# Patient Record
Sex: MALE | Race: WHITE | NOT HISPANIC OR LATINO
[De-identification: names, ages, dates, MRNs, and addresses within clinical notes are randomized per-mention and may not be internally consistent; named-entity substitution may affect disease eponyms.]

---

## 2017-02-21 ENCOUNTER — PRIOR ORIGINAL RECORDS (OUTPATIENT)
Dept: OTHER | Age: 72
End: 2017-02-21

## 2017-02-23 ENCOUNTER — TELEPHONE (OUTPATIENT)
Dept: FAMILY MEDICINE CLINIC | Facility: CLINIC | Age: 72
End: 2017-02-23

## 2017-02-23 NOTE — TELEPHONE ENCOUNTER
Pt advised. Appointment scheduled.   Future Appointments  Date Time Provider [facility] Radhika   2/24/2017 2:45 PM Marci Terrazas DO EMGOSW [facility] [place]   6/23/2017 10:20 AM Saida Lynn MD ONPV RHEUM [facility]SE MARIE NPV

## 2017-02-23 NOTE — TELEPHONE ENCOUNTER
Dr. Lucrecia Andrews office (hematology) called and stated pt has elevated fasting sugars. They wanted to make sure Dr. Marika Joseph is aware. Pt had recent labs done 2/21/17 at Dr. Lucrecia Andrews office. Please advise.

## 2017-02-24 ENCOUNTER — PRIOR ORIGINAL RECORDS (OUTPATIENT)
Dept: OTHER | Age: 72
End: 2017-02-24

## 2017-02-24 ENCOUNTER — OFFICE VISIT (OUTPATIENT)
Dept: FAMILY MEDICINE CLINIC | Facility: CLINIC | Age: 72
End: 2017-02-24

## 2017-02-24 VITALS
DIASTOLIC BLOOD PRESSURE: 72 MMHG | BODY MASS INDEX: 36 KG/M2 | HEART RATE: 76 BPM | SYSTOLIC BLOOD PRESSURE: 120 MMHG | TEMPERATURE: 98 F | WEIGHT: 242.38 LBS | RESPIRATION RATE: 14 BRPM

## 2017-02-24 DIAGNOSIS — E11.65 TYPE 2 DIABETES MELLITUS WITH HYPERGLYCEMIA, WITHOUT LONG-TERM CURRENT USE OF INSULIN (HCC): Primary | ICD-10-CM

## 2017-02-24 PROCEDURE — 99214 OFFICE O/P EST MOD 30 MIN: CPT | Performed by: FAMILY MEDICINE

## 2017-02-24 PROCEDURE — 83036 HEMOGLOBIN GLYCOSYLATED A1C: CPT | Performed by: FAMILY MEDICINE

## 2017-02-24 NOTE — PROGRESS NOTES
CC: abnormal testing    HPI:     Location blood sugar  Quality elevated  Severity moderate to severe (recent near 400)  Duration this is a chronic issue    ROS: positive polydipsia and polyuria, some dryness to skin, no cp or sob    Past Medical History

## 2017-02-25 LAB
EST. AVERAGE GLUCOSE BLD GHB EST-MCNC: 390 MG/DL (ref 68–126)
HBA1C MFR BLD HPLC: 15.2 % (ref ?–5.7)

## 2017-02-28 ENCOUNTER — NURSE ONLY (OUTPATIENT)
Dept: ENDOCRINOLOGY CLINIC | Facility: CLINIC | Age: 72
End: 2017-02-28

## 2017-02-28 VITALS — SYSTOLIC BLOOD PRESSURE: 128 MMHG | WEIGHT: 238 LBS | BODY MASS INDEX: 35 KG/M2 | DIASTOLIC BLOOD PRESSURE: 58 MMHG

## 2017-02-28 DIAGNOSIS — E11.65 TYPE 2 DIABETES MELLITUS WITH HYPERGLYCEMIA, UNSPECIFIED LONG TERM INSULIN USE STATUS: Primary | ICD-10-CM

## 2017-02-28 PROCEDURE — G0108 DIAB MANAGE TRN  PER INDIV: HCPCS | Performed by: DIETITIAN, REGISTERED

## 2017-02-28 RX ORDER — METFORMIN HYDROCHLORIDE 500 MG/1
TABLET, EXTENDED RELEASE ORAL
Qty: 120 TABLET | Refills: 6 | Status: SHIPPED | OUTPATIENT
Start: 2017-02-28 | End: 2017-07-06

## 2017-02-28 NOTE — PROGRESS NOTES
Chance Hernandez  : 1945 attended Step 1 Diabetic Education:  Pt. Accompanied by wife to appt.   Date: 2017   Start time: 1pm End time: 2:30pm    /58 mmHg  Wt 238 lb      HGBA1C (%)   Date Value   2017 15.2*   ----------     Depr Instructed/reinforced blood glucose monitoring, testing schedules and target goals:   Fasting / Pre-meal  2 Hour Post-prandial 140-180 -160  Demonstrated ability to perform blood glucose testing on: Zain Contour Next EZ  Glucose today was 523

## 2017-03-07 ENCOUNTER — NURSE ONLY (OUTPATIENT)
Dept: ENDOCRINOLOGY CLINIC | Facility: CLINIC | Age: 72
End: 2017-03-07

## 2017-03-07 ENCOUNTER — MED REC SCAN ONLY (OUTPATIENT)
Dept: FAMILY MEDICINE CLINIC | Facility: CLINIC | Age: 72
End: 2017-03-07

## 2017-03-07 VITALS — WEIGHT: 244 LBS | BODY MASS INDEX: 36 KG/M2 | DIASTOLIC BLOOD PRESSURE: 58 MMHG | SYSTOLIC BLOOD PRESSURE: 110 MMHG

## 2017-03-07 DIAGNOSIS — IMO0001 UNCONTROLLED TYPE 2 DIABETES MELLITUS WITHOUT COMPLICATION, WITHOUT LONG-TERM CURRENT USE OF INSULIN: Primary | ICD-10-CM

## 2017-03-07 PROCEDURE — G0108 DIAB MANAGE TRN  PER INDIV: HCPCS | Performed by: DIETITIAN, REGISTERED

## 2017-03-07 NOTE — PROGRESS NOTES
Rodney Cuello  [facility][id number] was seen for Diabetic Education Initial/Follow up:    Date: 3/7/2017  Referring MD:Dr. Lamin Lutz  Start time: 11am End time: 12pm    Assessment:     Assessment: /58 mmHg  Wt 244 lb     Changes since last visit:  - M May cause G.I. Side effectsif not taken with food. If dose missed, wait until next meal. Hold meds if tests ordered requiring contrast media. Injectables:   Type/Dose/Schedule:  Toujeo 24 units qhs( following titration schedule)  Action/Side Effects/Intera center with any questions or concerns. Patient verbalized understanding and has no further questions at this time.     Kavon Persaud, RN,CDE

## 2017-03-21 ENCOUNTER — NURSE ONLY (OUTPATIENT)
Dept: ENDOCRINOLOGY CLINIC | Facility: CLINIC | Age: 72
End: 2017-03-21

## 2017-03-21 VITALS — SYSTOLIC BLOOD PRESSURE: 118 MMHG | BODY MASS INDEX: 36 KG/M2 | WEIGHT: 242.19 LBS | DIASTOLIC BLOOD PRESSURE: 68 MMHG

## 2017-03-21 DIAGNOSIS — IMO0001 UNCONTROLLED TYPE 2 DIABETES MELLITUS WITHOUT COMPLICATION, WITHOUT LONG-TERM CURRENT USE OF INSULIN: Primary | ICD-10-CM

## 2017-03-21 PROCEDURE — G0108 DIAB MANAGE TRN  PER INDIV: HCPCS | Performed by: DIETITIAN, REGISTERED

## 2017-03-23 NOTE — PROGRESS NOTES
Toro Matthews  [id number] was seen for Diabetic Education Initial/Follow up:    Date: 3/22/2017  Referring MD: Dr. Gabriel Todd  Start time: 11am End time: 11:30am    Assessment:     Assessment: /68 mmHg  Wt 242 lb 3.2 oz    Changes since last >2 BG >300 mg/dL in 1 week    Hypoglycemia  - Causes/symptoms/prevention/treatment-Rule of 15  - Low Blood Glucose: <70 mg/dL  - Contact MD if >2BG <70 in 1 week    REDUCING RISKS:  - relationship between glucose control and risk for complications in eye,

## 2017-04-04 ENCOUNTER — PRIOR ORIGINAL RECORDS (OUTPATIENT)
Dept: OTHER | Age: 72
End: 2017-04-04

## 2017-04-04 ENCOUNTER — TELEPHONE (OUTPATIENT)
Dept: ENDOCRINOLOGY CLINIC | Facility: CLINIC | Age: 72
End: 2017-04-04

## 2017-04-04 NOTE — TELEPHONE ENCOUNTER
Pt. called to report BG readings:    Date Breakfast   Lunch    Dinner   Bedtime Comments    Pre Insulin Units Post Pre Insulin Units Post Pre  Insulin Units Post     3/23 131   93   111       3/24 109   115   147       3/25 125   103   87       3/26 124

## 2017-04-06 ENCOUNTER — TELEPHONE (OUTPATIENT)
Dept: FAMILY MEDICINE CLINIC | Facility: CLINIC | Age: 72
End: 2017-04-06

## 2017-04-06 NOTE — TELEPHONE ENCOUNTER
Pt completed dilated eye exam 4/4/17  [facility]  Seen by Shea hCeng, CHANI  Pt chart updated.

## 2017-04-10 LAB
ALBUMIN: 3.8 G/DL
ALKALINE PHOSPHATATE(ALK PHOS): 85 IU/L
BILIRUBIN TOTAL: 0.36 MG/DL
BUN: 15 MG/DL
CALCIUM: 9.1 MG/DL
CHLORIDE: 96 MEQ/L
CREATININE, SERUM: 0.99 MG/DL
ESR (SED RATE): 16 MM/HR
GLUCOSE: 390 MG/DL
HEMATOCRIT: 44.7 %
HEMOGLOBIN A1C: 15.2 %
HEMOGLOBIN: 14.8 G/DL
PLATELETS: 174 K/UL
POTASSIUM, SERUM: 4.6 MEQ/L
PROTEIN, TOTAL: 7.3 G/DL
RED BLOOD COUNT: 5.37 X 10-6/U
SGOT (AST): 44 IU/L
SGPT (ALT): 66 IU/L
SODIUM: 132 MEQ/L
WHITE BLOOD COUNT: 5.93 X 10-3/U

## 2017-04-11 ENCOUNTER — TELEPHONE (OUTPATIENT)
Dept: FAMILY MEDICINE CLINIC | Facility: CLINIC | Age: 72
End: 2017-04-11

## 2017-04-21 ENCOUNTER — PRIOR ORIGINAL RECORDS (OUTPATIENT)
Dept: OTHER | Age: 72
End: 2017-04-21

## 2017-04-24 ENCOUNTER — PRIOR ORIGINAL RECORDS (OUTPATIENT)
Dept: OTHER | Age: 72
End: 2017-04-24

## 2017-04-25 ENCOUNTER — OFFICE VISIT (OUTPATIENT)
Dept: FAMILY MEDICINE CLINIC | Facility: CLINIC | Age: 72
End: 2017-04-25

## 2017-04-25 VITALS
HEART RATE: 73 BPM | BODY MASS INDEX: 36.98 KG/M2 | RESPIRATION RATE: 16 BRPM | HEIGHT: 68 IN | DIASTOLIC BLOOD PRESSURE: 62 MMHG | SYSTOLIC BLOOD PRESSURE: 118 MMHG | WEIGHT: 244 LBS | TEMPERATURE: 99 F

## 2017-04-25 DIAGNOSIS — Z13.1 SCREENING FOR DIABETES MELLITUS (DM): ICD-10-CM

## 2017-04-25 DIAGNOSIS — Z23 NEED FOR VACCINATION: ICD-10-CM

## 2017-04-25 DIAGNOSIS — Z00.00 ENCOUNTER FOR ANNUAL HEALTH EXAMINATION: Primary | ICD-10-CM

## 2017-04-25 DIAGNOSIS — Z13.31 DEPRESSION SCREENING: ICD-10-CM

## 2017-04-25 PROCEDURE — G0444 DEPRESSION SCREEN ANNUAL: HCPCS | Performed by: FAMILY MEDICINE

## 2017-04-25 PROCEDURE — 82570 ASSAY OF URINE CREATININE: CPT | Performed by: FAMILY MEDICINE

## 2017-04-25 PROCEDURE — 82043 UR ALBUMIN QUANTITATIVE: CPT | Performed by: FAMILY MEDICINE

## 2017-04-25 PROCEDURE — 83036 HEMOGLOBIN GLYCOSYLATED A1C: CPT | Performed by: FAMILY MEDICINE

## 2017-04-25 PROCEDURE — 90732 PPSV23 VACC 2 YRS+ SUBQ/IM: CPT | Performed by: FAMILY MEDICINE

## 2017-04-25 PROCEDURE — G0009 ADMIN PNEUMOCOCCAL VACCINE: HCPCS | Performed by: FAMILY MEDICINE

## 2017-04-25 PROCEDURE — G0439 PPPS, SUBSEQ VISIT: HCPCS | Performed by: FAMILY MEDICINE

## 2017-04-25 RX ORDER — LOVASTATIN 40 MG/1
TABLET ORAL
Refills: 2 | COMMUNITY
Start: 2017-04-19

## 2017-04-25 NOTE — PROGRESS NOTES
HPI:   Josias Tucker is a 73year old male who presents for a Medicare Subsequent Annual Wellness visit (Pt already had Initial Annual Wellness). Doing ok. Blood sugars better.    His last annual assessment has been over 1 year: Annual Physical d Vitro Solution 1 drop by In Vitro route as needed.  Test 1st strip out of each new vial;discard solution 90 days after opening  Dx Code: E11.65 IDDM   Insulin Pen Needle (BD PEN NEEDLE LILO U/F) 32G X 4 MM Does not apply Misc Use once daily with Cassie Leiva alcohol or use illicit drugs.      REVIEW OF SYSTEMS:   GENERAL: feels well otherwise  SKIN: denies any unusual skin lesions  EYES: denies blurred vision or double vision  HEENT: denies nasal congestion, sinus pain or ST  LUNGS: denies shortness of breath w AND OTHER RELEVANT CHRONIC CONDITIONS:   Noe Gentile is a 73year old male who presents for a Medicare Assessment.      PLAN SUMMARY:   Diagnoses and all orders for this visit:    Encounter for annual health examination  -     pneumococcal 23-Tari Va Bathing or Showering: Able without help    Toileting: Able without help    Dressing: Able without help    Eating: Able without help    Driving: Able without help    Preparing your meals: Able without help    Managing money/bills: Able without help    Ta Procedure External Lab or Procedure   Diabetes Screening      HbgA1C   Annually HGBA1C (%)   Date Value   02/24/2017 15.2*       No flowsheet data found.     Fasting Blood Sugar (FSB)Annually   GLUCOSE (mg/dL)   Date Value   02/21/2017 390*   06/17/2016 196 06/17/2016 5.6*   04/16/2015 4.3   05/06/2014 4.7    No flowsheet data found.     Creatinine  Annually CREATININE, SERUM (mg/dL)   Date Value   06/17/2016 0.96     CREATININE (mg/dL)   Date Value   02/21/2017 0.99   04/16/2015 0.73   05/06/2014 0.75    No

## 2017-04-25 NOTE — PATIENT INSTRUCTIONS
Negro Reyes's SCREENING SCHEDULE   Tests on this list are recommended by your physician but may not be covered, or covered at this frequency, by your insurer. Please check with your insurance carrier before scheduling to verify coverage.     Sreekanth Arambula 73-68 years old and have smoked more than 100 cigarettes in their lifetime   • Anyone with a family history    Colorectal Cancer Screening Covered up to Age 76     Colonoscopy Screen   Covered every 10 years- more often if abnormal Colonoscopy,10 Years due Homosexual men   Illicit injectable drug abusers     Tetanus Toxoid- Only covered with a cut with metal- TD and TDaP Not covered by Medicare Part B) No orders found for this or any previous visit.  This may be covered with your prescription benefits, but

## 2017-04-28 LAB
ALBUMIN: 4.1 G/DL
ALKALINE PHOSPHATATE(ALK PHOS): 59 IU/L
BILIRUBIN TOTAL: 0.4 MG/DL
BNP: 75 PMOL/L
BUN: 20 MG/DL
CALCIUM: 9.3 MG/DL
CHLORIDE: 104 MEQ/L
CHOLESTEROL, TOTAL: 200 MG/DL
CREATININE, SERUM: 0.86 MG/DL
ESR (SED RATE): 31 MM/HR
GLOBULIN: 3.2 G/DL
GLUCOSE: 101 MG/DL
HDL CHOLESTEROL: 45 MG/DL
HEMATOCRIT: 46.4 %
HEMOGLOBIN: 15 G/DL
LDL CHOLESTEROL: 130 MG/DL
PLATELETS: 180 K/UL
POTASSIUM, SERUM: 4.8 MEQ/L
PROTEIN, TOTAL: 7.3 G/DL
RED BLOOD COUNT: 5.46 X 10-6/U
SGOT (AST): 25 IU/L
SGPT (ALT): 22 IU/L
SODIUM: 139 MEQ/L
TRIGLYCERIDES: 126 MG/DL
WHITE BLOOD COUNT: 5.7 X 10-3/U

## 2017-05-02 ENCOUNTER — NURSE ONLY (OUTPATIENT)
Dept: ENDOCRINOLOGY CLINIC | Facility: CLINIC | Age: 72
End: 2017-05-02

## 2017-05-02 VITALS — BODY MASS INDEX: 38 KG/M2 | WEIGHT: 247.19 LBS

## 2017-05-02 DIAGNOSIS — IMO0001 DIABETES MELLITUS DUE TO UNDERLYING CONDITION, UNCONTROLLED, WITHOUT COMPLICATION, WITH LONG-TERM CURRENT USE OF INSULIN: Primary | ICD-10-CM

## 2017-05-02 PROCEDURE — G0108 DIAB MANAGE TRN  PER INDIV: HCPCS | Performed by: DIETITIAN, REGISTERED

## 2017-05-04 NOTE — PROGRESS NOTES
Fang Balderrama  [id number] was seen for Diabetic Education Initial/Follow up:    Date: 5/3/2017  Referring MD:Dr. Ryan Olsen Start time: 11am End time: 11:30am    Assessment:     Assessment: Wt 247 lb 3.2 oz       HGBA1C (%)   Date Value   04/25/2017 hypoglycemia episodes 61 & 69 mg/dL    Hyperglycemia  - Causes/symptoms/prevention/treatment  - High Blood Glucose: >300 mg/dL  - Contact MD if >2 BG >300 mg/dL in 1 week    Hypoglycemia  - Causes/symptoms/prevention/treatment-Rule of 15  - Low Blood Gluco

## 2017-05-23 ENCOUNTER — PRIOR ORIGINAL RECORDS (OUTPATIENT)
Dept: OTHER | Age: 72
End: 2017-05-23

## 2017-05-30 ENCOUNTER — PRIOR ORIGINAL RECORDS (OUTPATIENT)
Dept: OTHER | Age: 72
End: 2017-05-30

## 2017-06-27 ENCOUNTER — NURSE ONLY (OUTPATIENT)
Dept: FAMILY MEDICINE CLINIC | Facility: CLINIC | Age: 72
End: 2017-06-27

## 2017-06-27 DIAGNOSIS — R73.09 ELEVATED HEMOGLOBIN A1C: Primary | ICD-10-CM

## 2017-06-27 PROCEDURE — 36415 COLL VENOUS BLD VENIPUNCTURE: CPT | Performed by: FAMILY MEDICINE

## 2017-06-27 PROCEDURE — 83036 HEMOGLOBIN GLYCOSYLATED A1C: CPT | Performed by: FAMILY MEDICINE

## 2017-07-06 ENCOUNTER — TELEPHONE (OUTPATIENT)
Dept: FAMILY MEDICINE CLINIC | Facility: CLINIC | Age: 72
End: 2017-07-06

## 2017-07-06 RX ORDER — METFORMIN HYDROCHLORIDE 500 MG/1
1000 TABLET, EXTENDED RELEASE ORAL 2 TIMES DAILY WITH MEALS
Qty: 360 TABLET | Refills: 1 | Status: SHIPPED | OUTPATIENT
Start: 2017-07-06 | End: 2018-01-12

## 2017-07-06 NOTE — TELEPHONE ENCOUNTER
[facility] needs updated information on patient's metformin. Medication pended, please edit sig. How many tabs daily?

## 2017-07-14 ENCOUNTER — MYAURORA ACCOUNT LINK (OUTPATIENT)
Dept: OTHER | Age: 72
End: 2017-07-14

## 2017-07-14 ENCOUNTER — PRIOR ORIGINAL RECORDS (OUTPATIENT)
Dept: OTHER | Age: 72
End: 2017-07-14

## 2017-08-01 ENCOUNTER — NURSE ONLY (OUTPATIENT)
Dept: ENDOCRINOLOGY CLINIC | Facility: CLINIC | Age: 72
End: 2017-08-01

## 2017-08-01 VITALS — WEIGHT: 253 LBS | BODY MASS INDEX: 37 KG/M2 | DIASTOLIC BLOOD PRESSURE: 64 MMHG | SYSTOLIC BLOOD PRESSURE: 114 MMHG

## 2017-08-01 DIAGNOSIS — E08.9 DIABETES MELLITUS DUE TO UNDERLYING CONDITION WITHOUT COMPLICATION, WITH LONG-TERM CURRENT USE OF INSULIN (HCC): Primary | ICD-10-CM

## 2017-08-01 DIAGNOSIS — Z79.4 DIABETES MELLITUS DUE TO UNDERLYING CONDITION WITHOUT COMPLICATION, WITH LONG-TERM CURRENT USE OF INSULIN (HCC): Primary | ICD-10-CM

## 2017-08-01 PROCEDURE — G0108 DIAB MANAGE TRN  PER INDIV: HCPCS | Performed by: DIETITIAN, REGISTERED

## 2017-08-01 NOTE — PROGRESS NOTES
Toro Matthews  : 1945 was seen for Diabetic Education Follow up:    Date: 2017  Referring MD: Dr. Gabriel Todd      Assessment:     Assessment: /64   Wt 253 lb   BMI 37.36 kg/m²        HgbA1C (%)   Date Value   2017 6.8 (H)   -- injection at the same time every day. Site selection/Rotation/Storage/Travel: no problems w/injection sites.  Provided w/Spink brochure for traveling w/insulin       PROBLEM SOLVING:  Review SMBG/Food log;observe patterns: elevated prelunch readings when no 0.10 - 0.60 10*3/uL   Eosinophils Absolute 0.18 0.00 - 0.30 10*3/uL   Basophils Absolute 0.06 0.00 - 0.10 10*3/uL   nRBC Absolute 0.000 0.000 - 0.012 10*3/uL   Neutrophils % 68.5 %   Lymphocytes % 21.4 %   Monocytes % 7.2 %   Eosinophils % 2.2 %   Basophil

## 2017-08-04 ENCOUNTER — OFFICE VISIT (OUTPATIENT)
Dept: FAMILY MEDICINE CLINIC | Facility: CLINIC | Age: 72
End: 2017-08-04

## 2017-08-04 VITALS
SYSTOLIC BLOOD PRESSURE: 132 MMHG | HEIGHT: 69 IN | TEMPERATURE: 98 F | BODY MASS INDEX: 37.27 KG/M2 | HEART RATE: 80 BPM | RESPIRATION RATE: 14 BRPM | WEIGHT: 251.63 LBS | OXYGEN SATURATION: 97 % | DIASTOLIC BLOOD PRESSURE: 82 MMHG

## 2017-08-04 DIAGNOSIS — Z79.4 TYPE 2 DIABETES MELLITUS WITH HYPERGLYCEMIA, WITH LONG-TERM CURRENT USE OF INSULIN (HCC): Primary | ICD-10-CM

## 2017-08-04 DIAGNOSIS — E11.65 TYPE 2 DIABETES MELLITUS WITH HYPERGLYCEMIA, WITH LONG-TERM CURRENT USE OF INSULIN (HCC): Primary | ICD-10-CM

## 2017-08-04 PROCEDURE — 99213 OFFICE O/P EST LOW 20 MIN: CPT | Performed by: FAMILY MEDICINE

## 2017-08-04 NOTE — PROGRESS NOTES
CC: dm follow up    HPI:     The A1c is markedly improved. Compliant with meds. Some exercise. Diet is relatively good.     ROS: weight fairly stable    EXAM: :  /82 (BP Location: Left arm, Patient Position: Sitting, Cuff Size: adult)   Pulse 80   Tem

## 2017-08-15 ENCOUNTER — MED REC SCAN ONLY (OUTPATIENT)
Dept: FAMILY MEDICINE CLINIC | Facility: CLINIC | Age: 72
End: 2017-08-15

## 2017-09-08 ENCOUNTER — MED REC SCAN ONLY (OUTPATIENT)
Dept: FAMILY MEDICINE CLINIC | Facility: CLINIC | Age: 72
End: 2017-09-08

## 2017-11-07 ENCOUNTER — NURSE ONLY (OUTPATIENT)
Dept: ENDOCRINOLOGY CLINIC | Facility: CLINIC | Age: 72
End: 2017-11-07

## 2017-11-07 ENCOUNTER — TELEPHONE (OUTPATIENT)
Dept: FAMILY MEDICINE CLINIC | Facility: CLINIC | Age: 72
End: 2017-11-07

## 2017-11-07 VITALS — BODY MASS INDEX: 38 KG/M2 | WEIGHT: 255 LBS | SYSTOLIC BLOOD PRESSURE: 122 MMHG | DIASTOLIC BLOOD PRESSURE: 70 MMHG

## 2017-11-07 DIAGNOSIS — E11.65 TYPE 2 DIABETES MELLITUS WITH HYPERGLYCEMIA, WITH LONG-TERM CURRENT USE OF INSULIN (HCC): ICD-10-CM

## 2017-11-07 DIAGNOSIS — Z12.5 PROSTATE CANCER SCREENING: ICD-10-CM

## 2017-11-07 DIAGNOSIS — Z00.00 GENERAL MEDICAL EXAM: Primary | ICD-10-CM

## 2017-11-07 DIAGNOSIS — Z79.4 TYPE 2 DIABETES MELLITUS WITH HYPERGLYCEMIA, WITH LONG-TERM CURRENT USE OF INSULIN (HCC): ICD-10-CM

## 2017-11-07 PROCEDURE — G0108 DIAB MANAGE TRN  PER INDIV: HCPCS | Performed by: DIETITIAN, REGISTERED

## 2017-11-07 NOTE — TELEPHONE ENCOUNTER
Bronson Bullock: I LM for patient to get nurse appointment scheduled. Fasting full set, A1C, urine micro, and PSA screen placed by Dr. Lore Barbosa.

## 2017-11-07 NOTE — TELEPHONE ENCOUNTER
Patient was in to see Carlos Cronin today. Sherice Medina states she thought patient was due for A1C check. No order for a1c in patient's chart. Would you like patient to be scheduled for any labs?   Last a1c : 6/27/17  Last urine micro: 4/25/17  Last diabetes vis

## 2017-11-07 NOTE — PROGRESS NOTES
Moises Ormond  : 1945 was seen for Diabetic Education Initial/Follow up:    Date: 2017  Referring MD:Dr. Jose Guadalupe Olsen  Start time: 11am End time: 11:30am    Assessment:     Assessment: /70   Wt 255 lb   BMI 37.66 kg/m²        HgbA1C timing of meal, use of snacks/fiber, barriers: he noticed his prelunch readings have been climbing over past month  - discussed foods eaten for breakfast; ranging from Protein shakes to bagel w/cream cheese to cereal ( granola or cheerios) w/blueberries 21 15 - 41 U/L   Albumin 4.0 3.5 - 4.8 g/dL   Alkaline Phosphatase 68 45 - 117 U/L   Bilirubin, Total 0.28 0.10 - 2.00 mg/dL   Total Protein 7.9 6.1 - 8.3 g/dL   Bilirubin, Direct <0.05 (L) 0.10 - 0.50 mg/dL   -SED RATE, WESTERGREN (AUTOMATED)   Result Tari at this time.     Dara Crespo RN, CDE

## 2017-11-07 NOTE — TELEPHONE ENCOUNTER
Future Appointments  Date Time Provider Lloyd Radhika   11/13/2017 8:30 AM [facility] [place] NURSE [facility] [place]   11/17/2017 11:00 AM YK CARD TM/STRESS/ECHO RM 1 YK CARD [place]   11/21/2017 10:20 AM Jennifer Bearden MD ONPV [facility]HE[facility]IE NPV   2/6/2018 11

## 2017-11-13 ENCOUNTER — PRIOR ORIGINAL RECORDS (OUTPATIENT)
Dept: OTHER | Age: 72
End: 2017-11-13

## 2017-11-13 ENCOUNTER — NURSE ONLY (OUTPATIENT)
Dept: FAMILY MEDICINE CLINIC | Facility: CLINIC | Age: 72
End: 2017-11-13

## 2017-11-13 DIAGNOSIS — Z00.00 GENERAL MEDICAL EXAM: Primary | ICD-10-CM

## 2017-11-13 DIAGNOSIS — Z12.5 PROSTATE CANCER SCREENING: ICD-10-CM

## 2017-11-13 PROCEDURE — 83036 HEMOGLOBIN GLYCOSYLATED A1C: CPT | Performed by: FAMILY MEDICINE

## 2017-11-13 PROCEDURE — 36415 COLL VENOUS BLD VENIPUNCTURE: CPT | Performed by: FAMILY MEDICINE

## 2017-11-13 PROCEDURE — 84443 ASSAY THYROID STIM HORMONE: CPT | Performed by: FAMILY MEDICINE

## 2017-11-13 PROCEDURE — 82043 UR ALBUMIN QUANTITATIVE: CPT | Performed by: FAMILY MEDICINE

## 2017-11-13 PROCEDURE — 80053 COMPREHEN METABOLIC PANEL: CPT | Performed by: FAMILY MEDICINE

## 2017-11-13 PROCEDURE — 85025 COMPLETE CBC W/AUTO DIFF WBC: CPT | Performed by: FAMILY MEDICINE

## 2017-11-13 PROCEDURE — 80061 LIPID PANEL: CPT | Performed by: FAMILY MEDICINE

## 2017-11-13 PROCEDURE — 82570 ASSAY OF URINE CREATININE: CPT | Performed by: FAMILY MEDICINE

## 2017-11-17 ENCOUNTER — MYAURORA ACCOUNT LINK (OUTPATIENT)
Dept: OTHER | Age: 72
End: 2017-11-17

## 2017-11-17 ENCOUNTER — HOSPITAL ENCOUNTER (OUTPATIENT)
Dept: CV DIAGNOSTICS | Age: 72
Discharge: HOME | End: 2017-11-17
Attending: INTERNAL MEDICINE
Payer: MEDICARE

## 2017-11-17 DIAGNOSIS — I25.10 CORONARY ARTERIOSCLEROSIS: ICD-10-CM

## 2017-11-17 DIAGNOSIS — I50.20 SYSTOLIC HEART FAILURE, UNSPECIFIED HEART FAILURE CHRONICITY: ICD-10-CM

## 2017-12-05 ENCOUNTER — PRIOR ORIGINAL RECORDS (OUTPATIENT)
Dept: OTHER | Age: 72
End: 2017-12-05

## 2017-12-27 LAB
ALBUMIN: 3.7 G/DL
ALKALINE PHOSPHATATE(ALK PHOS): 56 IU/L
BILIRUBIN TOTAL: 0.4 MG/DL
BUN: 22 MG/DL
CALCIUM: 9.3 MG/DL
CHLORIDE: 105 MEQ/L
CHOLESTEROL, TOTAL: 195 MG/DL
CREATININE, SERUM: 1.12 MG/DL
GLUCOSE: 105 MG/DL
HDL CHOLESTEROL: 49 MG/DL
HEMATOCRIT: 44.9 %
HEMOGLOBIN A1C: 6.5 %
HEMOGLOBIN: 14.6 G/DL
LDL CHOLESTEROL: 116 MG/DL
PLATELETS: 204 K/UL
POTASSIUM, SERUM: 5 MEQ/L
PROTEIN, TOTAL: 7.9 G/DL
RED BLOOD COUNT: 5.22 X 10-6/U
SGOT (AST): 21 IU/L
SGPT (ALT): 29 IU/L
SODIUM: 138 MEQ/L
THYROID STIMULATING HORMONE: 4.31 MLU/L
TRIGLYCERIDES: 151 MG/DL
WHITE BLOOD COUNT: 6.4 X 10-3/U

## 2018-01-12 RX ORDER — METFORMIN HYDROCHLORIDE 500 MG/1
TABLET, EXTENDED RELEASE ORAL
Qty: 360 TABLET | Refills: 0 | Status: SHIPPED | OUTPATIENT
Start: 2018-01-12 | End: 2018-04-13

## 2018-01-12 NOTE — TELEPHONE ENCOUNTER
LOV: 8/4/17 for diabetes  A1C: 11/13/17    MetFORMIN HCl  MG Oral Tablet 24 Hr 360 tablet 1 7/6/2017    Sig :  Take 2 tablets (1,000 mg total) by mouth 2 (two) times daily with meals.      Route:   Oral       Future Appointments  Date Time Provider Miky Ernandez

## 2018-01-17 ENCOUNTER — PATIENT OUTREACH (OUTPATIENT)
Dept: FAMILY MEDICINE CLINIC | Facility: CLINIC | Age: 73
End: 2018-01-17

## 2018-01-23 ENCOUNTER — PRIOR ORIGINAL RECORDS (OUTPATIENT)
Dept: OTHER | Age: 73
End: 2018-01-23

## 2018-01-24 ENCOUNTER — HOSPITAL ENCOUNTER (OUTPATIENT)
Dept: CV DIAGNOSTICS | Age: 73
Discharge: HOME | End: 2018-01-24
Attending: INTERNAL MEDICINE
Payer: MEDICARE

## 2018-01-24 DIAGNOSIS — I25.10 CORONARY ATHEROSCLEROSIS OF NATIVE CORONARY ARTERY: ICD-10-CM

## 2018-01-24 DIAGNOSIS — Z95.1 POSTSURGICAL AORTOCORONARY BYPASS STATUS: ICD-10-CM

## 2018-01-24 PROCEDURE — 78452 HT MUSCLE IMAGE SPECT MULT: CPT | Performed by: INTERNAL MEDICINE

## 2018-01-24 PROCEDURE — 93017 CV STRESS TEST TRACING ONLY: CPT | Performed by: INTERNAL MEDICINE

## 2018-01-24 PROCEDURE — 93018 CV STRESS TEST I&R ONLY: CPT | Performed by: INTERNAL MEDICINE

## 2018-02-02 ENCOUNTER — PRIOR ORIGINAL RECORDS (OUTPATIENT)
Dept: OTHER | Age: 73
End: 2018-02-02

## 2018-02-06 ENCOUNTER — NURSE ONLY (OUTPATIENT)
Dept: ENDOCRINOLOGY CLINIC | Facility: CLINIC | Age: 73
End: 2018-02-06

## 2018-02-06 VITALS — DIASTOLIC BLOOD PRESSURE: 52 MMHG | BODY MASS INDEX: 39 KG/M2 | WEIGHT: 264 LBS | SYSTOLIC BLOOD PRESSURE: 102 MMHG

## 2018-02-06 DIAGNOSIS — Z79.4 TYPE 2 DIABETES MELLITUS WITH HYPERGLYCEMIA, WITH LONG-TERM CURRENT USE OF INSULIN (HCC): ICD-10-CM

## 2018-02-06 DIAGNOSIS — E11.65 TYPE 2 DIABETES MELLITUS WITH HYPERGLYCEMIA, WITH LONG-TERM CURRENT USE OF INSULIN (HCC): ICD-10-CM

## 2018-02-06 PROCEDURE — G0108 DIAB MANAGE TRN  PER INDIV: HCPCS | Performed by: DIETITIAN, REGISTERED

## 2018-02-08 NOTE — PROGRESS NOTES
Steve Jessica  : 1945 was seen for Diabetic Education Follow up:    Date: 2018  Referring MD:Dr. Yesi Couch Start time:11am   End time: 11:30am    Assessment:     Assessment: /52   Wt 264 lb   BMI 38.99 kg/m²        HgbA1C (%)   Curtis Effects/Interactions/Precautions/Missed Doses: Decreases glucose production by the liver. May cause G.I. Side effectsif not taken with food. If dose missed, wait until next meal. Hold meds if tests ordered requiring contrast media.     Injectables:   Inject Chol 146 (H) <130 mg/dL   -ASSAY, THYROID STIM HORMONE   Result Value Ref Range   TSH 4.310 0.350 - 5.500 mIU/mL   -HEMOGLOBIN A1C   Result Value Ref Range   HgbA1C 6.5 (H) <5.7 %   Estimated Average Glucose 140 (H) 68 - 126 mg/dL   -MICROALB/CREAT RATIO,

## 2018-03-06 ENCOUNTER — PRIOR ORIGINAL RECORDS (OUTPATIENT)
Dept: OTHER | Age: 73
End: 2018-03-06

## 2018-03-09 ENCOUNTER — MED REC SCAN ONLY (OUTPATIENT)
Dept: FAMILY MEDICINE CLINIC | Facility: CLINIC | Age: 73
End: 2018-03-09

## 2018-04-11 ENCOUNTER — MYAURORA ACCOUNT LINK (OUTPATIENT)
Dept: OTHER | Age: 73
End: 2018-04-11

## 2018-04-13 RX ORDER — METFORMIN HYDROCHLORIDE 500 MG/1
TABLET, EXTENDED RELEASE ORAL
Qty: 360 TABLET | Refills: 0 | Status: SHIPPED | OUTPATIENT
Start: 2018-04-13 | End: 2018-07-20

## 2018-04-13 RX ORDER — INSULIN GLARGINE 300 U/ML
INJECTION, SOLUTION SUBCUTANEOUS
Qty: 15 PEN | Refills: 1 | Status: SHIPPED | OUTPATIENT
Start: 2018-04-13 | End: 2018-05-01

## 2018-04-27 NOTE — TELEPHONE ENCOUNTER
LOV: 8/4/17 for diabetes  A1C/Urine micro: 11/13/17      ROSIBEL MICROLET LANCETS Does not apply Misc 1 Box 11 2/28/2017    Sig :  Test 3 times daily DxCode: E11.65 IDDM     Route:   (none)       Future Appointments  Date Time Provider Lloyd Garrido   5/

## 2018-04-28 RX ORDER — LANCETS
EACH MISCELLANEOUS
Qty: 100 EACH | Refills: 2 | Status: SHIPPED | OUTPATIENT
Start: 2018-04-28 | End: 2018-10-12

## 2018-05-01 ENCOUNTER — OFFICE VISIT (OUTPATIENT)
Dept: FAMILY MEDICINE CLINIC | Facility: CLINIC | Age: 73
End: 2018-05-01

## 2018-05-01 VITALS
HEART RATE: 66 BPM | SYSTOLIC BLOOD PRESSURE: 110 MMHG | HEIGHT: 68 IN | OXYGEN SATURATION: 94 % | WEIGHT: 261 LBS | DIASTOLIC BLOOD PRESSURE: 60 MMHG | BODY MASS INDEX: 39.56 KG/M2 | TEMPERATURE: 99 F

## 2018-05-01 DIAGNOSIS — Z12.11 COLON CANCER SCREENING: ICD-10-CM

## 2018-05-01 DIAGNOSIS — I50.22 CHRONIC SYSTOLIC CONGESTIVE HEART FAILURE (HCC): ICD-10-CM

## 2018-05-01 DIAGNOSIS — Z79.4 TYPE 2 DIABETES MELLITUS WITH HYPERGLYCEMIA, WITH LONG-TERM CURRENT USE OF INSULIN (HCC): ICD-10-CM

## 2018-05-01 DIAGNOSIS — E78.5 DYSLIPIDEMIA: ICD-10-CM

## 2018-05-01 DIAGNOSIS — I25.10 CORONARY ARTERY DISEASE DUE TO CALCIFIED CORONARY LESION: ICD-10-CM

## 2018-05-01 DIAGNOSIS — G47.33 OSA (OBSTRUCTIVE SLEEP APNEA): ICD-10-CM

## 2018-05-01 DIAGNOSIS — M05.79 RHEUMATOID ARTHRITIS INVOLVING MULTIPLE SITES WITH POSITIVE RHEUMATOID FACTOR (HCC): ICD-10-CM

## 2018-05-01 DIAGNOSIS — I25.84 CORONARY ARTERY DISEASE DUE TO CALCIFIED CORONARY LESION: ICD-10-CM

## 2018-05-01 DIAGNOSIS — E11.65 TYPE 2 DIABETES MELLITUS WITH HYPERGLYCEMIA, WITH LONG-TERM CURRENT USE OF INSULIN (HCC): ICD-10-CM

## 2018-05-01 DIAGNOSIS — I42.8 NONISCHEMIC CARDIOMYOPATHY (HCC): ICD-10-CM

## 2018-05-01 DIAGNOSIS — Z00.00 ENCOUNTER FOR ANNUAL HEALTH EXAMINATION: Primary | ICD-10-CM

## 2018-05-01 PROCEDURE — G0439 PPPS, SUBSEQ VISIT: HCPCS | Performed by: FAMILY MEDICINE

## 2018-05-01 NOTE — PROGRESS NOTES
HPI:   Steve Lopez is a 81year old male who presents for a Medicare Subsequent Annual Wellness visit (Pt already had Initial Annual Wellness). Generally feeling ok. Blood sugars are \"being maintained\".    His last annual assessment has been (coronary artery disease)     Dyslipidemia     Rheumatoid arthritis involving multiple sites with positive rheumatoid factor (HCC)     Type 2 diabetes mellitus with hyperglycemia, with long-term current use of insulin (HCC)    Wt Readings from Last 3 Encou tablets by mouth 2x Daily(Beta Blocker). furosemide (LASIX) 20 MG Oral Tab Take 1 tablet by mouth daily. Clopidogrel Bisulfate (PLAVIX) 75 MG Oral Tab Take 75 mg by mouth daily. lisinopril (PRINIVIL,ZESTRIL) 5 MG Oral Tab Take 5 mg by mouth daily. Patient Position: Sitting, Cuff Size: adult)   Pulse 66   Temp 98.6 °F (37 °C) (Temporal)   Ht 68\"   Wt 261 lb   SpO2 94%   BMI 39.68 kg/m²   General appearance: alert, appears stated age and cooperative  Lungs: clear to auscultation bilaterally  Heart: S therapy    4. Chronic systolic congestive heart failure (HCC)  Clinically euvolemic. Continue diuretics, lower sodium, follow up Dr. Verlon Call. 5. Dyslipidemia  Stable. Continue statin therapy, periodic labs.      6. Nonischemic cardiomyopathy ([facility] [address]  Clin Health Maintenance if applicable    Flex Sigmoidoscopy Screen every 10 years No results found for this or any previous visit. No flowsheet data found. Fecal Occult Blood Annually No results found for: FOB No flowsheet data found.     Glaucoma Screening (mg/dL)   Date Value   03/23/2018 1.03    No flowsheet data found. Drug Serum Conc  Annually No results found for: DIGOXIN, DIG, VALP No flowsheet data found.        Diabetes      HgbA1C  Annually HgbA1C (%)   Date Value   11/13/2017 6.5 (H)       No emily

## 2018-05-01 NOTE — PATIENT INSTRUCTIONS
Tesha Reyes's SCREENING SCHEDULE   Tests on this list are recommended by your physician but may not be covered, or covered at this frequency, by your insurer. Please check with your insurance carrier before scheduling to verify coverage.     [employer] Limited to patients who meet one of the following criteria:   • Men who are 73-68 years old and have smoked more than 100 cigarettes in their lifetime   • Anyone with a family history    Colorectal Cancer Screening Covered up to Age 76     Colonoscopy Scre Clients of institutions for the mentally retarded   Persons who live in the same house as a HepB virus carrier   Homosexual men   Illicit injectable drug abusers     Tetanus Toxoid- Only covered with a cut with metal- TD and TDaP Not covered by [facility]

## 2018-05-11 RX ORDER — PEN NEEDLE, DIABETIC 32GX 5/32"
NEEDLE, DISPOSABLE MISCELLANEOUS
Qty: 100 EACH | Refills: 3 | Status: SHIPPED | OUTPATIENT
Start: 2018-05-11 | End: 2019-06-30

## 2018-05-22 ENCOUNTER — APPOINTMENT (OUTPATIENT)
Dept: LAB | Facility: HOSPITAL | Age: 73
End: 2018-05-22
Attending: FAMILY MEDICINE
Payer: MEDICARE

## 2018-05-22 ENCOUNTER — TELEPHONE (OUTPATIENT)
Dept: FAMILY MEDICINE CLINIC | Facility: CLINIC | Age: 73
End: 2018-05-22

## 2018-05-22 DIAGNOSIS — Z12.11 COLON CANCER SCREENING: ICD-10-CM

## 2018-05-22 PROCEDURE — 82270 OCCULT BLOOD FECES: CPT

## 2018-05-22 PROCEDURE — 82272 OCCULT BLD FECES 1-3 TESTS: CPT

## 2018-05-22 NOTE — TELEPHONE ENCOUNTER
Patient had questions regarding Hemoccult Fit cards. Instructed patient he must use all three cards  Patient verbalized understanding.

## 2018-06-01 ENCOUNTER — PRIOR ORIGINAL RECORDS (OUTPATIENT)
Dept: OTHER | Age: 73
End: 2018-06-01

## 2018-06-19 ENCOUNTER — MYAURORA ACCOUNT LINK (OUTPATIENT)
Dept: OTHER | Age: 73
End: 2018-06-19

## 2018-06-19 ENCOUNTER — PRIOR ORIGINAL RECORDS (OUTPATIENT)
Dept: OTHER | Age: 73
End: 2018-06-19

## 2018-07-10 ENCOUNTER — NURSE ONLY (OUTPATIENT)
Dept: ENDOCRINOLOGY CLINIC | Facility: CLINIC | Age: 73
End: 2018-07-10

## 2018-07-10 DIAGNOSIS — Z79.4 TYPE 2 DIABETES MELLITUS WITH HYPERGLYCEMIA, WITH LONG-TERM CURRENT USE OF INSULIN (HCC): ICD-10-CM

## 2018-07-10 DIAGNOSIS — E11.65 TYPE 2 DIABETES MELLITUS WITH HYPERGLYCEMIA, WITH LONG-TERM CURRENT USE OF INSULIN (HCC): ICD-10-CM

## 2018-07-10 PROCEDURE — G0108 DIAB MANAGE TRN  PER INDIV: HCPCS | Performed by: DIETITIAN, REGISTERED

## 2018-07-11 VITALS — WEIGHT: 261.38 LBS | DIASTOLIC BLOOD PRESSURE: 60 MMHG | SYSTOLIC BLOOD PRESSURE: 118 MMHG | BODY MASS INDEX: 40 KG/M2

## 2018-07-12 LAB
ALBUMIN: 4.2 G/DL
ALKALINE PHOSPHATATE(ALK PHOS): 53 IU/L
BILIRUBIN TOTAL: 0.3 MG/DL
BUN: 20 MG/DL
CALCIUM: 9.6 MG/DL
CHLORIDE: 103 MEQ/L
CHOLESTEROL, TOTAL: 192 MG/DL
CREATININE, SERUM: 0.94 MG/DL
GLOBULIN: 2.8 G/DL
GLUCOSE: 113 MG/DL
HDL CHOLESTEROL: 46 MG/DL
HEMOGLOBIN A1C: 6.4 %
LDL CHOLESTEROL: 120 MG/DL
POTASSIUM, SERUM: 5.2 MEQ/L
PROTEIN, TOTAL: 7 G/DL
SGOT (AST): 19 IU/L
SGPT (ALT): 20 IU/L
SODIUM: 138 MEQ/L
TRIGLYCERIDES: 145 MG/DL

## 2018-07-12 NOTE — PROGRESS NOTES
Kinzapat Hashimoto  : 1945 was seen for Diabetic Education Follow up:    Date: 7/10/2018  Referring MD: Dr. Omelia Dancer Start time: 11:00am End time: 11:30am    Assessment:     Assessment: /60 (BP Location: Right arm, Patient Position: Sittin complications    HEALTHY EATING:  - effect of food on BG, timing of meal, use of snacks/fiber, barriers    BEING ACTIVE:  - types of activity, frequency , duration: having problems w/Rt.  Knee pain     MONITORING:  - Type of meter:Zain Contour Next EZ  - T if no restrictions. 5. Increase Toujeo by 2-4 units at hs, continue Metformin 1000 mg 1 tab bid w/meals   5. Encouraged Alfredito Patel to call diabetes center with any questions or concerns.    6. Follow-up in 6 months        Patient verbalized under

## 2018-07-24 ENCOUNTER — PRIOR ORIGINAL RECORDS (OUTPATIENT)
Dept: OTHER | Age: 73
End: 2018-07-24

## 2018-07-24 RX ORDER — METFORMIN HYDROCHLORIDE 500 MG/1
TABLET, EXTENDED RELEASE ORAL
Qty: 360 TABLET | Refills: 0 | Status: SHIPPED | OUTPATIENT
Start: 2018-07-24 | End: 2018-10-22

## 2018-07-24 NOTE — TELEPHONE ENCOUNTER
Patient advised and verbalized understanding. Appointment scheduled.   Future Appointments  Date Time Provider Lloyd Garrido   8/7/2018 8:30 AM [facility] NURSE [facility] [place]   9/25/2018 11:00 AM Hanny Harvey MD ONPV RHE[facility] NPV   1/15/2019 1

## 2018-08-07 ENCOUNTER — NURSE ONLY (OUTPATIENT)
Dept: FAMILY MEDICINE CLINIC | Facility: CLINIC | Age: 73
End: 2018-08-07
Payer: MEDICARE

## 2018-08-07 ENCOUNTER — PRIOR ORIGINAL RECORDS (OUTPATIENT)
Dept: OTHER | Age: 73
End: 2018-08-07

## 2018-08-07 DIAGNOSIS — Z00.00 GENERAL MEDICAL EXAM: Primary | ICD-10-CM

## 2018-08-07 DIAGNOSIS — M17.12 PRIMARY OSTEOARTHRITIS OF LEFT KNEE: ICD-10-CM

## 2018-08-07 DIAGNOSIS — Z79.899 LONG-TERM USE OF HIGH-RISK MEDICATION: ICD-10-CM

## 2018-08-07 DIAGNOSIS — M05.79 RHEUMATOID ARTHRITIS INVOLVING MULTIPLE SITES WITH POSITIVE RHEUMATOID FACTOR (HCC): ICD-10-CM

## 2018-08-07 LAB
ALBUMIN SERPL-MCNC: 3.8 G/DL (ref 3.5–4.8)
ALBUMIN/GLOB SERPL: 0.9 {RATIO} (ref 1–2)
ALP LIVER SERPL-CCNC: 56 U/L (ref 45–117)
ALT SERPL-CCNC: 32 U/L (ref 17–63)
ANION GAP SERPL CALC-SCNC: 9 MMOL/L (ref 0–18)
AST SERPL-CCNC: 22 U/L (ref 15–41)
BASOPHILS # BLD AUTO: 0.05 X10(3) UL (ref 0–0.1)
BASOPHILS NFR BLD AUTO: 0.8 %
BILIRUB SERPL-MCNC: 0.3 MG/DL (ref 0.1–2)
BUN BLD-MCNC: 22 MG/DL (ref 8–20)
BUN/CREAT SERPL: 18.8 (ref 10–20)
CALCIUM BLD-MCNC: 9.3 MG/DL (ref 8.3–10.3)
CHLORIDE SERPL-SCNC: 106 MMOL/L (ref 101–111)
CHOLEST SMN-MCNC: 187 MG/DL (ref ?–200)
CO2 SERPL-SCNC: 26 MMOL/L (ref 22–32)
CREAT BLD-MCNC: 1.17 MG/DL (ref 0.7–1.3)
CREAT UR-SCNC: 256 MG/DL
CRP SERPL-MCNC: <0.29 MG/DL (ref ?–1)
EOSINOPHIL # BLD AUTO: 0.16 X10(3) UL (ref 0–0.3)
EOSINOPHIL NFR BLD AUTO: 2.6 %
ERYTHROCYTE [DISTWIDTH] IN BLOOD BY AUTOMATED COUNT: 15.2 % (ref 11.5–16)
EST. AVERAGE GLUCOSE BLD GHB EST-MCNC: 146 MG/DL (ref 68–126)
GLOBULIN PLAS-MCNC: 4.1 G/DL (ref 2.5–3.7)
GLUCOSE BLD-MCNC: 106 MG/DL (ref 70–99)
HBA1C MFR BLD HPLC: 6.7 % (ref ?–5.7)
HCT VFR BLD AUTO: 44.9 % (ref 37–53)
HDLC SERPL-MCNC: 44 MG/DL (ref 40–59)
HGB BLD-MCNC: 14.6 G/DL (ref 13–17)
IMMATURE GRANULOCYTE COUNT: 0.01 X10(3) UL (ref 0–1)
IMMATURE GRANULOCYTE RATIO %: 0.2 %
LDLC SERPL CALC-MCNC: 108 MG/DL (ref ?–100)
LYMPHOCYTES # BLD AUTO: 1.63 X10(3) UL (ref 0.9–4)
LYMPHOCYTES NFR BLD AUTO: 26.2 %
M PROTEIN MFR SERPL ELPH: 7.9 G/DL (ref 6.1–8.3)
MCH RBC QN AUTO: 27.9 PG (ref 27–33.2)
MCHC RBC AUTO-ENTMCNC: 32.5 G/DL (ref 31–37)
MCV RBC AUTO: 85.7 FL (ref 80–99)
MICROALBUMIN UR-MCNC: 1.54 MG/DL
MICROALBUMIN/CREAT 24H UR-RTO: 6 UG/MG (ref ?–30)
MONOCYTES # BLD AUTO: 0.56 X10(3) UL (ref 0.1–1)
MONOCYTES NFR BLD AUTO: 9 %
NEUTROPHIL ABS PRELIM: 3.82 X10 (3) UL (ref 1.3–6.7)
NEUTROPHILS # BLD AUTO: 3.82 X10(3) UL (ref 1.3–6.7)
NEUTROPHILS NFR BLD AUTO: 61.2 %
NONHDLC SERPL-MCNC: 143 MG/DL (ref ?–130)
OSMOLALITY SERPL CALC.SUM OF ELEC: 296 MOSM/KG (ref 275–295)
PLATELET # BLD AUTO: 173 10(3)UL (ref 150–450)
POTASSIUM SERPL-SCNC: 5.1 MMOL/L (ref 3.6–5.1)
RBC # BLD AUTO: 5.24 X10(6)UL (ref 3.8–5.8)
RED CELL DISTRIBUTION WIDTH-SD: 46.3 FL (ref 35.1–46.3)
SED RATE-ML: 14 MM/HR (ref 0–12)
SODIUM SERPL-SCNC: 141 MMOL/L (ref 136–144)
TRIGL SERPL-MCNC: 176 MG/DL (ref 30–149)
TSI SER-ACNC: 3.53 MIU/ML (ref 0.35–5.5)
VLDLC SERPL CALC-MCNC: 35 MG/DL (ref 0–30)
WBC # BLD AUTO: 6.2 X10(3) UL (ref 4–13)

## 2018-08-07 PROCEDURE — 36415 COLL VENOUS BLD VENIPUNCTURE: CPT | Performed by: FAMILY MEDICINE

## 2018-08-07 PROCEDURE — 80053 COMPREHEN METABOLIC PANEL: CPT | Performed by: FAMILY MEDICINE

## 2018-08-07 PROCEDURE — 84443 ASSAY THYROID STIM HORMONE: CPT | Performed by: FAMILY MEDICINE

## 2018-08-07 PROCEDURE — 85652 RBC SED RATE AUTOMATED: CPT | Performed by: INTERNAL MEDICINE

## 2018-08-07 PROCEDURE — 86140 C-REACTIVE PROTEIN: CPT | Performed by: INTERNAL MEDICINE

## 2018-08-07 PROCEDURE — 82043 UR ALBUMIN QUANTITATIVE: CPT | Performed by: FAMILY MEDICINE

## 2018-08-07 PROCEDURE — 85025 COMPLETE CBC W/AUTO DIFF WBC: CPT | Performed by: FAMILY MEDICINE

## 2018-08-07 PROCEDURE — 80061 LIPID PANEL: CPT | Performed by: FAMILY MEDICINE

## 2018-08-07 PROCEDURE — 82570 ASSAY OF URINE CREATININE: CPT | Performed by: FAMILY MEDICINE

## 2018-08-07 PROCEDURE — 83036 HEMOGLOBIN GLYCOSYLATED A1C: CPT | Performed by: FAMILY MEDICINE

## 2018-08-15 ENCOUNTER — OFFICE VISIT (OUTPATIENT)
Dept: FAMILY MEDICINE CLINIC | Facility: CLINIC | Age: 73
End: 2018-08-15
Payer: MEDICARE

## 2018-08-15 VITALS
HEART RATE: 78 BPM | BODY MASS INDEX: 39.71 KG/M2 | RESPIRATION RATE: 18 BRPM | WEIGHT: 262 LBS | DIASTOLIC BLOOD PRESSURE: 84 MMHG | SYSTOLIC BLOOD PRESSURE: 118 MMHG | HEIGHT: 68.1 IN | TEMPERATURE: 98 F

## 2018-08-15 DIAGNOSIS — E78.2 MIXED HYPERLIPIDEMIA: ICD-10-CM

## 2018-08-15 DIAGNOSIS — I25.84 CORONARY ARTERY DISEASE DUE TO CALCIFIED CORONARY LESION: ICD-10-CM

## 2018-08-15 DIAGNOSIS — E11.9 TYPE 2 DIABETES MELLITUS WITHOUT COMPLICATION, WITH LONG-TERM CURRENT USE OF INSULIN (HCC): Primary | ICD-10-CM

## 2018-08-15 DIAGNOSIS — I25.10 CORONARY ARTERY DISEASE DUE TO CALCIFIED CORONARY LESION: ICD-10-CM

## 2018-08-15 DIAGNOSIS — Z79.4 TYPE 2 DIABETES MELLITUS WITHOUT COMPLICATION, WITH LONG-TERM CURRENT USE OF INSULIN (HCC): Primary | ICD-10-CM

## 2018-08-15 PROCEDURE — 99214 OFFICE O/P EST MOD 30 MIN: CPT | Performed by: FAMILY MEDICINE

## 2018-08-15 NOTE — PROGRESS NOTES
CC: meds check    HPI:     DM: chronic, stable. Could increase the Toujeo to 34 units qhs to drive the N3V below 6.5 (currently 6.7).      Lipds: chronic, stable, but not at goal. He has had trouble with statins in past. Recent cardiology consult did not st ([facility] [address]  rx mgmt: increase toujeo to 34 units qhs, with tlc for obtaining a1c < 6.5.     2. Mixed hyperlipidemia  Stable, not at goal. Will contact Dr. India Etienne to determine if any further therapy drive down the ldl to goal of < 70.       3. Coronary artery dis

## 2018-08-20 ENCOUNTER — MED REC SCAN ONLY (OUTPATIENT)
Dept: FAMILY MEDICINE CLINIC | Facility: CLINIC | Age: 73
End: 2018-08-20

## 2018-10-12 RX ORDER — LANCETS
EACH MISCELLANEOUS
Qty: 100 EACH | Refills: 2 | Status: SHIPPED | OUTPATIENT
Start: 2018-10-12 | End: 2019-04-11

## 2018-10-12 NOTE — TELEPHONE ENCOUNTER
LOV:  8/15/18 for type 2 diabetes  A1C: 8/7/18 6.7    MICROLET LANCETS Does not apply Misc 100 each 2 4/28/2018    Sig :  TEST THREE TIMES DAILY     Route:   (none)       Future Appointments   Date Time Provider Lloyd Garrido   1/15/2019 11:00 AM Emile

## 2018-10-16 ENCOUNTER — MED REC SCAN ONLY (OUTPATIENT)
Dept: FAMILY MEDICINE CLINIC | Facility: CLINIC | Age: 73
End: 2018-10-16

## 2018-10-22 RX ORDER — METFORMIN HYDROCHLORIDE 500 MG/1
TABLET, EXTENDED RELEASE ORAL
Qty: 360 TABLET | Refills: 0 | Status: SHIPPED | OUTPATIENT
Start: 2018-10-22 | End: 2019-01-31

## 2018-10-24 ENCOUNTER — MYAURORA ACCOUNT LINK (OUTPATIENT)
Dept: OTHER | Age: 73
End: 2018-10-24

## 2018-11-19 ENCOUNTER — TELEPHONE (OUTPATIENT)
Dept: ENDOCRINOLOGY CLINIC | Facility: CLINIC | Age: 73
End: 2018-11-19

## 2018-11-19 DIAGNOSIS — E11.9 DIABETES MELLITUS WITHOUT COMPLICATION (HCC): Primary | ICD-10-CM

## 2018-11-20 ENCOUNTER — OFFICE VISIT (OUTPATIENT)
Dept: FAMILY MEDICINE CLINIC | Facility: CLINIC | Age: 73
End: 2018-11-20
Payer: MEDICARE

## 2018-11-20 ENCOUNTER — PRIOR ORIGINAL RECORDS (OUTPATIENT)
Dept: OTHER | Age: 73
End: 2018-11-20

## 2018-11-20 VITALS
SYSTOLIC BLOOD PRESSURE: 114 MMHG | OXYGEN SATURATION: 96 % | HEIGHT: 68 IN | BODY MASS INDEX: 40.47 KG/M2 | TEMPERATURE: 98 F | HEART RATE: 63 BPM | RESPIRATION RATE: 16 BRPM | WEIGHT: 267 LBS | DIASTOLIC BLOOD PRESSURE: 60 MMHG

## 2018-11-20 DIAGNOSIS — E78.2 MIXED HYPERLIPIDEMIA: ICD-10-CM

## 2018-11-20 DIAGNOSIS — E11.9 TYPE 2 DIABETES MELLITUS WITHOUT COMPLICATION, WITH LONG-TERM CURRENT USE OF INSULIN (HCC): Primary | ICD-10-CM

## 2018-11-20 DIAGNOSIS — Z79.4 TYPE 2 DIABETES MELLITUS WITHOUT COMPLICATION, WITH LONG-TERM CURRENT USE OF INSULIN (HCC): Primary | ICD-10-CM

## 2018-11-20 PROCEDURE — 36415 COLL VENOUS BLD VENIPUNCTURE: CPT | Performed by: FAMILY MEDICINE

## 2018-11-20 PROCEDURE — 99213 OFFICE O/P EST LOW 20 MIN: CPT | Performed by: FAMILY MEDICINE

## 2018-11-20 PROCEDURE — 83036 HEMOGLOBIN GLYCOSYLATED A1C: CPT | Performed by: FAMILY MEDICINE

## 2018-11-20 PROCEDURE — 83721 ASSAY OF BLOOD LIPOPROTEIN: CPT | Performed by: FAMILY MEDICINE

## 2018-11-20 NOTE — PROGRESS NOTES
CC: meds check    HPI:   Pt doing ok. Exercising. Using meds correctly. Due for labs for dm and lipids.      ROS: no abd pain    EXAM: /60   Pulse 63   Temp 97.8 °F (36.6 °C) (Temporal)   Resp 16   Ht 68\"   Wt 267 lb   SpO2 96%   BMI 40.60 kg/m²

## 2018-11-30 ENCOUNTER — MYAURORA ACCOUNT LINK (OUTPATIENT)
Dept: OTHER | Age: 73
End: 2018-11-30

## 2018-11-30 ENCOUNTER — HOSPITAL ENCOUNTER (OUTPATIENT)
Dept: CV DIAGNOSTICS | Facility: HOSPITAL | Age: 73
Discharge: HOME | End: 2018-11-30
Attending: INTERNAL MEDICINE

## 2018-11-30 DIAGNOSIS — I27.20 PULMONARY HYPERTENSION (HCC): ICD-10-CM

## 2018-11-30 DIAGNOSIS — I50.20 SYSTOLIC HEART FAILURE, UNSPECIFIED HF CHRONICITY (HCC): ICD-10-CM

## 2018-11-30 LAB — LV EF: NORMAL %

## 2018-12-03 ENCOUNTER — PRIOR ORIGINAL RECORDS (OUTPATIENT)
Dept: OTHER | Age: 73
End: 2018-12-03

## 2018-12-04 ENCOUNTER — PRIOR ORIGINAL RECORDS (OUTPATIENT)
Dept: OTHER | Age: 73
End: 2018-12-04

## 2018-12-04 ENCOUNTER — MYAURORA ACCOUNT LINK (OUTPATIENT)
Dept: OTHER | Age: 73
End: 2018-12-04

## 2018-12-18 LAB
ALBUMIN: 3.8 G/DL
ALKALINE PHOSPHATATE(ALK PHOS): 56 IU/L
BILIRUBIN TOTAL: 0.3 MG/DL
BUN: 22 MG/DL
CALCIUM: 9.3 MG/DL
CHLORIDE: 106 MEQ/L
CHOLESTEROL, TOTAL: 187 MG/DL
CREATININE, SERUM: 1.17 MG/DL
ESR (SED RATE): 14 MM/HR
GLOBULIN: 4.1 G/DL
GLUCOSE: 106 MG/DL
HDL CHOLESTEROL: 44 MG/DL
HEMATOCRIT: 44.9 %
HEMOGLOBIN A1C: 6.6 %
HEMOGLOBIN A1C: 6.7 %
HEMOGLOBIN: 14.6 G/DL
LDL CHOLESTEROL: 108 MG/DL
PLATELETS: 173 K/UL
POTASSIUM, SERUM: 5.1 MEQ/L
PROTEIN, TOTAL: 7.9 G/DL
RED BLOOD COUNT: 5.24 X 10-6/U
SGOT (AST): 22 IU/L
SGPT (ALT): 32 IU/L
SODIUM: 141 MEQ/L
THYROID STIMULATING HORMONE: 3.53 MLU/L
TRIGLYCERIDES: 176 MG/DL
WHITE BLOOD COUNT: 6.2 X 10-3/U

## 2019-01-07 ENCOUNTER — MED REC SCAN ONLY (OUTPATIENT)
Dept: FAMILY MEDICINE CLINIC | Facility: CLINIC | Age: 74
End: 2019-01-07

## 2019-01-15 ENCOUNTER — NURSE ONLY (OUTPATIENT)
Dept: ENDOCRINOLOGY CLINIC | Facility: CLINIC | Age: 74
End: 2019-01-15
Payer: MEDICARE

## 2019-01-15 DIAGNOSIS — E11.9 TYPE 2 DIABETES MELLITUS WITHOUT COMPLICATION, WITH LONG-TERM CURRENT USE OF INSULIN (HCC): Primary | ICD-10-CM

## 2019-01-15 DIAGNOSIS — Z79.4 TYPE 2 DIABETES MELLITUS WITHOUT COMPLICATION, WITH LONG-TERM CURRENT USE OF INSULIN (HCC): Primary | ICD-10-CM

## 2019-01-15 PROCEDURE — G0108 DIAB MANAGE TRN  PER INDIV: HCPCS | Performed by: DIETITIAN, REGISTERED

## 2019-01-22 ENCOUNTER — MYAURORA ACCOUNT LINK (OUTPATIENT)
Dept: OTHER | Age: 74
End: 2019-01-22

## 2019-01-22 ENCOUNTER — PRIOR ORIGINAL RECORDS (OUTPATIENT)
Dept: OTHER | Age: 74
End: 2019-01-22

## 2019-01-27 VITALS — WEIGHT: 269.19 LBS | SYSTOLIC BLOOD PRESSURE: 126 MMHG | DIASTOLIC BLOOD PRESSURE: 64 MMHG | BODY MASS INDEX: 41 KG/M2

## 2019-01-28 NOTE — PROGRESS NOTES
Carlos Eduardo Solis  : 1945 was seen for Diabetic Education Initial/Follow up:    Date: 2019 Referring MD: Dr. Belkis Levine  Start time: 11:00am End time: 11:30am    Assessment:     Assessment: /64 (BP Location: Right arm, Patient Position meal, use of snacks/fiber, barriers  -     BEING ACTIVE:  - types of activity, frequency , duration  -     MONITORING:  - Type of meter:Zain Contour Next EZ  - Testing Schedule: mainly fasting    TAKING MEDICATION:  Oral Agents:   Type/Dose/Schedule: Metf Eric to call diabetes center with any questions or concerns. Patient verbalized understanding and has no further questions at this time.     Leopold Knights, RN, CDE

## 2019-02-01 RX ORDER — METFORMIN HYDROCHLORIDE 500 MG/1
TABLET, EXTENDED RELEASE ORAL
Qty: 360 TABLET | Refills: 0 | Status: SHIPPED | OUTPATIENT
Start: 2019-02-01 | End: 2019-05-10

## 2019-02-28 VITALS
HEIGHT: 69 IN | DIASTOLIC BLOOD PRESSURE: 52 MMHG | SYSTOLIC BLOOD PRESSURE: 136 MMHG | HEART RATE: 72 BPM | WEIGHT: 258 LBS | BODY MASS INDEX: 38.21 KG/M2

## 2019-02-28 VITALS
DIASTOLIC BLOOD PRESSURE: 70 MMHG | RESPIRATION RATE: 16 BRPM | WEIGHT: 253 LBS | SYSTOLIC BLOOD PRESSURE: 130 MMHG | HEART RATE: 66 BPM

## 2019-02-28 VITALS
DIASTOLIC BLOOD PRESSURE: 68 MMHG | HEART RATE: 45 BPM | WEIGHT: 268 LBS | HEIGHT: 69 IN | SYSTOLIC BLOOD PRESSURE: 126 MMHG | BODY MASS INDEX: 39.69 KG/M2

## 2019-02-28 VITALS — SYSTOLIC BLOOD PRESSURE: 124 MMHG | DIASTOLIC BLOOD PRESSURE: 64 MMHG | HEART RATE: 70 BPM | WEIGHT: 260 LBS

## 2019-02-28 VITALS — WEIGHT: 269 LBS | SYSTOLIC BLOOD PRESSURE: 128 MMHG | DIASTOLIC BLOOD PRESSURE: 62 MMHG | HEART RATE: 70 BPM

## 2019-02-28 VITALS
HEART RATE: 60 BPM | DIASTOLIC BLOOD PRESSURE: 58 MMHG | SYSTOLIC BLOOD PRESSURE: 118 MMHG | HEIGHT: 69 IN | WEIGHT: 270 LBS | BODY MASS INDEX: 39.99 KG/M2

## 2019-02-28 VITALS — HEART RATE: 60 BPM | SYSTOLIC BLOOD PRESSURE: 122 MMHG | DIASTOLIC BLOOD PRESSURE: 68 MMHG | WEIGHT: 261 LBS

## 2019-02-28 VITALS
HEIGHT: 69 IN | WEIGHT: 268 LBS | BODY MASS INDEX: 39.69 KG/M2 | SYSTOLIC BLOOD PRESSURE: 112 MMHG | DIASTOLIC BLOOD PRESSURE: 62 MMHG | HEART RATE: 61 BPM

## 2019-03-01 VITALS
DIASTOLIC BLOOD PRESSURE: 58 MMHG | BODY MASS INDEX: 36.29 KG/M2 | SYSTOLIC BLOOD PRESSURE: 110 MMHG | HEART RATE: 72 BPM | WEIGHT: 245 LBS | HEIGHT: 69 IN

## 2019-03-01 VITALS
BODY MASS INDEX: 36.14 KG/M2 | DIASTOLIC BLOOD PRESSURE: 64 MMHG | WEIGHT: 244 LBS | SYSTOLIC BLOOD PRESSURE: 130 MMHG | HEART RATE: 65 BPM | HEIGHT: 69 IN

## 2019-03-14 ENCOUNTER — TELEPHONE (OUTPATIENT)
Dept: CARDIOLOGY | Age: 74
End: 2019-03-14

## 2019-03-14 ENCOUNTER — OFFICE VISIT (OUTPATIENT)
Dept: FAMILY MEDICINE CLINIC | Facility: CLINIC | Age: 74
End: 2019-03-14
Payer: MEDICARE

## 2019-03-14 ENCOUNTER — HOSPITAL ENCOUNTER (OUTPATIENT)
Dept: GENERAL RADIOLOGY | Age: 74
Discharge: HOME | End: 2019-03-14
Attending: FAMILY MEDICINE
Payer: MEDICARE

## 2019-03-14 VITALS
SYSTOLIC BLOOD PRESSURE: 126 MMHG | HEIGHT: 68 IN | WEIGHT: 265 LBS | HEART RATE: 79 BPM | OXYGEN SATURATION: 96 % | DIASTOLIC BLOOD PRESSURE: 84 MMHG | RESPIRATION RATE: 16 BRPM | TEMPERATURE: 99 F | BODY MASS INDEX: 40.16 KG/M2

## 2019-03-14 DIAGNOSIS — R05.9 COUGH: Primary | ICD-10-CM

## 2019-03-14 DIAGNOSIS — J98.01 BRONCHOSPASM: ICD-10-CM

## 2019-03-14 DIAGNOSIS — R05.9 COUGH: ICD-10-CM

## 2019-03-14 PROCEDURE — 71046 X-RAY EXAM CHEST 2 VIEWS: CPT | Performed by: FAMILY MEDICINE

## 2019-03-14 PROCEDURE — 94640 AIRWAY INHALATION TREATMENT: CPT | Performed by: FAMILY MEDICINE

## 2019-03-14 PROCEDURE — 99214 OFFICE O/P EST MOD 30 MIN: CPT | Performed by: FAMILY MEDICINE

## 2019-03-14 RX ORDER — ALBUTEROL SULFATE 2.5 MG/3ML
2.5 SOLUTION RESPIRATORY (INHALATION) EVERY 4 HOURS PRN
Qty: 150 ML | Refills: 1 | Status: SHIPPED | OUTPATIENT
Start: 2019-03-14

## 2019-03-14 RX ORDER — EZETIMIBE 10 MG/1
TABLET ORAL
Refills: 6 | COMMUNITY
Start: 2019-02-24 | End: 2021-10-14 | Stop reason: ALTCHOICE

## 2019-03-14 RX ORDER — EZETIMIBE 10 MG/1
1 TABLET ORAL DAILY
COMMUNITY
Start: 2018-12-04 | End: 2019-05-28 | Stop reason: SDUPTHER

## 2019-03-14 RX ORDER — FOLIC ACID 1 MG/1
TABLET ORAL
COMMUNITY
Start: 2013-12-12 | End: 2019-08-20 | Stop reason: SDUPTHER

## 2019-03-14 RX ORDER — LISINOPRIL 5 MG/1
TABLET ORAL
COMMUNITY
Start: 2019-01-18 | End: 2019-06-22 | Stop reason: SDUPTHER

## 2019-03-14 RX ORDER — CLOPIDOGREL BISULFATE 75 MG/1
1 TABLET ORAL DAILY
COMMUNITY
Start: 2017-12-19 | End: 2019-06-30 | Stop reason: SDUPTHER

## 2019-03-14 RX ORDER — ALBUTEROL SULFATE 2.5 MG/3ML
2.5 SOLUTION RESPIRATORY (INHALATION) ONCE
Status: COMPLETED | OUTPATIENT
Start: 2019-03-14 | End: 2019-03-14

## 2019-03-14 RX ORDER — METFORMIN HYDROCHLORIDE 500 MG/1
500 TABLET, EXTENDED RELEASE ORAL
COMMUNITY
Start: 2017-02-28

## 2019-03-14 RX ORDER — METOPROLOL SUCCINATE 100 MG/1
TABLET, EXTENDED RELEASE ORAL
COMMUNITY
Start: 2018-06-04 | End: 2019-03-24 | Stop reason: SDUPTHER

## 2019-03-14 RX ORDER — PREDNISONE 20 MG/1
40 TABLET ORAL DAILY
Qty: 10 TABLET | Refills: 0 | Status: SHIPPED | OUTPATIENT
Start: 2019-03-14 | End: 2019-03-19

## 2019-03-14 RX ORDER — FUROSEMIDE 20 MG/1
1 TABLET ORAL DAILY
COMMUNITY
Start: 2018-08-02 | End: 2019-05-28 | Stop reason: SDUPTHER

## 2019-03-14 RX ORDER — LOVASTATIN 40 MG/1
1 TABLET ORAL 2 TIMES DAILY
COMMUNITY
Start: 2018-11-06 | End: 2019-08-08 | Stop reason: SDUPTHER

## 2019-03-14 RX ADMIN — ALBUTEROL SULFATE 2.5 MG: 2.5 SOLUTION RESPIRATORY (INHALATION) at 13:32:00

## 2019-03-14 NOTE — PROGRESS NOTES
CC: cough    HPI:     Location chest  Quality deep, \"gurgling\"  Severity moderate  Duration 3 days  Timing frequent  Context recently ran out of water in cpap machine overnight    ROS: pos sputum, no fever or chills, no vomiting or diarrhea, no new edema

## 2019-03-25 ENCOUNTER — OFFICE VISIT (OUTPATIENT)
Dept: FAMILY MEDICINE CLINIC | Facility: CLINIC | Age: 74
End: 2019-03-25
Payer: MEDICARE

## 2019-03-25 VITALS
DIASTOLIC BLOOD PRESSURE: 66 MMHG | RESPIRATION RATE: 20 BRPM | WEIGHT: 264 LBS | HEART RATE: 63 BPM | HEIGHT: 68 IN | BODY MASS INDEX: 40.01 KG/M2 | OXYGEN SATURATION: 93 % | TEMPERATURE: 98 F | SYSTOLIC BLOOD PRESSURE: 101 MMHG

## 2019-03-25 DIAGNOSIS — R05.9 COUGH: Primary | ICD-10-CM

## 2019-03-25 DIAGNOSIS — J98.01 BRONCHOSPASM: ICD-10-CM

## 2019-03-25 PROCEDURE — 99213 OFFICE O/P EST LOW 20 MIN: CPT | Performed by: FAMILY MEDICINE

## 2019-03-25 RX ORDER — METOPROLOL SUCCINATE 100 MG/1
TABLET, EXTENDED RELEASE ORAL
Qty: 180 TABLET | Refills: 1 | Status: SHIPPED | OUTPATIENT
Start: 2019-03-25 | End: 2019-09-20 | Stop reason: SDUPTHER

## 2019-03-25 NOTE — PROGRESS NOTES
CC: follow up cough    HPI:       Overall much better. Has been taking the nebulizer treatments.      ROS: no sputum or hemoptysis    EXAM: /66 (BP Location: Left arm, Patient Position: Sitting, Cuff Size: adult)   Pulse 63   Temp 98.1 °F (36.7 °C) (T

## 2019-04-01 ENCOUNTER — TELEPHONE (OUTPATIENT)
Dept: ENDOCRINOLOGY CLINIC | Facility: CLINIC | Age: 74
End: 2019-04-01

## 2019-04-01 DIAGNOSIS — E11.9 DIABETES MELLITUS WITHOUT COMPLICATION (HCC): Primary | ICD-10-CM

## 2019-04-01 NOTE — TELEPHONE ENCOUNTER
PtMarianne ROMEO to request samples of Toujeo pens. Placed in the refrigerator with his name &  on them for pt. To pick . OUSMANE they are ready for him.

## 2019-04-02 ENCOUNTER — OFFICE VISIT (OUTPATIENT)
Dept: CARDIOLOGY | Age: 74
End: 2019-04-02

## 2019-04-02 VITALS
BODY MASS INDEX: 38.8 KG/M2 | DIASTOLIC BLOOD PRESSURE: 53 MMHG | SYSTOLIC BLOOD PRESSURE: 129 MMHG | HEIGHT: 69 IN | HEART RATE: 70 BPM | WEIGHT: 262 LBS

## 2019-04-02 DIAGNOSIS — I27.21 PULMONARY HYPERTENSION SECONDARY TO INCREASED PVR (CMD): Primary | ICD-10-CM

## 2019-04-02 DIAGNOSIS — I65.29 STENOSIS OF CAROTID ARTERY, UNSPECIFIED LATERALITY: ICD-10-CM

## 2019-04-02 DIAGNOSIS — E78.00 PURE HYPERCHOLESTEROLEMIA: ICD-10-CM

## 2019-04-02 DIAGNOSIS — Z95.1 HX OF CABG: ICD-10-CM

## 2019-04-02 DIAGNOSIS — I25.5 CARDIOMYOPATHY, ISCHEMIC: ICD-10-CM

## 2019-04-02 DIAGNOSIS — R06.00 DYSPNEA, UNSPECIFIED TYPE: ICD-10-CM

## 2019-04-02 DIAGNOSIS — R10.13 DYSPEPSIA: ICD-10-CM

## 2019-04-02 PROCEDURE — 99214 OFFICE O/P EST MOD 30 MIN: CPT | Performed by: INTERNAL MEDICINE

## 2019-04-02 SDOH — HEALTH STABILITY: PHYSICAL HEALTH: ON AVERAGE, HOW MANY DAYS PER WEEK DO YOU ENGAGE IN MODERATE TO STRENUOUS EXERCISE (LIKE A BRISK WALK)?: 0 DAYS

## 2019-04-02 SDOH — HEALTH STABILITY: PHYSICAL HEALTH: ON AVERAGE, HOW MANY MINUTES DO YOU ENGAGE IN EXERCISE AT THIS LEVEL?: 0 MIN

## 2019-04-11 NOTE — TELEPHONE ENCOUNTER
LOV: 3/25/19 for cough    MICROLET LANCETS Does not apply Misc 100 each 2 10/12/2018    Sig:   TEST THREE TIMES DAILY     Route:   (none)       Future Appointments   Date Time Provider Lloyd Garrido   5/3/2019 10:20 AM Judith Ferrell, DO EM[facility] [place]

## 2019-04-16 RX ORDER — LANCETS
EACH MISCELLANEOUS
Qty: 200 EACH | Refills: 2 | Status: SHIPPED | OUTPATIENT
Start: 2019-04-16 | End: 2021-10-16

## 2019-05-03 ENCOUNTER — OFFICE VISIT (OUTPATIENT)
Dept: FAMILY MEDICINE CLINIC | Facility: CLINIC | Age: 74
End: 2019-05-03
Payer: MEDICARE

## 2019-05-03 VITALS
WEIGHT: 264 LBS | RESPIRATION RATE: 16 BRPM | SYSTOLIC BLOOD PRESSURE: 120 MMHG | OXYGEN SATURATION: 96 % | HEART RATE: 72 BPM | HEIGHT: 68 IN | BODY MASS INDEX: 40.01 KG/M2 | DIASTOLIC BLOOD PRESSURE: 74 MMHG | TEMPERATURE: 98 F

## 2019-05-03 DIAGNOSIS — Z00.00 ENCOUNTER FOR ANNUAL HEALTH EXAMINATION: Primary | ICD-10-CM

## 2019-05-03 DIAGNOSIS — Z12.5 PROSTATE CANCER SCREENING: ICD-10-CM

## 2019-05-03 PROCEDURE — 36415 COLL VENOUS BLD VENIPUNCTURE: CPT | Performed by: FAMILY MEDICINE

## 2019-05-03 PROCEDURE — G0439 PPPS, SUBSEQ VISIT: HCPCS | Performed by: FAMILY MEDICINE

## 2019-05-03 RX ORDER — CLOPIDOGREL BISULFATE 75 MG/1
1 TABLET ORAL
COMMUNITY
Start: 2017-12-19 | End: 2019-05-03

## 2019-05-03 NOTE — PATIENT INSTRUCTIONS
Carline Reyes's SCREENING SCHEDULE   Tests on this list are recommended by your physician but may not be covered, or covered at this frequency, by your insurer. Please check with your insurance carrier before scheduling to verify coverage.     [employer] Limited to patients who meet one of the following criteria:   • Men who are 73-68 years old and have smoked more than 100 cigarettes in their lifetime   • Anyone with a family history    Colorectal Cancer Screening Covered up to Age 76     Colonoscopy Scre visit. Medium/high risk factors:   End-stage renal disease   Hemophiliacs who received Factor VIII or IX concentrates   Clients of institutions for the mentally retarded   Persons who live in the same house as a HepB virus carrier   Homosexual men   Illici

## 2019-05-03 NOTE — PROGRESS NOTES
HPI:   Amarilis Logan is a 72year old male who presents for a Medicare Subsequent Annual Wellness visit (Pt already had Initial Annual Wellness). Still unemployed.    His last annual assessment has been over 1 year: Annual Physical due on 05/01/2 Practice)  Yogi Mackey DO as PCP - MSS[facility]  Benito Burns MD as Consulting Physician (NEUROLOGY)    Patient Active Problem List:     Nonischemic cardiomyopathy ([facility] [address]     FLAKO (obstructive sleep apnea)     [facility]     CAD (coronary a Pen-injector Inject 32 units at bedtime   BD PEN NEEDLE LILO U/F 32G X 4 MM Does not apply Misc USE ONCE DAILY WITH TOULinktone SOLOSTAR PEN.    ROSIBEL CONTOUR NEXT TEST In Vitro Strip TEST THREE TIMES DAILY AS DIRECTED   aspirin 81 MG Oral Tab Take 81 mg by mout Resp 16   Ht 68\"   Wt 264 lb   SpO2 96%   BMI 40.14 kg/m²   Estimated body mass index is 40.14 kg/m² as calculated from the following:    Height as of this encounter: 68\". Weight as of this encounter: 264 lb.     Medicare Hearing Assessment  (Required activity?: Light  How would you describe your current health state?: Fair  How do you maintain positive mental well-being?: Social Interaction;Games    This section provided for quick review of chart, separate sheet to patient  [address] I[address] López 04/25/2017 Please get once after your 67th birthday    Hepatitis B for Moderate/High Risk No vaccine history found Medium/high risk factors:   End-stage renal disease   Hemophiliacs who received Factor VIII or IX concentrates   Clients of institutions for

## 2019-05-08 ENCOUNTER — APPOINTMENT (OUTPATIENT)
Dept: CARDIOLOGY | Age: 74
End: 2019-05-08
Attending: INTERNAL MEDICINE

## 2019-05-13 RX ORDER — METFORMIN HYDROCHLORIDE 500 MG/1
TABLET, EXTENDED RELEASE ORAL
Qty: 360 TABLET | Refills: 0 | Status: SHIPPED | OUTPATIENT
Start: 2019-05-13 | End: 2019-08-22

## 2019-05-28 RX ORDER — FUROSEMIDE 20 MG/1
TABLET ORAL
Qty: 90 TABLET | Refills: 1 | Status: SHIPPED | OUTPATIENT
Start: 2019-05-28 | End: 2019-11-17 | Stop reason: SDUPTHER

## 2019-05-29 RX ORDER — EZETIMIBE 10 MG/1
TABLET ORAL
Qty: 90 TABLET | Refills: 3 | Status: SHIPPED | OUTPATIENT
Start: 2019-05-29 | End: 2020-06-24

## 2019-06-06 ENCOUNTER — ANCILLARY ORDERS (OUTPATIENT)
Dept: CARDIOLOGY | Age: 74
End: 2019-06-06

## 2019-06-06 ENCOUNTER — ANCILLARY PROCEDURE (OUTPATIENT)
Dept: CARDIOLOGY | Age: 74
End: 2019-06-06
Attending: INTERNAL MEDICINE

## 2019-06-06 DIAGNOSIS — Z95.810 ICD (IMPLANTABLE CARDIOVERTER-DEFIBRILLATOR) IN PLACE: ICD-10-CM

## 2019-06-07 PROCEDURE — 93295 DEV INTERROG REMOTE 1/2/MLT: CPT | Performed by: INTERNAL MEDICINE

## 2019-06-11 ENCOUNTER — TELEPHONE (OUTPATIENT)
Dept: ENDOCRINOLOGY CLINIC | Facility: CLINIC | Age: 74
End: 2019-06-11

## 2019-06-11 DIAGNOSIS — E11.65 UNCONTROLLED TYPE 2 DIABETES MELLITUS WITH HYPERGLYCEMIA (HCC): Primary | ICD-10-CM

## 2019-06-11 NOTE — TELEPHONE ENCOUNTER
Pt. Called requesting samples of Toujeo. Informed he can come pick them up tomorrow. He v/u & was agreeable w/plan.

## 2019-06-25 RX ORDER — LISINOPRIL 5 MG/1
TABLET ORAL
Qty: 90 TABLET | Refills: 1 | Status: SHIPPED | OUTPATIENT
Start: 2019-06-25 | End: 2020-03-03 | Stop reason: SDUPTHER

## 2019-07-01 RX ORDER — PEN NEEDLE, DIABETIC 32GX 5/32"
NEEDLE, DISPOSABLE MISCELLANEOUS
Qty: 100 EACH | Refills: 3 | Status: SHIPPED | OUTPATIENT
Start: 2019-07-01 | End: 2020-07-27

## 2019-07-02 RX ORDER — CLOPIDOGREL BISULFATE 75 MG/1
TABLET ORAL
Qty: 90 TABLET | Refills: 0 | Status: SHIPPED | OUTPATIENT
Start: 2019-07-02 | End: 2019-09-28 | Stop reason: SDUPTHER

## 2019-07-23 ENCOUNTER — ANCILLARY PROCEDURE (OUTPATIENT)
Dept: CARDIOLOGY | Age: 74
End: 2019-07-23
Attending: INTERNAL MEDICINE

## 2019-07-23 ENCOUNTER — TELEPHONE (OUTPATIENT)
Dept: CARDIOLOGY | Age: 74
End: 2019-07-23

## 2019-07-23 VITALS
BODY MASS INDEX: 38.84 KG/M2 | DIASTOLIC BLOOD PRESSURE: 76 MMHG | SYSTOLIC BLOOD PRESSURE: 134 MMHG | HEART RATE: 80 BPM | WEIGHT: 263 LBS

## 2019-07-23 DIAGNOSIS — Z45.02 IMPLANTABLE DEFIBRILLATOR REPROGRAMMING/CHECK: ICD-10-CM

## 2019-07-23 PROCEDURE — 93283 PRGRMG EVAL IMPLANTABLE DFB: CPT | Performed by: INTERNAL MEDICINE

## 2019-07-23 RX ORDER — FOLIC ACID 1 MG/1
1 TABLET ORAL DAILY
COMMUNITY

## 2019-07-30 ENCOUNTER — NURSE ONLY (OUTPATIENT)
Dept: ENDOCRINOLOGY CLINIC | Facility: CLINIC | Age: 74
End: 2019-07-30
Payer: MEDICARE

## 2019-07-30 DIAGNOSIS — Z79.4 TYPE 2 DIABETES MELLITUS WITH HYPERGLYCEMIA, WITH LONG-TERM CURRENT USE OF INSULIN (HCC): ICD-10-CM

## 2019-07-30 DIAGNOSIS — E11.65 TYPE 2 DIABETES MELLITUS WITH HYPERGLYCEMIA, WITH LONG-TERM CURRENT USE OF INSULIN (HCC): ICD-10-CM

## 2019-07-30 PROCEDURE — G0108 DIAB MANAGE TRN  PER INDIV: HCPCS | Performed by: DIETITIAN, REGISTERED

## 2019-08-03 VITALS — BODY MASS INDEX: 41 KG/M2 | WEIGHT: 266.38 LBS

## 2019-08-04 NOTE — PROGRESS NOTES
Romi Gregg  : 1945 was seen for Diabetic Education Follow up:    Date: 2019  Referring Provider:Dr. Kay Alonso  Start time: 11:30am End time: 12:00pm    Assessment:     Assessment: Wt 266 lb 6.4 oz   BMI 40.51 kg/m²      HgbA1C (%) activity, frequency , duration: never started exercise routine  MONITORING:  - Type of meter:Zain Contour Next EZ  - Testing Schedule: fasting only    TAKING MEDICATION:  Oral Agents:   Type/Dose/Schedule: Metformin 1000 mg 1 tab bid w/meals  Action/Side

## 2019-08-08 RX ORDER — LOVASTATIN 40 MG/1
TABLET ORAL
Qty: 180 TABLET | Refills: 0 | Status: SHIPPED | OUTPATIENT
Start: 2019-08-08 | End: 2019-10-31 | Stop reason: SDUPTHER

## 2019-08-20 ENCOUNTER — OFFICE VISIT (OUTPATIENT)
Dept: CARDIOLOGY | Age: 74
End: 2019-08-20

## 2019-08-20 ENCOUNTER — CLINICAL ABSTRACT (OUTPATIENT)
Dept: CARDIOLOGY | Age: 74
End: 2019-08-20

## 2019-08-20 VITALS
HEIGHT: 69 IN | BODY MASS INDEX: 39.65 KG/M2 | SYSTOLIC BLOOD PRESSURE: 108 MMHG | DIASTOLIC BLOOD PRESSURE: 60 MMHG | WEIGHT: 267.7 LBS | HEART RATE: 70 BPM

## 2019-08-20 DIAGNOSIS — I27.21 PULMONARY HYPERTENSION SECONDARY TO INCREASED PVR (CMD): Primary | ICD-10-CM

## 2019-08-20 DIAGNOSIS — Z95.5 S/P CORONARY ARTERY STENT PLACEMENT: ICD-10-CM

## 2019-08-20 DIAGNOSIS — I65.23 BILATERAL CAROTID ARTERY STENOSIS: ICD-10-CM

## 2019-08-20 DIAGNOSIS — E66.9 DIABETES MELLITUS TYPE 2 IN OBESE (CMD): ICD-10-CM

## 2019-08-20 DIAGNOSIS — Z95.1 HX OF CABG: ICD-10-CM

## 2019-08-20 DIAGNOSIS — E78.00 PURE HYPERCHOLESTEROLEMIA: ICD-10-CM

## 2019-08-20 DIAGNOSIS — I25.5 CARDIOMYOPATHY, ISCHEMIC: ICD-10-CM

## 2019-08-20 DIAGNOSIS — G47.33 SLEEP APNEA, OBSTRUCTIVE: ICD-10-CM

## 2019-08-20 DIAGNOSIS — I50.22 CHRONIC SYSTOLIC HEART FAILURE (CMD): ICD-10-CM

## 2019-08-20 DIAGNOSIS — Z95.810 ICD (IMPLANTABLE CARDIOVERTER-DEFIBRILLATOR) IN PLACE: ICD-10-CM

## 2019-08-20 DIAGNOSIS — E11.69 DIABETES MELLITUS TYPE 2 IN OBESE (CMD): ICD-10-CM

## 2019-08-20 DIAGNOSIS — I25.10 CORONARY ARTERY DISEASE INVOLVING NATIVE CORONARY ARTERY OF NATIVE HEART WITHOUT ANGINA PECTORIS: ICD-10-CM

## 2019-08-20 PROCEDURE — 99215 OFFICE O/P EST HI 40 MIN: CPT | Performed by: INTERNAL MEDICINE

## 2019-08-23 RX ORDER — METFORMIN HYDROCHLORIDE 500 MG/1
TABLET, EXTENDED RELEASE ORAL
Qty: 360 TABLET | Refills: 0 | Status: SHIPPED | OUTPATIENT
Start: 2019-08-23 | End: 2019-11-20

## 2019-08-23 NOTE — TELEPHONE ENCOUNTER
Per verbal Dr. Ulysses alberts to fill 90 day  Rx sent    LOV: 5/3/19 for annual health exam  A1C: 11/20/18    METFORMIN HCL  MG Oral Tablet 24 Hr 360 tablet 0 5/13/2019    Sig:   TAKE 2 TABLETS(1000 MG) BY MOUTH TWICE DAILY WITH MEALS     Route:   (non

## 2019-08-24 LAB
ALBUMIN SERPL-MCNC: 4 G/DL (ref 3.6–5.1)
ALBUMIN/GLOB SERPL: 1.6 (CALC) (ref 1–2.5)
ALP SERPL-CCNC: 48 U/L (ref 40–115)
ALT SERPL-CCNC: 16 U/L (ref 9–46)
AST SERPL-CCNC: 21 U/L (ref 10–35)
BILIRUB SERPL-MCNC: 0.4 MG/DL (ref 0.2–1.2)
BUN SERPL-MCNC: 21 MG/DL (ref 7–25)
BUN/CREAT SERPL: ABNORMAL (CALC) (ref 6–22)
CALCIUM SERPL-MCNC: 9.3 MG/DL (ref 8.6–10.3)
CHLORIDE SERPL-SCNC: 106 MMOL/L (ref 98–110)
CHOLEST SERPL-MCNC: 151 MG/DL
CHOLEST/HDLC SERPL: 3.5 (CALC)
CO2 SERPL-SCNC: 27 MMOL/L (ref 20–32)
CREAT SERPL-MCNC: 0.96 MG/DL (ref 0.7–1.18)
GFRSERPLBLD MDRD-ARVRAT: 79 ML/MIN/1.73M2
GLOBULIN SER CALC-MCNC: 2.5 G/DL (CALC) (ref 1.9–3.7)
GLUCOSE SERPL-MCNC: 105 MG/DL (ref 65–99)
HBA1C MFR BLD: 6.8 % OF TOTAL HGB
HDLC SERPL-MCNC: 43 MG/DL
LDLC SERPL CALC-MCNC: 82 MG/DL (CALC)
NONHDLC SERPL-MCNC: 108 MG/DL (CALC)
POTASSIUM SERPL-SCNC: 5.2 MMOL/L (ref 3.5–5.3)
PROT SERPL-MCNC: 6.5 G/DL (ref 6.1–8.1)
SODIUM SERPL-SCNC: 142 MMOL/L (ref 135–146)
TRIGL SERPL-MCNC: 156 MG/DL

## 2019-09-13 ENCOUNTER — TELEPHONE (OUTPATIENT)
Dept: CARDIOLOGY | Age: 74
End: 2019-09-13

## 2019-09-17 ENCOUNTER — MED REC SCAN ONLY (OUTPATIENT)
Dept: FAMILY MEDICINE CLINIC | Facility: CLINIC | Age: 74
End: 2019-09-17

## 2019-09-20 RX ORDER — METOPROLOL SUCCINATE 100 MG/1
TABLET, EXTENDED RELEASE ORAL
Qty: 180 TABLET | Refills: 3 | Status: SHIPPED | OUTPATIENT
Start: 2019-09-20 | End: 2020-09-22 | Stop reason: SDUPTHER

## 2019-09-24 ENCOUNTER — OFFICE VISIT (OUTPATIENT)
Dept: FAMILY MEDICINE CLINIC | Facility: CLINIC | Age: 74
End: 2019-09-24
Payer: MEDICARE

## 2019-09-24 VITALS
TEMPERATURE: 97 F | BODY MASS INDEX: 41 KG/M2 | WEIGHT: 267 LBS | HEART RATE: 64 BPM | RESPIRATION RATE: 20 BRPM | OXYGEN SATURATION: 98 % | DIASTOLIC BLOOD PRESSURE: 58 MMHG | SYSTOLIC BLOOD PRESSURE: 100 MMHG

## 2019-09-24 DIAGNOSIS — I10 ESSENTIAL HYPERTENSION: ICD-10-CM

## 2019-09-24 DIAGNOSIS — Z79.4 TYPE 2 DIABETES MELLITUS WITHOUT COMPLICATION, WITH LONG-TERM CURRENT USE OF INSULIN (HCC): Primary | ICD-10-CM

## 2019-09-24 DIAGNOSIS — E78.2 MIXED HYPERLIPIDEMIA: ICD-10-CM

## 2019-09-24 DIAGNOSIS — E11.9 TYPE 2 DIABETES MELLITUS WITHOUT COMPLICATION, WITH LONG-TERM CURRENT USE OF INSULIN (HCC): Primary | ICD-10-CM

## 2019-09-24 LAB
ALT SERPL-CCNC: 35 U/L (ref 16–61)
AST SERPL-CCNC: 30 U/L (ref 15–37)
CK SERPL-CCNC: 121 U/L (ref 39–308)
EST. AVERAGE GLUCOSE BLD GHB EST-MCNC: 163 MG/DL (ref 68–126)
HBA1C MFR BLD HPLC: 7.3 % (ref ?–5.7)
LDLC SERPL DIRECT ASSAY-MCNC: 108 MG/DL (ref ?–100)

## 2019-09-24 PROCEDURE — 82550 ASSAY OF CK (CPK): CPT | Performed by: FAMILY MEDICINE

## 2019-09-24 PROCEDURE — 99214 OFFICE O/P EST MOD 30 MIN: CPT | Performed by: FAMILY MEDICINE

## 2019-09-24 PROCEDURE — 84460 ALANINE AMINO (ALT) (SGPT): CPT | Performed by: FAMILY MEDICINE

## 2019-09-24 PROCEDURE — 84450 TRANSFERASE (AST) (SGOT): CPT | Performed by: FAMILY MEDICINE

## 2019-09-24 PROCEDURE — 83721 ASSAY OF BLOOD LIPOPROTEIN: CPT | Performed by: FAMILY MEDICINE

## 2019-09-24 PROCEDURE — 83036 HEMOGLOBIN GLYCOSYLATED A1C: CPT | Performed by: FAMILY MEDICINE

## 2019-09-24 NOTE — PROGRESS NOTES
CC: meds and labs check    HPI:     1. Type 2 diabetes mellitus without complication, with long-term current use of insulin (HCC)  Chronic stable. Compliant with medication. Due for A1c. He has his eye exam scheduled    2.  Mixed hyperlipidemia  Chronic (LASIX) 20 MG Oral Tab Take 1 tablet by mouth daily. Disp: 30 tablet Rfl: 11   Clopidogrel Bisulfate (PLAVIX) 75 MG Oral Tab Take 75 mg by mouth daily. Disp:  Rfl:    lisinopril (PRINIVIL,ZESTRIL) 5 MG Oral Tab Take 5 mg by mouth daily.  Disp:  Rfl:    METF normal.  Bilateral monofilament/sensation of both feet is normal.  Pulsation pedal pulse exam of both lower legs/feet is normal as well.       A/P:   Type 2 diabetes mellitus without complication, with long-term current use of insulin (hcc)  (primary encoun

## 2019-09-24 NOTE — PATIENT INSTRUCTIONS
Dietary recommendations: restrict intake of carbohydrates from sources like bread, pasta, rice, cereal, potatoes or other starchy vegetables. Increase dramatically your intake of leafy greens like kale and cruciferous vegetables like broccoli.  Fruit choice

## 2019-10-01 ENCOUNTER — NURSE ONLY (OUTPATIENT)
Dept: ENDOCRINOLOGY CLINIC | Facility: CLINIC | Age: 74
End: 2019-10-01
Payer: MEDICARE

## 2019-10-01 VITALS — DIASTOLIC BLOOD PRESSURE: 62 MMHG | SYSTOLIC BLOOD PRESSURE: 124 MMHG

## 2019-10-01 DIAGNOSIS — E11.9 DIABETES MELLITUS WITHOUT COMPLICATION (HCC): Primary | ICD-10-CM

## 2019-10-01 PROCEDURE — G0108 DIAB MANAGE TRN  PER INDIV: HCPCS | Performed by: DIETITIAN, REGISTERED

## 2019-10-01 RX ORDER — BLOOD-GLUCOSE CONTROL, LOW
1 EACH MISCELLANEOUS AS NEEDED
Qty: 1 EACH | Refills: 3 | OUTPATIENT
Start: 2019-10-01

## 2019-10-01 RX ORDER — BLOOD SUGAR DIAGNOSTIC
STRIP MISCELLANEOUS
Qty: 300 EACH | Refills: 3 | Status: SHIPPED | OUTPATIENT
Start: 2019-10-01 | End: 2020-05-26

## 2019-10-01 RX ORDER — CLOPIDOGREL BISULFATE 75 MG/1
TABLET ORAL
Qty: 90 TABLET | Refills: 3 | Status: SHIPPED | OUTPATIENT
Start: 2019-10-01 | End: 2020-09-28 | Stop reason: SDUPTHER

## 2019-10-15 ENCOUNTER — OFFICE VISIT (OUTPATIENT)
Dept: CARDIOLOGY | Age: 74
End: 2019-10-15

## 2019-10-15 VITALS
BODY MASS INDEX: 39.25 KG/M2 | HEART RATE: 75 BPM | WEIGHT: 265 LBS | HEIGHT: 69 IN | SYSTOLIC BLOOD PRESSURE: 121 MMHG | DIASTOLIC BLOOD PRESSURE: 60 MMHG

## 2019-10-15 DIAGNOSIS — Z95.5 S/P CORONARY ARTERY STENT PLACEMENT: ICD-10-CM

## 2019-10-15 DIAGNOSIS — Z95.1 HX OF CABG: ICD-10-CM

## 2019-10-15 DIAGNOSIS — Z95.810 ICD (IMPLANTABLE CARDIOVERTER-DEFIBRILLATOR) IN PLACE: ICD-10-CM

## 2019-10-15 DIAGNOSIS — I50.22 CHRONIC SYSTOLIC HEART FAILURE (CMD): ICD-10-CM

## 2019-10-15 DIAGNOSIS — E78.00 PURE HYPERCHOLESTEROLEMIA: ICD-10-CM

## 2019-10-15 DIAGNOSIS — E11.69 DIABETES MELLITUS TYPE 2 IN OBESE (CMD): ICD-10-CM

## 2019-10-15 DIAGNOSIS — E66.9 DIABETES MELLITUS TYPE 2 IN OBESE (CMD): ICD-10-CM

## 2019-10-15 DIAGNOSIS — I65.23 BILATERAL CAROTID ARTERY STENOSIS: ICD-10-CM

## 2019-10-15 DIAGNOSIS — I25.10 CORONARY ARTERY DISEASE INVOLVING NATIVE CORONARY ARTERY OF NATIVE HEART WITHOUT ANGINA PECTORIS: ICD-10-CM

## 2019-10-15 DIAGNOSIS — I27.21 PULMONARY HYPERTENSION SECONDARY TO INCREASED PVR (CMD): ICD-10-CM

## 2019-10-15 DIAGNOSIS — R06.02 SHORTNESS OF BREATH: ICD-10-CM

## 2019-10-15 DIAGNOSIS — G47.33 SLEEP APNEA, OBSTRUCTIVE: Primary | ICD-10-CM

## 2019-10-15 DIAGNOSIS — I25.5 CARDIOMYOPATHY, ISCHEMIC: ICD-10-CM

## 2019-10-15 PROCEDURE — 99214 OFFICE O/P EST MOD 30 MIN: CPT | Performed by: INTERNAL MEDICINE

## 2019-10-15 ASSESSMENT — PATIENT HEALTH QUESTIONNAIRE - PHQ9
SUM OF ALL RESPONSES TO PHQ9 QUESTIONS 1 AND 2: 1
1. LITTLE INTEREST OR PLEASURE IN DOING THINGS: NOT AT ALL
2. FEELING DOWN, DEPRESSED OR HOPELESS: SEVERAL DAYS
SUM OF ALL RESPONSES TO PHQ9 QUESTIONS 1 AND 2: 1

## 2019-10-17 ENCOUNTER — E-ADVICE (OUTPATIENT)
Dept: CARDIOLOGY | Age: 74
End: 2019-10-17

## 2019-10-21 ENCOUNTER — PATIENT OUTREACH (OUTPATIENT)
Dept: CASE MANAGEMENT | Age: 74
End: 2019-10-21

## 2019-10-22 ENCOUNTER — PATIENT OUTREACH (OUTPATIENT)
Dept: CASE MANAGEMENT | Age: 74
End: 2019-10-22

## 2019-10-22 DIAGNOSIS — M05.79 RHEUMATOID ARTHRITIS INVOLVING MULTIPLE SITES WITH POSITIVE RHEUMATOID FACTOR (HCC): ICD-10-CM

## 2019-10-22 DIAGNOSIS — E11.65 TYPE 2 DIABETES MELLITUS WITH HYPERGLYCEMIA, WITH LONG-TERM CURRENT USE OF INSULIN (HCC): ICD-10-CM

## 2019-10-22 DIAGNOSIS — Z79.4 TYPE 2 DIABETES MELLITUS WITH HYPERGLYCEMIA, WITH LONG-TERM CURRENT USE OF INSULIN (HCC): ICD-10-CM

## 2019-10-22 NOTE — PROGRESS NOTES
I want to know a little about you. • What do you do for fun? Per patient enjoys watching sports and movies with wife. • Any hobbies? Yes  • What Hobbies? Per patient he enjoys cooking and loves to eat. • What do you do for work?  Per patient is currently patient. Care Team:  • Review specialists and add to care team Updated and reviewed with patient. Medication review:  • Update medications with meds from other providers as well Updated and reviewed with patient.   • Is there any reason you are unable t errands. • Do you want to work on incorporating more movement or exercise into your daily routine?  Yes patient states he would be willing to try some at home exercises aware I will be mailing him some information out.  o (skip to goals again if so)    Dis exercises to patient as we discussed. Care gaps? :  Patient aware he is due for FIT Colorectal screening/or Colonoscopy will discuss with Dr. Michelle Burrell at his next visit.     Spoke to Shilpa at length about CCM, current care plan and performed CCM assessme medications and denies experiencing any side effects.     Your appointments     Date & Time Appointment Department [facility])    Nov 12, 2019 11:00 AM CST DIAB Follow Up with Terence Herbert RN, CDE [facility] ([facility] DIABETES Abimbola Chapin adult  family member and parents of young children with diabetes. Please make arrangements for someone to care for your children under  the age of 6. ARRIVAL TIME  Patients should arrive 15 minutes prior to their appointment time.     INSURANCE QUESTION

## 2019-10-31 PROCEDURE — 99490 CHRNC CARE MGMT STAFF 1ST 20: CPT

## 2019-10-31 RX ORDER — LOVASTATIN 40 MG/1
TABLET ORAL
Qty: 180 TABLET | Refills: 1 | Status: SHIPPED | OUTPATIENT
Start: 2019-10-31 | End: 2020-04-27

## 2019-11-19 ENCOUNTER — NURSE ONLY (OUTPATIENT)
Dept: ENDOCRINOLOGY CLINIC | Facility: CLINIC | Age: 74
End: 2019-11-19
Payer: MEDICARE

## 2019-11-19 ENCOUNTER — PATIENT OUTREACH (OUTPATIENT)
Dept: CASE MANAGEMENT | Age: 74
End: 2019-11-19

## 2019-11-19 VITALS — BODY MASS INDEX: 41 KG/M2 | DIASTOLIC BLOOD PRESSURE: 66 MMHG | SYSTOLIC BLOOD PRESSURE: 116 MMHG | WEIGHT: 271.81 LBS

## 2019-11-19 DIAGNOSIS — E11.65 TYPE 2 DIABETES MELLITUS WITH HYPERGLYCEMIA, WITH LONG-TERM CURRENT USE OF INSULIN (HCC): Primary | ICD-10-CM

## 2019-11-19 DIAGNOSIS — Z79.4 TYPE 2 DIABETES MELLITUS WITH HYPERGLYCEMIA, WITH LONG-TERM CURRENT USE OF INSULIN (HCC): Primary | ICD-10-CM

## 2019-11-19 PROCEDURE — G0108 DIAB MANAGE TRN  PER INDIV: HCPCS | Performed by: DIETITIAN, REGISTERED

## 2019-11-19 RX ORDER — FUROSEMIDE 20 MG/1
TABLET ORAL
Qty: 90 TABLET | Refills: 1 | Status: SHIPPED | OUTPATIENT
Start: 2019-11-19 | End: 2020-05-18

## 2019-11-21 RX ORDER — METFORMIN HYDROCHLORIDE 500 MG/1
TABLET, EXTENDED RELEASE ORAL
Qty: 360 TABLET | Refills: 0 | Status: SHIPPED | OUTPATIENT
Start: 2019-11-21 | End: 2020-02-29

## 2019-11-24 NOTE — PROGRESS NOTES
Alfredito Patel  : 1945 was seen for Diabetic Education Follow up:    Date: 2019  Referring Provider:Dr. Reji Jeffers Start time: 11:00am End time: 11:30am    Assessment:     Assessment: /66 (BP Location: Right arm, Patient Position: complications    HEALTHY EATING:  - effect of food on BG, timing of meal, use of snacks/fiber, barriers; needs to work on portions  - B: eats cereal 2x/wk w/nuts & raisins( Honey Nut Cheerios, Honey Bunches of Oats) or oatmeal w/strawberries or Protein sha week    REDUCING RISKS:  - relationship between glucose control and risk for complications in eye, heart, kidneys,nerves,teeth,foot care, skin,  Foot Care Guidelines  - due for urine for microalbumin/creatinine ratio & dilated eye exam    HEALTHY COPING:

## 2019-11-27 ENCOUNTER — PATIENT OUTREACH (OUTPATIENT)
Dept: CASE MANAGEMENT | Age: 74
End: 2019-11-27

## 2019-11-27 NOTE — PROGRESS NOTES
Called patient and left a message for patient to call back when they can. Reviewed patient chart.  Left contact my contact number [phone]        Time Spent This Encounter Total: 3  min medical record review, telephone communication, care plan updates w

## 2019-12-06 ENCOUNTER — HOSPITAL ENCOUNTER (OUTPATIENT)
Dept: CV DIAGNOSTICS | Facility: HOSPITAL | Age: 74
Discharge: HOME | End: 2019-12-06
Attending: INTERNAL MEDICINE
Payer: MEDICARE

## 2019-12-06 DIAGNOSIS — I50.22 CHRONIC SYSTOLIC HEART FAILURE (HCC): ICD-10-CM

## 2019-12-06 DIAGNOSIS — Z95.810 ICD (IMPLANTABLE CARDIOVERTER-DEFIBRILLATOR) IN PLACE: ICD-10-CM

## 2019-12-06 DIAGNOSIS — E11.69 DIABETES MELLITUS TYPE 2 IN OBESE (HCC): ICD-10-CM

## 2019-12-06 DIAGNOSIS — Z95.5 S/P CORONARY ARTERY STENT PLACEMENT: ICD-10-CM

## 2019-12-06 DIAGNOSIS — Z95.1 HX OF CABG: ICD-10-CM

## 2019-12-06 DIAGNOSIS — E66.9 DIABETES MELLITUS TYPE 2 IN OBESE (HCC): ICD-10-CM

## 2019-12-06 DIAGNOSIS — I27.21 PULMONARY HYPERTENSION SECONDARY TO INCREASED PVR (HCC): ICD-10-CM

## 2019-12-06 PROCEDURE — 93306 TTE W/DOPPLER COMPLETE: CPT | Performed by: INTERNAL MEDICINE

## 2019-12-10 ENCOUNTER — PATIENT OUTREACH (OUTPATIENT)
Dept: CASE MANAGEMENT | Age: 74
End: 2019-12-10

## 2019-12-10 NOTE — PROGRESS NOTES
12/10/2019  Called patient and left a message for patient to call back when they can. Reviewed patient chart.  Left contact my contact number [phone]        Time Spent This Encounter Total: 5  min medical record review, telephone communication, care pl

## 2019-12-11 ENCOUNTER — E-ADVICE (OUTPATIENT)
Dept: CARDIOLOGY | Age: 74
End: 2019-12-11

## 2019-12-14 ENCOUNTER — ANCILLARY PROCEDURE (OUTPATIENT)
Dept: CARDIOLOGY | Age: 74
End: 2019-12-14
Attending: INTERNAL MEDICINE

## 2019-12-14 DIAGNOSIS — Z95.810 ICD (IMPLANTABLE CARDIOVERTER-DEFIBRILLATOR) IN PLACE: ICD-10-CM

## 2019-12-16 PROCEDURE — 93295 DEV INTERROG REMOTE 1/2/MLT: CPT | Performed by: INTERNAL MEDICINE

## 2019-12-18 ENCOUNTER — PATIENT OUTREACH (OUTPATIENT)
Dept: CASE MANAGEMENT | Age: 74
End: 2019-12-18

## 2019-12-26 ENCOUNTER — TELEPHONE (OUTPATIENT)
Dept: FAMILY MEDICINE CLINIC | Facility: CLINIC | Age: 74
End: 2019-12-26

## 2019-12-26 DIAGNOSIS — Z79.899 LONG-TERM USE OF HIGH-RISK MEDICATION: Primary | ICD-10-CM

## 2019-12-26 DIAGNOSIS — E11.9 TYPE 2 DIABETES MELLITUS WITHOUT COMPLICATION, WITH LONG-TERM CURRENT USE OF INSULIN (HCC): ICD-10-CM

## 2019-12-26 DIAGNOSIS — E78.2 MIXED HYPERLIPIDEMIA: ICD-10-CM

## 2019-12-26 DIAGNOSIS — Z79.4 TYPE 2 DIABETES MELLITUS WITHOUT COMPLICATION, WITH LONG-TERM CURRENT USE OF INSULIN (HCC): ICD-10-CM

## 2019-12-26 NOTE — TELEPHONE ENCOUNTER
Orders placed per written order from Dr. Geno Loya. Orders faxed to [facility] in [place] at [phone]. Spoke to pt. Advised him lab orders were faxed to [facility] in [place].

## 2019-12-26 NOTE — TELEPHONE ENCOUNTER
Pt wants to have labs done @ [facility] in [place],  Wants to know if can fax order to [employer]?

## 2019-12-30 ENCOUNTER — TELEPHONE (OUTPATIENT)
Dept: FAMILY MEDICINE CLINIC | Facility: CLINIC | Age: 74
End: 2019-12-30

## 2019-12-30 NOTE — TELEPHONE ENCOUNTER
----- Message from Erin Lopez MD sent at 12/30/2019 10:00 AM CST -----  Inform patient HBa1c is 7.1 improved from 7.3. His LDL cholesterol is 114 mild high.  LFT are normal. Continue low fat and low carb diet and follow up with Dr. Flori Beavers for further

## 2020-01-13 ENCOUNTER — PATIENT OUTREACH (OUTPATIENT)
Dept: CASE MANAGEMENT | Age: 75
End: 2020-01-13

## 2020-01-13 NOTE — PROGRESS NOTES
Called patient and left a message for patient to call back when they can. Reviewed patient chart.  Left contact my contact number [phone]        Time Spent This Encounter Total: 5  min medical record review  Monthly Minute Total including today: 5 justin

## 2020-01-24 ENCOUNTER — PATIENT OUTREACH (OUTPATIENT)
Dept: CASE MANAGEMENT | Age: 75
End: 2020-01-24

## 2020-01-24 NOTE — PROGRESS NOTES
Called patient and left a message for patient to call back when they can. Reviewed patient chart.  Left contact my contact number [phone]        Time Spent This Encounter Total: 5  min medical record review  Monthly Minute Total including today: 5 justin

## 2020-02-05 ENCOUNTER — TELEPHONE (OUTPATIENT)
Dept: FAMILY MEDICINE CLINIC | Facility: CLINIC | Age: 75
End: 2020-02-05

## 2020-02-07 NOTE — TELEPHONE ENCOUNTER
I dont know of anyone I recommend in the area. Dr. Korin Toure is who I refer to, goes to [address] [place] I believe.

## 2020-02-17 ENCOUNTER — NURSE ONLY (OUTPATIENT)
Dept: ENDOCRINOLOGY CLINIC | Facility: CLINIC | Age: 75
End: 2020-02-17
Payer: MEDICARE

## 2020-02-17 DIAGNOSIS — Z79.4 TYPE 2 DIABETES MELLITUS WITH HYPERGLYCEMIA, WITH LONG-TERM CURRENT USE OF INSULIN (HCC): Primary | ICD-10-CM

## 2020-02-17 DIAGNOSIS — E11.65 TYPE 2 DIABETES MELLITUS WITH HYPERGLYCEMIA, WITH LONG-TERM CURRENT USE OF INSULIN (HCC): Primary | ICD-10-CM

## 2020-02-17 PROCEDURE — G0108 DIAB MANAGE TRN  PER INDIV: HCPCS | Performed by: DIETITIAN, REGISTERED

## 2020-02-18 ENCOUNTER — OFFICE VISIT (OUTPATIENT)
Dept: CARDIOLOGY | Age: 75
End: 2020-02-18

## 2020-02-18 ENCOUNTER — TELEPHONE (OUTPATIENT)
Dept: FAMILY MEDICINE CLINIC | Facility: CLINIC | Age: 75
End: 2020-02-18

## 2020-02-18 ENCOUNTER — PATIENT OUTREACH (OUTPATIENT)
Dept: CASE MANAGEMENT | Age: 75
End: 2020-02-18

## 2020-02-18 VITALS
WEIGHT: 272.9 LBS | DIASTOLIC BLOOD PRESSURE: 54 MMHG | BODY MASS INDEX: 40.42 KG/M2 | HEIGHT: 69 IN | HEART RATE: 60 BPM | SYSTOLIC BLOOD PRESSURE: 116 MMHG

## 2020-02-18 DIAGNOSIS — I50.22 CHRONIC SYSTOLIC HEART FAILURE (CMD): ICD-10-CM

## 2020-02-18 DIAGNOSIS — I65.23 BILATERAL CAROTID ARTERY STENOSIS: ICD-10-CM

## 2020-02-18 DIAGNOSIS — G47.33 SLEEP APNEA, OBSTRUCTIVE: Primary | ICD-10-CM

## 2020-02-18 DIAGNOSIS — Z95.1 HX OF CABG: ICD-10-CM

## 2020-02-18 DIAGNOSIS — Z95.5 S/P CORONARY ARTERY STENT PLACEMENT: ICD-10-CM

## 2020-02-18 DIAGNOSIS — I27.21 PULMONARY HYPERTENSION SECONDARY TO INCREASED PVR (CMD): ICD-10-CM

## 2020-02-18 DIAGNOSIS — E11.65 TYPE 2 DIABETES MELLITUS WITH HYPERGLYCEMIA, WITH LONG-TERM CURRENT USE OF INSULIN (HCC): ICD-10-CM

## 2020-02-18 DIAGNOSIS — E78.5 DYSLIPIDEMIA: ICD-10-CM

## 2020-02-18 DIAGNOSIS — Z79.4 TYPE 2 DIABETES MELLITUS WITH HYPERGLYCEMIA, WITH LONG-TERM CURRENT USE OF INSULIN (HCC): ICD-10-CM

## 2020-02-18 DIAGNOSIS — I50.22 CHRONIC SYSTOLIC CONGESTIVE HEART FAILURE (HCC): ICD-10-CM

## 2020-02-18 DIAGNOSIS — G47.33 OSA (OBSTRUCTIVE SLEEP APNEA): ICD-10-CM

## 2020-02-18 DIAGNOSIS — I25.10 CORONARY ARTERY DISEASE INVOLVING NATIVE CORONARY ARTERY OF NATIVE HEART WITHOUT ANGINA PECTORIS: ICD-10-CM

## 2020-02-18 DIAGNOSIS — I25.5 CARDIOMYOPATHY, ISCHEMIC: ICD-10-CM

## 2020-02-18 DIAGNOSIS — Z95.810 ICD (IMPLANTABLE CARDIOVERTER-DEFIBRILLATOR) IN PLACE: ICD-10-CM

## 2020-02-18 DIAGNOSIS — E78.00 PURE HYPERCHOLESTEROLEMIA: ICD-10-CM

## 2020-02-18 PROCEDURE — 99215 OFFICE O/P EST HI 40 MIN: CPT | Performed by: INTERNAL MEDICINE

## 2020-02-18 RX ORDER — MELOXICAM 15 MG/1
15 TABLET ORAL DAILY PRN
COMMUNITY

## 2020-02-18 ASSESSMENT — PATIENT HEALTH QUESTIONNAIRE - PHQ9
SUM OF ALL RESPONSES TO PHQ9 QUESTIONS 1 AND 2: 0
1. LITTLE INTEREST OR PLEASURE IN DOING THINGS: NOT AT ALL
2. FEELING DOWN, DEPRESSED OR HOPELESS: NOT AT ALL
SUM OF ALL RESPONSES TO PHQ9 QUESTIONS 1 AND 2: 0

## 2020-02-18 NOTE — TELEPHONE ENCOUNTER
Last eye exam: 10/18/19  Doctor name:Rodney My Rubi , OD  Location:[facility]. No retinopathy noted, per note. Updated on flowsheets.

## 2020-02-18 NOTE — PROGRESS NOTES
2/18/2020  Spoke to Shilpa for CCM. Updates to patient care team/ comments: Per patient no changes  Patient reported changes in medications: Per patient no changes  Med Adherence  Comment: Taking as prescribed.     Health Maintenance: Reviewed with bishnu 11:00 AM Jennifer Bearden MD ONPV RHEUM ROSE MARIE NPV             Patient agrees to goal action plan.  Yes per patient  Self-Management Abilities (patient reported)             1= least confident in achieving goal, 5= very confident               - confidence: 3

## 2020-02-20 ENCOUNTER — TELEPHONE (OUTPATIENT)
Dept: FAMILY MEDICINE CLINIC | Facility: CLINIC | Age: 75
End: 2020-02-20

## 2020-02-20 NOTE — TELEPHONE ENCOUNTER
No fax in Dr Parvez Phillip red folder. LM at [facility] that fax not received and provided office fax #.

## 2020-02-21 VITALS — DIASTOLIC BLOOD PRESSURE: 64 MMHG | BODY MASS INDEX: 42 KG/M2 | SYSTOLIC BLOOD PRESSURE: 124 MMHG | WEIGHT: 275 LBS

## 2020-02-21 NOTE — PROGRESS NOTES
Romi Gregg  : 1945 was seen for Diabetic Education Follow up:    Date: 2020  Referring Provider: Dr. Kay Alonso  Start time: 11:00am End time: 11:30am    Assessment:     Assessment: /64 (BP Location: Right arm, Patient Position: fasting and 2 hours post meals 2x daily times/day. 3. Bring glucose logs to all provider visits for review. 4. Continue current medications   4. Encouraged to continue to trying to exercise for 10-15 min 3x/wk. .   5. Encouraged Bobo diane

## 2020-02-26 ENCOUNTER — ANCILLARY PROCEDURE (OUTPATIENT)
Dept: CARDIOLOGY | Age: 75
End: 2020-02-26
Attending: INTERNAL MEDICINE

## 2020-02-26 ENCOUNTER — ANCILLARY ORDERS (OUTPATIENT)
Dept: CARDIOLOGY | Age: 75
End: 2020-02-26

## 2020-02-26 DIAGNOSIS — Z95.0 CARDIAC PACEMAKER: ICD-10-CM

## 2020-02-26 PROCEDURE — X1114 CARDIAC DEVICE HOME CHECK - REMOTE UNSCHEDULED: HCPCS | Performed by: INTERNAL MEDICINE

## 2020-02-27 ENCOUNTER — PATIENT OUTREACH (OUTPATIENT)
Dept: FAMILY MEDICINE CLINIC | Facility: CLINIC | Age: 75
End: 2020-02-27

## 2020-02-27 PROCEDURE — 93295 DEV INTERROG REMOTE 1/2/MLT: CPT | Performed by: INTERNAL MEDICINE

## 2020-02-29 PROCEDURE — 99490 CHRNC CARE MGMT STAFF 1ST 20: CPT

## 2020-02-29 RX ORDER — METFORMIN HYDROCHLORIDE 500 MG/1
TABLET, EXTENDED RELEASE ORAL
Qty: 360 TABLET | Refills: 0 | Status: SHIPPED | OUTPATIENT
Start: 2020-02-29 | End: 2020-06-02

## 2020-03-02 ENCOUNTER — E-ADVICE (OUTPATIENT)
Dept: CARDIOLOGY | Age: 75
End: 2020-03-02

## 2020-03-04 RX ORDER — LISINOPRIL 5 MG/1
5 TABLET ORAL DAILY
Qty: 90 TABLET | Refills: 3 | Status: SHIPPED | OUTPATIENT
Start: 2020-03-04

## 2020-03-04 RX ORDER — LISINOPRIL 5 MG/1
5 TABLET ORAL DAILY
Qty: 90 TABLET | Refills: 3 | Status: SHIPPED | OUTPATIENT
Start: 2020-03-04 | End: 2020-03-04 | Stop reason: SDUPTHER

## 2020-03-16 ENCOUNTER — PATIENT OUTREACH (OUTPATIENT)
Dept: CASE MANAGEMENT | Age: 75
End: 2020-03-16

## 2020-03-16 NOTE — PROGRESS NOTES
Called patient and left a message for patient to call back when they can. Reviewed patient chart.  Left contact my contact number [phone]        Time Spent This Encounter Total: 5  min medical record review  Monthly Minute Total including today: 5 justin

## 2020-03-26 ENCOUNTER — PATIENT OUTREACH (OUTPATIENT)
Dept: CASE MANAGEMENT | Age: 75
End: 2020-03-26

## 2020-03-26 DIAGNOSIS — I50.22 CHRONIC SYSTOLIC CONGESTIVE HEART FAILURE (HCC): ICD-10-CM

## 2020-03-26 DIAGNOSIS — I42.8 NONISCHEMIC CARDIOMYOPATHY (HCC): ICD-10-CM

## 2020-03-26 DIAGNOSIS — I25.84 CORONARY ARTERY DISEASE DUE TO CALCIFIED CORONARY LESION: ICD-10-CM

## 2020-03-26 DIAGNOSIS — Z79.4 TYPE 2 DIABETES MELLITUS WITH HYPERGLYCEMIA, WITH LONG-TERM CURRENT USE OF INSULIN (HCC): ICD-10-CM

## 2020-03-26 DIAGNOSIS — I25.10 CORONARY ARTERY DISEASE DUE TO CALCIFIED CORONARY LESION: ICD-10-CM

## 2020-03-26 DIAGNOSIS — G47.33 OSA (OBSTRUCTIVE SLEEP APNEA): ICD-10-CM

## 2020-03-26 DIAGNOSIS — E11.65 TYPE 2 DIABETES MELLITUS WITH HYPERGLYCEMIA, WITH LONG-TERM CURRENT USE OF INSULIN (HCC): ICD-10-CM

## 2020-03-26 DIAGNOSIS — E78.5 DYSLIPIDEMIA: ICD-10-CM

## 2020-03-26 DIAGNOSIS — M05.79 RHEUMATOID ARTHRITIS INVOLVING MULTIPLE SITES WITH POSITIVE RHEUMATOID FACTOR (HCC): ICD-10-CM

## 2020-03-26 NOTE — PROGRESS NOTES
3/26/2020  Spoke to Shilpa for CCM.       Updates to patient care team/ comments: Patient states no changes  Patient reported changes in medications: Patient states no changes  Med Adherence  Comment: Taking as prescribed    Health Maintenance: Reviewed w (patient reported)             1= least confident in achieving goal, 5= very confident               - confidence: 4    Care Manager Follow Up: 1 month follow up  Reason For Follow Up: review progress and or barriers towards patients goals.      Care manage

## 2020-03-26 NOTE — PROGRESS NOTES
Called patient and left a message for patient to call back when they can. Reviewed patient chart.  Left contact my contact number [phone]        Time Spent This Encounter Total: 3  min medical record review  Monthly Minute Total including today: 8 justin

## 2020-03-31 PROCEDURE — 99490 CHRNC CARE MGMT STAFF 1ST 20: CPT

## 2020-04-07 ENCOUNTER — TELEPHONE (OUTPATIENT)
Dept: ENDOCRINOLOGY CLINIC | Facility: CLINIC | Age: 75
End: 2020-04-07

## 2020-04-07 NOTE — TELEPHONE ENCOUNTER
Patient  Called asking to speak with you directly. I explained you are at other locations and I would be happy to help. He would like to talk to you directly.  Please return call at [phone]

## 2020-04-08 NOTE — TELEPHONE ENCOUNTER
Returned pt. call  & he was requesting samples of Toujeo. Explained to him that the reps haven't been coming to the offices since the [facility] outbreak. Suggested he call his PCP to check w/other offices to see if they have any samples.  He v/u &was agreeable

## 2020-04-14 ENCOUNTER — PATIENT OUTREACH (OUTPATIENT)
Dept: CASE MANAGEMENT | Age: 75
End: 2020-04-14

## 2020-04-14 NOTE — PROGRESS NOTES
4/14/2020  Spoke to [place] for CCM. Updates to patient care team/ comments:None     Patient reported changes in medications: None     Med Adherence  Comment: pt relates taking medication as prescribed.      Health Maintenance:     Colonoscopy due on Patient agreed. Time Spent This Encounter Total: 22 min medical record review, telephone communication, care plan updates where needed, education, goals and action plan recreation/update. Provided acknowledgment and validation to patient's concerns.    Mon

## 2020-04-14 NOTE — PROGRESS NOTES
Called patient and left a message for patient to call back when he can. Reviewed chart. Time spent 5 min.

## 2020-04-15 ENCOUNTER — TELEPHONE (OUTPATIENT)
Dept: FAMILY MEDICINE CLINIC | Facility: CLINIC | Age: 75
End: 2020-04-15

## 2020-04-21 NOTE — TELEPHONE ENCOUNTER
Pt. Contacted & pt. Wants to try to get into the Pt. Assistance program. Will e-mail form for Dr. Kait King & pt. to complete. Pt. V/u & was agreeable w/plan.

## 2020-04-23 RX ORDER — SACUBITRIL AND VALSARTAN 24; 26 MG/1; MG/1
1 TABLET, FILM COATED ORAL 2 TIMES DAILY
COMMUNITY
Start: 2020-02-25 | End: 2020-04-28

## 2020-04-26 DIAGNOSIS — E11.65 TYPE 2 DIABETES MELLITUS WITH HYPERGLYCEMIA, WITH LONG-TERM CURRENT USE OF INSULIN (HCC): ICD-10-CM

## 2020-04-26 DIAGNOSIS — Z79.4 TYPE 2 DIABETES MELLITUS WITH HYPERGLYCEMIA, WITH LONG-TERM CURRENT USE OF INSULIN (HCC): ICD-10-CM

## 2020-04-27 RX ORDER — LOVASTATIN 40 MG/1
TABLET ORAL
Qty: 180 TABLET | Refills: 3 | Status: SHIPPED | OUTPATIENT
Start: 2020-04-27 | End: 2021-04-13

## 2020-04-28 ENCOUNTER — OFFICE VISIT (OUTPATIENT)
Dept: CARDIOLOGY | Age: 75
End: 2020-04-28

## 2020-04-28 VITALS — HEIGHT: 69 IN | WEIGHT: 260 LBS | BODY MASS INDEX: 38.51 KG/M2

## 2020-04-28 DIAGNOSIS — Z95.810 ICD (IMPLANTABLE CARDIOVERTER-DEFIBRILLATOR) IN PLACE: ICD-10-CM

## 2020-04-28 DIAGNOSIS — I65.23 BILATERAL CAROTID ARTERY STENOSIS: ICD-10-CM

## 2020-04-28 DIAGNOSIS — Z95.5 S/P CORONARY ARTERY STENT PLACEMENT: ICD-10-CM

## 2020-04-28 DIAGNOSIS — I25.5 CARDIOMYOPATHY, ISCHEMIC: ICD-10-CM

## 2020-04-28 DIAGNOSIS — E78.00 PURE HYPERCHOLESTEROLEMIA: ICD-10-CM

## 2020-04-28 DIAGNOSIS — Z95.1 HX OF CABG: ICD-10-CM

## 2020-04-28 DIAGNOSIS — E66.9 DIABETES MELLITUS TYPE 2 IN OBESE (CMD): ICD-10-CM

## 2020-04-28 DIAGNOSIS — I50.22 CHRONIC SYSTOLIC HEART FAILURE (CMD): ICD-10-CM

## 2020-04-28 DIAGNOSIS — E11.69 DIABETES MELLITUS TYPE 2 IN OBESE (CMD): ICD-10-CM

## 2020-04-28 DIAGNOSIS — G47.33 SLEEP APNEA, OBSTRUCTIVE: Primary | ICD-10-CM

## 2020-04-28 DIAGNOSIS — E55.9 VITAMIN D DEFICIENCY: ICD-10-CM

## 2020-04-28 DIAGNOSIS — I27.21 PULMONARY HYPERTENSION SECONDARY TO INCREASED PVR (CMD): ICD-10-CM

## 2020-04-28 DIAGNOSIS — I25.10 CORONARY ARTERY DISEASE INVOLVING NATIVE CORONARY ARTERY OF NATIVE HEART WITHOUT ANGINA PECTORIS: ICD-10-CM

## 2020-04-28 PROCEDURE — 99213 OFFICE O/P EST LOW 20 MIN: CPT | Performed by: INTERNAL MEDICINE

## 2020-04-28 SDOH — HEALTH STABILITY: PHYSICAL HEALTH: ON AVERAGE, HOW MANY DAYS PER WEEK DO YOU ENGAGE IN MODERATE TO STRENUOUS EXERCISE (LIKE A BRISK WALK)?: 0 DAYS

## 2020-04-28 SDOH — HEALTH STABILITY: PHYSICAL HEALTH: ON AVERAGE, HOW MANY MINUTES DO YOU ENGAGE IN EXERCISE AT THIS LEVEL?: 0 MIN

## 2020-04-28 ASSESSMENT — ENCOUNTER SYMPTOMS
COUGH: 0
BRUISES/BLEEDS EASILY: 0
WEIGHT LOSS: 0
WEIGHT GAIN: 0
HEMOPTYSIS: 0
SUSPICIOUS LESIONS: 0
ALLERGIC/IMMUNOLOGIC COMMENTS: NO NEW FOOD ALLERGIES
FEVER: 0
HEMATOCHEZIA: 0
CHILLS: 0

## 2020-04-28 ASSESSMENT — PATIENT HEALTH QUESTIONNAIRE - PHQ9
2. FEELING DOWN, DEPRESSED OR HOPELESS: NOT AT ALL
SUM OF ALL RESPONSES TO PHQ9 QUESTIONS 1 AND 2: 0
SUM OF ALL RESPONSES TO PHQ9 QUESTIONS 1 AND 2: 0
1. LITTLE INTEREST OR PLEASURE IN DOING THINGS: NOT AT ALL

## 2020-05-18 RX ORDER — FUROSEMIDE 20 MG/1
TABLET ORAL
Qty: 90 TABLET | Refills: 3 | Status: SHIPPED | OUTPATIENT
Start: 2020-05-18 | End: 2021-05-14

## 2020-05-26 RX ORDER — BLOOD SUGAR DIAGNOSTIC
STRIP MISCELLANEOUS
Qty: 300 EACH | Refills: 2 | Status: SHIPPED | OUTPATIENT
Start: 2020-05-26 | End: 2020-12-16

## 2020-05-26 NOTE — TELEPHONE ENCOUNTER
Diabetic Supplies Protocol Passed5/26 2:57 PM   Appointment in the past 12 or next 3 months     Per ptchelsea passed  rx sent

## 2020-05-26 NOTE — TELEPHONE ENCOUNTER
Medication Detail     Medication Quantity Refills Start End   CONTOUR NEXT TEST In Vitro Strip 300 each 3 10/1/2019    Sig:   Test 3 times daily Dx Code: E11.9 Insulin - dependent diabetes     Route:   (none)       Last refilled on 10/01/2019  for # 300 wi

## 2020-06-01 ENCOUNTER — TELEPHONE (OUTPATIENT)
Dept: ENDOCRINOLOGY CLINIC | Facility: CLINIC | Age: 75
End: 2020-06-01

## 2020-06-02 RX ORDER — METFORMIN HYDROCHLORIDE 500 MG/1
TABLET, EXTENDED RELEASE ORAL
Qty: 360 TABLET | Refills: 0 | Status: SHIPPED | OUTPATIENT
Start: 2020-06-02 | End: 2020-09-09

## 2020-06-05 ENCOUNTER — ANCILLARY ORDERS (OUTPATIENT)
Dept: CARDIOLOGY | Age: 75
End: 2020-06-05

## 2020-06-05 ENCOUNTER — ANCILLARY PROCEDURE (OUTPATIENT)
Dept: CARDIOLOGY | Age: 75
End: 2020-06-05
Attending: INTERNAL MEDICINE

## 2020-06-05 DIAGNOSIS — Z95.810 ICD (IMPLANTABLE CARDIOVERTER-DEFIBRILLATOR) IN PLACE: ICD-10-CM

## 2020-06-05 PROCEDURE — X1114 CARDIAC DEVICE HOME CHECK - REMOTE UNSCHEDULED: HCPCS | Performed by: INTERNAL MEDICINE

## 2020-06-05 PROCEDURE — 93295 DEV INTERROG REMOTE 1/2/MLT: CPT | Performed by: INTERNAL MEDICINE

## 2020-06-15 ENCOUNTER — PATIENT OUTREACH (OUTPATIENT)
Dept: CASE MANAGEMENT | Age: 75
End: 2020-06-15

## 2020-06-15 NOTE — PROGRESS NOTES
Called patient and left a message for patient to call back when they can. Reviewed patient chart.  Left contact my contact number [phone]        Time Spent This Encounter Total: 5  min medical record review  Monthly Minute Total including today: 5 justin

## 2020-06-17 ENCOUNTER — MED REC SCAN ONLY (OUTPATIENT)
Dept: FAMILY MEDICINE CLINIC | Facility: CLINIC | Age: 75
End: 2020-06-17

## 2020-06-24 RX ORDER — EZETIMIBE 10 MG/1
TABLET ORAL
Qty: 90 TABLET | Refills: 3 | Status: SHIPPED | OUTPATIENT
Start: 2020-06-24 | End: 2021-06-10

## 2020-07-13 ENCOUNTER — PATIENT OUTREACH (OUTPATIENT)
Dept: CASE MANAGEMENT | Age: 75
End: 2020-07-13

## 2020-07-13 NOTE — PROGRESS NOTES
Called patient and left a message for patient to call back when they can. Reviewed patient chart.  Left contact my contact number [phone]        Time Spent This Encounter Total: 5  min medical record review  Monthly Minute Total including today: 5 justin

## 2020-07-23 ENCOUNTER — PATIENT OUTREACH (OUTPATIENT)
Dept: CASE MANAGEMENT | Age: 75
End: 2020-07-23

## 2020-07-23 ENCOUNTER — TELEPHONE (OUTPATIENT)
Dept: CASE MANAGEMENT | Age: 75
End: 2020-07-23

## 2020-07-23 DIAGNOSIS — E78.5 DYSLIPIDEMIA: ICD-10-CM

## 2020-07-23 DIAGNOSIS — E11.65 TYPE 2 DIABETES MELLITUS WITH HYPERGLYCEMIA, WITH LONG-TERM CURRENT USE OF INSULIN (HCC): ICD-10-CM

## 2020-07-23 DIAGNOSIS — Z79.4 TYPE 2 DIABETES MELLITUS WITH HYPERGLYCEMIA, WITH LONG-TERM CURRENT USE OF INSULIN (HCC): ICD-10-CM

## 2020-07-23 NOTE — PROGRESS NOTES
7/23/2020  Spoke to Shilpa for CCM.       Updates to patient care team/ comments: No changes per patient  Patient reported changes in medications: No changes per patient  Med Adherence  Comment: Taking as precribed    Health Maintenance:   Colonoscopy due Time Provider Lloyd Garrido   8/28/2020 11:00 AM Brandi Etienne DO [facility]             Patient agrees to goal action plan.  Yes per patient  Self-Management Abilities (patient reported)             1= least confident in achieving goal, 5= very

## 2020-07-23 NOTE — TELEPHONE ENCOUNTER
Spoke with patient regarding possibly getting sample/samples of Toujeo Max if there are any available to help him out.  Please advise

## 2020-07-27 RX ORDER — PEN NEEDLE, DIABETIC 32GX 5/32"
NEEDLE, DISPOSABLE MISCELLANEOUS
Qty: 100 EACH | Refills: 3 | Status: SHIPPED | OUTPATIENT
Start: 2020-07-27 | End: 2021-11-05

## 2020-07-31 PROCEDURE — 99490 CHRNC CARE MGMT STAFF 1ST 20: CPT

## 2020-08-18 ENCOUNTER — V-VISIT (OUTPATIENT)
Dept: CARDIOLOGY | Age: 75
End: 2020-08-18

## 2020-08-18 VITALS
HEIGHT: 69 IN | WEIGHT: 255 LBS | DIASTOLIC BLOOD PRESSURE: 58 MMHG | BODY MASS INDEX: 37.77 KG/M2 | SYSTOLIC BLOOD PRESSURE: 145 MMHG

## 2020-08-18 DIAGNOSIS — G47.33 SLEEP APNEA, OBSTRUCTIVE: Primary | ICD-10-CM

## 2020-08-18 DIAGNOSIS — Z95.1 HX OF CABG: ICD-10-CM

## 2020-08-18 DIAGNOSIS — I25.5 CARDIOMYOPATHY, ISCHEMIC: ICD-10-CM

## 2020-08-18 DIAGNOSIS — I25.10 CORONARY ARTERY DISEASE INVOLVING NATIVE CORONARY ARTERY OF NATIVE HEART WITHOUT ANGINA PECTORIS: ICD-10-CM

## 2020-08-18 DIAGNOSIS — I50.22 CHRONIC SYSTOLIC HEART FAILURE (CMD): ICD-10-CM

## 2020-08-18 DIAGNOSIS — Z95.5 S/P CORONARY ARTERY STENT PLACEMENT: ICD-10-CM

## 2020-08-18 DIAGNOSIS — I65.23 BILATERAL CAROTID ARTERY STENOSIS: ICD-10-CM

## 2020-08-18 DIAGNOSIS — E78.00 PURE HYPERCHOLESTEROLEMIA: ICD-10-CM

## 2020-08-18 DIAGNOSIS — I27.21 PULMONARY HYPERTENSION SECONDARY TO INCREASED PVR (CMD): ICD-10-CM

## 2020-08-18 DIAGNOSIS — Z95.810 ICD (IMPLANTABLE CARDIOVERTER-DEFIBRILLATOR) IN PLACE: ICD-10-CM

## 2020-08-18 PROCEDURE — 99443 TELEPHONE E&M BY PHYSICIAN EST PT NOT ORIG PREV 7 DAYS 21-30 MIN: CPT | Performed by: INTERNAL MEDICINE

## 2020-08-18 SDOH — HEALTH STABILITY: PHYSICAL HEALTH: ON AVERAGE, HOW MANY DAYS PER WEEK DO YOU ENGAGE IN MODERATE TO STRENUOUS EXERCISE (LIKE A BRISK WALK)?: 0 DAYS

## 2020-08-18 SDOH — HEALTH STABILITY: PHYSICAL HEALTH: ON AVERAGE, HOW MANY MINUTES DO YOU ENGAGE IN EXERCISE AT THIS LEVEL?: 0 MIN

## 2020-08-18 ASSESSMENT — PATIENT HEALTH QUESTIONNAIRE - PHQ9
CLINICAL INTERPRETATION OF PHQ9 SCORE: NO FURTHER SCREENING NEEDED
CLINICAL INTERPRETATION OF PHQ2 SCORE: NO FURTHER SCREENING NEEDED
1. LITTLE INTEREST OR PLEASURE IN DOING THINGS: NOT AT ALL
SUM OF ALL RESPONSES TO PHQ9 QUESTIONS 1 AND 2: 0
2. FEELING DOWN, DEPRESSED OR HOPELESS: NOT AT ALL
SUM OF ALL RESPONSES TO PHQ9 QUESTIONS 1 AND 2: 0

## 2020-08-25 ENCOUNTER — PATIENT OUTREACH (OUTPATIENT)
Dept: CASE MANAGEMENT | Age: 75
End: 2020-08-25

## 2020-08-25 DIAGNOSIS — E11.65 TYPE 2 DIABETES MELLITUS WITH HYPERGLYCEMIA, WITH LONG-TERM CURRENT USE OF INSULIN (HCC): ICD-10-CM

## 2020-08-25 DIAGNOSIS — E78.5 DYSLIPIDEMIA: ICD-10-CM

## 2020-08-25 DIAGNOSIS — M05.79 RHEUMATOID ARTHRITIS INVOLVING MULTIPLE SITES WITH POSITIVE RHEUMATOID FACTOR (HCC): ICD-10-CM

## 2020-08-25 DIAGNOSIS — Z79.4 TYPE 2 DIABETES MELLITUS WITH HYPERGLYCEMIA, WITH LONG-TERM CURRENT USE OF INSULIN (HCC): ICD-10-CM

## 2020-08-25 NOTE — PROGRESS NOTES
8/25/2020  Spoke to Shilpa for CCM.       Updates to patient care team/ comments: No changes per patient  Patient reported changes in medications: No changes per patient  Med Adherence  Comment: Taking as prescribed    Health Maintenance: Patient aware  C around for prices at different pharmacies for cost of Toujeo medication patient stated he plans to do so.     Reviewed with patient  Future Appointments   Date Time Provider Lloyd Garrido   8/28/2020 11:00 AM DO NOLBERTO Reza [facility]   10/1/2

## 2020-08-25 NOTE — PROGRESS NOTES
Called patient and left a message for patient to call back when they can. Reviewed patient chart.  Left contact my contact number [phone]        Time Spent This Encounter Total: 5  min medical record review  Monthly Minute Total including today: 5 justin

## 2020-08-31 PROCEDURE — 99490 CHRNC CARE MGMT STAFF 1ST 20: CPT

## 2020-09-09 RX ORDER — METFORMIN HYDROCHLORIDE 500 MG/1
TABLET, EXTENDED RELEASE ORAL
Qty: 360 TABLET | Refills: 0 | Status: SHIPPED | OUTPATIENT
Start: 2020-09-09 | End: 2020-12-16

## 2020-09-10 ENCOUNTER — LAB ENCOUNTER (OUTPATIENT)
Dept: LAB | Age: 75
End: 2020-09-10
Attending: INTERNAL MEDICINE
Payer: MEDICARE

## 2020-09-10 ENCOUNTER — ANCILLARY PROCEDURE (OUTPATIENT)
Dept: CARDIOLOGY | Age: 75
End: 2020-09-10
Attending: INTERNAL MEDICINE

## 2020-09-10 ENCOUNTER — ANCILLARY ORDERS (OUTPATIENT)
Dept: CARDIOLOGY | Age: 75
End: 2020-09-10

## 2020-09-10 DIAGNOSIS — I50.22 CHRONIC SYSTOLIC HEART FAILURE (HCC): ICD-10-CM

## 2020-09-10 DIAGNOSIS — E78.2 MIXED HYPERLIPIDEMIA: ICD-10-CM

## 2020-09-10 DIAGNOSIS — I25.5 CARDIOMYOPATHY, ISCHEMIC: Primary | ICD-10-CM

## 2020-09-10 DIAGNOSIS — Z79.4 TYPE 2 DIABETES MELLITUS WITHOUT COMPLICATION, WITH LONG-TERM CURRENT USE OF INSULIN (HCC): ICD-10-CM

## 2020-09-10 DIAGNOSIS — Z95.810 ICD (IMPLANTABLE CARDIOVERTER-DEFIBRILLATOR) IN PLACE: ICD-10-CM

## 2020-09-10 DIAGNOSIS — Z79.899 LONG-TERM USE OF HIGH-RISK MEDICATION: ICD-10-CM

## 2020-09-10 DIAGNOSIS — E78.00 PURE HYPERCHOLESTEROLEMIA: ICD-10-CM

## 2020-09-10 DIAGNOSIS — E11.9 TYPE 2 DIABETES MELLITUS WITHOUT COMPLICATION, WITH LONG-TERM CURRENT USE OF INSULIN (HCC): ICD-10-CM

## 2020-09-10 LAB
ALBUMIN SERPL-MCNC: 3.7 G/DL (ref 3.4–5)
ALBUMIN/GLOB SERPL: 0.9 {RATIO} (ref 1–2)
ALP LIVER SERPL-CCNC: 52 U/L (ref 45–117)
ALT SERPL-CCNC: 25 U/L (ref 16–61)
ANION GAP SERPL CALC-SCNC: 5 MMOL/L (ref 0–18)
AST SERPL-CCNC: 23 U/L (ref 15–37)
BILIRUB SERPL-MCNC: 0.4 MG/DL (ref 0.1–2)
BUN BLD-MCNC: 24 MG/DL (ref 7–18)
BUN/CREAT SERPL: 23.3 (ref 10–20)
CALCIUM BLD-MCNC: 9.4 MG/DL (ref 8.5–10.1)
CHLORIDE SERPL-SCNC: 106 MMOL/L (ref 98–112)
CHOLEST SMN-MCNC: 147 MG/DL (ref ?–200)
CK SERPL-CCNC: 233 U/L (ref 39–308)
CO2 SERPL-SCNC: 28 MMOL/L (ref 21–32)
CREAT BLD-MCNC: 1.03 MG/DL (ref 0.7–1.3)
EST. AVERAGE GLUCOSE BLD GHB EST-MCNC: 146 MG/DL (ref 68–126)
GLOBULIN PLAS-MCNC: 3.9 G/DL (ref 2.8–4.4)
GLUCOSE BLD-MCNC: 109 MG/DL (ref 70–99)
HBA1C MFR BLD HPLC: 6.7 % (ref ?–5.7)
HDLC SERPL-MCNC: 49 MG/DL (ref 40–59)
LDLC SERPL CALC-MCNC: 73 MG/DL (ref ?–100)
LDLC SERPL DIRECT ASSAY-MCNC: 90 MG/DL (ref ?–100)
M PROTEIN MFR SERPL ELPH: 7.6 G/DL (ref 6.4–8.2)
NONHDLC SERPL-MCNC: 98 MG/DL (ref ?–130)
OSMOLALITY SERPL CALC.SUM OF ELEC: 293 MOSM/KG (ref 275–295)
PATIENT FASTING Y/N/NP: NO
PATIENT FASTING Y/N/NP: NO
POTASSIUM SERPL-SCNC: 4.8 MMOL/L (ref 3.5–5.1)
SODIUM SERPL-SCNC: 139 MMOL/L (ref 136–145)
TRIGL SERPL-MCNC: 123 MG/DL (ref 30–149)
VLDLC SERPL CALC-MCNC: 25 MG/DL (ref 0–30)

## 2020-09-10 PROCEDURE — 93295 DEV INTERROG REMOTE 1/2/MLT: CPT | Performed by: INTERNAL MEDICINE

## 2020-09-10 PROCEDURE — 83721 ASSAY OF BLOOD LIPOPROTEIN: CPT

## 2020-09-10 PROCEDURE — 83036 HEMOGLOBIN GLYCOSYLATED A1C: CPT

## 2020-09-10 PROCEDURE — X1114 CARDIAC DEVICE HOME CHECK - REMOTE UNSCHEDULED: HCPCS | Performed by: INTERNAL MEDICINE

## 2020-09-10 PROCEDURE — 80053 COMPREHEN METABOLIC PANEL: CPT

## 2020-09-10 PROCEDURE — 36415 COLL VENOUS BLD VENIPUNCTURE: CPT

## 2020-09-10 PROCEDURE — 80061 LIPID PANEL: CPT

## 2020-09-10 PROCEDURE — 82550 ASSAY OF CK (CPK): CPT

## 2020-09-22 ENCOUNTER — PATIENT OUTREACH (OUTPATIENT)
Dept: CASE MANAGEMENT | Age: 75
End: 2020-09-22

## 2020-09-22 RX ORDER — METOPROLOL SUCCINATE 100 MG/1
100 TABLET, EXTENDED RELEASE ORAL 2 TIMES DAILY
Qty: 180 TABLET | Refills: 0 | Status: SHIPPED | OUTPATIENT
Start: 2020-09-22 | End: 2020-12-24

## 2020-09-22 NOTE — PROGRESS NOTES
Returned patient call and left a message for patient to call back when they can. Reviewed patient chart.  Left contact my contact number [phone]        Time Spent This Encounter Total: 0  min medical record review  Monthly Minute Total including today:

## 2020-09-22 NOTE — PROGRESS NOTES
Called patient and left a message for patient to call back when they can. Reviewed patient chart.  Left contact my contact number [phone]        Time Spent This Encounter Total: 5  min medical record review  Monthly Minute Total including today: 5 justin

## 2020-09-23 ENCOUNTER — PATIENT OUTREACH (OUTPATIENT)
Dept: CASE MANAGEMENT | Age: 75
End: 2020-09-23

## 2020-09-23 DIAGNOSIS — I42.8 NONISCHEMIC CARDIOMYOPATHY (HCC): ICD-10-CM

## 2020-09-23 DIAGNOSIS — E11.65 TYPE 2 DIABETES MELLITUS WITH HYPERGLYCEMIA, WITH LONG-TERM CURRENT USE OF INSULIN (HCC): ICD-10-CM

## 2020-09-23 DIAGNOSIS — Z79.4 TYPE 2 DIABETES MELLITUS WITH HYPERGLYCEMIA, WITH LONG-TERM CURRENT USE OF INSULIN (HCC): ICD-10-CM

## 2020-09-23 DIAGNOSIS — M05.79 RHEUMATOID ARTHRITIS INVOLVING MULTIPLE SITES WITH POSITIVE RHEUMATOID FACTOR (HCC): ICD-10-CM

## 2020-09-23 DIAGNOSIS — E78.5 DYSLIPIDEMIA: ICD-10-CM

## 2020-09-28 ENCOUNTER — TELEPHONE (OUTPATIENT)
Dept: CARDIOLOGY | Age: 75
End: 2020-09-28

## 2020-09-28 DIAGNOSIS — I25.10 CORONARY ARTERY DISEASE INVOLVING NATIVE CORONARY ARTERY OF NATIVE HEART WITHOUT ANGINA PECTORIS: Primary | ICD-10-CM

## 2020-09-28 RX ORDER — CLOPIDOGREL BISULFATE 75 MG/1
75 TABLET ORAL DAILY
Qty: 90 TABLET | Refills: 0 | Status: SHIPPED | OUTPATIENT
Start: 2020-09-28 | End: 2020-12-24

## 2020-09-30 PROCEDURE — 99490 CHRNC CARE MGMT STAFF 1ST 20: CPT

## 2020-10-05 ENCOUNTER — OFFICE VISIT (OUTPATIENT)
Dept: FAMILY MEDICINE CLINIC | Facility: CLINIC | Age: 75
End: 2020-10-05
Payer: MEDICARE

## 2020-10-05 VITALS
HEIGHT: 68 IN | SYSTOLIC BLOOD PRESSURE: 118 MMHG | WEIGHT: 257 LBS | HEART RATE: 70 BPM | OXYGEN SATURATION: 98 % | DIASTOLIC BLOOD PRESSURE: 82 MMHG | BODY MASS INDEX: 38.95 KG/M2 | RESPIRATION RATE: 16 BRPM | TEMPERATURE: 97 F

## 2020-10-05 DIAGNOSIS — Z00.00 ENCOUNTER FOR ANNUAL HEALTH EXAMINATION: ICD-10-CM

## 2020-10-05 DIAGNOSIS — Z12.5 PROSTATE CANCER SCREENING: ICD-10-CM

## 2020-10-05 DIAGNOSIS — Z12.11 COLON CANCER SCREENING: ICD-10-CM

## 2020-10-05 PROCEDURE — G0439 PPPS, SUBSEQ VISIT: HCPCS | Performed by: FAMILY MEDICINE

## 2020-10-05 NOTE — PATIENT INSTRUCTIONS
Winifred Reyes's SCREENING SCHEDULE   Tests on this list are recommended by your physician but may not be covered, or covered at this frequency, by your insurer. Please check with your insurance carrier before scheduling to verify coverage.     Jayce Tabares ages 73-68)  No results found for this or any previous visit.  Limited to patients who meet one of the following criteria:   • Men who are 73-68 years old and have smoked more than 100 cigarettes in their lifetime   • Anyone with a family history    Colorec Moderate/High Risk No orders found for this or any previous visit.  Medium/high risk factors:   End-stage renal disease   Hemophiliacs who received Factor VIII or IX concentrates   Clients of institutions for the mentally retarded   Persons who live in the

## 2020-10-05 NOTE — PROGRESS NOTES
HPI:   Fang Balderrama is a 70year old male who presents for a Medicare Subsequent Annual Wellness visit (Pt already had Initial Annual Wellness). Is unfortunately unemployed again.      His last annual assessment has been over 1 year: Annual Phys was screened for Alcohol abuse and had a score of 0 so is at low risk.      Patient Care Team: Patient Care Team:  Jana Nicholson DO as PCP - General (Family Practice)  Jana Nicholson DO as PCP - [facility]  Meme Oconnor [address] [place] [address] Tejal as Johan Oneill UNIT/ML Subcutaneous Solution Pen-injector, INJECT 30 UNITS EVERY NIGHT AT BEDTIME    •  ROSIBEL MICROLET LANCETS Does not apply Misc, Test 3 times daily Dx Code: E11.9 Insulin dependent diabetes    •  Blood Glucose Calibration (CONTOUR CONTROL) Low In Vitro and Unspecified sleep apnea. He  has a past surgical history that includes tonsillectomy; Cardiac defibrillator placement (6/7/14); angiogram (2/11/2015); angioplasty (coronary) (2/23/15); cabg (2004); and bypass surgery.     His family history includes mg-3ml 09/03/2013, 09/21/2013   • Pneumococcal (Prevnar 13) 11/25/2014   • Pneumovax 23 04/25/2017        ASSESSMENT AND OTHER RELEVANT CHRONIC CONDITIONS:   Vikash Tellez is a 70year old male who presents for a Medicare Assessment.      PLAN SUMMARY Colorectal Cancer Screening      Colonoscopy Screen every 10 years Colonoscopy due on 08/25/1997 Update [facility] if applicable    Flex Sigmoidoscopy Screen every 10 years No results found for this or any previous visit.  No flowsheet data found diuretics, anticonvulsants.)    Potassium  Annually Potassium (mmol/L)   Date Value   09/10/2020 4.8   03/23/2018 5.2 (H)   06/17/2016 5.6 (H)    No flowsheet data found.     Creatinine  Annually Creatinine, Serum (mg/dL)   Date Value   06/17/2016 0.96

## 2020-10-16 ENCOUNTER — ANCILLARY PROCEDURE (OUTPATIENT)
Dept: CARDIOLOGY | Age: 75
End: 2020-10-16
Attending: INTERNAL MEDICINE

## 2020-10-16 VITALS — WEIGHT: 255 LBS | HEART RATE: 60 BPM | BODY MASS INDEX: 37.66 KG/M2

## 2020-10-16 DIAGNOSIS — Z95.810 ICD (IMPLANTABLE CARDIOVERTER-DEFIBRILLATOR) IN PLACE: Primary | ICD-10-CM

## 2020-10-16 DIAGNOSIS — I47.20 VENTRICULAR TACHYCARDIA (CMD): ICD-10-CM

## 2020-10-16 PROCEDURE — 93283 PRGRMG EVAL IMPLANTABLE DFB: CPT | Performed by: INTERNAL MEDICINE

## 2020-10-21 ENCOUNTER — PATIENT OUTREACH (OUTPATIENT)
Dept: CASE MANAGEMENT | Age: 75
End: 2020-10-21

## 2020-10-21 DIAGNOSIS — E78.5 DYSLIPIDEMIA: ICD-10-CM

## 2020-10-21 DIAGNOSIS — E11.65 TYPE 2 DIABETES MELLITUS WITH HYPERGLYCEMIA, WITH LONG-TERM CURRENT USE OF INSULIN (HCC): ICD-10-CM

## 2020-10-21 DIAGNOSIS — Z79.4 TYPE 2 DIABETES MELLITUS WITH HYPERGLYCEMIA, WITH LONG-TERM CURRENT USE OF INSULIN (HCC): ICD-10-CM

## 2020-10-21 DIAGNOSIS — I25.84 CORONARY ARTERY DISEASE DUE TO CALCIFIED CORONARY LESION: ICD-10-CM

## 2020-10-21 DIAGNOSIS — M05.79 RHEUMATOID ARTHRITIS INVOLVING MULTIPLE SITES WITH POSITIVE RHEUMATOID FACTOR (HCC): ICD-10-CM

## 2020-10-21 DIAGNOSIS — I25.10 CORONARY ARTERY DISEASE DUE TO CALCIFIED CORONARY LESION: ICD-10-CM

## 2020-10-21 NOTE — PROGRESS NOTES
10/21/2020  Spoke to [place] for CCM.       Updates to patient care team/ comments: No changes per patient  Patient reported changes in medications: No changes per paitent  Med Adherence  Comment: Taking as prescribed    Health Maintenance: Pt aware   [place] goals. Care managers interventions: None needed at this time per patient. Time Spent This Encounter Total: 20 min medical record review, telephone communication, care plan updates where needed, education, goals and action plan recreation/update.  Provi

## 2020-10-21 NOTE — PROGRESS NOTES
Called patient and left a message for patient to call back when they can. Reviewed patient chart.  Left contact my contact number [phone]        Time Spent This Encounter Total: 5  min medical record review  Monthly Minute Total including today: 5 justin

## 2020-10-27 ENCOUNTER — V-VISIT (OUTPATIENT)
Dept: CARDIOLOGY | Age: 75
End: 2020-10-27

## 2020-10-27 VITALS
WEIGHT: 250 LBS | BODY MASS INDEX: 37.03 KG/M2 | HEIGHT: 69 IN | SYSTOLIC BLOOD PRESSURE: 137 MMHG | DIASTOLIC BLOOD PRESSURE: 66 MMHG

## 2020-10-27 DIAGNOSIS — E78.00 PURE HYPERCHOLESTEROLEMIA: ICD-10-CM

## 2020-10-27 DIAGNOSIS — I27.21 PULMONARY HYPERTENSION SECONDARY TO INCREASED PVR (CMD): ICD-10-CM

## 2020-10-27 DIAGNOSIS — Z95.5 S/P CORONARY ARTERY STENT PLACEMENT: ICD-10-CM

## 2020-10-27 DIAGNOSIS — R06.02 SHORTNESS OF BREATH: ICD-10-CM

## 2020-10-27 DIAGNOSIS — I65.23 BILATERAL CAROTID ARTERY STENOSIS: ICD-10-CM

## 2020-10-27 DIAGNOSIS — G47.33 SLEEP APNEA, OBSTRUCTIVE: Primary | ICD-10-CM

## 2020-10-27 DIAGNOSIS — E66.9 DIABETES MELLITUS TYPE 2 IN OBESE (CMD): ICD-10-CM

## 2020-10-27 DIAGNOSIS — Z95.1 HX OF CABG: ICD-10-CM

## 2020-10-27 DIAGNOSIS — I25.5 CARDIOMYOPATHY, ISCHEMIC: ICD-10-CM

## 2020-10-27 DIAGNOSIS — E11.69 DIABETES MELLITUS TYPE 2 IN OBESE (CMD): ICD-10-CM

## 2020-10-27 DIAGNOSIS — Z95.810 ICD (IMPLANTABLE CARDIOVERTER-DEFIBRILLATOR) IN PLACE: ICD-10-CM

## 2020-10-27 DIAGNOSIS — I50.22 CHRONIC SYSTOLIC HEART FAILURE (CMD): ICD-10-CM

## 2020-10-27 DIAGNOSIS — I25.10 CORONARY ARTERY DISEASE INVOLVING NATIVE CORONARY ARTERY OF NATIVE HEART WITHOUT ANGINA PECTORIS: ICD-10-CM

## 2020-10-27 PROCEDURE — 93283 PRGRMG EVAL IMPLANTABLE DFB: CPT | Performed by: INTERNAL MEDICINE

## 2020-10-27 PROCEDURE — 99214 OFFICE O/P EST MOD 30 MIN: CPT | Performed by: INTERNAL MEDICINE

## 2020-10-27 ASSESSMENT — ENCOUNTER SYMPTOMS
ALLERGIC/IMMUNOLOGIC COMMENTS: NO NEW FOOD ALLERGIES
CHILLS: 0
BRUISES/BLEEDS EASILY: 0
WEIGHT LOSS: 0
WEIGHT GAIN: 0
SUSPICIOUS LESIONS: 0
HEMATOCHEZIA: 0
HEMOPTYSIS: 0
COUGH: 0
FEVER: 0

## 2020-10-27 ASSESSMENT — PATIENT HEALTH QUESTIONNAIRE - PHQ9
2. FEELING DOWN, DEPRESSED OR HOPELESS: NOT AT ALL
CLINICAL INTERPRETATION OF PHQ9 SCORE: NO FURTHER SCREENING NEEDED
SUM OF ALL RESPONSES TO PHQ9 QUESTIONS 1 AND 2: 0
CLINICAL INTERPRETATION OF PHQ2 SCORE: NO FURTHER SCREENING NEEDED
1. LITTLE INTEREST OR PLEASURE IN DOING THINGS: NOT AT ALL
SUM OF ALL RESPONSES TO PHQ9 QUESTIONS 1 AND 2: 0

## 2020-10-31 PROCEDURE — 99490 CHRNC CARE MGMT STAFF 1ST 20: CPT

## 2020-11-17 DIAGNOSIS — Z79.4 TYPE 2 DIABETES MELLITUS WITH HYPERGLYCEMIA, WITH LONG-TERM CURRENT USE OF INSULIN (HCC): ICD-10-CM

## 2020-11-17 DIAGNOSIS — E11.65 TYPE 2 DIABETES MELLITUS WITH HYPERGLYCEMIA, WITH LONG-TERM CURRENT USE OF INSULIN (HCC): ICD-10-CM

## 2020-11-17 NOTE — TELEPHONE ENCOUNTER
Insulin Glargine, 1 Unit Dial, (TOUJEO SOLOSTAR) 300 UNIT/ML Subcutaneous Solution Pen-injector     [facility] to [facility] [place]  Pt had px on 10/2020  Changed PCP to Dr Makayla Maciel

## 2020-11-17 NOTE — TELEPHONE ENCOUNTER
Last refilled on 4/27/20 for # 22. 5mL with 0 refills  Last labs a1c 6.7 on 9/10/20  Last OV 10/5/20  Future Appointments   Date Time Provider Lloyd Garrido   4/6/2021 12:40 PM Audra Perry MD ONPV RHE[facility] NPV        Thank you.

## 2020-11-18 RX ORDER — INSULIN GLARGINE 300 U/ML
INJECTION, SOLUTION SUBCUTANEOUS
Qty: 22.5 ML | Refills: 0 | Status: SHIPPED | OUTPATIENT
Start: 2020-11-18 | End: 2021-06-14

## 2020-11-19 ENCOUNTER — PATIENT OUTREACH (OUTPATIENT)
Dept: CASE MANAGEMENT | Age: 75
End: 2020-11-19

## 2020-11-19 NOTE — PROGRESS NOTES
Called patient and left a message for patient to call back when they can. Reviewed patient chart.  Left contact my contact number [phone]        Time Spent This Encounter Total: 5  min medical record review  Monthly Minute Total including today: 5 justin

## 2020-11-20 ENCOUNTER — PATIENT OUTREACH (OUTPATIENT)
Dept: CASE MANAGEMENT | Age: 75
End: 2020-11-20

## 2020-11-20 DIAGNOSIS — Z79.4 TYPE 2 DIABETES MELLITUS WITH HYPERGLYCEMIA, WITH LONG-TERM CURRENT USE OF INSULIN (HCC): ICD-10-CM

## 2020-11-20 DIAGNOSIS — I25.10 CORONARY ARTERY DISEASE DUE TO CALCIFIED CORONARY LESION: ICD-10-CM

## 2020-11-20 DIAGNOSIS — E11.65 TYPE 2 DIABETES MELLITUS WITH HYPERGLYCEMIA, WITH LONG-TERM CURRENT USE OF INSULIN (HCC): ICD-10-CM

## 2020-11-20 DIAGNOSIS — I25.84 CORONARY ARTERY DISEASE DUE TO CALCIFIED CORONARY LESION: ICD-10-CM

## 2020-11-20 DIAGNOSIS — E78.5 DYSLIPIDEMIA: ICD-10-CM

## 2020-11-20 DIAGNOSIS — G47.33 OSA (OBSTRUCTIVE SLEEP APNEA): ICD-10-CM

## 2020-11-20 DIAGNOSIS — M05.79 RHEUMATOID ARTHRITIS INVOLVING MULTIPLE SITES WITH POSITIVE RHEUMATOID FACTOR (HCC): ICD-10-CM

## 2020-11-20 NOTE — PROGRESS NOTES
11/20/2020  Spoke to Shilpa for CCM.       Updates to patient care team/ comments: No changes per patient  Patient reported changes in medications: No changes per patient  Med Adherence  Comment: Taking as prescribed    Health Maintenance: Pt viridiana  Colon Abilities (patient reported)             1= least confident in achieving goal, 5= very confident               - confidence: 4    Care Manager Follow Up: 1 month follow up  Reason For Follow Up: review progress and or barriers towards patients goals.      C

## 2020-11-23 ENCOUNTER — TELEPHONE (OUTPATIENT)
Dept: FAMILY MEDICINE CLINIC | Facility: CLINIC | Age: 75
End: 2020-11-23

## 2020-11-23 NOTE — TELEPHONE ENCOUNTER
I don't have time today to see him, I'm pretty booked. You can put him in at 3:30 tomorrow, or he can go to  today if he is really feeling terrible.

## 2020-11-23 NOTE — TELEPHONE ENCOUNTER
Patient and patient wife notified. Accepted appt for tomorrow at 3:30. States he had a neck ache 3 days ago but was putting kezia lights up on pine trees over the weekend and pain worsened.     Will go to UC if pain continues to worsen before appt t

## 2020-11-23 NOTE — TELEPHONE ENCOUNTER
Pt wife, Rah Hathaway is a pt of Dr Brenda Dunbar. Wife called to ask if Dr Brenda Dunbar would accept pt as her pt. He has been experiencing pain that she thinks is a pinched nerve in the back of his neck. She is requesting an office visit today with Dr Brenda Dunbar.

## 2020-11-24 ENCOUNTER — OFFICE VISIT (OUTPATIENT)
Dept: FAMILY MEDICINE CLINIC | Facility: CLINIC | Age: 75
End: 2020-11-24
Payer: MEDICARE

## 2020-11-24 VITALS
RESPIRATION RATE: 16 BRPM | HEART RATE: 72 BPM | WEIGHT: 258 LBS | SYSTOLIC BLOOD PRESSURE: 124 MMHG | BODY MASS INDEX: 39.1 KG/M2 | HEIGHT: 68 IN | DIASTOLIC BLOOD PRESSURE: 70 MMHG | TEMPERATURE: 97 F

## 2020-11-24 DIAGNOSIS — M62.838 MUSCLE SPASMS OF NECK: Primary | ICD-10-CM

## 2020-11-24 PROCEDURE — 99214 OFFICE O/P EST MOD 30 MIN: CPT | Performed by: FAMILY MEDICINE

## 2020-11-24 RX ORDER — NAPROXEN 500 MG/1
500 TABLET ORAL 2 TIMES DAILY WITH MEALS
Qty: 30 TABLET | Refills: 0 | Status: SHIPPED | OUTPATIENT
Start: 2020-11-24 | End: 2020-12-01

## 2020-11-24 RX ORDER — CYCLOBENZAPRINE HCL 10 MG
10 TABLET ORAL 3 TIMES DAILY PRN
Qty: 21 TABLET | Refills: 0 | Status: SHIPPED | OUTPATIENT
Start: 2020-11-24 | End: 2021-10-14 | Stop reason: ALTCHOICE

## 2020-11-24 RX ORDER — ACETAMINOPHEN AND CODEINE PHOSPHATE 300; 30 MG/1; MG/1
1 TABLET ORAL EVERY 4 HOURS PRN
COMMUNITY
End: 2021-03-25 | Stop reason: ALTCHOICE

## 2020-11-24 NOTE — PROGRESS NOTES
Brina Casper is a 70year old male. Patient presents with:  Establish Care: neck and back pain      HPI:   Last week started with a crick in his neck. Thought he had been sleeping wrong. Lasted for 3-4 days.    He was hanging lights out side on tree 2.5 MG Oral Tab Take 4 tablets (10 mg total) by mouth every 7 days.  48 tablet 1   • METFORMIN HCL  MG Oral Tablet 24 Hr TAKE 2 TABLETS BY MOUTH TWICE DAILY WITH MEALS 360 tablet 0   • BD PEN NEEDLE LILO U/F 32G X 4 MM Does not apply Misc USE ONCE YSABEL daily.     • lisinopril (PRINIVIL,ZESTRIL) 5 MG Oral Tab Take 5 mg by mouth daily.         Past Medical History:   Diagnosis Date   • CAD (coronary artery disease)    • Congestive heart failure ([facility] [address]    • CORONARY ARTERY DISEASE 2004    cabg   • Diabetes (H murmur  GI: good BS's,no masses, HSM or tenderness  EXTREMITIES: no cyanosis, clubbing or edema  Neuro, strength and sensation intact b/l UE. Musc: tension and spams in neck b/l L>R, unable to move head other than keeping it straight without severe pain.

## 2020-11-27 ENCOUNTER — LAB ENCOUNTER (OUTPATIENT)
Dept: LAB | Facility: HOSPITAL | Age: 75
End: 2020-11-27
Attending: FAMILY MEDICINE
Payer: MEDICARE

## 2020-11-27 DIAGNOSIS — Z12.11 COLON CANCER SCREENING: ICD-10-CM

## 2020-11-27 PROCEDURE — 82274 ASSAY TEST FOR BLOOD FECAL: CPT

## 2020-11-30 PROCEDURE — 99490 CHRNC CARE MGMT STAFF 1ST 20: CPT

## 2020-12-16 RX ORDER — METFORMIN HYDROCHLORIDE 500 MG/1
1000 TABLET, EXTENDED RELEASE ORAL 2 TIMES DAILY WITH MEALS
Qty: 360 TABLET | Refills: 0 | Status: SHIPPED | OUTPATIENT
Start: 2020-12-16 | End: 2021-03-12

## 2020-12-16 RX ORDER — BLOOD SUGAR DIAGNOSTIC
STRIP MISCELLANEOUS
Qty: 300 EACH | Refills: 2 | Status: SHIPPED | OUTPATIENT
Start: 2020-12-16

## 2020-12-16 NOTE — TELEPHONE ENCOUNTER
Pt called, needs refills on METFORMIN HCL  MG Oral Tablet 24 Hr and CONTOUR NEXT TEST In Vitro Strip. [facility] [place].   Please call pt at [phone]

## 2020-12-18 ENCOUNTER — PATIENT OUTREACH (OUTPATIENT)
Dept: CASE MANAGEMENT | Age: 75
End: 2020-12-18

## 2020-12-18 NOTE — PROGRESS NOTES
Called patient and left a message for patient to call back when they can. Reviewed patient chart.  Left contact my contact number [phone]        Time Spent This Encounter Total: 5  min medical record review  Monthly Minute Total including today: 5 justin

## 2020-12-21 ENCOUNTER — TELEPHONE (OUTPATIENT)
Dept: FAMILY MEDICINE CLINIC | Facility: CLINIC | Age: 75
End: 2020-12-21

## 2020-12-23 ENCOUNTER — ANCILLARY ORDERS (OUTPATIENT)
Dept: CARDIOLOGY | Age: 75
End: 2020-12-23

## 2020-12-23 ENCOUNTER — ANCILLARY PROCEDURE (OUTPATIENT)
Dept: CARDIOLOGY | Age: 75
End: 2020-12-23
Attending: INTERNAL MEDICINE

## 2020-12-23 DIAGNOSIS — Z95.810 ICD (IMPLANTABLE CARDIOVERTER-DEFIBRILLATOR) IN PLACE: ICD-10-CM

## 2020-12-23 PROCEDURE — X1114 CARDIAC DEVICE HOME CHECK - REMOTE UNSCHEDULED: HCPCS | Performed by: INTERNAL MEDICINE

## 2020-12-23 PROCEDURE — 93294 REM INTERROG EVL PM/LDLS PM: CPT | Performed by: INTERNAL MEDICINE

## 2020-12-24 RX ORDER — METOPROLOL SUCCINATE 100 MG/1
TABLET, EXTENDED RELEASE ORAL
Qty: 180 TABLET | Refills: 3 | Status: SHIPPED | OUTPATIENT
Start: 2020-12-24

## 2020-12-24 RX ORDER — CLOPIDOGREL BISULFATE 75 MG/1
75 TABLET ORAL DAILY
Qty: 90 TABLET | Refills: 3 | Status: SHIPPED | OUTPATIENT
Start: 2020-12-24

## 2020-12-28 ENCOUNTER — MED REC SCAN ONLY (OUTPATIENT)
Dept: FAMILY MEDICINE CLINIC | Facility: CLINIC | Age: 75
End: 2020-12-28

## 2021-01-19 ENCOUNTER — PATIENT OUTREACH (OUTPATIENT)
Dept: CASE MANAGEMENT | Age: 76
End: 2021-01-19

## 2021-01-19 DIAGNOSIS — I25.84 CORONARY ARTERY DISEASE DUE TO CALCIFIED CORONARY LESION: ICD-10-CM

## 2021-01-19 DIAGNOSIS — M05.79 RHEUMATOID ARTHRITIS INVOLVING MULTIPLE SITES WITH POSITIVE RHEUMATOID FACTOR (HCC): ICD-10-CM

## 2021-01-19 DIAGNOSIS — E78.5 DYSLIPIDEMIA: ICD-10-CM

## 2021-01-19 DIAGNOSIS — Z79.4 TYPE 2 DIABETES MELLITUS WITH HYPERGLYCEMIA, WITH LONG-TERM CURRENT USE OF INSULIN (HCC): ICD-10-CM

## 2021-01-19 DIAGNOSIS — E11.65 TYPE 2 DIABETES MELLITUS WITH HYPERGLYCEMIA, WITH LONG-TERM CURRENT USE OF INSULIN (HCC): ICD-10-CM

## 2021-01-19 DIAGNOSIS — I42.8 NONISCHEMIC CARDIOMYOPATHY (HCC): ICD-10-CM

## 2021-01-19 DIAGNOSIS — I25.10 CORONARY ARTERY DISEASE DUE TO CALCIFIED CORONARY LESION: ICD-10-CM

## 2021-01-19 NOTE — PROGRESS NOTES
1/19/2021  Spoke to [place] for CCM.       Updates to patient care team/ comments: No changes per patient  Patient reported changes in medications: No changes per patient  Med Adherence  Comment: Taking as prescribed    Health Maintenance: Pt viridiana Carter goal action plan.   Yes per patient  Self-Management Abilities (patient reported)             1= least confident in achieving goal, 5= very confident               - confidence: 4    Care Manager Follow Up: 1 month follow up   Reason For Follow Up: review p

## 2021-01-19 NOTE — PROGRESS NOTES
Called patient and left a message for patient to call back when they can. Reviewed patient chart.  Left contact my contact number [phone]        Time Spent This Encounter Total: 5  min medical record review  Monthly Minute Total including today: 5 justin

## 2021-01-20 ENCOUNTER — MED REC SCAN ONLY (OUTPATIENT)
Dept: FAMILY MEDICINE CLINIC | Facility: CLINIC | Age: 76
End: 2021-01-20

## 2021-01-26 DIAGNOSIS — Z23 NEED FOR VACCINATION: ICD-10-CM

## 2021-01-31 PROCEDURE — 99490 CHRNC CARE MGMT STAFF 1ST 20: CPT

## 2021-02-22 ENCOUNTER — TELEPHONE (OUTPATIENT)
Dept: FAMILY MEDICINE CLINIC | Facility: CLINIC | Age: 76
End: 2021-02-22

## 2021-03-04 ENCOUNTER — PATIENT OUTREACH (OUTPATIENT)
Dept: CASE MANAGEMENT | Age: 76
End: 2021-03-04

## 2021-03-04 NOTE — PROGRESS NOTES
Called patient and left a message for patient to call back when they can. Reviewed patient chart.  Left contact my contact number [phone]        Time Spent This Encounter Total: 5  min medical record review  Monthly Minute Total including today: 5 justin

## 2021-03-12 RX ORDER — METFORMIN HYDROCHLORIDE 500 MG/1
1000 TABLET, EXTENDED RELEASE ORAL 2 TIMES DAILY WITH MEALS
Qty: 360 TABLET | Refills: 0 | Status: SHIPPED | OUTPATIENT
Start: 2021-03-12 | End: 2021-06-12

## 2021-03-12 NOTE — TELEPHONE ENCOUNTER
Last OV 11/24/2020  Last lab 9/10/2020 A1c 6.7  Last refilled 12/16/2020  #360  0 refills    Diabetic Medication Protocol [id number]/12/2021 02:10 PM   HgBA1C procedure resulted in past 6 months Protocol Details    Last HgBA1C < 7.5     Microalbumin procedur

## 2021-03-25 ENCOUNTER — PATIENT OUTREACH (OUTPATIENT)
Dept: CASE MANAGEMENT | Age: 76
End: 2021-03-25

## 2021-03-25 DIAGNOSIS — M05.79 RHEUMATOID ARTHRITIS INVOLVING MULTIPLE SITES WITH POSITIVE RHEUMATOID FACTOR (HCC): ICD-10-CM

## 2021-03-25 DIAGNOSIS — I25.10 CORONARY ARTERY DISEASE DUE TO CALCIFIED CORONARY LESION: ICD-10-CM

## 2021-03-25 DIAGNOSIS — E78.5 DYSLIPIDEMIA: ICD-10-CM

## 2021-03-25 DIAGNOSIS — E11.65 TYPE 2 DIABETES MELLITUS WITH HYPERGLYCEMIA, WITH LONG-TERM CURRENT USE OF INSULIN (HCC): ICD-10-CM

## 2021-03-25 DIAGNOSIS — I25.84 CORONARY ARTERY DISEASE DUE TO CALCIFIED CORONARY LESION: ICD-10-CM

## 2021-03-25 DIAGNOSIS — I50.22 CHRONIC SYSTOLIC CONGESTIVE HEART FAILURE (HCC): ICD-10-CM

## 2021-03-25 DIAGNOSIS — Z79.4 TYPE 2 DIABETES MELLITUS WITH HYPERGLYCEMIA, WITH LONG-TERM CURRENT USE OF INSULIN (HCC): ICD-10-CM

## 2021-03-25 DIAGNOSIS — I42.8 NONISCHEMIC CARDIOMYOPATHY (HCC): ICD-10-CM

## 2021-03-25 NOTE — PROGRESS NOTES
3/25/2021  Spoke to Shilpa for CCM.       Updates to patient care team/ comments: No changes per patient  Patient reported changes in medications: No changes per patient  Med Adherence  Comment: Taking as prescribed    Health Maintenance: Pt viridiana Carter Abilities (patient reported)             1= least confident in achieving goal, 5= very confident               - confidence: 4    Care Manager Follow Up: 1 month follow up  Reason For Follow Up: review progress and or barriers towards patients goals.      C

## 2021-03-26 ENCOUNTER — ANCILLARY PROCEDURE (OUTPATIENT)
Dept: CARDIOLOGY | Age: 76
End: 2021-03-26
Attending: INTERNAL MEDICINE

## 2021-03-26 ENCOUNTER — ANCILLARY ORDERS (OUTPATIENT)
Dept: CARDIOLOGY | Age: 76
End: 2021-03-26

## 2021-03-26 DIAGNOSIS — Z95.810 ICD (IMPLANTABLE CARDIOVERTER-DEFIBRILLATOR) IN PLACE: ICD-10-CM

## 2021-03-26 PROCEDURE — X1114 CARDIAC DEVICE HOME CHECK - REMOTE UNSCHEDULED: HCPCS | Performed by: INTERNAL MEDICINE

## 2021-03-26 PROCEDURE — 93295 DEV INTERROG REMOTE 1/2/MLT: CPT | Performed by: INTERNAL MEDICINE

## 2021-03-31 ENCOUNTER — TELEPHONE (OUTPATIENT)
Dept: FAMILY MEDICINE CLINIC | Facility: CLINIC | Age: 76
End: 2021-03-31

## 2021-03-31 DIAGNOSIS — E11.65 TYPE 2 DIABETES MELLITUS WITH HYPERGLYCEMIA, WITH LONG-TERM CURRENT USE OF INSULIN (HCC): Primary | ICD-10-CM

## 2021-03-31 DIAGNOSIS — Z79.4 TYPE 2 DIABETES MELLITUS WITH HYPERGLYCEMIA, WITH LONG-TERM CURRENT USE OF INSULIN (HCC): Primary | ICD-10-CM

## 2021-03-31 PROCEDURE — 99490 CHRNC CARE MGMT STAFF 1ST 20: CPT

## 2021-03-31 NOTE — TELEPHONE ENCOUNTER
Patient just scheduled him self for a nurse apt on Monday. Patient's wife then got on the phone call and stated she is sick.  Please make sure orders are placed and we cancel if he can't come in due to expose to wife (she thinks she has a sinus infection)

## 2021-04-05 ENCOUNTER — NURSE ONLY (OUTPATIENT)
Dept: FAMILY MEDICINE CLINIC | Facility: CLINIC | Age: 76
End: 2021-04-05
Payer: MEDICARE

## 2021-04-05 DIAGNOSIS — E11.65 TYPE 2 DIABETES MELLITUS WITH HYPERGLYCEMIA, WITH LONG-TERM CURRENT USE OF INSULIN (HCC): ICD-10-CM

## 2021-04-05 DIAGNOSIS — Z79.899 LONG-TERM USE OF HIGH-RISK MEDICATION: ICD-10-CM

## 2021-04-05 DIAGNOSIS — M05.79 RHEUMATOID ARTHRITIS INVOLVING MULTIPLE SITES WITH POSITIVE RHEUMATOID FACTOR (HCC): ICD-10-CM

## 2021-04-05 DIAGNOSIS — Z79.4 TYPE 2 DIABETES MELLITUS WITH HYPERGLYCEMIA, WITH LONG-TERM CURRENT USE OF INSULIN (HCC): ICD-10-CM

## 2021-04-05 PROCEDURE — 82565 ASSAY OF CREATININE: CPT | Performed by: INTERNAL MEDICINE

## 2021-04-05 PROCEDURE — 82043 UR ALBUMIN QUANTITATIVE: CPT | Performed by: FAMILY MEDICINE

## 2021-04-05 PROCEDURE — 85652 RBC SED RATE AUTOMATED: CPT | Performed by: INTERNAL MEDICINE

## 2021-04-05 PROCEDURE — 82570 ASSAY OF URINE CREATININE: CPT | Performed by: FAMILY MEDICINE

## 2021-04-05 PROCEDURE — 36415 COLL VENOUS BLD VENIPUNCTURE: CPT | Performed by: FAMILY MEDICINE

## 2021-04-05 PROCEDURE — 83036 HEMOGLOBIN GLYCOSYLATED A1C: CPT | Performed by: INTERNAL MEDICINE

## 2021-04-05 PROCEDURE — 80076 HEPATIC FUNCTION PANEL: CPT | Performed by: INTERNAL MEDICINE

## 2021-04-05 PROCEDURE — 86140 C-REACTIVE PROTEIN: CPT | Performed by: INTERNAL MEDICINE

## 2021-04-05 PROCEDURE — 85025 COMPLETE CBC W/AUTO DIFF WBC: CPT | Performed by: INTERNAL MEDICINE

## 2021-04-05 NOTE — PROGRESS NOTES
Patient to clinic for labs per KERMIT and Dr Guerita Schaefer and lavender tube drawn left AC x 1 attempt. Urine sent in red top tube.

## 2021-04-08 ENCOUNTER — TELEPHONE (OUTPATIENT)
Dept: FAMILY MEDICINE CLINIC | Facility: CLINIC | Age: 76
End: 2021-04-08

## 2021-04-08 NOTE — TELEPHONE ENCOUNTER
Moe Calle DO   4/6/2021 11:56 AM CDT       Mychart sent: Shannan Parsons, your A1c is stable and still under good control at 6.8. Your urine test shows no significant damage to the kidneys from high sugars. Good news.       Patient states he received the message

## 2021-04-13 RX ORDER — LOVASTATIN 40 MG/1
TABLET ORAL
Qty: 180 TABLET | Refills: 2 | Status: SHIPPED | OUTPATIENT
Start: 2021-04-13

## 2021-04-23 ENCOUNTER — PATIENT OUTREACH (OUTPATIENT)
Dept: CASE MANAGEMENT | Age: 76
End: 2021-04-23

## 2021-04-23 NOTE — PROGRESS NOTES
Called patient and left a message for patient to call back when they can. Reviewed patient chart.  Left contact my contact number [phone]        Time Spent This Encounter Total: 5  min medical record review  Monthly Minute Total including today: 5 justin

## 2021-05-14 RX ORDER — FUROSEMIDE 20 MG/1
TABLET ORAL
Qty: 90 TABLET | Refills: 3 | Status: SHIPPED | OUTPATIENT
Start: 2021-05-14

## 2021-05-20 ENCOUNTER — TELEPHONE (OUTPATIENT)
Dept: FAMILY MEDICINE CLINIC | Facility: CLINIC | Age: 76
End: 2021-05-20

## 2021-05-20 NOTE — TELEPHONE ENCOUNTER
Diabetic eye exam done 1/19/21 at [facility] with Dr Otis Stock    No retinopathy noted    flowsheet updated  Report to scanning

## 2021-05-26 ENCOUNTER — PATIENT OUTREACH (OUTPATIENT)
Dept: CASE MANAGEMENT | Age: 76
End: 2021-05-26

## 2021-05-26 DIAGNOSIS — I25.84 CORONARY ARTERY DISEASE DUE TO CALCIFIED CORONARY LESION: ICD-10-CM

## 2021-05-26 DIAGNOSIS — E11.65 TYPE 2 DIABETES MELLITUS WITH HYPERGLYCEMIA, WITH LONG-TERM CURRENT USE OF INSULIN (HCC): ICD-10-CM

## 2021-05-26 DIAGNOSIS — I50.22 CHRONIC SYSTOLIC CONGESTIVE HEART FAILURE (HCC): ICD-10-CM

## 2021-05-26 DIAGNOSIS — Z79.4 TYPE 2 DIABETES MELLITUS WITH HYPERGLYCEMIA, WITH LONG-TERM CURRENT USE OF INSULIN (HCC): ICD-10-CM

## 2021-05-26 DIAGNOSIS — M05.79 RHEUMATOID ARTHRITIS INVOLVING MULTIPLE SITES WITH POSITIVE RHEUMATOID FACTOR (HCC): ICD-10-CM

## 2021-05-26 DIAGNOSIS — E78.5 DYSLIPIDEMIA: ICD-10-CM

## 2021-05-26 DIAGNOSIS — I25.10 CORONARY ARTERY DISEASE DUE TO CALCIFIED CORONARY LESION: ICD-10-CM

## 2021-05-26 NOTE — PROGRESS NOTES
Called patient and left a message for patient to call back when they can. Reviewed patient chart.  Left contact my contact number [phone]        Time Spent This Encounter Total: 5  min medical record review  Monthly Minute Total including today: 5 justin

## 2021-05-26 NOTE — PROGRESS NOTES
5/26/2021  Spoke to Shilpa for CCM.       Updates to patient care team/ comments: No changes per patient   Patient reported changes in medications: No changes per patient  Med Adherence  Comment: Taking as prescribed    Health Maintenance: Pt aware  COVID ONPV RHEUM ROSE MARIE NPV             Patient agrees to goal action plan.   Self-Management Abilities (patient reported)             1= least confident in achieving goal, 5= very confident               - confidence: 3    Care Manager Follow Up: 1 month follow u

## 2021-05-31 PROCEDURE — 99490 CHRNC CARE MGMT STAFF 1ST 20: CPT

## 2021-06-10 RX ORDER — EZETIMIBE 10 MG/1
TABLET ORAL
Qty: 90 TABLET | Refills: 3 | Status: SHIPPED | OUTPATIENT
Start: 2021-06-10

## 2021-06-12 RX ORDER — METFORMIN HYDROCHLORIDE 500 MG/1
1000 TABLET, EXTENDED RELEASE ORAL 2 TIMES DAILY WITH MEALS
Qty: 360 TABLET | Refills: 0 | Status: SHIPPED | OUTPATIENT
Start: 2021-06-12 | End: 2021-09-16

## 2021-06-14 ENCOUNTER — TELEPHONE (OUTPATIENT)
Dept: ENDOCRINOLOGY CLINIC | Facility: CLINIC | Age: 76
End: 2021-06-14

## 2021-06-14 DIAGNOSIS — Z79.4 TYPE 2 DIABETES MELLITUS WITH HYPERGLYCEMIA, WITH LONG-TERM CURRENT USE OF INSULIN (HCC): ICD-10-CM

## 2021-06-14 DIAGNOSIS — E11.65 TYPE 2 DIABETES MELLITUS WITH HYPERGLYCEMIA, WITH LONG-TERM CURRENT USE OF INSULIN (HCC): ICD-10-CM

## 2021-06-14 RX ORDER — INSULIN GLARGINE 300 U/ML
INJECTION, SOLUTION SUBCUTANEOUS
Qty: 4.5 ML | Refills: 0 | COMMUNITY
Start: 2021-06-14 | End: 2021-07-26

## 2021-06-14 NOTE — TELEPHONE ENCOUNTER
Pt. Called to schedule a follow-up appt. W/RNKATHLEEN. He asked if the office had any Touo samples. Please contact pt. If any available.

## 2021-06-14 NOTE — TELEPHONE ENCOUNTER
3 sample pens available- per KE ok to give samples      Left message for the pt that we have 3 pens for him and he can come to the office to pick them up and to call if any questions

## 2021-06-21 ENCOUNTER — NURSE ONLY (OUTPATIENT)
Dept: ENDOCRINOLOGY CLINIC | Facility: CLINIC | Age: 76
End: 2021-06-21
Payer: MEDICARE

## 2021-06-21 VITALS — BODY MASS INDEX: 39 KG/M2 | WEIGHT: 258 LBS

## 2021-06-21 DIAGNOSIS — E11.65 TYPE 2 DIABETES MELLITUS WITH HYPERGLYCEMIA, WITH LONG-TERM CURRENT USE OF INSULIN (HCC): ICD-10-CM

## 2021-06-21 DIAGNOSIS — Z79.4 TYPE 2 DIABETES MELLITUS WITH HYPERGLYCEMIA, WITH LONG-TERM CURRENT USE OF INSULIN (HCC): ICD-10-CM

## 2021-06-21 PROCEDURE — G0108 DIAB MANAGE TRN  PER INDIV: HCPCS | Performed by: DIETITIAN, REGISTERED

## 2021-06-21 NOTE — PROGRESS NOTES
Dillan Checo : 1945 was seen for Diabetic Education Follow up:    Date: 21 Referring Provider: Dr. Luanne Menchaca  Start time: 11am End time: 11:30am    Assessment:     Diagnosis: Type 2 DM    Reason for visit: follow-up    Changes since l Follow recommended meal plan. 2. Test BG fasting,premeal, 2 hr postmeal or at hs once daily   3. Bring glucose logsmeter to all provider visits for review. 4. Instructed to continue current meds    5.  Encouraged  to call diabetes center with any questi

## 2021-06-24 ENCOUNTER — TELEPHONE (OUTPATIENT)
Dept: FAMILY MEDICINE CLINIC | Facility: CLINIC | Age: 76
End: 2021-06-24

## 2021-06-24 ENCOUNTER — PATIENT OUTREACH (OUTPATIENT)
Dept: CASE MANAGEMENT | Age: 76
End: 2021-06-24

## 2021-06-24 DIAGNOSIS — I50.22 CHRONIC SYSTOLIC CONGESTIVE HEART FAILURE (HCC): ICD-10-CM

## 2021-06-24 DIAGNOSIS — I25.84 CORONARY ARTERY DISEASE DUE TO CALCIFIED CORONARY LESION: ICD-10-CM

## 2021-06-24 DIAGNOSIS — M05.79 RHEUMATOID ARTHRITIS INVOLVING MULTIPLE SITES WITH POSITIVE RHEUMATOID FACTOR (HCC): ICD-10-CM

## 2021-06-24 DIAGNOSIS — Z79.4 TYPE 2 DIABETES MELLITUS WITH HYPERGLYCEMIA, WITH LONG-TERM CURRENT USE OF INSULIN (HCC): ICD-10-CM

## 2021-06-24 DIAGNOSIS — E11.65 TYPE 2 DIABETES MELLITUS WITH HYPERGLYCEMIA, WITH LONG-TERM CURRENT USE OF INSULIN (HCC): ICD-10-CM

## 2021-06-24 DIAGNOSIS — E78.5 DYSLIPIDEMIA: ICD-10-CM

## 2021-06-24 DIAGNOSIS — I25.10 CORONARY ARTERY DISEASE DUE TO CALCIFIED CORONARY LESION: ICD-10-CM

## 2021-06-24 NOTE — PROGRESS NOTES
6/24/2021  Spoke to Shilpa for CCM.       Updates to patient care team/ comments: No changes per patient  Patient reported changes in medications: No changes per patient  Med Adherence  Comment: taking as prescribed    Health Maintenance:   COVID-19 Vacci Appointments   Date Time Provider Lloyd Radhika   9/20/2021 11:00 AM Inga Curry RN, CDE [facility]   10/5/2021 11:00 AM Edgar Rios MD ONPV RHEUM [facility] NPV   10/12/2021  1:00 PM DO JOSIANE PritchardYK EMG Aisha Asher

## 2021-06-24 NOTE — TELEPHONE ENCOUNTER
Patient stated he has been having pain in his right elbow and wrist that first started randomly. Patient stated in the last few weeks it has become more frequent and daily. Patient stated he would rate the pain at 10 at the onset of pain.  Patient stated th

## 2021-06-30 PROCEDURE — 99490 CHRNC CARE MGMT STAFF 1ST 20: CPT

## 2021-07-01 ENCOUNTER — ANCILLARY PROCEDURE (OUTPATIENT)
Dept: CARDIOLOGY | Age: 76
End: 2021-07-01
Attending: INTERNAL MEDICINE

## 2021-07-01 ENCOUNTER — ANCILLARY ORDERS (OUTPATIENT)
Dept: CARDIOLOGY | Age: 76
End: 2021-07-01

## 2021-07-01 DIAGNOSIS — Z95.810 ICD (IMPLANTABLE CARDIOVERTER-DEFIBRILLATOR) IN PLACE: ICD-10-CM

## 2021-07-01 PROCEDURE — X1114 CARDIAC DEVICE HOME CHECK - REMOTE UNSCHEDULED: HCPCS | Performed by: INTERNAL MEDICINE

## 2021-07-23 ENCOUNTER — TELEPHONE (OUTPATIENT)
Dept: ENDOCRINOLOGY CLINIC | Facility: CLINIC | Age: 76
End: 2021-07-23

## 2021-07-23 DIAGNOSIS — E11.65 TYPE 2 DIABETES MELLITUS WITH HYPERGLYCEMIA, WITH LONG-TERM CURRENT USE OF INSULIN (HCC): Primary | ICD-10-CM

## 2021-07-23 DIAGNOSIS — Z79.4 TYPE 2 DIABETES MELLITUS WITH HYPERGLYCEMIA, WITH LONG-TERM CURRENT USE OF INSULIN (HCC): Primary | ICD-10-CM

## 2021-07-23 RX ORDER — INSULIN GLARGINE 300 U/ML
INJECTION, SOLUTION SUBCUTANEOUS
Qty: 3 EACH | Refills: 0 | COMMUNITY
Start: 2021-07-23

## 2021-07-23 NOTE — TELEPHONE ENCOUNTER
Spoke to Harlan Guillen, samples are in the fridge in Una's office. They are labeled.  Medication placed as historical. Pt will  today about 1-3pm.

## 2021-07-23 NOTE — TELEPHONE ENCOUNTER
Pt. Migue Pitts that he needs Toujeo samples. Please contact pt. To let him know if they are available.  Thanks, Rosa Elena White

## 2021-07-23 NOTE — TELEPHONE ENCOUNTER
Forward to Dr. Mercer, please advise on Ara's message about finding a more permanent solution. Ok to wait until your return.

## 2021-07-26 ENCOUNTER — PATIENT OUTREACH (OUTPATIENT)
Dept: CASE MANAGEMENT | Age: 76
End: 2021-07-26

## 2021-07-26 DIAGNOSIS — E78.5 DYSLIPIDEMIA: ICD-10-CM

## 2021-07-26 DIAGNOSIS — M05.79 RHEUMATOID ARTHRITIS INVOLVING MULTIPLE SITES WITH POSITIVE RHEUMATOID FACTOR (HCC): ICD-10-CM

## 2021-07-26 DIAGNOSIS — G47.33 OSA (OBSTRUCTIVE SLEEP APNEA): ICD-10-CM

## 2021-07-26 DIAGNOSIS — I50.22 CHRONIC SYSTOLIC CONGESTIVE HEART FAILURE (HCC): ICD-10-CM

## 2021-07-26 DIAGNOSIS — E11.65 TYPE 2 DIABETES MELLITUS WITH HYPERGLYCEMIA, WITH LONG-TERM CURRENT USE OF INSULIN (HCC): ICD-10-CM

## 2021-07-26 DIAGNOSIS — Z79.4 TYPE 2 DIABETES MELLITUS WITH HYPERGLYCEMIA, WITH LONG-TERM CURRENT USE OF INSULIN (HCC): ICD-10-CM

## 2021-07-26 RX ORDER — INSULIN GLARGINE 300 U/ML
INJECTION, SOLUTION SUBCUTANEOUS
Qty: 30 ML | Refills: 0 | Status: SHIPPED | OUTPATIENT
Start: 2021-07-26

## 2021-07-26 NOTE — PROGRESS NOTES
7/26/2021  Spoke to Shilpa for CCM.       Updates to patient care team/ comments: No changes per patient  Patient reported changes in medications: No changes per patient  Med Adherence  Comment: taking as prescribed    Health Maintenance: Patient aware  C achieving goal, 5= very confident               - confidence: 3    Care Manager Follow Up: 1 month follow up or sooner if needed. Reason For Follow Up: review progress and or barriers towards patients goals.    Care managers interventions: None needed at t

## 2021-07-26 NOTE — TELEPHONE ENCOUNTER
Spoke with pharmacist who states patient has [address] prescription plan. Per pharmacist medication is covered. Patient paid $74 for 3 pens in May.      Per [employer] formulary all insulins are tier 3

## 2021-07-26 NOTE — TELEPHONE ENCOUNTER
We can try prescribing a full box and see if it's more affordable for a monthly payment. Please pend 90 day supply.

## 2021-07-26 NOTE — TELEPHONE ENCOUNTER
Can you see if there is another long acting insulin that would be less expensive with his insurance.

## 2021-07-31 PROCEDURE — 99490 CHRNC CARE MGMT STAFF 1ST 20: CPT

## 2021-08-31 ENCOUNTER — TELEPHONE (OUTPATIENT)
Dept: ENDOCRINOLOGY CLINIC | Facility: CLINIC | Age: 76
End: 2021-08-31

## 2021-08-31 ENCOUNTER — PATIENT OUTREACH (OUTPATIENT)
Dept: CASE MANAGEMENT | Age: 76
End: 2021-08-31

## 2021-08-31 NOTE — PROGRESS NOTES
Called patient and left a message for patient to call back when they can. Reviewed patient chart.  Left contact my contact number [phone]        Time Spent This Encounter Total: 5  min medical record review  Monthly Minute Total including today: 5 justin

## 2021-09-02 NOTE — TELEPHONE ENCOUNTER
Pt. calling to request Toujeo samples. Please contact pt. to let him know when he can pick them up.  Thanks, [employer]

## 2021-09-03 NOTE — TELEPHONE ENCOUNTER
Per KERMIT, OK to give 3 sample pens    Sample pens in refrigerator for .       Patient notified via detailed voicemail left at cell number (ok per  HIPAA consent)

## 2021-09-16 ENCOUNTER — TELEPHONE (OUTPATIENT)
Dept: FAMILY MEDICINE CLINIC | Facility: CLINIC | Age: 76
End: 2021-09-16

## 2021-09-16 RX ORDER — METFORMIN HYDROCHLORIDE 500 MG/1
1000 TABLET, EXTENDED RELEASE ORAL 2 TIMES DAILY WITH MEALS
Qty: 360 TABLET | Refills: 0 | Status: SHIPPED | OUTPATIENT
Start: 2021-09-16 | End: 2021-12-14

## 2021-09-16 NOTE — TELEPHONE ENCOUNTER
Last OV 11/24/2020  Last lab 4/5/21 A1c 6.8  Last refilled 6/12/21  #360  0 refills   Future Appointments   Date Time Provider Lloyd Garrido   10/12/2021  1:00 PM [facility] JOSIANE Smyth

## 2021-09-20 ENCOUNTER — NURSE ONLY (OUTPATIENT)
Dept: ENDOCRINOLOGY CLINIC | Facility: CLINIC | Age: 76
End: 2021-09-20
Payer: MEDICARE

## 2021-09-20 DIAGNOSIS — Z79.4 TYPE 2 DIABETES MELLITUS WITH HYPERGLYCEMIA, WITH LONG-TERM CURRENT USE OF INSULIN (HCC): ICD-10-CM

## 2021-09-20 DIAGNOSIS — E11.65 TYPE 2 DIABETES MELLITUS WITH HYPERGLYCEMIA, WITH LONG-TERM CURRENT USE OF INSULIN (HCC): ICD-10-CM

## 2021-09-20 PROCEDURE — G0108 DIAB MANAGE TRN  PER INDIV: HCPCS | Performed by: DIETITIAN, REGISTERED

## 2021-09-21 ENCOUNTER — PATIENT OUTREACH (OUTPATIENT)
Dept: CASE MANAGEMENT | Age: 76
End: 2021-09-21

## 2021-09-21 VITALS — BODY MASS INDEX: 39 KG/M2 | WEIGHT: 259 LBS

## 2021-09-21 DIAGNOSIS — E11.65 TYPE 2 DIABETES MELLITUS WITH HYPERGLYCEMIA, WITH LONG-TERM CURRENT USE OF INSULIN (HCC): ICD-10-CM

## 2021-09-21 DIAGNOSIS — Z79.4 TYPE 2 DIABETES MELLITUS WITH HYPERGLYCEMIA, WITH LONG-TERM CURRENT USE OF INSULIN (HCC): ICD-10-CM

## 2021-09-21 DIAGNOSIS — I25.84 CORONARY ARTERY DISEASE DUE TO CALCIFIED CORONARY LESION: ICD-10-CM

## 2021-09-21 DIAGNOSIS — E78.5 DYSLIPIDEMIA: ICD-10-CM

## 2021-09-21 DIAGNOSIS — I50.22 CHRONIC SYSTOLIC CONGESTIVE HEART FAILURE (HCC): ICD-10-CM

## 2021-09-21 DIAGNOSIS — I25.10 CORONARY ARTERY DISEASE DUE TO CALCIFIED CORONARY LESION: ICD-10-CM

## 2021-09-21 NOTE — PROGRESS NOTES
9/21/2021  Spoke to Shilpa for CCM.       Updates to patient care team/ comments: No changes per patient  Patient reported changes in medications: No changes per patient  Med Adherence  Comment: taking as prescribed    Health Maintenance: Pt aware  COVID- Abilities (patient reported)             1= least confident in achieving goal, 5= very confident               - confidence: 3    Care Manager Follow Up: 1 month follow up or sooner if needed  Reason For Follow Up: review progress and or barriers towards p

## 2021-09-21 NOTE — PROGRESS NOTES
Called patient and left a message for patient to call back when they can. Reviewed patient chart.  Left contact my contact number [phone]        Time Spent This Encounter Total: 5  min medical record review  Monthly Minute Total including today: 5 justin

## 2021-09-21 NOTE — PROGRESS NOTES
Nathanaliza Stephanienorberto : 1945 was seen for Diabetic Education Follow up:     Date: 21 Referring Provider: Dr Jaylen Traylor.  Magalis Espinosa Start time: 11amEnd time: 11:30am    Assessment:     Diagnosis: Type 2    Reason for visit: follow-up    Changes since last vis week    Hypoglycemia  - Causes/symptoms/prevention/treatment-Rule of 15  - Low Blood Glucose: <70 mg/dL  - Contact MD if >2BG <70 in 1 week    REDUCING RISKS:  - relationship between glucose control and risk for complications in eye, heart, kidneys,nerves,

## 2021-09-30 PROCEDURE — 99490 CHRNC CARE MGMT STAFF 1ST 20: CPT

## 2021-10-12 ENCOUNTER — TELEPHONE (OUTPATIENT)
Dept: CARDIOLOGY | Age: 76
End: 2021-10-12

## 2021-10-12 ENCOUNTER — ANCILLARY ORDERS (OUTPATIENT)
Dept: CARDIOLOGY | Age: 76
End: 2021-10-12

## 2021-10-12 ENCOUNTER — ANCILLARY PROCEDURE (OUTPATIENT)
Dept: CARDIOLOGY | Age: 76
End: 2021-10-12
Attending: INTERNAL MEDICINE

## 2021-10-12 DIAGNOSIS — Z95.810 ICD (IMPLANTABLE CARDIOVERTER-DEFIBRILLATOR) IN PLACE: ICD-10-CM

## 2021-10-12 PROCEDURE — X1114 CARDIAC DEVICE HOME CHECK - REMOTE UNSCHEDULED: HCPCS | Performed by: INTERNAL MEDICINE

## 2021-10-13 ENCOUNTER — TELEPHONE (OUTPATIENT)
Dept: ENDOCRINOLOGY CLINIC | Facility: CLINIC | Age: 76
End: 2021-10-13

## 2021-10-13 NOTE — TELEPHONE ENCOUNTER
Pt. called to request Toujeo samples. Will forward to Dr. Latham Kinds office . Hw ould like to be called when he can pick them up.  Thanks, [employer]

## 2021-10-13 NOTE — TELEPHONE ENCOUNTER
Patient was contacted by Ayden Lopez RN that the office has 3 sample pens for him  Lot # [id number] - 1 box- expiration 1 31 -2023  Lot [id number]  - 2 boxes - expiration 11 20 2022

## 2021-10-14 ENCOUNTER — OFFICE VISIT (OUTPATIENT)
Dept: FAMILY MEDICINE CLINIC | Facility: CLINIC | Age: 76
End: 2021-10-14
Payer: MEDICARE

## 2021-10-14 VITALS
DIASTOLIC BLOOD PRESSURE: 70 MMHG | HEIGHT: 69 IN | BODY MASS INDEX: 38.51 KG/M2 | SYSTOLIC BLOOD PRESSURE: 130 MMHG | TEMPERATURE: 97 F | HEART RATE: 72 BPM | WEIGHT: 260 LBS | OXYGEN SATURATION: 98 % | RESPIRATION RATE: 18 BRPM

## 2021-10-14 DIAGNOSIS — Z12.11 SCREENING FOR COLON CANCER: ICD-10-CM

## 2021-10-14 DIAGNOSIS — E11.65 TYPE 2 DIABETES MELLITUS WITH HYPERGLYCEMIA, WITH LONG-TERM CURRENT USE OF INSULIN (HCC): ICD-10-CM

## 2021-10-14 DIAGNOSIS — I42.8 NONISCHEMIC CARDIOMYOPATHY (HCC): ICD-10-CM

## 2021-10-14 DIAGNOSIS — E78.5 DYSLIPIDEMIA: ICD-10-CM

## 2021-10-14 DIAGNOSIS — I25.84 CORONARY ARTERY DISEASE DUE TO CALCIFIED CORONARY LESION: ICD-10-CM

## 2021-10-14 DIAGNOSIS — I50.22 CHRONIC SYSTOLIC CONGESTIVE HEART FAILURE (HCC): ICD-10-CM

## 2021-10-14 DIAGNOSIS — R35.0 FREQUENCY OF MICTURITION: ICD-10-CM

## 2021-10-14 DIAGNOSIS — M05.79 RHEUMATOID ARTHRITIS INVOLVING MULTIPLE SITES WITH POSITIVE RHEUMATOID FACTOR (HCC): ICD-10-CM

## 2021-10-14 DIAGNOSIS — Z79.4 TYPE 2 DIABETES MELLITUS WITH HYPERGLYCEMIA, WITH LONG-TERM CURRENT USE OF INSULIN (HCC): ICD-10-CM

## 2021-10-14 DIAGNOSIS — G47.33 OSA (OBSTRUCTIVE SLEEP APNEA): ICD-10-CM

## 2021-10-14 DIAGNOSIS — Z12.5 SCREENING FOR MALIGNANT NEOPLASM OF PROSTATE: ICD-10-CM

## 2021-10-14 DIAGNOSIS — Z00.00 ENCOUNTER FOR ANNUAL HEALTH EXAMINATION: Primary | ICD-10-CM

## 2021-10-14 DIAGNOSIS — I25.10 CORONARY ARTERY DISEASE DUE TO CALCIFIED CORONARY LESION: ICD-10-CM

## 2021-10-14 PROCEDURE — G0439 PPPS, SUBSEQ VISIT: HCPCS | Performed by: FAMILY MEDICINE

## 2021-10-14 NOTE — PATIENT INSTRUCTIONS
Tien Reyes's SCREENING SCHEDULE   Tests on this list are recommended by your physician but may not be covered, or covered at this frequency, by your insurer. Please check with your insurance carrier before scheduling to verify coverage.    PREV 10/01/2021    Pneumococcal Each vaccine (Zfinudv75 & Lyqfsdrre61) covered once after 65 Prevnar 13: 11/25/2014    Rjtxpqbuq58: 04/25/2017     No recommendations at this time    Hepatitis B One screening covered for patients with certain risk factors   -  N information including definitions of the different types of Advance Directives. It also has the State forms available on it's website for anyone to review and print using their home computer and printer. (the forms are also available in [address])  www. [email or web address]

## 2021-10-14 NOTE — PROGRESS NOTES
HPI:   Amarilis Logan is a 78year old male who presents for a Medicare Subsequent Annual Wellness visit (Pt already had Initial Annual Wellness). RA: Following with Dr. Melanie Sparks and overall doing okay with methotrexate 4 tabs weekly.      CAD: Followin depressed, or hopeless: Not at all  PHQ-2 SCORE: 0      Advanced Directive:  He does NOT have a Living Will on file in [facility]    The patient has this document but we do not have it in [facility], and patient is instructed to get our office a copy of it for claudy Last Chemistry Labs:   Lab Results   Component Value Date    AST 23 04/05/2021    ALT 26 04/05/2021    CA 9.4 09/10/2020    ALB 3.8 04/05/2021    TSH 3.530 08/07/2018    CREATSERUM 1.04 04/05/2021     (H) 09/10/2020        CBC  (most recent labs Vitro Solution, 1 drop by In Vitro route as needed. Test 1st strip out of each new vial;discard solution 90 days after opening  Dx Code: M45.04 IDDM  folic acid (FOLVITE) 1 MG Oral Tab, Take 1 mg by mouth daily.   Metoprolol Succinate ER (TOPROL XL) 50 MG O of anemia  ENDOCRINE: denies thyroid history  ALL/ASTHMA: denies hx of allergy or asthma    EXAM:   /70   Pulse 72   Temp 97.1 °F (36.2 °C) (Temporal)   Resp 18   Ht 5' 9\" (1.753 m)   Wt 260 lb (117.9 kg)   SpO2 98%   BMI 38.40 kg/m²   Estimated bod Cervical, supraclavicular, and axillary nodes normal   Neurologic: Normal   Bilateral barefoot skin diabetic exam is normal, visualized feet and the appearance is normal.  Bilateral monofilament/sensation of both feet is normal.  Pulsation pedal pulse exam DIAGNOSTIC; Future    Frequency of micturition   -     PSA, DIAGNOSTIC; Future  - check PSA. Diet assessment: good     PLAN:  The patient indicates understanding of these issues and agrees to the plan.   Reinforced healthy diet, lifestyle, and exer Covered once in a lifetime for one of the following risk factors   • Men who are 73-68 years old and have ever smoked   • Anyone with a family history -     Colorectal Cancer Screening  Covered for ages 52-80; only need ONE of the following:    Colonoscopy Persistent Medications (ACE/ARB, digoxin diuretics, anticonvulsants)    Potassium Annually Lab Results   Component Value Date    K 4.8 09/10/2020         Creatinine   Annually Lab Results   Component Value Date    CREATSERUM 1.04 04/05/2021         BUN Angelica Brow

## 2021-10-16 ENCOUNTER — TELEPHONE (OUTPATIENT)
Dept: FAMILY MEDICINE CLINIC | Facility: CLINIC | Age: 76
End: 2021-10-16

## 2021-10-16 DIAGNOSIS — Z79.4 TYPE 2 DIABETES MELLITUS WITH HYPERGLYCEMIA, WITH LONG-TERM CURRENT USE OF INSULIN (HCC): Primary | ICD-10-CM

## 2021-10-16 DIAGNOSIS — E11.65 TYPE 2 DIABETES MELLITUS WITH HYPERGLYCEMIA, WITH LONG-TERM CURRENT USE OF INSULIN (HCC): Primary | ICD-10-CM

## 2021-10-16 RX ORDER — LANCETS
1 EACH MISCELLANEOUS 3 TIMES DAILY
Qty: 200 EACH | Refills: 3 | Status: SHIPPED | OUTPATIENT
Start: 2021-10-16

## 2021-10-16 NOTE — TELEPHONE ENCOUNTER
Pt called and left a message asking for a refill of medication. If there are any issues please please call back.      [id number] Jewel Vasques, [address], [phone], [phone]

## 2021-10-22 ENCOUNTER — TELEPHONE (OUTPATIENT)
Dept: ENDOCRINOLOGY CLINIC | Facility: CLINIC | Age: 76
End: 2021-10-22

## 2021-10-22 DIAGNOSIS — E11.65 TYPE 2 DIABETES MELLITUS WITH HYPERGLYCEMIA, WITH LONG-TERM CURRENT USE OF INSULIN (HCC): Primary | ICD-10-CM

## 2021-10-22 DIAGNOSIS — Z79.4 TYPE 2 DIABETES MELLITUS WITH HYPERGLYCEMIA, WITH LONG-TERM CURRENT USE OF INSULIN (HCC): Primary | ICD-10-CM

## 2021-10-22 NOTE — TELEPHONE ENCOUNTER
Pt. Brain Jeff to return call regarding his diabetes testing supplies. He is out of lancets to test blood sugar.  Thanks

## 2021-10-28 ENCOUNTER — PATIENT OUTREACH (OUTPATIENT)
Dept: CASE MANAGEMENT | Age: 76
End: 2021-10-28

## 2021-10-29 ENCOUNTER — TELEPHONE (OUTPATIENT)
Dept: FAMILY MEDICINE CLINIC | Facility: CLINIC | Age: 76
End: 2021-10-29

## 2021-10-29 NOTE — TELEPHONE ENCOUNTER
Fax from [employer] requesting medicare diabetic form completed.   Signed by Jessica Henderson as covering provider    Form faxed    Sent to scan

## 2021-11-05 RX ORDER — PEN NEEDLE, DIABETIC 32GX 5/32"
1 NEEDLE, DISPOSABLE MISCELLANEOUS DAILY
Qty: 300 EACH | Refills: 3 | Status: SHIPPED | OUTPATIENT
Start: 2021-11-05 | End: 2022-02-03

## 2021-11-05 RX ORDER — LANCETS
EACH MISCELLANEOUS
Qty: 300 EACH | Refills: 3 | Status: SHIPPED | OUTPATIENT
Start: 2021-11-05

## 2021-11-05 NOTE — TELEPHONE ENCOUNTER
Pt. Tasia Mckeon that his prescription for Microlet lancets & BD Aline pen needles need to be signed for by Dr. Adrianne Martin not Kavitha Macias .   Sent prescription for both per pt. request.

## 2021-11-18 ENCOUNTER — TELEPHONE (OUTPATIENT)
Dept: FAMILY MEDICINE CLINIC | Facility: CLINIC | Age: 76
End: 2021-11-18

## 2021-11-23 ENCOUNTER — MED REC SCAN ONLY (OUTPATIENT)
Dept: FAMILY MEDICINE CLINIC | Facility: CLINIC | Age: 76
End: 2021-11-23

## 2021-11-30 ENCOUNTER — PATIENT OUTREACH (OUTPATIENT)
Dept: CASE MANAGEMENT | Age: 76
End: 2021-11-30

## 2021-11-30 NOTE — PROGRESS NOTES
Called patient and left a message for patient to call back when they can. Reviewed patient chart.  Left contact my contact number [phone]        Time Spent This Encounter Total: 5  min medical record review  Monthly Minute Total including today: 5 justin

## 2021-12-14 ENCOUNTER — TELEPHONE (OUTPATIENT)
Dept: FAMILY MEDICINE CLINIC | Facility: CLINIC | Age: 76
End: 2021-12-14

## 2021-12-14 RX ORDER — METFORMIN HYDROCHLORIDE 500 MG/1
1000 TABLET, EXTENDED RELEASE ORAL 2 TIMES DAILY WITH MEALS
Qty: 360 TABLET | Refills: 0 | Status: SHIPPED | OUTPATIENT
Start: 2021-12-14

## 2021-12-14 NOTE — TELEPHONE ENCOUNTER
[facility] faxed a refill request for metformin    LRF 9/16/21 #360   LOV 10/14/21  Lab Results   Component Value Date     (H) 04/05/2021    A1C 6.1 (H) 12/04/2021 12/4/21:  AST   10 - 35 U/L 24    ALT   9 - 46 U/L 19      Refilled per protocol  Future

## 2021-12-23 ENCOUNTER — TELEPHONE (OUTPATIENT)
Dept: ENDOCRINOLOGY CLINIC | Facility: CLINIC | Age: 76
End: 2021-12-23

## 2021-12-23 NOTE — TELEPHONE ENCOUNTER
Pt. LM that he needs Toujeo insulin samples. Please contact pt. To let him know if you have any for him.  Thanks, [employer]

## 2021-12-27 ENCOUNTER — TELEPHONE (OUTPATIENT)
Dept: FAMILY MEDICINE CLINIC | Facility: CLINIC | Age: 76
End: 2021-12-27

## 2021-12-27 NOTE — TELEPHONE ENCOUNTER
PT CALLED AND WAS VERY FRUSTRATED THAT HE IS HAVING TO WAIT OVER AT / AND [facility]. PT ADV THAT HE THINKS HE HAS PNEUMONIA AND DOESN'T UNDERSTAND WHY.      ADV PT THAT WITH THE SYMPTOMS THAT HE IS HAVING THAT IT IS HIGHLY RECOMMENDED THAT HE DO WAIT TO BE EVAL

## 2021-12-28 ENCOUNTER — HOSPITAL ENCOUNTER (OUTPATIENT)
Age: 76
Discharge: OTHER INSTITUTION | End: 2021-12-28
Payer: MEDICARE

## 2021-12-28 VITALS
BODY MASS INDEX: 38 KG/M2 | SYSTOLIC BLOOD PRESSURE: 130 MMHG | HEART RATE: 81 BPM | TEMPERATURE: 98 F | DIASTOLIC BLOOD PRESSURE: 75 MMHG | WEIGHT: 260 LBS | OXYGEN SATURATION: 92 % | RESPIRATION RATE: 26 BRPM

## 2021-12-28 DIAGNOSIS — R06.02 SHORTNESS OF BREATH: Primary | ICD-10-CM

## 2021-12-28 PROCEDURE — 93000 ELECTROCARDIOGRAM COMPLETE: CPT | Performed by: NURSE PRACTITIONER

## 2021-12-28 PROCEDURE — 99215 OFFICE O/P EST HI 40 MIN: CPT | Performed by: NURSE PRACTITIONER

## 2021-12-28 PROCEDURE — U0002 COVID-19 LAB TEST NON-CDC: HCPCS | Performed by: NURSE PRACTITIONER

## 2021-12-28 NOTE — ED INITIAL ASSESSMENT (HPI)
Pt presents with adriana, low sats upon enetering room going up to 96% as pt is sitting on cart, pt denies cp

## 2021-12-28 NOTE — ED PROVIDER NOTES
Patient Seen in: Immediate [facility]      History   Patient presents with:  Difficulty Breathing    Stated Complaint: shortness breath left shoulder pain/new problem    Subjective:   79-year-old male presents today with complaints of shortness of breat negative except as noted above.     Physical Exam     ED Triage Vitals [12/28/21 0825]   /75   Pulse 81   Resp 26   Temp 97.8 °F (36.6 °C)   Temp src Temporal   SpO2 92 %   O2 Device None (Room air)       Current:/75   Pulse 81   Temp 97.8 °F (3 List as of 12/28/2021  8:47 AM

## 2021-12-29 ENCOUNTER — PATIENT OUTREACH (OUTPATIENT)
Dept: CASE MANAGEMENT | Age: 76
End: 2021-12-29

## 2021-12-29 NOTE — PROGRESS NOTES
Called patient and left a message for patient to call back when they can. Reviewed patient chart.  Left contact my contact number [phone]        Time Spent This Encounter Total: 5  min medical record review  Monthly Minute Total including today: 5 justin

## 2022-01-03 ENCOUNTER — PATIENT OUTREACH (OUTPATIENT)
Dept: CASE MANAGEMENT | Age: 77
End: 2022-01-03

## 2022-01-03 DIAGNOSIS — Z79.4 TYPE 2 DIABETES MELLITUS WITH HYPERGLYCEMIA, WITH LONG-TERM CURRENT USE OF INSULIN (HCC): ICD-10-CM

## 2022-01-03 DIAGNOSIS — E78.5 DYSLIPIDEMIA: ICD-10-CM

## 2022-01-03 DIAGNOSIS — M05.79 RHEUMATOID ARTHRITIS INVOLVING MULTIPLE SITES WITH POSITIVE RHEUMATOID FACTOR (HCC): ICD-10-CM

## 2022-01-03 DIAGNOSIS — I50.22 CHRONIC SYSTOLIC CONGESTIVE HEART FAILURE (HCC): ICD-10-CM

## 2022-01-03 DIAGNOSIS — E11.65 TYPE 2 DIABETES MELLITUS WITH HYPERGLYCEMIA, WITH LONG-TERM CURRENT USE OF INSULIN (HCC): ICD-10-CM

## 2022-01-03 RX ORDER — EZETIMIBE 10 MG/1
10 TABLET ORAL DAILY
COMMUNITY
Start: 2021-12-15

## 2022-01-03 NOTE — PROGRESS NOTES
1/3/2022  Spoke to Shilpa for CCM.       Updates to patient care team/ comments: No changes per patient  Patient reported changes in medications: No changes per patient  Med Adherence  Comment: Taking as prescribed    Health Maintenance: Pt aware  COVID-1 possible as he does have a lot to go over with her.      Pt aware  Future Appointments   Date Time Provider Lloyd Garrido   3/21/2022 11:00 AM Janice Knight RN, CDE [facility] EMG DIAB [facility]   4/5/2022 11:20 AM Brianna Enamorado MD [facility]

## 2022-01-31 PROCEDURE — 99490 CHRNC CARE MGMT STAFF 1ST 20: CPT

## 2022-02-04 ENCOUNTER — PATIENT OUTREACH (OUTPATIENT)
Dept: CASE MANAGEMENT | Age: 77
End: 2022-02-04

## 2022-02-04 NOTE — PROGRESS NOTES
Called patient and left a message for patient to call back when they can. Reviewed patient chart.  Left contact my contact number [phone]        Time Spent This Encounter Total: 5  min medical record review  Monthly Minute Total including today: 5 minutes

## 2022-02-08 ENCOUNTER — TELEPHONE (OUTPATIENT)
Dept: FAMILY MEDICINE CLINIC | Facility: CLINIC | Age: 77
End: 2022-02-08

## 2022-02-16 RX ORDER — INSULIN GLARGINE 300 U/ML
INJECTION, SOLUTION SUBCUTANEOUS
Qty: 22.5 ML | Refills: 0 | Status: CANCELLED | OUTPATIENT
Start: 2022-02-16

## 2022-02-16 NOTE — TELEPHONE ENCOUNTER
Last refilled 7/6/21 for 30 ML with 0 RF  LOV with KE 10/14/21  Future appt with Pito Lobato on 3/21/22  Protocol: none

## 2022-02-17 RX ORDER — INSULIN GLARGINE 300 U/ML
INJECTION, SOLUTION SUBCUTANEOUS
Qty: 30 ML | Refills: 0 | Status: SHIPPED | OUTPATIENT
Start: 2022-02-17 | End: 2022-02-24

## 2022-02-17 NOTE — TELEPHONE ENCOUNTER
Per 9/20/21 note from diabetic ed: Injectables:   Type/Dose/Schedule: Toujeo 34 units daily    Per 10/14/21 OV note KE:  Doing okay with insulin dosing, on 34 units nightly. Patient is taking toujeo 34 units nightly.    Inactive scripts removed from med list      Routed to KE to advise on refill

## 2022-02-23 ENCOUNTER — TELEPHONE (OUTPATIENT)
Dept: FAMILY MEDICINE CLINIC | Facility: CLINIC | Age: 77
End: 2022-02-23

## 2022-02-24 RX ORDER — INSULIN GLARGINE 300 U/ML
34 INJECTION, SOLUTION SUBCUTANEOUS NIGHTLY
Qty: 3 EACH | Refills: 0 | COMMUNITY
Start: 2022-02-24 | End: 2022-02-24

## 2022-02-24 RX ORDER — INSULIN GLARGINE 300 U/ML
INJECTION, SOLUTION SUBCUTANEOUS
Qty: 3 EACH | Refills: 0 | COMMUNITY
Start: 2022-02-24

## 2022-02-24 RX ORDER — INSULIN GLARGINE 300 U/ML
INJECTION, SOLUTION SUBCUTANEOUS
Qty: 3 EACH | Refills: 0 | COMMUNITY
Start: 2022-02-24 | End: 2022-02-24 | Stop reason: CLARIF

## 2022-02-24 NOTE — TELEPHONE ENCOUNTER
Patient notified and verbalized understanding. Advised 3 pens have been put aside for him. States wife will  tomorrow. Also advised to complete his section of assistance paperwork and bring to office for KE to complete her portion.

## 2022-02-24 NOTE — TELEPHONE ENCOUNTER
Okay, will fill out form when I get it. I haven't seen it yet. We can give him samples if we have them, but he can't rely on them since it's not a guarantee and we can't give all patients samples all the time.

## 2022-02-24 NOTE — TELEPHONE ENCOUNTER
Pt. LM that he needed more Toujeo samples. Pt. Informed that there weren't samples currently. Discussed need to develop a better plan for his insulin needs. I have e-mailed him a copy of the [address] form  to complete. PCP will need to complete a section of the form.

## 2022-03-15 ENCOUNTER — PATIENT OUTREACH (OUTPATIENT)
Dept: CASE MANAGEMENT | Age: 77
End: 2022-03-15

## 2022-03-15 RX ORDER — LOSARTAN POTASSIUM 25 MG/1
25 TABLET ORAL DAILY
COMMUNITY
Start: 2021-12-31

## 2022-03-15 RX ORDER — SPIRONOLACTONE 25 MG/1
25 TABLET ORAL
COMMUNITY
Start: 2021-12-31

## 2022-03-17 ENCOUNTER — TELEPHONE (OUTPATIENT)
Dept: FAMILY MEDICINE CLINIC | Facility: CLINIC | Age: 77
End: 2022-03-17

## 2022-03-17 RX ORDER — BLOOD SUGAR DIAGNOSTIC
STRIP MISCELLANEOUS
Qty: 300 EACH | Refills: 1 | Status: SHIPPED | OUTPATIENT
Start: 2022-03-17

## 2022-03-21 ENCOUNTER — NURSE ONLY (OUTPATIENT)
Dept: ENDOCRINOLOGY CLINIC | Facility: CLINIC | Age: 77
End: 2022-03-21
Payer: MEDICARE

## 2022-03-21 DIAGNOSIS — Z79.4 TYPE 2 DIABETES MELLITUS WITH HYPERGLYCEMIA, WITH LONG-TERM CURRENT USE OF INSULIN (HCC): ICD-10-CM

## 2022-03-21 DIAGNOSIS — E11.65 TYPE 2 DIABETES MELLITUS WITH HYPERGLYCEMIA, WITH LONG-TERM CURRENT USE OF INSULIN (HCC): ICD-10-CM

## 2022-03-21 PROCEDURE — G0108 DIAB MANAGE TRN  PER INDIV: HCPCS | Performed by: DIETITIAN, REGISTERED

## 2022-03-22 ENCOUNTER — TELEPHONE (OUTPATIENT)
Dept: LAB | Age: 77
End: 2022-03-22

## 2022-03-22 NOTE — PROGRESS NOTES
Dilma Hughes : 1945 was seen for Diabetic Education Follow up:    Date: 3/21/22 Referring Provider: Dr. Becky Martinez  Start time:8:30am End time: 9am    Assessment:     Diagnosis: Type 2 DM    Reason for visit:follow-up    Changes since last visit:  - Lifestyle:Pt. was hospitalized  for CHF. Had started a new job at [employer] 10 hr days: 8am-6pm. Out of wk for month of Dec. Started back to work on mid .  - Meals: May stop for breakfast @ [employer] 1x/wk ;other days continues to drink [employer] nutrition shake w/p.b. & 1/2 banana for breakfast/ Brings a sandwich to work for lunch. Eats dinner at 6:30-7pm;meat  & veggies  Snacks on food bar at work  - Medications: Toujeo 34 units daily, Metformin 1000 mg 1 tab twice daily  - Exercise:only what he gets while working  - Last A1C 21 6.1%;possible hypoglycemia    SMBG 22 to 3/21-22   Meter downloaded; form sent to scanning  Av B mg/dL Testing fasting only  Fastin-124 mg/dL    Comment: Stopped taking Toujeo from -- w/following fasting BG levels:  104,130,129,128,102,119  Pt. Asked why readings weren't higher;explained that Toujeo is a long-actinf insulin so may take several days to see a difference    Education:     DIABETES REVIEW:  - Impact of exercise, weight loss, stress on blood sugar  - Importance of improved BG control in preventing complications    HEALTHY EATING:  - effect of food on BG, timing of meal, use of snacks/fiber, barriers: too many hrs between lunch & dinner    BEING ACTIVE:  - types of activity, frequency , duration: only what he gets while at work    MONITORING:  - Type of meter:Zain Contour Next EZ  - Testing Schedule: fasting only  Comment: Explained that when a pt. Has an A1C of 6.1% he may be experiencing hypoglycemia episodes. Needs to check during the day not just fasting    TAKING MEDICATION:  Oral Agents:   Metformin 1000 mg 1 tab twice daily    Injectables:    Toujeo 34 units daily    PROBLEM SOLVING:  Review SMBG/Food log;observe patterns: Fasting in target    Hypoglycemia  - Causes/symptoms/prevention/treatment-Rule of 15  - Low Blood Glucose: <70 mg/dL  - Contact MD if >2BG <70 in 1 week    REDUCING RISKS:  - relationship between glucose control and risk for complications in eye, heart, kidneys,nerves,teeth,foot care, skin,  Foot Care Guidelines    Recommendations:      1. Follow recommended meal plan;add protein bar between lunch & dinner   2. Test BG fasting and 2 hours post meals 2-3 times/day. 3. Bring glucose logs/meter to all provider visits for review. 4. Continue current meds;could try to decrease Toujeo by 2 units every 4-5 days & see if fastings remain in target   5. Encouraged  to call diabetes center with any questions or concerns. 6. Follow-up in 3 months;get A1C in 3 months    Script sent to preferred pharmacy for glucose test strips and lancets per protocol. Patient verbalized understanding and has no further questions at this time.     Alma Evans, RN, CDE

## 2022-03-23 ENCOUNTER — TELEPHONE (OUTPATIENT)
Dept: FAMILY MEDICINE CLINIC | Facility: CLINIC | Age: 77
End: 2022-03-23

## 2022-03-25 VITALS — WEIGHT: 258 LBS | BODY MASS INDEX: 38 KG/M2

## 2022-03-31 PROCEDURE — 99490 CHRNC CARE MGMT STAFF 1ST 20: CPT

## 2022-04-13 ENCOUNTER — TELEPHONE (OUTPATIENT)
Dept: FAMILY MEDICINE CLINIC | Facility: CLINIC | Age: 77
End: 2022-04-13

## 2022-04-13 NOTE — TELEPHONE ENCOUNTER
Spoke to pharmacy, they have refill on file. They stated there is a PA form they will need to be filled out for verification.  Waiting on fax and form to be signed by Radha Clubs

## 2022-04-13 NOTE — TELEPHONE ENCOUNTER
Pt needs a refill for more [facility] [id number] [place]. He needs them asap. Please advise.     CONTOUR NEXT TEST In Vitro Strip

## 2022-04-15 ENCOUNTER — PATIENT OUTREACH (OUTPATIENT)
Dept: CASE MANAGEMENT | Age: 77
End: 2022-04-15

## 2022-04-15 NOTE — PROGRESS NOTES
Called patient and left a message for patient to call back when they can. Reviewed patient chart.  Left contact my contact number [phone]        Time Spent This Encounter Total: 5  min medical record review  Monthly Minute Total including today: 5 minutes

## 2022-04-24 ENCOUNTER — APPOINTMENT (OUTPATIENT)
Dept: GENERAL RADIOLOGY | Age: 77
End: 2022-04-24
Attending: NURSE PRACTITIONER
Payer: MEDICARE

## 2022-04-24 ENCOUNTER — HOSPITAL ENCOUNTER (OUTPATIENT)
Age: 77
Discharge: HOME | End: 2022-04-24
Payer: MEDICARE

## 2022-04-24 VITALS
OXYGEN SATURATION: 97 % | TEMPERATURE: 96 F | WEIGHT: 240 LBS | HEIGHT: 69 IN | BODY MASS INDEX: 35.55 KG/M2 | RESPIRATION RATE: 20 BRPM | HEART RATE: 72 BPM | SYSTOLIC BLOOD PRESSURE: 102 MMHG | DIASTOLIC BLOOD PRESSURE: 67 MMHG

## 2022-04-24 DIAGNOSIS — J06.9 VIRAL URI WITH COUGH: Primary | ICD-10-CM

## 2022-04-24 DIAGNOSIS — J44.1 COPD EXACERBATION (HCC): ICD-10-CM

## 2022-04-24 LAB — SARS-COV-2 RNA RESP QL NAA+PROBE: NOT DETECTED

## 2022-04-24 PROCEDURE — 99213 OFFICE O/P EST LOW 20 MIN: CPT | Performed by: NURSE PRACTITIONER

## 2022-04-24 PROCEDURE — 71046 X-RAY EXAM CHEST 2 VIEWS: CPT | Performed by: NURSE PRACTITIONER

## 2022-04-24 PROCEDURE — U0002 COVID-19 LAB TEST NON-CDC: HCPCS | Performed by: NURSE PRACTITIONER

## 2022-04-24 PROCEDURE — 94640 AIRWAY INHALATION TREATMENT: CPT | Performed by: NURSE PRACTITIONER

## 2022-04-24 RX ORDER — PREDNISONE 20 MG/1
40 TABLET ORAL DAILY
Qty: 8 TABLET | Refills: 0 | Status: SHIPPED | OUTPATIENT
Start: 2022-04-24 | End: 2022-04-28

## 2022-04-24 RX ORDER — PREDNISONE 20 MG/1
40 TABLET ORAL ONCE
Status: COMPLETED | OUTPATIENT
Start: 2022-04-24 | End: 2022-04-24

## 2022-04-24 RX ORDER — ALBUTEROL SULFATE 2.5 MG/3ML
2.5 SOLUTION RESPIRATORY (INHALATION) EVERY 4 HOURS PRN
Qty: 30 EACH | Refills: 0 | Status: SHIPPED | OUTPATIENT
Start: 2022-04-24 | End: 2022-04-29

## 2022-04-24 RX ORDER — IPRATROPIUM BROMIDE AND ALBUTEROL SULFATE 2.5; .5 MG/3ML; MG/3ML
3 SOLUTION RESPIRATORY (INHALATION) ONCE
Status: COMPLETED | OUTPATIENT
Start: 2022-04-24 | End: 2022-04-24

## 2022-04-24 NOTE — ED INITIAL ASSESSMENT (HPI)
Pt here w/ c/o cough, audible wheeze. No edema. States 4 days of cough, increasing sputum production.

## 2022-04-25 ENCOUNTER — TELEPHONE (OUTPATIENT)
Dept: ENDOCRINOLOGY CLINIC | Facility: CLINIC | Age: 77
End: 2022-04-25

## 2022-04-25 RX ORDER — METFORMIN HYDROCHLORIDE 500 MG/1
1000 TABLET, EXTENDED RELEASE ORAL 2 TIMES DAILY WITH MEALS
Qty: 360 TABLET | Refills: 0 | Status: SHIPPED | OUTPATIENT
Start: 2022-04-25

## 2022-04-25 NOTE — TELEPHONE ENCOUNTER
Pt left message on educator line asking if he could have samples of Toujeo in [place]. Explained CDE from [facility] will be there next Monday. Pt has enough to last and said he would like YK CDE to please call him back when she returns to the office.

## 2022-04-25 NOTE — TELEPHONE ENCOUNTER
Pt failed refill protocol for the following reasons:     Diabetic Medication Protocol Failed 04/25/2022 01:50 PM   Protocol Details  Appointment in past 6 or next 3 months            Last refill: 12/14/2021 #360 with 0 refills  Last appt: 10/14/2021  Next appt: Future Appointments   Date Time Provider Lloyd Garrido   6/20/2022  9:00 AM Ino Luna RN, [facility] EMG DIAB [facility]   10/3/2022  8:00 AM DO JOSIANE HenriquezYK EMG [place] to Dr. Madison Plata, please advise on refills. Thank you.

## 2022-04-26 ENCOUNTER — PATIENT OUTREACH (OUTPATIENT)
Dept: CASE MANAGEMENT | Age: 77
End: 2022-04-26

## 2022-04-26 RX ORDER — FUROSEMIDE 20 MG/1
40 TABLET ORAL DAILY
Qty: 90 TABLET | Refills: 1 | Status: ON HOLD | OUTPATIENT
Start: 2022-04-26

## 2022-04-26 RX ORDER — LOSARTAN POTASSIUM 25 MG/1
25 TABLET ORAL DAILY
Qty: 90 TABLET | Refills: 1 | Status: ON HOLD | OUTPATIENT
Start: 2022-04-26

## 2022-04-26 RX ORDER — INSULIN GLARGINE 300 U/ML
INJECTION, SOLUTION SUBCUTANEOUS
Qty: 3 EACH | Refills: 0 | Status: ON HOLD | COMMUNITY
Start: 2022-04-26

## 2022-04-26 RX ORDER — SPIRONOLACTONE 25 MG/1
25 TABLET ORAL
Qty: 90 TABLET | Refills: 1 | Status: ON HOLD | OUTPATIENT
Start: 2022-04-26

## 2022-04-26 NOTE — TELEPHONE ENCOUNTER
Patient called requesting an update on Toujeo samples. Patient stated he has not heard back. Patient also requesting a refill on 3 other medications as well. patient aware a appointment may be needed in order to refill medications. Patient verbalized understanding.  Please advise    Medication(s) to Refill:   Losartan 25 mg  spironolactone 25 MG Oral   furosemide 40 mg   Last Time Medication was Filled:  12/31/2021   Last Office Visit with PCP: 10/14/2021 Physical   Future Appointments   Date Time Provider Lloyd Garrido   6/20/2022  9:00 AM Nery Velasco RN, [facility] EMG DIAB [address]   10/3/2022  8:00 AM DO Geronimo Zhong [address] EMG [place] [facility]     Last Blood Pressures:  BP Readings from Last 2 Encounters:  04/24/22 : 102/67  04/05/22 : 118/64

## 2022-04-26 NOTE — TELEPHONE ENCOUNTER
Returning call to pt. reagrding samples of Toujeo. OUSMANE  explained that since he has Dr. Ruby Jones as his PCP, there is no longer a need to contact me when he is requesting samples. He can call the office directly.

## 2022-04-29 ENCOUNTER — OFFICE VISIT (OUTPATIENT)
Dept: FAMILY MEDICINE CLINIC | Facility: CLINIC | Age: 77
End: 2022-04-29
Payer: MEDICARE

## 2022-04-29 ENCOUNTER — HOSPITAL ENCOUNTER (OUTPATIENT)
Dept: GENERAL RADIOLOGY | Age: 77
Discharge: HOME | End: 2022-04-29
Attending: FAMILY MEDICINE
Payer: MEDICARE

## 2022-04-29 ENCOUNTER — TELEPHONE (OUTPATIENT)
Dept: FAMILY MEDICINE CLINIC | Facility: CLINIC | Age: 77
End: 2022-04-29

## 2022-04-29 VITALS
WEIGHT: 246 LBS | BODY MASS INDEX: 36.43 KG/M2 | DIASTOLIC BLOOD PRESSURE: 56 MMHG | TEMPERATURE: 96 F | SYSTOLIC BLOOD PRESSURE: 128 MMHG | OXYGEN SATURATION: 96 % | HEART RATE: 78 BPM | HEIGHT: 69 IN | RESPIRATION RATE: 20 BRPM

## 2022-04-29 DIAGNOSIS — R06.01 ORTHOPNEA: Primary | ICD-10-CM

## 2022-04-29 DIAGNOSIS — R05.9 COUGH: ICD-10-CM

## 2022-04-29 DIAGNOSIS — J18.9 PNEUMONIA OF LEFT LOWER LOBE DUE TO INFECTIOUS ORGANISM: ICD-10-CM

## 2022-04-29 DIAGNOSIS — R06.01 ORTHOPNEA: ICD-10-CM

## 2022-04-29 DIAGNOSIS — J40 BRONCHITIS: ICD-10-CM

## 2022-04-29 LAB
ALBUMIN SERPL-MCNC: 3.4 G/DL (ref 3.4–5)
ALBUMIN/GLOB SERPL: 1 {RATIO} (ref 1–2)
ALP LIVER SERPL-CCNC: 53 U/L
ALT SERPL-CCNC: 20 U/L
ANION GAP SERPL CALC-SCNC: 8 MMOL/L (ref 0–18)
AST SERPL-CCNC: 16 U/L (ref 15–37)
BASOPHILS # BLD AUTO: 0.03 X10(3) UL (ref 0–0.2)
BASOPHILS NFR BLD AUTO: 0.2 %
BILIRUB SERPL-MCNC: 0.3 MG/DL (ref 0.1–2)
BUN BLD-MCNC: 27 MG/DL (ref 7–18)
CALCIUM BLD-MCNC: 8.6 MG/DL (ref 8.5–10.1)
CHLORIDE SERPL-SCNC: 108 MMOL/L (ref 98–112)
CO2 SERPL-SCNC: 26 MMOL/L (ref 21–32)
CREAT BLD-MCNC: 1.18 MG/DL
EOSINOPHIL # BLD AUTO: 0.04 X10(3) UL (ref 0–0.7)
EOSINOPHIL NFR BLD AUTO: 0.3 %
ERYTHROCYTE [DISTWIDTH] IN BLOOD BY AUTOMATED COUNT: 15.9 %
FASTING STATUS PATIENT QL REPORTED: NO
GLOBULIN PLAS-MCNC: 3.4 G/DL (ref 2.8–4.4)
GLUCOSE BLD-MCNC: 112 MG/DL (ref 70–99)
HCT VFR BLD AUTO: 34.8 %
HGB BLD-MCNC: 10.9 G/DL
IMM GRANULOCYTES # BLD AUTO: 0.22 X10(3) UL (ref 0–1)
IMM GRANULOCYTES NFR BLD: 1.8 %
LYMPHOCYTES # BLD AUTO: 2.02 X10(3) UL (ref 1–4)
LYMPHOCYTES NFR BLD AUTO: 16.4 %
MCH RBC QN AUTO: 28.7 PG (ref 26–34)
MCHC RBC AUTO-ENTMCNC: 31.3 G/DL (ref 31–37)
MCV RBC AUTO: 91.6 FL
MONOCYTES # BLD AUTO: 1.67 X10(3) UL (ref 0.1–1)
MONOCYTES NFR BLD AUTO: 13.6 %
NEUTROPHILS # BLD AUTO: 8.31 X10 (3) UL (ref 1.5–7.7)
NEUTROPHILS # BLD AUTO: 8.31 X10(3) UL (ref 1.5–7.7)
NEUTROPHILS NFR BLD AUTO: 67.7 %
NT-PROBNP SERPL-MCNC: 6908 PG/ML (ref ?–125)
OSMOLALITY SERPL CALC.SUM OF ELEC: 300 MOSM/KG (ref 275–295)
PLATELET # BLD AUTO: 102 10(3)UL (ref 150–450)
POTASSIUM SERPL-SCNC: 4.1 MMOL/L (ref 3.5–5.1)
PROT SERPL-MCNC: 6.8 G/DL (ref 6.4–8.2)
RBC # BLD AUTO: 3.8 X10(6)UL
SODIUM SERPL-SCNC: 142 MMOL/L (ref 136–145)
WBC # BLD AUTO: 12.3 X10(3) UL (ref 4–11)

## 2022-04-29 PROCEDURE — 99214 OFFICE O/P EST MOD 30 MIN: CPT | Performed by: FAMILY MEDICINE

## 2022-04-29 PROCEDURE — 83880 ASSAY OF NATRIURETIC PEPTIDE: CPT | Performed by: FAMILY MEDICINE

## 2022-04-29 PROCEDURE — 85025 COMPLETE CBC W/AUTO DIFF WBC: CPT | Performed by: FAMILY MEDICINE

## 2022-04-29 PROCEDURE — 80053 COMPREHEN METABOLIC PANEL: CPT | Performed by: FAMILY MEDICINE

## 2022-04-29 PROCEDURE — 71046 X-RAY EXAM CHEST 2 VIEWS: CPT | Performed by: FAMILY MEDICINE

## 2022-04-29 RX ORDER — PREDNISONE 20 MG/1
TABLET ORAL
Qty: 20 TABLET | Refills: 0 | Status: ON HOLD | OUTPATIENT
Start: 2022-04-29 | End: 2022-05-12

## 2022-04-29 RX ORDER — ALBUTEROL SULFATE 2.5 MG/3ML
2.5 SOLUTION RESPIRATORY (INHALATION) EVERY 4 HOURS PRN
Qty: 30 EACH | Refills: 0 | Status: ON HOLD | OUTPATIENT
Start: 2022-04-29 | End: 2022-05-29

## 2022-04-29 RX ORDER — PREDNISONE 20 MG/1
TABLET ORAL
Qty: 22 TABLET | Refills: 0 | Status: SHIPPED | OUTPATIENT
Start: 2022-04-29 | End: 2022-04-29

## 2022-04-29 RX ORDER — LEVOFLOXACIN 500 MG/1
500 TABLET, FILM COATED ORAL DAILY
Qty: 10 TABLET | Refills: 0 | Status: ON HOLD | OUTPATIENT
Start: 2022-04-29 | End: 2022-05-09

## 2022-04-30 ENCOUNTER — TELEPHONE (OUTPATIENT)
Dept: FAMILY MEDICINE CLINIC | Facility: CLINIC | Age: 77
End: 2022-04-30

## 2022-04-30 PROCEDURE — 99490 CHRNC CARE MGMT STAFF 1ST 20: CPT

## 2022-04-30 NOTE — TELEPHONE ENCOUNTER
----- Message from Yanique Pinedo DO sent at 4/29/2022  9:40 PM CDT -----  Mychart sent: Sherlyn Santamaria, the radiologist did not see pneumonia, so maybe I'm wrong, but given how your lungs sounded in the office, I still would like you to take the antibiotic.  Let me know how you are feeling Monday. - Dr. Gloria Hurst

## 2022-04-30 NOTE — TELEPHONE ENCOUNTER
Called and LM for pt. His BNP level, which is a marker of heart failure, is quite high. I think he should go to ER and get checked out. I'm not sure if this is just a respiratory infection or heart failure, but since he's feel bad and this number is high and his lungs sounded terrible yesterday, I'd feel more comfortable if he was seen in the hospital.     pls call him again with this info, or call wife if you can't reach him.

## 2022-04-30 NOTE — TELEPHONE ENCOUNTER
Pt was advised of informaiton    Based on lab results KE advised pt should go to ER to be seen- see other note 4/30/22    Pt verbalized understanding

## 2022-05-02 ENCOUNTER — HOSPITAL ENCOUNTER (OUTPATIENT)
Facility: HOSPITAL | Age: 77
Setting detail: OBSERVATION
Discharge: HOME | End: 2022-05-04
Attending: EMERGENCY MEDICINE | Admitting: HOSPITALIST
Payer: MEDICARE

## 2022-05-02 ENCOUNTER — TELEPHONE (OUTPATIENT)
Dept: FAMILY MEDICINE CLINIC | Facility: CLINIC | Age: 77
End: 2022-05-02

## 2022-05-02 ENCOUNTER — APPOINTMENT (OUTPATIENT)
Dept: GENERAL RADIOLOGY | Facility: HOSPITAL | Age: 77
End: 2022-05-02
Attending: EMERGENCY MEDICINE
Payer: MEDICARE

## 2022-05-02 DIAGNOSIS — R06.00 EXERTIONAL DYSPNEA: Primary | ICD-10-CM

## 2022-05-02 DIAGNOSIS — J18.9 PNEUMONIA OF LEFT LOWER LOBE DUE TO INFECTIOUS ORGANISM: ICD-10-CM

## 2022-05-02 DIAGNOSIS — I50.9 CONGESTIVE HEART FAILURE, UNSPECIFIED HF CHRONICITY, UNSPECIFIED HEART FAILURE TYPE (HCC): ICD-10-CM

## 2022-05-02 PROBLEM — R06.09 EXERTIONAL DYSPNEA: Status: ACTIVE | Noted: 2022-05-02

## 2022-05-02 LAB
ADENOVIRUS PCR:: NOT DETECTED
ALBUMIN SERPL-MCNC: 3.2 G/DL (ref 3.4–5)
ALBUMIN/GLOB SERPL: 0.9 {RATIO} (ref 1–2)
ALP LIVER SERPL-CCNC: 55 U/L
ALT SERPL-CCNC: 19 U/L
ANION GAP SERPL CALC-SCNC: 8 MMOL/L (ref 0–18)
AST SERPL-CCNC: 15 U/L (ref 15–37)
B PARAPERT DNA SPEC QL NAA+PROBE: NOT DETECTED
B PERT DNA SPEC QL NAA+PROBE: NOT DETECTED
BASOPHILS # BLD AUTO: 0.03 X10(3) UL (ref 0–0.2)
BASOPHILS NFR BLD AUTO: 0.3 %
BILIRUB SERPL-MCNC: 0.2 MG/DL (ref 0.1–2)
BUN BLD-MCNC: 25 MG/DL (ref 7–18)
C PNEUM DNA SPEC QL NAA+PROBE: NOT DETECTED
CALCIUM BLD-MCNC: 9.1 MG/DL (ref 8.5–10.1)
CHLORIDE SERPL-SCNC: 106 MMOL/L (ref 98–112)
CO2 SERPL-SCNC: 24 MMOL/L (ref 21–32)
CORONAVIRUS 229E PCR:: NOT DETECTED
CORONAVIRUS HKU1 PCR:: NOT DETECTED
CORONAVIRUS NL63 PCR:: NOT DETECTED
CORONAVIRUS OC43 PCR:: NOT DETECTED
CREAT BLD-MCNC: 1.45 MG/DL
EOSINOPHIL # BLD AUTO: 0.08 X10(3) UL (ref 0–0.7)
EOSINOPHIL NFR BLD AUTO: 0.7 %
ERYTHROCYTE [DISTWIDTH] IN BLOOD BY AUTOMATED COUNT: 16.4 %
EST. AVERAGE GLUCOSE BLD GHB EST-MCNC: 140 MG/DL (ref 68–126)
FLUAV RNA SPEC QL NAA+PROBE: NOT DETECTED
FLUBV RNA SPEC QL NAA+PROBE: NOT DETECTED
GLOBULIN PLAS-MCNC: 3.6 G/DL (ref 2.8–4.4)
GLUCOSE BLD-MCNC: 172 MG/DL (ref 70–99)
GLUCOSE BLD-MCNC: 183 MG/DL (ref 70–99)
HBA1C MFR BLD: 6.5 % (ref ?–5.7)
HCT VFR BLD AUTO: 33.4 %
HGB BLD-MCNC: 10.8 G/DL
IMM GRANULOCYTES # BLD AUTO: 0.18 X10(3) UL (ref 0–1)
IMM GRANULOCYTES NFR BLD: 1.5 %
LYMPHOCYTES # BLD AUTO: 1.55 X10(3) UL (ref 1–4)
LYMPHOCYTES NFR BLD AUTO: 13.2 %
MCH RBC QN AUTO: 29 PG (ref 26–34)
MCHC RBC AUTO-ENTMCNC: 32.3 G/DL (ref 31–37)
MCV RBC AUTO: 89.8 FL
METAPNEUMOVIRUS PCR:: NOT DETECTED
MONOCYTES # BLD AUTO: 2.3 X10(3) UL (ref 0.1–1)
MONOCYTES NFR BLD AUTO: 19.6 %
MYCOPLASMA PNEUMONIA PCR:: NOT DETECTED
NEUTROPHILS # BLD AUTO: 7.58 X10 (3) UL (ref 1.5–7.7)
NEUTROPHILS # BLD AUTO: 7.58 X10(3) UL (ref 1.5–7.7)
NEUTROPHILS NFR BLD AUTO: 64.7 %
NT-PROBNP SERPL-MCNC: 5640 PG/ML (ref ?–125)
OSMOLALITY SERPL CALC.SUM OF ELEC: 294 MOSM/KG (ref 275–295)
PARAINFLUENZA 1 PCR:: NOT DETECTED
PARAINFLUENZA 2 PCR:: NOT DETECTED
PARAINFLUENZA 3 PCR:: NOT DETECTED
PARAINFLUENZA 4 PCR:: NOT DETECTED
PLATELET # BLD AUTO: 92 10(3)UL (ref 150–450)
POTASSIUM SERPL-SCNC: 4.4 MMOL/L (ref 3.5–5.1)
PROCALCITONIN SERPL-MCNC: <0.05 NG/ML (ref ?–0.16)
PROT SERPL-MCNC: 6.8 G/DL (ref 6.4–8.2)
RBC # BLD AUTO: 3.72 X10(6)UL
RHINOVIRUS/ENTERO PCR:: DETECTED
RSV RNA SPEC QL NAA+PROBE: NOT DETECTED
SARS-COV-2 RNA NPH QL NAA+NON-PROBE: NOT DETECTED
SARS-COV-2 RNA RESP QL NAA+PROBE: NOT DETECTED
SODIUM SERPL-SCNC: 138 MMOL/L (ref 136–145)
TROPONIN I HIGH SENSITIVITY: 36 NG/L
WBC # BLD AUTO: 11.7 X10(3) UL (ref 4–11)

## 2022-05-02 PROCEDURE — 99220 INITIAL OBSERVATION CARE,LEVL III: CPT | Performed by: HOSPITALIST

## 2022-05-02 PROCEDURE — 71045 X-RAY EXAM CHEST 1 VIEW: CPT | Performed by: EMERGENCY MEDICINE

## 2022-05-02 RX ORDER — METOPROLOL TARTRATE 100 MG/1
100 TABLET ORAL EVERY EVENING
Status: ON HOLD | COMMUNITY
End: 2022-05-02

## 2022-05-02 RX ORDER — ATORVASTATIN CALCIUM 20 MG/1
20 TABLET, FILM COATED ORAL NIGHTLY
Status: DISCONTINUED | OUTPATIENT
Start: 2022-05-02 | End: 2022-05-03 | Stop reason: ALTCHOICE

## 2022-05-02 RX ORDER — MELATONIN
3 NIGHTLY PRN
Status: DISCONTINUED | OUTPATIENT
Start: 2022-05-02 | End: 2022-05-04

## 2022-05-02 RX ORDER — FUROSEMIDE 40 MG/1
40 TABLET ORAL DAILY
Status: DISCONTINUED | OUTPATIENT
Start: 2022-05-03 | End: 2022-05-03

## 2022-05-02 RX ORDER — IPRATROPIUM BROMIDE AND ALBUTEROL SULFATE 2.5; .5 MG/3ML; MG/3ML
3 SOLUTION RESPIRATORY (INHALATION)
Status: DISCONTINUED | OUTPATIENT
Start: 2022-05-02 | End: 2022-05-04

## 2022-05-02 RX ORDER — METOPROLOL SUCCINATE 50 MG/1
50 TABLET, EXTENDED RELEASE ORAL
COMMUNITY
End: 2022-05-06 | Stop reason: CLARIF

## 2022-05-02 RX ORDER — HEPARIN SODIUM 5000 [USP'U]/ML
5000 INJECTION, SOLUTION INTRAVENOUS; SUBCUTANEOUS EVERY 12 HOURS SCHEDULED
Status: DISCONTINUED | OUTPATIENT
Start: 2022-05-02 | End: 2022-05-04

## 2022-05-02 RX ORDER — CLOPIDOGREL BISULFATE 75 MG/1
75 TABLET ORAL DAILY
Status: DISCONTINUED | OUTPATIENT
Start: 2022-05-03 | End: 2022-05-04

## 2022-05-02 RX ORDER — EZETIMIBE 10 MG/1
10 TABLET ORAL DAILY
Status: DISCONTINUED | OUTPATIENT
Start: 2022-05-03 | End: 2022-05-04

## 2022-05-02 RX ORDER — NICOTINE POLACRILEX 4 MG
30 LOZENGE BUCCAL
Status: DISCONTINUED | OUTPATIENT
Start: 2022-05-02 | End: 2022-05-04

## 2022-05-02 RX ORDER — METOPROLOL SUCCINATE 100 MG/1
100 TABLET, EXTENDED RELEASE ORAL EVERY EVENING
Status: DISCONTINUED | OUTPATIENT
Start: 2022-05-02 | End: 2022-05-04

## 2022-05-02 RX ORDER — METOPROLOL SUCCINATE 100 MG/1
50 TABLET, EXTENDED RELEASE ORAL EVERY MORNING
COMMUNITY

## 2022-05-02 RX ORDER — METOPROLOL SUCCINATE 50 MG/1
50 TABLET, EXTENDED RELEASE ORAL
Status: DISCONTINUED | OUTPATIENT
Start: 2022-05-03 | End: 2022-05-04

## 2022-05-02 RX ORDER — NICOTINE POLACRILEX 4 MG
15 LOZENGE BUCCAL
Status: DISCONTINUED | OUTPATIENT
Start: 2022-05-02 | End: 2022-05-04

## 2022-05-02 RX ORDER — METHYLPREDNISOLONE SODIUM SUCCINATE 40 MG/ML
40 INJECTION, POWDER, LYOPHILIZED, FOR SOLUTION INTRAMUSCULAR; INTRAVENOUS EVERY 8 HOURS
Status: DISCONTINUED | OUTPATIENT
Start: 2022-05-02 | End: 2022-05-04

## 2022-05-02 RX ORDER — ACETAMINOPHEN 325 MG/1
650 TABLET ORAL EVERY 6 HOURS PRN
Status: DISCONTINUED | OUTPATIENT
Start: 2022-05-02 | End: 2022-05-04

## 2022-05-02 RX ORDER — FUROSEMIDE 10 MG/ML
40 INJECTION INTRAMUSCULAR; INTRAVENOUS ONCE
Status: COMPLETED | OUTPATIENT
Start: 2022-05-02 | End: 2022-05-02

## 2022-05-02 RX ORDER — ONDANSETRON 2 MG/ML
4 INJECTION INTRAMUSCULAR; INTRAVENOUS EVERY 6 HOURS PRN
Status: DISCONTINUED | OUTPATIENT
Start: 2022-05-02 | End: 2022-05-04

## 2022-05-02 RX ORDER — DEXTROSE MONOHYDRATE 25 G/50ML
50 INJECTION, SOLUTION INTRAVENOUS
Status: DISCONTINUED | OUTPATIENT
Start: 2022-05-02 | End: 2022-05-04

## 2022-05-02 RX ORDER — METOPROLOL TARTRATE 50 MG/1
50 TABLET, FILM COATED ORAL EVERY MORNING
Status: ON HOLD | COMMUNITY
End: 2022-05-02

## 2022-05-02 RX ORDER — ASPIRIN 81 MG/1
81 TABLET ORAL DAILY
Status: DISCONTINUED | OUTPATIENT
Start: 2022-05-03 | End: 2022-05-04

## 2022-05-02 NOTE — TELEPHONE ENCOUNTER
Per KERMIT, call patient for condition update.  Was advised ER 4/30/22    Per [facility], patient currently at [facility] ER    ozzie

## 2022-05-02 NOTE — ED INITIAL ASSESSMENT (HPI)
Pt to the ER via walk in for SOB that started 2 weeks. Pt states he went to his PCP on Friday had tests done and she called on Friday night and said if you don't feel better go to the ER. \" Pt denies CP at this time. Pt presents to the ER a&ox4. Respirations even and nolabored. Skin warm and dry. Pt ambulated to triage chair without difficulty and a steady gait.     PMH: COPD

## 2022-05-03 ENCOUNTER — APPOINTMENT (OUTPATIENT)
Dept: CV DIAGNOSTICS | Facility: HOSPITAL | Age: 77
End: 2022-05-03
Attending: HOSPITALIST
Payer: MEDICARE

## 2022-05-03 LAB
ANION GAP SERPL CALC-SCNC: 4 MMOL/L (ref 0–18)
ATRIAL RATE: 87 BPM
BUN BLD-MCNC: 27 MG/DL (ref 7–18)
CALCIUM BLD-MCNC: 9.1 MG/DL (ref 8.5–10.1)
CHLORIDE SERPL-SCNC: 106 MMOL/L (ref 98–112)
CHOLEST SERPL-MCNC: 149 MG/DL (ref ?–200)
CO2 SERPL-SCNC: 26 MMOL/L (ref 21–32)
CREAT BLD-MCNC: 1.3 MG/DL
GLUCOSE BLD-MCNC: 170 MG/DL (ref 70–99)
GLUCOSE BLD-MCNC: 177 MG/DL (ref 70–99)
GLUCOSE BLD-MCNC: 195 MG/DL (ref 70–99)
GLUCOSE BLD-MCNC: 227 MG/DL (ref 70–99)
GLUCOSE BLD-MCNC: 249 MG/DL (ref 70–99)
HDLC SERPL-MCNC: 48 MG/DL (ref 40–59)
LDLC SERPL CALC-MCNC: 87 MG/DL (ref ?–100)
NONHDLC SERPL-MCNC: 101 MG/DL (ref ?–130)
OSMOLALITY SERPL CALC.SUM OF ELEC: 292 MOSM/KG (ref 275–295)
P AXIS: 45 DEGREES
P-R INTERVAL: 218 MS
POTASSIUM SERPL-SCNC: 4.8 MMOL/L (ref 3.5–5.1)
Q-T INTERVAL: 406 MS
QRS DURATION: 156 MS
QTC CALCULATION (BEZET): 488 MS
R AXIS: -57 DEGREES
SODIUM SERPL-SCNC: 136 MMOL/L (ref 136–145)
T AXIS: 78 DEGREES
TRIGL SERPL-MCNC: 71 MG/DL (ref 30–149)
TROPONIN I HIGH SENSITIVITY: 28 NG/L
TROPONIN I HIGH SENSITIVITY: 40 NG/L
VENTRICULAR RATE: 87 BPM
VLDLC SERPL CALC-MCNC: 11 MG/DL (ref 0–30)

## 2022-05-03 PROCEDURE — 93306 TTE W/DOPPLER COMPLETE: CPT | Performed by: HOSPITALIST

## 2022-05-03 PROCEDURE — B246ZZZ ULTRASONOGRAPHY OF RIGHT AND LEFT HEART: ICD-10-PCS | Performed by: INTERNAL MEDICINE

## 2022-05-03 PROCEDURE — 99225 SUBSEQUENT OBSERVATION CARE: CPT | Performed by: HOSPITALIST

## 2022-05-03 RX ORDER — FUROSEMIDE 10 MG/ML
40 INJECTION INTRAMUSCULAR; INTRAVENOUS
Status: DISCONTINUED | OUTPATIENT
Start: 2022-05-03 | End: 2022-05-04

## 2022-05-03 RX ORDER — LEVOFLOXACIN 500 MG/1
500 TABLET, FILM COATED ORAL DAILY
Status: DISCONTINUED | OUTPATIENT
Start: 2022-05-03 | End: 2022-05-04

## 2022-05-03 RX ORDER — LOVASTATIN 40 MG/1
40 TABLET ORAL 2 TIMES DAILY
Status: DISCONTINUED | OUTPATIENT
Start: 2022-05-03 | End: 2022-05-04

## 2022-05-03 NOTE — PLAN OF CARE
Vitals stable, scant wheezing in right lung only, patient free of pain or SOB. Tolerating room air during the day but wore 2L at night last night because the CPAP mask provided here wasn't the one he has at home and can tolerate. Patient called wife to bring in cpap mask as well as lovastatin medication so we can administer that here. Cardiology rounded this AM for consult, continued IV lasix BID and may transition to PO tomorrow. Still on IV steroids q8h. Ambulates appropriately in the room independently, urine output adequate. Echo done this AM, awaiting results. Resumed home levaquin PO that he was prescribed recently as an OP for URI. Respiratory extended panel done here confirms rhino/enterovirus. Nebs q6h while awake. Call light within reach, all questions answered. Echo just came back, no change from 2019, ef 20-25%      Problem: Diabetes/Glucose Control  Goal: Glucose maintained within prescribed range  Description: INTERVENTIONS:  - Monitor Blood Glucose as ordered  - Assess for signs and symptoms of hyperglycemia and hypoglycemia  - Administer ordered medications to maintain glucose within target range  - Assess barriers to adequate nutritional intake and initiate nutrition consult as needed  - Instruct patient on self management of diabetes  Outcome: Progressing       Problem: CARDIOVASCULAR - ADULT  Goal: Maintains optimal cardiac output and hemodynamic stability  Description: INTERVENTIONS:  - Monitor vital signs, rhythm, and trends  - Monitor for bleeding, hypotension and signs of decreased cardiac output  - Evaluate effectiveness of vasoactive medications to optimize hemodynamic stability  - Monitor arterial and/or venous puncture sites for bleeding and/or hematoma  - Assess quality of pulses, skin color and temperature  - Assess for signs of decreased coronary artery perfusion - ex.  Angina  - Evaluate fluid balance, assess for edema, trend weights  Outcome: Progressing  Goal: Absence of cardiac arrhythmias or at baseline  Description: INTERVENTIONS:  - Continuous cardiac monitoring, monitor vital signs, obtain 12 lead EKG if indicated  - Evaluate effectiveness of antiarrhythmic and heart rate control medications as ordered  - Initiate emergency measures for life threatening arrhythmias  - Monitor electrolytes and administer replacement therapy as ordered  Outcome: Progressing     Problem: RESPIRATORY - ADULT  Goal: Achieves optimal ventilation and oxygenation  Description: INTERVENTIONS:  - Assess for changes in respiratory status  - Assess for changes in mentation and behavior  - Position to facilitate oxygenation and minimize respiratory effort  - Oxygen supplementation based on oxygen saturation or ABGs  - Provide Smoking Cessation handout, if applicable  - Encourage broncho-pulmonary hygiene including cough, deep breathe, Incentive Spirometry  - Assess the need for suctioning and perform as needed  - Assess and instruct to report SOB or any respiratory difficulty  - Respiratory Therapy support as indicated  - Manage/alleviate anxiety  - Monitor for signs/symptoms of CO2 retention  Outcome: Progressing     Problem: METABOLIC/FLUID AND ELECTROLYTES - ADULT  Goal: Electrolytes maintained within normal limits  Description: INTERVENTIONS:  - Monitor labs and rhythm and assess patient for signs and symptoms of electrolyte imbalances  - Administer electrolyte replacement as ordered  - Monitor response to electrolyte replacements, including rhythm and repeat lab results as appropriate  - Fluid restriction as ordered  - Instruct patient on fluid and nutrition restrictions as appropriate  Outcome: Progressing  Goal: Hemodynamic stability and optimal renal function maintained  Description: INTERVENTIONS:  - Monitor labs and assess for signs and symptoms of volume excess or deficit  - Monitor intake, output and patient weight  - Monitor urine specific gravity, serum osmolarity and serum sodium as indicated or ordered  - Monitor response to interventions for patient's volume status, including labs, urine output, blood pressure (other measures as available)  - Encourage oral intake as appropriate  - Instruct patient on fluid and nutrition restrictions as appropriate  Outcome: Progressing

## 2022-05-03 NOTE — PLAN OF CARE
Received pt from ER a/ox 4 in no apparent distress. Monitor shows sinus rhythm/sinus arrhythmia/AP with stable but lower BP around 884 systolic. Meds verified with pt and sure scripts. Metoprolol held tonight. Lungs with rhonchi and wheezes with active coughing. Expanded resp panel taken with Isolation precautions initiated. IV site patent with blood return noted and without redness or swelling noted. 1+ edema to BLE. Safety precautions in place and orders received and implemented.    Problem: Diabetes/Glucose Control  Goal: Glucose maintained within prescribed range  Description: INTERVENTIONS:  - Monitor Blood Glucose as ordered  - Assess for signs and symptoms of hyperglycemia and hypoglycemia  - Administer ordered medications to maintain glucose within target range  - Assess barriers to adequate nutritional intake and initiate nutrition consult as needed  - Instruct patient on self management of diabetes  Outcome: Progressing     Problem: Patient/Family Goals  Goal: Patient/Family Long Term Goal  Description: Patient's Long Term Goal: go home    Interventions:  - f/u with md op  - tolerate and keep meds on schedule  - See additional Care Plan goals for specific interventions  Outcome: Progressing  Goal: Patient/Family Short Term Goal  Description: Patient's Short Term Goal: breathe easier    Interventions:   - monitor tele/vitals  - labs  - solumedrol  - daily wt  - glucose QID  - See additional Care Plan goals for specific interventions  Outcome: Progressing     Problem: CARDIOVASCULAR - ADULT  Goal: Maintains optimal cardiac output and hemodynamic stability  Description: INTERVENTIONS:  - Monitor vital signs, rhythm, and trends  - Monitor for bleeding, hypotension and signs of decreased cardiac output  - Evaluate effectiveness of vasoactive medications to optimize hemodynamic stability  - Monitor arterial and/or venous puncture sites for bleeding and/or hematoma  - Assess quality of pulses, skin color and temperature  - Assess for signs of decreased coronary artery perfusion - ex.  Angina  - Evaluate fluid balance, assess for edema, trend weights  Outcome: Progressing  Goal: Absence of cardiac arrhythmias or at baseline  Description: INTERVENTIONS:  - Continuous cardiac monitoring, monitor vital signs, obtain 12 lead EKG if indicated  - Evaluate effectiveness of antiarrhythmic and heart rate control medications as ordered  - Initiate emergency measures for life threatening arrhythmias  - Monitor electrolytes and administer replacement therapy as ordered  Outcome: Progressing     Problem: RESPIRATORY - ADULT  Goal: Achieves optimal ventilation and oxygenation  Description: INTERVENTIONS:  - Assess for changes in respiratory status  - Assess for changes in mentation and behavior  - Position to facilitate oxygenation and minimize respiratory effort  - Oxygen supplementation based on oxygen saturation or ABGs  - Provide Smoking Cessation handout, if applicable  - Encourage broncho-pulmonary hygiene including cough, deep breathe, Incentive Spirometry  - Assess the need for suctioning and perform as needed  - Assess and instruct to report SOB or any respiratory difficulty  - Respiratory Therapy support as indicated  - Manage/alleviate anxiety  - Monitor for signs/symptoms of CO2 retention  Outcome: Progressing     Problem: METABOLIC/FLUID AND ELECTROLYTES - ADULT  Goal: Electrolytes maintained within normal limits  Description: INTERVENTIONS:  - Monitor labs and rhythm and assess patient for signs and symptoms of electrolyte imbalances  - Administer electrolyte replacement as ordered  - Monitor response to electrolyte replacements, including rhythm and repeat lab results as appropriate  - Fluid restriction as ordered  - Instruct patient on fluid and nutrition restrictions as appropriate  Outcome: Progressing  Goal: Hemodynamic stability and optimal renal function maintained  Description: INTERVENTIONS:  - Monitor labs and assess for signs and symptoms of volume excess or deficit  - Monitor intake, output and patient weight  - Monitor urine specific gravity, serum osmolarity and serum sodium as indicated or ordered  - Monitor response to interventions for patient's volume status, including labs, urine output, blood pressure (other measures as available)  - Encourage oral intake as appropriate  - Instruct patient on fluid and nutrition restrictions as appropriate  Outcome: Progressing

## 2022-05-03 NOTE — PROGRESS NOTES
05/02/22 2026 05/02/22 2030 05/02/22 2033   Vital Signs   /59 90/74 100/65   MAP (mmHg) 70 77 71   BP Location Left arm Left arm Left arm   BP Method Automatic Automatic Automatic   Patient Position Lying Sitting Standing     orthos on admit

## 2022-05-03 NOTE — ED QUICK NOTES
Orders for admission, patient is aware of plan and ready to go upstairs.  Any questions, please call ED RN Miriam Dela Cruz at extension [phone]    Patient Covid vaccination status: Unvaccinated     COVID Test Ordered in ED: Rapid SARS-CoV-2 by PCR    COVID Suspicion at Admission: Low clinical suspicion for COVID    Running Infusions:  None    Mental Status/LOC at time of transport:oriented x4    Other pertinent information:  Family at bed side  CIWA score: N/A   NIH score:  N/A

## 2022-05-04 VITALS
WEIGHT: 235.69 LBS | TEMPERATURE: 98 F | BODY MASS INDEX: 34.91 KG/M2 | OXYGEN SATURATION: 98 % | SYSTOLIC BLOOD PRESSURE: 112 MMHG | HEIGHT: 69 IN | HEART RATE: 82 BPM | RESPIRATION RATE: 18 BRPM | DIASTOLIC BLOOD PRESSURE: 82 MMHG

## 2022-05-04 LAB
ANION GAP SERPL CALC-SCNC: 7 MMOL/L (ref 0–18)
BASOPHILS # BLD AUTO: 0.01 X10(3) UL (ref 0–0.2)
BASOPHILS NFR BLD AUTO: 0.1 %
BUN BLD-MCNC: 40 MG/DL (ref 7–18)
CALCIUM BLD-MCNC: 9.3 MG/DL (ref 8.5–10.1)
CHLORIDE SERPL-SCNC: 102 MMOL/L (ref 98–112)
CO2 SERPL-SCNC: 26 MMOL/L (ref 21–32)
CREAT BLD-MCNC: 1.39 MG/DL
DEPRECATED HBV CORE AB SER IA-ACNC: 400.2 NG/ML
EOSINOPHIL # BLD AUTO: 0 X10(3) UL (ref 0–0.7)
EOSINOPHIL NFR BLD AUTO: 0 %
ERYTHROCYTE [DISTWIDTH] IN BLOOD BY AUTOMATED COUNT: 15.9 %
GLUCOSE BLD-MCNC: 172 MG/DL (ref 70–99)
GLUCOSE BLD-MCNC: 186 MG/DL (ref 70–99)
GLUCOSE BLD-MCNC: 191 MG/DL (ref 70–99)
HCT VFR BLD AUTO: 32.6 %
HGB BLD-MCNC: 10.5 G/DL
IMM GRANULOCYTES # BLD AUTO: 0.13 X10(3) UL (ref 0–1)
IMM GRANULOCYTES NFR BLD: 1.6 %
IRON SATN MFR SERPL: 27 %
IRON SERPL-MCNC: 89 UG/DL
LYMPHOCYTES # BLD AUTO: 1 X10(3) UL (ref 1–4)
LYMPHOCYTES NFR BLD AUTO: 12.7 %
MCH RBC QN AUTO: 28.8 PG (ref 26–34)
MCHC RBC AUTO-ENTMCNC: 32.2 G/DL (ref 31–37)
MCV RBC AUTO: 89.6 FL
MONOCYTES # BLD AUTO: 1.26 X10(3) UL (ref 0.1–1)
MONOCYTES NFR BLD AUTO: 16 %
NEUTROPHILS # BLD AUTO: 5.48 X10 (3) UL (ref 1.5–7.7)
NEUTROPHILS # BLD AUTO: 5.48 X10(3) UL (ref 1.5–7.7)
NEUTROPHILS NFR BLD AUTO: 69.6 %
OSMOLALITY SERPL CALC.SUM OF ELEC: 295 MOSM/KG (ref 275–295)
PLATELET # BLD AUTO: 82 10(3)UL (ref 150–450)
POTASSIUM SERPL-SCNC: 4.6 MMOL/L (ref 3.5–5.1)
RBC # BLD AUTO: 3.64 X10(6)UL
SODIUM SERPL-SCNC: 135 MMOL/L (ref 136–145)
TIBC SERPL-MCNC: 325 UG/DL (ref 240–450)
TRANSFERRIN SERPL-MCNC: 218 MG/DL (ref 200–360)
WBC # BLD AUTO: 7.9 X10(3) UL (ref 4–11)

## 2022-05-04 PROCEDURE — 99217 OBSERVATION CARE DISCHARGE: CPT | Performed by: INTERNAL MEDICINE

## 2022-05-04 RX ORDER — PREDNISONE 20 MG/1
TABLET ORAL
Qty: 13 TABLET | Refills: 0 | Status: SHIPPED | OUTPATIENT
Start: 2022-05-04

## 2022-05-04 RX ORDER — LEVOFLOXACIN 500 MG/1
500 TABLET, FILM COATED ORAL DAILY
Refills: 0 | Status: SHIPPED | COMMUNITY
Start: 2022-05-04 | End: 2022-05-08

## 2022-05-04 RX ORDER — PREDNISONE 20 MG/1
40 TABLET ORAL
Status: DISCONTINUED | OUTPATIENT
Start: 2022-05-04 | End: 2022-05-04

## 2022-05-04 RX ORDER — FUROSEMIDE 40 MG/1
40 TABLET ORAL DAILY
Status: DISCONTINUED | OUTPATIENT
Start: 2022-05-05 | End: 2022-05-04

## 2022-05-04 NOTE — PLAN OF CARE
Patient cleared for dc. Vitals stable, no complaints of pain or SOB, patient states breathing is better today and cannot feel any tightness in his respirations. Continue levaquin at home. Walked in halls more than 300' with sats 98% the whole time, before and after the walk as well. Changed IV steroid to PO for taper at home, change IV lasix to PO for home, also ordered prednisone taper and clarified how many days left of levaquin. DC instructions thoroughly reviewed with patient and wife at bedside, wheelchair down to the [place] [id number] entrance, all questions answered, discharged.       Problem: Diabetes/Glucose Control  Goal: Glucose maintained within prescribed range  Description: INTERVENTIONS:  - Monitor Blood Glucose as ordered  - Assess for signs and symptoms of hyperglycemia and hypoglycemia  - Administer ordered medications to maintain glucose within target range  - Assess barriers to adequate nutritional intake and initiate nutrition consult as needed  - Instruct patient on self management of diabetes  Outcome: Adequate for Discharge     Problem: Patient/Family Goals  Goal: Patient/Family Long Term Goal  Description: Patient's Long Term Goal: \"stay out of hospital\"    Interventions:  - See additional Care Plan goals for specific interventions  Outcome: Adequate for Discharge  Goal: Patient/Family Short Term Goal  Description: Patient's Short Term Goal: \"have cough resolve\"    Interventions:   - See additional Care Plan goals for specific interventions  Outcome: Adequate for Discharge     Problem: CARDIOVASCULAR - ADULT  Goal: Maintains optimal cardiac output and hemodynamic stability  Description: INTERVENTIONS:  - Monitor vital signs, rhythm, and trends  - Monitor for bleeding, hypotension and signs of decreased cardiac output  - Evaluate effectiveness of vasoactive medications to optimize hemodynamic stability  - Monitor arterial and/or venous puncture sites for bleeding and/or hematoma  - Assess quality of pulses, skin color and temperature  - Assess for signs of decreased coronary artery perfusion - ex.  Angina  - Evaluate fluid balance, assess for edema, trend weights  Outcome: Adequate for Discharge  Goal: Absence of cardiac arrhythmias or at baseline  Description: INTERVENTIONS:  - Continuous cardiac monitoring, monitor vital signs, obtain 12 lead EKG if indicated  - Evaluate effectiveness of antiarrhythmic and heart rate control medications as ordered  - Initiate emergency measures for life threatening arrhythmias  - Monitor electrolytes and administer replacement therapy as ordered  Outcome: Adequate for Discharge     Problem: RESPIRATORY - ADULT  Goal: Achieves optimal ventilation and oxygenation  Description: INTERVENTIONS:  - Assess for changes in respiratory status  - Assess for changes in mentation and behavior  - Position to facilitate oxygenation and minimize respiratory effort  - Oxygen supplementation based on oxygen saturation or ABGs  - Provide Smoking Cessation handout, if applicable  - Encourage broncho-pulmonary hygiene including cough, deep breathe, Incentive Spirometry  - Assess the need for suctioning and perform as needed  - Assess and instruct to report SOB or any respiratory difficulty  - Respiratory Therapy support as indicated  - Manage/alleviate anxiety  - Monitor for signs/symptoms of CO2 retention  Outcome: Adequate for Discharge     Problem: METABOLIC/FLUID AND ELECTROLYTES - ADULT  Goal: Electrolytes maintained within normal limits  Description: INTERVENTIONS:  - Monitor labs and rhythm and assess patient for signs and symptoms of electrolyte imbalances  - Administer electrolyte replacement as ordered  - Monitor response to electrolyte replacements, including rhythm and repeat lab results as appropriate  - Fluid restriction as ordered  - Instruct patient on fluid and nutrition restrictions as appropriate  Outcome: Adequate for Discharge  Goal: Hemodynamic stability and optimal renal function maintained  Description: INTERVENTIONS:  - Monitor labs and assess for signs and symptoms of volume excess or deficit  - Monitor intake, output and patient weight  - Monitor urine specific gravity, serum osmolarity and serum sodium as indicated or ordered  - Monitor response to interventions for patient's volume status, including labs, urine output, blood pressure (other measures as available)  - Encourage oral intake as appropriate  - Instruct patient on fluid and nutrition restrictions as appropriate  Outcome: Adequate for Discharge

## 2022-05-04 NOTE — PROGRESS NOTES
[facility] [address]  [place], [address]  ? 05/04/22  ? Re: Esthela Steel  ? To Whom It May Concern:    Esthela Steel was admitted to [facility] from 5/2/2022 to 05/04/22. Please excuse Esthela Steel from attending work for these days. The patient may return to work on Monday  May 9  with no  Restrictions:. Patient will follow up with their primary care physician upon discharge. Further restrictions and work excuse will be based on primary care physician's evaluation. ?   Thank you,    Sada Shukla NP,   [facility]

## 2022-05-04 NOTE — PLAN OF CARE
Patient a&ox4 SR on tele. RA, CPAP at [facility]. . Denies any pain or sob. C/o non-productive cough. Nebs. IV solumedrol. Accuchecks qid. Daily weight. Cbc and bmp in am. Patient updated on poc, questions answered. Will continue to monitor.

## 2022-05-04 NOTE — PROGRESS NOTES
Brief note:     Patient weaned off oxygen, ambulated and saturating >95% on Room Air    Gen: NAD  CVS: s1s2  Resp: coarse bilaterally with mild expiratory wheezes  Abd: soft NTND    Assessment and Plan:    Transitioned to oral prednisone and oral Lasix today  Discharge home to continue prednisone taper  Further documentation to follow       Naty Suarez DO

## 2022-05-05 ENCOUNTER — PATIENT OUTREACH (OUTPATIENT)
Dept: CASE MANAGEMENT | Age: 77
End: 2022-05-05

## 2022-05-05 PROCEDURE — 1111F DSCHRG MED/CURRENT MED MERGE: CPT

## 2022-05-06 ENCOUNTER — OFFICE VISIT (OUTPATIENT)
Dept: INTERNAL MEDICINE CLINIC | Facility: CLINIC | Age: 77
End: 2022-05-06
Payer: MEDICARE

## 2022-05-06 VITALS
BODY MASS INDEX: 35.4 KG/M2 | DIASTOLIC BLOOD PRESSURE: 54 MMHG | WEIGHT: 239 LBS | HEART RATE: 91 BPM | SYSTOLIC BLOOD PRESSURE: 100 MMHG | OXYGEN SATURATION: 97 % | RESPIRATION RATE: 16 BRPM | HEIGHT: 69 IN | TEMPERATURE: 97 F

## 2022-05-06 DIAGNOSIS — Z79.4 TYPE 2 DIABETES MELLITUS WITH HYPERGLYCEMIA, WITH LONG-TERM CURRENT USE OF INSULIN (HCC): ICD-10-CM

## 2022-05-06 DIAGNOSIS — I25.5 ISCHEMIC CARDIOMYOPATHY: ICD-10-CM

## 2022-05-06 DIAGNOSIS — E11.65 TYPE 2 DIABETES MELLITUS WITH HYPERGLYCEMIA, WITH LONG-TERM CURRENT USE OF INSULIN (HCC): ICD-10-CM

## 2022-05-06 DIAGNOSIS — B34.8 RHINOVIRUS: ICD-10-CM

## 2022-05-06 DIAGNOSIS — I50.33 ACUTE ON CHRONIC DIASTOLIC HEART FAILURE (HCC): Primary | ICD-10-CM

## 2022-05-06 PROCEDURE — 1111F DSCHRG MED/CURRENT MED MERGE: CPT | Performed by: NURSE PRACTITIONER

## 2022-05-06 PROCEDURE — 99495 TRANSJ CARE MGMT MOD F2F 14D: CPT | Performed by: NURSE PRACTITIONER

## 2022-05-06 RX ORDER — THIAMINE MONONITRATE (VIT B1) 100 MG
100 TABLET ORAL DAILY
COMMUNITY

## 2022-05-15 PROBLEM — I50.33 ACUTE ON CHRONIC DIASTOLIC HEART FAILURE (HCC): Status: ACTIVE | Noted: 2022-05-15

## 2022-05-15 PROBLEM — B34.8 RHINOVIRUS: Status: ACTIVE | Noted: 2022-05-15

## 2022-05-26 ENCOUNTER — OFFICE VISIT (OUTPATIENT)
Dept: FAMILY MEDICINE CLINIC | Facility: CLINIC | Age: 77
End: 2022-05-26
Payer: MEDICARE

## 2022-05-26 VITALS
DIASTOLIC BLOOD PRESSURE: 60 MMHG | HEIGHT: 64.9 IN | WEIGHT: 242.63 LBS | SYSTOLIC BLOOD PRESSURE: 110 MMHG | TEMPERATURE: 98 F | BODY MASS INDEX: 40.43 KG/M2 | OXYGEN SATURATION: 98 % | HEART RATE: 88 BPM

## 2022-05-26 DIAGNOSIS — I50.22 CHRONIC SYSTOLIC CONGESTIVE HEART FAILURE (HCC): Primary | ICD-10-CM

## 2022-05-26 DIAGNOSIS — D64.9 ANEMIA, UNSPECIFIED TYPE: ICD-10-CM

## 2022-05-26 DIAGNOSIS — B34.8 RHINOVIRUS: ICD-10-CM

## 2022-05-26 PROCEDURE — 1111F DSCHRG MED/CURRENT MED MERGE: CPT | Performed by: FAMILY MEDICINE

## 2022-05-26 PROCEDURE — 99214 OFFICE O/P EST MOD 30 MIN: CPT | Performed by: FAMILY MEDICINE

## 2022-06-01 ENCOUNTER — PATIENT OUTREACH (OUTPATIENT)
Dept: CASE MANAGEMENT | Age: 77
End: 2022-06-01

## 2022-06-01 NOTE — PROGRESS NOTES
Called patient and left a message for patient to call back when they can. Reviewed patient chart.  Left contact my contact number [phone]        Time Spent This Encounter Total: 5  min medical record review  Monthly Minute Total including today: 5 minutes

## 2022-06-16 NOTE — TELEPHONE ENCOUNTER
Asking for referral name of ENT in [facility]. [FreeTextEntry1] : Ms. Reyes is a 89 y/o woman with h/o breast cancer, HTN, CHF, Pacemaker placement, Kidney stones here for evaluation of worsening HTN\par \par HTN: long standing however recently with worsening control\par No obvious triggering factors noted\par She had been getting ozone therapy but has not had it in the last 1 month\par Will check a secondary w/u\par Check renal duplex to r/o LINDSAY\par Maintain on enalapril\par To take amlodipine 5 mg daily rather than PRN\par Keep a BP log and email me with the results\par Low sodium diet\par \par \par Nephrolithiasis: Long standing history\par Non obstructive\par Check PTH\par \par Breast cancer: On treatment\par W/U as per oncology\par \par F/U in 2 months\par

## 2022-06-20 ENCOUNTER — NURSE ONLY (OUTPATIENT)
Dept: ENDOCRINOLOGY CLINIC | Facility: CLINIC | Age: 77
End: 2022-06-20
Payer: MEDICARE

## 2022-06-20 VITALS — BODY MASS INDEX: 41 KG/M2 | WEIGHT: 243 LBS

## 2022-06-20 DIAGNOSIS — Z79.4 TYPE 2 DIABETES MELLITUS WITH HYPERGLYCEMIA, WITH LONG-TERM CURRENT USE OF INSULIN (HCC): ICD-10-CM

## 2022-06-20 DIAGNOSIS — E11.65 TYPE 2 DIABETES MELLITUS WITH HYPERGLYCEMIA, WITH LONG-TERM CURRENT USE OF INSULIN (HCC): ICD-10-CM

## 2022-06-21 NOTE — PROGRESS NOTES
Ganesh Dwyer : 1945 was seen for Diabetic Education Follow up:    Date: 22 Referring Provider: Dr. Arellano Lipoma  Start time: 9am End time: 9:30am    Assessment:     Diagnosis: Controlled Type 2    Reason for visit: 3 month follow-up    Changes since last visit:  - Meals:eating less food per meal  - Medications:Has gradually decreased units of Toujeo to 24 units daily;continues Metformin 1000 mg twice daily  - Exercise:walking while at work   - Last A1C 6.5%    SMBG 22-22    14-day av mg/dL  Fastin-143 mg/dL      Education:     DIABETES REVIEW:  - Importance of improved BG control in preventing complications    HEALTHY EATING:  - effect of food on BG, timing of meal, use of snacks/fiber, barriers-reducing portion by half    MONITORING:  - Type of meter:Contour Next EZ  - Testing Schedule: fasting only    TAKING MEDICATION:  Oral Agents:   Metformin 1000 mg 1 tab twice daily    Injectables: Toujeo 24 units daily    PROBLEM SOLVING:  Review SMBG/Food log;observe patterns: difficult to see pattern when he only checks fasting     REDUCING RISKS:  - relationship between glucose control and risk for complications in eye, heart, kidneys,nerves,teeth,foot care, skin,  Foot Care Guidelines    Recommendations:      1. Follow recommended meal plan. 2. Test BG fasting and 2 hours post meals 2 times/day. 3. Bring glucose logs/meter to all provider visits for review. 4. Encouraged to continue current mediccations   5. Encouraged  to call diabetes center with any questions or concerns. 6. Follow-up in 2 wks to review BG readings    Patient verbalized understanding and has no further questions at this time. Mal Ozuna RN, CDE                      Date: 2022  Referring Provider                      Patient verbalized understanding and has no further questions at this time.     Mal Ozuna RN, CDE

## 2022-06-24 ENCOUNTER — TELEPHONE (OUTPATIENT)
Dept: FAMILY MEDICINE CLINIC | Facility: CLINIC | Age: 77
End: 2022-06-24

## 2022-06-24 DIAGNOSIS — E11.65 TYPE 2 DIABETES MELLITUS WITH HYPERGLYCEMIA, WITH LONG-TERM CURRENT USE OF INSULIN (HCC): ICD-10-CM

## 2022-06-24 DIAGNOSIS — Z79.4 TYPE 2 DIABETES MELLITUS WITH HYPERGLYCEMIA, WITH LONG-TERM CURRENT USE OF INSULIN (HCC): ICD-10-CM

## 2022-06-24 RX ORDER — INSULIN GLARGINE 300 U/ML
INJECTION, SOLUTION SUBCUTANEOUS
Qty: 2 EACH | Refills: 0 | Status: ON HOLD | COMMUNITY
Start: 2022-06-24

## 2022-06-24 NOTE — TELEPHONE ENCOUNTER
PT CALLED AND WOULD LIKE TO KNOW IF WE HAVE ANY SAMPLES OF TOUJEO. PT HAS GOTTEN SAMPLES IN THE PAST. PLEASE CALL AND ADV. PT GOING INTO MEETING SOON SO PLEASE LEAVE DETAILED MESSAGE.     Esperanza Vargas

## 2022-06-26 ENCOUNTER — APPOINTMENT (OUTPATIENT)
Dept: GENERAL RADIOLOGY | Facility: HOSPITAL | Age: 77
End: 2022-06-26
Attending: EMERGENCY MEDICINE
Payer: MEDICARE

## 2022-06-26 ENCOUNTER — HOSPITAL ENCOUNTER (INPATIENT)
Facility: HOSPITAL | Age: 77
LOS: 3 days | Discharge: HOME | End: 2022-06-30
Attending: EMERGENCY MEDICINE | Admitting: HOSPITALIST
Payer: MEDICARE

## 2022-06-26 ENCOUNTER — APPOINTMENT (OUTPATIENT)
Dept: CT IMAGING | Facility: HOSPITAL | Age: 77
End: 2022-06-26
Attending: EMERGENCY MEDICINE
Payer: MEDICARE

## 2022-06-26 DIAGNOSIS — R06.00 DYSPNEA ON EXERTION: Primary | ICD-10-CM

## 2022-06-26 PROBLEM — R06.09 DYSPNEA ON EXERTION: Status: ACTIVE | Noted: 2022-06-26

## 2022-06-26 LAB
ALBUMIN SERPL-MCNC: 3.8 G/DL (ref 3.4–5)
ALBUMIN/GLOB SERPL: 1 {RATIO} (ref 1–2)
ALP LIVER SERPL-CCNC: 55 U/L
ALT SERPL-CCNC: 14 U/L
ANION GAP SERPL CALC-SCNC: 5 MMOL/L (ref 0–18)
AST SERPL-CCNC: 17 U/L (ref 15–37)
BASOPHILS # BLD AUTO: 0.03 X10(3) UL (ref 0–0.2)
BASOPHILS NFR BLD AUTO: 0.3 %
BILIRUB SERPL-MCNC: 0.5 MG/DL (ref 0.1–2)
BUN BLD-MCNC: 23 MG/DL (ref 7–18)
CALCIUM BLD-MCNC: 9.1 MG/DL (ref 8.5–10.1)
CHLORIDE SERPL-SCNC: 108 MMOL/L (ref 98–112)
CO2 SERPL-SCNC: 26 MMOL/L (ref 21–32)
CREAT BLD-MCNC: 1.21 MG/DL
D DIMER PPP FEU-MCNC: 1.26 UG/ML FEU (ref ?–0.74)
EOSINOPHIL # BLD AUTO: 0.13 X10(3) UL (ref 0–0.7)
EOSINOPHIL NFR BLD AUTO: 1.4 %
ERYTHROCYTE [DISTWIDTH] IN BLOOD BY AUTOMATED COUNT: 17.1 %
GLOBULIN PLAS-MCNC: 3.7 G/DL (ref 2.8–4.4)
GLUCOSE BLD-MCNC: 120 MG/DL (ref 70–99)
HCT VFR BLD AUTO: 37.2 %
HGB BLD-MCNC: 11.5 G/DL
IMM GRANULOCYTES # BLD AUTO: 0.06 X10(3) UL (ref 0–1)
IMM GRANULOCYTES NFR BLD: 0.7 %
LYMPHOCYTES # BLD AUTO: 1.85 X10(3) UL (ref 1–4)
LYMPHOCYTES NFR BLD AUTO: 20.2 %
MCH RBC QN AUTO: 29.2 PG (ref 26–34)
MCHC RBC AUTO-ENTMCNC: 30.9 G/DL (ref 31–37)
MCV RBC AUTO: 94.4 FL
MONOCYTES # BLD AUTO: 1.74 X10(3) UL (ref 0.1–1)
MONOCYTES NFR BLD AUTO: 19 %
NEUTROPHILS # BLD AUTO: 5.34 X10 (3) UL (ref 1.5–7.7)
NEUTROPHILS # BLD AUTO: 5.34 X10(3) UL (ref 1.5–7.7)
NEUTROPHILS NFR BLD AUTO: 58.4 %
OSMOLALITY SERPL CALC.SUM OF ELEC: 293 MOSM/KG (ref 275–295)
PLATELET # BLD AUTO: 77 10(3)UL (ref 150–450)
POTASSIUM SERPL-SCNC: 4.3 MMOL/L (ref 3.5–5.1)
PROT SERPL-MCNC: 7.5 G/DL (ref 6.4–8.2)
RBC # BLD AUTO: 3.94 X10(6)UL
SARS-COV-2 RNA RESP QL NAA+PROBE: NOT DETECTED
SODIUM SERPL-SCNC: 139 MMOL/L (ref 136–145)
TROPONIN I HIGH SENSITIVITY: 64 NG/L
WBC # BLD AUTO: 9.2 X10(3) UL (ref 4–11)

## 2022-06-26 PROCEDURE — 71046 X-RAY EXAM CHEST 2 VIEWS: CPT | Performed by: EMERGENCY MEDICINE

## 2022-06-26 PROCEDURE — 71260 CT THORAX DX C+: CPT | Performed by: EMERGENCY MEDICINE

## 2022-06-27 LAB
ADENOVIRUS PCR:: NOT DETECTED
ATRIAL RATE: 76 BPM
B PARAPERT DNA SPEC QL NAA+PROBE: NOT DETECTED
B PERT DNA SPEC QL NAA+PROBE: NOT DETECTED
C PNEUM DNA SPEC QL NAA+PROBE: NOT DETECTED
CORONAVIRUS 229E PCR:: NOT DETECTED
CORONAVIRUS HKU1 PCR:: NOT DETECTED
CORONAVIRUS NL63 PCR:: NOT DETECTED
CORONAVIRUS OC43 PCR:: NOT DETECTED
FLUAV RNA SPEC QL NAA+PROBE: NOT DETECTED
FLUBV RNA SPEC QL NAA+PROBE: NOT DETECTED
GLUCOSE BLD-MCNC: 113 MG/DL (ref 70–99)
GLUCOSE BLD-MCNC: 147 MG/DL (ref 70–99)
GLUCOSE BLD-MCNC: 203 MG/DL (ref 70–99)
GLUCOSE BLD-MCNC: 97 MG/DL (ref 70–99)
METAPNEUMOVIRUS PCR:: NOT DETECTED
MYCOPLASMA PNEUMONIA PCR:: NOT DETECTED
P AXIS: 47 DEGREES
P-R INTERVAL: 218 MS
PARAINFLUENZA 1 PCR:: NOT DETECTED
PARAINFLUENZA 2 PCR:: NOT DETECTED
PARAINFLUENZA 3 PCR:: NOT DETECTED
PARAINFLUENZA 4 PCR:: NOT DETECTED
Q-T INTERVAL: 426 MS
QRS DURATION: 164 MS
QTC CALCULATION (BEZET): 479 MS
R AXIS: 11 DEGREES
RHINOVIRUS/ENTERO PCR:: NOT DETECTED
RSV RNA SPEC QL NAA+PROBE: NOT DETECTED
SARS-COV-2 RNA NPH QL NAA+NON-PROBE: NOT DETECTED
T AXIS: 82 DEGREES
VENTRICULAR RATE: 76 BPM

## 2022-06-27 PROCEDURE — 99223 1ST HOSP IP/OBS HIGH 75: CPT | Performed by: HOSPITALIST

## 2022-06-27 RX ORDER — IPRATROPIUM BROMIDE AND ALBUTEROL SULFATE 2.5; .5 MG/3ML; MG/3ML
3 SOLUTION RESPIRATORY (INHALATION)
Status: DISCONTINUED | OUTPATIENT
Start: 2022-06-27 | End: 2022-06-28

## 2022-06-27 RX ORDER — CLOPIDOGREL BISULFATE 75 MG/1
75 TABLET ORAL DAILY
Status: DISCONTINUED | OUTPATIENT
Start: 2022-06-27 | End: 2022-06-30

## 2022-06-27 RX ORDER — MELATONIN
100 DAILY
Status: DISCONTINUED | OUTPATIENT
Start: 2022-06-27 | End: 2022-06-30

## 2022-06-27 RX ORDER — ATORVASTATIN CALCIUM 10 MG/1
10 TABLET, FILM COATED ORAL NIGHTLY
Status: DISCONTINUED | OUTPATIENT
Start: 2022-06-27 | End: 2022-06-30

## 2022-06-27 RX ORDER — PREDNISONE 20 MG/1
40 TABLET ORAL
Status: DISCONTINUED | OUTPATIENT
Start: 2022-06-27 | End: 2022-06-30

## 2022-06-27 RX ORDER — ONDANSETRON 2 MG/ML
4 INJECTION INTRAMUSCULAR; INTRAVENOUS EVERY 6 HOURS PRN
Status: DISCONTINUED | OUTPATIENT
Start: 2022-06-27 | End: 2022-06-30

## 2022-06-27 RX ORDER — NICOTINE POLACRILEX 4 MG
30 LOZENGE BUCCAL
Status: DISCONTINUED | OUTPATIENT
Start: 2022-06-27 | End: 2022-06-30

## 2022-06-27 RX ORDER — FUROSEMIDE 10 MG/ML
20 INJECTION INTRAMUSCULAR; INTRAVENOUS ONCE
Status: COMPLETED | OUTPATIENT
Start: 2022-06-27 | End: 2022-06-27

## 2022-06-27 RX ORDER — ENOXAPARIN SODIUM 100 MG/ML
40 INJECTION SUBCUTANEOUS DAILY
Status: DISCONTINUED | OUTPATIENT
Start: 2022-06-27 | End: 2022-06-30

## 2022-06-27 RX ORDER — NICOTINE POLACRILEX 4 MG
15 LOZENGE BUCCAL
Status: DISCONTINUED | OUTPATIENT
Start: 2022-06-27 | End: 2022-06-30

## 2022-06-27 RX ORDER — ACETAMINOPHEN 500 MG
500 TABLET ORAL EVERY 4 HOURS PRN
Status: DISCONTINUED | OUTPATIENT
Start: 2022-06-27 | End: 2022-06-30

## 2022-06-27 RX ORDER — METOCLOPRAMIDE HYDROCHLORIDE 5 MG/ML
10 INJECTION INTRAMUSCULAR; INTRAVENOUS EVERY 8 HOURS PRN
Status: DISCONTINUED | OUTPATIENT
Start: 2022-06-27 | End: 2022-06-30

## 2022-06-27 RX ORDER — EZETIMIBE 10 MG/1
10 TABLET ORAL DAILY
Status: DISCONTINUED | OUTPATIENT
Start: 2022-06-27 | End: 2022-06-30

## 2022-06-27 RX ORDER — IPRATROPIUM BROMIDE AND ALBUTEROL SULFATE 2.5; .5 MG/3ML; MG/3ML
3 SOLUTION RESPIRATORY (INHALATION) EVERY 6 HOURS PRN
Status: DISCONTINUED | OUTPATIENT
Start: 2022-06-27 | End: 2022-06-27

## 2022-06-27 RX ORDER — DEXTROSE MONOHYDRATE 25 G/50ML
50 INJECTION, SOLUTION INTRAVENOUS
Status: DISCONTINUED | OUTPATIENT
Start: 2022-06-27 | End: 2022-06-30

## 2022-06-27 RX ORDER — ASPIRIN 81 MG/1
81 TABLET, CHEWABLE ORAL DAILY
Status: DISCONTINUED | OUTPATIENT
Start: 2022-06-27 | End: 2022-06-30

## 2022-06-27 RX ORDER — SPIRONOLACTONE 25 MG/1
25 TABLET ORAL
Status: DISCONTINUED | OUTPATIENT
Start: 2022-06-27 | End: 2022-06-30

## 2022-06-27 RX ORDER — METOPROLOL SUCCINATE 50 MG/1
50 TABLET, EXTENDED RELEASE ORAL
Status: DISCONTINUED | OUTPATIENT
Start: 2022-06-27 | End: 2022-06-30

## 2022-06-27 RX ORDER — MELATONIN
800 EVERY OTHER DAY
Status: DISCONTINUED | OUTPATIENT
Start: 2022-06-27 | End: 2022-06-30

## 2022-06-27 RX ORDER — FUROSEMIDE 40 MG/1
40 TABLET ORAL DAILY
Status: DISCONTINUED | OUTPATIENT
Start: 2022-06-27 | End: 2022-06-28

## 2022-06-27 NOTE — PLAN OF CARE
Assumed care @ 0730. SpO2 >94% on RA. Expiratory wheeze present. Pt reports productive cough with clear sputum.  Nebs scheduled and inhaler added per pulm

## 2022-06-27 NOTE — PLAN OF CARE
Patient A&O x4, no c/o pain, lungs diminished and course. Patient ambulates well to the bathroom and back independently. Was in the hospital in May for CHF issues. Patient feels may be same type issue, gets SOB easily but O2 sats have remained steady 90s.

## 2022-06-27 NOTE — ED QUICK NOTES
Orders for admission, patient is aware of plan and ready to go upstairs. Any questions, please call ED RN adair at extension [phone]. Patient Covid vaccination status: Unvaccinated     COVID Test Ordered in ED: Rapid SARS-CoV-2 by PCR    COVID Suspicion at Admission: N/A    Running Infusions:  None    Mental Status/LOC at time of transport: AxO x4    Other pertinent information:   Up x1 supervision. On room air.    CIWA score: N/A   NIH score:  N/A

## 2022-06-28 LAB
ANION GAP SERPL CALC-SCNC: 6 MMOL/L (ref 0–18)
ARTERIAL PATENCY WRIST A: POSITIVE
BASE EXCESS BLDA CALC-SCNC: 0.6 MMOL/L (ref ?–2)
BODY TEMPERATURE: 98.6 F
BUN BLD-MCNC: 26 MG/DL (ref 7–18)
CA-I BLD-SCNC: 1.19 MMOL/L (ref 0.95–1.32)
CALCIUM BLD-MCNC: 9.3 MG/DL (ref 8.5–10.1)
CHLORIDE SERPL-SCNC: 105 MMOL/L (ref 98–112)
CO2 SERPL-SCNC: 28 MMOL/L (ref 21–32)
COHGB MFR BLD: 2 % SAT (ref 0–3)
CREAT BLD-MCNC: 1.12 MG/DL
FIO2: 21 %
GLUCOSE BLD-MCNC: 114 MG/DL (ref 70–99)
GLUCOSE BLD-MCNC: 127 MG/DL (ref 70–99)
GLUCOSE BLD-MCNC: 128 MG/DL (ref 70–99)
GLUCOSE BLD-MCNC: 178 MG/DL (ref 70–99)
GLUCOSE BLD-MCNC: 196 MG/DL (ref 70–99)
HCO3 BLDA-SCNC: 25.4 MEQ/L (ref 21–27)
HGB BLD-MCNC: 11.3 G/DL
LACTATE BLD-SCNC: 2.5 MMOL/L (ref 0.5–2)
METHGB MFR BLD: 0.7 % SAT (ref 0.4–1.5)
NT-PROBNP SERPL-MCNC: ABNORMAL PG/ML (ref ?–125)
OSMOLALITY SERPL CALC.SUM OF ELEC: 294 MOSM/KG (ref 275–295)
OXYHGB MFR BLDA: 95.9 % (ref 92–100)
PCO2 BLDA: 35 MM HG (ref 35–45)
PH BLDA: 7.45 [PH] (ref 7.35–7.45)
PO2 BLDA: 93 MM HG (ref 80–100)
POTASSIUM BLD-SCNC: 4.5 MMOL/L (ref 3.6–5.1)
POTASSIUM SERPL-SCNC: 4.4 MMOL/L (ref 3.5–5.1)
SODIUM BLD-SCNC: 133 MMOL/L (ref 135–145)
SODIUM SERPL-SCNC: 139 MMOL/L (ref 136–145)

## 2022-06-28 PROCEDURE — 99233 SBSQ HOSP IP/OBS HIGH 50: CPT | Performed by: HOSPITALIST

## 2022-06-28 RX ORDER — FUROSEMIDE 10 MG/ML
40 INJECTION INTRAMUSCULAR; INTRAVENOUS ONCE
Status: COMPLETED | OUTPATIENT
Start: 2022-06-28 | End: 2022-06-28

## 2022-06-28 RX ORDER — IPRATROPIUM BROMIDE AND ALBUTEROL SULFATE 2.5; .5 MG/3ML; MG/3ML
3 SOLUTION RESPIRATORY (INHALATION)
Status: DISCONTINUED | OUTPATIENT
Start: 2022-06-28 | End: 2022-06-29

## 2022-06-28 RX ORDER — FUROSEMIDE 40 MG/1
40 TABLET ORAL DAILY
Status: DISCONTINUED | OUTPATIENT
Start: 2022-06-29 | End: 2022-06-30

## 2022-06-28 NOTE — PLAN OF CARE
Assumed care at [address]. Pt a/ox4. VSS. NSR per tele. Room air. Denies pain. Voiding per urinal. Pt updated with POC. Call light in reach. Needs attended to.

## 2022-06-28 NOTE — PLAN OF CARE
No acute events during shift, all VSS. Cardiology consulted. One time dose of IV lasix x1. Plan for avaps tonight per pulm.     Problem: RESPIRATORY - ADULT  Goal: Achieves optimal ventilation and oxygenation  Description: INTERVENTIONS:  - Assess for changes in respiratory status  - Assess for changes in mentation and behavior  - Position to facilitate oxygenation and minimize respiratory effort  - Oxygen supplementation based on oxygen saturation or ABGs  - Provide Smoking Cessation handout, if applicable  - Encourage broncho-pulmonary hygiene including cough, deep breathe, Incentive Spirometry  - Assess the need for suctioning and perform as needed  - Assess and instruct to report SOB or any respiratory difficulty  - Respiratory Therapy support as indicated  - Manage/alleviate anxiety  - Monitor for signs/symptoms of CO2 retention  Outcome: Progressing

## 2022-06-29 LAB
GLUCOSE BLD-MCNC: 109 MG/DL (ref 70–99)
GLUCOSE BLD-MCNC: 124 MG/DL (ref 70–99)
GLUCOSE BLD-MCNC: 152 MG/DL (ref 70–99)
GLUCOSE BLD-MCNC: 194 MG/DL (ref 70–99)

## 2022-06-29 PROCEDURE — 99232 SBSQ HOSP IP/OBS MODERATE 35: CPT | Performed by: HOSPITALIST

## 2022-06-29 RX ORDER — IPRATROPIUM BROMIDE AND ALBUTEROL SULFATE 2.5; .5 MG/3ML; MG/3ML
3 SOLUTION RESPIRATORY (INHALATION) 3 TIMES DAILY
Status: DISCONTINUED | OUTPATIENT
Start: 2022-06-29 | End: 2022-06-30

## 2022-06-29 RX ORDER — CODEINE PHOSPHATE AND GUAIFENESIN 10; 100 MG/5ML; MG/5ML
5 SOLUTION ORAL EVERY 4 HOURS PRN
Status: DISCONTINUED | OUTPATIENT
Start: 2022-06-29 | End: 2022-06-29

## 2022-06-29 RX ORDER — DOCUSATE SODIUM 100 MG/1
100 CAPSULE, LIQUID FILLED ORAL 2 TIMES DAILY
Status: DISCONTINUED | OUTPATIENT
Start: 2022-06-29 | End: 2022-06-30

## 2022-06-29 RX ORDER — LOSARTAN POTASSIUM 25 MG/1
25 TABLET ORAL DAILY
Status: DISCONTINUED | OUTPATIENT
Start: 2022-06-29 | End: 2022-06-30

## 2022-06-29 RX ORDER — CODEINE PHOSPHATE AND GUAIFENESIN 10; 100 MG/5ML; MG/5ML
5 SOLUTION ORAL NIGHTLY PRN
Status: DISCONTINUED | OUTPATIENT
Start: 2022-06-29 | End: 2022-06-30

## 2022-06-29 RX ORDER — PANTOPRAZOLE SODIUM 40 MG/1
40 TABLET, DELAYED RELEASE ORAL
Status: DISCONTINUED | OUTPATIENT
Start: 2022-06-29 | End: 2022-06-30

## 2022-06-29 RX ORDER — IPRATROPIUM BROMIDE AND ALBUTEROL SULFATE 2.5; .5 MG/3ML; MG/3ML
3 SOLUTION RESPIRATORY (INHALATION) EVERY 6 HOURS PRN
Status: DISCONTINUED | OUTPATIENT
Start: 2022-06-29 | End: 2022-06-29

## 2022-06-29 RX ORDER — BENZONATATE 100 MG/1
100 CAPSULE ORAL 3 TIMES DAILY
Status: DISCONTINUED | OUTPATIENT
Start: 2022-06-29 | End: 2022-06-30

## 2022-06-29 RX ORDER — POLYETHYLENE GLYCOL 3350 17 G/17G
17 POWDER, FOR SOLUTION ORAL DAILY
Status: DISCONTINUED | OUTPATIENT
Start: 2022-06-29 | End: 2022-06-30

## 2022-06-29 NOTE — PLAN OF CARE
Pt A&Ox4, VSS, afebrile, breathing is slightly better today. Pt up ad brett in room with steady gait. Instructed on use of IS, gets up to 1500. Pt tolerating diet, voiding clear, yellow urine via urinal. Meds for cough ordered by Dr. Landon Robison. Call light in reach, bed low & locked, sr up x 2.

## 2022-06-29 NOTE — PLAN OF CARE
Assumed care at 1930  A&O x4, RA wheezing at times, SR on tele  Denies pain  Will continue to monitor

## 2022-06-30 VITALS
WEIGHT: 235.88 LBS | HEIGHT: 69 IN | BODY MASS INDEX: 34.94 KG/M2 | HEART RATE: 79 BPM | TEMPERATURE: 98 F | RESPIRATION RATE: 16 BRPM | SYSTOLIC BLOOD PRESSURE: 99 MMHG | OXYGEN SATURATION: 96 % | DIASTOLIC BLOOD PRESSURE: 60 MMHG

## 2022-06-30 LAB
ANION GAP SERPL CALC-SCNC: 4 MMOL/L (ref 0–18)
BUN BLD-MCNC: 30 MG/DL (ref 7–18)
CALCIUM BLD-MCNC: 8.9 MG/DL (ref 8.5–10.1)
CHLORIDE SERPL-SCNC: 108 MMOL/L (ref 98–112)
CO2 SERPL-SCNC: 26 MMOL/L (ref 21–32)
CREAT BLD-MCNC: 1.02 MG/DL
GLUCOSE BLD-MCNC: 105 MG/DL (ref 70–99)
GLUCOSE BLD-MCNC: 124 MG/DL (ref 70–99)
GLUCOSE BLD-MCNC: 128 MG/DL (ref 70–99)
MAGNESIUM SERPL-MCNC: 2.5 MG/DL (ref 1.6–2.6)
OSMOLALITY SERPL CALC.SUM OF ELEC: 294 MOSM/KG (ref 275–295)
POTASSIUM SERPL-SCNC: 3.8 MMOL/L (ref 3.5–5.1)
SODIUM SERPL-SCNC: 138 MMOL/L (ref 136–145)

## 2022-06-30 PROCEDURE — 99239 HOSP IP/OBS DSCHRG MGMT >30: CPT | Performed by: HOSPITALIST

## 2022-06-30 RX ORDER — BENZONATATE 100 MG/1
100 CAPSULE ORAL 3 TIMES DAILY
Qty: 30 CAPSULE | Refills: 0 | Status: SHIPPED | OUTPATIENT
Start: 2022-06-30

## 2022-06-30 RX ORDER — PREDNISONE 10 MG/1
TABLET ORAL
Qty: 35 TABLET | Refills: 0 | Status: SHIPPED | OUTPATIENT
Start: 2022-06-30

## 2022-06-30 RX ORDER — POTASSIUM CHLORIDE 20 MEQ/1
40 TABLET, EXTENDED RELEASE ORAL ONCE
Status: COMPLETED | OUTPATIENT
Start: 2022-06-30 | End: 2022-06-30

## 2022-06-30 RX ORDER — ALBUTEROL SULFATE 90 UG/1
2 AEROSOL, METERED RESPIRATORY (INHALATION) EVERY 4 HOURS PRN
Qty: 1 EACH | Refills: 0 | Status: SHIPPED | OUTPATIENT
Start: 2022-06-30

## 2022-06-30 RX ORDER — IPRATROPIUM BROMIDE AND ALBUTEROL SULFATE 2.5; .5 MG/3ML; MG/3ML
3 SOLUTION RESPIRATORY (INHALATION)
Status: DISCONTINUED | OUTPATIENT
Start: 2022-06-30 | End: 2022-06-30

## 2022-06-30 RX ORDER — LOSARTAN POTASSIUM 25 MG/1
25 TABLET ORAL DAILY
Qty: 30 TABLET | Refills: 3 | Status: SHIPPED | OUTPATIENT
Start: 2022-07-01

## 2022-06-30 NOTE — PLAN OF CARE
NURSING DISCHARGE NOTE    Discharged Home via Wheelchair. Accompanied by Spouse and Support staff  Belongings Taken by patient/family. Discharge AVS complete and reviewed with patient and spouse. Patient medically cleared to discharge per all consults. New medications, side effects, and follow up appointments discussed. Patient verbalized understanding. All questions answered to patient's satisfaction. Patient discharged home with spouse.

## 2022-06-30 NOTE — PROGRESS NOTES
Assumed care at [address]  Patient oriented x 4   Denies pain or sob   Breathing easy and unlabored   Up ad brett w/steady gait   Vitals stable at room air   Not in any form of distress   Will continue to monitor pt

## 2022-07-01 ENCOUNTER — PATIENT OUTREACH (OUTPATIENT)
Dept: CASE MANAGEMENT | Age: 77
End: 2022-07-01

## 2022-07-01 DIAGNOSIS — Z02.9 ENCOUNTERS FOR ADMINISTRATIVE PURPOSE: ICD-10-CM

## 2022-07-01 PROCEDURE — 1111F DSCHRG MED/CURRENT MED MERGE: CPT

## 2022-07-01 NOTE — PROGRESS NOTES
MARGARITAESTEBAN for post hospital follow up. [facility] contact information provided as well as [facility] office number, [phone].

## 2022-07-05 ENCOUNTER — OFFICE VISIT (OUTPATIENT)
Dept: INTERNAL MEDICINE CLINIC | Facility: CLINIC | Age: 77
End: 2022-07-05
Payer: MEDICARE

## 2022-07-05 VITALS
BODY MASS INDEX: 35.1 KG/M2 | HEART RATE: 75 BPM | TEMPERATURE: 96 F | HEIGHT: 69 IN | WEIGHT: 237 LBS | DIASTOLIC BLOOD PRESSURE: 72 MMHG | RESPIRATION RATE: 24 BRPM | SYSTOLIC BLOOD PRESSURE: 110 MMHG | OXYGEN SATURATION: 98 %

## 2022-07-05 DIAGNOSIS — R68.2 DRY MOUTH: ICD-10-CM

## 2022-07-05 DIAGNOSIS — E11.65 TYPE 2 DIABETES MELLITUS WITH HYPERGLYCEMIA, WITH LONG-TERM CURRENT USE OF INSULIN (HCC): ICD-10-CM

## 2022-07-05 DIAGNOSIS — J98.01 BRONCHOSPASM: Primary | ICD-10-CM

## 2022-07-05 DIAGNOSIS — Z79.4 TYPE 2 DIABETES MELLITUS WITH HYPERGLYCEMIA, WITH LONG-TERM CURRENT USE OF INSULIN (HCC): ICD-10-CM

## 2022-07-05 DIAGNOSIS — I50.9 CONGESTIVE HEART FAILURE, UNSPECIFIED HF CHRONICITY, UNSPECIFIED HEART FAILURE TYPE (HCC): ICD-10-CM

## 2022-07-05 DIAGNOSIS — I25.5 ISCHEMIC CARDIOMYOPATHY: ICD-10-CM

## 2022-07-05 DIAGNOSIS — G47.33 OSA (OBSTRUCTIVE SLEEP APNEA): ICD-10-CM

## 2022-07-05 RX ORDER — INSULIN GLARGINE 300 U/ML
24 INJECTION, SOLUTION SUBCUTANEOUS NIGHTLY
COMMUNITY

## 2022-07-05 NOTE — PATIENT INSTRUCTIONS
PATIENT INSTRUCTIONS:    1.  Start Losartan 25mg tablets in the morning and take Metoprolol Succinate 50mg every evening. 2.  Ventolin inhaler will be ready at the pharmacy around 12pm.  Cost will be $40.00.    3. Our office left a message with Dr. Irean Kelly office. You should receive a call from them in 1-2 days. If you do not, please contact our office for assistance.   Dr. Chepe Mcclure pulmonology   [phone]

## 2022-07-08 ENCOUNTER — TELEPHONE (OUTPATIENT)
Dept: INTERNAL MEDICINE CLINIC | Facility: CLINIC | Age: 77
End: 2022-07-08

## 2022-07-08 NOTE — TELEPHONE ENCOUNTER
Pt states he was told at [facility] appt on 7/5 that pulmonology would be contacting him to make appt with Dr Yolie Polk. Also - some of the prescriptions that are ready at [facility] are too expensive. Called pulm and scheduled pt for appt with Dr Graham Adam 7/19 at 10:00. Called pt and advised him of this appt. Also notified pt that if he transfers albuterol inhaler and ezetimibe to [facility] and gets coupon from [facility], he can get meds for $22.28 and $5.94 respectively. Patient verbalized understanding and intent to comply.

## 2022-07-11 PROBLEM — R68.2 DRY MOUTH: Status: ACTIVE | Noted: 2022-07-11

## 2022-07-21 RX ORDER — FUROSEMIDE 20 MG/1
TABLET ORAL
Qty: 90 TABLET | Refills: 1 | Status: SHIPPED | OUTPATIENT
Start: 2022-07-21

## 2022-07-21 RX ORDER — METFORMIN HYDROCHLORIDE 500 MG/1
TABLET, EXTENDED RELEASE ORAL
Qty: 360 TABLET | Refills: 0 | Status: SHIPPED | OUTPATIENT
Start: 2022-07-21

## 2022-08-04 ENCOUNTER — PATIENT OUTREACH (OUTPATIENT)
Dept: CASE MANAGEMENT | Age: 77
End: 2022-08-04

## 2022-08-04 NOTE — PROGRESS NOTES
Called patient and left a message for patient to call back when they can. Reviewed patient chart.  Left contact my contact number [phone]        Time Spent This Encounter Total: 5  min medical record review  Monthly Minute Total including today: 5 minutes

## 2022-08-11 ENCOUNTER — TELEPHONE (OUTPATIENT)
Dept: FAMILY MEDICINE CLINIC | Facility: CLINIC | Age: 77
End: 2022-08-11

## 2022-08-11 ENCOUNTER — OFFICE VISIT (OUTPATIENT)
Dept: FAMILY MEDICINE CLINIC | Facility: CLINIC | Age: 77
End: 2022-08-11
Payer: MEDICARE

## 2022-08-11 VITALS
RESPIRATION RATE: 16 BRPM | HEART RATE: 84 BPM | TEMPERATURE: 96 F | OXYGEN SATURATION: 99 % | HEIGHT: 69 IN | DIASTOLIC BLOOD PRESSURE: 60 MMHG | BODY MASS INDEX: 35.84 KG/M2 | SYSTOLIC BLOOD PRESSURE: 110 MMHG | WEIGHT: 242 LBS

## 2022-08-11 DIAGNOSIS — R06.02 SOB (SHORTNESS OF BREATH): ICD-10-CM

## 2022-08-11 DIAGNOSIS — Z79.4 TYPE 2 DIABETES MELLITUS WITH HYPERGLYCEMIA, WITH LONG-TERM CURRENT USE OF INSULIN (HCC): Primary | ICD-10-CM

## 2022-08-11 DIAGNOSIS — R06.01 ORTHOPNEA: ICD-10-CM

## 2022-08-11 DIAGNOSIS — E11.65 TYPE 2 DIABETES MELLITUS WITH HYPERGLYCEMIA, WITH LONG-TERM CURRENT USE OF INSULIN (HCC): Primary | ICD-10-CM

## 2022-08-11 DIAGNOSIS — I50.22 CHRONIC SYSTOLIC CONGESTIVE HEART FAILURE (HCC): ICD-10-CM

## 2022-08-11 PROBLEM — E55.9 VITAMIN D DEFICIENCY: Status: ACTIVE | Noted: 2019-08-20

## 2022-08-11 PROBLEM — I27.21 PULMONARY HYPERTENSION SECONDARY TO INCREASED PVR (HCC): Status: ACTIVE | Noted: 2019-04-02

## 2022-08-11 PROBLEM — E78.00 PURE HYPERCHOLESTEROLEMIA: Status: ACTIVE | Noted: 2019-04-02

## 2022-08-11 PROBLEM — I65.29 CAROTID ARTERY STENOSIS: Status: ACTIVE | Noted: 2019-04-02

## 2022-08-11 LAB
ANION GAP SERPL CALC-SCNC: 6 MMOL/L (ref 0–18)
BUN BLD-MCNC: 26 MG/DL (ref 7–18)
CALCIUM BLD-MCNC: 9 MG/DL (ref 8.5–10.1)
CHLORIDE SERPL-SCNC: 106 MMOL/L (ref 98–112)
CO2 SERPL-SCNC: 25 MMOL/L (ref 21–32)
CREAT BLD-MCNC: 1.25 MG/DL
EST. AVERAGE GLUCOSE BLD GHB EST-MCNC: 131 MG/DL (ref 68–126)
FASTING STATUS PATIENT QL REPORTED: NO
GFR SERPLBLD BASED ON 1.73 SQ M-ARVRAT: 60 ML/MIN/1.73M2 (ref 60–?)
GLUCOSE BLD-MCNC: 180 MG/DL (ref 70–99)
HBA1C MFR BLD: 6.2 % (ref ?–5.7)
OSMOLALITY SERPL CALC.SUM OF ELEC: 293 MOSM/KG (ref 275–295)
POTASSIUM SERPL-SCNC: 5.2 MMOL/L (ref 3.5–5.1)
SODIUM SERPL-SCNC: 137 MMOL/L (ref 136–145)
TSI SER-ACNC: 2.62 MIU/ML (ref 0.36–3.74)

## 2022-08-11 PROCEDURE — 1111F DSCHRG MED/CURRENT MED MERGE: CPT | Performed by: FAMILY MEDICINE

## 2022-08-11 PROCEDURE — 83036 HEMOGLOBIN GLYCOSYLATED A1C: CPT | Performed by: FAMILY MEDICINE

## 2022-08-11 PROCEDURE — 84443 ASSAY THYROID STIM HORMONE: CPT | Performed by: FAMILY MEDICINE

## 2022-08-11 PROCEDURE — 99214 OFFICE O/P EST MOD 30 MIN: CPT | Performed by: FAMILY MEDICINE

## 2022-08-11 PROCEDURE — 80048 BASIC METABOLIC PNL TOTAL CA: CPT | Performed by: FAMILY MEDICINE

## 2022-08-11 RX ORDER — INSULIN GLARGINE 300 U/ML
INJECTION, SOLUTION SUBCUTANEOUS
Qty: 2 EACH | Refills: 0 | COMMUNITY
Start: 2022-08-11

## 2022-08-11 NOTE — TELEPHONE ENCOUNTER
Exam note received from [facility]  Exam date 5/21/22  no retinopathy noted  flowsheet updated  Report to scanning

## 2022-08-12 DIAGNOSIS — E87.5 HYPERKALEMIA: Primary | ICD-10-CM

## 2022-08-18 ENCOUNTER — LAB ENCOUNTER (OUTPATIENT)
Dept: LAB | Age: 77
End: 2022-08-18
Attending: INTERNAL MEDICINE
Payer: MEDICARE

## 2022-08-18 ENCOUNTER — HOSPITAL ENCOUNTER (OUTPATIENT)
Dept: GENERAL RADIOLOGY | Age: 77
Discharge: HOME | End: 2022-08-18
Attending: INTERNAL MEDICINE
Payer: MEDICARE

## 2022-08-18 DIAGNOSIS — R06.00 DYSPNEA, PAROXYSMAL NOCTURNAL: Primary | ICD-10-CM

## 2022-08-18 DIAGNOSIS — E87.5 HYPERKALEMIA: ICD-10-CM

## 2022-08-18 DIAGNOSIS — R06.00 DYSPNEA: ICD-10-CM

## 2022-08-18 LAB
ANION GAP SERPL CALC-SCNC: 5 MMOL/L (ref 0–18)
BASOPHILS # BLD AUTO: 0.02 X10(3) UL (ref 0–0.2)
BASOPHILS NFR BLD AUTO: 0.2 %
BUN BLD-MCNC: 24 MG/DL (ref 7–18)
CALCIUM BLD-MCNC: 9.1 MG/DL (ref 8.5–10.1)
CHLORIDE SERPL-SCNC: 107 MMOL/L (ref 98–112)
CO2 SERPL-SCNC: 25 MMOL/L (ref 21–32)
CREAT BLD-MCNC: 1.19 MG/DL
CRP SERPL-MCNC: 0.83 MG/DL (ref ?–0.3)
EOSINOPHIL # BLD AUTO: 0.04 X10(3) UL (ref 0–0.7)
EOSINOPHIL NFR BLD AUTO: 0.5 %
ERYTHROCYTE [DISTWIDTH] IN BLOOD BY AUTOMATED COUNT: 16.7 %
ERYTHROCYTE [SEDIMENTATION RATE] IN BLOOD: 34 MM/HR
GFR SERPLBLD BASED ON 1.73 SQ M-ARVRAT: 64 ML/MIN/1.73M2 (ref 60–?)
GLUCOSE BLD-MCNC: 209 MG/DL (ref 70–99)
HCT VFR BLD AUTO: 36.6 %
HGB BLD-MCNC: 11.5 G/DL
IGE SERPL-ACNC: 4.5 IU/ML (ref 3.6–114)
IMM GRANULOCYTES # BLD AUTO: 0.03 X10(3) UL (ref 0–1)
IMM GRANULOCYTES NFR BLD: 0.3 %
LYMPHOCYTES # BLD AUTO: 1.26 X10(3) UL (ref 1–4)
LYMPHOCYTES NFR BLD AUTO: 14.7 %
MCH RBC QN AUTO: 29.3 PG (ref 26–34)
MCHC RBC AUTO-ENTMCNC: 31.4 G/DL (ref 31–37)
MCV RBC AUTO: 93.4 FL
MONOCYTES # BLD AUTO: 1.2 X10(3) UL (ref 0.1–1)
MONOCYTES NFR BLD AUTO: 14 %
NEUTROPHILS # BLD AUTO: 6.03 X10 (3) UL (ref 1.5–7.7)
NEUTROPHILS # BLD AUTO: 6.03 X10(3) UL (ref 1.5–7.7)
NEUTROPHILS NFR BLD AUTO: 70.3 %
NT-PROBNP SERPL-MCNC: 9559 PG/ML (ref ?–125)
OSMOLALITY SERPL CALC.SUM OF ELEC: 294 MOSM/KG (ref 275–295)
PLATELET # BLD AUTO: 70 10(3)UL (ref 150–450)
POTASSIUM SERPL-SCNC: 4.3 MMOL/L (ref 3.5–5.1)
RBC # BLD AUTO: 3.92 X10(6)UL
SODIUM SERPL-SCNC: 137 MMOL/L (ref 136–145)
WBC # BLD AUTO: 8.6 X10(3) UL (ref 4–11)

## 2022-08-18 PROCEDURE — 80048 BASIC METABOLIC PNL TOTAL CA: CPT

## 2022-08-18 PROCEDURE — 85025 COMPLETE CBC W/AUTO DIFF WBC: CPT

## 2022-08-18 PROCEDURE — 86140 C-REACTIVE PROTEIN: CPT

## 2022-08-18 PROCEDURE — 71046 X-RAY EXAM CHEST 2 VIEWS: CPT | Performed by: INTERNAL MEDICINE

## 2022-08-18 PROCEDURE — 36415 COLL VENOUS BLD VENIPUNCTURE: CPT

## 2022-08-18 PROCEDURE — 82785 ASSAY OF IGE: CPT

## 2022-08-18 PROCEDURE — 85652 RBC SED RATE AUTOMATED: CPT

## 2022-08-18 PROCEDURE — 83880 ASSAY OF NATRIURETIC PEPTIDE: CPT

## 2022-09-06 ENCOUNTER — APPOINTMENT (OUTPATIENT)
Dept: GENERAL RADIOLOGY | Facility: HOSPITAL | Age: 77
DRG: 291 | End: 2022-09-06
Attending: INTERNAL MEDICINE

## 2022-09-06 ENCOUNTER — APPOINTMENT (OUTPATIENT)
Dept: GENERAL RADIOLOGY | Facility: HOSPITAL | Age: 77
End: 2022-09-06
Attending: INTERNAL MEDICINE

## 2022-09-06 ENCOUNTER — HOSPITAL ENCOUNTER (INPATIENT)
Facility: HOSPITAL | Age: 77
LOS: 3 days | Discharge: HOME | DRG: 291 | End: 2022-09-09
Attending: INTERNAL MEDICINE | Admitting: INTERNAL MEDICINE

## 2022-09-06 ENCOUNTER — HOSPITAL ENCOUNTER (INPATIENT)
Facility: HOSPITAL | Age: 77
LOS: 3 days | Discharge: HOME | End: 2022-09-09
Attending: INTERNAL MEDICINE | Admitting: INTERNAL MEDICINE

## 2022-09-06 DIAGNOSIS — I50.23 ACUTE ON CHRONIC SYSTOLIC CONGESTIVE HEART FAILURE (HCC): Primary | ICD-10-CM

## 2022-09-06 LAB
ADENOVIRUS PCR:: NOT DETECTED
ALBUMIN SERPL-MCNC: 3.4 G/DL (ref 3.4–5)
ALBUMIN/GLOB SERPL: 0.9 {RATIO} (ref 1–2)
ALP LIVER SERPL-CCNC: 58 U/L
ALT SERPL-CCNC: 14 U/L
ANION GAP SERPL CALC-SCNC: 7 MMOL/L (ref 0–18)
AST SERPL-CCNC: 16 U/L (ref 15–37)
B PARAPERT DNA SPEC QL NAA+PROBE: NOT DETECTED
B PERT DNA SPEC QL NAA+PROBE: NOT DETECTED
BASOPHILS # BLD AUTO: 0.02 X10(3) UL (ref 0–0.2)
BASOPHILS NFR BLD AUTO: 0.3 %
BILIRUB SERPL-MCNC: 0.8 MG/DL (ref 0.1–2)
BUN BLD-MCNC: 22 MG/DL (ref 7–18)
C PNEUM DNA SPEC QL NAA+PROBE: NOT DETECTED
CALCIUM BLD-MCNC: 9 MG/DL (ref 8.5–10.1)
CHLORIDE SERPL-SCNC: 106 MMOL/L (ref 98–112)
CO2 SERPL-SCNC: 25 MMOL/L (ref 21–32)
CORONAVIRUS 229E PCR:: NOT DETECTED
CORONAVIRUS HKU1 PCR:: NOT DETECTED
CORONAVIRUS NL63 PCR:: NOT DETECTED
CORONAVIRUS OC43 PCR:: NOT DETECTED
CREAT BLD-MCNC: 1.08 MG/DL
EOSINOPHIL # BLD AUTO: 0.06 X10(3) UL (ref 0–0.7)
EOSINOPHIL NFR BLD AUTO: 0.8 %
ERYTHROCYTE [DISTWIDTH] IN BLOOD BY AUTOMATED COUNT: 16.5 %
FLUAV RNA SPEC QL NAA+PROBE: NOT DETECTED
FLUBV RNA SPEC QL NAA+PROBE: NOT DETECTED
GFR SERPLBLD BASED ON 1.73 SQ M-ARVRAT: 72 ML/MIN/1.73M2 (ref 60–?)
GLOBULIN PLAS-MCNC: 3.6 G/DL (ref 2.8–4.4)
GLUCOSE BLD-MCNC: 111 MG/DL (ref 70–99)
GLUCOSE BLD-MCNC: 115 MG/DL (ref 70–99)
GLUCOSE BLD-MCNC: 98 MG/DL (ref 70–99)
HCT VFR BLD AUTO: 35.7 %
HGB BLD-MCNC: 11.2 G/DL
IMM GRANULOCYTES # BLD AUTO: 0.04 X10(3) UL (ref 0–1)
IMM GRANULOCYTES NFR BLD: 0.5 %
LYMPHOCYTES # BLD AUTO: 1.16 X10(3) UL (ref 1–4)
LYMPHOCYTES NFR BLD AUTO: 15.9 %
MCH RBC QN AUTO: 29 PG (ref 26–34)
MCHC RBC AUTO-ENTMCNC: 31.4 G/DL (ref 31–37)
MCV RBC AUTO: 92.5 FL
METAPNEUMOVIRUS PCR:: NOT DETECTED
MONOCYTES # BLD AUTO: 1.6 X10(3) UL (ref 0.1–1)
MONOCYTES NFR BLD AUTO: 21.9 %
MYCOPLASMA PNEUMONIA PCR:: NOT DETECTED
NEUTROPHILS # BLD AUTO: 4.42 X10 (3) UL (ref 1.5–7.7)
NEUTROPHILS # BLD AUTO: 4.42 X10(3) UL (ref 1.5–7.7)
NEUTROPHILS NFR BLD AUTO: 60.6 %
NT-PROBNP SERPL-MCNC: 8581 PG/ML (ref ?–450)
OSMOLALITY SERPL CALC.SUM OF ELEC: 289 MOSM/KG (ref 275–295)
PARAINFLUENZA 1 PCR:: NOT DETECTED
PARAINFLUENZA 2 PCR:: NOT DETECTED
PARAINFLUENZA 3 PCR:: NOT DETECTED
PARAINFLUENZA 4 PCR:: NOT DETECTED
PLATELET # BLD AUTO: 75 10(3)UL (ref 150–450)
POTASSIUM SERPL-SCNC: 4.2 MMOL/L (ref 3.5–5.1)
PROT SERPL-MCNC: 7 G/DL (ref 6.4–8.2)
RBC # BLD AUTO: 3.86 X10(6)UL
RHINOVIRUS/ENTERO PCR:: NOT DETECTED
RSV RNA SPEC QL NAA+PROBE: NOT DETECTED
SARS-COV-2 RNA NPH QL NAA+NON-PROBE: NOT DETECTED
SARS-COV-2 RNA RESP QL NAA+PROBE: NOT DETECTED
SODIUM SERPL-SCNC: 138 MMOL/L (ref 136–145)
WBC # BLD AUTO: 7.3 X10(3) UL (ref 4–11)

## 2022-09-06 PROCEDURE — 71045 X-RAY EXAM CHEST 1 VIEW: CPT | Performed by: INTERNAL MEDICINE

## 2022-09-06 PROCEDURE — 99223 1ST HOSP IP/OBS HIGH 75: CPT | Performed by: INTERNAL MEDICINE

## 2022-09-06 PROCEDURE — 5A09457 ASSISTANCE WITH RESPIRATORY VENTILATION, 24-96 CONSECUTIVE HOURS, CONTINUOUS POSITIVE AIRWAY PRESSURE: ICD-10-PCS | Performed by: INTERNAL MEDICINE

## 2022-09-06 RX ORDER — BISACODYL 10 MG
10 SUPPOSITORY, RECTAL RECTAL
Status: DISCONTINUED | OUTPATIENT
Start: 2022-09-06 | End: 2022-09-09

## 2022-09-06 RX ORDER — FOLIC ACID 1 MG/1
1000 TABLET ORAL DAILY
Status: DISCONTINUED | OUTPATIENT
Start: 2022-09-07 | End: 2022-09-09

## 2022-09-06 RX ORDER — MELATONIN
3 NIGHTLY PRN
Status: DISCONTINUED | OUTPATIENT
Start: 2022-09-06 | End: 2022-09-09

## 2022-09-06 RX ORDER — ASPIRIN 81 MG/1
81 TABLET ORAL NIGHTLY
Status: DISCONTINUED | OUTPATIENT
Start: 2022-09-06 | End: 2022-09-09

## 2022-09-06 RX ORDER — BENZONATATE 100 MG/1
200 CAPSULE ORAL 3 TIMES DAILY PRN
Status: DISCONTINUED | OUTPATIENT
Start: 2022-09-06 | End: 2022-09-09

## 2022-09-06 RX ORDER — NICOTINE POLACRILEX 4 MG
15 LOZENGE BUCCAL
Status: DISCONTINUED | OUTPATIENT
Start: 2022-09-06 | End: 2022-09-09

## 2022-09-06 RX ORDER — LOSARTAN POTASSIUM 25 MG/1
25 TABLET ORAL DAILY
Status: DISCONTINUED | OUTPATIENT
Start: 2022-09-07 | End: 2022-09-09

## 2022-09-06 RX ORDER — ENOXAPARIN SODIUM 100 MG/ML
40 INJECTION SUBCUTANEOUS DAILY
Status: DISCONTINUED | OUTPATIENT
Start: 2022-09-06 | End: 2022-09-09

## 2022-09-06 RX ORDER — METOCLOPRAMIDE HYDROCHLORIDE 5 MG/ML
10 INJECTION INTRAMUSCULAR; INTRAVENOUS EVERY 8 HOURS PRN
Status: DISCONTINUED | OUTPATIENT
Start: 2022-09-06 | End: 2022-09-09

## 2022-09-06 RX ORDER — NICOTINE POLACRILEX 4 MG
30 LOZENGE BUCCAL
Status: DISCONTINUED | OUTPATIENT
Start: 2022-09-06 | End: 2022-09-09

## 2022-09-06 RX ORDER — EZETIMIBE 10 MG/1
10 TABLET ORAL DAILY
Status: DISCONTINUED | OUTPATIENT
Start: 2022-09-07 | End: 2022-09-09

## 2022-09-06 RX ORDER — ONDANSETRON 2 MG/ML
4 INJECTION INTRAMUSCULAR; INTRAVENOUS EVERY 6 HOURS PRN
Status: DISCONTINUED | OUTPATIENT
Start: 2022-09-06 | End: 2022-09-09

## 2022-09-06 RX ORDER — SENNOSIDES 8.6 MG
17.2 TABLET ORAL NIGHTLY PRN
Status: DISCONTINUED | OUTPATIENT
Start: 2022-09-06 | End: 2022-09-09

## 2022-09-06 RX ORDER — ECHINACEA PURPUREA EXTRACT 125 MG
1 TABLET ORAL
Status: DISCONTINUED | OUTPATIENT
Start: 2022-09-06 | End: 2022-09-09

## 2022-09-06 RX ORDER — BUMETANIDE 0.25 MG/ML
1 INJECTION, SOLUTION INTRAMUSCULAR; INTRAVENOUS
Status: COMPLETED | OUTPATIENT
Start: 2022-09-06 | End: 2022-09-08

## 2022-09-06 RX ORDER — CLOPIDOGREL BISULFATE 75 MG/1
75 TABLET ORAL DAILY
Status: DISCONTINUED | OUTPATIENT
Start: 2022-09-07 | End: 2022-09-08

## 2022-09-06 RX ORDER — SODIUM PHOSPHATE, DIBASIC AND SODIUM PHOSPHATE, MONOBASIC 7; 19 G/133ML; G/133ML
1 ENEMA RECTAL ONCE AS NEEDED
Status: DISCONTINUED | OUTPATIENT
Start: 2022-09-06 | End: 2022-09-09

## 2022-09-06 RX ORDER — METOPROLOL SUCCINATE 50 MG/1
50 TABLET, EXTENDED RELEASE ORAL EVERY EVENING
Status: DISCONTINUED | OUTPATIENT
Start: 2022-09-06 | End: 2022-09-09

## 2022-09-06 RX ORDER — ACETAMINOPHEN 500 MG
500 TABLET ORAL EVERY 4 HOURS PRN
Status: DISCONTINUED | OUTPATIENT
Start: 2022-09-06 | End: 2022-09-09

## 2022-09-06 RX ORDER — POLYETHYLENE GLYCOL 3350 17 G/17G
17 POWDER, FOR SOLUTION ORAL DAILY PRN
Status: DISCONTINUED | OUTPATIENT
Start: 2022-09-06 | End: 2022-09-09

## 2022-09-06 RX ORDER — SPIRONOLACTONE 25 MG/1
25 TABLET ORAL
Status: DISCONTINUED | OUTPATIENT
Start: 2022-09-07 | End: 2022-09-09

## 2022-09-06 RX ORDER — DEXTROSE MONOHYDRATE 25 G/50ML
50 INJECTION, SOLUTION INTRAVENOUS
Status: DISCONTINUED | OUTPATIENT
Start: 2022-09-06 | End: 2022-09-09

## 2022-09-06 NOTE — PLAN OF CARE
Pt admitted directly from Dr Evelina Casper' office. Pt placed on continuous tele monitoring. Denies pain. No dyspnea on room air. Steady while walking independently. Given update regarding plan of care. Safety and comfort maintained. Will continue to monitor. Problem: CARDIOVASCULAR - ADULT  Goal: Maintains optimal cardiac output and hemodynamic stability  Description: INTERVENTIONS:  - Monitor vital signs, rhythm, and trends  - Monitor for bleeding, hypotension and signs of decreased cardiac output  - Evaluate effectiveness of vasoactive medications to optimize hemodynamic stability  - Monitor arterial and/or venous puncture sites for bleeding and/or hematoma  - Assess quality of pulses, skin color and temperature  - Assess for signs of decreased coronary artery perfusion - ex.  Angina  - Evaluate fluid balance, assess for edema, trend weights  Outcome: Progressing  Goal: Absence of cardiac arrhythmias or at baseline  Description: INTERVENTIONS:  - Continuous cardiac monitoring, monitor vital signs, obtain 12 lead EKG if indicated  - Evaluate effectiveness of antiarrhythmic and heart rate control medications as ordered  - Initiate emergency measures for life threatening arrhythmias  - Monitor electrolytes and administer replacement therapy as ordered  Outcome: Progressing     Problem: RESPIRATORY - ADULT  Goal: Achieves optimal ventilation and oxygenation  Description: INTERVENTIONS:  - Assess for changes in respiratory status  - Assess for changes in mentation and behavior  - Position to facilitate oxygenation and minimize respiratory effort  - Oxygen supplementation based on oxygen saturation or ABGs  - Provide Smoking Cessation handout, if applicable  - Encourage broncho-pulmonary hygiene including cough, deep breathe, Incentive Spirometry  - Assess the need for suctioning and perform as needed  - Assess and instruct to report SOB or any respiratory difficulty  - Respiratory Therapy support as indicated  - Manage/alleviate anxiety  - Monitor for signs/symptoms of CO2 retention  Outcome: Progressing

## 2022-09-07 LAB
ANION GAP SERPL CALC-SCNC: 2 MMOL/L (ref 0–18)
ATRIAL RATE: 69 BPM
BASOPHILS # BLD AUTO: 0.02 X10(3) UL (ref 0–0.2)
BASOPHILS NFR BLD AUTO: 0.3 %
BUN BLD-MCNC: 24 MG/DL (ref 7–18)
CALCIUM BLD-MCNC: 9.2 MG/DL (ref 8.5–10.1)
CHLORIDE SERPL-SCNC: 107 MMOL/L (ref 98–112)
CO2 SERPL-SCNC: 28 MMOL/L (ref 21–32)
CREAT BLD-MCNC: 1.17 MG/DL
EOSINOPHIL # BLD AUTO: 0.08 X10(3) UL (ref 0–0.7)
EOSINOPHIL NFR BLD AUTO: 1.2 %
ERYTHROCYTE [DISTWIDTH] IN BLOOD BY AUTOMATED COUNT: 16.2 %
GFR SERPLBLD BASED ON 1.73 SQ M-ARVRAT: 65 ML/MIN/1.73M2 (ref 60–?)
GLUCOSE BLD-MCNC: 113 MG/DL (ref 70–99)
GLUCOSE BLD-MCNC: 121 MG/DL (ref 70–99)
GLUCOSE BLD-MCNC: 89 MG/DL (ref 70–99)
GLUCOSE BLD-MCNC: 90 MG/DL (ref 70–99)
GLUCOSE BLD-MCNC: 95 MG/DL (ref 70–99)
HCT VFR BLD AUTO: 36.4 %
HGB BLD-MCNC: 11.4 G/DL
IMM GRANULOCYTES # BLD AUTO: 0.02 X10(3) UL (ref 0–1)
IMM GRANULOCYTES NFR BLD: 0.3 %
LYMPHOCYTES # BLD AUTO: 1.27 X10(3) UL (ref 1–4)
LYMPHOCYTES NFR BLD AUTO: 19.3 %
MAGNESIUM SERPL-MCNC: 2.3 MG/DL (ref 1.6–2.6)
MCH RBC QN AUTO: 29.1 PG (ref 26–34)
MCHC RBC AUTO-ENTMCNC: 31.3 G/DL (ref 31–37)
MCV RBC AUTO: 92.9 FL
MONOCYTES # BLD AUTO: 1.43 X10(3) UL (ref 0.1–1)
MONOCYTES NFR BLD AUTO: 21.7 %
NEUTROPHILS # BLD AUTO: 3.76 X10 (3) UL (ref 1.5–7.7)
NEUTROPHILS # BLD AUTO: 3.76 X10(3) UL (ref 1.5–7.7)
NEUTROPHILS NFR BLD AUTO: 57.2 %
OSMOLALITY SERPL CALC.SUM OF ELEC: 288 MOSM/KG (ref 275–295)
P AXIS: 50 DEGREES
P-R INTERVAL: 244 MS
PLATELET # BLD AUTO: 74 10(3)UL (ref 150–450)
POTASSIUM SERPL-SCNC: 4.2 MMOL/L (ref 3.5–5.1)
Q-T INTERVAL: 460 MS
QRS DURATION: 176 MS
QTC CALCULATION (BEZET): 492 MS
R AXIS: -52 DEGREES
RBC # BLD AUTO: 3.92 X10(6)UL
SODIUM SERPL-SCNC: 137 MMOL/L (ref 136–145)
T AXIS: 95 DEGREES
VENTRICULAR RATE: 69 BPM
WBC # BLD AUTO: 6.6 X10(3) UL (ref 4–11)

## 2022-09-07 PROCEDURE — 99232 SBSQ HOSP IP/OBS MODERATE 35: CPT | Performed by: INTERNAL MEDICINE

## 2022-09-07 RX ORDER — SACUBITRIL AND VALSARTAN 24; 26 MG/1; MG/1
1 TABLET, FILM COATED ORAL 2 TIMES DAILY
Qty: 60 TABLET | Refills: 2 | Status: SHIPPED | OUTPATIENT
Start: 2022-09-07 | End: 2022-09-08

## 2022-09-07 NOTE — PLAN OF CARE
Received patient at [address] [facility]. Patient A/O x 4. NSR on tele. O2 saturation 95% on RA. Breath sounds clear. No C/O chest pain or SOB. Patient voiding with no issue. Independent in room. Bed is locked and in low position. Call light and personal items within reach. All needs met at this time. Good urine output, plan for IV bumex daily    Problem: Diabetes/Glucose Control  Goal: Glucose maintained within prescribed range  Description: INTERVENTIONS:  - Monitor Blood Glucose as ordered  - Assess for signs and symptoms of hyperglycemia and hypoglycemia  - Administer ordered medications to maintain glucose within target range  - Assess barriers to adequate nutritional intake and initiate nutrition consult as needed  - Instruct patient on self management of diabetes  Outcome: Progressing        Problem: CARDIOVASCULAR - ADULT  Goal: Maintains optimal cardiac output and hemodynamic stability  Description: INTERVENTIONS:  - Monitor vital signs, rhythm, and trends  - Monitor for bleeding, hypotension and signs of decreased cardiac output  - Evaluate effectiveness of vasoactive medications to optimize hemodynamic stability  - Monitor arterial and/or venous puncture sites for bleeding and/or hematoma  - Assess quality of pulses, skin color and temperature  - Assess for signs of decreased coronary artery perfusion - ex.  Angina  - Evaluate fluid balance, assess for edema, trend weights  Outcome: Progressing  Goal: Absence of cardiac arrhythmias or at baseline  Description: INTERVENTIONS:  - Continuous cardiac monitoring, monitor vital signs, obtain 12 lead EKG if indicated  - Evaluate effectiveness of antiarrhythmic and heart rate control medications as ordered  - Initiate emergency measures for life threatening arrhythmias  - Monitor electrolytes and administer replacement therapy as ordered  Outcome: Progressing     Problem: RESPIRATORY - ADULT  Goal: Achieves optimal ventilation and oxygenation  Description: INTERVENTIONS:  - Assess for changes in respiratory status  - Assess for changes in mentation and behavior  - Position to facilitate oxygenation and minimize respiratory effort  - Oxygen supplementation based on oxygen saturation or ABGs  - Provide Smoking Cessation handout, if applicable  - Encourage broncho-pulmonary hygiene including cough, deep breathe, Incentive Spirometry  - Assess the need for suctioning and perform as needed  - Assess and instruct to report SOB or any respiratory difficulty  - Respiratory Therapy support as indicated  - Manage/alleviate anxiety  - Monitor for signs/symptoms of CO2 retention  Outcome: Progressing

## 2022-09-07 NOTE — PLAN OF CARE
Assumed care of pt at 0700. Pt alert and oriented x4. Continuous tele monitoring in place. Denies pain. Reports some increased work of breathing despite being on room air and appearing to be breathing regularly. O2 sats WNL. Steady gait while ambulating. Voiding per urinal.  Strict I&O maintained. Safety and comfort maintained. Plan is to continue diuresis. Will continue to monitor. Problem: Diabetes/Glucose Control  Goal: Glucose maintained within prescribed range  Description: INTERVENTIONS:  - Monitor Blood Glucose as ordered  - Assess for signs and symptoms of hyperglycemia and hypoglycemia  - Administer ordered medications to maintain glucose within target range  - Assess barriers to adequate nutritional intake and initiate nutrition consult as needed  - Instruct patient on self management of diabetes  Outcome: Progressing     Problem: CARDIOVASCULAR - ADULT  Goal: Maintains optimal cardiac output and hemodynamic stability  Description: INTERVENTIONS:  - Monitor vital signs, rhythm, and trends  - Monitor for bleeding, hypotension and signs of decreased cardiac output  - Evaluate effectiveness of vasoactive medications to optimize hemodynamic stability  - Monitor arterial and/or venous puncture sites for bleeding and/or hematoma  - Assess quality of pulses, skin color and temperature  - Assess for signs of decreased coronary artery perfusion - ex.  Angina  - Evaluate fluid balance, assess for edema, trend weights  Outcome: Progressing  Goal: Absence of cardiac arrhythmias or at baseline  Description: INTERVENTIONS:  - Continuous cardiac monitoring, monitor vital signs, obtain 12 lead EKG if indicated  - Evaluate effectiveness of antiarrhythmic and heart rate control medications as ordered  - Initiate emergency measures for life threatening arrhythmias  - Monitor electrolytes and administer replacement therapy as ordered  Outcome: Progressing     Problem: RESPIRATORY - ADULT  Goal: Achieves optimal ventilation and oxygenation  Description: INTERVENTIONS:  - Assess for changes in respiratory status  - Assess for changes in mentation and behavior  - Position to facilitate oxygenation and minimize respiratory effort  - Oxygen supplementation based on oxygen saturation or ABGs  - Provide Smoking Cessation handout, if applicable  - Encourage broncho-pulmonary hygiene including cough, deep breathe, Incentive Spirometry  - Assess the need for suctioning and perform as needed  - Assess and instruct to report SOB or any respiratory difficulty  - Respiratory Therapy support as indicated  - Manage/alleviate anxiety  - Monitor for signs/symptoms of CO2 retention  Outcome: Progressing

## 2022-09-08 LAB
ANION GAP SERPL CALC-SCNC: 8 MMOL/L (ref 0–18)
BUN BLD-MCNC: 25 MG/DL (ref 7–18)
CALCIUM BLD-MCNC: 8.9 MG/DL (ref 8.5–10.1)
CHLORIDE SERPL-SCNC: 104 MMOL/L (ref 98–112)
CO2 SERPL-SCNC: 24 MMOL/L (ref 21–32)
CREAT BLD-MCNC: 1.03 MG/DL
GFR SERPLBLD BASED ON 1.73 SQ M-ARVRAT: 76 ML/MIN/1.73M2 (ref 60–?)
GLUCOSE BLD-MCNC: 113 MG/DL (ref 70–99)
GLUCOSE BLD-MCNC: 131 MG/DL (ref 70–99)
GLUCOSE BLD-MCNC: 85 MG/DL (ref 70–99)
GLUCOSE BLD-MCNC: 93 MG/DL (ref 70–99)
GLUCOSE BLD-MCNC: 96 MG/DL (ref 70–99)
OSMOLALITY SERPL CALC.SUM OF ELEC: 286 MOSM/KG (ref 275–295)
POTASSIUM SERPL-SCNC: 3.7 MMOL/L (ref 3.5–5.1)
SODIUM SERPL-SCNC: 136 MMOL/L (ref 136–145)

## 2022-09-08 PROCEDURE — 99232 SBSQ HOSP IP/OBS MODERATE 35: CPT | Performed by: INTERNAL MEDICINE

## 2022-09-08 RX ORDER — SACUBITRIL AND VALSARTAN 24; 26 MG/1; MG/1
1 TABLET, FILM COATED ORAL 2 TIMES DAILY
Qty: 60 TABLET | Refills: 2 | Status: SHIPPED | OUTPATIENT
Start: 2022-09-08

## 2022-09-08 RX ORDER — POTASSIUM CHLORIDE 20 MEQ/1
40 TABLET, EXTENDED RELEASE ORAL ONCE
Status: COMPLETED | OUTPATIENT
Start: 2022-09-08 | End: 2022-09-08

## 2022-09-08 RX ORDER — TORSEMIDE 20 MG/1
20 TABLET ORAL
Status: DISCONTINUED | OUTPATIENT
Start: 2022-09-09 | End: 2022-09-09

## 2022-09-08 NOTE — PLAN OF CARE
Received patient at 1. Patient A/O x 4. NSR sometimes Atrial pacing on tele. O2 saturation 98% on RA, CPAP overnight but does not use our mask, 2L applied for desaturation. No C/O chest pain or SOB. Patient voiding with no issue. Bed is locked and in low position. Call light and personal items within reach. All needs met at this time    Problem: Diabetes/Glucose Control  Goal: Glucose maintained within prescribed range  Description: INTERVENTIONS:  - Monitor Blood Glucose as ordered  - Assess for signs and symptoms of hyperglycemia and hypoglycemia  - Administer ordered medications to maintain glucose within target range  - Assess barriers to adequate nutritional intake and initiate nutrition consult as needed  - Instruct patient on self management of diabetes  Outcome: Progressing    Problem: CARDIOVASCULAR - ADULT  Goal: Maintains optimal cardiac output and hemodynamic stability  Description: INTERVENTIONS:  - Monitor vital signs, rhythm, and trends  - Monitor for bleeding, hypotension and signs of decreased cardiac output  - Evaluate effectiveness of vasoactive medications to optimize hemodynamic stability  - Monitor arterial and/or venous puncture sites for bleeding and/or hematoma  - Assess quality of pulses, skin color and temperature  - Assess for signs of decreased coronary artery perfusion - ex.  Angina  - Evaluate fluid balance, assess for edema, trend weights  Outcome: Progressing  Goal: Absence of cardiac arrhythmias or at baseline  Description: INTERVENTIONS:  - Continuous cardiac monitoring, monitor vital signs, obtain 12 lead EKG if indicated  - Evaluate effectiveness of antiarrhythmic and heart rate control medications as ordered  - Initiate emergency measures for life threatening arrhythmias  - Monitor electrolytes and administer replacement therapy as ordered  Outcome: Progressing     Problem: RESPIRATORY - ADULT  Goal: Achieves optimal ventilation and oxygenation  Description: INTERVENTIONS:  - Assess for changes in respiratory status  - Assess for changes in mentation and behavior  - Position to facilitate oxygenation and minimize respiratory effort  - Oxygen supplementation based on oxygen saturation or ABGs  - Provide Smoking Cessation handout, if applicable  - Encourage broncho-pulmonary hygiene including cough, deep breathe, Incentive Spirometry  - Assess the need for suctioning and perform as needed  - Assess and instruct to report SOB or any respiratory difficulty  - Respiratory Therapy support as indicated  - Manage/alleviate anxiety  - Monitor for signs/symptoms of CO2 retention  Outcome: Progressing

## 2022-09-08 NOTE — PLAN OF CARE
Subjective: Patient is alert and oriented  He states that his breathing is improved with the diuretics    Objective: Patient is on IV bumex twice per day  He is sinus rhythm on monitor with occasional a pacing  Lungs clear on ausculation: minimal lower extremity edema noted    Assessment: Patient is withdrawn  And possibly depressed    Plan: Monitor on telemetry (patient had significant v tach on 9/7)    Monitor and replace electrolytes per protocol

## 2022-09-08 NOTE — PLAN OF CARE
Problem: Diabetes/Glucose Control  Goal: Glucose maintained within prescribed range  Description: INTERVENTIONS:  - Monitor Blood Glucose as ordered  - Assess for signs and symptoms of hyperglycemia and hypoglycemia  - Administer ordered medications to maintain glucose within target range  - Assess barriers to adequate nutritional intake and initiate nutrition consult as needed  - Instruct patient on self management of diabetes  Outcome: Progressing     Problem: Patient/Family Goals  Goal: Patient/Family Long Term Goal  Description: Patient's Long Term Goal: to go home    Interventions:  - prepare for follow up  - See additional Care Plan goals for specific interventions  Outcome: Progressing  Goal: Patient/Family Short Term Goal  Description: Patient's Short Term Goal: diurese    Interventions:   - monitor fluid status: weights, I & O   - See additional Care Plan goals for specific interventions  Outcome: Progressing     Problem: CARDIOVASCULAR - ADULT  Goal: Maintains optimal cardiac output and hemodynamic stability  Description: INTERVENTIONS:  - Monitor vital signs, rhythm, and trends  - Monitor for bleeding, hypotension and signs of decreased cardiac output  - Evaluate effectiveness of vasoactive medications to optimize hemodynamic stability  - Monitor arterial and/or venous puncture sites for bleeding and/or hematoma  - Assess quality of pulses, skin color and temperature  - Assess for signs of decreased coronary artery perfusion - ex.  Angina  - Evaluate fluid balance, assess for edema, trend weights  Outcome: Progressing  Goal: Absence of cardiac arrhythmias or at baseline  Description: INTERVENTIONS:  - Continuous cardiac monitoring, monitor vital signs, obtain 12 lead EKG if indicated  - Evaluate effectiveness of antiarrhythmic and heart rate control medications as ordered  - Initiate emergency measures for life threatening arrhythmias  - Monitor electrolytes and administer replacement therapy as ordered  Outcome: Progressing     Problem: RESPIRATORY - ADULT  Goal: Achieves optimal ventilation and oxygenation  Description: INTERVENTIONS:  - Assess for changes in respiratory status  - Assess for changes in mentation and behavior  - Position to facilitate oxygenation and minimize respiratory effort  - Oxygen supplementation based on oxygen saturation or ABGs  - Provide Smoking Cessation handout, if applicable  - Encourage broncho-pulmonary hygiene including cough, deep breathe, Incentive Spirometry  - Assess the need for suctioning and perform as needed  - Assess and instruct to report SOB or any respiratory difficulty  - Respiratory Therapy support as indicated  - Manage/alleviate anxiety  - Monitor for signs/symptoms of CO2 retention  Outcome: Progressing

## 2022-09-08 NOTE — CM/SW NOTE
LOIS acknowledged order to check [facility] cost. RN printed script and tubed to [employer]. They confirmed Entresto cost is $45 on insurance/monthly and are able to provide first month free with coupon. RN to contact [facility] to deliver to patient. SW remains available.     NACHO Lares  Discharge Planner  [phone]

## 2022-09-09 VITALS
RESPIRATION RATE: 18 BRPM | TEMPERATURE: 98 F | SYSTOLIC BLOOD PRESSURE: 99 MMHG | HEART RATE: 73 BPM | DIASTOLIC BLOOD PRESSURE: 60 MMHG | WEIGHT: 225.5 LBS | BODY MASS INDEX: 33 KG/M2 | OXYGEN SATURATION: 93 %

## 2022-09-09 LAB
ANION GAP SERPL CALC-SCNC: 7 MMOL/L (ref 0–18)
BASOPHILS # BLD AUTO: 0.03 X10(3) UL (ref 0–0.2)
BASOPHILS NFR BLD AUTO: 0.3 %
BUN BLD-MCNC: 32 MG/DL (ref 7–18)
CALCIUM BLD-MCNC: 9.5 MG/DL (ref 8.5–10.1)
CHLORIDE SERPL-SCNC: 104 MMOL/L (ref 98–112)
CO2 SERPL-SCNC: 26 MMOL/L (ref 21–32)
CREAT BLD-MCNC: 1.11 MG/DL
EOSINOPHIL # BLD AUTO: 0.15 X10(3) UL (ref 0–0.7)
EOSINOPHIL NFR BLD AUTO: 1.7 %
ERYTHROCYTE [DISTWIDTH] IN BLOOD BY AUTOMATED COUNT: 15.7 %
GFR SERPLBLD BASED ON 1.73 SQ M-ARVRAT: 69 ML/MIN/1.73M2 (ref 60–?)
GLUCOSE BLD-MCNC: 84 MG/DL (ref 70–99)
GLUCOSE BLD-MCNC: 95 MG/DL (ref 70–99)
GLUCOSE BLD-MCNC: 96 MG/DL (ref 70–99)
HCT VFR BLD AUTO: 38.8 %
HGB BLD-MCNC: 12.6 G/DL
IMM GRANULOCYTES # BLD AUTO: 0.04 X10(3) UL (ref 0–1)
IMM GRANULOCYTES NFR BLD: 0.4 %
LYMPHOCYTES # BLD AUTO: 1.15 X10(3) UL (ref 1–4)
LYMPHOCYTES NFR BLD AUTO: 12.7 %
MCH RBC QN AUTO: 29.5 PG (ref 26–34)
MCHC RBC AUTO-ENTMCNC: 32.5 G/DL (ref 31–37)
MCV RBC AUTO: 90.9 FL
MONOCYTES # BLD AUTO: 2.59 X10(3) UL (ref 0.1–1)
MONOCYTES NFR BLD AUTO: 28.7 %
NEUTROPHILS # BLD AUTO: 5.08 X10 (3) UL (ref 1.5–7.7)
NEUTROPHILS # BLD AUTO: 5.08 X10(3) UL (ref 1.5–7.7)
NEUTROPHILS NFR BLD AUTO: 56.2 %
OSMOLALITY SERPL CALC.SUM OF ELEC: 291 MOSM/KG (ref 275–295)
PLATELET # BLD AUTO: 88 10(3)UL (ref 150–450)
POTASSIUM SERPL-SCNC: 4 MMOL/L (ref 3.5–5.1)
POTASSIUM SERPL-SCNC: 4 MMOL/L (ref 3.5–5.1)
RBC # BLD AUTO: 4.27 X10(6)UL
SODIUM SERPL-SCNC: 137 MMOL/L (ref 136–145)
WBC # BLD AUTO: 9 X10(3) UL (ref 4–11)

## 2022-09-09 PROCEDURE — 99239 HOSP IP/OBS DSCHRG MGMT >30: CPT | Performed by: INTERNAL MEDICINE

## 2022-09-09 RX ORDER — METOPROLOL SUCCINATE 50 MG/1
50 TABLET, EXTENDED RELEASE ORAL EVERY EVENING
Qty: 30 TABLET | Refills: 2 | Status: SHIPPED | OUTPATIENT
Start: 2022-09-09

## 2022-09-09 RX ORDER — METOPROLOL SUCCINATE 50 MG/1
50 TABLET, EXTENDED RELEASE ORAL
Status: DISCONTINUED | OUTPATIENT
Start: 2022-09-10 | End: 2022-09-09

## 2022-09-09 RX ORDER — TORSEMIDE 20 MG/1
20 TABLET ORAL
Qty: 60 TABLET | Refills: 1 | Status: SHIPPED | OUTPATIENT
Start: 2022-09-09

## 2022-09-09 NOTE — PLAN OF CARE
Patient AOX4. O2 sat > 90% on RA while awake, 2L NC while asleep. Declines CPAP. NSR on tele. VSS. Denies pain. Transitioning to PO Torsemide in a.m. Patient updated on plan of care. Problem: Diabetes/Glucose Control  Goal: Glucose maintained within prescribed range  Description: INTERVENTIONS:  - Monitor Blood Glucose as ordered  - Assess for signs and symptoms of hyperglycemia and hypoglycemia  - Administer ordered medications to maintain glucose within target range  - Assess barriers to adequate nutritional intake and initiate nutrition consult as needed  - Instruct patient on self management of diabetes  Outcome: Progressing     Problem: Patient/Family Goals  Goal: Patient/Family Long Term Goal  Description: Patient's Long Term Goal: stay healthy at home    Interventions:  -take medications as prescribed  -attend follow up appointments as recommended  -diet and exercise as advised  -report new or worsening symptoms to physician       - See additional Care Plan goals for specific interventions  Outcome: Progressing  Goal: Patient/Family Short Term Goal  Description: Patient's Short Term Goal: breathe better    Interventions:   -IV bumex -> transition to PO torsemide  -fluid restriction  -daily weight  -cardiology consult following  - See additional Care Plan goals for specific interventions  Outcome: Progressing     Problem: CARDIOVASCULAR - ADULT  Goal: Maintains optimal cardiac output and hemodynamic stability  Description: INTERVENTIONS:  - Monitor vital signs, rhythm, and trends  - Monitor for bleeding, hypotension and signs of decreased cardiac output  - Evaluate effectiveness of vasoactive medications to optimize hemodynamic stability  - Monitor arterial and/or venous puncture sites for bleeding and/or hematoma  - Assess quality of pulses, skin color and temperature  - Assess for signs of decreased coronary artery perfusion - ex.  Angina  - Evaluate fluid balance, assess for edema, trend weights  Outcome: Progressing  Goal: Absence of cardiac arrhythmias or at baseline  Description: INTERVENTIONS:  - Continuous cardiac monitoring, monitor vital signs, obtain 12 lead EKG if indicated  - Evaluate effectiveness of antiarrhythmic and heart rate control medications as ordered  - Initiate emergency measures for life threatening arrhythmias  - Monitor electrolytes and administer replacement therapy as ordered  Outcome: Progressing     Problem: RESPIRATORY - ADULT  Goal: Achieves optimal ventilation and oxygenation  Description: INTERVENTIONS:  - Assess for changes in respiratory status  - Assess for changes in mentation and behavior  - Position to facilitate oxygenation and minimize respiratory effort  - Oxygen supplementation based on oxygen saturation or ABGs  - Provide Smoking Cessation handout, if applicable  - Encourage broncho-pulmonary hygiene including cough, deep breathe, Incentive Spirometry  - Assess the need for suctioning and perform as needed  - Assess and instruct to report SOB or any respiratory difficulty  - Respiratory Therapy support as indicated  - Manage/alleviate anxiety  - Monitor for signs/symptoms of CO2 retention  Outcome: Progressing

## 2022-09-09 NOTE — PLAN OF CARE
Assumed patient care at 0730 this AM. Patient A&O x4. SPO2 maintained on RA, 2L o2 when sleeping (uses CPAP at home); reports orthopnea, occasional non-productive cough. Diuretics transitioned to PO this AM. NSR with 1st degree AVB, some pacing on tele. Lovenox subcutaneous for dvt prophylaxis. Started on Entresto this AM- plan to monitor BP and ambulation response with possible dc this afternoon per cards. Continent of B&B, Bm yesterday 9/8. Denies pain. Up ad brett. Updated on POC, needs met. 1430: Explained discharge instructions including medications and follow ups to the patient and his wife at bedside, verbalize understanding. New prescriptions delivered to bedside by Meds[facility]to[facility]Bed service. PIV removed, tele monitor discontinued, transported via wheelchair to [facility] for [employer].        Problem: Diabetes/Glucose Control  Goal: Glucose maintained within prescribed range  Description: INTERVENTIONS:  - Monitor Blood Glucose as ordered  - Assess for signs and symptoms of hyperglycemia and hypoglycemia  - Administer ordered medications to maintain glucose within target range  - Assess barriers to adequate nutritional intake and initiate nutrition consult as needed  - Instruct patient on self management of diabetes  Outcome: Progressing     Problem: Patient/Family Goals  Goal: Patient/Family Long Term Goal  Description: Patient's Long Term Goal: stay healthy at home    Interventions:  -take medications as prescribed  -attend follow up appointments as recommended  -diet and exercise as advised  -report new or worsening symptoms to physician       - See additional Care Plan goals for specific interventions  Outcome: Progressing  Goal: Patient/Family Short Term Goal  Description: Patient's Short Term Goal: breathe better    Interventions:   -IV bumex -> transition to PO torsemide  -fluid restriction  -daily weight  -cardiology consult following  - See additional Care Plan goals for specific interventions  Outcome: Progressing     Problem: CARDIOVASCULAR - ADULT  Goal: Maintains optimal cardiac output and hemodynamic stability  Description: INTERVENTIONS:  - Monitor vital signs, rhythm, and trends  - Monitor for bleeding, hypotension and signs of decreased cardiac output  - Evaluate effectiveness of vasoactive medications to optimize hemodynamic stability  - Monitor arterial and/or venous puncture sites for bleeding and/or hematoma  - Assess quality of pulses, skin color and temperature  - Assess for signs of decreased coronary artery perfusion - ex.  Angina  - Evaluate fluid balance, assess for edema, trend weights  Outcome: Progressing  Goal: Absence of cardiac arrhythmias or at baseline  Description: INTERVENTIONS:  - Continuous cardiac monitoring, monitor vital signs, obtain 12 lead EKG if indicated  - Evaluate effectiveness of antiarrhythmic and heart rate control medications as ordered  - Initiate emergency measures for life threatening arrhythmias  - Monitor electrolytes and administer replacement therapy as ordered  Outcome: Progressing     Problem: RESPIRATORY - ADULT  Goal: Achieves optimal ventilation and oxygenation  Description: INTERVENTIONS:  - Assess for changes in respiratory status  - Assess for changes in mentation and behavior  - Position to facilitate oxygenation and minimize respiratory effort  - Oxygen supplementation based on oxygen saturation or ABGs  - Provide Smoking Cessation handout, if applicable  - Encourage broncho-pulmonary hygiene including cough, deep breathe, Incentive Spirometry  - Assess the need for suctioning and perform as needed  - Assess and instruct to report SOB or any respiratory difficulty  - Respiratory Therapy support as indicated  - Manage/alleviate anxiety  - Monitor for signs/symptoms of CO2 retention  Outcome: Progressing

## 2022-09-12 ENCOUNTER — PATIENT OUTREACH (OUTPATIENT)
Dept: CASE MANAGEMENT | Age: 77
End: 2022-09-12

## 2022-09-19 ENCOUNTER — NURSE ONLY (OUTPATIENT)
Dept: ENDOCRINOLOGY CLINIC | Facility: CLINIC | Age: 77
End: 2022-09-19

## 2022-09-19 DIAGNOSIS — E11.65 TYPE 2 DIABETES MELLITUS WITH HYPERGLYCEMIA, WITH LONG-TERM CURRENT USE OF INSULIN (HCC): ICD-10-CM

## 2022-09-19 DIAGNOSIS — Z79.4 TYPE 2 DIABETES MELLITUS WITH HYPERGLYCEMIA, WITH LONG-TERM CURRENT USE OF INSULIN (HCC): ICD-10-CM

## 2022-09-19 PROCEDURE — 1111F DSCHRG MED/CURRENT MED MERGE: CPT | Performed by: DIETITIAN, REGISTERED

## 2022-09-19 PROCEDURE — G0108 DIAB MANAGE TRN  PER INDIV: HCPCS | Performed by: DIETITIAN, REGISTERED

## 2022-09-20 ENCOUNTER — PATIENT OUTREACH (OUTPATIENT)
Dept: CASE MANAGEMENT | Age: 77
End: 2022-09-20

## 2022-09-20 DIAGNOSIS — I27.21 PULMONARY HYPERTENSION SECONDARY TO INCREASED PVR (HCC): ICD-10-CM

## 2022-09-20 DIAGNOSIS — I50.9 CONGESTIVE HEART FAILURE, UNSPECIFIED HF CHRONICITY, UNSPECIFIED HEART FAILURE TYPE (HCC): ICD-10-CM

## 2022-09-20 DIAGNOSIS — E78.5 DYSLIPIDEMIA: ICD-10-CM

## 2022-09-20 DIAGNOSIS — G47.33 OSA (OBSTRUCTIVE SLEEP APNEA): ICD-10-CM

## 2022-09-20 DIAGNOSIS — E11.65 TYPE 2 DIABETES MELLITUS WITH HYPERGLYCEMIA, WITH LONG-TERM CURRENT USE OF INSULIN (HCC): ICD-10-CM

## 2022-09-20 DIAGNOSIS — I25.5 ISCHEMIC CARDIOMYOPATHY: ICD-10-CM

## 2022-09-20 DIAGNOSIS — J44.1 COPD EXACERBATION (HCC): ICD-10-CM

## 2022-09-20 DIAGNOSIS — Z79.4 TYPE 2 DIABETES MELLITUS WITH HYPERGLYCEMIA, WITH LONG-TERM CURRENT USE OF INSULIN (HCC): ICD-10-CM

## 2022-09-20 DIAGNOSIS — E78.00 PURE HYPERCHOLESTEROLEMIA: ICD-10-CM

## 2022-09-20 NOTE — PROGRESS NOTES
Called patient and left a message for patient to call back when they can. Reviewed patient chart.  Left contact my contact number [phone]        Time Spent This Encounter Total: 5  min medical record review  Monthly Minute Total including today: 5 minutes

## 2022-09-20 NOTE — PROGRESS NOTES
Ivy Arana : 1945 was seen for Diabetic Education Follow up:    Date: 22 Referring Provider: Dr. Laurence Lora Start time: 9am End time: 9:30am    Assessment:     Diagnosis: Type 2 DM    Reason for visit: 3 month f/u    Changes since last visit:  - Lifestyle:needed to quit his job due to CHF & frequent hospitalizations  - Meals:hasn't changed his eating habits;now watching his sodium  - Medications: Toujeo 22 units daily , Metformin ER 2-500 mg tabs twice daily  - Last A1C 6.2%    SMBG 22 to 22   Meter downloaded; form sent to scanning  Av B mg/dL  Fastin-126 mg/dL     Prelunch: 133 mg/dL      Education:     DIABETES REVIEW:  - Importance of improved BG control in preventing complications    HEALTHY EATING:  - effect of food on BG, timing of meal, use of snacks/fiber, barriers    MONITORING:  - Type of meter: Contour Next EZ  - Testing Schedule: fasting only    TAKING MEDICATION:  Oral Agents:   Metformin ER 2-500 mg tabs twice daily    Injectables: Toujeo 22 units daily  PROBLEM SOLVING:  Review SMBG/Food log;observe patterns: : fasting mainly within range;some as low as 90 mg/dL    Hyperglycemia  - Causes/symptoms/prevention/treatment  - High Blood Glucose: >300 mg/dL  - Contact MD if >2 BG >300 mg/dL in 1 week    Hypoglycemia  - Causes/symptoms/prevention/treatment-Rule of 15  - Low Blood Glucose: <70 mg/dL  - Contact MD if >2BG <70 in 1 week    REDUCING RISKS:  - relationship between glucose control and risk for complications in eye, heart, kidneys,nerves,teeth,foot care, skin,  Foot Care Guidelines    Recommendations:      1. Follow recommended meal plan. 2. Test BG fasting , pre/2 hours post meals 3 times/day. 3. Bring glucose logs/meter to all provider visits for review. 4. Decrease Toujeo to 20 units daily;continue Metformin    5. Encouraged  to call diabetes center with any questions or concerns.    6. F/U in 6 months    Patient verbalized understanding and has no further questions at this time.     Travis Smith, RN, CDE

## 2022-09-20 NOTE — PROGRESS NOTES
Christiano Ureña : 1945 was seen for Diabetic Education Follow up:    Date: 22  Referring Provider: Dr. Errol Lara  Start time: 9am End time: 9:30am    Assessment:     Diagnosis: Type 2 DM    Reason for visit: 3 month follow-up    Changes since last visit:  - Lifestyle: needed to quit his job due to CHF;hospitalized again due to SOB  - Meals:hasn't changed eating; has to watch sodium in diet  - Medications: Toujeo 22 units, Metformin ER 2 -500 mg twice daily  - Last A1C 6.2%    SMB22 to 22  Meter downloaded; form sent to scanning  Av Bmg/dL  Fastin-126     Prelunch: 133 mg/dL       Education:     DIABETES REVIEW:  - Importance of improved BG control in preventing complications    HEALTHY EATING:  - effect of food on BG, timing of meal, use of snacks/fiber, barriers    MONITORING:  - Type of meter: Contour Next EZ  - Testing Schedule: fasting only    TAKING MEDICATION:  Oral Agents:   Metformin ER 2-500 mg twice daily    Injectables: Toujeo 22 units daily    PROBLEM SOLVING:  Review SMBG/Food log;observe patterns: fasting mainly within range;some as low as 90 mg/dL    Hypoglycemia  - Causes/symptoms/prevention/treatment-Rule of 15  - Low Blood Glucose: <70 mg/dL  - Contact MD if >2BG <70 in 1 week    REDUCING RISKS:  - relationship between glucose control and risk for complications in eye, heart, kidneys,nerves,teeth,foot care, skin,  Foot Care Guidelines    Recommendations:      1. Follow recommended meal plan. 2. Test BG fasting , premeal, or2 hours post meals once daily   3. Bring glucose logs/meter to all provider visits for review. 4. Decrease Toujeo to 20 units daily & continue Metformin at current dose   5. Encouraged  to call diabetes center with any questions or concerns. 6. Follow-up in 6 months    Patient verbalized understanding and has no further questions at this time.     Kyra Worley RN, CDE

## 2022-09-22 NOTE — PROGRESS NOTES
Called patient back no answer left a message for patient to call back when they can. Reviewed patient chart.  Left contact my contact number [phone]        Time Spent This Encounter Total: 3 min medical record review  Monthly Minute Total including today: 8 minutes

## 2022-09-27 ENCOUNTER — ORDER TRANSCRIPTION (OUTPATIENT)
Dept: ADMINISTRATIVE | Facility: HOSPITAL | Age: 77
End: 2022-09-27

## 2022-09-27 DIAGNOSIS — Z01.818 PRE-OP TESTING: Primary | ICD-10-CM

## 2022-10-03 ENCOUNTER — OFFICE VISIT (OUTPATIENT)
Dept: FAMILY MEDICINE CLINIC | Facility: CLINIC | Age: 77
End: 2022-10-03
Payer: MEDICARE

## 2022-10-03 VITALS
DIASTOLIC BLOOD PRESSURE: 50 MMHG | OXYGEN SATURATION: 98 % | HEART RATE: 71 BPM | BODY MASS INDEX: 35.8 KG/M2 | WEIGHT: 230.81 LBS | SYSTOLIC BLOOD PRESSURE: 84 MMHG | HEIGHT: 67.5 IN

## 2022-10-03 DIAGNOSIS — I50.33 ACUTE ON CHRONIC DIASTOLIC HEART FAILURE (HCC): ICD-10-CM

## 2022-10-03 DIAGNOSIS — I42.8 NONISCHEMIC CARDIOMYOPATHY (HCC): ICD-10-CM

## 2022-10-03 DIAGNOSIS — Z79.4 TYPE 2 DIABETES MELLITUS WITH HYPERGLYCEMIA, WITH LONG-TERM CURRENT USE OF INSULIN (HCC): ICD-10-CM

## 2022-10-03 DIAGNOSIS — Z00.00 ENCOUNTER FOR ANNUAL HEALTH EXAMINATION: Primary | ICD-10-CM

## 2022-10-03 DIAGNOSIS — G47.33 OSA (OBSTRUCTIVE SLEEP APNEA): ICD-10-CM

## 2022-10-03 DIAGNOSIS — I50.22 CHRONIC SYSTOLIC CONGESTIVE HEART FAILURE (HCC): ICD-10-CM

## 2022-10-03 DIAGNOSIS — R06.09 DYSPNEA ON EXERTION: ICD-10-CM

## 2022-10-03 DIAGNOSIS — I27.21 PULMONARY HYPERTENSION SECONDARY TO INCREASED PVR (HCC): ICD-10-CM

## 2022-10-03 DIAGNOSIS — E11.65 TYPE 2 DIABETES MELLITUS WITH HYPERGLYCEMIA, WITH LONG-TERM CURRENT USE OF INSULIN (HCC): ICD-10-CM

## 2022-10-03 DIAGNOSIS — R06.01 ORTHOPNEA: ICD-10-CM

## 2022-10-03 DIAGNOSIS — E66.9 OBESITY (BMI 30-39.9): ICD-10-CM

## 2022-10-03 DIAGNOSIS — I25.84 CORONARY ARTERY DISEASE DUE TO CALCIFIED CORONARY LESION: ICD-10-CM

## 2022-10-03 DIAGNOSIS — E78.5 DYSLIPIDEMIA: ICD-10-CM

## 2022-10-03 DIAGNOSIS — I65.29 STENOSIS OF CAROTID ARTERY, UNSPECIFIED LATERALITY: ICD-10-CM

## 2022-10-03 DIAGNOSIS — D64.9 NORMOCYTIC ANEMIA: ICD-10-CM

## 2022-10-03 DIAGNOSIS — D69.6 THROMBOCYTOPENIA (HCC): ICD-10-CM

## 2022-10-03 DIAGNOSIS — I25.5 ISCHEMIC CARDIOMYOPATHY: ICD-10-CM

## 2022-10-03 DIAGNOSIS — R68.2 DRY MOUTH: ICD-10-CM

## 2022-10-03 DIAGNOSIS — E55.9 VITAMIN D DEFICIENCY: ICD-10-CM

## 2022-10-03 DIAGNOSIS — I50.23 ACUTE ON CHRONIC SYSTOLIC CONGESTIVE HEART FAILURE (HCC): ICD-10-CM

## 2022-10-03 DIAGNOSIS — Z28.21 IMMUNIZATION NOT CARRIED OUT BECAUSE OF PATIENT REFUSAL: ICD-10-CM

## 2022-10-03 DIAGNOSIS — M05.79 RHEUMATOID ARTHRITIS INVOLVING MULTIPLE SITES WITH POSITIVE RHEUMATOID FACTOR (HCC): ICD-10-CM

## 2022-10-03 DIAGNOSIS — J98.01 BRONCHOSPASM: ICD-10-CM

## 2022-10-03 DIAGNOSIS — I25.10 CORONARY ARTERY DISEASE DUE TO CALCIFIED CORONARY LESION: ICD-10-CM

## 2022-10-03 DIAGNOSIS — Z12.11 SCREENING FOR COLON CANCER: ICD-10-CM

## 2022-10-03 DIAGNOSIS — E78.00 PURE HYPERCHOLESTEROLEMIA: ICD-10-CM

## 2022-10-03 PROBLEM — B34.8 RHINOVIRUS: Status: RESOLVED | Noted: 2022-05-15 | Resolved: 2022-10-03

## 2022-10-03 LAB
ALBUMIN SERPL-MCNC: 3.5 G/DL (ref 3.4–5)
ALBUMIN/GLOB SERPL: 0.9 {RATIO} (ref 1–2)
ALP LIVER SERPL-CCNC: 56 U/L
ALT SERPL-CCNC: 17 U/L
ANION GAP SERPL CALC-SCNC: 7 MMOL/L (ref 0–18)
AST SERPL-CCNC: 15 U/L (ref 15–37)
BASOPHILS # BLD AUTO: 0.02 X10(3) UL (ref 0–0.2)
BASOPHILS NFR BLD AUTO: 0.3 %
BILIRUB SERPL-MCNC: 0.4 MG/DL (ref 0.1–2)
BUN BLD-MCNC: 31 MG/DL (ref 7–18)
CALCIUM BLD-MCNC: 9.4 MG/DL (ref 8.5–10.1)
CHLORIDE SERPL-SCNC: 105 MMOL/L (ref 98–112)
CHOLEST SERPL-MCNC: 170 MG/DL (ref ?–200)
CO2 SERPL-SCNC: 26 MMOL/L (ref 21–32)
CREAT BLD-MCNC: 1.4 MG/DL
CREAT UR-SCNC: 187 MG/DL
CRP SERPL-MCNC: 0.41 MG/DL (ref ?–0.3)
EOSINOPHIL # BLD AUTO: 0.13 X10(3) UL (ref 0–0.7)
EOSINOPHIL NFR BLD AUTO: 1.8 %
ERYTHROCYTE [DISTWIDTH] IN BLOOD BY AUTOMATED COUNT: 15.2 %
ERYTHROCYTE [SEDIMENTATION RATE] IN BLOOD: 67 MM/HR
FASTING PATIENT LIPID ANSWER: YES
FASTING STATUS PATIENT QL REPORTED: YES
GFR SERPLBLD BASED ON 1.73 SQ M-ARVRAT: 52 ML/MIN/1.73M2 (ref 60–?)
GLOBULIN PLAS-MCNC: 3.9 G/DL (ref 2.8–4.4)
GLUCOSE BLD-MCNC: 104 MG/DL (ref 70–99)
HCT VFR BLD AUTO: 39.1 %
HDLC SERPL-MCNC: 44 MG/DL (ref 40–59)
HGB BLD-MCNC: 12.6 G/DL
IMM GRANULOCYTES # BLD AUTO: 0.04 X10(3) UL (ref 0–1)
IMM GRANULOCYTES NFR BLD: 0.6 %
LDLC SERPL CALC-MCNC: 106 MG/DL (ref ?–100)
LYMPHOCYTES # BLD AUTO: 1.17 X10(3) UL (ref 1–4)
LYMPHOCYTES NFR BLD AUTO: 16.6 %
MCH RBC QN AUTO: 29 PG (ref 26–34)
MCHC RBC AUTO-ENTMCNC: 32.2 G/DL (ref 31–37)
MCV RBC AUTO: 89.9 FL
MICROALBUMIN UR-MCNC: 1.51 MG/DL
MICROALBUMIN/CREAT 24H UR-RTO: 8.1 UG/MG (ref ?–30)
MONOCYTES # BLD AUTO: 1.78 X10(3) UL (ref 0.1–1)
MONOCYTES NFR BLD AUTO: 25.2 %
NEUTROPHILS # BLD AUTO: 3.92 X10 (3) UL (ref 1.5–7.7)
NEUTROPHILS # BLD AUTO: 3.92 X10(3) UL (ref 1.5–7.7)
NEUTROPHILS NFR BLD AUTO: 55.5 %
NONHDLC SERPL-MCNC: 126 MG/DL (ref ?–130)
OSMOLALITY SERPL CALC.SUM OF ELEC: 293 MOSM/KG (ref 275–295)
PLATELET # BLD AUTO: 57 10(3)UL (ref 150–450)
POTASSIUM SERPL-SCNC: 4.4 MMOL/L (ref 3.5–5.1)
PROT SERPL-MCNC: 7.4 G/DL (ref 6.4–8.2)
RBC # BLD AUTO: 4.35 X10(6)UL
SODIUM SERPL-SCNC: 138 MMOL/L (ref 136–145)
TRIGL SERPL-MCNC: 110 MG/DL (ref 30–149)
VIT D+METAB SERPL-MCNC: 20.7 NG/ML (ref 30–100)
VLDLC SERPL CALC-MCNC: 19 MG/DL (ref 0–30)
WBC # BLD AUTO: 7.1 X10(3) UL (ref 4–11)

## 2022-10-03 PROCEDURE — 85025 COMPLETE CBC W/AUTO DIFF WBC: CPT | Performed by: FAMILY MEDICINE

## 2022-10-03 PROCEDURE — 82043 UR ALBUMIN QUANTITATIVE: CPT | Performed by: FAMILY MEDICINE

## 2022-10-03 PROCEDURE — 82306 VITAMIN D 25 HYDROXY: CPT | Performed by: FAMILY MEDICINE

## 2022-10-03 PROCEDURE — 80061 LIPID PANEL: CPT | Performed by: FAMILY MEDICINE

## 2022-10-03 PROCEDURE — 82570 ASSAY OF URINE CREATININE: CPT | Performed by: FAMILY MEDICINE

## 2022-10-03 PROCEDURE — 86140 C-REACTIVE PROTEIN: CPT | Performed by: FAMILY MEDICINE

## 2022-10-03 PROCEDURE — 80053 COMPREHEN METABOLIC PANEL: CPT | Performed by: FAMILY MEDICINE

## 2022-10-03 PROCEDURE — 1125F AMNT PAIN NOTED PAIN PRSNT: CPT | Performed by: FAMILY MEDICINE

## 2022-10-03 PROCEDURE — 1111F DSCHRG MED/CURRENT MED MERGE: CPT | Performed by: FAMILY MEDICINE

## 2022-10-03 PROCEDURE — G0439 PPPS, SUBSEQ VISIT: HCPCS | Performed by: FAMILY MEDICINE

## 2022-10-03 PROCEDURE — 85652 RBC SED RATE AUTOMATED: CPT | Performed by: FAMILY MEDICINE

## 2022-10-04 ENCOUNTER — OFFICE VISIT (OUTPATIENT)
Facility: CLINIC | Age: 77
End: 2022-10-04
Payer: MEDICARE

## 2022-10-04 ENCOUNTER — TELEPHONE (OUTPATIENT)
Facility: CLINIC | Age: 77
End: 2022-10-04

## 2022-10-04 VITALS
SYSTOLIC BLOOD PRESSURE: 100 MMHG | BODY MASS INDEX: 36.14 KG/M2 | WEIGHT: 233 LBS | HEIGHT: 67.5 IN | DIASTOLIC BLOOD PRESSURE: 58 MMHG | RESPIRATION RATE: 16 BRPM | HEART RATE: 80 BPM | OXYGEN SATURATION: 97 %

## 2022-10-04 DIAGNOSIS — I50.22 CHRONIC SYSTOLIC CONGESTIVE HEART FAILURE (HCC): ICD-10-CM

## 2022-10-04 DIAGNOSIS — E66.9 OBESITY (BMI 30-39.9): ICD-10-CM

## 2022-10-04 DIAGNOSIS — G47.33 OSA (OBSTRUCTIVE SLEEP APNEA): Primary | ICD-10-CM

## 2022-10-04 PROBLEM — E66.01 MORBID (SEVERE) OBESITY DUE TO EXCESS CALORIES (HCC): Status: ACTIVE | Noted: 2022-10-04

## 2022-10-04 PROBLEM — J44.9 CHRONIC OBSTRUCTIVE PULMONARY DISEASE, UNSPECIFIED (HCC): Status: ACTIVE | Noted: 2022-10-04

## 2022-10-04 PROCEDURE — 1111F DSCHRG MED/CURRENT MED MERGE: CPT | Performed by: OTHER

## 2022-10-04 PROCEDURE — 99214 OFFICE O/P EST MOD 30 MIN: CPT | Performed by: OTHER

## 2022-10-07 ENCOUNTER — LAB ENCOUNTER (OUTPATIENT)
Dept: LAB | Age: 77
End: 2022-10-07
Attending: INTERNAL MEDICINE
Payer: MEDICARE

## 2022-10-07 DIAGNOSIS — Z01.818 PRE-OP TESTING: ICD-10-CM

## 2022-10-08 DIAGNOSIS — N28.9 FUNCTION KIDNEY DECREASED: ICD-10-CM

## 2022-10-08 DIAGNOSIS — E55.9 VITAMIN D INSUFFICIENCY: Primary | ICD-10-CM

## 2022-10-08 DIAGNOSIS — D69.6 PLATELETS DECREASED (HCC): ICD-10-CM

## 2022-10-08 LAB — SARS-COV-2 RNA RESP QL NAA+PROBE: NOT DETECTED

## 2022-10-08 RX ORDER — ERGOCALCIFEROL 1.25 MG/1
50000 CAPSULE ORAL WEEKLY
Qty: 4 CAPSULE | Refills: 1 | Status: SHIPPED | OUTPATIENT
Start: 2022-10-08 | End: 2022-11-07

## 2022-10-08 NOTE — TELEPHONE ENCOUNTER
Has not read [facility], called patient.     Patient notified via detailed voicemail left at home number (ok per  HIPAA consent)      Future lab ordered, recall in epic      Routed to KE to advise on script

## 2022-10-10 ENCOUNTER — HOSPITAL ENCOUNTER (OUTPATIENT)
Dept: INTERVENTIONAL RADIOLOGY/VASCULAR | Facility: HOSPITAL | Age: 77
Discharge: HOME | End: 2022-10-10
Attending: INTERNAL MEDICINE | Admitting: INTERNAL MEDICINE
Payer: MEDICARE

## 2022-10-10 VITALS
OXYGEN SATURATION: 98 % | DIASTOLIC BLOOD PRESSURE: 69 MMHG | RESPIRATION RATE: 17 BRPM | HEART RATE: 63 BPM | SYSTOLIC BLOOD PRESSURE: 112 MMHG | TEMPERATURE: 97 F

## 2022-10-10 DIAGNOSIS — Z95.810 ICD (IMPLANTABLE CARDIOVERTER-DEFIBRILLATOR) IN PLACE: ICD-10-CM

## 2022-10-10 PROCEDURE — 75820 VEIN X-RAY ARM/LEG: CPT | Performed by: INTERNAL MEDICINE

## 2022-10-10 PROCEDURE — 36005 INJECTION EXT VENOGRAPHY: CPT | Performed by: INTERNAL MEDICINE

## 2022-10-10 PROCEDURE — B51NYZZ FLUOROSCOPY OF LEFT UPPER EXTREMITY VEINS USING OTHER CONTRAST: ICD-10-PCS | Performed by: INTERNAL MEDICINE

## 2022-10-10 RX ORDER — SODIUM CHLORIDE 9 MG/ML
INJECTION, SOLUTION INTRAVENOUS CONTINUOUS
Status: DISCONTINUED | OUTPATIENT
Start: 2022-10-10 | End: 2022-10-10

## 2022-10-10 RX ORDER — IODIXANOL 320 MG/ML
50 INJECTION, SOLUTION INTRAVASCULAR
Status: COMPLETED | OUTPATIENT
Start: 2022-10-10 | End: 2022-10-10

## 2022-10-10 RX ADMIN — IODIXANOL 20 ML: 320 INJECTION, SOLUTION INTRAVASCULAR at 09:38:00

## 2022-10-11 NOTE — TELEPHONE ENCOUNTER
Notified pt RUSTY REDD (titration ordered to [address] [place] [address]). Pt aware and will call the lab after his cardiac procedure.

## 2022-10-21 ENCOUNTER — LAB ENCOUNTER (OUTPATIENT)
Dept: LAB | Age: 77
End: 2022-10-21
Attending: INTERNAL MEDICINE
Payer: MEDICARE

## 2022-10-21 DIAGNOSIS — Z01.818 PRE-OP TESTING: ICD-10-CM

## 2022-10-21 DIAGNOSIS — Z01.818 PREOP EXAMINATION: Primary | ICD-10-CM

## 2022-10-21 DIAGNOSIS — D69.6 PLATELETS DECREASED (HCC): ICD-10-CM

## 2022-10-21 LAB
ANION GAP SERPL CALC-SCNC: 6 MMOL/L (ref 0–18)
BASOPHILS # BLD AUTO: 0.03 X10(3) UL (ref 0–0.2)
BASOPHILS NFR BLD AUTO: 0.4 %
BUN BLD-MCNC: 29 MG/DL (ref 7–18)
CALCIUM BLD-MCNC: 9.1 MG/DL (ref 8.5–10.1)
CHLORIDE SERPL-SCNC: 106 MMOL/L (ref 98–112)
CO2 SERPL-SCNC: 27 MMOL/L (ref 21–32)
CREAT BLD-MCNC: 1.33 MG/DL
EOSINOPHIL # BLD AUTO: 0.09 X10(3) UL (ref 0–0.7)
EOSINOPHIL NFR BLD AUTO: 1.3 %
ERYTHROCYTE [DISTWIDTH] IN BLOOD BY AUTOMATED COUNT: 15.8 %
FASTING STATUS PATIENT QL REPORTED: YES
GFR SERPLBLD BASED ON 1.73 SQ M-ARVRAT: 56 ML/MIN/1.73M2 (ref 60–?)
GLUCOSE BLD-MCNC: 94 MG/DL (ref 70–99)
HCT VFR BLD AUTO: 37.1 %
HGB BLD-MCNC: 11.7 G/DL
IMM GRANULOCYTES # BLD AUTO: 0.03 X10(3) UL (ref 0–1)
IMM GRANULOCYTES NFR BLD: 0.4 %
LYMPHOCYTES # BLD AUTO: 1.36 X10(3) UL (ref 1–4)
LYMPHOCYTES NFR BLD AUTO: 19.6 %
MCH RBC QN AUTO: 28.8 PG (ref 26–34)
MCHC RBC AUTO-ENTMCNC: 31.5 G/DL (ref 31–37)
MCV RBC AUTO: 91.4 FL
MONOCYTES # BLD AUTO: 1.25 X10(3) UL (ref 0.1–1)
MONOCYTES NFR BLD AUTO: 18 %
NEUTROPHILS # BLD AUTO: 4.18 X10 (3) UL (ref 1.5–7.7)
NEUTROPHILS # BLD AUTO: 4.18 X10(3) UL (ref 1.5–7.7)
NEUTROPHILS NFR BLD AUTO: 60.3 %
OSMOLALITY SERPL CALC.SUM OF ELEC: 294 MOSM/KG (ref 275–295)
PLATELET # BLD AUTO: 72 10(3)UL (ref 150–450)
POTASSIUM SERPL-SCNC: 4.1 MMOL/L (ref 3.5–5.1)
RBC # BLD AUTO: 4.06 X10(6)UL
SODIUM SERPL-SCNC: 139 MMOL/L (ref 136–145)
WBC # BLD AUTO: 6.9 X10(3) UL (ref 4–11)

## 2022-10-21 PROCEDURE — 80048 BASIC METABOLIC PNL TOTAL CA: CPT

## 2022-10-21 PROCEDURE — 85025 COMPLETE CBC W/AUTO DIFF WBC: CPT

## 2022-10-21 PROCEDURE — 36415 COLL VENOUS BLD VENIPUNCTURE: CPT

## 2022-10-22 LAB — SARS-COV-2 RNA RESP QL NAA+PROBE: NOT DETECTED

## 2022-10-24 ENCOUNTER — HOSPITAL ENCOUNTER (OUTPATIENT)
Dept: GENERAL RADIOLOGY | Facility: HOSPITAL | Age: 77
Discharge: HOME | End: 2022-10-24
Attending: INTERNAL MEDICINE | Admitting: INTERNAL MEDICINE
Payer: MEDICARE

## 2022-10-24 ENCOUNTER — HOSPITAL ENCOUNTER (OUTPATIENT)
Dept: INTERVENTIONAL RADIOLOGY/VASCULAR | Facility: HOSPITAL | Age: 77
Discharge: HOME | End: 2022-10-24
Attending: INTERNAL MEDICINE | Admitting: INTERNAL MEDICINE
Payer: MEDICARE

## 2022-10-24 VITALS
DIASTOLIC BLOOD PRESSURE: 64 MMHG | OXYGEN SATURATION: 96 % | RESPIRATION RATE: 19 BRPM | BODY MASS INDEX: 35 KG/M2 | WEIGHT: 227 LBS | SYSTOLIC BLOOD PRESSURE: 100 MMHG | HEART RATE: 68 BPM

## 2022-10-24 DIAGNOSIS — I42.9 CARDIOMYOPATHY (HCC): ICD-10-CM

## 2022-10-24 DIAGNOSIS — Z95.810 ICD (IMPLANTABLE CARDIOVERTER-DEFIBRILLATOR) IN PLACE: ICD-10-CM

## 2022-10-24 LAB — GLUCOSE BLD-MCNC: 99 MG/DL (ref 70–99)

## 2022-10-24 PROCEDURE — 0JPT0PZ REMOVAL OF CARDIAC RHYTHM RELATED DEVICE FROM TRUNK SUBCUTANEOUS TISSUE AND FASCIA, OPEN APPROACH: ICD-10-PCS | Performed by: INTERNAL MEDICINE

## 2022-10-24 PROCEDURE — 33264 RMVL & RPLCMT DFB GEN MLT LD: CPT | Performed by: INTERNAL MEDICINE

## 2022-10-24 PROCEDURE — 99152 MOD SED SAME PHYS/QHP 5/>YRS: CPT | Performed by: INTERNAL MEDICINE

## 2022-10-24 PROCEDURE — 71046 X-RAY EXAM CHEST 2 VIEWS: CPT | Performed by: INTERNAL MEDICINE

## 2022-10-24 PROCEDURE — 02H43KZ INSERTION OF DEFIBRILLATOR LEAD INTO CORONARY VEIN, PERCUTANEOUS APPROACH: ICD-10-PCS | Performed by: INTERNAL MEDICINE

## 2022-10-24 PROCEDURE — B51MYZZ FLUOROSCOPY OF RIGHT UPPER EXTREMITY VEINS USING OTHER CONTRAST: ICD-10-PCS | Performed by: INTERNAL MEDICINE

## 2022-10-24 PROCEDURE — 0JH609Z INSERTION OF CARDIAC RESYNCHRONIZATION DEFIBRILLATOR PULSE GENERATOR INTO CHEST SUBCUTANEOUS TISSUE AND FASCIA, OPEN APPROACH: ICD-10-PCS | Performed by: INTERNAL MEDICINE

## 2022-10-24 PROCEDURE — 99153 MOD SED SAME PHYS/QHP EA: CPT | Performed by: INTERNAL MEDICINE

## 2022-10-24 PROCEDURE — 82962 GLUCOSE BLOOD TEST: CPT

## 2022-10-24 PROCEDURE — 33225 L VENTRIC PACING LEAD ADD-ON: CPT | Performed by: INTERNAL MEDICINE

## 2022-10-24 RX ORDER — LIDOCAINE HYDROCHLORIDE 10 MG/ML
INJECTION, SOLUTION EPIDURAL; INFILTRATION; INTRACAUDAL; PERINEURAL
Status: COMPLETED
Start: 2022-10-24 | End: 2022-10-24

## 2022-10-24 RX ORDER — ONDANSETRON 2 MG/ML
4 INJECTION INTRAMUSCULAR; INTRAVENOUS EVERY 6 HOURS PRN
Status: DISCONTINUED | OUTPATIENT
Start: 2022-10-24 | End: 2022-10-24

## 2022-10-24 RX ORDER — VANCOMYCIN HYDROCHLORIDE 1 G/20ML
INJECTION, POWDER, LYOPHILIZED, FOR SOLUTION INTRAVENOUS
Status: COMPLETED
Start: 2022-10-24 | End: 2022-10-24

## 2022-10-24 RX ORDER — SODIUM CHLORIDE 9 MG/ML
INJECTION, SOLUTION INTRAVENOUS
Status: COMPLETED | OUTPATIENT
Start: 2022-10-25 | End: 2022-10-24

## 2022-10-24 RX ORDER — MIDAZOLAM HYDROCHLORIDE 1 MG/ML
INJECTION INTRAMUSCULAR; INTRAVENOUS
Status: COMPLETED
Start: 2022-10-24 | End: 2022-10-24

## 2022-10-24 RX ORDER — IODIXANOL 320 MG/ML
100 INJECTION, SOLUTION INTRAVASCULAR
Status: COMPLETED | OUTPATIENT
Start: 2022-10-24 | End: 2022-10-24

## 2022-10-24 RX ORDER — CHLORHEXIDINE GLUCONATE 4 G/100ML
30 SOLUTION TOPICAL
Status: DISCONTINUED | OUTPATIENT
Start: 2022-10-24 | End: 2022-10-24 | Stop reason: HOSPADM

## 2022-10-24 RX ORDER — VANCOMYCIN HYDROCHLORIDE
15
Status: DISCONTINUED | OUTPATIENT
Start: 2022-10-24 | End: 2022-10-24 | Stop reason: HOSPADM

## 2022-10-24 RX ADMIN — SODIUM CHLORIDE: 9 INJECTION, SOLUTION INTRAVENOUS at 08:00:00

## 2022-10-24 RX ADMIN — IODIXANOL 15 ML: 320 INJECTION, SOLUTION INTRAVASCULAR at 09:45:00

## 2022-10-24 NOTE — PROGRESS NOTES
10:00 Pt s/p CRT-D implant. Post procedure VSS, denied pain, tolerated PO intake. Dressing to L upper chest CDI with L arm in sling. 11:10 Pt to XRAY. 11:45 Pt back from XRAY. No complaints. VSS. Dressing to L uprer chest CDI    13:00 Pt will scant amount of blood on L chest dressing. Dressing removed and light manual pressure applied over site X10min with resolution. New dressing applied. 13:30 Pt tolerated PO intake and ambulation in room. Dressing to L chest CDI. Device Rep at bedside-discharge instructions given-pt verbalized understanding. CXR with no pneumothorax. Pt to lobby via WC with sling to LUE.

## 2022-10-25 NOTE — PROGRESS NOTES
Call back complete; pt had bleeding from site last night, \"pad placed over site\" per pt. No further bleeding. Per patient area is not hard. Instructed patient if bleeding re-occurs to call MD office, verbalized understanding. MD made aware of situation.

## 2022-10-26 ENCOUNTER — PATIENT OUTREACH (OUTPATIENT)
Dept: CASE MANAGEMENT | Age: 77
End: 2022-10-26

## 2022-10-26 NOTE — PROGRESS NOTES
Called patient and left a message for patient to call back when they can. Reviewed patient chart.  Left contact my contact number [phone]        Time Spent This Encounter Total: 5  min medical record review  Monthly Minute Total including today: 5 minutes

## 2022-11-04 RX ORDER — METFORMIN HYDROCHLORIDE 500 MG/1
TABLET, EXTENDED RELEASE ORAL
Qty: 360 TABLET | Refills: 0 | Status: SHIPPED | OUTPATIENT
Start: 2022-11-04

## 2022-11-04 RX ORDER — SPIRONOLACTONE 25 MG/1
TABLET ORAL
Qty: 90 TABLET | Refills: 1 | Status: SHIPPED | OUTPATIENT
Start: 2022-11-04

## 2022-11-04 NOTE — TELEPHONE ENCOUNTER
Forward to Dr. Magdy Segovia, there is a drug interaction with Entresto and Spironolactone. Please review and send meds if appropriate.

## 2022-11-14 ENCOUNTER — PATIENT OUTREACH (OUTPATIENT)
Dept: CASE MANAGEMENT | Age: 77
End: 2022-11-14

## 2022-11-14 NOTE — PROGRESS NOTES
Called patient and left a message for patient to call back when they can. Reviewed patient chart.  Left contact my contact number [phone]        Time Spent This Encounter Total: 5  min medical record review  Monthly Minute Total including today: 5 minutes

## 2022-11-22 ENCOUNTER — TELEPHONE (OUTPATIENT)
Dept: FAMILY MEDICINE CLINIC | Facility: CLINIC | Age: 77
End: 2022-11-22

## 2022-11-22 NOTE — TELEPHONE ENCOUNTER
Office does not have any samples    Patient notified via detailed voicemail left at home number (ok per  HIPAA consent). Advised if he does not have any available at pharmacy to call back to let us know and KERMIT can send script to pharmacy.

## 2022-12-08 ENCOUNTER — TELEPHONE (OUTPATIENT)
Dept: FAMILY MEDICINE CLINIC | Facility: CLINIC | Age: 77
End: 2022-12-08

## 2022-12-08 NOTE — TELEPHONE ENCOUNTER
Letter mailed to patient reminding him he is due for labs per pt reminder:    Inez Machuca, RN  [facility] Nurse  Recall bmp, vit D, cbc in 2 months.    Lab Frequency Next Occurrence   OCCULT BLOOD, FECAL, FIT IMMUNOASSAY Once 07/22/6339   BASIC METABOLIC PANEL (8) Once 05/65/1344   VITAMIN D Once 12/08/2022   CBC WITH DIFFERENTIAL WITH PLATELET Once 63/78/0771

## 2022-12-28 ENCOUNTER — PATIENT OUTREACH (OUTPATIENT)
Dept: CASE MANAGEMENT | Age: 77
End: 2022-12-28

## 2022-12-28 NOTE — PROGRESS NOTES
Called patient and left a message for patient to call back when they can. Reviewed patient chart.  Left contact my contact number [phone]        Time Spent This Encounter Total: 5  min medical record review  Monthly Minute Total including today: 5 minutes

## 2023-01-30 ENCOUNTER — PATIENT OUTREACH (OUTPATIENT)
Dept: CASE MANAGEMENT | Age: 78
End: 2023-01-30

## 2023-01-30 NOTE — PROGRESS NOTES
Called patient and left a message for patient to call back when they can. Reviewed patient chart.  Left contact my contact number [phone]        Time Spent This Encounter Total: 5  min medical record review  Monthly Minute Total including today: 5 minutes

## 2023-02-20 ENCOUNTER — TELEPHONE (OUTPATIENT)
Dept: FAMILY MEDICINE CLINIC | Facility: CLINIC | Age: 78
End: 2023-02-20

## 2023-02-20 ENCOUNTER — NURSE ONLY (OUTPATIENT)
Dept: ENDOCRINOLOGY CLINIC | Facility: CLINIC | Age: 78
End: 2023-02-20
Payer: MEDICARE

## 2023-02-20 VITALS — WEIGHT: 279 LBS | BODY MASS INDEX: 43 KG/M2

## 2023-02-20 DIAGNOSIS — Z79.4 TYPE 2 DIABETES MELLITUS WITH HYPERGLYCEMIA, WITH LONG-TERM CURRENT USE OF INSULIN (HCC): ICD-10-CM

## 2023-02-20 DIAGNOSIS — E11.65 TYPE 2 DIABETES MELLITUS WITH HYPERGLYCEMIA, WITH LONG-TERM CURRENT USE OF INSULIN (HCC): ICD-10-CM

## 2023-02-20 PROCEDURE — G0108 DIAB MANAGE TRN  PER INDIV: HCPCS | Performed by: DIETITIAN, REGISTERED

## 2023-02-20 RX ORDER — INSULIN GLARGINE 300 U/ML
INJECTION, SOLUTION SUBCUTANEOUS
Qty: 1 EACH | Refills: 0 | COMMUNITY
Start: 2023-02-20

## 2023-02-20 NOTE — TELEPHONE ENCOUNTER
Sonia Quiroz -diabetes educator called to ask if we have sample of toujeo. She said max would be great.

## 2023-02-20 NOTE — PROGRESS NOTES
Delia Norris : 1945 was seen for Diabetic Education Follow up:    Date: 23 Referring Provider: Dr. Josep Engel  Start time: 9am End time: 9:30am    Assessment:     Diagnosis: Type 2 DM    Reason for visit: 6 month follow-up    Changes since last visit:  - Lifestyle:no longer working  - Meals:states he is eating same foods  - Medications: Toujeo 20 units & metformin 1000 mg twice daily  - Due for A1C    SMBG 23 to 23    14-day av mg/dL  Fastin-120 mg/dL     Prelunch:  mg/dL    Predinner: 128 mg/dL    2 hr postdinner: 149 mg/dL      Education:     DIABETES REVIEW:  - Importance of improved BG control in preventing complications    HEALTHY EATING:  - effect of food on BG, timing of meal, use of snacks/fiber, barriers    MONITORING:  - Type of meter:Zain Contour Next EZ  - Testing Schedule: varied times; fasting , premeal/2 hr post meals    TAKING MEDICATION:  Oral Agents:   Metformin ER 2-500 mg twice daily    Injectables: Toujeo 20 units daily    REDUCING RISKS:  - relationship between glucose control and risk for complications in eye, heart, kidneys,nerves,teeth,foot care, skin,  Foot Care Guidelines  Due for an A1C    Recommendations:      1. Follow recommended meal plan. 2. Test BG fasting, pre/ 2 hours post meals  once daily. 3. Bring glucose logs/meter to all provider visits for review. 4. Decrease Toujeo to 18 units daily, continue Metformin as current dose   5. Encouraged  to call diabetes center with any questions or concerns. 6. Follow-up in 6 months    Patient verbalized understanding and has no further questions at this time.     Abram Lara RN, [facility]

## 2023-02-28 ENCOUNTER — PATIENT OUTREACH (OUTPATIENT)
Dept: CASE MANAGEMENT | Age: 78
End: 2023-02-28

## 2023-02-28 NOTE — PROGRESS NOTES
Called patient and left a message for patient to call back when they can. Reviewed patient chart.  Left contact my contact number [phone]        Time Spent This Encounter Total: 5  min medical record review  Monthly Minute Total including today: 5 minutes

## 2023-03-08 NOTE — TELEPHONE ENCOUNTER
Last refilled 11/4/22 for #360 with 0 RF  LOV with KE 10/3/22  No future appt with KE  Last Micro 10/3/22  Last A1C 8/11/22    Diabetic Medication Protocol Failed 03/07/2023 04:42 PM   Protocol Details  HgBA1C procedure resulted in past 6 months    Last HgBA1C < 7.5    Microalbumin procedure in past 12 months or taking ACE/ARB    Appointment in past 6 or next 3 months

## 2023-03-09 RX ORDER — METFORMIN HYDROCHLORIDE 500 MG/1
TABLET, EXTENDED RELEASE ORAL
Qty: 360 TABLET | Refills: 0 | Status: SHIPPED | OUTPATIENT
Start: 2023-03-09

## 2023-03-30 ENCOUNTER — NURSE ONLY (OUTPATIENT)
Dept: FAMILY MEDICINE CLINIC | Facility: CLINIC | Age: 78
End: 2023-03-30
Payer: MEDICARE

## 2023-03-30 DIAGNOSIS — D69.6 PLATELETS DECREASED (HCC): ICD-10-CM

## 2023-03-30 DIAGNOSIS — N28.9 FUNCTION KIDNEY DECREASED: ICD-10-CM

## 2023-03-30 DIAGNOSIS — E55.9 VITAMIN D INSUFFICIENCY: ICD-10-CM

## 2023-03-30 DIAGNOSIS — M05.79 SEROPOSITIVE RHEUMATOID ARTHRITIS OF MULTIPLE SITES (HCC): Primary | ICD-10-CM

## 2023-03-30 DIAGNOSIS — E11.65 TYPE 2 DIABETES MELLITUS WITH HYPERGLYCEMIA, WITH LONG-TERM CURRENT USE OF INSULIN (HCC): ICD-10-CM

## 2023-03-30 DIAGNOSIS — Z79.4 TYPE 2 DIABETES MELLITUS WITH HYPERGLYCEMIA, WITH LONG-TERM CURRENT USE OF INSULIN (HCC): ICD-10-CM

## 2023-03-30 LAB
ALBUMIN SERPL-MCNC: 3.8 G/DL (ref 3.4–5)
ALBUMIN/GLOB SERPL: 1 {RATIO} (ref 1–2)
ALP LIVER SERPL-CCNC: 50 U/L
ALT SERPL-CCNC: 21 U/L
ANION GAP SERPL CALC-SCNC: 3 MMOL/L (ref 0–18)
AST SERPL-CCNC: 20 U/L (ref 15–37)
BASOPHILS # BLD AUTO: 0.04 X10(3) UL (ref 0–0.2)
BASOPHILS NFR BLD AUTO: 0.4 %
BILIRUB SERPL-MCNC: 0.4 MG/DL (ref 0.1–2)
BUN BLD-MCNC: 32 MG/DL (ref 7–18)
CALCIUM BLD-MCNC: 9.4 MG/DL (ref 8.5–10.1)
CHLORIDE SERPL-SCNC: 110 MMOL/L (ref 98–112)
CO2 SERPL-SCNC: 26 MMOL/L (ref 21–32)
CREAT BLD-MCNC: 1.5 MG/DL
CRP SERPL-MCNC: <0.29 MG/DL (ref ?–0.3)
EOSINOPHIL # BLD AUTO: 0.17 X10(3) UL (ref 0–0.7)
EOSINOPHIL NFR BLD AUTO: 1.8 %
ERYTHROCYTE [DISTWIDTH] IN BLOOD BY AUTOMATED COUNT: 15.7 %
ERYTHROCYTE [SEDIMENTATION RATE] IN BLOOD: 72 MM/HR
FASTING STATUS PATIENT QL REPORTED: NO
GFR SERPLBLD BASED ON 1.73 SQ M-ARVRAT: 48 ML/MIN/1.73M2 (ref 60–?)
GLOBULIN PLAS-MCNC: 3.7 G/DL (ref 2.8–4.4)
GLUCOSE BLD-MCNC: 111 MG/DL (ref 70–99)
HCT VFR BLD AUTO: 36.3 %
HGB BLD-MCNC: 11.6 G/DL
IMM GRANULOCYTES # BLD AUTO: 0.09 X10(3) UL (ref 0–1)
IMM GRANULOCYTES NFR BLD: 1 %
LYMPHOCYTES # BLD AUTO: 1.36 X10(3) UL (ref 1–4)
LYMPHOCYTES NFR BLD AUTO: 14.6 %
MCH RBC QN AUTO: 29.2 PG (ref 26–34)
MCHC RBC AUTO-ENTMCNC: 32 G/DL (ref 31–37)
MCV RBC AUTO: 91.4 FL
MONOCYTES # BLD AUTO: 2.97 X10(3) UL (ref 0.1–1)
MONOCYTES NFR BLD AUTO: 32 %
NEUTROPHILS # BLD AUTO: 4.66 X10 (3) UL (ref 1.5–7.7)
NEUTROPHILS # BLD AUTO: 4.66 X10(3) UL (ref 1.5–7.7)
NEUTROPHILS NFR BLD AUTO: 50.2 %
OSMOLALITY SERPL CALC.SUM OF ELEC: 296 MOSM/KG (ref 275–295)
PLATELET # BLD AUTO: 72 10(3)UL (ref 150–450)
POTASSIUM SERPL-SCNC: 4.8 MMOL/L (ref 3.5–5.1)
PROT SERPL-MCNC: 7.5 G/DL (ref 6.4–8.2)
RBC # BLD AUTO: 3.97 X10(6)UL
SODIUM SERPL-SCNC: 139 MMOL/L (ref 136–145)
VIT D+METAB SERPL-MCNC: 20.8 NG/ML (ref 30–100)
WBC # BLD AUTO: 9.3 X10(3) UL (ref 4–11)

## 2023-03-30 PROCEDURE — 82306 VITAMIN D 25 HYDROXY: CPT | Performed by: FAMILY MEDICINE

## 2023-03-30 PROCEDURE — 83036 HEMOGLOBIN GLYCOSYLATED A1C: CPT | Performed by: INTERNAL MEDICINE

## 2023-03-30 PROCEDURE — 80053 COMPREHEN METABOLIC PANEL: CPT | Performed by: INTERNAL MEDICINE

## 2023-03-30 PROCEDURE — 86140 C-REACTIVE PROTEIN: CPT | Performed by: INTERNAL MEDICINE

## 2023-03-30 PROCEDURE — 85025 COMPLETE CBC W/AUTO DIFF WBC: CPT | Performed by: INTERNAL MEDICINE

## 2023-03-30 PROCEDURE — 85652 RBC SED RATE AUTOMATED: CPT | Performed by: INTERNAL MEDICINE

## 2023-03-30 NOTE — PROGRESS NOTES
Patient to clinic for labs per:  Dr Kwaku Power, CBC and Vit D  Dr Dyana Brand- CMP, CBC, ESR and CRP  (standing order every 3 months)  Dr Joseph Garibay- BMP. CBC    Gold, mint and lavender tube drawn left lateral AC x 1 attempt.     Patient left office in stable condition      Orders placed under Dr Dyana Brand  Please fax results to Dr Joseph Garibay at [phone]

## 2023-03-31 ENCOUNTER — MED REC SCAN ONLY (OUTPATIENT)
Dept: FAMILY MEDICINE CLINIC | Facility: CLINIC | Age: 78
End: 2023-03-31

## 2023-03-31 DIAGNOSIS — E11.65 TYPE 2 DIABETES MELLITUS WITH HYPERGLYCEMIA, WITH LONG-TERM CURRENT USE OF INSULIN (HCC): Primary | ICD-10-CM

## 2023-03-31 DIAGNOSIS — Z79.4 TYPE 2 DIABETES MELLITUS WITH HYPERGLYCEMIA, WITH LONG-TERM CURRENT USE OF INSULIN (HCC): Primary | ICD-10-CM

## 2023-03-31 LAB
EST. AVERAGE GLUCOSE BLD GHB EST-MCNC: 143 MG/DL (ref 68–126)
HBA1C MFR BLD: 6.6 % (ref ?–5.7)

## 2023-04-01 ENCOUNTER — TELEPHONE (OUTPATIENT)
Dept: FAMILY MEDICINE CLINIC | Facility: CLINIC | Age: 78
End: 2023-04-01

## 2023-04-01 DIAGNOSIS — E55.9 VITAMIN D DEFICIENCY: Primary | ICD-10-CM

## 2023-04-01 RX ORDER — ERGOCALCIFEROL 1.25 MG/1
50000 CAPSULE ORAL WEEKLY
Qty: 8 CAPSULE | Refills: 0 | Status: SHIPPED | OUTPATIENT
Start: 2023-04-01 | End: 2023-05-21

## 2023-04-01 NOTE — TELEPHONE ENCOUNTER
Left message on voicemail/answering machine for patient to call office    mychart sent as well.  Hold to confirm read    Results have been faxed to Dr Rebekah Rodriguez and Dr Dwayne Smart as well

## 2023-04-01 NOTE — TELEPHONE ENCOUNTER
Pls let pt know that his A1c is still in a good range. DM is under control with current medications. Please continue. Vit D is low. I recommend an RX dose of 50,000 units once a week for the next 2 months (until there is more sunshine). Then, lets recheck A1c and vit D in 3 months. Script sent.

## 2023-04-04 ENCOUNTER — MED REC SCAN ONLY (OUTPATIENT)
Dept: FAMILY MEDICINE CLINIC | Facility: CLINIC | Age: 78
End: 2023-04-04

## 2023-04-05 ENCOUNTER — TELEPHONE (OUTPATIENT)
Dept: FAMILY MEDICINE CLINIC | Facility: CLINIC | Age: 78
End: 2023-04-05

## 2023-04-05 NOTE — TELEPHONE ENCOUNTER
Pt states that he got a call from our office last week-  He states he did not get the whole story on his lab results    RN reviewed the [facility] sent last week with pt- verbalized understanding

## 2023-04-19 ENCOUNTER — PATIENT OUTREACH (OUTPATIENT)
Dept: CASE MANAGEMENT | Age: 78
End: 2023-04-19

## 2023-04-19 NOTE — PROGRESS NOTES
Called patient and left a message for patient to call back when they can. Reviewed patient chart.  Left contact my contact number [phone]        Time Spent This Encounter Total: 5  min medical record review  Monthly Minute Total including today: 5 minutes

## 2023-04-24 DIAGNOSIS — E11.65 TYPE 2 DIABETES MELLITUS WITH HYPERGLYCEMIA, WITH LONG-TERM CURRENT USE OF INSULIN (HCC): ICD-10-CM

## 2023-04-24 DIAGNOSIS — Z79.4 TYPE 2 DIABETES MELLITUS WITH HYPERGLYCEMIA, WITH LONG-TERM CURRENT USE OF INSULIN (HCC): ICD-10-CM

## 2023-04-24 RX ORDER — PEN NEEDLE, DIABETIC 32GX 5/32"
NEEDLE, DISPOSABLE MISCELLANEOUS
Qty: 300 EACH | Refills: 3 | Status: SHIPPED | OUTPATIENT
Start: 2023-04-24

## 2023-04-24 NOTE — TELEPHONE ENCOUNTER
Diabetic Supplies Protocol Passed 04/24/2023 09:02 AM    Appointment in the past 12 or next 3 months     Refilled per protocol

## 2023-04-28 ENCOUNTER — TELEPHONE (OUTPATIENT)
Dept: FAMILY MEDICINE CLINIC | Facility: CLINIC | Age: 78
End: 2023-04-28

## 2023-05-15 ENCOUNTER — MED REC SCAN ONLY (OUTPATIENT)
Dept: FAMILY MEDICINE CLINIC | Facility: CLINIC | Age: 78
End: 2023-05-15

## 2023-05-15 ENCOUNTER — TELEPHONE (OUTPATIENT)
Dept: FAMILY MEDICINE CLINIC | Facility: CLINIC | Age: 78
End: 2023-05-15

## 2023-05-15 NOTE — TELEPHONE ENCOUNTER
Exam note received from [facility]  Exam date 5/13/23  Yes, retinopathy noted  flowsheet updated  Report to scanning

## 2023-05-19 ENCOUNTER — PATIENT OUTREACH (OUTPATIENT)
Dept: CASE MANAGEMENT | Age: 78
End: 2023-05-19

## 2023-05-30 ENCOUNTER — TELEPHONE (OUTPATIENT)
Dept: FAMILY MEDICINE CLINIC | Facility: CLINIC | Age: 78
End: 2023-05-30

## 2023-05-30 DIAGNOSIS — E55.9 VITAMIN D DEFICIENCY: Primary | ICD-10-CM

## 2023-05-30 DIAGNOSIS — Z79.4 TYPE 2 DIABETES MELLITUS WITH HYPERGLYCEMIA, WITH LONG-TERM CURRENT USE OF INSULIN (HCC): ICD-10-CM

## 2023-05-30 DIAGNOSIS — E11.65 TYPE 2 DIABETES MELLITUS WITH HYPERGLYCEMIA, WITH LONG-TERM CURRENT USE OF INSULIN (HCC): ICD-10-CM

## 2023-05-30 RX ORDER — SPIRONOLACTONE 25 MG/1
TABLET ORAL
Qty: 90 TABLET | Refills: 0 | Status: SHIPPED | OUTPATIENT
Start: 2023-05-30

## 2023-05-30 RX ORDER — METFORMIN HYDROCHLORIDE 500 MG/1
TABLET, EXTENDED RELEASE ORAL
Qty: 360 TABLET | Refills: 0 | Status: SHIPPED | OUTPATIENT
Start: 2023-05-30

## 2023-05-30 NOTE — TELEPHONE ENCOUNTER
Diabetic Medication Protocol Passed 05/30/2023 02:45 PM   Protocol Details  HgBA1C procedure resulted in past 6 months    Last HgBA1C < 7.5    Microalbumin procedure in past 12 months or taking ACE/ARB    Appointment in past 6 or next 3 months        Hypertension Medications Protocol Passed 05/30/2023 02:45 PM   Protocol Details  CMP or BMP in past 12 months    Last serum creatinine< 2.0    Appointment in past 6 or next 3 months        Last OV 10/3/22  Last lab 3/30/23 A1c 6.6, cmp/creat 1.5  Last refilled:  3/9/23 metformin #360  0 refills  11/4/22 spironolactone #90  1 refill

## 2023-05-30 NOTE — TELEPHONE ENCOUNTER
Pt finished rx vitamin D and was told to re-check levels upon completion.  Pt scheduled for Thursday @ 11am, asking for vitamin D order to be placed in chart

## 2023-05-30 NOTE — TELEPHONE ENCOUNTER
Per 4/1/23 tel enc repeat Vit D and A1c in 3 months    Patient notified via detailed voicemail left at cell number (ok per  HIPAA consent). Advised can wait another month to repeat Vit D with A1c. If wants to separate draws that is fine, just let office know what he prefers.

## 2023-06-01 NOTE — TELEPHONE ENCOUNTER
Has rescheduled appt    Future Appointments   Date Time Provider Lloyd Garrido   6/30/2023 10:00 AM [facility] NURSE Geronimo Reyes [facility]   8/21/2023  9:00 AM Meche , RN, CDE [facility]

## 2023-06-26 ENCOUNTER — PATIENT OUTREACH (OUTPATIENT)
Dept: CASE MANAGEMENT | Age: 78
End: 2023-06-26

## 2023-06-26 NOTE — PROGRESS NOTES
Called patient and left a message for patient to call back when they can. Reviewed patient chart.  Left contact my contact number [phone]        Time Spent This Encounter Total: 5  min medical record review  Monthly Minute Total including today: 5 minutes

## 2023-06-29 ENCOUNTER — TELEPHONE (OUTPATIENT)
Dept: FAMILY MEDICINE CLINIC | Facility: CLINIC | Age: 78
End: 2023-06-29

## 2023-06-30 ENCOUNTER — NURSE ONLY (OUTPATIENT)
Dept: FAMILY MEDICINE CLINIC | Facility: CLINIC | Age: 78
End: 2023-06-30
Payer: MEDICARE

## 2023-06-30 DIAGNOSIS — E11.65 TYPE 2 DIABETES MELLITUS WITH HYPERGLYCEMIA, WITH LONG-TERM CURRENT USE OF INSULIN (HCC): ICD-10-CM

## 2023-06-30 DIAGNOSIS — E55.9 VITAMIN D DEFICIENCY: ICD-10-CM

## 2023-06-30 DIAGNOSIS — Z79.4 TYPE 2 DIABETES MELLITUS WITH HYPERGLYCEMIA, WITH LONG-TERM CURRENT USE OF INSULIN (HCC): ICD-10-CM

## 2023-06-30 LAB
EST. AVERAGE GLUCOSE BLD GHB EST-MCNC: 137 MG/DL (ref 68–126)
HBA1C MFR BLD: 6.4 % (ref ?–5.7)
VIT D+METAB SERPL-MCNC: 35.7 NG/ML (ref 30–100)

## 2023-06-30 PROCEDURE — 83036 HEMOGLOBIN GLYCOSYLATED A1C: CPT | Performed by: FAMILY MEDICINE

## 2023-06-30 PROCEDURE — 82306 VITAMIN D 25 HYDROXY: CPT | Performed by: FAMILY MEDICINE

## 2023-07-01 ENCOUNTER — TELEPHONE (OUTPATIENT)
Dept: FAMILY MEDICINE CLINIC | Facility: CLINIC | Age: 78
End: 2023-07-01

## 2023-07-01 DIAGNOSIS — Z79.4 TYPE 2 DIABETES MELLITUS WITH HYPERGLYCEMIA, WITH LONG-TERM CURRENT USE OF INSULIN (HCC): ICD-10-CM

## 2023-07-01 DIAGNOSIS — E11.65 TYPE 2 DIABETES MELLITUS WITH HYPERGLYCEMIA, WITH LONG-TERM CURRENT USE OF INSULIN (HCC): ICD-10-CM

## 2023-07-01 DIAGNOSIS — E55.9 VITAMIN D DEFICIENCY: Primary | ICD-10-CM

## 2023-07-01 RX ORDER — INSULIN GLARGINE 300 U/ML
INJECTION, SOLUTION SUBCUTANEOUS
Qty: 30 ML | Refills: 0 | Status: SHIPPED | OUTPATIENT
Start: 2023-07-01

## 2023-08-16 DIAGNOSIS — E11.65 TYPE 2 DIABETES MELLITUS WITH HYPERGLYCEMIA, WITH LONG-TERM CURRENT USE OF INSULIN (HCC): ICD-10-CM

## 2023-08-16 DIAGNOSIS — Z79.4 TYPE 2 DIABETES MELLITUS WITH HYPERGLYCEMIA, WITH LONG-TERM CURRENT USE OF INSULIN (HCC): ICD-10-CM

## 2023-08-17 RX ORDER — LANCETS
EACH MISCELLANEOUS
Qty: 300 EACH | Refills: 3 | Status: SHIPPED | OUTPATIENT
Start: 2023-08-17

## 2023-08-24 ENCOUNTER — NURSE ONLY (OUTPATIENT)
Dept: ENDOCRINOLOGY CLINIC | Facility: CLINIC | Age: 78
End: 2023-08-24
Payer: MEDICARE

## 2023-08-24 DIAGNOSIS — Z79.4 TYPE 2 DIABETES MELLITUS WITH HYPERGLYCEMIA, WITH LONG-TERM CURRENT USE OF INSULIN (HCC): Primary | ICD-10-CM

## 2023-08-24 DIAGNOSIS — E11.65 TYPE 2 DIABETES MELLITUS WITH HYPERGLYCEMIA, WITH LONG-TERM CURRENT USE OF INSULIN (HCC): Primary | ICD-10-CM

## 2023-08-24 PROCEDURE — G0108 DIAB MANAGE TRN  PER INDIV: HCPCS | Performed by: DIETITIAN, REGISTERED

## 2023-08-27 NOTE — PROGRESS NOTES
Natividad Vaughan : 1945 was seen for Diabetic Education Follow up:    Date: 23  Referring Provider: Dr. 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 - Meals:hasn't change his eating habits  - Medications: Toujeo 10 daily, Metformin 1,000 mg twice daily  - Exercise:more an than usual   - Last A1C 6.4%    SMBG 23 to  23    Fastin-133 mg/dl    Education:     HEALTHY EATING:  - effect of food on BG, timing of meal, use of snacks/fiber, barriers    BEING ACTIVE:  - types of activity, frequency , duration: more active since working     MONITORING:  - Type of meter:Contour Next EZ  - Testing Schedule: fasting only     TAKING MEDICATION:  Oral Agents:   Metformin 1,000 mg twice daily    Injectables: Toujeo 10 units daily;has been adjusting according to fastings  Site inspection: still rotating sites    Hypoglycemia  - Causes/symptoms/prevention/treatment-Rule of 15  - Low Blood Glucose: <70 mg/dL  - Contact MD if >2BG <70 in 1 week    REDUCING RISKS:  - relationship between glucose control and risk for complications in eye, heart, kidneys,nerves,teeth,foot care, skin,  Foot Care Guidelines    Recommendations:      1. Follow recommended meal plan. 2. Test BG fasting/2 hours post meals ; needs to vary the times   3. Continue current doses of Toujeo & Metformin    4. Bring glucose logs / meter to all provider visits for review. 5. Encouraged activity if no restrictions. 6. Encouraged  to call diabetes center with any questions or concerns. 7. Follow-up in 6 months    Patient verbalized understanding and has no further questions at this time.     Dorene Cuellar RN, [facility]

## 2023-08-31 ENCOUNTER — PATIENT OUTREACH (OUTPATIENT)
Dept: CASE MANAGEMENT | Age: 78
End: 2023-08-31

## 2023-09-01 ENCOUNTER — NURSE ONLY (OUTPATIENT)
Dept: FAMILY MEDICINE CLINIC | Facility: CLINIC | Age: 78
End: 2023-09-01
Payer: MEDICARE

## 2023-09-01 DIAGNOSIS — M05.79 SEROPOSITIVE RHEUMATOID ARTHRITIS OF MULTIPLE SITES (HCC): ICD-10-CM

## 2023-09-01 LAB
ALBUMIN SERPL-MCNC: 3.9 G/DL (ref 3.4–5)
ALBUMIN/GLOB SERPL: 1 {RATIO} (ref 1–2)
ALP LIVER SERPL-CCNC: 56 U/L
ALT SERPL-CCNC: 20 U/L
ANION GAP SERPL CALC-SCNC: 5 MMOL/L (ref 0–18)
AST SERPL-CCNC: 14 U/L (ref 15–37)
BASOPHILS # BLD AUTO: 0.03 X10(3) UL (ref 0–0.2)
BASOPHILS NFR BLD AUTO: 0.3 %
BILIRUB SERPL-MCNC: 0.3 MG/DL (ref 0.1–2)
BUN BLD-MCNC: 32 MG/DL (ref 7–18)
CALCIUM BLD-MCNC: 9 MG/DL (ref 8.5–10.1)
CHLORIDE SERPL-SCNC: 105 MMOL/L (ref 98–112)
CO2 SERPL-SCNC: 26 MMOL/L (ref 21–32)
CREAT BLD-MCNC: 1.43 MG/DL
CRP SERPL-MCNC: <0.29 MG/DL (ref ?–0.3)
EGFRCR SERPLBLD CKD-EPI 2021: 51 ML/MIN/1.73M2 (ref 60–?)
EOSINOPHIL # BLD AUTO: 0.07 X10(3) UL (ref 0–0.7)
EOSINOPHIL NFR BLD AUTO: 0.7 %
ERYTHROCYTE [DISTWIDTH] IN BLOOD BY AUTOMATED COUNT: 16.7 %
ERYTHROCYTE [SEDIMENTATION RATE] IN BLOOD: 65 MM/HR
FASTING STATUS PATIENT QL REPORTED: NO
GLOBULIN PLAS-MCNC: 3.8 G/DL (ref 2.8–4.4)
GLUCOSE BLD-MCNC: 137 MG/DL (ref 70–99)
HCT VFR BLD AUTO: 35 %
HGB BLD-MCNC: 11.1 G/DL
IMM GRANULOCYTES # BLD AUTO: 0.08 X10(3) UL (ref 0–1)
IMM GRANULOCYTES NFR BLD: 0.8 %
LYMPHOCYTES # BLD AUTO: 1.53 X10(3) UL (ref 1–4)
LYMPHOCYTES NFR BLD AUTO: 14.4 %
MCH RBC QN AUTO: 29.1 PG (ref 26–34)
MCHC RBC AUTO-ENTMCNC: 31.7 G/DL (ref 31–37)
MCV RBC AUTO: 91.6 FL
MONOCYTES # BLD AUTO: 2.07 X10(3) UL (ref 0.1–1)
MONOCYTES NFR BLD AUTO: 19.5 %
NEUTROPHILS # BLD AUTO: 6.86 X10 (3) UL (ref 1.5–7.7)
NEUTROPHILS # BLD AUTO: 6.86 X10(3) UL (ref 1.5–7.7)
NEUTROPHILS NFR BLD AUTO: 64.3 %
OSMOLALITY SERPL CALC.SUM OF ELEC: 291 MOSM/KG (ref 275–295)
PLATELET # BLD AUTO: 72 10(3)UL (ref 150–450)
POTASSIUM SERPL-SCNC: 4.4 MMOL/L (ref 3.5–5.1)
PROT SERPL-MCNC: 7.7 G/DL (ref 6.4–8.2)
RBC # BLD AUTO: 3.82 X10(6)UL
SODIUM SERPL-SCNC: 136 MMOL/L (ref 136–145)
WBC # BLD AUTO: 10.6 X10(3) UL (ref 4–11)

## 2023-09-01 PROCEDURE — 86140 C-REACTIVE PROTEIN: CPT | Performed by: INTERNAL MEDICINE

## 2023-09-01 PROCEDURE — 80053 COMPREHEN METABOLIC PANEL: CPT | Performed by: INTERNAL MEDICINE

## 2023-09-01 PROCEDURE — 85652 RBC SED RATE AUTOMATED: CPT | Performed by: INTERNAL MEDICINE

## 2023-09-01 PROCEDURE — 85025 COMPLETE CBC W/AUTO DIFF WBC: CPT | Performed by: INTERNAL MEDICINE

## 2023-09-05 RX ORDER — SPIRONOLACTONE 25 MG/1
25 TABLET ORAL
Qty: 90 TABLET | Refills: 0 | Status: SHIPPED | OUTPATIENT
Start: 2023-09-05

## 2023-09-05 NOTE — TELEPHONE ENCOUNTER
Hypertension Medications Protocol [id number]/05/2023 12:06 PM   Protocol Details Appointment in past 6 or next 3 months    CMP or BMP in past 12 months    Last serum creatinine< 2.0

## 2023-09-18 RX ORDER — METFORMIN HYDROCHLORIDE 500 MG/1
1000 TABLET, EXTENDED RELEASE ORAL 2 TIMES DAILY WITH MEALS
Qty: 360 TABLET | Refills: 0 | Status: SHIPPED | OUTPATIENT
Start: 2023-09-18

## 2023-09-29 ENCOUNTER — PATIENT OUTREACH (OUTPATIENT)
Dept: CASE MANAGEMENT | Age: 78
End: 2023-09-29

## 2023-09-29 NOTE — PROGRESS NOTES
Called patient and left a message for patient to call back when they can. Reviewed patient chart.  Left contact my contact number [phone]        Time Spent This Encounter Total: 5  min medical record review  Monthly Minute Total including today: 5 minutes

## 2023-10-12 ENCOUNTER — OFFICE VISIT (OUTPATIENT)
Dept: FAMILY MEDICINE CLINIC | Facility: CLINIC | Age: 78
End: 2023-10-12
Payer: MEDICARE

## 2023-10-12 VITALS
DIASTOLIC BLOOD PRESSURE: 70 MMHG | OXYGEN SATURATION: 96 % | TEMPERATURE: 97 F | SYSTOLIC BLOOD PRESSURE: 128 MMHG | RESPIRATION RATE: 18 BRPM | BODY MASS INDEX: 38 KG/M2 | WEIGHT: 244.38 LBS | HEART RATE: 73 BPM

## 2023-10-12 DIAGNOSIS — R68.2 DRY MOUTH: ICD-10-CM

## 2023-10-12 DIAGNOSIS — G47.33 OSA (OBSTRUCTIVE SLEEP APNEA): ICD-10-CM

## 2023-10-12 DIAGNOSIS — I65.29 STENOSIS OF CAROTID ARTERY, UNSPECIFIED LATERALITY: ICD-10-CM

## 2023-10-12 DIAGNOSIS — E55.9 VITAMIN D DEFICIENCY: ICD-10-CM

## 2023-10-12 DIAGNOSIS — I25.84 CORONARY ARTERY DISEASE DUE TO CALCIFIED CORONARY LESION: ICD-10-CM

## 2023-10-12 DIAGNOSIS — Z00.00 ENCOUNTER FOR ANNUAL HEALTH EXAMINATION: Primary | ICD-10-CM

## 2023-10-12 DIAGNOSIS — R04.2 COUGH WITH HEMOPTYSIS: ICD-10-CM

## 2023-10-12 DIAGNOSIS — I27.21 PULMONARY HYPERTENSION SECONDARY TO INCREASED PVR (HCC): ICD-10-CM

## 2023-10-12 DIAGNOSIS — D64.9 ANEMIA, UNSPECIFIED TYPE: ICD-10-CM

## 2023-10-12 DIAGNOSIS — E78.00 PURE HYPERCHOLESTEROLEMIA: ICD-10-CM

## 2023-10-12 DIAGNOSIS — I25.5 ISCHEMIC CARDIOMYOPATHY: ICD-10-CM

## 2023-10-12 DIAGNOSIS — Z79.4 TYPE 2 DIABETES MELLITUS WITH HYPERGLYCEMIA, WITH LONG-TERM CURRENT USE OF INSULIN (HCC): ICD-10-CM

## 2023-10-12 DIAGNOSIS — D69.6 THROMBOCYTOPENIA (HCC): ICD-10-CM

## 2023-10-12 DIAGNOSIS — J44.9 CHRONIC OBSTRUCTIVE PULMONARY DISEASE, UNSPECIFIED COPD TYPE (HCC): ICD-10-CM

## 2023-10-12 DIAGNOSIS — I50.22 CHRONIC SYSTOLIC CONGESTIVE HEART FAILURE (HCC): ICD-10-CM

## 2023-10-12 DIAGNOSIS — R06.09 DYSPNEA ON EXERTION: ICD-10-CM

## 2023-10-12 DIAGNOSIS — I25.10 CORONARY ARTERY DISEASE DUE TO CALCIFIED CORONARY LESION: ICD-10-CM

## 2023-10-12 DIAGNOSIS — E66.01 MORBID (SEVERE) OBESITY DUE TO EXCESS CALORIES (HCC): ICD-10-CM

## 2023-10-12 DIAGNOSIS — D72.821 MONOCYTOSIS: ICD-10-CM

## 2023-10-12 DIAGNOSIS — R14.0 ABDOMINAL DISTENSION: ICD-10-CM

## 2023-10-12 DIAGNOSIS — M05.79 RHEUMATOID ARTHRITIS INVOLVING MULTIPLE SITES WITH POSITIVE RHEUMATOID FACTOR (HCC): ICD-10-CM

## 2023-10-12 DIAGNOSIS — E11.65 TYPE 2 DIABETES MELLITUS WITH HYPERGLYCEMIA, WITH LONG-TERM CURRENT USE OF INSULIN (HCC): ICD-10-CM

## 2023-10-12 DIAGNOSIS — D64.9 NORMOCYTIC ANEMIA: ICD-10-CM

## 2023-10-12 DIAGNOSIS — I42.8 NONISCHEMIC CARDIOMYOPATHY (HCC): ICD-10-CM

## 2023-10-12 PROBLEM — I50.33 ACUTE ON CHRONIC DIASTOLIC HEART FAILURE (HCC): Status: RESOLVED | Noted: 2022-05-15 | Resolved: 2023-10-12

## 2023-10-12 PROBLEM — I50.23 ACUTE ON CHRONIC SYSTOLIC CONGESTIVE HEART FAILURE (HCC): Status: RESOLVED | Noted: 2022-09-06 | Resolved: 2023-10-12

## 2023-10-12 LAB
CREAT UR-SCNC: 41.4 MG/DL
MICROALBUMIN UR-MCNC: <0.5 MG/DL

## 2023-10-12 PROCEDURE — 82570 ASSAY OF URINE CREATININE: CPT | Performed by: FAMILY MEDICINE

## 2023-10-12 PROCEDURE — G0439 PPPS, SUBSEQ VISIT: HCPCS | Performed by: FAMILY MEDICINE

## 2023-10-12 PROCEDURE — 1125F AMNT PAIN NOTED PAIN PRSNT: CPT | Performed by: FAMILY MEDICINE

## 2023-10-12 PROCEDURE — 82043 UR ALBUMIN QUANTITATIVE: CPT | Performed by: FAMILY MEDICINE

## 2023-10-12 RX ORDER — KETOROLAC TROMETHAMINE 5 MG/ML
SOLUTION OPHTHALMIC
COMMUNITY
Start: 2023-06-12

## 2023-10-12 RX ORDER — PREDNISOLONE ACETATE 10 MG/ML
SUSPENSION/ DROPS OPHTHALMIC
COMMUNITY
Start: 2023-06-12

## 2023-10-12 RX ORDER — LOSARTAN POTASSIUM 25 MG/1
25 TABLET ORAL EVERY EVENING
COMMUNITY
Start: 2023-09-08

## 2023-10-12 RX ORDER — OFLOXACIN 3 MG/ML
SOLUTION/ DROPS OPHTHALMIC
COMMUNITY
Start: 2023-06-13

## 2023-10-26 ENCOUNTER — HOSPITAL ENCOUNTER (OUTPATIENT)
Dept: CT IMAGING | Age: 78
Discharge: HOME | End: 2023-10-26
Attending: FAMILY MEDICINE

## 2023-10-26 DIAGNOSIS — R04.2 COUGH WITH HEMOPTYSIS: ICD-10-CM

## 2023-10-26 DIAGNOSIS — D64.9 ANEMIA, UNSPECIFIED TYPE: ICD-10-CM

## 2023-10-26 DIAGNOSIS — R14.0 ABDOMINAL DISTENSION: ICD-10-CM

## 2023-10-26 DIAGNOSIS — D72.821 MONOCYTOSIS: ICD-10-CM

## 2023-10-26 LAB
CREAT BLD-MCNC: 1.4 MG/DL
EGFRCR SERPLBLD CKD-EPI 2021: 52 ML/MIN/1.73M2 (ref 60–?)

## 2023-10-26 PROCEDURE — 82565 ASSAY OF CREATININE: CPT

## 2023-10-26 PROCEDURE — 71260 CT THORAX DX C+: CPT | Performed by: FAMILY MEDICINE

## 2023-10-26 PROCEDURE — 74177 CT ABD & PELVIS W/CONTRAST: CPT | Performed by: FAMILY MEDICINE

## 2023-10-30 ENCOUNTER — PATIENT OUTREACH (OUTPATIENT)
Dept: CASE MANAGEMENT | Age: 78
End: 2023-10-30

## 2023-10-30 NOTE — PROGRESS NOTES
Pt arrived from lab. IV d.cd and pt discharged to home in stable condition.  No sedation received Spoke with patient and conveyed Dulce Tobias PA-C's comments regarding patients most recent lab results.         Patient states she will contact Dr. MADALYN Brooke, her gastroenterologist to follow up with.     Patient states she abstains from etoh, works out 3days/week, but had gallbladder removal surgery in June.     Patient acknowledged understanding and thankful for call.

## 2023-10-30 NOTE — PROGRESS NOTES
Called patient and left a message for patient to call back when they can. Reviewed patient chart.  Left contact my contact number [phone]        Time Spent This Encounter Total: 5  min medical record review  Monthly Minute Total including today: 5 minutes

## 2023-11-21 RX ORDER — SPIRONOLACTONE 25 MG/1
25 TABLET ORAL
Qty: 90 TABLET | Refills: 0 | Status: SHIPPED | OUTPATIENT
Start: 2023-11-21

## 2023-11-21 NOTE — TELEPHONE ENCOUNTER
Hypertension Medications Protocol [id number]/21/2023 11:51 AM   Protocol Details CMP or BMP in past 12 months    Last serum creatinine< 2.0    Appointment in past 6 or next 3 months          LOV 10/12/23    Labs 1.40 Creatinine 10/24/23    Last Refill 9/5/23 #90 Refill 0     Future Appointments   Date Time Provider Lloyd Garrido   2/26/2024  9:00 AM Refugio Garcia RN, [facility]

## 2023-12-01 ENCOUNTER — PATIENT OUTREACH (OUTPATIENT)
Dept: CASE MANAGEMENT | Age: 78
End: 2023-12-01

## 2023-12-01 NOTE — PROGRESS NOTES
Called patient and left a message for patient to call back when they can. Reviewed patient chart.  Left contact my contact number [phone]        Time Spent This Encounter Total: 3  min medical record review  Monthly Minute Total including today: 3 minutes

## 2023-12-02 DIAGNOSIS — Z79.4 TYPE 2 DIABETES MELLITUS WITH HYPERGLYCEMIA, WITH LONG-TERM CURRENT USE OF INSULIN (HCC): ICD-10-CM

## 2023-12-02 DIAGNOSIS — E11.65 TYPE 2 DIABETES MELLITUS WITH HYPERGLYCEMIA, WITH LONG-TERM CURRENT USE OF INSULIN (HCC): ICD-10-CM

## 2023-12-04 ENCOUNTER — TELEPHONE (OUTPATIENT)
Dept: FAMILY MEDICINE CLINIC | Facility: CLINIC | Age: 78
End: 2023-12-04

## 2023-12-04 RX ORDER — LANCETS
EACH MISCELLANEOUS
Qty: 300 EACH | Refills: 3 | Status: SHIPPED | OUTPATIENT
Start: 2023-12-04

## 2023-12-04 NOTE — TELEPHONE ENCOUNTER
PATIENT WOULD LIKE TO SCHEDULE LAB DRAW FROM DR GUSTAFSON (HEART FAILURE-CARD) HE WOULD LIKE TO HAVE THESE DONE PRIOR TO HIS VISIT WITH DR UGSTAFSON. I DO NOT SEE LAB ORDERS. I DO SEE LAST OV FROM DR GUSTAFSON FROM 9-7-2023. PATIENT SAYS HE WOULD LIKE TO COME IN THIS AFTERNOON.

## 2023-12-05 ENCOUNTER — NURSE ONLY (OUTPATIENT)
Dept: FAMILY MEDICINE CLINIC | Facility: CLINIC | Age: 78
End: 2023-12-05
Payer: MEDICARE

## 2023-12-05 DIAGNOSIS — M05.79 SEROPOSITIVE RHEUMATOID ARTHRITIS OF MULTIPLE SITES (HCC): ICD-10-CM

## 2023-12-05 DIAGNOSIS — I25.5 ISCHEMIC CARDIOMYOPATHY: Primary | ICD-10-CM

## 2023-12-05 DIAGNOSIS — E78.00 PURE HYPERCHOLESTEROLEMIA: ICD-10-CM

## 2023-12-05 DIAGNOSIS — I65.29 STENOSIS OF CAROTID ARTERY, UNSPECIFIED LATERALITY: ICD-10-CM

## 2023-12-05 DIAGNOSIS — I27.21 PULMONARY HYPERTENSION SECONDARY TO INCREASED PVR (HCC): ICD-10-CM

## 2023-12-05 LAB
ALBUMIN SERPL-MCNC: 3.7 G/DL (ref 3.4–5)
ALBUMIN/GLOB SERPL: 1 {RATIO} (ref 1–2)
ALP LIVER SERPL-CCNC: 52 U/L
ALT SERPL-CCNC: 40 U/L
ANION GAP SERPL CALC-SCNC: 5 MMOL/L (ref 0–18)
AST SERPL-CCNC: 44 U/L (ref 15–37)
BASOPHILS # BLD AUTO: 0.01 X10(3) UL (ref 0–0.2)
BASOPHILS NFR BLD AUTO: 0.2 %
BILIRUB SERPL-MCNC: 0.5 MG/DL (ref 0.1–2)
BUN BLD-MCNC: 22 MG/DL (ref 9–23)
CALCIUM BLD-MCNC: 8.7 MG/DL (ref 8.5–10.1)
CHLORIDE SERPL-SCNC: 106 MMOL/L (ref 98–112)
CO2 SERPL-SCNC: 28 MMOL/L (ref 21–32)
CREAT BLD-MCNC: 1.56 MG/DL
CRP SERPL-MCNC: <0.29 MG/DL (ref ?–0.3)
EGFRCR SERPLBLD CKD-EPI 2021: 46 ML/MIN/1.73M2 (ref 60–?)
EOSINOPHIL # BLD AUTO: 0.05 X10(3) UL (ref 0–0.7)
EOSINOPHIL NFR BLD AUTO: 0.8 %
ERYTHROCYTE [DISTWIDTH] IN BLOOD BY AUTOMATED COUNT: 15.6 %
ERYTHROCYTE [SEDIMENTATION RATE] IN BLOOD: 65 MM/HR
FASTING STATUS PATIENT QL REPORTED: NO
GLOBULIN PLAS-MCNC: 3.8 G/DL (ref 2.8–4.4)
GLUCOSE BLD-MCNC: 116 MG/DL (ref 70–99)
HCT VFR BLD AUTO: 36.2 %
HGB BLD-MCNC: 11.5 G/DL
IMM GRANULOCYTES # BLD AUTO: 0.03 X10(3) UL (ref 0–1)
IMM GRANULOCYTES NFR BLD: 0.5 %
LYMPHOCYTES # BLD AUTO: 1.11 X10(3) UL (ref 1–4)
LYMPHOCYTES NFR BLD AUTO: 18.6 %
MCH RBC QN AUTO: 29.2 PG (ref 26–34)
MCHC RBC AUTO-ENTMCNC: 31.8 G/DL (ref 31–37)
MCV RBC AUTO: 91.9 FL
MONOCYTES # BLD AUTO: 0.96 X10(3) UL (ref 0.1–1)
MONOCYTES NFR BLD AUTO: 16.1 %
NEUTROPHILS # BLD AUTO: 3.8 X10 (3) UL (ref 1.5–7.7)
NEUTROPHILS # BLD AUTO: 3.8 X10(3) UL (ref 1.5–7.7)
NEUTROPHILS NFR BLD AUTO: 63.8 %
OSMOLALITY SERPL CALC.SUM OF ELEC: 292 MOSM/KG (ref 275–295)
PLATELET # BLD AUTO: 57 10(3)UL (ref 150–450)
POTASSIUM SERPL-SCNC: 4.2 MMOL/L (ref 3.5–5.1)
PROT SERPL-MCNC: 7.5 G/DL (ref 6.4–8.2)
RBC # BLD AUTO: 3.94 X10(6)UL
SODIUM SERPL-SCNC: 139 MMOL/L (ref 136–145)
WBC # BLD AUTO: 6 X10(3) UL (ref 4–11)

## 2023-12-05 PROCEDURE — 85025 COMPLETE CBC W/AUTO DIFF WBC: CPT | Performed by: INTERNAL MEDICINE

## 2023-12-05 PROCEDURE — 86140 C-REACTIVE PROTEIN: CPT | Performed by: INTERNAL MEDICINE

## 2023-12-05 PROCEDURE — 85652 RBC SED RATE AUTOMATED: CPT | Performed by: INTERNAL MEDICINE

## 2023-12-05 PROCEDURE — 80053 COMPREHEN METABOLIC PANEL: CPT | Performed by: INTERNAL MEDICINE

## 2023-12-05 NOTE — PROGRESS NOTES
Patient to clinic for labs per Dr Sweet File and Dr Georgina Walker and lavender tube drawn left AC x 1 attempt    Patient left office in stable condition

## 2023-12-18 RX ORDER — METFORMIN HYDROCHLORIDE 500 MG/1
1000 TABLET, EXTENDED RELEASE ORAL 2 TIMES DAILY WITH MEALS
Qty: 360 TABLET | Refills: 0 | Status: SHIPPED | OUTPATIENT
Start: 2023-12-18

## 2023-12-18 NOTE — TELEPHONE ENCOUNTER
Diabetic Medication Protocol [id number]/17/2023 03:45 PM   Protocol Details HgBA1C procedure resulted in past 6 months    Last HgBA1C < 7.5    Microalbumin procedure in past 12 months or taking ACE/ARB    Appointment in past 6 or next 3 months       LOV 10/12/23    Last Refill #360 Refill 0    Labs 6/30/23     Future Appointments   Date Time Provider Lloyd Garrido   2/26/2024  9:00 AM Fadia Henderson RN, [facility] [place]

## 2024-01-03 ENCOUNTER — PATIENT OUTREACH (OUTPATIENT)
Dept: CASE MANAGEMENT | Age: 79
End: 2024-01-03

## 2024-01-03 DIAGNOSIS — Z79.4 TYPE 2 DIABETES MELLITUS WITH HYPERGLYCEMIA, WITH LONG-TERM CURRENT USE OF INSULIN (HCC): Primary | ICD-10-CM

## 2024-01-03 DIAGNOSIS — E11.65 TYPE 2 DIABETES MELLITUS WITH HYPERGLYCEMIA, WITH LONG-TERM CURRENT USE OF INSULIN (HCC): Primary | ICD-10-CM

## 2024-01-03 DIAGNOSIS — D69.6 THROMBOCYTOPENIA (HCC): ICD-10-CM

## 2024-01-03 DIAGNOSIS — G47.33 OSA (OBSTRUCTIVE SLEEP APNEA): ICD-10-CM

## 2024-01-03 NOTE — PROGRESS NOTES
Called patient and left a message for patient to call back when they can. Reviewed patient chart. Left contact my contact number [phone]        Time Spent This Encounter Total: 5  min medical record review  Monthly Minute Total including today: 5 minutes

## 2024-01-08 ENCOUNTER — TELEPHONE (OUTPATIENT)
Dept: FAMILY MEDICINE CLINIC | Facility: CLINIC | Age: 79
End: 2024-01-08

## 2024-01-08 NOTE — TELEPHONE ENCOUNTER
Patient notified via detailed voicemail left at cell number (ok per  HIPAA consent)  Number to schedule with ENT provided

## 2024-01-08 NOTE — TELEPHONE ENCOUNTER
He can see ENT for sinuses and throat. Pato is fine.   No further recs. He can see me if needed, but doesn't sound like these things are new.

## 2024-01-08 NOTE — TELEPHONE ENCOUNTER
Patient stated he has noticed the last few weeks he has increased fatigue and finds himself sleeping a lot throughout the day. Patient is not sure if it is normal or if its is something he should be worried about. Patient also reports a productive cough he has had on and off with mucus that is dark in color. Please advise

## 2024-01-08 NOTE — PROGRESS NOTES
Spoke to David for CCM.      Updates to patient care team/comments: No changes per patient  Patient reported changes in medications: No changes per patient  Med Adherence  Comment: Taking as prescribed     Health Maintenance: Pt aware  Health Maintenance   Topic Date Due    COVID-19 Vaccine (1) Never done    Zoster Vaccines (1 of 2) Never done    Influenza Vaccine (1) Never done    Diabetes Care A1C  12/30/2023    Annual Depression Screening  01/01/2024    Fall Risk Screening (Annual)  01/01/2024    Diabetes Care Foot Exam (Annual)  01/01/2024    Diabetes Care Dilated Eye Exam  05/13/2024    Annual Physical  10/12/2024    Diabetes Care: Microalb/Creat Ratio  10/12/2024    Diabetes Care: GFR  12/05/2024    Pneumococcal Vaccine: 65+ Years  Completed    Colorectal Cancer Screening  Discontinued     Patient updates/concerns:  Patient states he has been feeling more fatigue then usual. Patient stated he is not sure if it is due to not being as active like when he was working that he is feeling so tired. Patient stated the fatigue is almost daily. Patient agreed to have a message sent to Dr. Harris on his behalf so she is aware of his symptoms.     [facility]    Goals/Action Plan:    Active goal from previous outreach:     Stay active and improve fatigue patient states  Patient reported progress towards goals:              - What: Patient stated he has noticed the last few weeks he has increased fatigue and fins himself sleeping a lot throughout the day. Patient is not sure if it is normal or if its is something he should be worried about. Patient also reports a productive cough he has on and off with mucus that is dark in color.           - Where/When/How: TE was sent to pcp clinical staff at patient requests so he cam discuss symptoms with a nurse.   Patient Reported Barriers and Concerns: Increased fatigue and productive cough with dark colored mucus he has had on and off.                    - Plan for overcoming barriers: Message sent to pcp on patient behalf. Patient to follow up with care team as scheduled or as needed.    Care Managers Interventions: Discussed with patient proper nutrition, hydration and exercise. Patient is aware of our next outreach, has agreed to being contacted via telephone. Patient verbalized understanding.     Future Appointments: Pt aware  Future Appointments   Date Time Provider Department Center   2/26/2024  9:00 AM Ivana Mitchell RN, [facility]     Next Care Manager Follow Up Date: 1 month follow up or sooner if needed    Reason For Follow Up: review progress and or barriers towards patient's goals.     Time Spent This Encounter Total: 20 min medical record review, telephone communication, care plan updates where needed, education, goals, and action plan recreation/update. Provided acknowledgment and validation to patient's concerns.   Monthly Minute Total including today: 20  Physical assessment, complete health history, and need for CCM established by Thea Harris DO.

## 2024-01-08 NOTE — TELEPHONE ENCOUNTER
Patient states dark mucus has been several months  Got progressively worse a week ago. Has slowed down a bit.  When overexerts his breathing is labored. States that has been going on for a couple years.  Has been in the hospital 2-3 times and needed diuretics.  States he does not think it is due to excess fluid as he has not gained weight.   No chest pain.   Fatigue the last few weeks.   Recently stopped working so sleep habits have changed.  Sleeps longer and takes naps now.    Offered to schedule appt with KERMIT. Patient declined. States he does not have any concerns with his symptoms.  States he discussed cough/sinus drainage with KERMIT and she recommended a doctor to see but he has not scheduled.  Does not remember who was recommended. No referral/order in epic. Requests call back with recommendation     Routed to KERMIT to advise

## 2024-01-10 ENCOUNTER — TELEPHONE (OUTPATIENT)
Dept: FAMILY MEDICINE CLINIC | Facility: CLINIC | Age: 79
End: 2024-01-10

## 2024-01-10 NOTE — TELEPHONE ENCOUNTER
Letter mailed to patient reminding them they have outstanding orders.  Lab Frequency Next Occurrence   Vitamin D [E] Once 10/01/2023

## 2024-01-30 RX ORDER — METFORMIN HYDROCHLORIDE 500 MG/1
1000 TABLET, EXTENDED RELEASE ORAL 2 TIMES DAILY WITH MEALS
Qty: 360 TABLET | Refills: 0 | Status: SHIPPED | OUTPATIENT
Start: 2024-01-30

## 2024-01-30 RX ORDER — PERPHENAZINE 16 MG/1
TABLET, FILM COATED ORAL
Qty: 300 EACH | Refills: 1 | Status: SHIPPED | OUTPATIENT
Start: 2024-01-30

## 2024-01-30 NOTE — TELEPHONE ENCOUNTER
Last OV 10/12/23  Last lab 3/30/23  A1c 6.6  Last refilled 12/18/23  #360  0 refill    Diabetic Medication Protocol [id number]/30/2024 05:57 AM   Protocol Details HgBA1C procedure resulted in past 6 months    Last HgBA1C < 7.5    Microalbumin procedure in past 12 months or taking ACE/ARB    Appointment in past 6 or next 3 months

## 2024-02-01 ENCOUNTER — NURSE ONLY (OUTPATIENT)
Dept: FAMILY MEDICINE CLINIC | Facility: CLINIC | Age: 79
End: 2024-02-01
Payer: MEDICARE

## 2024-02-01 DIAGNOSIS — I25.10 CORONARY ARTERY DISEASE DUE TO CALCIFIED CORONARY LESION: Primary | ICD-10-CM

## 2024-02-01 DIAGNOSIS — I25.84 CORONARY ARTERY DISEASE DUE TO CALCIFIED CORONARY LESION: Primary | ICD-10-CM

## 2024-02-01 DIAGNOSIS — I50.22 CHRONIC SYSTOLIC CONGESTIVE HEART FAILURE (HCC): ICD-10-CM

## 2024-02-01 DIAGNOSIS — E78.00 PURE HYPERCHOLESTEROLEMIA: ICD-10-CM

## 2024-02-01 DIAGNOSIS — E11.65 TYPE 2 DIABETES MELLITUS WITH HYPERGLYCEMIA, WITH LONG-TERM CURRENT USE OF INSULIN (HCC): ICD-10-CM

## 2024-02-01 DIAGNOSIS — Z95.1 HX OF CABG: ICD-10-CM

## 2024-02-01 DIAGNOSIS — I42.8 NONISCHEMIC CARDIOMYOPATHY (HCC): ICD-10-CM

## 2024-02-01 DIAGNOSIS — Z79.4 TYPE 2 DIABETES MELLITUS WITH HYPERGLYCEMIA, WITH LONG-TERM CURRENT USE OF INSULIN (HCC): ICD-10-CM

## 2024-02-01 LAB
ALBUMIN SERPL-MCNC: 3.8 G/DL (ref 3.4–5)
ALBUMIN/GLOB SERPL: 1.1 {RATIO} (ref 1–2)
ALP LIVER SERPL-CCNC: 58 U/L
ALT SERPL-CCNC: 32 U/L
ANION GAP SERPL CALC-SCNC: 3 MMOL/L (ref 0–18)
AST SERPL-CCNC: 33 U/L (ref 15–37)
BASOPHILS # BLD AUTO: 0.02 X10(3) UL (ref 0–0.2)
BASOPHILS NFR BLD AUTO: 0.5 %
BILIRUB SERPL-MCNC: 0.6 MG/DL (ref 0.1–2)
BUN BLD-MCNC: 24 MG/DL (ref 9–23)
CALCIUM BLD-MCNC: 9.2 MG/DL (ref 8.5–10.1)
CHLORIDE SERPL-SCNC: 109 MMOL/L (ref 98–112)
CHOLEST SERPL-MCNC: 120 MG/DL (ref ?–200)
CO2 SERPL-SCNC: 28 MMOL/L (ref 21–32)
CREAT BLD-MCNC: 1.46 MG/DL
EGFRCR SERPLBLD CKD-EPI 2021: 50 ML/MIN/1.73M2 (ref 60–?)
EOSINOPHIL # BLD AUTO: 0.05 X10(3) UL (ref 0–0.7)
EOSINOPHIL NFR BLD AUTO: 1.2 %
ERYTHROCYTE [DISTWIDTH] IN BLOOD BY AUTOMATED COUNT: 16 %
EST. AVERAGE GLUCOSE BLD GHB EST-MCNC: 140 MG/DL (ref 68–126)
FASTING PATIENT LIPID ANSWER: YES
FASTING STATUS PATIENT QL REPORTED: YES
GLOBULIN PLAS-MCNC: 3.6 G/DL (ref 2.8–4.4)
GLUCOSE BLD-MCNC: 133 MG/DL (ref 70–99)
HBA1C MFR BLD: 6.5 % (ref ?–5.7)
HCT VFR BLD AUTO: 34.9 %
HDLC SERPL-MCNC: 36 MG/DL (ref 40–59)
HGB BLD-MCNC: 11 G/DL
IMM GRANULOCYTES # BLD AUTO: 0.02 X10(3) UL (ref 0–1)
IMM GRANULOCYTES NFR BLD: 0.5 %
LDLC SERPL CALC-MCNC: 62 MG/DL (ref ?–100)
LYMPHOCYTES # BLD AUTO: 1.24 X10(3) UL (ref 1–4)
LYMPHOCYTES NFR BLD AUTO: 30 %
MCH RBC QN AUTO: 29.9 PG (ref 26–34)
MCHC RBC AUTO-ENTMCNC: 31.5 G/DL (ref 31–37)
MCV RBC AUTO: 94.8 FL
MONOCYTES # BLD AUTO: 0.52 X10(3) UL (ref 0.1–1)
MONOCYTES NFR BLD AUTO: 12.6 %
NEUTROPHILS # BLD AUTO: 2.28 X10 (3) UL (ref 1.5–7.7)
NEUTROPHILS # BLD AUTO: 2.28 X10(3) UL (ref 1.5–7.7)
NEUTROPHILS NFR BLD AUTO: 55.2 %
NONHDLC SERPL-MCNC: 84 MG/DL (ref ?–130)
NT-PROBNP SERPL-MCNC: 4382 PG/ML (ref ?–450)
OSMOLALITY SERPL CALC.SUM OF ELEC: 296 MOSM/KG (ref 275–295)
PLATELET # BLD AUTO: 53 10(3)UL (ref 150–450)
POTASSIUM SERPL-SCNC: 4.6 MMOL/L (ref 3.5–5.1)
PROT SERPL-MCNC: 7.4 G/DL (ref 6.4–8.2)
RBC # BLD AUTO: 3.68 X10(6)UL
SODIUM SERPL-SCNC: 140 MMOL/L (ref 136–145)
T4 FREE SERPL-MCNC: 0.9 NG/DL (ref 0.8–1.7)
TRIGL SERPL-MCNC: 121 MG/DL (ref 30–149)
TSI SER-ACNC: 4.24 MIU/ML (ref 0.36–3.74)
VIT D+METAB SERPL-MCNC: 21.4 NG/ML (ref 30–100)
VLDLC SERPL CALC-MCNC: 18 MG/DL (ref 0–30)
WBC # BLD AUTO: 4.1 X10(3) UL (ref 4–11)

## 2024-02-01 PROCEDURE — 82306 VITAMIN D 25 HYDROXY: CPT | Performed by: INTERNAL MEDICINE

## 2024-02-01 PROCEDURE — 84443 ASSAY THYROID STIM HORMONE: CPT | Performed by: INTERNAL MEDICINE

## 2024-02-01 PROCEDURE — 85025 COMPLETE CBC W/AUTO DIFF WBC: CPT | Performed by: INTERNAL MEDICINE

## 2024-02-01 PROCEDURE — 83036 HEMOGLOBIN GLYCOSYLATED A1C: CPT | Performed by: FAMILY MEDICINE

## 2024-02-01 PROCEDURE — 84439 ASSAY OF FREE THYROXINE: CPT | Performed by: INTERNAL MEDICINE

## 2024-02-01 PROCEDURE — 80061 LIPID PANEL: CPT | Performed by: INTERNAL MEDICINE

## 2024-02-01 PROCEDURE — 80053 COMPREHEN METABOLIC PANEL: CPT | Performed by: INTERNAL MEDICINE

## 2024-02-01 PROCEDURE — 85025 COMPLETE CBC W/AUTO DIFF WBC: CPT | Performed by: FAMILY MEDICINE

## 2024-02-01 PROCEDURE — 83880 ASSAY OF NATRIURETIC PEPTIDE: CPT | Performed by: INTERNAL MEDICINE

## 2024-02-01 NOTE — PROGRESS NOTES
Patient to clinic for labs per Dr Sung  Patient brought orders for lipid, cmp, pro BNP, vit D and tsh  Orders placed    Gold, mint and lavender tube drawn right AC x 1 attempt    Patient left office in stable condition

## 2024-02-07 ENCOUNTER — PATIENT OUTREACH (OUTPATIENT)
Dept: CASE MANAGEMENT | Age: 79
End: 2024-02-07

## 2024-02-07 NOTE — PROGRESS NOTES
Called patient and left a message for patient to call back when they can. Reviewed patient chart. Left contact my contact number [phone]        Time Spent This Encounter Total: 5  min medical record review  Monthly Minute Total including today: 5 minutes

## 2024-03-04 ENCOUNTER — NURSE ONLY (OUTPATIENT)
Dept: ENDOCRINOLOGY CLINIC | Facility: CLINIC | Age: 79
End: 2024-03-04
Payer: MEDICARE

## 2024-03-04 VITALS — BODY MASS INDEX: 38 KG/M2 | WEIGHT: 244 LBS

## 2024-03-04 DIAGNOSIS — Z79.4 TYPE 2 DIABETES MELLITUS WITH HYPERGLYCEMIA, WITH LONG-TERM CURRENT USE OF INSULIN (HCC): Primary | ICD-10-CM

## 2024-03-04 DIAGNOSIS — E11.65 TYPE 2 DIABETES MELLITUS WITH HYPERGLYCEMIA, WITH LONG-TERM CURRENT USE OF INSULIN (HCC): Primary | ICD-10-CM

## 2024-03-05 ENCOUNTER — TELEPHONE (OUTPATIENT)
Dept: ENDOCRINOLOGY CLINIC | Facility: CLINIC | Age: 79
End: 2024-03-05

## 2024-03-05 DIAGNOSIS — Z79.4 TYPE 2 DIABETES MELLITUS WITH HYPERGLYCEMIA, WITH LONG-TERM CURRENT USE OF INSULIN (HCC): Primary | ICD-10-CM

## 2024-03-05 DIAGNOSIS — E11.65 TYPE 2 DIABETES MELLITUS WITH HYPERGLYCEMIA, WITH LONG-TERM CURRENT USE OF INSULIN (HCC): Primary | ICD-10-CM

## 2024-03-05 RX ORDER — INSULIN GLARGINE 300 U/ML
24 INJECTION, SOLUTION SUBCUTANEOUS DAILY
Qty: 27 ML | Refills: 1 | Status: SHIPPED | OUTPATIENT
Start: 2024-03-05

## 2024-03-05 NOTE — PROGRESS NOTES
David Reyes : 1945 was seen for Diabetic Education Follow up:    Date: 3/4/24  Referring Provider: Dr. 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  - Meals:admits he has pasta & Italian bread in large portions for dinner  - Medications:Toujeo 12 units,Metformin ER 2-500 mg tabs twice daily  - Exercise:none  - Most recent A1C: 6.5%    SMBG 24 to 3/4/24     14-day av mg/dl  Fastin-160 mg/dl      Education:     DIABETES REVIEW:  - Importance of improved BG control in preventing complications    HEALTHY EATING:  - effect of food on BG, timing of meal, use of snacks/fiber, barriers : try to eat whole grain bread or whole wheat pasta or br ulices    MONITORING:  - Type of meter:Contour Next EZ  - Testing Schedule: fasting only    TAKING MEDICATION:  Oral Agents:   Metformin ER 2-500 mg tabs twice daily    Injectables:   Toujeo 12 units daily    REDUCING RISKS:  - relationship between glucose control and risk for complications in eye, heart, kidneys,nerves,teeth,foot care, skin,  Foot Care Guidelines  Reviewed lab test results; noted that BUN & Creatinine are elevated and GFR is <60 mg/dl    Recommendations:      1. Follow recommended meal plan;continue to portion carbs   2. Test BG fasting; has requested he check at different times of the day   3. Bring glucose logs to all provider visits for review.   4. Continue current medications: Can increase Toujeo by 1 unit to 13 units daily;fasting results are sometimes 140-160 mg/dl   5. Encouraged  to call diabetes center with any questions or concerns.   6. Follow-up in  6 months    Patient verbalized understanding and has no further questions at this time.    Ivana Mitchell RN, [facility]

## 2024-03-05 NOTE — TELEPHONE ENCOUNTER
Pt. said that he has received a prescription for Toujeo Max in the past but not recently. I provides double the amt of insulin thereby saving the pt. money for additional copays.I have pended the prescription for signature if agreeble w/plan. Pt. Is due to refill soon.

## 2024-03-08 ENCOUNTER — TELEPHONE (OUTPATIENT)
Dept: FAMILY MEDICINE CLINIC | Facility: CLINIC | Age: 79
End: 2024-03-08

## 2024-03-08 ENCOUNTER — PATIENT OUTREACH (OUTPATIENT)
Dept: CASE MANAGEMENT | Age: 79
End: 2024-03-08

## 2024-03-08 DIAGNOSIS — Z79.4 TYPE 2 DIABETES MELLITUS WITH HYPERGLYCEMIA, WITH LONG-TERM CURRENT USE OF INSULIN (HCC): Primary | ICD-10-CM

## 2024-03-08 DIAGNOSIS — I50.22 CHRONIC SYSTOLIC CONGESTIVE HEART FAILURE (HCC): ICD-10-CM

## 2024-03-08 DIAGNOSIS — E11.65 TYPE 2 DIABETES MELLITUS WITH HYPERGLYCEMIA, WITH LONG-TERM CURRENT USE OF INSULIN (HCC): Primary | ICD-10-CM

## 2024-03-08 DIAGNOSIS — E78.00 PURE HYPERCHOLESTEROLEMIA: ICD-10-CM

## 2024-03-08 DIAGNOSIS — G47.33 OSA (OBSTRUCTIVE SLEEP APNEA): ICD-10-CM

## 2024-03-08 RX ORDER — ERGOCALCIFEROL 1.25 MG/1
CAPSULE ORAL
COMMUNITY
Start: 2024-02-19

## 2024-03-08 NOTE — TELEPHONE ENCOUNTER
Patient notified and verbalized understanding.     Future Appointments   Date Time Provider Department Center   4/8/2024 11:00 AM Thea Harris DO [facility] [place]

## 2024-03-08 NOTE — TELEPHONE ENCOUNTER
Patient called and reported seeing the DM educator Ivana Mitchell who discussed the lab results of the most recent labs he completed and discuss the health of his kidneys. Patient stated they would closely monitored. Patient however would like to know if he needs to be seen by Dr. Harris to discuss labs as well and to discuss what she recommends for him and if a appointment is needed. Patient can be reached at [phone] Please advise.

## 2024-03-08 NOTE — PROGRESS NOTES
Spoke to David for CCM.      Updates to patient care team/comments: No changes per patient  Patient reported changes in medications: No changes per patient  Med Adherence  Comment: Taking     Health Maintenance:   Health Maintenance   Topic Date Due    Zoster Vaccines (1 of 2) Never done    COVID-19 Vaccine (1 - 2023-24 season) Never done    Influenza Vaccine (1) Never done    Annual Depression Screening  01/01/2024    Fall Risk Screening (Annual)  01/01/2024    Diabetes Care Dilated Eye Exam  05/13/2024    Diabetes Care A1C  08/01/2024    Annual Physical  10/12/2024    Diabetes Care Foot Exam  10/12/2024    Diabetes Care: Microalb/Creat Ratio  10/12/2024    Diabetes Care: GFR  02/01/2025    Pneumococcal Vaccine: 65+ Years  Completed    Colorectal Cancer Screening  Discontinued     Patient updates/concerns:  Patient stated he continues to back pain on and off especially when standing for long periods of time so he has to take breaks when he is cooking or doing house chores. Patient reported seeing the DM educator who discussed the lab results of the most recent labs he completed and went of the health of his kidneys. Patient stated it would closely monitored. Patient states he is not sure if that meant he should see Dr. Harris or if they would need to be seeing him more frequent. Patient requested a message be sent to Dr. Harris to find out if she wants to see him to discuss of if it ok to to wait and follow up in 6 month with the DM educator as scheduled.     TE sent to [facility] clinical staff    Goals/Action Plan:    Active goal from previous outreach:     Improve diet and increase exercise   Patient reported progress towards goals:              - What: Patient reported seeing the DM educator who discussed the lab results of the most recent labs he completed and went of the health of his kidneys. Patient stated it would closely monitored.           - Where/When/How: Patient requested a message be sent to   Steven to find out if she wants to see him to discuss of if it ok to to wait and follow up in 6 month with the DM educator as scheduled.   Patient Reported Barriers and Concerns: TE sent to Dr. Harris clinical staff to find out if he needs to follow up with her to discuss his recent lab results.                   - Plan for overcoming barriers: Patient aware he will be contacted by Dr Harris office to further assist him. Patient to continue to follow up with care team as scheduled or as needed.    Care Managers Interventions: Patient aware message was sent on his behalf to Dr. Rangel to find out if he needs to follow up with her to discuss his recent labs due to the kidney results patient is a bit concerned. Patient encouraged to contact DM center as well if he has further questions. Patient aware he may contact me if further assistance is needed,    Future Appointments: Pt aware  Future Appointments   Date Time Provider Department Center   9/9/2024 10:00 AM [facility] [place]     Next Care Manager Follow Up Date: 1 month follow up or sooner if needed    Reason For Follow Up: review progress and or barriers towards patient's goals.     Time Spent This Encounter Total: 20 min medical record review, telephone communication, care plan updates where needed, education, goals, and action plan recreation/update. Provided acknowledgment and validation to patient's concerns.   Monthly Minute Total including today: 25  Physical assessment, complete health history, and need for CCM established by Thea Harris DO.

## 2024-03-08 NOTE — TELEPHONE ENCOUNTER
I'd like him to see me sometime in the next month for a general follow up. He has enough going on that I'd like to see him every 6 months.   I'd also like to make sure we are following up on the labs that Dr. Sung did.

## 2024-03-08 NOTE — PROGRESS NOTES
Called patient and left a message for patient to call back when they can. Reviewed patient chart. Left contact my contact number [phone]        Time Spent This Encounter Total: 5  min medical record review  Monthly Minute Total including today: 5 minutes

## 2024-03-12 NOTE — ADDENDUM NOTE
Addended by: Jinny Griffith on: 8/4/2019 12:07 AM     Modules accepted: Ellen Render In Strict Bullet Format?: No Detail Level: Zone Initiate Treatment: Proscriptix volume shampoo and conditioner

## 2024-03-18 ENCOUNTER — MED REC SCAN ONLY (OUTPATIENT)
Dept: FAMILY MEDICINE CLINIC | Facility: CLINIC | Age: 79
End: 2024-03-18

## 2024-03-19 RX ORDER — SPIRONOLACTONE 25 MG/1
25 TABLET ORAL
Qty: 90 TABLET | Refills: 0 | Status: SHIPPED | OUTPATIENT
Start: 2024-03-19

## 2024-03-19 NOTE — TELEPHONE ENCOUNTER
Pt failed refill protocol for the following reasons:  spironolactone 25 MG Oral Tab          Sig: Take 1 tablet (25 mg total) by mouth daily with breakfast.    Disp: 90 tablet    Refills: 0    Start: 3/18/2024    Class: Normal    Non-formulary    Last ordered: 3 months ago (11/21/2023) by Thea Harris DO    Hypertension Medications Protocol [id number]/18/2024 04:48 PM   Protocol Details EGFRCR or GFRNAA > 50    CMP or BMP in past 12 months    Last BP reading less than 140/90    In person appointment or virtual visit in the past 12 mos or appointment in next 3 mos      To be filled at: [id number] [place] [address] [place] [facility] [address], [phone], [phone]         Last refill: 11/21/23  Last appt: 10/12/23  Next appt:   Future Appointments   Date Time Provider Department Center   4/8/2024 11:00 AM Thea Harris DO [facility] [place]   9/9/2024 10:00 AM Ivana Mitchell RN, [facility]         Forward to Dr. Harris, please advise on refills. Thank you.

## 2024-04-08 ENCOUNTER — OFFICE VISIT (OUTPATIENT)
Dept: FAMILY MEDICINE CLINIC | Facility: CLINIC | Age: 79
End: 2024-04-08
Payer: MEDICARE

## 2024-04-08 VITALS
BODY MASS INDEX: 38 KG/M2 | TEMPERATURE: 97 F | HEART RATE: 82 BPM | DIASTOLIC BLOOD PRESSURE: 70 MMHG | SYSTOLIC BLOOD PRESSURE: 128 MMHG | OXYGEN SATURATION: 98 % | WEIGHT: 245.38 LBS | RESPIRATION RATE: 18 BRPM

## 2024-04-08 DIAGNOSIS — I42.8 NONISCHEMIC CARDIOMYOPATHY (HCC): ICD-10-CM

## 2024-04-08 DIAGNOSIS — G89.29 CHRONIC LOW BACK PAIN, UNSPECIFIED BACK PAIN LATERALITY, UNSPECIFIED WHETHER SCIATICA PRESENT: Primary | ICD-10-CM

## 2024-04-08 DIAGNOSIS — M54.50 CHRONIC LOW BACK PAIN, UNSPECIFIED BACK PAIN LATERALITY, UNSPECIFIED WHETHER SCIATICA PRESENT: Primary | ICD-10-CM

## 2024-04-08 DIAGNOSIS — E11.65 TYPE 2 DIABETES MELLITUS WITH HYPERGLYCEMIA, WITH LONG-TERM CURRENT USE OF INSULIN (HCC): ICD-10-CM

## 2024-04-08 DIAGNOSIS — Z79.4 TYPE 2 DIABETES MELLITUS WITH HYPERGLYCEMIA, WITH LONG-TERM CURRENT USE OF INSULIN (HCC): ICD-10-CM

## 2024-04-08 DIAGNOSIS — I50.22 CHRONIC SYSTOLIC CONGESTIVE HEART FAILURE (HCC): ICD-10-CM

## 2024-04-08 DIAGNOSIS — M05.79 RHEUMATOID ARTHRITIS INVOLVING MULTIPLE SITES WITH POSITIVE RHEUMATOID FACTOR (HCC): ICD-10-CM

## 2024-04-08 PROCEDURE — 99214 OFFICE O/P EST MOD 30 MIN: CPT | Performed by: FAMILY MEDICINE

## 2024-04-08 NOTE — PROGRESS NOTES
HPI:   David Reyes is a 78 year old male who presents for recheck of his diabetes. Patient’s FBS have been 100-115, occasionally jumps up to 130-150's. No 200's, no lows. Lowest was 90. Last visit with ophthalmologist was May 2023, has that scheduled in the next month or two.  Pt has been checking his feet on a regular basis. Pt reports tingling of the feet. Pt denies any issues with depression.   A1c 2/1/24 was 6.5. it has been controlled and stable.   Pt complains of pain in spine has been annoying. Can't stand or sit without back support for periods of time. Working from home some. No recent falls.   Asking what he can do about pain in back. He asked Dr. Tillman, was told it's not an arthritis thing.     Asking for samples of toujeo. Taking 12 units nightly. Maintaining.   Running out of toujeo in the next week.   Watching diet.     Wt Readings from Last 6 Encounters:   04/08/24 245 lb 6.4 oz (111.3 kg)   03/04/24 244 lb (110.7 kg)   10/12/23 244 lb 6 oz (110.8 kg)   02/20/23 279 lb (126.6 kg)   10/24/22 227 lb (103 kg)   10/04/22 233 lb (105.7 kg)     Body mass index is 37.87 kg/m².     Lab Results   Component Value Date    A1C 6.5 (H) 02/01/2024    A1C 6.4 (H) 06/30/2023    A1C 6.6 (H) 03/30/2023     Lab Results   Component Value Date    CHOLEST 120 02/01/2024    CHOLEST 170 10/03/2022    CHOLEST 149 05/03/2022     Lab Results   Component Value Date    HDL 36 (L) 02/01/2024    HDL 44 10/03/2022    HDL 48 05/03/2022     Lab Results   Component Value Date    LDL 62 02/01/2024     (H) 10/03/2022    LDL 87 05/03/2022     Lab Results   Component Value Date    TRIG 121 02/01/2024    TRIG 110 10/03/2022    TRIG 71 05/03/2022     Lab Results   Component Value Date    AST 33 02/01/2024    AST 44 (H) 12/05/2023    AST 14 (L) 09/01/2023     Lab Results   Component Value Date    ALT 32 02/01/2024    ALT 40 12/05/2023    ALT 20 09/01/2023     Malb/Cre Calc   Date Value Ref Range Status   10/12/2023   Final      Comment:     Unable to calculate due to Urine Microalbumin <0.5 mg/dL     10/03/2022 8.1 <=30.0 ug/mg Final     Comment:     <30 ug/mg creatinine       Normal     ug/mg creatinine   Microalbuminuria   >300 ug/mg creatinine      Albuminuria       04/05/2021 13.6 <=30.0 ug/mg Final     Comment:     <30 ug/mg creatinine       Normal     ug/mg creatinine   Microalbuminuria   >300 ug/mg creatinine      Albuminuria           Current Outpatient Medications   Medication Sig Dispense Refill    spironolactone 25 MG Oral Tab Take 1 tablet (25 mg total) by mouth daily with breakfast. 90 tablet 0    ergocalciferol 1.25 MG (33581 UT) Oral Cap       Insulin Glargine, 2 Unit Dial, (TOUJEO MAX SOLOSTAR) 300 UNIT/ML Subcutaneous Solution Pen-injector Inject 24 Units into the skin daily. 27 mL 1    metFORMIN  MG Oral Tablet 24 Hr Take 2 tablets (1,000 mg total) by mouth 2 (two) times daily with meals. 360 tablet 0    CONTOUR NEXT TEST In Vitro Strip Test 3 times daily Dx Code: E11.9 Insulin - dependent diabetes 300 each 1    Microlet Lancets Does not apply Misc CHECK BLOOD SUGAR THREE TIMES DAILY 300 each 3    losartan 25 MG Oral Tab Take 1 tablet (25 mg total) by mouth every evening.      Insulin Glargine, 1 Unit Dial, (TOUJEO SOLOSTAR) 300 UNIT/ML Subcutaneous Solution Pen-injector INJECT 34 UNITS UNDER THE SKIN EVERY NIGHT AT BEDTIME 30 mL 0    BD PEN NEEDLE LILO 2ND GEN 32G X 4 MM Does not apply Misc USE DAILY 300 each 3    Insulin Glargine, 2 Unit Dial, (TOUJEO MAX SOLOSTAR) 300 UNIT/ML Subcutaneous Solution Pen-injector Inject 24 Units into the skin nightly. 1 each 0    torsemide 20 MG Oral Tab Take 1 tablet (20 mg total) by mouth BID (Diuretic). 60 tablet 1    metoprolol succinate ER 50 MG Oral Tablet 24 Hr Take 1 tablet (50 mg total) by mouth every evening. (Patient taking differently: Take 1 tablet (50 mg total) by mouth 2 (two) times daily.) 30 tablet 2    Vitamin B-1 100 MG Oral Tab Take 1 tablet (100 mg  total) by mouth daily.      methotrexate 2.5 MG Oral Tab Take 4 tablets (10 mg total) by mouth every 7 days. 48 tablet 1    ezetimibe 10 MG Oral Tab Take 1 tablet (10 mg total) by mouth daily.      aspirin 81 MG Oral Tab Take 1 tablet (81 mg total) by mouth at bedtime.      Lovastatin 40 MG Oral Tab Take 1 tablet (40 mg total) by mouth in the morning and 1 tablet (40 mg total) before bedtime.  2    folic acid 800 MCG Oral Tab Take 1 tablet (800 mcg total) by mouth every other day. Alternate with 2 tablets (1600mcg) every other day    About 1000 mcg daily      ketorolac 0.5 % Ophthalmic Solution  (Patient not taking: Reported on 2024)      sacubitril-valsartan (ENTRESTO) 24-26 MG Oral Tab Take 1 tablet by mouth 2 (two) times daily. (Patient not taking: Reported on 2024) 60 tablet 2      Past Medical History:   Diagnosis Date    CAD (coronary artery disease)     Congestive heart disease (HCC)     Congestive heart failure (HCC)     CORONARY ARTERY DISEASE     cabg    Coronary atherosclerosis     Diabetes (HCC)     DM (diabetes mellitus) (HCC)     Heart attack (HCC)     High cholesterol     Other and unspecified hyperlipidemia     Rheumatoid arthritis (HCC)     Sleep apnea     Unspecified sleep apnea       Past Surgical History:   Procedure Laterality Date    ANGIOGRAM  2015    ANGIOPLASTY (CORONARY)  2/23/15    RCA    BYPASS SURGERY          CABG      LAD, Diagonal    CARDIAC DEFIBRILLATOR PLACEMENT  16    [employer] dual chamber ICD    CATH DRUG ELUTING STENT      TONSILLECTOMY        Social History:   Social History     Socioeconomic History    Marital status:    Tobacco Use    Smoking status: Former     Packs/day: 1.00     Years: 26.00     Additional pack years: 0.00     Total pack years: 26.00     Types: Cigarettes     Quit date: 1986     Years since quittin.8    Smokeless tobacco: Never    Tobacco comments:     NON-SMOKER   Vaping Use    Vaping Use: Never used    Substance and Sexual Activity    Alcohol use: Yes     Alcohol/week: 0.0 standard drinks of alcohol     Comment: wine / OCC    Drug use: No   Other Topics Concern    Caffeine Concern No    Exercise No     Exercise: minimal. Just around the house, up and down stairs.  Diet: watches sugar closely     REVIEW OF SYSTEMS:   GENERAL HEALTH: feels well otherwise  SKIN: denies any unusual skin lesions or rashes  RESPIRATORY: denies shortness of breath with exertion  CARDIOVASCULAR: denies chest pain on exertion  GI: denies abdominal pain and denies heartburn  NEURO: denies headaches    EXAM:   /70 (BP Location: Left arm, Patient Position: Sitting, Cuff Size: large)   Pulse 82   Temp 96.8 °F (36 °C) (Temporal)   Resp 18   Wt 245 lb 6.4 oz (111.3 kg)   SpO2 98%   BMI 37.87 kg/m²   GENERAL: well developed, well nourished,in no apparent distress  SKIN: no rashes,no suspicious lesions  NECK: supple,no adenopathy    LUNGS: clear to auscultation  CARDIO: RRR without murmur  GI: good BS's,no masses, HSM or tenderness  EXTREMITIES: no cyanosis, clubbing or edema  Musc: weakness in low back muscles with some tenderness.   .    ASSESSMENT AND PLAN:   David Reyes is a 78 year old male who presents for a recheck of his diabetes. Diabetic control is controlled with current meds. .  Recommendations are: continue present meds, check HgbA1C, check fasting lipids and CMP, lose wgt with carbohydrate controlled diet and exercise, refer to Ophthamology, check feet daily.  The patient indicates understanding of these issues and agrees to the plan.  The patient is asked to return in 6 months for AWV, labs and DM follow up.    Encounter Diagnoses   Name Primary?    Chronic low back pain, unspecified back pain laterality, unspecified whether sciatica present Yes    Type 2 diabetes mellitus with hyperglycemia, with long-term current use of insulin (HCC)     Chronic systolic congestive heart failure (HCC)     Nonischemic  cardiomyopathy (HCC)     Rheumatoid arthritis involving multiple sites with positive rheumatoid factor (HCC)    - refer to PT for back pain.   - continue current DM meds.   - continue to follow up with cardiology closely for heart failure.   - following with Dr. Tillman for RA.     No orders of the defined types were placed in this encounter.      Meds & Refills for this Visit:  Requested Prescriptions      No prescriptions requested or ordered in this encounter       Imaging & Consults:  OP REFERRAL TO [facility]

## 2024-04-10 ENCOUNTER — PATIENT OUTREACH (OUTPATIENT)
Dept: CASE MANAGEMENT | Age: 79
End: 2024-04-10

## 2024-04-10 NOTE — PROGRESS NOTES
Called patient and left a message for patient to call back when they can. Reviewed patient chart. Left contact my contact number [phone]        Time Spent This Encounter Total: 5  min medical record review  Monthly Minute Total including today: 5 minutes

## 2024-05-01 NOTE — PROCEDURES
SUBJECTIVE:   Pancho Najera Jr. is a 80 year old male presenting with a chief complaint of   Chief Complaint   Patient presents with    Dizziness     Pt states he has been getting dizzy since taking Jardiance. He now has a swollen lymph node in right jaw line x couple weeks ago. When it swells up it goes into his right sinuses and causes dizzy spells, also will affect with his right vision.   Gets swollen salivary gland not necessarily a lymph node, states he gets antibiotics and it goes away.  Pt reports increased dizziness when the gland swells. But also wonders if the dizziness is related to starting Jardiance.    No past medical history on file.  Family History   Problem Relation Age of Onset    Diabetes Mother     Coronary Artery Disease Mother     Uterine Cancer Mother     Lung Cancer Father     Diabetes Father     Asthma Sister     Diabetes Sister     Other - See Comments Sister         in coma for 2 years after asthma attack     Current Outpatient Medications   Medication Sig Dispense Refill    aspirin (ASA) 81 MG EC tablet Take 81 mg by mouth      atorvastatin (LIPITOR) 10 MG tablet TAKE 1 TABLET (10 MG) BY MOUTH DAILY 90 tablet 2    blood glucose monitoring (ONE TOUCH ULTRA 2) meter device kit Use to test blood sugar 2 times daily 1 kit 0    blood glucose monitoring (SOFTCLIX) lancets       clonazePAM (KLONOPIN) 0.5 MG tablet Take 0.5 mg by mouth 3 times daily as needed       empagliflozin (JARDIANCE) 10 MG TABS tablet Take 1 tablet (10 mg) by mouth daily 90 tablet 0    glimepiride (AMARYL) 4 MG tablet TAKE 1 TABLET (4 MG) BY MOUTH 2 TIMES DAILY 180 tablet 1    ibuprofen (ADVIL/MOTRIN) 200 MG capsule Take 200 mg by mouth every 4 hours as needed for fever      ONETOUCH ULTRA test strip USE TO TEST BLOOD SUGAR 2 TIMES DAILY. 100 strip 2     Social History     Tobacco Use    Smoking status: Never    Smokeless tobacco: Never   Substance Use Topics    Alcohol use: Not Currently       OBJECTIVE  BP (!)  Spirometry Findings:  FEV1 is 1.26L, 43% predicted. FVC is 1.73L, 44% predicted. FEV1/ FVC ratio is 0.73. The flow-volume loop suggests a normal pattern. There is no bronchodilator response. Impression:  There is no airway obstruction on spirometry and visualized   on flow-volume loop. The reduced FEV1 and FVC is suggestive of restrictive lung disease, which can be seen in heart failure, fluid overload states, interstitial lung disease, thoracic spine/chest disorders, obesity as well as other clinical scenarios. Would suggest further evaluation with complete pulmonary function testing. There are no previous pulmonary function tests available for comparison. 141/88   Pulse 60   Temp 97.5  F (36.4  C) (Tympanic)   Resp 16   Wt 93.4 kg (206 lb)   SpO2 98%   BMI 31.79 kg/m      Physical Exam  Constitutional:       Appearance: Normal appearance.   HENT:      Head: Normocephalic.      Right Ear: There is impacted cerumen.      Left Ear: Tympanic membrane normal.   Eyes:      Extraocular Movements: Extraocular movements intact.      Conjunctiva/sclera: Conjunctivae normal.      Pupils: Pupils are equal, round, and reactive to light.   Cardiovascular:      Rate and Rhythm: Normal rate and regular rhythm.      Heart sounds: Normal heart sounds.   Pulmonary:      Effort: Pulmonary effort is normal.      Breath sounds: Normal breath sounds.   Musculoskeletal:      Cervical back: Normal range of motion and neck supple. Tenderness (tender over maxillary sinuses on right) present.   Lymphadenopathy:      Cervical: Cervical adenopathy (left cervical adenopathy below pinna) present.   Skin:     General: Skin is warm and dry.   Neurological:      General: No focal deficit present.      Mental Status: He is alert.      Cranial Nerves: No cranial nerve deficit.      Sensory: No sensory deficit.      Motor: No weakness.      Gait: Gait abnormal (slightly unsteady on feet, uses walker at baseline).   Psychiatric:         Mood and Affect: Mood normal.       CBC: normal  BMP: pending  UA: no infection -500 Glucose likely from Jardiance    Cerumenosis is noted.  Wax is removed by syringing and manual debridement with lighted curette. Instructions for home care to prevent wax buildup are given.  -post lavage right TM is normal, pearly grey.    ASSESSMENT:  1. Acute recurrent maxillary sinusitis    - amoxicillin-clavulanate (AUGMENTIN) 875-125 MG tablet; Take 1 tablet by mouth 2 times daily for 7 days  Dispense: 14 tablet; Refill: 0    2. Dizziness    - CBC with platelets; Future  - Basic metabolic panel  (Ca, Cl, CO2, Creat, Gluc, K, Na, BUN); Future  - UA Macroscopic with reflex to  Microscopic and Culture - Clinic Collect  - CBC with platelets  - Basic metabolic panel  (Ca, Cl, CO2, Creat, Gluc, K, Na, BUN)    3. Impacted cerumen of right ear    - KS REMOVAL IMPACTED CERUMEN IRRIGATION/LVG UNILAT      PLAN:  1.  Take antibiotic according to bottle instructions with food to avoid stomach upset.  If you are prone to stomach upset with antibiotics, I recommend adding a probiotic to this regimen.  Culturelle is a trusted brand.  2.  I recommend using Mucinex to help thin mucus secretions.  Adding a saline nasal spray can also be helpful with clearing sinus congestion.  3.  Take Advil or Tylenol as needed for pain or fever control.  4.  Follow-up if you not having any improvement in your symptoms over the next 5 days.

## 2024-05-03 ENCOUNTER — MED REC SCAN ONLY (OUTPATIENT)
Dept: FAMILY MEDICINE CLINIC | Facility: CLINIC | Age: 79
End: 2024-05-03

## 2024-05-09 ENCOUNTER — TELEPHONE (OUTPATIENT)
Dept: FAMILY MEDICINE CLINIC | Facility: CLINIC | Age: 79
End: 2024-05-09

## 2024-05-09 NOTE — TELEPHONE ENCOUNTER
[facility] medicare diabetes form completed and faxed back to local pharmacy and [facility] Medicare processing center

## 2024-05-10 ENCOUNTER — HOSPITAL ENCOUNTER (OUTPATIENT)
Dept: GENERAL RADIOLOGY | Age: 79
Discharge: HOME | End: 2024-05-10
Attending: FAMILY MEDICINE

## 2024-05-10 ENCOUNTER — OFFICE VISIT (OUTPATIENT)
Dept: FAMILY MEDICINE CLINIC | Facility: CLINIC | Age: 79
End: 2024-05-10
Payer: MEDICARE

## 2024-05-10 VITALS
DIASTOLIC BLOOD PRESSURE: 62 MMHG | SYSTOLIC BLOOD PRESSURE: 112 MMHG | HEIGHT: 69 IN | BODY MASS INDEX: 34.96 KG/M2 | OXYGEN SATURATION: 98 % | RESPIRATION RATE: 18 BRPM | TEMPERATURE: 97 F | HEART RATE: 108 BPM | WEIGHT: 236 LBS

## 2024-05-10 DIAGNOSIS — R06.02 SHORTNESS OF BREATH: ICD-10-CM

## 2024-05-10 DIAGNOSIS — R05.2 SUBACUTE COUGH: ICD-10-CM

## 2024-05-10 DIAGNOSIS — R52 BODY ACHES: ICD-10-CM

## 2024-05-10 DIAGNOSIS — R35.0 URINARY FREQUENCY: Primary | ICD-10-CM

## 2024-05-10 LAB
ALBUMIN SERPL-MCNC: 3.8 G/DL (ref 3.4–5)
ALBUMIN/GLOB SERPL: 0.9 {RATIO} (ref 1–2)
ALP LIVER SERPL-CCNC: 54 U/L
ALT SERPL-CCNC: 26 U/L
ANION GAP SERPL CALC-SCNC: 9 MMOL/L (ref 0–18)
AST SERPL-CCNC: 23 U/L (ref 15–37)
ATRIAL RATE: 84 BPM
BASOPHILS # BLD AUTO: 0.01 X10(3) UL (ref 0–0.2)
BASOPHILS NFR BLD AUTO: 0.1 %
BILIRUB SERPL-MCNC: 0.7 MG/DL (ref 0.1–2)
BUN BLD-MCNC: 34 MG/DL (ref 9–23)
CALCIUM BLD-MCNC: 9.5 MG/DL (ref 8.5–10.1)
CHLORIDE SERPL-SCNC: 97 MMOL/L (ref 98–112)
CO2 SERPL-SCNC: 28 MMOL/L (ref 21–32)
CREAT BLD-MCNC: 1.91 MG/DL
EGFRCR SERPLBLD CKD-EPI 2021: 36 ML/MIN/1.73M2 (ref 60–?)
EOSINOPHIL # BLD AUTO: 0.01 X10(3) UL (ref 0–0.7)
EOSINOPHIL NFR BLD AUTO: 0.1 %
ERYTHROCYTE [DISTWIDTH] IN BLOOD BY AUTOMATED COUNT: 15.1 %
FASTING STATUS PATIENT QL REPORTED: NO
GLOBULIN PLAS-MCNC: 4.3 G/DL (ref 2.8–4.4)
GLUCOSE BLD-MCNC: 158 MG/DL (ref 70–99)
HCT VFR BLD AUTO: 33.3 %
HGB BLD-MCNC: 10.6 G/DL
IMM GRANULOCYTES # BLD AUTO: 0.06 X10(3) UL (ref 0–1)
IMM GRANULOCYTES NFR BLD: 0.8 %
LYMPHOCYTES # BLD AUTO: 0.96 X10(3) UL (ref 1–4)
LYMPHOCYTES NFR BLD AUTO: 12.7 %
MCH RBC QN AUTO: 29.5 PG (ref 26–34)
MCHC RBC AUTO-ENTMCNC: 31.8 G/DL (ref 31–37)
MCV RBC AUTO: 92.8 FL
MONOCYTES # BLD AUTO: 2.33 X10(3) UL (ref 0.1–1)
MONOCYTES NFR BLD AUTO: 30.8 %
NEUTROPHILS # BLD AUTO: 4.19 X10 (3) UL (ref 1.5–7.7)
NEUTROPHILS # BLD AUTO: 4.19 X10(3) UL (ref 1.5–7.7)
NEUTROPHILS NFR BLD AUTO: 55.5 %
NT-PROBNP SERPL-MCNC: ABNORMAL PG/ML (ref ?–450)
OSMOLALITY SERPL CALC.SUM OF ELEC: 289 MOSM/KG (ref 275–295)
P AXIS: 73 DEGREES
P-R INTERVAL: 176 MS
PLATELET # BLD AUTO: 72 10(3)UL (ref 150–450)
PLATELETS.RETICULATED NFR BLD AUTO: 26.9 % (ref 0–7)
POTASSIUM SERPL-SCNC: 3.9 MMOL/L (ref 3.5–5.1)
PROT SERPL-MCNC: 8.1 G/DL (ref 6.4–8.2)
Q-T INTERVAL: 422 MS
QRS DURATION: 150 MS
QTC CALCULATION (BEZET): 498 MS
R AXIS: 119 DEGREES
RBC # BLD AUTO: 3.59 X10(6)UL
SODIUM SERPL-SCNC: 134 MMOL/L (ref 136–145)
T AXIS: 37 DEGREES
VENTRICULAR RATE: 84 BPM
WBC # BLD AUTO: 7.6 X10(3) UL (ref 4–11)

## 2024-05-10 PROCEDURE — 80053 COMPREHEN METABOLIC PANEL: CPT | Performed by: FAMILY MEDICINE

## 2024-05-10 PROCEDURE — 71046 X-RAY EXAM CHEST 2 VIEWS: CPT | Performed by: FAMILY MEDICINE

## 2024-05-10 PROCEDURE — 87637 SARSCOV2&INF A&B&RSV AMP PRB: CPT | Performed by: FAMILY MEDICINE

## 2024-05-10 PROCEDURE — 85025 COMPLETE CBC W/AUTO DIFF WBC: CPT | Performed by: FAMILY MEDICINE

## 2024-05-10 PROCEDURE — 83880 ASSAY OF NATRIURETIC PEPTIDE: CPT | Performed by: FAMILY MEDICINE

## 2024-05-10 NOTE — PROGRESS NOTES
David Reyes is a 78 year old male.  Chief Complaint   Patient presents with    Pain     Patient states he has body aches and pain all over his body from his head to his toes c/o of low and fatigue.    Epistaxis     C/o nose bleed x 2 days patient states he has been coughing up blood        HPI:   Sick x 2 weeks. Pain all up and down spine. Laying is okay, but if standing for more than 5 min, he has pain from tip of head to tailbone.   All he's doing is sleeping.   Feels flushed now and then, couldn't find thermometers. No chills. Has had night sweats a couple of times. Arms and legs are also achy.   Has not moved much per wife and doesn't make sense at times, wife says he's uncoordinated, at times confused. Wife says they have not really had a conversation in a whiel.   He is coughing occasionally, dry cough.   Spitting up blood. Had a nose bleed first.   More short of breath than usual.   No new swelling in legs.   Peeing more than usual.   No diarrhea.   Eating okay, drinks water.   No falls, he has been walking okay.     Trying a supplement he bought online for muscle enhancement, about 3 weeks ago. Not taking it every day. It's a powder shake. No other drugs, alcohol or new supplements.     ALLERGIES:  Allergies   Allergen Reactions    Pcn [Penicillins]      \"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         Current Outpatient Medications   Medication Sig Dispense Refill    spironolactone 25 MG Oral Tab Take 1 tablet (25 mg total) by mouth daily with breakfast. 90 tablet 0    ergocalciferol 1.25 MG (21831 UT) Oral Cap       Insulin Glargine, 2 Unit Dial, (TOUJEO MAX SOLOSTAR) 300 UNIT/ML Subcutaneous Solution Pen-injector Inject 24 Units into the skin daily. 27 mL 1    metFORMIN  MG Oral Tablet 24 Hr Take 2 tablets (1,000 mg total) by mouth 2 (two) times daily with meals. 360 tablet 0    CONTOUR NEXT TEST In Vitro Strip  Test 3 times daily Dx Code: E11.9 Insulin - dependent diabetes 300 each 1    Microlet Lancets Does not apply Misc CHECK BLOOD SUGAR THREE TIMES DAILY 300 each 3    losartan 25 MG Oral Tab Take 1 tablet (25 mg total) by mouth every evening.      BD PEN NEEDLE LILO 2ND GEN 32G X 4 MM Does not apply Misc USE DAILY 300 each 3    torsemide 20 MG Oral Tab Take 1 tablet (20 mg total) by mouth BID (Diuretic). 60 tablet 1    metoprolol succinate ER 50 MG Oral Tablet 24 Hr Take 1 tablet (50 mg total) by mouth every evening. (Patient taking differently: Take 1 tablet (50 mg total) by mouth 2 (two) times daily.) 30 tablet 2    sacubitril-valsartan (ENTRESTO) 24-26 MG Oral Tab Take 1 tablet by mouth 2 (two) times daily. 60 tablet 2    Vitamin B-1 100 MG Oral Tab Take 1 tablet (100 mg total) by mouth daily.      methotrexate 2.5 MG Oral Tab Take 4 tablets (10 mg total) by mouth every 7 days. 48 tablet 1    ezetimibe 10 MG Oral Tab Take 1 tablet (10 mg total) by mouth daily.      aspirin 81 MG Oral Tab Take 1 tablet (81 mg total) by mouth at bedtime.      Lovastatin 40 MG Oral Tab Take 1 tablet (40 mg total) by mouth in the morning and 1 tablet (40 mg total) before bedtime.  2    folic acid 800 MCG Oral Tab Take 1 tablet (800 mcg total) by mouth every other day. Alternate with 2 tablets (1600mcg) every other day    About 1000 mcg daily        Past Medical History:    CAD (coronary artery disease)    Congestive heart disease (HCC)    Congestive heart failure (HCC)    CORONARY ARTERY DISEASE    cabg    Coronary atherosclerosis    Diabetes (HCC)    DM (diabetes mellitus) (HCC)    Heart attack (HCC)    High cholesterol    Other and unspecified hyperlipidemia    Rheumatoid arthritis (HCC)    Sleep apnea    Unspecified sleep apnea      Social History:  Social History     Socioeconomic History    Marital status:    Tobacco Use    Smoking status: Former     Current packs/day: 0.00     Average packs/day: 1 pack/day for 26.0 years  (26.0 ttl pk-yrs)     Types: Cigarettes     Start date: 1960     Quit date: 1986     Years since quittin.9    Smokeless tobacco: Never    Tobacco comments:     NON-SMOKER   Vaping Use    Vaping status: Never Used   Substance and Sexual Activity    Alcohol use: Yes     Alcohol/week: 0.0 standard drinks of alcohol     Comment: wine / OCC    Drug use: No   Other Topics Concern    Caffeine Concern No    Exercise No     Social Determinants of Health     Physical Activity: Inactive (2020)    [facility]    Exercise Vital Sign     Days of Exercise per Week: 0 days     Minutes of Exercise per Session: 0 min    [facility]    Social Connections    [facility] from [facility], [facility]    Housing Stability        BP Readings from Last 6 Encounters:   05/10/24 112/62   24 128/70   10/12/23 128/70   10/24/22 100/64   10/10/22 112/69   10/04/22 100/58       Wt Readings from Last 6 Encounters:   05/10/24 236 lb (107 kg)   24 245 lb 6.4 oz (111.3 kg)   24 244 lb (110.7 kg)   10/12/23 244 lb 6 oz (110.8 kg)   23 279 lb (126.6 kg)   10/24/22 227 lb (103 kg)       REVIEW OF SYSTEMS:   GENERAL HEALTH: feels terrible.   SKIN: denies any unusual skin lesions or rashes  RESPIRATORY: + shortness of breath with exertion  CARDIOVASCULAR: denies chest pain on exertion, no palpitations, he is paced. Has not felt defibrillator  GI: denies abdominal pain and denies heartburn  NEURO: denies headaches or dizziness     EXAM:   /62   Pulse 108   Temp 97.1 °F (36.2 °C)   Resp 18   Ht 5' 9\" (1.753 m)   Wt 236 lb (107 kg)   SpO2 98%   BMI 34.85 kg/m²  Body mass index is 34.85 kg/m².      GENERAL: well developed, well nourished, laying down, tired, but communicating appropriately.   SKIN: no rashes,no suspicious lesions  HEENT: atraumatic,  normocephalic,ears and throat are clear, conjunctiva are pale.   NECK: supple,no adenopathy   LUNGS: clear to auscultation, no rales or wheezing   CARDIO: RRR without murmur, paced.   GI: good BS's,no masses, HSM or tenderness  EXTREMITIES: no cyanosis, clubbing or edema  Neuro: alert and appropriate, no facial droop, strength and sensation intact in b/l UE and LE.     ASSESSMENT AND PLAN:     Encounter Diagnoses   Name Primary?    Urinary frequency Yes    Shortness of breath     Subacute cough     Body aches        Diagnoses and all orders for this visit:    Urinary frequency  -     UA/M With Culture Reflex [E]; Future    Shortness of breath  -     XR CHEST PA + LAT CHEST (CPT=71046); Future  -     EKG with interpretation and Report -IN OFFICE [64560]  -     CBC With Differential With Platelet; Future  -     Comp Metabolic Panel (14); Future  -     Pro Beta Natriuretic Peptide [E]; Future  -     SARS-CoV-2/Flu A and B/RSV by PCR ([facility]) [E]; Future    Subacute cough  -     XR CHEST PA + LAT CHEST (CPT=71046); Future    Body aches  -     SARS-CoV-2/Flu A and B/RSV by PCR ([facility]) [E]; Future    Testing as ordered.   CXR clear.   EKG shows no acute MI.   STOP new supplement, it likely has too much protein, maybe creatine, nothing good for him.   Go to ER if feeling any worse.     Orders Placed This Encounter   Procedures    UA/M With Culture Reflex [E]     Standing Status:   Future     Number of Occurrences:   1     Standing Expiration Date:   5/10/2025     Order Specific Question:   Release to patient     Answer:   Immediate    CBC With Differential With Platelet     Standing Status:   Future     Number of Occurrences:   1     Standing Expiration Date:   5/11/2025    Comp Metabolic Panel (14)     Standing Status:   Future     Number of Occurrences:   1     Standing Expiration Date:   5/11/2025    Pro Beta Natriuretic Peptide [E]     Standing Status:   Future     Number of Occurrences:   1     Standing Expiration  Date:   5/10/2025     Order Specific Question:   Release to patient     Answer:   Immediate               Meds & Refills for this Visit:  Requested Prescriptions      No prescriptions requested or ordered in this encounter             The patient indicates understanding of these issues and agrees to the plan.

## 2024-05-11 ENCOUNTER — TELEPHONE (OUTPATIENT)
Dept: FAMILY MEDICINE CLINIC | Facility: CLINIC | Age: 79
End: 2024-05-11

## 2024-05-11 DIAGNOSIS — R35.0 URINARY FREQUENCY: Primary | ICD-10-CM

## 2024-05-11 LAB
BILIRUB UR QL STRIP.AUTO: NEGATIVE
CLARITY UR REFRACT.AUTO: CLEAR
FLUAV + FLUBV RNA SPEC NAA+PROBE: NOT DETECTED
FLUAV + FLUBV RNA SPEC NAA+PROBE: NOT DETECTED
GLUCOSE UR STRIP.AUTO-MCNC: NORMAL MG/DL
HYALINE CASTS #/AREA URNS AUTO: PRESENT /LPF
KETONES UR STRIP.AUTO-MCNC: NEGATIVE MG/DL
LEUKOCYTE ESTERASE UR QL STRIP.AUTO: NEGATIVE
NITRITE UR QL STRIP.AUTO: NEGATIVE
PH UR STRIP.AUTO: 5 [PH] (ref 5–8)
PROT UR STRIP.AUTO-MCNC: NEGATIVE MG/DL
RSV RNA SPEC NAA+PROBE: NOT DETECTED
SARS-COV-2 RNA RESP QL NAA+PROBE: NOT DETECTED
SP GR UR STRIP.AUTO: 1.01 (ref 1–1.03)
UROBILINOGEN UR STRIP.AUTO-MCNC: NORMAL MG/DL

## 2024-05-11 PROCEDURE — 81001 URINALYSIS AUTO W/SCOPE: CPT | Performed by: FAMILY MEDICINE

## 2024-05-11 NOTE — TELEPHONE ENCOUNTER
----- Message from Thea Harris sent at 5/11/2024  7:44 AM CDT -----  Pls let pt know that his kidney function has declined and his heart failure marker is very high. It's higher than it was 2 yrs ago when he was in the hospital. I would like him to go to the ER for evaluation and treatment of heart failure.

## 2024-05-11 NOTE — TELEPHONE ENCOUNTER
Unable to run urine dip at this time. Urinalysis send out.    Per Dr. Harris: ok to wait for urinalysis sent out, please follow-up with pt/spouse prior to office closing. Follow-up on Monday as well    Left detailed message to voicemail (per verbal release form consent with confirmed identifying message) following up per Dr. Harris's request to confirm pt went/going to ER. Patient's spouse was advised to call office back and/or send MCM with any questions/concerns.

## 2024-05-11 NOTE — TELEPHONE ENCOUNTER
Left detailed message to voicemail (per verbal release form consent with confirmed identifying message) of Dr. Harris's note below. Patient was advised to call office back to confirm message received.

## 2024-05-11 NOTE — TELEPHONE ENCOUNTER
Pt's spouse present in office to drop off pt's urine sample from office visit 05/10/24    Advised pt's spouse of Dr. Harris's note below - she v/u and stated will notify pt. Spouse requesting printed results w/ Dr. Harris's note - given to pt's spouse. No further questions at this time.

## 2024-05-13 ENCOUNTER — APPOINTMENT (OUTPATIENT)
Dept: GENERAL RADIOLOGY | Facility: HOSPITAL | Age: 79
End: 2024-05-13

## 2024-05-13 ENCOUNTER — HOSPITAL ENCOUNTER (OUTPATIENT)
Facility: HOSPITAL | Age: 79
Setting detail: OBSERVATION
LOS: 2 days | Discharge: HOME | End: 2024-05-16
Attending: EMERGENCY MEDICINE | Admitting: HOSPITALIST

## 2024-05-13 ENCOUNTER — HOSPITAL ENCOUNTER (OUTPATIENT)
Age: 79
Discharge: ANOTHER HOSPITAL | End: 2024-05-13

## 2024-05-13 ENCOUNTER — PATIENT OUTREACH (OUTPATIENT)
Dept: CASE MANAGEMENT | Age: 79
End: 2024-05-13

## 2024-05-13 VITALS
TEMPERATURE: 97 F | HEIGHT: 69 IN | RESPIRATION RATE: 20 BRPM | OXYGEN SATURATION: 98 % | BODY MASS INDEX: 34.96 KG/M2 | HEART RATE: 81 BPM | SYSTOLIC BLOOD PRESSURE: 100 MMHG | WEIGHT: 236 LBS | DIASTOLIC BLOOD PRESSURE: 70 MMHG

## 2024-05-13 DIAGNOSIS — M54.12 CERVICAL RADICULOPATHY: ICD-10-CM

## 2024-05-13 DIAGNOSIS — R53.1 WEAKNESS GENERALIZED: ICD-10-CM

## 2024-05-13 DIAGNOSIS — S06.5XAA SUBDURAL HEMATOMA (HCC): ICD-10-CM

## 2024-05-13 DIAGNOSIS — I95.9 HYPOTENSION, UNSPECIFIED HYPOTENSION TYPE: ICD-10-CM

## 2024-05-13 DIAGNOSIS — R53.1 WEAKNESS GENERALIZED: Primary | ICD-10-CM

## 2024-05-13 DIAGNOSIS — R41.0 DISORIENTATION: ICD-10-CM

## 2024-05-13 DIAGNOSIS — R06.09 DOE (DYSPNEA ON EXERTION): Primary | ICD-10-CM

## 2024-05-13 DIAGNOSIS — I95.9 HYPOTENSIVE EPISODE: ICD-10-CM

## 2024-05-13 LAB
ALBUMIN SERPL-MCNC: 3.7 G/DL (ref 3.4–5)
ALBUMIN/GLOB SERPL: 0.8 {RATIO} (ref 1–2)
ALP LIVER SERPL-CCNC: 53 U/L
ALT SERPL-CCNC: 24 U/L
ANION GAP SERPL CALC-SCNC: 10 MMOL/L (ref 0–18)
AST SERPL-CCNC: 43 U/L (ref 15–37)
BASOPHILS # BLD AUTO: 0.01 X10(3) UL (ref 0–0.2)
BASOPHILS NFR BLD AUTO: 0.2 %
BILIRUB SERPL-MCNC: 0.4 MG/DL (ref 0.1–2)
BILIRUB UR QL STRIP.AUTO: NEGATIVE
BUN BLD-MCNC: 37 MG/DL (ref 9–23)
CALCIUM BLD-MCNC: 9.8 MG/DL (ref 8.5–10.1)
CHLORIDE SERPL-SCNC: 99 MMOL/L (ref 98–112)
CLARITY UR REFRACT.AUTO: CLEAR
CO2 SERPL-SCNC: 26 MMOL/L (ref 21–32)
CREAT BLD-MCNC: 1.62 MG/DL
EGFRCR SERPLBLD CKD-EPI 2021: 44 ML/MIN/1.73M2 (ref 60–?)
EOSINOPHIL # BLD AUTO: 0.02 X10(3) UL (ref 0–0.7)
EOSINOPHIL NFR BLD AUTO: 0.3 %
ERYTHROCYTE [DISTWIDTH] IN BLOOD BY AUTOMATED COUNT: 15 %
GLOBULIN PLAS-MCNC: 4.4 G/DL (ref 2.8–4.4)
GLUCOSE BLD-MCNC: 114 MG/DL (ref 70–99)
GLUCOSE BLD-MCNC: 130 MG/DL (ref 70–99)
GLUCOSE BLD-MCNC: 99 MG/DL (ref 70–99)
GLUCOSE UR STRIP.AUTO-MCNC: NORMAL MG/DL
HCT VFR BLD AUTO: 32.2 %
HGB BLD-MCNC: 10.3 G/DL
HYALINE CASTS #/AREA URNS AUTO: PRESENT /LPF
IMM GRANULOCYTES # BLD AUTO: 0.03 X10(3) UL (ref 0–1)
IMM GRANULOCYTES NFR BLD: 0.5 %
KETONES UR STRIP.AUTO-MCNC: NEGATIVE MG/DL
LEUKOCYTE ESTERASE UR QL STRIP.AUTO: NEGATIVE
LYMPHOCYTES # BLD AUTO: 1.02 X10(3) UL (ref 1–4)
LYMPHOCYTES NFR BLD AUTO: 15.8 %
MAGNESIUM SERPL-MCNC: 2.4 MG/DL (ref 1.6–2.6)
MCH RBC QN AUTO: 29.3 PG (ref 26–34)
MCHC RBC AUTO-ENTMCNC: 32 G/DL (ref 31–37)
MCV RBC AUTO: 91.7 FL
MONOCYTES # BLD AUTO: 1.33 X10(3) UL (ref 0.1–1)
MONOCYTES NFR BLD AUTO: 20.7 %
NEUTROPHILS # BLD AUTO: 4.03 X10 (3) UL (ref 1.5–7.7)
NEUTROPHILS # BLD AUTO: 4.03 X10(3) UL (ref 1.5–7.7)
NEUTROPHILS NFR BLD AUTO: 62.5 %
NITRITE UR QL STRIP.AUTO: NEGATIVE
NT-PROBNP SERPL-MCNC: 8855 PG/ML (ref ?–450)
OSMOLALITY SERPL CALC.SUM OF ELEC: 289 MOSM/KG (ref 275–295)
PH UR STRIP.AUTO: 5.5 [PH] (ref 5–8)
PLATELET # BLD AUTO: 68 10(3)UL (ref 150–450)
PLATELETS.RETICULATED NFR BLD AUTO: 20 % (ref 0–7)
POTASSIUM SERPL-SCNC: 4 MMOL/L (ref 3.5–5.1)
PROT SERPL-MCNC: 8.1 G/DL (ref 6.4–8.2)
PROT UR STRIP.AUTO-MCNC: NEGATIVE MG/DL
RBC # BLD AUTO: 3.51 X10(6)UL
SODIUM SERPL-SCNC: 135 MMOL/L (ref 136–145)
SP GR UR STRIP.AUTO: 1.02 (ref 1–1.03)
TROPONIN I SERPL HS-MCNC: 54 NG/L
UROBILINOGEN UR STRIP.AUTO-MCNC: NORMAL MG/DL
WBC # BLD AUTO: 6.4 X10(3) UL (ref 4–11)

## 2024-05-13 PROCEDURE — 82962 GLUCOSE BLOOD TEST: CPT | Performed by: NURSE PRACTITIONER

## 2024-05-13 PROCEDURE — 71045 X-RAY EXAM CHEST 1 VIEW: CPT | Performed by: EMERGENCY MEDICINE

## 2024-05-13 PROCEDURE — 99205 OFFICE O/P NEW HI 60 MIN: CPT | Performed by: NURSE PRACTITIONER

## 2024-05-13 PROCEDURE — 99223 1ST HOSP IP/OBS HIGH 75: CPT | Performed by: INTERNAL MEDICINE

## 2024-05-13 RX ORDER — BENZONATATE 100 MG/1
200 CAPSULE ORAL 3 TIMES DAILY PRN
Status: DISCONTINUED | OUTPATIENT
Start: 2024-05-13 | End: 2024-05-16

## 2024-05-13 RX ORDER — EZETIMIBE 10 MG/1
10 TABLET ORAL DAILY
Status: DISCONTINUED | OUTPATIENT
Start: 2024-05-14 | End: 2024-05-16

## 2024-05-13 RX ORDER — ONDANSETRON 2 MG/ML
4 INJECTION INTRAMUSCULAR; INTRAVENOUS EVERY 6 HOURS PRN
Status: DISCONTINUED | OUTPATIENT
Start: 2024-05-13 | End: 2024-05-16

## 2024-05-13 RX ORDER — ECHINACEA PURPUREA EXTRACT 125 MG
1 TABLET ORAL
Status: DISCONTINUED | OUTPATIENT
Start: 2024-05-13 | End: 2024-05-16

## 2024-05-13 RX ORDER — LOSARTAN POTASSIUM 25 MG/1
25 TABLET ORAL EVERY EVENING
Status: DISCONTINUED | OUTPATIENT
Start: 2024-05-13 | End: 2024-05-16

## 2024-05-13 RX ORDER — INSULIN DEGLUDEC 100 U/ML
12 INJECTION, SOLUTION SUBCUTANEOUS NIGHTLY
Status: DISCONTINUED | OUTPATIENT
Start: 2024-05-13 | End: 2024-05-16

## 2024-05-13 RX ORDER — MELATONIN
800 EVERY OTHER DAY
Status: DISCONTINUED | OUTPATIENT
Start: 2024-05-14 | End: 2024-05-16

## 2024-05-13 RX ORDER — NICOTINE POLACRILEX 4 MG
30 LOZENGE BUCCAL
Status: DISCONTINUED | OUTPATIENT
Start: 2024-05-13 | End: 2024-05-16

## 2024-05-13 RX ORDER — NICOTINE POLACRILEX 4 MG
15 LOZENGE BUCCAL
Status: DISCONTINUED | OUTPATIENT
Start: 2024-05-13 | End: 2024-05-16

## 2024-05-13 RX ORDER — METOPROLOL SUCCINATE 50 MG/1
50 TABLET, EXTENDED RELEASE ORAL 2 TIMES DAILY
Status: DISCONTINUED | OUTPATIENT
Start: 2024-05-13 | End: 2024-05-16

## 2024-05-13 RX ORDER — SPIRONOLACTONE 25 MG/1
25 TABLET ORAL
Status: DISCONTINUED | OUTPATIENT
Start: 2024-05-14 | End: 2024-05-16

## 2024-05-13 RX ORDER — ENEMA 19; 7 G/133ML; G/133ML
1 ENEMA RECTAL ONCE AS NEEDED
Status: DISCONTINUED | OUTPATIENT
Start: 2024-05-13 | End: 2024-05-16

## 2024-05-13 RX ORDER — METOCLOPRAMIDE HYDROCHLORIDE 5 MG/ML
5 INJECTION INTRAMUSCULAR; INTRAVENOUS EVERY 8 HOURS PRN
Status: DISCONTINUED | OUTPATIENT
Start: 2024-05-13 | End: 2024-05-16

## 2024-05-13 RX ORDER — BISACODYL 10 MG
10 SUPPOSITORY, RECTAL RECTAL
Status: DISCONTINUED | OUTPATIENT
Start: 2024-05-13 | End: 2024-05-16

## 2024-05-13 RX ORDER — SENNOSIDES 8.6 MG
17.2 TABLET ORAL NIGHTLY PRN
Status: DISCONTINUED | OUTPATIENT
Start: 2024-05-13 | End: 2024-05-16

## 2024-05-13 RX ORDER — DEXTROSE MONOHYDRATE 25 G/50ML
50 INJECTION, SOLUTION INTRAVENOUS
Status: DISCONTINUED | OUTPATIENT
Start: 2024-05-13 | End: 2024-05-16

## 2024-05-13 RX ORDER — MELATONIN
3 NIGHTLY PRN
Status: DISCONTINUED | OUTPATIENT
Start: 2024-05-13 | End: 2024-05-16

## 2024-05-13 RX ORDER — ACETAMINOPHEN 500 MG
1000 TABLET ORAL EVERY 8 HOURS PRN
Status: DISCONTINUED | OUTPATIENT
Start: 2024-05-13 | End: 2024-05-16

## 2024-05-13 RX ORDER — ASPIRIN 81 MG/1
81 TABLET, CHEWABLE ORAL NIGHTLY
Status: DISCONTINUED | OUTPATIENT
Start: 2024-05-13 | End: 2024-05-16

## 2024-05-13 RX ORDER — POLYETHYLENE GLYCOL 3350 17 G/17G
17 POWDER, FOR SOLUTION ORAL DAILY PRN
Status: DISCONTINUED | OUTPATIENT
Start: 2024-05-13 | End: 2024-05-16

## 2024-05-13 NOTE — DISCHARGE INSTRUCTIONS
As we discussed do feel need higher level care than what we can provide you here at the immediate care.  You have been encouraged to go by ambulance to the closest hospital is your right to refuse.  By doing so you are going AGAINST MEDICAL ADVICE.  Go directly to desired ER do not stop to eat or drink anything in route.

## 2024-05-13 NOTE — TELEPHONE ENCOUNTER
Left detailed message for patient to call office back to let Dr Harris know if he was seen in the ER.    Left detailed message for patinet wife to call office to let Dr Harris know if patient was seen in the ER.     See also UA result note

## 2024-05-13 NOTE — PROGRESS NOTES
Called patient and left a message for patient to call back when they can. Reviewed patient chart. Left contact my contact number [phone]        Time Spent This Encounter Total: 5  min medical record review  Monthly Minute Total including today: 5 minutes

## 2024-05-13 NOTE — ED PROVIDER NOTES
Patient Seen in: [facility]      History     Chief Complaint   Patient presents with    Fatigue    Weakness     Stated Complaint: fatigue little coughing body aches headache    Subjective:   78-year-old male presents today with history of generalized fatigue coughing body aches headache.  Did see his primary care physician on Friday who told him to go to the ER for further evaluation care.  Patient refused stated wanted to just go home and sleep.  Today still feeling generalized weakness.  Family states that he seems to be disorientated.  When asking questions he simply says I do not know.  Has had complaints of headaches radiating down his spine.  Patient also has had abdominal pain denies any urinary symptoms.  No other symptoms or concerns.  The patient's medication list, past medical history and social history elements as listed in today's nurse's notes were reviewed and agreed (except as otherwise stated in the HPI).  The patient's family history reviewed and determined to be noncontributory to the presenting problem            Objective:   No pertinent past medical history.            No pertinent past surgical history.              No pertinent social history.            Review of Systems    Positive for stated complaint: fatigue little coughing body aches headache  Other systems are as noted in HPI.  Constitutional and vital signs reviewed.      All other systems reviewed and negative except as noted above.    Physical Exam     ED Triage Vitals [05/13/24 1644]   BP (!) 80/62   Pulse 81   Resp 20   Temp 97.4 °F (36.3 °C)   Temp src Temporal   SpO2 98 %   O2 Device None (Room air)       Current Vitals:   Vital Signs  BP: (!) 80/62  Pulse: 81  Resp: 20  Temp: 97.4 °F (36.3 °C)  Temp src: Temporal    Oxygen Therapy  SpO2: 98 %  O2 Device: None (Room air)            Physical Exam  Vitals and nursing note reviewed.   Constitutional:       General: He is not in acute distress.  HENT:      Head:  Normocephalic.      Nose: Nose normal.      Mouth/Throat:      Mouth: Mucous membranes are moist.   Eyes:      Pupils: Pupils are equal, round, and reactive to light.   Cardiovascular:      Rate and Rhythm: Normal rate and regular rhythm.   Pulmonary:      Effort: Pulmonary effort is normal.      Breath sounds: Normal breath sounds.   Musculoskeletal:      Cervical back: Normal range of motion and neck supple.   Skin:     General: Skin is warm and dry.   Neurological:      Mental Status: He is alert.      Comments: Patient answering questions appropriately.  Does seem generally weak.  Is able to transfer from chair to bed without any complications.  Moving all extremities per norm.  Speech is clear.  Following all commands.               ED Course   Labs Reviewed - No data to display                   MDM     Please note that this report has been produced using speech recognition software and may contain errors related to that system including, but not limited to, errors in grammar, punctuation, and spelling, as well as words and phrases that possibly may have been recognized inappropriately.  If there are any questions or concerns, contact the dictating provider for clarification.        Note to patient: The 21st Century Cures Act makes medical notes like these available to patients in the interest of transparency. However, this is a medical document intended as peer to peer communication. It is written in medical language and may contain abbreviations or verbiage that are unfamiliar. It may appear blunt or direct. Medical documents are intended to carry relevant information, facts as evident, and the clinical opinion of the practitioner.                                   Medical Decision Making  Differential diagnosis includes but is not limited to: Hypoglycemia, anemia, CVA, MI, dissection, pneumonia      Presented today with complaints of generalized weakness with possible disorientation per family.  Patient was  answering all questions per norm during exam.  Also following all commands.  Patient does appear to be somewhat weak.  Was able to transfer from chair to bed however needed assist of 1.  Initial blood pressure was 80/62 with the automatic cuff.  Repeat with manual cuff was 100/70.  Accu-Chek was done and blood sugar was normal.  Explained to patient do feel he needs higher level care than what we can provide her at the immediate care and patient was strongly encouraged to go by ambulance but is refusing because he does not want to go to [facility].  Did offer private ambulance but states that family member will drive him to the hospital.  Explained to patient by doing so will be going AGAINST MEDICAL ADVICE patient did verbalize understanding.  Instructed to go directly to the hospital and do not stop to eat or drink anything in route.  Both verbalized understanding and agreed to plan of care.        Disposition and Plan     Clinical Impression:  1. Weakness generalized    2. Hypotension, unspecified hypotension type    3. Disorientation         Disposition:  Ic to ed  5/13/2024  4:55 pm    Follow-up:  No follow-up provider specified.        Medications Prescribed:  Current Discharge Medication List

## 2024-05-14 ENCOUNTER — APPOINTMENT (OUTPATIENT)
Dept: CV DIAGNOSTICS | Facility: HOSPITAL | Age: 79
End: 2024-05-14
Attending: STUDENT IN AN ORGANIZED HEALTH CARE EDUCATION/TRAINING PROGRAM

## 2024-05-14 LAB
ADENOVIRUS PCR:: NOT DETECTED
ANION GAP SERPL CALC-SCNC: 8 MMOL/L (ref 0–18)
ATRIAL RATE: 71 BPM
B PARAPERT DNA SPEC QL NAA+PROBE: NOT DETECTED
B PERT DNA SPEC QL NAA+PROBE: NOT DETECTED
BASOPHILS # BLD AUTO: 0.02 X10(3) UL (ref 0–0.2)
BASOPHILS NFR BLD AUTO: 0.5 %
BUN BLD-MCNC: 32 MG/DL (ref 9–23)
C PNEUM DNA SPEC QL NAA+PROBE: NOT DETECTED
CALCIUM BLD-MCNC: 8.9 MG/DL (ref 8.5–10.1)
CHLORIDE SERPL-SCNC: 101 MMOL/L (ref 98–112)
CO2 SERPL-SCNC: 28 MMOL/L (ref 21–32)
CORONAVIRUS 229E PCR:: NOT DETECTED
CORONAVIRUS HKU1 PCR:: NOT DETECTED
CORONAVIRUS NL63 PCR:: NOT DETECTED
CORONAVIRUS OC43 PCR:: NOT DETECTED
CREAT BLD-MCNC: 1.35 MG/DL
EGFRCR SERPLBLD CKD-EPI 2021: 54 ML/MIN/1.73M2 (ref 60–?)
EOSINOPHIL # BLD AUTO: 0.01 X10(3) UL (ref 0–0.7)
EOSINOPHIL NFR BLD AUTO: 0.3 %
ERYTHROCYTE [DISTWIDTH] IN BLOOD BY AUTOMATED COUNT: 14.7 %
EST. AVERAGE GLUCOSE BLD GHB EST-MCNC: 143 MG/DL (ref 68–126)
FLUAV RNA SPEC QL NAA+PROBE: NOT DETECTED
FLUBV RNA SPEC QL NAA+PROBE: NOT DETECTED
GLUCOSE BLD-MCNC: 101 MG/DL (ref 70–99)
GLUCOSE BLD-MCNC: 108 MG/DL (ref 70–99)
GLUCOSE BLD-MCNC: 120 MG/DL (ref 70–99)
GLUCOSE BLD-MCNC: 177 MG/DL (ref 70–99)
HBA1C MFR BLD: 6.6 % (ref ?–5.7)
HCT VFR BLD AUTO: 27.9 %
HGB BLD-MCNC: 8.9 G/DL
IMM GRANULOCYTES # BLD AUTO: 0.01 X10(3) UL (ref 0–1)
IMM GRANULOCYTES NFR BLD: 0.3 %
LYMPHOCYTES # BLD AUTO: 0.81 X10(3) UL (ref 1–4)
LYMPHOCYTES NFR BLD AUTO: 21.3 %
MAGNESIUM SERPL-MCNC: 2.4 MG/DL (ref 1.6–2.6)
MCH RBC QN AUTO: 29.3 PG (ref 26–34)
MCHC RBC AUTO-ENTMCNC: 31.9 G/DL (ref 31–37)
MCV RBC AUTO: 91.8 FL
METAPNEUMOVIRUS PCR:: NOT DETECTED
MONOCYTES # BLD AUTO: 0.76 X10(3) UL (ref 0.1–1)
MONOCYTES NFR BLD AUTO: 19.9 %
MYCOPLASMA PNEUMONIA PCR:: NOT DETECTED
NEUTROPHILS # BLD AUTO: 2.2 X10 (3) UL (ref 1.5–7.7)
NEUTROPHILS # BLD AUTO: 2.2 X10(3) UL (ref 1.5–7.7)
NEUTROPHILS NFR BLD AUTO: 57.7 %
OSMOLALITY SERPL CALC.SUM OF ELEC: 291 MOSM/KG (ref 275–295)
P AXIS: 68 DEGREES
P-R INTERVAL: 170 MS
PARAINFLUENZA 1 PCR:: NOT DETECTED
PARAINFLUENZA 2 PCR:: NOT DETECTED
PARAINFLUENZA 3 PCR:: NOT DETECTED
PARAINFLUENZA 4 PCR:: NOT DETECTED
PLATELET # BLD AUTO: 52 10(3)UL (ref 150–450)
PLATELETS.RETICULATED NFR BLD AUTO: 18.5 % (ref 0–7)
POTASSIUM SERPL-SCNC: 3.4 MMOL/L (ref 3.5–5.1)
Q-T INTERVAL: 470 MS
QRS DURATION: 156 MS
QTC CALCULATION (BEZET): 510 MS
R AXIS: 187 DEGREES
RBC # BLD AUTO: 3.04 X10(6)UL
RHINOVIRUS/ENTERO PCR:: NOT DETECTED
RSV RNA SPEC QL NAA+PROBE: NOT DETECTED
SARS-COV-2 RNA NPH QL NAA+NON-PROBE: NOT DETECTED
SODIUM SERPL-SCNC: 137 MMOL/L (ref 136–145)
T AXIS: 20 DEGREES
VENTRICULAR RATE: 71 BPM
WBC # BLD AUTO: 3.8 X10(3) UL (ref 4–11)

## 2024-05-14 PROCEDURE — 93306 TTE W/DOPPLER COMPLETE: CPT | Performed by: STUDENT IN AN ORGANIZED HEALTH CARE EDUCATION/TRAINING PROGRAM

## 2024-05-14 PROCEDURE — 82962 GLUCOSE BLOOD TEST: CPT | Performed by: NURSE PRACTITIONER

## 2024-05-14 PROCEDURE — 99232 SBSQ HOSP IP/OBS MODERATE 35: CPT | Performed by: INTERNAL MEDICINE

## 2024-05-14 RX ORDER — POTASSIUM CHLORIDE 20 MEQ/1
40 TABLET, EXTENDED RELEASE ORAL ONCE
Status: COMPLETED | OUTPATIENT
Start: 2024-05-14 | End: 2024-05-14

## 2024-05-14 NOTE — ED PROVIDER NOTES
Patient Seen in: [facility]      History     Chief Complaint   Patient presents with    Hypotension     Stated Complaint: from [facility], hypotensive 80/60. pale, fatigued    Subjective:   HPI    This is a 78-year-old male with history of coronary disease, CHF, rheumatoid arthritis presents emergency room for evaluation of hypotensive episode at the immediate care.  Patient states over the last few weeks he has felt general fatigue and feels achy over his whole body, reports \"pain from head down to my feet \".  Patient denies any focal weakness, denies fevers, denies any rashes.  Patient does report he is felt some shortness of breath with exertion but denies chest pain.  Denies nausea vomiting or diarrhea.  Denies melena or hematochezia.  Denies urinary symptoms.  Patient saw his primary care physician 3 days ago for same symptoms.  States he was still feeling the same today therefore went to the immediate care, patient was noted to be hypotensive at the immediate care 80/60 and sent to the ER for further evaluation.  Wife at bedside also reviewed, denies patient's had any mental status changes, does not report any other concerns other than what patient mentioned during my interview    Objective:   Past Medical History:    CAD (coronary artery disease)    Congestive heart disease (HCC)    Congestive heart failure (HCC)    CORONARY ARTERY DISEASE    cabg    Coronary atherosclerosis    Diabetes (HCC)    DM (diabetes mellitus) (HCC)    Heart attack (HCC)    High cholesterol    Other and unspecified hyperlipidemia    Rheumatoid arthritis (HCC)    Sleep apnea    Unspecified sleep apnea              Past Surgical History:   Procedure Laterality Date    Angiogram  2/11/2015    Angioplasty (coronary)  2/23/15    RCA    Bypass surgery      2007    Cabg  2004    LAD, Diagonal    Cardiac defibrillator placement  6/7/14    [employer] dual chamber ICD    Cath drug eluting stent      Tonsillectomy                   Social History     Socioeconomic History    Marital status:    Tobacco Use    Smoking status: Former     Current packs/day: 0.00     Average packs/day: 1 pack/day for 26.0 years (26.0 ttl pk-yrs)     Types: Cigarettes     Start date: 1960     Quit date: 1986     Years since quittin.9    Smokeless tobacco: Never    Tobacco comments:     NON-SMOKER   Vaping Use    Vaping status: Never Used   Substance and Sexual Activity    Alcohol use: Yes     Alcohol/week: 0.0 standard drinks of alcohol     Comment: wine / OCC    Drug use: No   Other Topics Concern    Caffeine Concern No    Exercise No     Social Determinants of Health     Food Insecurity: No Food Insecurity (2024)    Food Insecurity     Food Insecurity: Never true   Transportation Needs: No Transportation Needs (2024)    Transportation Needs     Lack of Transportation: No   Physical Activity: Inactive (2020)    [facility]    Exercise Vital Sign     Days of Exercise per Week: 0 days     Minutes of Exercise per Session: 0 min    [facility]    Social Connections   Housing Stability: Low Risk  (2024)    Housing Stability     Housing Instability: No              Review of Systems    Positive for stated complaint: from [facility], hypotensive 80/60. pale, fatigued  Other systems are as noted in HPI.  Constitutional and vital signs reviewed.      All other systems reviewed and negative except as noted above.    Physical Exam     ED Triage Vitals   BP 24 1751 102/86   Pulse 24 1751 78   Resp 24 1751 18   Temp 24 1751 96.8 °F (36 °C)   Temp src 24 1751 Temporal   SpO2 24 1751 98 %   O2 Device 24 1915 None (Room air)       Current Vitals:   Vital Signs  BP: 105/87  Pulse: 75  Resp: 21  Temp: 99.7 °F (37.6 °C)  Temp src: Oral  MAP (mmHg): 94    Oxygen Therapy  SpO2: 94 %  O2 Device:  None (Room air)  Pulse Oximetry Type: Continuous  Oximetry Probe Site Changed: No  Pulse Ox Probe Location: Right hand            Physical Exam    GENERAL: Patient is awake, alert, , in no acute distress.  HEENT: Pupils equal round reactive to light, extraocular muscles are intact, there is no scleral icterus.  Mucous membranes are moist, oropharynx is yvonne.  Scalp is atraumatic.  NECK: Neck is supple, there is no nuchal rigidity.   HEART: Regular rate and rhythm, no murmurs.  LUNGS: Clear to auscultation bilaterally.  No Rales, no rhonchi, no wheezing, no stridor.  ABDOMEN: Soft, nondistended,non tender, no rebound, no rigidity, no guarding.no pulsatile masses. No CVA tenderness  EXTREMITIES: No peripheral edema, no calf tenderness  NEUROLOGIC EXAM: Tongue midline, no facial drooping, no ptosis, muscle strength +5/5 bilateral upper and lower extremities cerebellar finger to nose intact, no pronator drift, sensation intact.        ED Course     Labs Reviewed   COMP METABOLIC PANEL (14) - Abnormal; Notable for the following components:       Result Value    Sodium 135 (*)     BUN 37 (*)     Creatinine 1.62 (*)     eGFR-Cr 44 (*)     AST 43 (*)     A/G Ratio 0.8 (*)     All other components within normal limits   PRO BETA NATRIURETIC PEPTIDE - Abnormal; Notable for the following components:    Pro-Beta Natriuretic Peptide 8,855 (*)     All other components within normal limits   URINALYSIS WITH CULTURE REFLEX - Abnormal; Notable for the following components:    Blood Urine 1+ (*)     Hyaline Casts Present (*)     All other components within normal limits   HEMOGLOBIN A1C - Abnormal; Notable for the following components:    HgbA1C 6.6 (*)     Estimated Average Glucose 143 (*)     All other components within normal limits   BASIC METABOLIC PANEL (8) - Abnormal; Notable for the following components:    Glucose 101 (*)     Potassium 3.4 (*)     BUN 32 (*)     Creatinine 1.35 (*)     eGFR-Cr 54 (*)     All other components  within normal limits   POCT GLUCOSE - Abnormal; Notable for the following components:    POC Glucose 130 (*)     All other components within normal limits   POCT GLUCOSE - Abnormal; Notable for the following components:    POC Glucose 108 (*)     All other components within normal limits   CBC W/ DIFFERENTIAL - Abnormal; Notable for the following components:    RBC 3.51 (*)     HGB 10.3 (*)     HCT 32.2 (*)     PLT 68.0 (*)     Immature Platelet Fraction 20.0 (*)     Monocyte Absolute 1.33 (*)     All other components within normal limits   CBC W/ DIFFERENTIAL - Abnormal; Notable for the following components:    WBC 3.8 (*)     RBC 3.04 (*)     HGB 8.9 (*)     HCT 27.9 (*)     PLT 52.0 (*)     Immature Platelet Fraction 18.5 (*)     Lymphocyte Absolute 0.81 (*)     All other components within normal limits   TROPONIN I HIGH SENSITIVITY - Normal   MAGNESIUM - Normal   MAGNESIUM - Normal   CBC WITH DIFFERENTIAL WITH PLATELET    Narrative:     The following orders were created for panel order CBC With Differential With Platelet.  Procedure                               Abnormality         Status                     ---------                               -----------         ------                     CBC W/ DIFFERENTIAL[[id number]]          Abnormal            Final result                 Please view results for these tests on the individual orders.   CBC WITH DIFFERENTIAL WITH PLATELET    Narrative:     The following orders were created for panel order CBC With Differential With Platelet.  Procedure                               Abnormality         Status                     ---------                               -----------         ------                     CBC W/ DIFFERENTIAL[[id number]]          Abnormal            Final result                 Please view results for these tests on the individual orders.   RAINBOW DRAW LAVENDER   RAINBOW DRAW LIGHT GREEN   RAINBOW DRAW BLUE   RAINBOW DRAW GOLD   RESPIRATORY FLU  EXPAND PANEL + COVID-19     EKG    Rate, intervals and axes as noted on EKG Report.  Rate: 71  Rhythm: Paced rhythm.  Reading: Paced rhythm at heart rate of 71, no ST elevation.                         MDM        Differential diagnosis before testing includes but not limited to pneumonia, urinary tract infection, electrolyte abnormality, CHF, which is a medical condition that poses a threat to life/function    Past Medical History/comorbidities-as noted in HPI      Radiographic images  I personally reviewed the radiographs and my individual interpretation shows chest x-ray no pneumothorax  I also reviewed the official reports that showed chest x-ray mild cardiomegaly unchanged.  No active cardiopulmonary disease    History obtained by external source  (EMS, family, law enforcement, )other sources of medical information included history per wife also is on HPI    Discussion of management (consult/physicians, social work, pharmacy,ect) Dr. Bueno hospitalist, [facility] cardiology    External chart review included primary care office visit reviewed from 5/10/2024, noted patient had similar complaints at that time.    Course of Events during Emergency Room Visit include IV established, patient placed on cardiac monitor and pulse ox.  Chest x-ray was unremarkable.  CBC white count 6.4 hemoglobin 10.3 platelets 68.  On review of previous laboratory results patient has been noted to be anemic over the last year, hemoglobin ranges between 10.3 and 11.6, also has thrombocytopenia ranging from 53-72.  Chemistry sodium 135 potassium 4.0 BUN 37 creatinine 1.62 glucose 99.  Urinalysis negative nitrate and leukocyte esterase.  Troponin 54, BNP 8855.  I discussed all results with the patient and wife, etiology of patient's symptoms are unclear, patient did have episode of hypotension at the immediate care, has not had any hypotension in the Emergency Department, discussed with cardiology and hospitalist, will admit for further  evaluation and treatment.  Patient agrees with plan.    Shared decision making was utilized           Disposition:    Admission  I have discussed with the patient the results of test, differential diagnosis, and treatment plan. They expressed clear understanding of these instructions and agrees to the plan provided.       Note to patient: The 21st Century Cures Act makes medical notes like these available to patients in the interest of transparency. However, this is a medical document intended as peer to peer communication. It is written in medical language and may contain abbreviations or verbiage that are unfamiliar. It may appear blunt or direct. Medical documents are intended to carry relevant information, facts as evident, and the clinical opinion of the practitioner.           Admission disposition: 5/13/2024  9:02 PM                                        Medical Decision Making      Disposition and Plan     Clinical Impression:  1. CUMMINGS (dyspnea on exertion)    2. Weakness generalized    3. Hypotensive episode         Disposition:  Admit  5/13/2024  9:02 pm    Follow-up:  No follow-up provider specified.        Medications Prescribed:  Current Discharge Medication List                            Hospital Problems       Present on Admission  Date Reviewed: 5/10/2024            ICD-10-CM Noted POA    * (Principal) CUMMINGS (dyspnea on exertion) R06.09 5/13/2024 Unknown    Weakness generalized R53.1 5/13/2024 Unknown

## 2024-05-14 NOTE — PROGRESS NOTES
David Reyes Patient Status:  Inpatient    1945 MRN [id number]   Location [place] [address]NE-A Attending Joe Marcum MD   Hosp Day # 1 PCP Thea Harris,      Cardiology Nocturnal APN Note    Briefly: (Documentation from chart review)     David Reyes is a 79 y/o male with PMH/PSH of FLAKO, HLD, RA, DM2, CAD and ICM who presented with generalized weakness and fatigue. His BP at urgent care was 80 on the automatic cuff but 100/70 on manual repeat. BNP elevated, but lower then prior test a few days ago.     Primary Cardiologist Brandi/Sebas    Vital Signs:       2024    10:22 PM 2024    12:30 AM   Vitals History   BP  101/66   BP Location  Left arm   Pulse 72 66   Resp  20   Temp  98.7 °F (37.1 °C)   SpO2 99 % 92 %        Labs:   Lab Results   Component Value Date    WBC 6.4 2024    HGB 10.3 2024    HCT 32.2 2024    PLT 68.0 2024    CREATSERUM 1.62 2024    BUN 37 2024     2024    K 4.0 2024    CL 99 2024    CO2 26.0 2024    GLU 99 2024    CA 9.8 2024    ALB 3.7 2024    ALKPHO 53 2024    BILT 0.4 2024    TP 8.1 2024    AST 43 2024    ALT 24 2024    MG 2.4 2024    TROPHS 54 2024       Diagnostics:   XR CHEST AP PORTABLE  (CPT=71045)    Result Date: 2024  CONCLUSION:  There is mild cardiomegaly which is unchanged.  There is otherwise no evidence of active cardiopulmonary disease.   LOCATION:        Dictated by (CST): Jl Faulkner MD on 2024 at 6:35 PM     Finalized by (CST): Jl Faulkner MD on 2024 at 6:36 PM       XR CHEST PA + LAT CHEST (CPT=71046)    Result Date: 5/10/2024  CONCLUSION:   Postoperative changes of cardiothoracic surgery with stable cardiac and mediastinal contours.  Stable positioning of left chest wall ICD.  No pulmonary edema or focal airspace consolidation.  The pleural spaces are clear.  Mild degenerative  changes of the spine.   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       Medications:    aspirin    ezetimibe    folic acid    metoprolol succinate ER    glucose **OR** glucose **OR** glucose-vitamin C **OR** dextrose **OR** glucose **OR** glucose **OR** glucose-vitamin C    acetaminophen    melatonin    ondansetron    metoclopramide    polyethylene glycol (PEG 3350)    sennosides    bisacodyl    fleet enema    insulin degludec    insulin aspart    benzonatate    guaiFENesin    glycerin-hypromellose-    sodium chloride    spironolactone    losartan    Assessment:   Hypotension  - 80/62 with automatic cuff at convenient care, but 100/70 on repeat with manual cuff  - No readings <100 SBP charted while in ED    Elevated BNP  - 8855  - Prior BNP on 11,128  - No pulmonary edema on chest x-ray      Plan:    - Continue to monitor overnight  - Formal Cardiology consult to follow in AM.       MIKEL Live  [facility]  5/14/2024  1:30 AM

## 2024-05-14 NOTE — PROGRESS NOTES
[facility]   part of [facility] Hospitalist Progress Note     David Reyes Patient Status:  Inpatient    1945 MRN [id number]   Location [address] Attending Germán Gonzalez DO   Hosp Day # 1 PCP Thea Harris DO     Chief Complaint: Generalized weakness    Subjective:     Patient has no complaints besides feeling tired. However, he had previously complained about a headache.     Objective:    Review of Systems:   A comprehensive review of systems was completed; pertinent positive and negatives stated in subjective.    Vital signs:  Temp:  [96.8 °F (36 °C)-99.7 °F (37.6 °C)] 99.7 °F (37.6 °C)  Pulse:  [62-82] 75  Resp:  [18-28] 21  BP: ()/(61-91) 105/87  SpO2:  [92 %-99 %] 94 %    Physical Exam:    General: No acute distress, awake and alert. Appears fatigued  Respiratory: No wheezes, no rhonchi  Cardiovascular: S1, S2, regular rate and rhythm  Abdomen: Soft, Non-tender, non-distended, positive bowel sounds  Neuro: FERRARA x 4  Extremities: No edema    Diagnostic Data:    Labs:  Recent Labs   Lab 05/10/24  1501 24  1757 24  0532   WBC 7.6 6.4 3.8*   HGB 10.6* 10.3* 8.9*   MCV 92.8 91.7 91.8   PLT 72.0* 68.0* 52.0*     Recent Labs   Lab 05/10/24  1501 24  1757 24  0532   * 99 101*   BUN 34* 37* 32*   CREATSERUM 1.91* 1.62* 1.35*   CA 9.5 9.8 8.9   ALB 3.8 3.7  --    * 135* 137   K 3.9 4.0 3.4*   CL 97* 99 101   CO2 28.0 26.0 28.0   ALKPHO 54 53  --    AST 23 43*  --    ALT 26 24  --    BILT 0.7 0.4  --    TP 8.1 8.1  --      CrCl cannot be calculated (Unknown ideal weight.).    Recent Labs   Lab 24   TROPHS 54     No results for input(s): \"PTP\", \"INR\" in the last 168 hours.     Microbiology  No results found for this visit on 24.    Imaging: Reviewed in Epic.    Medications:    potassium chloride  40 mEq Intravenous Once    aspirin  81 mg Oral Nightly    ezetimibe  10 mg Oral Daily    folic acid  800 mcg Oral QOD     metoprolol succinate ER  50 mg Oral BID    insulin degludec  12 Units Subcutaneous Nightly    insulin aspart  2-10 Units Subcutaneous TID AC and HS    spironolactone  25 mg Oral Daily with breakfast    losartan  25 mg Oral QPM       Assessment & Plan:      #Chronic combined systolic and diastolic CHF   #Ischemic cardiomyopathy s/p ICD  -Diurese per Cardiology on consult (typically on torsemide 20 BID at home)   -PTA Losartan/Aldactone  -Hold entresto given hypotension at immediate care  -Echo  -Interrogate device  -Doubt acute CHF     #Generalized weakness/fatigue presumably due to above  -Influenza negative, Urinalysis unremarkable   -Check RVP  -PT     #CAD s/p CABG and PCI  -ASA     #CKD III  -Stable     #Dyslipidemia  -Only tolerates lovastatin (not on formulary)  -Zetia     #DM type II, A1c is 6.6  -ISS, tresiba     #Rheumatoid arthritis  -Typically on methotrexate     #FLAKO  -FLAKO protocol     #Chronic thrombocytopenia and anemia  -Monitor   -Continue Folate   -Hold chemical DVT ppx    Discussed with Dr. Emerson Gonzalez, patient and RN.      Germán Gonzalez, DO    Supplementary Documentation:     Quality:  DVT Mechanical Prophylaxis:     Early ambuation  DVT Pharmacologic Prophylaxis   Medication   None      DVT Pharmacologic prophylaxis: Aspirin 162 mg     Code Status: Full  Beltre: No urinary catheter in place  GABE: TBD    Discharge is dependent on: Clinical status, consultant recs  At this point Mr. Reyes is expected to be discharge to: TBD    The 21st Century Cures Act makes medical notes like these available to patients in the interest of transparency. Please be advised this is a medical document. Medical documents are intended to carry relevant information, facts as evident, and the clinical opinion of the practitioner. The medical note is intended as peer to peer communication and may appear blunt or direct. It is written in medical language and may contain abbreviations or verbiage that are unfamiliar.              **Certification    PHYSICIAN Certification of Need for Inpatient Hospitalization - Initial Certification    Patient will require inpatient services that will reasonably be expected to span two midnight's based on the clinical documentation in H+P.   Based on patients current state of illness, I anticipate that, after discharge, patient will require TBD.

## 2024-05-14 NOTE — CONSULTS
Cardiology Consultation      David Reyes Patient Status:  Inpatient    1945 MRN [id number]   Location [facility] 7NE-A Attending Germán Gonzalez DO   Hosp Day # 1 PCP Thea Harris DO     Reason for Consultation:  Hypotension     History of Present Illness:  David Reyes is a(n) 78 year old male with chronic medical conditions including FLAKO, HLD, RA, DM2, CAD, ICM who presents with worsening fatigue and generalized weakness who was noted to be hypotensive at urgent care. Symptoms on-going for 2-3 weeks. He is more concerned about headache with radiation of pain down his spine. Denies shortness of breath, weight gain, swelling, chest pain. Noted to be hypotensive at urgent care and sent for eval at [facility] where BP was improved. Cardiology asked to eval given hx.     History:  Past Medical History:    CAD (coronary artery disease)    Congestive heart disease (HCC)    Congestive heart failure (HCC)    CORONARY ARTERY DISEASE    cabg    Coronary atherosclerosis    Diabetes (HCC)    DM (diabetes mellitus) (HCC)    Heart attack (HCC)    High cholesterol    Other and unspecified hyperlipidemia    Rheumatoid arthritis (HCC)    Sleep apnea    Unspecified sleep apnea     Past Surgical History:   Procedure Laterality Date    Angiogram  2015    Angioplasty (coronary)  2/23/15    RCA    Bypass surgery          Cabg      LAD, Diagonal    Cardiac defibrillator placement  14    [employer] dual chamber ICD    Cath drug eluting stent      Tonsillectomy       Family History   Problem Relation Age of Onset    Cancer Father     Heart Disease Mother     Other (alzheimers) Mother     Stroke Neg       reports that he quit smoking about 37 years ago. His smoking use included cigarettes. He started smoking about 63 years ago. He has a 26 pack-year smoking history. He has never used smokeless tobacco. He reports current alcohol use. He reports that he does not use  drugs.    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       Medications:    Current Facility-Administered Medications:     potassium chloride (Klor-Con M20) tab 40 mEq, 40 mEq, Oral, Once    aspirin chewable tab 81 mg, 81 mg, Oral, Nightly    ezetimibe (Zetia) tab 10 mg, 10 mg, Oral, Daily    folic acid (Folvite) tab 800 mcg, 800 mcg, Oral, QOD    metoprolol succinate ER (Toprol XL) 24 hr tab 50 mg, 50 mg, Oral, BID    glucose (Dex4) 15 GM/59ML oral liquid 15 g, 15 g, Oral, Q15 Min PRN **OR** glucose (Glutose) 40% oral gel 15 g, 15 g, Oral, Q15 Min PRN **OR** glucose-vitamin C (Dex-4) chewable tab 4 tablet, 4 tablet, Oral, Q15 Min PRN **OR** dextrose 50% injection 50 mL, 50 mL, Intravenous, Q15 Min PRN **OR** glucose (Dex4) 15 GM/59ML oral liquid 30 g, 30 g, Oral, Q15 Min PRN **OR** glucose (Glutose) 40% oral gel 30 g, 30 g, Oral, Q15 Min PRN **OR** glucose-vitamin C (Dex-4) chewable tab 8 tablet, 8 tablet, Oral, Q15 Min PRN    acetaminophen (Tylenol Extra Strength) tab 1,000 mg, 1,000 mg, Oral, Q8H PRN    melatonin tab 3 mg, 3 mg, Oral, Nightly PRN    ondansetron (Zofran) 4 MG/2ML injection 4 mg, 4 mg, Intravenous, Q6H PRN    metoclopramide (Reglan) 5 mg/mL injection 5 mg, 5 mg, Intravenous, Q8H PRN    polyethylene glycol (PEG 3350) (Miralax) 17 g oral packet 17 g, 17 g, Oral, Daily PRN    sennosides (Senokot) tab 17.2 mg, 17.2 mg, Oral, Nightly PRN    bisacodyl (Dulcolax) 10 MG rectal suppository 10 mg, 10 mg, Rectal, Daily PRN    fleet enema (Fleet) 7-19 GM/118ML rectal enema 133 mL, 1 enema, Rectal, Once PRN    insulin degludec 100 units/mL flextouch 12 Units, 12 Units, Subcutaneous, Nightly    insulin aspart (NovoLOG) 100 Units/mL FlexPen 2-10 Units, 2-10 Units, Subcutaneous, TID AC and HS    benzonatate (Tessalon) cap 200 mg, 200 mg, Oral, TID PRN    guaiFENesin  (Robitussin) 100 MG/5 ML oral liquid 200 mg, 200 mg, Oral, Q4H PRN    glycerin-hypromellose- (Artificial Tears) 0.2-0.2-1 % ophthalmic solution 1 drop, 1 drop, Both Eyes, QID PRN    sodium chloride (Saline Mist) 0.65 % nasal solution 1 spray, 1 spray, Each Nare, Q3H PRN    spironolactone (Aldactone) tab 25 mg, 25 mg, Oral, Daily with breakfast    losartan (Cozaar) tab 25 mg, 25 mg, Oral, QPM    Review of Systems:  A comprehensive review of systems was negative if not otherwise mention in above HPI.    /87 (BP Location: Left arm)   Pulse 75   Temp 99.7 °F (37.6 °C) (Oral)   Resp 21   SpO2 94%   Temp (24hrs), Av.4 °F (36.9 °C), Min:96.8 °F (36 °C), Max:99.7 °F (37.6 °C)       Intake/Output Summary (Last 24 hours) at 2024 0920  Last data filed at 2024 2043  Gross per 24 hour   Intake 1000 ml   Output --   Net 1000 ml     Wt Readings from Last 3 Encounters:   24 236 lb (107 kg)   05/10/24 236 lb (107 kg)   24 245 lb 6.4 oz (111.3 kg)       Physical Exam:   General: Alert and oriented x 3. No apparent distress. No respiratory or constitutional distress.  HEENT: Normocephalic, anicteric sclera, neck supple.  Neck: No JVD  Cardiac: Regular rate and rhythm. S1, S2 normal. No murmur, pericardial rub, S3.  Lungs: Clear without wheezes, rales, rhonchi or dullness.  Normal excursions and effort.  Abdomen: Soft, non-tender. BS-present.  Extremities: Without clubbing, cyanosis or edema.   Neurologic: Alert and oriented, normal affect.  Skin: Warm and dry.     Laboratory Data:  Lab Results   Component Value Date    WBC 3.8 2024    HGB 8.9 2024    HCT 27.9 2024    PLT 52.0 2024    CREATSERUM 1.35 2024    BUN 32 2024     2024    K 3.4 2024     2024    CO2 28.0 2024     2024    CA 8.9 2024    ALB 3.7 2024    ALKPHO 53 2024    BILT 0.4 2024    TP 8.1 2024    AST 43 2024     ALT 24 05/13/2024    MG 2.4 05/14/2024    PGLU 108 05/14/2024       Imaging/results:  CXR - no acute cardiopulm disease   Troponin 54   pBNP 8855  EKG - A sensed, v paced with PVC      Assessment:  Transient hypotension   Generalized fatigue   Headache   Chronic combined systolic/diastolic HF   CAD w/ hx of PCI and CABG   Ischemic cardiomyopathy s/p ICD  CKD  DM2   FLAKO  Chronic thrombocytopenia       Plan:  Check ECHO   Clinically, do not suspect he is in acute heart failure. Will interrogate device to eval further.   Further recs to follow         Thank you for allowing me to participate in the care of your patient.      Emerson Gonzalez DO  Cardiologist  [facility]  5/14/2024 9:20 AM      Note to the patient: The 21st Century Cures Act makes medical notes like these available to patients in the interest of transparency. However, be advised that this is a medical document. It is intended as peer to peer communication. It is written in medical language and may contain abbreviations or verbiage that are unfamiliar. It may appear blunt or direct. Medical documents are intended to carry relevant information, facts as evident, and clinical opinion of the practitioner.     Disclaimer: Components of this note were documented using voice recognition system and are subject to errors not corrected at proofreading. Contact the author of this note for any clarifications.

## 2024-05-14 NOTE — HISTORICAL OFFICE NOTE
[facility]  Outside Information  Continuity of Care Document  4/23/2024  David Reyes - 78 y.o. Male; born [birth year]Summary of episode note, generated on May 10, 2024May 10, 2024   CHIEF COMPLAINT    CHIEF COMPLAINT  Reason for Visit/Chief Complaint   4 month follow up: Complex heart failure patientFatigue and dyspnea on exertion  Heart failure with reduced ejection fraction, chronic  Ischemic cardiomyopathy  CAD with CABG  Obstructive sleep apnea on CPAP  Dual-chamber ICD  Pulmonary hypertension   78-year-old gentleman coming for clinic for 4-month follow-up.This visit is related to heart failure management.Patient saw EP but no change in management.  Patient also saw Dr. Sung. No change in medications.No recent admissions to hospital.He stays off Plavix due to chronic thrombocytopenia although no bleeding issues now.Last time we double beta-blocker and added losartan since Entresto was expensive.Patient has chronic heart failure with reduced ejection fraction, stage C, class II with underlying ischemic cardiomyopathy with LVEF of 25-30%, post BiV and HF meds. CAD, CABG and PCIs. In the past initially his LVEF was 10%. He has history of severe pulmonary hypertension which normalized with medications in the past by Doppler 2016. He underwent CRT-D upgrade with Dr. Mullen in October 2022. Device was checked and is functioning well by EP per notes. He is 30% atrial pacing and 98% BiV pacing with underlying sinus.He tolerates the medications well. We had changed Entresto to ARB back because it was expensive.Patient saw rheumatology and rheumatoid arthritis controlled with methotrexate. Patient takes folic acid as well. He also has osteoarthritis of the knees. He has morning stiffness. He is intermittently on prednisone with tapering doses.Follow-up echo Doppler -March 1, 2024 no -significant changes as compared to February 2023. LVEF 25% with grade 2 diastolic  dysfunction and wall motion abnormalities consistent with ischemic cardiomyopathy on top of global diffuse hypokinesis. Aortic valve area 1.6 cm score with mild aortic stenosis and mean gradient of 12 mmHg and 1+ AI but aortic stenosis may be underestimated with low EF. RV size normal with preserved RV function. Mildly elevated pulmonary pressure 35 mmHg and mildly dilated left atrium. No other valvular abnormalities.Occasional hemoptysis with cough with low platelets.Patient does not use any CPAP. He feels more comfortable without CPAP. He could not tolerate it.Patient has BiV ICD. No shocks. Device functioning well and no recent issues. He is AV paced.Patient has chronically low blood pressure but with reduced LV systolic function. It stays at 100/60 mmHg. - We reviewed blood work with the patient ordered by other provider -February 2024 hemoglobin 11.0 and platelet count 53. Stable. Free T4 0.9 with normal TSH of 4.24 And vitamin D 21 cholesterol profile stable with chronically reduced HDL of 36. LDL 62 triglycerides 121. proBNP 4382 not far from baseline. Glucose 133 potassium 4.2 sodium 140 creatinine 1.46 BUN 24 and normal liver enzymes. Hemoglobin A1c 6.5%Follow-up blood work December 5, 2023 -hemoglobin 11.5 stable platelet dropped from 74K to 57K glucose 116 potassium 4.2 sodium 139 with creatinine 1.56 BUN 22 and normal liver enzymes borderline AST.Echo Doppler February 2023 - -as compared to echo May 2022 mild improvement in LV systolic function. LVEF 25% from 15-20% post BiV and after adding Entresto. Moderately calcified aortic valve 1.9 cm2 with mean gradient of 8 mmHg with no significant stenosis in only trace AI. Hospitalization - acute on chronic systolic CHF exacerbation - September 2022.  Hospitalization for viral infection with rhinovirus and bronchopneumonia in May 2022.  Patient was hospitalized for CHF exacerbation at [facility] in December 2021.Plavix was dc in hospital in  09/2022 - due to low platelets in 50's to 70's but he stayed on baby ASA.He has history of lisinopril intolerance with cough. Losartan is OK.Follow-up Lexiscan nuclear stress test -March 2022 -no new ischemia but fixed defect from prior infarct and anterolateral wall small area. LVEF was 22% with moderately enlarged left ventricle. Liver enzymes normal. Stable vitals. Paced rhythm.Initially his LVEF was 10% in the past.PCI of RCA with drug-coated stent February 2015.  PCI of left circumflex chronic total occlusion with drug-coated stent -February 2015.  CAD with LAD chronic total occlusion but patient has widely patent LIMA to LAD graft which also supplies retrogradely the major diagonal branch.Patient has rheumatoid arthritis was treated with methotrexate and prednisone intermittently. Stable now.RHC -2013 -severe pulmonary hypertension at 60 mmHg and PVR of 4.1 Wood units.Currently has NYHA Class 2 symptoms     PROBLEMS  Reconcile with Patient's ChartPROBLEMS  Problem Effective Dates Date resolved Problem Status   ACC/AHA stage C chronic systolic heart failure, [SNOMED-CT: 310902114] 3/3/2022 - Active   Cardiomyopathy, ischemic, [SNOMED-CT: 183561209] 4/2/2019 - Active   CAD (coronary artery disease), [SNOMED-CT: 49243987] 8/20/2019 - Active   Pure hypercholesterolemia, [SNOMED-CT: 391630787] 4/2/2019 - Active   Pulmonary hypertension secondary to increased PVR, [SNOMED-CT: 443258534] 4/2/2019 - Active   Aortic stenosis, mild, [SNOMED-CT: 906646294] 4/23/2024 - Active   S/P coronary artery stent placement, [SNOMED-CT: 669746472] 8/20/2019 - Active   ICD (implantable cardioverter-defibrillator) in place, [SNOMED-CT: 411830024] 8/20/2019 - Active   Carotid artery stenosis, [SNOMED-CT: 77608220] 4/2/2019 - Active   Obesity, [SNOMED-CT: 702313248] 8/20/2019 - Active   Rheumatoid arthritis, [SNOMED-CT: 10001687] 8/20/2019 - Active   Sleep apnea, obstructive, [SNOMED-CT: 68362626] 8/20/2019 - Active   Pre-op testing,  [SNOMED-CT: 232138719] 9/27/2022 - Active   Hx of CABG, [SNOMED-CT: 677091489] 4/2/2019 - Active   Diabetes mellitus type 2 in obese, [SNOMED-CT: 54620051] 8/20/2019 - Active     ENCOUNTER DIAGNOSIS    ENCOUNTER DIAGNOSIS  Problem Effective Dates Date resolved Problem Status   ACC/AHA stage C chronic systolic heart failure, [SNOMED-CT: 544121051] 3/3/2022 - Active   Cardiomyopathy, ischemic, [SNOMED-CT: 632413241] 4/2/2019 - Active   CAD (coronary artery disease), [SNOMED-CT: 35634107] 8/20/2019 - Active   Pure hypercholesterolemia, [SNOMED-CT: 756453882] 4/2/2019 - Active   Pulmonary hypertension secondary to increased PVR, [SNOMED-CT: 545148507] 4/2/2019 - Active   Aortic stenosis, mild, [SNOMED-CT: 437426409] 4/23/2024 - Active   S/P coronary artery stent placement, [SNOMED-CT: 798069049] 8/20/2019 - Active   ICD (implantable cardioverter-defibrillator) in place, [SNOMED-CT: 737381574] 8/20/2019 - Active   Carotid artery stenosis, [SNOMED-CT: 31691100] 4/2/2019 - Active   Obesity, [SNOMED-CT: 403649677] 8/20/2019 - Active   Rheumatoid arthritis, [SNOMED-CT: 27267789] 8/20/2019 - Active   Sleep apnea, obstructive, [SNOMED-CT: 94062468] 8/20/2019 - Active   Hx of CABG, [SNOMED-CT: 151384868] 4/2/2019 - Active   Diabetes mellitus type 2 in obese, [SNOMED-CT: 94152521] 8/20/2019 - Active     VITAL SIGNS    VITAL SIGNS  Date / Time: 4/23/2024   BP Systolic 100 mmHg   BP Diastolic 58 mmHg   Height 69 inches   Weight 243 lbs   Pulse Rate 71 bpm   BSA (Body Surface Area) 2.4 cc/m2   BMI (Body Mass Index) 35.9 cc/m2   Blood Pressure 100 / 58 mmHg     PHYSICAL EXAMINATION    PHYSICAL EXAMINATION  Header Details   Vitals Right Arm Sitting  / 58 mmHg, Pulse rate 71 bpm, Regular, Height in 5' 9\", BMI: 35.9, Weight in 242.99 lbs (or) 110.22 kgs, BSA : 2.36 cc/m²   General Appearance No Acute Distress, Obese   Head/Eyes/Ears/Nose/Mouth/Throat Conjunctiva pink, Sclera Clear, PERRLA, Mucous membranes Moist, No pallor   Neck  Normal carotid pulsations, No carotid bruits, No JVD   Respiratory Unlabored, Lungs clear with normal breath sounds   Cardiovascular Intact distal pulses, Regular rhythm. Normal rate present. Normal and normal S1 and S2  Harsh mid murmur is present at the upper right sternal border radiating to the neck.   Gastrointestinal Abdomen soft, Non-tender, Normoactive bowel sounds, No organomegaly   Strength and tone Normal muscle strength   Lower Extremities Pulses 2+ and equal bilaterally, No edema   Skin Warm and dry, No rashes or lesions   Neurologic / Psychiatric Alert and Oriented   Speech Normal speech     ALLERGIES, ADVERSE REACTIONS, ALERTS    ALLERGIES, ADVERSE REACTIONS, ALERTS  Type Substance Reaction Severity Status   Allergies Penicillins - Allergen cripling effect Severe Active   Allergies Statins-HMG-CoA Reductase Inhibitors - Allergen myalgias Moderate Active     MEDICATIONS ADMINISTERED DURING VISIT    No data available    MEDICATIONS    MEDICATIONS  Medication Start Date Route/Frequency Status   aspirin 81 MG tablet, [RxNorm: 940031] 10/7/2021 1 tab daily Active   ergocalciferol (vitamin D2) 1,250 mcg (50,000 unit) capsule, [RxNorm: 6408143] 2/19/2024 take 1 tablet weekly x 8 weeks then decrease to 50,000 units monthly Active   EZETIMIBE 10MG TABLETS, [RxNorm: 015382] - TAKE 1 TABLET BY MOUTH EVERY DAY Active   folic acid (FOLATE) 1 MG tablet, [RxNorm: 426075] 10/7/2021 Take 1 mg by mouth daily. Active   LOSARTAN 25MG TABLETS, [RxNorm: 598710] - TAKE 1 TABLET BY MOUTH EVERY EVENING Active   LOVASTATIN 40MG TABLETS, [RxNorm: 697463] - TAKE 1 TABLET BY MOUTH TWICE DAILY Active   metFORMIN 1,000 mg tablet, [RxNorm: 841096] 5/26/2022 Take 1 tablet orally 2 times a day. Active   methotrexate 2.5 MG Tab, [RxNorm: 488511] 10/7/2021 4 tabs weekly, as dir Active   metoprolol succinate ER 50 mg tablet,extended release 24 hr, [RxNorm: 784629] 12/7/2023 Take 1 tablet orally 2 times a day. Active   TORSEMIDE 20MG  TABLETS, [RxNorm: 597580] - TAKE 1 TABLET BY MOUTH TWICE DAILY Active   Toujeo SoloStar U-300 Insulin 300 unit/mL (1.5 mL) subcutaneous pen, [RxNorm: 7738002] 9/14/2022 Inject 24 units into the skin nightly Active   lovastatin 40 mg tablet, [RxNorm: 936683] 4/23/2024 Take 1 tablet orally 2 times a day. Active     ASSESSMENT    1. Continue current cardiac medications as scheduled. Last time we increased beta-blocker and resume losartan because Entresto was too expensive. Continue torsemide at current dose he is euvolemic. No need to refill medications today.  2. Reviewed multiple blood work results from February 2024. Mild kidney insufficiency, stable and normal liver enzymes. Hemoglobin A1c 6.5% and cholesterol profile stable with chronically mildly reduced HDL and other numbers at goal. Stable thrombocytopenia and mild anemia. No bleeding issues.  3. Plavix was discontinued due to thrombocytopenia in hospital September 2022. No significant bleeding issues with occasional minor hemoptysis with cough but nothing significant. Patient will continue baby aspirin. Platelet count stable in 50s to 70s.  Patient was asked to see hematologist few times. He got referral from PCP.  4. Patient was instructed to contact primary care physician regarding mild hypothyroidism which may help with energy level and weight gain. There is no fluid overload by physical exam. TSH is slowly rising up. Vitamin D supplement per PCP.  5. Good diet and staying active was emphasized.  6. We will continue the same dose of torsemide. He is euvolemic.  7. Follow-up with Dr. Marcela Sung and EP as scheduled. Device check as scheduled.  9. We reviewed echo Doppler with LVEF of 25% which is baseline for the patient. Mild AS. No need for additional cardiac testing now.  10. Follow-up with me in 6 months.  11. Higher-dose of beta-blocker helped with tendency to SVT besides chronic systolic heart failure.  12. Patient was instructed to monitor blood  pressure at home and call with questions.All discussed with patient detail. Copy to PCP.     FAMILY HISTORY    FAMILY HISTORY  Relationship Age Diagnosis   Mother 0 S/P CABG x 3     GENERAL STATUS    No data available    PAST MEDICAL HISTORY    PAST MEDICAL HISTORY  Problem Date diagonsed Date resolved Status   ACC/AHA stage C chronic systolic heart failure, [SNOMED-CT: 093288309] 3/3/2022 - Active   Cardiomyopathy, ischemic, [SNOMED-CT: 174145949] 4/2/2019 - Active   CAD (coronary artery disease), [SNOMED-CT: 07717861] 8/20/2019 - Active   Pure hypercholesterolemia, [SNOMED-CT: 200316651] 4/2/2019 - Active   Pulmonary hypertension secondary to increased PVR, [SNOMED-CT: 985771104] 4/2/2019 - Active   Aortic stenosis, mild, [SNOMED-CT: 120256037] 4/23/2024 - Active   S/P coronary artery stent placement, [SNOMED-CT: 463597620] 8/20/2019 - Active   ICD (implantable cardioverter-defibrillator) in place, [SNOMED-CT: 761316537] 8/20/2019 - Active   Carotid artery stenosis, [SNOMED-CT: 63561291] 4/2/2019 - Active   Obesity, [SNOMED-CT: 176854083] 8/20/2019 - Active   Rheumatoid arthritis, [SNOMED-CT: 65469867] 8/20/2019 - Active   Sleep apnea, obstructive, [SNOMED-CT: 03157286] 8/20/2019 - Active   Pre-op testing, [SNOMED-CT: 401481353] 9/27/2022 - Active   Hx of CABG, [SNOMED-CT: 959044137] 4/2/2019 - Active   Diabetes mellitus type 2 in obese, [SNOMED-CT: 21905540] 8/20/2019 - Active     HISTORY OF PRESENT ILLNESS    78-year-old gentleman coming for clinic for 4-month follow-up.This visit is related to heart failure management.Patient saw EP but no change in management.  Patient also saw Dr. Sung. No change in medications.No recent admissions to hospital.He stays off Plavix due to chronic thrombocytopenia although no bleeding issues now.Last time we double beta-blocker and added losartan since Entresto was expensive.Patient has chronic heart failure with reduced ejection fraction, stage C, class II with underlying  ischemic cardiomyopathy with LVEF of 25-30%, post BiV and HF meds. CAD, CABG and PCIs. In the past initially his LVEF was 10%. He has history of severe pulmonary hypertension which normalized with medications in the past by Doppler 2016. He underwent CRT-D upgrade with Dr. Mullen in October 2022. Device was checked and is functioning well by EP per notes. He is 30% atrial pacing and 98% BiV pacing with underlying sinus.He tolerates the medications well. We had changed Entresto to ARB back because it was expensive.Patient saw rheumatology and rheumatoid arthritis controlled with methotrexate. Patient takes folic acid as well. He also has osteoarthritis of the knees. He has morning stiffness. He is intermittently on prednisone with tapering doses.Follow-up echo Doppler -March 1, 2024 no -significant changes as compared to February 2023. LVEF 25% with grade 2 diastolic dysfunction and wall motion abnormalities consistent with ischemic cardiomyopathy on top of global diffuse hypokinesis. Aortic valve area 1.6 cm score with mild aortic stenosis and mean gradient of 12 mmHg and 1+ AI but aortic stenosis may be underestimated with low EF. RV size normal with preserved RV function. Mildly elevated pulmonary pressure 35 mmHg and mildly dilated left atrium. No other valvular abnormalities.Occasional hemoptysis with cough with low platelets.Patient does not use any CPAP. He feels more comfortable without CPAP. He could not tolerate it.Patient has BiV ICD. No shocks. Device functioning well and no recent issues. He is AV paced.Patient has chronically low blood pressure but with reduced LV systolic function. It stays at 100/60 mmHg. - We reviewed blood work with the patient ordered by other provider -February 2024 hemoglobin 11.0 and platelet count 53. Stable. Free T4 0.9 with normal TSH of 4.24 And vitamin D 21 cholesterol profile stable with chronically reduced HDL of 36. LDL 62 triglycerides 121. proBNP 4382 not far from  baseline. Glucose 133 potassium 4.2 sodium 140 creatinine 1.46 BUN 24 and normal liver enzymes. Hemoglobin A1c 6.5%Follow-up blood work December 5, 2023 -hemoglobin 11.5 stable platelet dropped from 74K to 57K glucose 116 potassium 4.2 sodium 139 with creatinine 1.56 BUN 22 and normal liver enzymes borderline AST.Echo Doppler February 2023 - -as compared to echo May 2022 mild improvement in LV systolic function. LVEF 25% from 15-20% post BiV and after adding Entresto. Moderately calcified aortic valve 1.9 cm2 with mean gradient of 8 mmHg with no significant stenosis in only trace AI. Hospitalization - acute on chronic systolic CHF exacerbation - September 2022.  Hospitalization for viral infection with rhinovirus and bronchopneumonia in May 2022.  Patient was hospitalized for CHF exacerbation at [facility] in December 2021.Plavix was dc in hospital in 09/2022 - due to low platelets in 50's to 70's but he stayed on baby ASA.He has history of lisinopril intolerance with cough. Losartan is OK.Follow-up Lexiscan nuclear stress test -March 2022 -no new ischemia but fixed defect from prior infarct and anterolateral wall small area. LVEF was 22% with moderately enlarged left ventricle. Liver enzymes normal. Stable vitals. Paced rhythm.Initially his LVEF was 10% in the past.PCI of RCA with drug-coated stent February 2015.  PCI of left circumflex chronic total occlusion with drug-coated stent -February 2015.  CAD with LAD chronic total occlusion but patient has widely patent LIMA to LAD graft which also supplies retrogradely the major diagonal branch.Patient has rheumatoid arthritis was treated with methotrexate and prednisone intermittently. Stable now.RHC -2013 -severe pulmonary hypertension at 60 mmHg and PVR of 4.1 Wood units.Currently has NYHA Class 2 symptoms     IMMUNIZATIONS    No data available    PLAN OF CARE    PLAN OF CARE  Planned Care Date   Take 1 tablet orally 2 times a day.-lovastatin 40 mg  tablet 4/23/2024   Referral Visit - Thea Harris [email or web address]) : 1/1/1898     PROCEDURES    No data available    RESULTS    RESULTS  Name Result Date Location - Ordered By   WBC [LOINC: 6690-2] 4.1 x10(3) uL 02/01/2024 09:32:00 AM [facility] ([facility])  Address: [address] [place] [address]  [phone]  tel:   RED BLOOD COUNT [LOINC: 789-8] 3.68 x10(6)uL [Low] 02/01/2024 09:32:00 AM [facility] ([facility])  Address: [address] [place] [address]  [address]  tel:   HGB [LOINC: 718-7] 11.0 g/dL [Low] 02/01/2024 09:32:00 AM [facility] ([facility])  Address: [address] [place] [address]  [address]  tel:   HCT [LOINC: 4544-3] 34.9 % [Low] 02/01/2024 09:32:00 AM [facility] ([facility])  Address: [address] [place] [address]  [address]  tel:   PLATELETS [LOINC: 777-3] 53.0 10(3)uL [Low] 02/01/2024 09:32:00 AM [facility] ([facility])  Address: [address] [place] [address]  [address]  tel:   MEAN CELL VOLUME [LOINC: 787-2] 94.8 fL 02/01/2024 09:32:00 AM [facility] ([facility])  Address: [address] [place] [address]  [address]  tel:   MEAN CORPUSCULAR HEMOGLOBIN [LOINC: 785-6] 29.9 pg 02/01/2024 09:32:00 AM [facility] ([facility])  Address: [address] [place] [address]  [address]  tel:   MEAN CORPUSCULAR HGB CONC [LOINC: 786-4] 31.5 g/dL 02/01/2024 09:32:00 AM [facility] ([facility])  Address: [address] [place] [address]  [address]  tel:   RED CELL DISTRIBUTION WIDTH CV [LOINC: 788-0] 16.0 % 02/01/2024 09:32:00 AM [facility] ([facility])  Address: [address] [place] [address]  [address]  tel:   NEUTROPHIL ABS PRELIM [LOINC: 751-8] 2.28 x10 (3) uL 02/01/2024 09:32:00 AM [facility] ([facility])  Address: [address] [place] [address]  [address]  tel:    NEUTROPHIL ABSOLUTE [LOINC: 751-8] 2.28 x10(3) uL 02/01/2024 09:32:00 AM [facility] ([facility])  Address: [address] [place] [address]  [address]  tel:   LYMPHOCYTE ABSOLUTE [LOINC: 731-0] 1.24 x10(3) uL 02/01/2024 09:32:00 AM [facility] ([facility])  Address: [address] [place] [address]  [address]  tel:   MONOCYTE ABSOLUTE [LOINC: 742-7] 0.52 x10(3) uL 02/01/2024 09:32:00 AM [facility] ([facility])  Address: [address] [place] [address]  [address]  tel:   EOSINOPHIL ABSOLUTE [LOINC: 711-2] 0.05 x10(3) uL 02/01/2024 09:32:00 AM [facility] ([facility])  Address: [address] [place] [address]  [address]  tel:   BASOPHIL ABSOLUTE [LOINC: 704-7] 0.02 x10(3) uL 02/01/2024 09:32:00 AM [facility] ([facility])  Address: [address] [place] [address]  [address]  tel:   IMMATURE GRANULOCYTE COUNT [LOINC: 14944-1] 0.02 x10(3) uL 02/01/2024 09:32:00 AM [facility] ([facility])  Address: [address] [place] [address]  [address]  tel:   NEUTROPHIL % [LOINC: 770-8] 55.2 % 02/01/2024 09:32:00 AM [facility] ([facility])  Address: [address] [place] [address]  [address]  tel:   LYMPHOCYTE % [LOINC: 736-9] 30.0 % 02/01/2024 09:32:00 AM [facility] ([facility])  Address: [address] [place] [address]  [address]  tel:   MONOCYTE % [LOINC: 5905-5] 12.6 % 02/01/2024 09:32:00 AM [facility] ([facility])  Address: [address] [place] [address]  tel:   EOSINOPHIL % [LOINC: 713-8] 1.2 % 02/01/2024 09:32:00 AM [facility] ([facility])  Address: [address] [place] [address]  tel:   BASOPHIL % [LOINC: 706-2] 0.5 % 02/01/2024 09:32:00 AM [facility] ([facility])  Address: [address] [place] [address]  tel:   IMMATURE GRANULOCYTE RATIO %  [LOINC: 77537-6] 0.5 % 02/01/2024 09:32:00 AM [facility] ([facility])  Address: [address] [place] [address]  [address]  tel:   FREE T4 [LOINC: 3024-7] 0.9 ng/dL 02/01/2024 09:10:00 AM [facility] ([facility])  Address: [address] [place] [address]  [address]  tel:   25-HYDROXYVITAMIN D (TOTAL) [LOINC: 31837-6] 21.4 ng/mL [Low] 02/01/2024 09:10:00 AM [facility] ([facility])  Address: [address] [place] [address]  [address]  tel:   CHOLESTEROL [LOINC: 2093-3] 120 mg/dL 02/01/2024 09:10:00 AM [facility] ([facility])  Address: [address] [place] [address]  [address]  tel:   HDL CHOL [LOINC: 2085-9] 36 mg/dL [Low] 02/01/2024 09:10:00 AM [facility] ([facility])  Address: [address] [place] [address]  [address]  tel:   TRIGLYCERIDES [LOINC: 2571-8] 121 mg/dL 02/01/2024 09:10:00 AM [facility] ([facility])  Address: [address] [place] [address]  [address]  tel:   LDL CHOLESTROL [LOINC: 82603-3] 62 mg/dL 02/01/2024 09:10:00 AM [facility] ([facility])  Address: [address] [place] [address]  [address]  tel:   VLDL [LOINC: 66219-0] 18 mg/dL 02/01/2024 09:10:00 AM [facility] ([facility])  Address: [address] [place] [address]  [address]  tel:   NON HDL CHOL [LOINC: 94406-6] 84 mg/dL 02/01/2024 09:10:00 AM [facility] ([facility])  Address: [address] [place] [address]  [address]  tel:   FASTING PATIENT LIPID ANSWER [LOINC: 36714339] Yes 02/01/2024 09:10:00 AM [facility] [facility])  Address: [address] [place] [address]  [address]  tel:   TSH [LOINC: 15722-9] 4.240 mIU/mL [High] 02/01/2024 09:10:00 AM [facility] LAB ([facility])  Address: [address] [place] [address]  [address]  tel:   PRO-BETA NATRIURETIC PEPTIDE [LOINC: 06331-7] 4382 pg/mL [High]  02/01/2024 09:10:00 AM [facility] LAB ([facility])  Address: [address] [place] [address]  [address]  tel:   GLUCOSE [LOINC: 2339-0] 133 mg/dL [High] 02/01/2024 09:10:00 AM [facility] LAB ([facility])  Address: [address] [place] [address]  [address]  tel:   SODIUM [LOINC: 2951-2] 140 mmol/L 02/01/2024 09:10:00 AM [facility] LAB ([facility])  Address: [address] [place] [address]  [address]  tel:   POTASSIUM [LOINC: 2823-3] 4.6 mmol/L 02/01/2024 09:10:00 AM [facility] LAB ([facility])  Address: [address] [place] [address]  [address]  tel:   CHLORIDE [LOINC: 2075-0] 109 mmol/L 02/01/2024 09:10:00 AM [facility] LAB ([facility])  Address: [address] [place] [address]  [address]  tel:   CO2 [LOINC: 2028-9] 28.0 mmol/L 02/01/2024 09:10:00 AM [facility] LAB ([facility])  Address: [address] [place] [address]  [address]  tel:   ANION GAP [LOINC: 33037-3] 3 mmol/L 02/01/2024 09:10:00 AM [facility] LAB ([facility])  Address: [address] [place] [address]  [address]  tel:   BUN [LOINC: 6299-2] 24 mg/dL [High] 02/01/2024 09:10:00 AM [facility] LAB ([facility])  Address: [address] [place] [address]  [address]  tel:   CREATININE [LOINC: 11279-2] 1.46 mg/dL [High] 02/01/2024 09:10:00 AM [facility] LAB ([facility])  Address: [address] [place] [address]  [address]  tel:   CALCIUM [LOINC: 03558-4] 9.2 mg/dL 02/01/2024 09:10:00 AM [facility] LAB ([facility])  Address: [address] [place] [address]  tel:   OSMOLALITY CALCULATED [LOINC: 56876-9] 296 mOsm/kg [High] 02/01/2024 09:10:00 AM [facility] LAB ([facility])  Address: [address] [place] [address]  tel:   E GFR CR [LOINC: 93573-3] 50 mL/min/1.73m2 [Low] 02/01/2024 09:10:00 AM [facility] LAB ([facility]  [facility])  Address: [address] [place] [address]  [address]  tel:   AST [LOINC: 1920-8] 33 U/L 02/01/2024 09:10:00 AM [facility] LAB ([facility])  Address: [address] [place] [address]  [address]  tel:   ALT [LOINC: 1742-6] 32 U/L 02/01/2024 09:10:00 AM [facility] LAB ([facility])  Address: [address] [place] [address]  tel:   ALKALINE PHOSPHATASE [LOINC: 1779-8] 58 U/L 02/01/2024 09:10:00 AM [facility] LAB ([facility])  Address: [address] [place] [address]  tel:   BILIRUBIN, TOTAL [LOINC: 1975-2] 0.6 mg/dL 02/01/2024 09:10:00 AM [facility] LAB ([facility])  Address: [address] [place] [address]  tel:   TOTAL PROTEIN [LOINC: 2885-2] 7.4 g/dL 02/01/2024 09:10:00 AM [facility] LAB ([facility])  Address: [address] [place] [address]  tel:   ALBUMIN [LOINC: 1751-7] 3.8 g/dL 02/01/2024 09:10:00 AM [facility] LAB ([facility])  Address: [address] [place] [address]  [address]  tel:   GLOBULIN [LOINC: 95192-7] 3.6 g/dL 02/01/2024 09:10:00 AM [facility] LAB ([facility])  Address: [address] [place] [address]  tel:   A/G RATIO [LOINC: 1759-0] 1.1 02/01/2024 09:10:00 AM [facility] LAB ([facility])  Address: [address] [place] [address]  tel:   FASTING PATIENT CMP ANSWER [LOINC: 64553-6] Yes 02/01/2024 09:10:00 AM [facility] LAB ([facility])  Address: [address] [place] [address]  [address]  tel:   Nuclear PET 1.Stress EKG is normal. 2.The heart rate recovery is normal. 1.This is an abnormal perfusion study. Study is consistent with prior infarction in the anterolateral wall small area2.This scan is suggestive of moderate risk for future cardiovascular events. 3.Small fixed perfusion abnormality of moderate intensity in the anterior lateral region. 4.The left  ventricular cavity is noted to be markedly enlarged on the stress studies. The stress left ventricular ejection fraction was calculated to be 22% and left ventricular global function is severely reduced. The rest left ventricular cavity is noted to be markedly enlarged. The rest left ventricular ejection fraction was calculated to be 17% and rest left ventricular global function is severely reduced. 5.When compared to the resting ejection fraction (17%), the stress ejection fraction (22%) has increased. 6.The study quality is good. 3/18/2022 9:00:00 AM Renan Paige   Carotid Ultrasound 1.The study quality is good. 2.1-39% stenosis in the proximal right internal carotid artery based on Bluth Criteria. 3.1-39% stenosis in the proximal left internal carotid artery based on Bluth Criteria. 4.Antegrade right vertebral artery flow. 5.Antegrade left vertebral artery flow. 2/10/2022 2:00:00 PM Manish Vance MD   Trans Thoracic Echocardiogram 1.The study quality is good. 2.The left ventricle is severely enlarged. Wall thickness is normal. Global left ventricular systolic function is severely decreased. The left ventricular ejection fraction is 25%. The left ventricle diastolic function is impaired (Grade II) with an elevated left atrial pressure. Severe diffuse global hypokinesis noted. Lumason was used to enhance visualization of endocardial borders. 3.Diffuse thickening of the aortic valve cusps is noted with reduced cuspal excursion. Aortic cusp separation measures 1.1 cm. Mild aortic valve stenosis is present but can be underestimated with low LVEF. Aortic valve area continuity equation is 1.6 cm². The trans-aortic peak velocity is 2.2 m/s. The trans-aortic mean gradient is 12.0 mmHg. Mild (1+) aortic regurgitation.4.The right ventricle is normal in size. Right ventricular systolic function is borderline normal. A linear echo density is noted in the right ventricle, suggestive of a ICD lead.5.The estimated pulmonary  artery systolic pressure is 35 mmHg, normal.6.The left atrium is mildly dilated measuring 4.4 cm.7.Mild calcification of the mitral valve is noted. Trace mitral regurgitation. 8.As compared to prior study 02/2023 - no significant changes. 3/1/2024 10:00:00 AM John Paz MD     REVIEW OF SYSTEMS    REVIEW OF SYSTEMS  Header Details   Constitutional Fatigue   Gastrointestinal No history of Abdominal pain   Cardiovascular CUMMINGS  No history of Chest pain, Palpitations, Syncope, PND, Orthopnea, Edema, Claudication   Musculoskeletal Arthralgias, Arthritis  No history of Myalgias   Respiratory No history of SOB   Neurological No history of Numbness, Limb weakness, Speech problems, Poor balance, Unsteady gait   Psychiatric Depression  No history of Anxiety   Integumentary No history of Rashes, Skin changes     SOCIAL HISTORY    SOCIAL HISTORY  Social History Element Description Effective Dates   Smoking status Former smoker -     FUNCTIONAL STATUS    No data available    MEDICAL EQUIPMENT    No data available    Goals Sections    No data available    REASON FOR REFERRAL                 Health Concerns Section    No data available    COGNITIVE/MENTAL STATUS    No data available    Patient Demographics    Patient Demographics  Patient Address Patient Name Communication   [address] [place] [address] David Reyes [phone] (Mobile)     Patient Demographics  Language Race / Ethnicity Marital Status   English - Spoken (Preferred) White / Not  or       Document Information    Primary Care Provider Other Service Providers Document Coverage Dates   John Paz  NPI: [id number]  [phone] (Work)  [address] [facility], [address]th [place] [address]  [place] [address]  Interpreting Physicians  [facility] [place]  [phone] (Work)  [address] [place] [address] Nery Dunbar  NPI: [id number]  [phone] (Work)  [address] [facility]  [id number] [place] [address]  Nurses     Negin Duval  NPI: [id number]  [phone] (Work)  [address] [place] [address]  [place] [address]  Nurses Apr. 23, 2024April 23, 2024      Organization   [facility]  [phone] (Work)  [address] [place] [address] [id number] [place] [address]     Encounter Providers Encounter Date    Apr. 23, 2024April 23, 2024     Legal Authenticator    John Paz  NPI: [id number]  [phone] (Work)  [address] [place] [address]  [place] [address]

## 2024-05-14 NOTE — PLAN OF CARE
Assumed care at 0800  A/Ox4  Rm Air  Vpaced on tele  Denies pain  SBA to ambulate  SOB with exertion  K replaced per protocol  Call light in reach of patient  Safety precautions in place    POC: PT/OT to see  Goal: Patient/Family Short Term Goal  Description: Patient's Short Term Goal: \"find out why I'm having pain\"    Interventions:   - pain medication as needed  - See additional Care Plan goals for specific interventions  Outcome: Progressing     Problem: Diabetes/Glucose Control  Goal: Glucose maintained within prescribed range  Description: INTERVENTIONS:  - Monitor Blood Glucose as ordered  - Assess for signs and symptoms of hyperglycemia and hypoglycemia  - Administer ordered medications to maintain glucose within target range  - Assess barriers to adequate nutritional intake and initiate nutrition consult as needed  - Instruct patient on self management of diabetes  Outcome: Progressing     Problem: PAIN - ADULT  Goal: Verbalizes/displays adequate comfort level or patient's stated pain goal  Description: INTERVENTIONS:  - Encourage pt to monitor pain and request assistance  - Assess pain using appropriate pain scale  - Administer analgesics based on type and severity of pain and evaluate response  - Implement non-pharmacological measures as appropriate and evaluate response  - Consider cultural and social influences on pain and pain management  - Manage/alleviate anxiety  - Utilize distraction and/or relaxation techniques  - Monitor for opioid side effects  - Notify MD/LIP if interventions unsuccessful or patient reports new pain  - Anticipate increased pain with activity and pre-medicate as appropriate  Outcome: Progressing     Problem: RESPIRATORY - ADULT  Goal: Achieves optimal ventilation and oxygenation  Description: INTERVENTIONS:  - Assess for changes in respiratory status  - Assess for changes in mentation and behavior  - Position to facilitate oxygenation and minimize respiratory effort  - Oxygen  supplementation based on oxygen saturation or ABGs  - Provide Smoking Cessation handout, if applicable  - Encourage broncho-pulmonary hygiene including cough, deep breathe, Incentive Spirometry  - Assess the need for suctioning and perform as needed  - Assess and instruct to report SOB or any respiratory difficulty  - Respiratory Therapy support as indicated  - Manage/alleviate anxiety  - Monitor for signs/symptoms of CO2 retention  Outcome: Progressing

## 2024-05-14 NOTE — ED QUICK NOTES
Orders for admission, patient is aware of plan and ready to go upstairs. Any questions, please call ED OLE hand at extension [phone].     Patient Covid vaccination status: Unvaccinated     COVID Test Ordered in ED: None    COVID Suspicion at Admission: N/A    Running Infusions:  None    Mental Status/LOC at time of transport: x3    Other pertinent information:   CIWA score: N/A   NIH score:  N/A

## 2024-05-14 NOTE — PLAN OF CARE
Assumed care of pt at approximately 0020.  Alert, oriented x4.  On RA. Paced on tele.  Reports a productive cough.   Tylenol given for a headache.  Plan of care reviewed with pt.    Problem: Patient/Family Goals  Goal: Patient/Family Short Term Goal  Description: Patient's Short Term Goal: \"find out why I'm having pain\"    Interventions:   - pain medication as needed  - See additional Care Plan goals for specific interventions  Outcome: Progressing     Problem: PAIN - ADULT  Goal: Verbalizes/displays adequate comfort level or patient's stated pain goal  Description: INTERVENTIONS:  - Encourage pt to monitor pain and request assistance  - Assess pain using appropriate pain scale  - Administer analgesics based on type and severity of pain and evaluate response  - Implement non-pharmacological measures as appropriate and evaluate response  - Consider cultural and social influences on pain and pain management  - Manage/alleviate anxiety  - Utilize distraction and/or relaxation techniques  - Monitor for opioid side effects  - Notify MD/LIP if interventions unsuccessful or patient reports new pain  - Anticipate increased pain with activity and pre-medicate as appropriate  Outcome: Progressing     Problem: RESPIRATORY - ADULT  Goal: Achieves optimal ventilation and oxygenation  Description: INTERVENTIONS:  - Assess for changes in respiratory status  - Assess for changes in mentation and behavior  - Position to facilitate oxygenation and minimize respiratory effort  - Oxygen supplementation based on oxygen saturation or ABGs  - Provide Smoking Cessation handout, if applicable  - Encourage broncho-pulmonary hygiene including cough, deep breathe, Incentive Spirometry  - Assess the need for suctioning and perform as needed  - Assess and instruct to report SOB or any respiratory difficulty  - Respiratory Therapy support as indicated  - Manage/alleviate anxiety  - Monitor for signs/symptoms of CO2 retention  Outcome:  Progressing

## 2024-05-14 NOTE — PLAN OF CARE
NURSING ADMISSION NOTE      Patient admitted via Cart around 2130  Alert and oriented x4   AV paced on tele and on RA  HA, tylenol given   Denies any dizziness, chest pain, or dyspnea at rest   C/o dyspnea w/ exertion   Refused metoprolol & losartan for tonight   Call light w/in reach   Report given to Amie HANDY around 0020

## 2024-05-14 NOTE — H&P
[facility]     David Reyes Patient Status:  Emergency    1945 MRN [id number]   Location [facility] EMERGENCY DEPARTMENT Attending Darek Calix DO   Hosp Day # 0 PCP Thea Harris DO     Chief Complaint: Multiple, weak, low BP    Subjective:    History of Present Illness:     David Reyes is a 78 year old male with past medical history of ischemic cardiomyopathy presented to the emergency department with multiple complaints including body aches and generalized weakness.    Has been tired and wanting to sleep, this information is obtained via chart review as patient himself states he is only here because his doctor told him to come, he complains of his chronic pain that he has had for \"years\" but no acute complaints.    History/Other:    Past Medical History:  Past Medical History:    CAD (coronary artery disease)    Congestive heart disease (HCC)    Congestive heart failure (HCC)    CORONARY ARTERY DISEASE    cabg    Coronary atherosclerosis    Diabetes (HCC)    DM (diabetes mellitus) (HCC)    Heart attack (HCC)    High cholesterol    Other and unspecified hyperlipidemia    Rheumatoid arthritis (HCC)    Sleep apnea    Unspecified sleep apnea     Past Surgical History:   Past Surgical History:   Procedure Laterality Date    Angiogram  2015    Angioplasty (coronary)  2/23/15    RCA    Bypass surgery          Cabg      LAD, Diagonal    Cardiac defibrillator placement  14    [employer] Scientific dual chamber ICD    Cath drug eluting stent      Tonsillectomy        Family History:   Family History   Problem Relation Age of Onset    Cancer Father     Heart Disease Mother     Other (alzheimers) Mother     Stroke Neg      Social History:    reports that he quit smoking about 37 years ago. His smoking use included cigarettes. He started smoking about 63 years ago. He has a 26 pack-year smoking history. He has never used smokeless tobacco. He reports  current alcohol use. He reports that he does not use drugs.     Allergies:   Allergies   Allergen Reactions    Pcn [Penicillins]      \"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       Medications:    No current facility-administered medications on file prior to encounter.     Current Outpatient Medications on File Prior to Encounter   Medication Sig Dispense Refill    spironolactone 25 MG Oral Tab Take 1 tablet (25 mg total) by mouth daily with breakfast. 90 tablet 0    ergocalciferol 1.25 MG (64868 UT) Oral Cap       Insulin Glargine, 2 Unit Dial, (TOUJEO MAX SOLOSTAR) 300 UNIT/ML Subcutaneous Solution Pen-injector Inject 24 Units into the skin daily. 27 mL 1    metFORMIN  MG Oral Tablet 24 Hr Take 2 tablets (1,000 mg total) by mouth 2 (two) times daily with meals. 360 tablet 0    CONTOUR NEXT TEST In Vitro Strip Test 3 times daily Dx Code: E11.9 Insulin - dependent diabetes 300 each 1    Microlet Lancets Does not apply Misc CHECK BLOOD SUGAR THREE TIMES DAILY 300 each 3    losartan 25 MG Oral Tab Take 1 tablet (25 mg total) by mouth every evening.      BD PEN NEEDLE LILO 2ND GEN 32G X 4 MM Does not apply Misc USE DAILY 300 each 3    torsemide 20 MG Oral Tab Take 1 tablet (20 mg total) by mouth BID (Diuretic). 60 tablet 1    metoprolol succinate ER 50 MG Oral Tablet 24 Hr Take 1 tablet (50 mg total) by mouth every evening. (Patient taking differently: Take 1 tablet (50 mg total) by mouth 2 (two) times daily.) 30 tablet 2    sacubitril-valsartan (ENTRESTO) 24-26 MG Oral Tab Take 1 tablet by mouth 2 (two) times daily. 60 tablet 2    Vitamin B-1 100 MG Oral Tab Take 1 tablet (100 mg total) by mouth daily.      methotrexate 2.5 MG Oral Tab Take 4 tablets (10 mg total) by mouth every 7 days. 48 tablet 1    ezetimibe 10 MG Oral Tab Take 1 tablet (10 mg total) by mouth daily.      aspirin 81 MG Oral Tab Take 1 tablet (81 mg total) by  mouth at bedtime.      Lovastatin 40 MG Oral Tab Take 1 tablet (40 mg total) by mouth in the morning and 1 tablet (40 mg total) before bedtime.  2    folic acid 800 MCG Oral Tab Take 1 tablet (800 mcg total) by mouth every other day. Alternate with 2 tablets (1600mcg) every other day    About 1000 mcg daily         Review of Systems:   A comprehensive review of systems was completed.    Pertinent positives and negatives noted in the HPI.    Objective:   Physical Exam:    /72   Pulse 82   Temp 96.8 °F (36 °C) (Temporal)   Resp (!) 28   SpO2 98%   General: No acute distress, Alert  Respiratory: diminished   Cardiovascular: S1, S2. Regular rate and rhythm  Abdomen: Soft, Non-tender, non-distended, positive bowel sounds  Neuro: No new focal deficits  Extremities: No edema      Results:    Labs:      Labs Last 24 Hours:    Recent Labs   Lab 05/10/24  1501 05/13/24  1757   RBC 3.59* 3.51*   HGB 10.6* 10.3*   HCT 33.3* 32.2*   MCV 92.8 91.7   MCH 29.5 29.3   MCHC 31.8 32.0   RDW 15.1 15.0   NEPRELIM 4.19 4.03   WBC 7.6 6.4   PLT 72.0* 68.0*       Recent Labs   Lab 05/10/24  1501 05/13/24  1757   * 99   BUN 34* 37*   CREATSERUM 1.91* 1.62*   EGFRCR 36* 44*   CA 9.5 9.8   ALB 3.8 3.7   * 135*   K 3.9 4.0   CL 97* 99   CO2 28.0 26.0   ALKPHO 54 53   AST 23 43*   ALT 26 24   BILT 0.7 0.4   TP 8.1 8.1       Lab Results   Component Value Date    PT 17.1 (H) 04/27/2014    PT 16.7 (H) 04/26/2014    INR 1.43 (H) 04/27/2014    INR 1.39 (H) 04/26/2014       Recent Labs   Lab 05/13/24  1757   TROPHS 54       Recent Labs   Lab 05/13/24  1757   PBNP 8,855*       No results for input(s): \"PCT\" in the last 168 hours.    Imaging: Imaging data reviewed in Epic.    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  #Ischemic cardiomyopathy s/p ICD  -Diurese per Cardiology on consult   -PTA Losartan/Aldactone    #Generalized weakness/fatigue presumably due to above  -Influenza negative, Urinalysis  unremarkable   -PT     #CAD s/p CABG and PCI  -ASA     #CKD III  -monitor with diuresis      #Dyslipidemia  -Only tolerates lovastatin (not on formulary)  -Zetia     #DM type II, A1c is 6.2  -ISS     #Rheumatoid arthritis  -Typically on methotrexate     #FALKO  -FLAKO protocol     #Chronic thrombocytopenia  -monitor   -Continue Folate   -hold chemical DVT ppx        Plan of care discussed with ED MD Sukhdeep Bueno, DO    Supplementary Documentation:     The 21st Century Cures Act makes medical notes like these available to patients in the interest of transparency. Please be advised this is a medical document. Medical documents are intended to carry relevant information, facts as evident, and the clinical opinion of the practitioner. The medical note is intended as peer to peer communication and may appear blunt or direct. It is written in medical language and may contain abbreviations or verbiage that are unfamiliar.

## 2024-05-15 ENCOUNTER — APPOINTMENT (OUTPATIENT)
Dept: CT IMAGING | Facility: HOSPITAL | Age: 79
End: 2024-05-15
Attending: INTERNAL MEDICINE

## 2024-05-15 LAB
ANION GAP SERPL CALC-SCNC: 6 MMOL/L (ref 0–18)
BASOPHILS # BLD AUTO: 0.01 X10(3) UL (ref 0–0.2)
BASOPHILS NFR BLD AUTO: 0.3 %
BUN BLD-MCNC: 24 MG/DL (ref 9–23)
CALCIUM BLD-MCNC: 8.8 MG/DL (ref 8.5–10.1)
CHLORIDE SERPL-SCNC: 106 MMOL/L (ref 98–112)
CO2 SERPL-SCNC: 24 MMOL/L (ref 21–32)
CREAT BLD-MCNC: 1.11 MG/DL
EGFRCR SERPLBLD CKD-EPI 2021: 69 ML/MIN/1.73M2 (ref 60–?)
EOSINOPHIL # BLD AUTO: 0.03 X10(3) UL (ref 0–0.7)
EOSINOPHIL NFR BLD AUTO: 0.8 %
ERYTHROCYTE [DISTWIDTH] IN BLOOD BY AUTOMATED COUNT: 14.7 %
GLUCOSE BLD-MCNC: 102 MG/DL (ref 70–99)
GLUCOSE BLD-MCNC: 107 MG/DL (ref 70–99)
GLUCOSE BLD-MCNC: 116 MG/DL (ref 70–99)
GLUCOSE BLD-MCNC: 139 MG/DL (ref 70–99)
GLUCOSE BLD-MCNC: 152 MG/DL (ref 70–99)
HCT VFR BLD AUTO: 27 %
HGB BLD-MCNC: 8.8 G/DL
IMM GRANULOCYTES # BLD AUTO: 0.01 X10(3) UL (ref 0–1)
IMM GRANULOCYTES NFR BLD: 0.3 %
LYMPHOCYTES # BLD AUTO: 1.05 X10(3) UL (ref 1–4)
LYMPHOCYTES NFR BLD AUTO: 27.8 %
MCH RBC QN AUTO: 29.7 PG (ref 26–34)
MCHC RBC AUTO-ENTMCNC: 32.6 G/DL (ref 31–37)
MCV RBC AUTO: 91.2 FL
MONOCYTES # BLD AUTO: 0.34 X10(3) UL (ref 0.1–1)
MONOCYTES NFR BLD AUTO: 9 %
NEUTROPHILS # BLD AUTO: 2.34 X10 (3) UL (ref 1.5–7.7)
NEUTROPHILS # BLD AUTO: 2.34 X10(3) UL (ref 1.5–7.7)
NEUTROPHILS NFR BLD AUTO: 61.8 %
OSMOLALITY SERPL CALC.SUM OF ELEC: 287 MOSM/KG (ref 275–295)
PLATELET # BLD AUTO: 49 10(3)UL (ref 150–450)
PLATELETS.RETICULATED NFR BLD AUTO: 14.7 % (ref 0–7)
POTASSIUM SERPL-SCNC: 3.6 MMOL/L (ref 3.5–5.1)
POTASSIUM SERPL-SCNC: 3.6 MMOL/L (ref 3.5–5.1)
POTASSIUM SERPL-SCNC: 4.8 MMOL/L (ref 3.5–5.1)
RBC # BLD AUTO: 2.96 X10(6)UL
SODIUM SERPL-SCNC: 136 MMOL/L (ref 136–145)
WBC # BLD AUTO: 3.8 X10(3) UL (ref 4–11)

## 2024-05-15 PROCEDURE — 70450 CT HEAD/BRAIN W/O DYE: CPT | Performed by: INTERNAL MEDICINE

## 2024-05-15 PROCEDURE — 99232 SBSQ HOSP IP/OBS MODERATE 35: CPT | Performed by: INTERNAL MEDICINE

## 2024-05-15 PROCEDURE — 82962 GLUCOSE BLOOD TEST: CPT | Performed by: NURSE PRACTITIONER

## 2024-05-15 PROCEDURE — 99223 1ST HOSP IP/OBS HIGH 75: CPT | Performed by: NEUROLOGICAL SURGERY

## 2024-05-15 PROCEDURE — 72125 CT NECK SPINE W/O DYE: CPT | Performed by: INTERNAL MEDICINE

## 2024-05-15 RX ORDER — POTASSIUM CHLORIDE 20 MEQ/1
40 TABLET, EXTENDED RELEASE ORAL EVERY 4 HOURS
Status: COMPLETED | OUTPATIENT
Start: 2024-05-15 | End: 2024-05-15

## 2024-05-15 RX ORDER — GABAPENTIN 100 MG/1
100 CAPSULE ORAL 3 TIMES DAILY
Status: DISCONTINUED | OUTPATIENT
Start: 2024-05-15 | End: 2024-05-16

## 2024-05-15 RX ORDER — GABAPENTIN 100 MG/1
100 CAPSULE ORAL 3 TIMES DAILY
Qty: 90 CAPSULE | Refills: 1 | Status: SHIPPED | OUTPATIENT
Start: 2024-05-15

## 2024-05-15 RX ORDER — METOPROLOL SUCCINATE 50 MG/1
50 TABLET, EXTENDED RELEASE ORAL 2 TIMES DAILY
COMMUNITY

## 2024-05-15 NOTE — OCCUPATIONAL THERAPY NOTE
OCCUPATIONAL THERAPY EVALUATION - INPATIENT    Room Number: 7618/7618-A  Evaluation Date: 5/15/2024     Type of Evaluation: Initial  Presenting Problem: Acute on chronic combined HFrEF/HFpEF    Physician Order: IP Consult to Occupational Therapy  Reason for Therapy:  ADL/IADL Dysfunction and Discharge Planning      OCCUPATIONAL THERAPY ASSESSMENT   Patient is a 78 year old male admitted on 5/13/2024 from home for body aches and generalized weakness. Pt diagnosed with Acute on chronic combined HFrEF/HFpEF. Co-Morbidities : ischemic cardiomyopathy, CAD s/p PCI, CKDIII, RA, DMII  Patient is currently functioning near baseline with  ADLs and mobility .  Prior to admission, patient's baseline is independent without device.  Patient met all OT goals at SBA to IND level.  Patient reports no further questions/concerns at this time. No OT needs anticipated at DC.     WEIGHT BEARING RESTRICTION  Weight Bearing Restriction: None                Recommendations for nursing staff:   Transfers: SBA with gait belt   Toileting location: Toilet    EVALUATION SESSION:  Patient at start of session: semi-supine   FUNCTIONAL TRANSFER ASSESSMENT  Sit to Stand: Edge of Bed  Edge of Bed: Supervision  Toilet Transfer: Supervision    BED MOBILITY  Supine to Sit : Independent  Scooting: IND    BALANCE ASSESSMENT  Static Sitting: Independent  Sitting Bilateral: Independent  Static Standing: Supervision  Standing Bilateral: Supervision    FUNCTIONAL ADL ASSESSMENT  LB Dressing Seated: Supervision  LB Dressing Standing: Supervision      ACTIVITY TOLERANCE: Pt on room air and denies SOB, dizziness or lightheadedness throughout session. No significant change in vitals noted.                          O2 SATURATIONS       COGNITION  Overall Cognitive Status:  WFL - within functional limits  COGNITION ASSESSMENTS       Upper Extremity:   ROM: within functional limits   Strength: is within functional limits     EDUCATION PROVIDED  Patient: Role of  Occupational Therapy; Plan of Care  Patient's Response to Education: Verbalized Understanding    Equipment used: n/a  Demonstrates functional use    Therapist comments: Encouraged Pt to get up to chair for all meals, walk to bathroom and ambulate 3x/day with nursing staff to prevent deconditioning while hospitalized.    Patient End of Session: Up in chair;Needs met;Call light within reach;RN aware of session/findings;All patient questions and concerns addressed    OCCUPATIONAL PROFILE    HOME SITUATION  Type of Home:  (Duplex)  Home Layout: One level (2 JARAD)  Lives With: Spouse          Other Equipment:  (cane, RW - does not use)    Occupation/Status: works in [employer]     Drives: Yes       Prior Level of Function: Pt is typically independent with all aspects of mobility and self-cares without device.     SUBJECTIVE  Pt is pleasant and agreeable to therapy.    PAIN ASSESSMENT  Rating: Other (Comment) (\"a little bit in my back\")  Location: back  Management Techniques: Body mechanics;Activity promotion;Breathing techniques    OBJECTIVE  Precautions: None  Fall Risk: Standard fall risk    WEIGHT BEARING RESTRICTION  Weight Bearing Restriction: None                AM-PAC ‘6-Clicks’ Inpatient Daily Activity Short Form  -   Putting on and taking off regular lower body clothing?: None  -   Bathing (including washing, rinsing, drying)?: A Little  -   Toileting, which includes using toilet, bedpan or urinal? : None  -   Putting on and taking off regular upper body clothing?: None  -   Taking care of personal grooming such as brushing teeth?: None  -   Eating meals?: None    AM-PAC Score:  Score: 23  Approx Degree of Impairment: 15.86%  Standardized Score (AM-PAC Scale): 51.12      ADDITIONAL TESTS     NEUROLOGICAL FINDINGS        PLAN   Patient has been evaluated and presents with no skilled Occupational Therapy needs at this time.  Patient discharged from Occupational Therapy services.  Please re-order if a new functional  limitation presents during this admission.      Patient Evaluation Complexity Level:   Occupational Profile/Medical History LOW - Brief history including review of medical or therapy records    Specific performance deficits impacting engagement in ADL/IADL LOW  1 - 3 performance deficits    Client Assessment/Performance Deficits MODERATE - Comorbidities and min to mod modifications of tasks    Clinical Decision Making LOW - Analysis of occupational profile, problem-focused assessments, limited treatment options    Overall Complexity LOW     OT Session Time: 15 minutes

## 2024-05-15 NOTE — PLAN OF CARE
A&OX4  RooM Air  Vpaced on the monitor  Lung sounds clear & Diminished  C/o severe headaches radiating from left to R eye down to shoulders and spine. Given tylenol extra strength x2   Continent of Bowel and bladder.  SBA.

## 2024-05-15 NOTE — CM/SW NOTE
This is a Code 44. Patient failed inpatient criteria. Second level of review completed and supports observation.  CECILIA committee in agreement. Jina BROOKS RN discussed with Dr. Hightower  who approves observation status.  MOON given to the patient and order written.    Shaka Osborne RN, [facility]  [place] [phone]

## 2024-05-15 NOTE — CONSULTS
[facility] [place]  Neurological Surgery Consultation Note    David Reyes Patient Status:  Observation    1945 MRN [id number]   Location [address]-A Attending Annetta Jarvis MD   Hosp Day # 2 PCP Thea Harris DO     REASON FOR CONSULTATION:  Right sided chronic subdural hematoma    HISTORY OF PRESENT ILLNESS:  David Reyes is a 78 year old male with ischemic cardiomyopathy on aspirin who presented to the ED with hypotension and generalized weakness.  He reports many weeks of right sided headaches that have been worsening but are controlled with tylenol.  Head CT demonstrates a 5mm right convexity chronic subdural hematoma without significant brain compression.  CT cervical demonstrates degenerative changes without fracture or malalignment. Denies stroke or seizure symptoms. Denies any prior trauma.    PAST MEDICAL HISTORY:  Past Medical History:    CAD (coronary artery disease)    Congestive heart disease (HCC)    Congestive heart failure (HCC)    CORONARY ARTERY DISEASE    cabg    Coronary atherosclerosis    Diabetes (HCC)    DM (diabetes mellitus) (HCC)    Heart attack (HCC)    High cholesterol    Other and unspecified hyperlipidemia    Rheumatoid arthritis (HCC)    Sleep apnea    Unspecified sleep apnea       PAST SURGICAL HISTORY:  Past Surgical History:   Procedure Laterality Date    Angiogram  2015    Angioplasty (coronary)  2/23/15    RCA    Bypass surgery          Cabg      LAD, Diagonal    Cardiac defibrillator placement  16    [employer] dual chamber ICD    Cath drug eluting stent      Tonsillectomy         FAMILY HISTORY:  family history includes Cancer in his father; Heart Disease in his mother; alzheimers in his mother.    SOCIAL HISTORY:   reports that he quit smoking about 37 years ago. His smoking use included cigarettes. He started smoking about 63 years ago. He has a 26 pack-year smoking history. He has  never used smokeless tobacco. He reports current alcohol use. He reports that he does not use drugs.    ALLERGIES:  Allergies   Allergen Reactions    Pcn [Penicillins]      \"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       MEDICATIONS:  Medications Prior to Admission   Medication Sig Dispense Refill Last Dose    metoprolol succinate ER 50 MG Oral Tablet 24 Hr Take 1 tablet (50 mg total) by mouth 2 (two) times daily.       spironolactone 25 MG Oral Tab Take 1 tablet (25 mg total) by mouth daily with breakfast. 90 tablet 0 5/13/2024 at 0900    ergocalciferol 1.25 MG (35488 UT) Oral Cap    5/13/2024 at 0900    Insulin Glargine, 2 Unit Dial, (TOUJEO MAX SOLOSTAR) 300 UNIT/ML Subcutaneous Solution Pen-injector Inject 24 Units into the skin daily. 27 mL 1 5/12/2024 at 1900    metFORMIN  MG Oral Tablet 24 Hr Take 2 tablets (1,000 mg total) by mouth 2 (two) times daily with meals. 360 tablet 0 5/13/2024 at 0900    CONTOUR NEXT TEST In Vitro Strip Test 3 times daily Dx Code: E11.9 Insulin - dependent diabetes 300 each 1 5/13/2024    losartan 25 MG Oral Tab Take 1 tablet (25 mg total) by mouth every evening.   5/12/2024 at 2100    BD PEN NEEDLE LILO 2ND GEN 32G X 4 MM Does not apply Misc USE DAILY 300 each 3 5/13/2024    torsemide 20 MG Oral Tab Take 1 tablet (20 mg total) by mouth BID (Diuretic). 60 tablet 1 5/13/2024 at 0900    Vitamin B-1 100 MG Oral Tab Take 1 tablet (100 mg total) by mouth daily.   5/13/2024 at 0900    methotrexate 2.5 MG Oral Tab Take 4 tablets (10 mg total) by mouth every 7 days. 48 tablet 1 Past Week    ezetimibe 10 MG Oral Tab Take 1 tablet (10 mg total) by mouth daily.   5/13/2024 at 0900    aspirin 81 MG Oral Tab Take 1 tablet (81 mg total) by mouth at bedtime.   5/12/2024 at 2100    Lovastatin 40 MG Oral Tab Take 1 tablet (40 mg total) by mouth in the morning and 1 tablet (40 mg total) before bedtime.  2 5/13/2024  at 0900    folic acid 800 MCG Oral Tab Take 1 tablet (800 mcg total) by mouth every other day. Alternate with 2 tablets (1600mcg) every other day    About 1000 mcg daily   5/13/2024    Microlet Lancets Does not apply Misc CHECK BLOOD SUGAR THREE TIMES DAILY 300 each 3      Current Facility-Administered Medications   Medication Dose Route Frequency    gabapentin (Neurontin) cap 100 mg  100 mg Oral TID    [Held by provider] aspirin chewable tab 81 mg  81 mg Oral Nightly    ezetimibe (Zetia) tab 10 mg  10 mg Oral Daily    folic acid (Folvite) tab 800 mcg  800 mcg Oral QOD    metoprolol succinate ER (Toprol XL) 24 hr tab 50 mg  50 mg Oral BID    glucose (Dex4) 15 GM/59ML oral liquid 15 g  15 g Oral Q15 Min PRN    Or    glucose (Glutose) 40% oral gel 15 g  15 g Oral Q15 Min PRN    Or    glucose-vitamin C (Dex-4) chewable tab 4 tablet  4 tablet Oral Q15 Min PRN    Or    dextrose 50% injection 50 mL  50 mL Intravenous Q15 Min PRN    Or    glucose (Dex4) 15 GM/59ML oral liquid 30 g  30 g Oral Q15 Min PRN    Or    glucose (Glutose) 40% oral gel 30 g  30 g Oral Q15 Min PRN    Or    glucose-vitamin C (Dex-4) chewable tab 8 tablet  8 tablet Oral Q15 Min PRN    acetaminophen (Tylenol Extra Strength) tab 1,000 mg  1,000 mg Oral Q8H PRN    melatonin tab 3 mg  3 mg Oral Nightly PRN    ondansetron (Zofran) 4 MG/2ML injection 4 mg  4 mg Intravenous Q6H PRN    metoclopramide (Reglan) 5 mg/mL injection 5 mg  5 mg Intravenous Q8H PRN    polyethylene glycol (PEG 3350) (Miralax) 17 g oral packet 17 g  17 g Oral Daily PRN    sennosides (Senokot) tab 17.2 mg  17.2 mg Oral Nightly PRN    bisacodyl (Dulcolax) 10 MG rectal suppository 10 mg  10 mg Rectal Daily PRN    fleet enema (Fleet) 7-19 GM/118ML rectal enema 133 mL  1 enema Rectal Once PRN    insulin degludec 100 units/mL flextouch 12 Units  12 Units Subcutaneous Nightly    insulin aspart (NovoLOG) 100 Units/mL FlexPen 2-10 Units  2-10 Units Subcutaneous TID AC and HS    benzonatate  (Tessalon) cap 200 mg  200 mg Oral TID PRN    guaiFENesin (Robitussin) 100 MG/5 ML oral liquid 200 mg  200 mg Oral Q4H PRN    glycerin-hypromellose- (Artificial Tears) 0.2-0.2-1 % ophthalmic solution 1 drop  1 drop Both Eyes QID PRN    sodium chloride (Saline Mist) 0.65 % nasal solution 1 spray  1 spray Each Nare Q3H PRN    spironolactone (Aldactone) tab 25 mg  25 mg Oral Daily with breakfast    losartan (Cozaar) tab 25 mg  25 mg Oral QPM       REVIEW OF SYSTEMS:  A 10-point system was reviewed.  Pertinent positives and negatives are noted in HPI.      PHYSICAL EXAMINATION:  VITAL SIGNS: /85 (BP Location: Left arm)   Pulse 63   Temp 96.8 °F (36 °C) (Oral)   Resp 20   SpO2 99%   GENERAL:  Patient is a 78 year old male in no acute distress.  HEENT:  Normocephalic, atraumatic  LUNGS: Nonlabored breathing  HEART:  Well perfused  NEUROLOGICAL:    A&Ox 3, Speech fluent. Comprehension intact  PERRL, EOMI, FS, TM   Full strength x 4, no drift  Sensation intact    DIAGNOSTIC DATA:   Lab Results   Component Value Date    WBC 3.8 05/15/2024    HGB 8.8 05/15/2024    HCT 27.0 05/15/2024    PLT 49.0 05/15/2024    CREATSERUM 1.11 05/15/2024    BUN 24 05/15/2024     05/15/2024    K 3.6 05/15/2024    K 3.6 05/15/2024     05/15/2024    CO2 24.0 05/15/2024     05/15/2024    CA 8.8 05/15/2024    PGLU 102 05/15/2024       ASSESSMENT:  79yo male on aspirin with right convexity chronic SDH without significant brain compression    Discussed with the patient the current clinical situation and plan of care and answered all of his questions.     Plan:  No acute neurosurgical intervention  Hold aspirin  Will coordinate follow up in 1 month with a repeat head CT  Ok for discharge from a neurosurgical perspective    Please call with any questions    Amadeo Becerra MD  Neurological Surgery  [facility]

## 2024-05-15 NOTE — PROGRESS NOTES
[facility] Cardiology Progress Note    David Reyes Patient Status:  Inpatient    1945 MRN [id number]   Location [facility] 7NE-A Attending Annetta Jarvis MD   Hosp Day # 2 PCP Thea Harris,      Subjective:  No acute events overnight  Feels \" a little better than yesterday\"  Walking to bathroom, but not much in halls.  Denies dizziness, LH.     Objective:  /64 (BP Location: Left arm)   Pulse 65   Temp 98.9 °F (37.2 °C) (Oral)   Resp 20   SpO2 99%     Telemetry: nsr.         Intake/Output:    Intake/Output Summary (Last 24 hours) at 5/15/2024 0943  Last data filed at 2024 1140  Gross per 24 hour   Intake --   Output 1 ml   Net -1 ml       Last 3 Weights   24 1644 236 lb (107 kg)   05/10/24 1349 236 lb (107 kg)   24 1109 245 lb 6.4 oz (111.3 kg)       Labs:  Recent Labs   Lab 24  0532 05/15/24  0600   GLU 99 101* 107*   BUN 37* 32* 24*   CREATSERUM 1.62* 1.35* 1.11   EGFRCR 44* 54* 69   CA 9.8 8.9 8.8   * 137 136   K 4.0 3.4* 3.6  3.6   CL 99 101 106   CO2 26.0 28.0 24.0     Recent Labs   Lab 24  0532 05/15/24  0600   RBC 3.51* 3.04* 2.96*   HGB 10.3* 8.9* 8.8*   HCT 32.2* 27.9* 27.0*   MCV 91.7 91.8 91.2   MCH 29.3 29.3 29.7   MCHC 32.0 31.9 32.6   RDW 15.0 14.7 14.7   NEPRELIM 4.03 2.20 2.34   WBC 6.4 3.8* 3.8*   PLT 68.0* 52.0* 49.0*         Recent Labs   Lab 24   TROPHS 54       Diagnostics:             Physical Exam:    Physical Exam  Cardiovascular:      Rate and Rhythm: Normal rate and regular rhythm.   Pulmonary:      Effort: Pulmonary effort is normal.      Breath sounds: No wheezing or rales.   Abdominal:      Palpations: Abdomen is soft.   Musculoskeletal:      Right lower leg: No edema.      Left lower leg: No edema.   Skin:     General: Skin is warm.   Neurological:      Mental Status: He is alert and oriented to person, place, and time.         Medications:   potassium chloride  40 mEq  Oral Q4H    aspirin  81 mg Oral Nightly    ezetimibe  10 mg Oral Daily    folic acid  800 mcg Oral QOD    metoprolol succinate ER  50 mg Oral BID    insulin degludec  12 Units Subcutaneous Nightly    insulin aspart  2-10 Units Subcutaneous TID AC and HS    spironolactone  25 mg Oral Daily with breakfast    losartan  25 mg Oral QPM         Assessment:  79yo presenting with 2-3 weeks of weakness, noted 7-8lb loss, and was hypotensive at urgent care. Viral panel negative     Transient hypotension- heart logic data showing euvolemia, baseline sbp 100  Chronic combined HFrEF/diastolic- EF 10-->25%, 98% BIV paced(BS), back on gg/arb/mra today, no entresto r/t cost  CAD-s/p pci, no angina  CKD- Cr better than baseline  DM2  FLAKO-intolerant to cpap  Chronic thrombocytopenia- at baseline 50-70  RA    Plan:    Stable bp and volume status on baseline meds. No acute CHF exacerbation.   DC planning    Cammy Ramirez, MIKEL  5/15/2024  9:43 AM  Ph [phone] ([place])  Ph [phone] ([place])      Patient seen and examined independently.  Note reviewed and labs reviewed. Agree with above assessment and plan.  78-year-old male who presented with generalized weakness and transient hypotension.  Device was interrogated noting euvolemic status which correlates with his clinical evaluation.  Echo completed yesterday notable for LVEF 20-25% which is similar to prior.  Plan on resumption of GDMT - use losartan instead of entresto for now due to noted hypotension previously. Recommend PT eval. Cardiology service will follow peripherally.  Please do not hesitate to contact if questions or concerns arise.  Plan for outpatient follow-up.        Emerson Gonzalez DO  Cardiologist  [facility] [place]  5/15/2024 1:03 PM      Note to the patient: The 21st Century Cures Act makes medical notes like these available to patients in the interest of transparency. However, be advised that this is a medical document. It is intended  as peer to peer communication. It is written in medical language and may contain abbreviations or verbiage that are unfamiliar. It may appear blunt or direct. Medical documents are intended to carry relevant information, facts as evident, and clinical opinion of the practitioner.     Disclaimer: Components of this note were documented using voice recognition system and are subject to errors not corrected at proofreading. Contact the author of this note for any clarifications.

## 2024-05-15 NOTE — PROGRESS NOTES
[facility]   part of [facility]     Hospitalist Progress Note     David Reyes Patient Status:  Inpatient    1945 MRN [id number]   Location [facility] 7NE-A Attending Germán Gonzalez DO   Hosp Day # 2 PCP Thea Harris DO     Chief Complaint: Generalized weakness/ HA     Subjective:     Patient reports he has been having severe HAs, neck pain, numbness in right hand. No CP/SOB, on RA    Objective:    Review of Systems:   A comprehensive review of systems was completed; pertinent positive and negatives stated in subjective.    Vital signs:  Temp:  [98.2 °F (36.8 °C)-100.5 °F (38.1 °C)] 98.6 °F (37 °C)  Pulse:  [66-79] 68  Resp:  [18-20] 20  BP: (102-150)/(58-70) 115/70  SpO2:  [93 %-98 %] 98 %    Physical Exam:    General: No acute distress, awake and alert. Appears fatigued  Respiratory: No wheezes, no rhonchi  Cardiovascular: S1, S2, regular rate and rhythm  Abdomen: Soft, Non-tender, non-distended, positive bowel sounds  Neuro: FERRARA x 4  Extremities: No edema    Diagnostic Data:    Labs:  Recent Labs   Lab 05/10/24  1501 24  0532 05/15/24  0600   WBC 7.6 6.4 3.8* 3.8*   HGB 10.6* 10.3* 8.9* 8.8*   MCV 92.8 91.7 91.8 91.2   PLT 72.0* 68.0* 52.0* 49.0*     Recent Labs   Lab 05/10/24  1501 24  1757 24  0532 05/15/24  0600   * 99 101* 107*   BUN 34* 37* 32* 24*   CREATSERUM 1.91* 1.62* 1.35* 1.11   CA 9.5 9.8 8.9 8.8   ALB 3.8 3.7  --   --    * 135* 137 136   K 3.9 4.0 3.4* 3.6  3.6   CL 97* 99 101 106   CO2 28.0 26.0 28.0 24.0   ALKPHO 54 53  --   --    AST 23 43*  --   --    ALT 26 24  --   --    BILT 0.7 0.4  --   --    TP 8.1 8.1  --   --      CrCl cannot be calculated (Unknown ideal weight.).    Recent Labs   Lab 24  1757   TROPHS 54     No results for input(s): \"PTP\", \"INR\" in the last 168 hours.     Microbiology  No results found for this visit on 24.    Imaging: Reviewed in Epic.    Medications:    potassium chloride  40 mEq  Oral Q4H    aspirin  81 mg Oral Nightly    ezetimibe  10 mg Oral Daily    folic acid  800 mcg Oral QOD    metoprolol succinate ER  50 mg Oral BID    insulin degludec  12 Units Subcutaneous Nightly    insulin aspart  2-10 Units Subcutaneous TID AC and HS    spironolactone  25 mg Oral Daily with breakfast    losartan  25 mg Oral QPM       Assessment & Plan:      #Chronic combined systolic and diastolic CHF   #Ischemic cardiomyopathy s/p ICD  -Diurese per Cardiology   -PTA Losartan/Aldactone  -Hold entresto given hypotension at immediate care  -Echo EF 20-25%     #Generalized weakness/fatigue presumably due to above  -Influenza negative, Urinalysis unremarkable   -RVP neg   -PT     # neck pain, radiculopathy   - 2015 images reviewed with pt and likely due to DJD, however can obtain imaging and have pt follow up as outpatient with spine surgery     #CAD s/p CABG and PCI  -ASA     #CKD III  -Stable     #Dyslipidemia  -Only tolerates lovastatin (not on formulary)  -Zetia     #DM type II, A1c is 6.6  -ISS, tresiba     #Rheumatoid arthritis  -Typically on methotrexate     #FLAKO  -FLAKO protocol     #Chronic thrombocytopenia and anemia  -Monitor   -Continue Folate   -Hold chemical DVT ppx      DC PLANNING IF CT Brain and Cspine with no acute pathology    Annetta Jarvis MD      Supplementary Documentation:     Quality:  DVT Mechanical Prophylaxis:     Early ambuation  DVT Pharmacologic Prophylaxis   Medication   None      DVT Pharmacologic prophylaxis: Aspirin 162 mg     Code Status: Full  Beltre: No urinary catheter in place  GABE: TBD    Discharge is dependent on: Clinical status, consultant recs  At this point Mr. Reyes is expected to be discharge to: TBD    The 21st Century Cures Act makes medical notes like these available to patients in the interest of transparency. Please be advised this is a medical document. Medical documents are intended to carry relevant information, facts as evident, and the clinical opinion of the  practitioner. The medical note is intended as peer to peer communication and may appear blunt or direct. It is written in medical language and may contain abbreviations or verbiage that are unfamiliar.

## 2024-05-15 NOTE — PLAN OF CARE
Assumed care at 0730  A/Ox4  Rm Air  Vpaced on tele  Denies pain  SBA to ambulate  SOB with exertion  K replaced per protocol  CT Brain/Spine done.  Call light in reach of patient  Safety precautions in place    Goal: Patient/Family Short Term Goal  Description: Patient's Short Term Goal: \"find out why I'm having pain\"    Interventions:   - pain medication as needed  - See additional Care Plan goals for specific interventions  Outcome: Progressing     Problem: Diabetes/Glucose Control  Goal: Glucose maintained within prescribed range  Description: INTERVENTIONS:  - Monitor Blood Glucose as ordered  - Assess for signs and symptoms of hyperglycemia and hypoglycemia  - Administer ordered medications to maintain glucose within target range  - Assess barriers to adequate nutritional intake and initiate nutrition consult as needed  - Instruct patient on self management of diabetes  Outcome: Progressing     Problem: PAIN - ADULT  Goal: Verbalizes/displays adequate comfort level or patient's stated pain goal  Description: INTERVENTIONS:  - Encourage pt to monitor pain and request assistance  - Assess pain using appropriate pain scale  - Administer analgesics based on type and severity of pain and evaluate response  - Implement non-pharmacological measures as appropriate and evaluate response  - Consider cultural and social influences on pain and pain management  - Manage/alleviate anxiety  - Utilize distraction and/or relaxation techniques  - Monitor for opioid side effects  - Notify MD/LIP if interventions unsuccessful or patient reports new pain  - Anticipate increased pain with activity and pre-medicate as appropriate  Outcome: Progressing     Problem: RESPIRATORY - ADULT  Goal: Achieves optimal ventilation and oxygenation  Description: INTERVENTIONS:  - Assess for changes in respiratory status  - Assess for changes in mentation and behavior  - Position to facilitate oxygenation and minimize respiratory effort  -  Oxygen supplementation based on oxygen saturation or ABGs  - Provide Smoking Cessation handout, if applicable  - Encourage broncho-pulmonary hygiene including cough, deep breathe, Incentive Spirometry  - Assess the need for suctioning and perform as needed  - Assess and instruct to report SOB or any respiratory difficulty  - Respiratory Therapy support as indicated  - Manage/alleviate anxiety  - Monitor for signs/symptoms of CO2 retention  Outcome: Progressing

## 2024-05-15 NOTE — PHYSICAL THERAPY NOTE
PHYSICAL THERAPY EVALUATION - INPATIENT     Room Number: 7618/7618-A  Evaluation Date: 5/15/2024  Type of Evaluation: Initial  Physician Order: PT Eval and Treat    Presenting Problem: fatigue, coughing, body aches, headache, acute on chronic combined HFrEF/HFpEF  Co-Morbidities : ischemic cardiomyopathy, CAD, CKD3, dyslipidemia, DM2, RA, FLAKO, chronic thrombocyopenia  Reason for Therapy: Mobility Dysfunction and Discharge Planning    PHYSICAL THERAPY ASSESSMENT   Patient is a 78 year old male admitted 5/13/2024 for fatigue, coughing, body aches, headache, acute on chronic combined HFrEF/HFpEF.   Patient is currently functioning at baseline with bed mobility, transfers, and gait. Prior to admission, patient's baseline is independent.     Given the patient is functioning near baseline level do not anticipate skilled therapy needs at discharge .    PLAN  Patient has been evaluated and presents with no skilled Physical Therapy needs at this time.  Patient discharged from Physical Therapy services.  Please re-order if a new functional limitation presents during this admission.    GOALS  Patient was able to achieve the following goals ...    Patient was able to transfer At previous, functional level   Patient able to ambulate on level surfaces At previous, functional level         HOME SITUATION  Type of Home: House   Home Layout: One level  Stairs to Enter : 2     Stairs to Bedroom: 0       Lives With: Spouse  Drives: Yes  Patient Owned Equipment: None       Prior Level of Horatio: Pt typically indep with ADLs and mobility. Retired from sales. Reports he has a bad L knee and hip.     SUBJECTIVE  \"It's my first time walking\"       OBJECTIVE  Precautions: None  Fall Risk: Standard fall risk    WEIGHT BEARING RESTRICTION  Weight Bearing Restriction: None                PAIN ASSESSMENT  Rating: Unable to rate  Location: back pain  Management Techniques: Activity promotion    COGNITION  Overall Cognitive Status:   WFL - within functional limits    RANGE OF MOTION AND STRENGTH ASSESSMENT  See OT note for UE assessment    Lower extremity ROM is within functional limits      Lower extremity strength is within functional limits        BALANCE  Static Sitting: Good  Dynamic Sitting: Good  Static Standing: Fair +  Dynamic Standing: Poor +    ADDITIONAL TESTS                                    ACTIVITY TOLERANCE  Pulse: 65  Heart Rate Source: Monitor                   O2 WALK       NEUROLOGICAL FINDINGS                        AM-PAC '6-Clicks' INPATIENT SHORT FORM - BASIC MOBILITY  How much difficulty does the patient currently have...  Patient Difficulty: Turning over in bed (including adjusting bedclothes, sheets and blankets)?: None   Patient Difficulty: Sitting down on and standing up from a chair with arms (e.g., wheelchair, bedside commode, etc.): None   Patient Difficulty: Moving from lying on back to sitting on the side of the bed?: None   How much help from another person does the patient currently need...   Help from Another: Moving to and from a bed to a chair (including a wheelchair)?: A Little   Help from Another: Need to walk in hospital room?: A Little   Help from Another: Climbing 3-5 steps with a railing?: A Little       AM-PAC Score:  Raw Score: 21   Approx Degree of Impairment: 28.97%   Standardized Score (AM-PAC Scale): 50.25   CMS Modifier (G-Code): CJ    FUNCTIONAL ABILITY STATUS  Gait Assessment   Functional Mobility/Gait Assessment  Gait Assistance: Supervision  Distance (ft): 200  Assistive Device: None  Pattern: Within Functional Limits  Stairs:  (pt demo sufficient strength and balance to perform ascent/descent of 3 stairs)    Skilled Therapy Provided     Bed Mobility:  Rolling: NT  Supine to sit: ind   Sit to supine: NT     Transfer Mobility:  Sit to stand: ind   Stand to sit: ind  Gait = supervision    Therapist's comments:RN cleared for session. Pt agreeable for therapy, received supine. Pt with mild  waddle/antalgic gait, reports history of knee/hip issues. Pt instructed on ambulation 3x daily with nursing staff. Pt reports he is functioning near baseline. Instructed to call for nursing staff for any needs and OOB mobility.     Exercise/Education Provided:  Bed mobility  Body mechanics  Energy conservation  Functional activity tolerated  Gait training  Posture  Strengthening  Transfer training    Patient End of Session: Up in chair;Needs met;Call light within reach;RN aware of session/findings;All patient questions and concerns addressed;Discussed recommendations with /    Patient Evaluation Complexity Level:  History High - 3 or more personal factors and/or co-morbidities   Examination of body systems Low - addressing 1-2 elements   Clinical Presentation Low - Stable   Clinical Decision Making Low Complexity       PT Session Time: 20 minutes  Gait Training: 10 minutes

## 2024-05-15 NOTE — DISCHARGE SUMMARY
[place] HOSPITAL[facility]  DISCHARGE SUMMARY     David Reyes Patient Status:  Observation    1945 MRN [id number]   Location [facility] 7NE-A Attending No att. providers found   Hosp Day # 2 PCP Thea Harris DO     Date of Admission: 2024  Date of Discharge:  2024    Discharge Disposition: Home or Self Care    Discharge Diagnosis:  #Chronic combined systolic and diastolic CHF   #Ischemic cardiomyopathy s/p ICD  -Echo EF 20-25%     #Generalized weakness  -Urinalysis unremarkable   -RVP neg   -PT     # neck pain, radiculopathy   - CT cervical spine with DJD and foraminal stenosis   - outpatient spine surgery referral provided   - gabapentin started     #CAD s/p CABG and PCI  -ASA     #CKD III  -Stable     #Dyslipidemia  -Only tolerates lovastatin (not on formulary)  -Zetia     #DM type II, A1c is 6.6  -ISS, tresiba     #Rheumatoid arthritis  -Typically on methotrexate     #FLAKO  -FLAKO protocol     #Chronic thrombocytopenia and anemia     # Chronic SDH, off ASA     History of Present Illness: walter Reyes is a 78 year old male with past medical history of ischemic cardiomyopathy presented to the emergency department with multiple complaints including body aches and generalized weakness.     Has been tired and wanting to sleep, this information is obtained via chart review as patient himself states he is only here because his doctor told him to come, he complains of his chronic pain that he has had for \"years\" but no acute complaints.       Brief Synopsis: patient admitted and monitored. He had difficulty mobilizing and c/o HA. He underwent further imaging of the brain and cervical spine. His BP improved. He will require follow up with NS/spine surgery.     Lace+ Score: 68  59-90 High Risk  29-58 Medium Risk  0-28   Low Risk       TCM Follow-Up Recommendation:  LACE > 58: High Risk of readmission after discharge from the hospital.      Discharge Medication List:     Discharge Medications         START taking these medications        Instructions Prescription details   gabapentin 100 MG Caps  Commonly known as: Neurontin      Take 1 capsule (100 mg total) by mouth 3 (three) times daily.   Quantity: 90 capsule  Refills: 1     HYDROcodone-acetaminophen  MG Tabs  Commonly known as: Norco      Take 1-2 tablets by mouth every 4 (four) hours as needed for Pain.   Quantity: 20 tablet  Refills: 0            CONTINUE taking these medications        Instructions Prescription details   BD Pen Needle Aline 2nd Gen 32G X 4 MM Misc  Generic drug: Insulin Pen Needle      USE DAILY   Quantity: 300 each  Refills: 3     Contour Next Test Strp  Generic drug: Glucose Blood      Test 3 times daily Dx Code: E11.9 Insulin - dependent diabetes   Quantity: 300 each  Refills: 1     ergocalciferol 1.25 MG (38398 UT) Caps  Commonly known as: Vitamin D2       Refills: 0     ezetimibe 10 MG Tabs  Commonly known as: Zetia      Take 1 tablet (10 mg total) by mouth daily.   Refills: 0     folic acid 800 MCG Tabs  Commonly known as: FOLVITE      Take 1 tablet (800 mcg total) by mouth every other day. Alternate with 2 tablets (1600mcg) every other day    About 1000 mcg daily   Refills: 0     losartan 25 MG Tabs  Commonly known as: Cozaar      Take 1 tablet (25 mg total) by mouth every evening.   Refills: 0     Lovastatin 40 MG Tabs      Take 1 tablet (40 mg total) by mouth in the morning and 1 tablet (40 mg total) before bedtime.   Refills: 2     metFORMIN  MG Tb24  Commonly known as: Glucophage XR      Take 2 tablets (1,000 mg total) by mouth 2 (two) times daily with meals.   Quantity: 360 tablet  Refills: 0     methotrexate 2.5 MG Tabs  Commonly known as: Rheumatrex      Take 4 tablets (10 mg total) by mouth every 7 days.   Quantity: 48 tablet  Refills: 1     metoprolol succinate ER 50 MG Tb24  Commonly known as: Toprol XL      Take 1 tablet (50 mg total) by mouth 2 (two) times daily.   Refills: 0     Microlet Lancets Misc       CHECK BLOOD SUGAR THREE TIMES DAILY   Quantity: 300 each  Refills: 3     spironolactone 25 MG Tabs  Commonly known as: Aldactone      Take 1 tablet (25 mg total) by mouth daily with breakfast.   Quantity: 90 tablet  Refills: 0     torsemide 20 MG Tabs  Commonly known as: Demadex      Take 1 tablet (20 mg total) by mouth BID (Diuretic).   Quantity: 60 tablet  Refills: 1     Toujeo Max SoloStar 300 UNIT/ML Sopn  Generic drug: Insulin Glargine (2 Unit Dial)      Inject 24 Units into the skin daily.   Quantity: 27 mL  Refills: 1     Vitamin B-1 100 MG Tabs  Commonly known as: VITAMIN B1      Take 1 tablet (100 mg total) by mouth daily.   Refills: 0            STOP taking these medications      aspirin 81 MG Tabs                  Where to Get Your Medications        These medications were sent to [id number] - [place], [place] - [address] [facility] [address], [phone], [phone]  [address] [facility] [address]      Phone: [phone]   gabapentin 100 MG Caps  HYDROcodone-acetaminophen  MG Tabs         [place]    Follow-up appointment:   Thea Harris, DO  [address] [id number] [phone]    Follow up in 1 week(s)      Greg Bejarano MD  [address]   [address] [address] [phone]    Follow up in 2 week(s)  neck pain and radiculopathy    John Paz MD  [address] [place] [address] [phone]    Follow up in 1 month(s)  Office will call you to schedule follow up    Appointments for Next 30 Days 5/16/2024 - 6/15/2024      None            Vital signs:  Temp:  [98.6 °F (37 °C)-99.5 °F (37.5 °C)] 99.5 °F (37.5 °C)  Pulse:  [67-80] 80  Resp:  [14-19] 19  BP: (102-120)/(62-78) 116/62  SpO2:  [95 %-99 %] 95 %    Physical Exam:    General: No acute distress   Lungs: clear to auscultation  Cardiovascular: S1, S2  Abdomen: Soft NTND      -----------------------------------------------------------------------------------------------  PATIENT DISCHARGE INSTRUCTIONS: See electronic chart    Annetta Jarvis MD    Total time spent on discharge plannin minutes     The  [employer] Cures Act makes medical notes like these available to patients in the interest of transparency. Please be advised this is a medical document. Medical documents are intended to carry relevant information, facts as evident, and the clinical opinion of the practitioner. The medical note is intended as peer to peer communication and may appear blunt or direct. It is written in medical language and may contain abbreviations or verbiage that are unfamiliar.

## 2024-05-16 VITALS
RESPIRATION RATE: 19 BRPM | DIASTOLIC BLOOD PRESSURE: 62 MMHG | OXYGEN SATURATION: 95 % | TEMPERATURE: 100 F | SYSTOLIC BLOOD PRESSURE: 116 MMHG | HEART RATE: 80 BPM

## 2024-05-16 LAB
GLUCOSE BLD-MCNC: 102 MG/DL (ref 70–99)
GLUCOSE BLD-MCNC: 115 MG/DL (ref 70–99)

## 2024-05-16 PROCEDURE — 99239 HOSP IP/OBS DSCHRG MGMT >30: CPT | Performed by: INTERNAL MEDICINE

## 2024-05-16 PROCEDURE — 82962 GLUCOSE BLOOD TEST: CPT | Performed by: NURSE PRACTITIONER

## 2024-05-16 RX ORDER — HYDROCODONE BITARTRATE AND ACETAMINOPHEN 10; 325 MG/1; MG/1
1-2 TABLET ORAL EVERY 4 HOURS PRN
Qty: 20 TABLET | Refills: 0 | Status: SHIPPED | OUTPATIENT
Start: 2024-05-16

## 2024-05-16 NOTE — PLAN OF CARE
Assumed pt care at 1930  A&Ox4, able to make needs known  A-V paced/V paced on tele  Still having H/A 5/10, being managed by tylenol  Poss dc today?  Call light in reach  Bed in low position

## 2024-05-16 NOTE — PLAN OF CARE
Cardiology Plan of Care Update    Updated by Dr Jarvis on CT Brain results of 5mm subdural hematoma to R frontal lobe. Appreciate neurosurgery consultation. Ok to hold aspirin from cardiology perspective in setting of thrombocytopenia and SDH. With underlying CAD w/Hx PCI and CABG, resume aspirin when ok with neurosurgery team after repeat CT scan and re-evaluation as an outpatient.     Silvia Lozano, APRN  05/16/24   10:44 AM  [phone] [place]  [phone] [place]

## 2024-05-16 NOTE — PLAN OF CARE
Assumed patient care 0730  Patient alert and oriented X4  On room air, VSS,  on tele  Patient c/o HA pain 5/10, managed with PRN Tylenol   Up ad brett in room, voiding  Tolerating diet   Patient cleared by all consults for discharge        NURSING DISCHARGE NOTE    All discharge instructions including AVS, follow ups, and medications reviewed with patient and spouse at bedside, verbalized understanding.  Signs and symptoms when to call 911 discussed with patient and spouse, verbalized understanding.   Discharged Home via Wheelchair.  Accompanied by RN and Spouse  Belongings Taken by patient/family.    Problem: PAIN - ADULT  Goal: Verbalizes/displays adequate comfort level or patient's stated pain goal  Description: INTERVENTIONS:  - Encourage pt to monitor pain and request assistance  - Assess pain using appropriate pain scale  - Administer analgesics based on type and severity of pain and evaluate response  - Implement non-pharmacological measures as appropriate and evaluate response  - Consider cultural and social influences on pain and pain management  - Manage/alleviate anxiety  - Utilize distraction and/or relaxation techniques  - Monitor for opioid side effects  - Notify MD/LIP if interventions unsuccessful or patient reports new pain  - Anticipate increased pain with activity and pre-medicate as appropriate  Outcome: Adequate for Discharge     Problem: RESPIRATORY - ADULT  Goal: Achieves optimal ventilation and oxygenation  Description: INTERVENTIONS:  - Assess for changes in respiratory status  - Assess for changes in mentation and behavior  - Position to facilitate oxygenation and minimize respiratory effort  - Oxygen supplementation based on oxygen saturation or ABGs  - Provide Smoking Cessation handout, if applicable  - Encourage broncho-pulmonary hygiene including cough, deep breathe, Incentive Spirometry  - Assess the need for suctioning and perform as needed  - Assess and instruct to report SOB or any  respiratory difficulty  - Respiratory Therapy support as indicated  - Manage/alleviate anxiety  - Monitor for signs/symptoms of CO2 retention  Outcome: Adequate for Discharge     Problem: Diabetes/Glucose Control  Goal: Glucose maintained within prescribed range  Description: INTERVENTIONS:  - Monitor Blood Glucose as ordered  - Assess for signs and symptoms of hyperglycemia and hypoglycemia  - Administer ordered medications to maintain glucose within target range  - Assess barriers to adequate nutritional intake and initiate nutrition consult as needed  - Instruct patient on self management of diabetes  Outcome: Adequate for Discharge

## 2024-05-17 ENCOUNTER — PATIENT OUTREACH (OUTPATIENT)
Dept: CASE MANAGEMENT | Age: 79
End: 2024-05-17

## 2024-05-17 DIAGNOSIS — Z02.9 ENCOUNTERS FOR UNSPECIFIED ADMINISTRATIVE PURPOSE: Primary | ICD-10-CM

## 2024-05-23 ENCOUNTER — TELEPHONE (OUTPATIENT)
Dept: PHYSICAL THERAPY | Facility: HOSPITAL | Age: 79
End: 2024-05-23

## 2024-05-23 ENCOUNTER — OFFICE VISIT (OUTPATIENT)
Dept: FAMILY MEDICINE CLINIC | Facility: CLINIC | Age: 79
End: 2024-05-23

## 2024-05-23 VITALS
DIASTOLIC BLOOD PRESSURE: 48 MMHG | TEMPERATURE: 97 F | OXYGEN SATURATION: 96 % | RESPIRATION RATE: 18 BRPM | BODY MASS INDEX: 34.66 KG/M2 | SYSTOLIC BLOOD PRESSURE: 90 MMHG | HEIGHT: 69 IN | HEART RATE: 86 BPM | WEIGHT: 234 LBS

## 2024-05-23 DIAGNOSIS — Z92.89 HISTORY OF RECENT HOSPITALIZATION: ICD-10-CM

## 2024-05-23 DIAGNOSIS — R53.1 GENERALIZED WEAKNESS: ICD-10-CM

## 2024-05-23 DIAGNOSIS — I62.03 CHRONIC SUBDURAL HEMATOMA (HCC): Primary | ICD-10-CM

## 2024-05-23 DIAGNOSIS — R68.89 DECREASED EXERCISE TOLERANCE: ICD-10-CM

## 2024-05-23 PROBLEM — I35.0 AORTIC STENOSIS, MILD: Status: ACTIVE | Noted: 2024-04-23

## 2024-05-23 NOTE — PROGRESS NOTES
Subjective:   David Reyes is a 78 year old male who presents for hospital follow up.   He was discharged from [facility] to Home or Self Care  Admission Date: 5/13/24   Discharge Date: 5/16/24  Hospital Discharge Diagnosis: chronic subdural hematoma.     Interactive contact within 2 business days post discharge first initiated on Date: 5/17/2024    During the visit, the following was completed:  Obtained and reviewed discharge summary, continuity of care documents, and Hospitalist notes  Reviewed Labs (CBC, CMP), Labs (Cardiac markers), and CT radiology results.     HPI: had several weeks of lethargy, headaches, weakness.  He went to the hospital and they found subdural hematoma, chronic. Aspirin was stopped for now. Cardiology was consulted, did not seem to be in heart failure. Neurosurgery consulted, they recommended no intervention, but follow up CT head in 1 month, then see neurosurgery again.     Now that he is home, feeling a little better, still weak and tired, low exercise tolerance. Headaches have been better. Tylenol seems to help.   Eating, drinking, voiding well. No fevers. No rashes, no swelling in legs.     History/Other:   Current Medications:  Medication Reconciliation:  I am aware of an inpatient discharge within the last 30 days.  The discharge medication list has been reconciled with the patient's current medication list and reviewed by me.  See medication list for additions of new medication, and changes to current doses of medications and discontinued medications.    Outpatient Medications Marked as Taking for the 5/23/24 encounter (Office Visit) with Thea Harris, DO   Medication Sig    HYDROcodone-acetaminophen  MG Oral Tab Take 1-2 tablets by mouth every 4 (four) hours as needed for Pain.    gabapentin 100 MG Oral Cap Take 1 capsule (100 mg total) by mouth 3 (three) times daily.    metoprolol succinate ER 50 MG Oral Tablet 24 Hr Take 1 tablet (50 mg total) by mouth 2 (two)  times daily.    spironolactone 25 MG Oral Tab Take 1 tablet (25 mg total) by mouth daily with breakfast.    ergocalciferol 1.25 MG (02657 UT) Oral Cap     Insulin Glargine, 2 Unit Dial, (TOUJEO MAX SOLOSTAR) 300 UNIT/ML Subcutaneous Solution Pen-injector Inject 24 Units into the skin daily.    metFORMIN  MG Oral Tablet 24 Hr Take 2 tablets (1,000 mg total) by mouth 2 (two) times daily with meals.    CONTOUR NEXT TEST In Vitro Strip Test 3 times daily Dx Code: E11.9 Insulin - dependent diabetes    Microlet Lancets Does not apply Misc CHECK BLOOD SUGAR THREE TIMES DAILY    losartan 25 MG Oral Tab Take 1 tablet (25 mg total) by mouth every evening.    BD PEN NEEDLE LILO 2ND GEN 32G X 4 MM Does not apply Misc USE DAILY    torsemide 20 MG Oral Tab Take 1 tablet (20 mg total) by mouth BID (Diuretic).    Vitamin B-1 100 MG Oral Tab Take 1 tablet (100 mg total) by mouth daily.    methotrexate 2.5 MG Oral Tab Take 4 tablets (10 mg total) by mouth every 7 days.    ezetimibe 10 MG Oral Tab Take 1 tablet (10 mg total) by mouth daily.    Lovastatin 40 MG Oral Tab Take 1 tablet (40 mg total) by mouth in the morning and 1 tablet (40 mg total) before bedtime.    folic acid 800 MCG Oral Tab Take 1 tablet (800 mcg total) by mouth every other day. Alternate with 2 tablets (1600mcg) every other day    About 1000 mcg daily     Review of Systems:  GENERAL: weight stable, energy stable, no sweating  SKIN: denies any unusual skin lesions  EYES: denies blurred vision or double vision  HEENT: denies nasal congestion, sinus pain or ST  LUNGS: denies shortness of breath with exertion  CARDIOVASCULAR: denies chest pain on exertion or palpitations  GI: denies abdominal pain, denies heartburn, denies diarrhea  MUSCULOSKELETAL: denies pain, normal range of motion of extremities  NEURO: see HPI   PSYCHE: denies depression or anxiety  HEMATOLOGIC: denies hx of anemia, or bruising, denies bleeding  ENDOCRINE: denies thyroid history  ALL/ASTHMA:  denies hx of allergy or asthma    Objective:   No LMP for male patient.  Estimated body mass index is 34.56 kg/m² as calculated from the following:    Height as of this encounter: 5' 9\" (1.753 m).    Weight as of this encounter: 234 lb (106.1 kg).   BP 90/48   Pulse 86   Temp 97 °F (36.1 °C)   Resp 18   Ht 5' 9\" (1.753 m)   Wt 234 lb (106.1 kg)   SpO2 96%   BMI 34.56 kg/m²    GENERAL: well developed, well nourished, in no apparent distress, better than last time I saw him.   SKIN: no rashes, no suspicious lesions  HEENT: atraumatic, normocephalic, ears and throat are clear  EYES: PERRLA, EOMI, conjunctiva are clear  NECK: supple, no adenopathy   CHEST: no chest tenderness  LUNGS: clear to auscultation  CARDIO: RRR without murmur  GI: good BS's, no masses, HSM or tenderness  MUSCULOSKELETAL: back is not tender, FROM of the extremities  EXTREMITIES: no cyanosis, clubbing or edema  NEURO: Oriented times three, cranial nerves are intact, motor and sensory are grossly intact    Assessment & Plan:   1. Chronic subdural hematoma (HCC) (Primary)  2. Generalized weakness  -     Cancel: Physical Therapy Referral - [facility]  -     Physical Therapy Referral - [facility]  3. Decreased exercise tolerance  -     Cancel: Physical Therapy Referral - [facility]  -     Physical Therapy Referral - [facility]  4. History of recent hospitalization  -     Cancel: Physical Therapy Referral - [facility]  -     Physical Therapy Referral - [facility]  Follow up with neurosurgery.   Refer for physical therapy to help with regaining strength and stamina and stability.   Follow up with cardiology as recommended.       Return in 3 months (on 8/23/2024).

## 2024-05-24 ENCOUNTER — OFFICE VISIT (OUTPATIENT)
Dept: PHYSICAL THERAPY | Age: 79
End: 2024-05-24
Attending: FAMILY MEDICINE

## 2024-05-24 DIAGNOSIS — R53.1 GENERALIZED WEAKNESS: Primary | ICD-10-CM

## 2024-05-24 DIAGNOSIS — R68.89 DECREASED EXERCISE TOLERANCE: ICD-10-CM

## 2024-05-24 DIAGNOSIS — Z92.89 HISTORY OF RECENT HOSPITALIZATION: ICD-10-CM

## 2024-05-24 PROCEDURE — 97110 THERAPEUTIC EXERCISES: CPT

## 2024-05-24 PROCEDURE — 97161 PT EVAL LOW COMPLEX 20 MIN: CPT

## 2024-05-24 NOTE — PROGRESS NOTES
PHYSICAL THERAPY INITIAL EVALUATION     Date of service: 5/24/2024  Dx: Generalized weakness (R53.1), Decreased exercise tolerance (R68.89), History of recent hospitalization (Z92.89)       Insurance: [facility]  Insurance Limits: N/A  Visit #: 1  Authorized # of Visits: 8 recommended  POC/Auth Expiration: N/A  Authorizing Physician/Provider: Steven     PATIENT SUMMARY     History/TIARA: \"I was having pain in my head that would radiate down my shoulders and down into my spine. It was to the point that I would have to sit down and recline to relieve the pain. This has been going on for months now. It's gotten progressively worse in the past 4-6 weeks to the degree that I ended up in the hospital last week. They thought my problem was heart related. They came to the conclusion after doing some imaging that I had blood bleed on my brain. They eliminated the idea that I was having heart issues.\"    \"They took me off of my aspirin and blood thinners and scheduled me for another follow-up scan in three weeks. I have a meeting with the doctor that will check out my head. They suggested in the meantime that I do therapy. \'    The patient reports pain on the back and outside of his hips. He also reports pain down his spine. He reports that he will go for groceries, but his wife has to help put them away. The patent has a flight of stairs to the basement where the office is located, but he has to take them one step at a time.     DOI/S: chronic, insidious onset     Aggravating Factors: sitting with back unsupported, cooking/meal prep    Alleviating Factors: Tylenol    PMH: The patient's PMH was reviewed with the patient including allergies, medications, and surgical and medical history. Patient  has a past medical history of CAD (coronary artery disease), Congestive heart disease (HCC), Congestive heart failure (HCC), CORONARY ARTERY DISEASE (2004), Coronary atherosclerosis, Diabetes (HCC), DM (diabetes mellitus) (HCC), Heart attack  (HCC), High cholesterol, Other and unspecified hyperlipidemia, Rheumatoid arthritis (HCC), Sleep apnea, and Unspecified sleep apnea. PMH of left knee arthritis, managed with gel injections.     PLF/Personal Goals: \"I'd like to be able to golf and go bowling.\"    OBJECTIVE:     Pain/Symptom Presentation:    Pain at rest: 6/10  Pain at worst: 9/10    Outcomes Measure(s):   Modified Oswestry: 54% disability    Activity Measures:  Sleeping: Moderate Activity Limitation: Patient is able to sleep < 4 hours a night, even when taking pain medication.   Sitting: Mild Activity Limitation: Patient is able to sit with minimal limitations, prefers to sit over standing.   Standing: Moderate Activity Limitation: Patient is able to stand up to 5-10 minutes at a time.   Walking: Moderate Activity Limitation: Patient is able to walk up to 200ft.   Ascending Stairs: Moderate Activity Limitation: Patient navigates up the stairs with step-over gait pattern, moderate deviations.   Descending Stairs: Moderate Activity Limitation: Patient navigates down the stairs with step-over gait pattern, moderate deviations.   Stairs: Moderate Activity Limitation: Patient is only able to navigate 1-2 flights of stairs at a time.     Inspection/Observation: Patient demonstrates sitting postural dysfunction with forward head posture, excessive neck protraction and thoracic kyphosis, anterior tilt and downward rotation of the scapula, internal rotation of shoulder, decreased scapular and abdominal tone, and reduced lumbar lordosis.      Strength/MMT:   Knee Motion Strength    Right Left   Knee Extension 4/5 4/5   Knee Flexion 4/5 4/5   *indicates activity was associated with pain    Hip Motion Strength    Right Left   Hip Flexion 4-/5 4-/5   Hip Extension 4-/5 4-/5   Hip ABD 4-/5 4-/5   Hip ER 4/5 4/5   Hip IR  4/5 4/5   *indicates activity was associated with pain    Strength/MMT:   Ankle Motion Strength    Right Left   Ankle OKC DF 4/5 4/5   Ankle OKC  PF 4/5 4/5   Ankle OKC INV/forefoot ADD 5/5 5/5   Ankle OKC EV/forefoot ABD 5/5 5/5   *indicates activity was associated with pain     Balance:   Tandem stance balance: ~15sec (B)   SLS: ~3-5 sec (B)     ASSESSMENT:     SANJANA is a 78 year old male that presents to physical therapy evaluation s/p 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 The patient noticed progressive head, neck, and spine pain that has worsened over the course of the past 4-6 months. The only relief the patient could find was sitting or lying down. The patient was found to have a chronic subdural hematoma after recent hospitalization 5/13/24. The patient reports remaining head, neck, and spine pain and complains of generalized weakness and endurance deficits. The patient demonstrates generalized hip and low back flexibility as well as generalized LE strength and endurance deficits. Current functional limitation include but are not limited to prolonged standing, walking long distances, sleeping at night, navigating stairs, squatting, and completing household chores. Pat demonstrates some balance deficits and expresses some concern about his balance and stability. The patient would benefit from physical therapy to facilitate progression in strength, endurance, and stability for improved tolerance to ADL's and community integration.     Precautions/WB Status: WBAT  Education or Treatment Limitation(s): None  Rehab Potential: Fair    TREATMENT:     Initial Evaluation: x 20min     Therapeutic Activities: x 5min  Patient Education: Patient was educated on anatomy and pathophysiology of current condition, rationale for physical therapy, anticipated treatment interventions, prognosis, timeline for recovery, and expected functional outcomes based on evaluative findings.     Therapeutic Exercise: x 15min  DL  bridging: 2 x 10   S/L clamshell: 2 x 10 (B), red theraband   DLHR: x 20  Standing hip ABD and ext: 2 x 10 (B)   Administered HEP:  Reviewed HEP handout, exercise selection, and recommended resistance. Provided verbal and written instructions/cueing for proper technique and common errors/compensations as needed.   Patient Education: Patient was educated on the importance of compliance with consistent treatment and HEP to achieve mutually established goals. Patient verbalized understanding.     Home Exercise Program: Patient was issued a HEP handout 5/24/2024 including DL bridging, S/L clamshell, DLHR, and standing hip ABD and ext.     Provider Interactions With Patient:   All patient's questions were answered and the patient denied further comments, complaints, or concerns upon departure.   Patient was issued an appt list and verbally confirmed the next appt date and time to ensure consistency with physical therapy attendance.     Charges: PT Eval Low Complexity Complexity x 1, Therex x 1  Total Timed Treatment: 20min       Total Treatment Time: 40min     PLAN OF CARE:      Goals:  Short-Term Goals:  Patient will improve LE endurance to facilitate intermittent standing for at least 2 hour(s) daily for household chores and community integration. Timeframe: 3-4 weeks.  Long-Term Goals:  The patient will improve LE strength and endurance to facilitate walking distances of 2 mile(s) daily for community integration. Timeframe: 6-8 weeks.  Patient will improve LE mobility, strength, and single-leg stabilization to meet strength requirements for reciprocal stair navigation pattern with [mild asymmetries, no asymmetries] for ascending and descending 3 flights of stairs daily for household and community ambulation. Timeframe: 8 weeks.  Patient will improve lower motor control to perform double-leg squat with symmetrical loading, without asymmetries, and with proper posterior chain loading to facilitate squatting and lifting ADL's. Timeframe: 8 weeks.    Plan Frequency / Duration: Patient will be seen for 2x/week for 4 weeks, for a total of 8 visits, over  a 90 day period. We will re-evaluate the patient at that time in order to determine functional progress, evaluate short-term goal completion, and establish an updated plan of care. Possible treatment interventions will/may include: Therapeutic Activities, Therapeutic Exercise, Neuromuscular Re-education, Manual Therapy, Home Exercise Program Instruction, Patient Education, Self-Care/Home Management, Gait Training, and Modalities as needed.     Patient/Family was advised of these findings, precautions, and treatment options and has agreed to actively participate in planning and for this course of care.    Thank you for your referral. Please co-sign or sign and return this letter via fax as soon as possible to [phone]. If you have any questions, please contact me at Dept: [phone].    Sincerely,    X___Rosalinda Zuniga PT, DPT, SCS, ATC, CSCS____ Date: __5/24/2024__________    Electronically signed by therapist: Rosalinda Escobar PT  Physician's certification required: Yes  I certify the need for these services furnished under this plan of treatment and while under my care.

## 2024-05-24 NOTE — PROGRESS NOTES
Multiple attempts to reach pt and messages left with no return call.  Patient went in for HFU appt with PCP on 5/23/24.  Encounter closing.

## 2024-05-24 NOTE — PATIENT INSTRUCTIONS
Thank you for coming to your physical therapy appt! During your appt today you were issued a HEP handout from [employer] which can also be accessed using the information below. The exercises chosen are meant to supplement the treatment you received and reinforce the progress made in physical therapy. It is important to stay consistent with your exercises to help facilitate and maximize your recovery!      View at [email or web address] using code: [email or web address]

## 2024-06-03 ENCOUNTER — OFFICE VISIT (OUTPATIENT)
Dept: PHYSICAL THERAPY | Age: 79
End: 2024-06-03
Attending: FAMILY MEDICINE
Payer: MEDICARE

## 2024-06-03 PROCEDURE — 97110 THERAPEUTIC EXERCISES: CPT

## 2024-06-03 PROCEDURE — 97140 MANUAL THERAPY 1/> REGIONS: CPT

## 2024-06-03 NOTE — PROGRESS NOTES
Date of service: 06/03/2024  Dx: Generalized weakness (R53.1), Decreased exercise tolerance (R68.89), History of recent hospitalization (Z92.89)       Insurance: [employer]  Insurance Limits: N/A  Visit #: 2  Authorized # of Visits: 8 recommended  POC/Auth Expiration: N/A  Authorizing Physician/Provider: Steven Insurance Primary/Secondary: MEDICARE / [employer]       # Auth Visits: N/A (10 for MCR)            Subjective: Patient reports pain in both hips, back, and neck/shoulders generalized 5/10 this day. Patient reports more significant pain on left that right with clamshells exercise.   Pain: 5/10    Objective: Decr endurance, tolerates 1-2 standing exercises before seated rest breaks required.     Assessment: Patient care this day focused on pain management for gluteal tension/stiffness and functional strengthening and endurance. Patient would benefit from continued progression with standing exercises to improve activity tolerance     Goals: (total of 8 visits)  Short-Term Goals:  Patient will improve LE endurance to facilitate intermittent standing for at least 2 hour(s) daily for household chores and community integration. Timeframe: 3-4 weeks.  Long-Term Goals:  The patient will improve LE strength and endurance to facilitate walking distances of 2 mile(s) daily for community integration. Timeframe: 6-8 weeks.  Patient will improve LE mobility, strength, and single-leg stabilization to meet strength requirements for reciprocal stair navigation pattern with [mild asymmetries, no asymmetries] for ascending and descending 3 flights of stairs daily for household and community ambulation. Timeframe: 8 weeks.  Patient will improve lower motor control to perform double-leg squat with symmetrical loading, without asymmetries, and with proper posterior chain loading to facilitate squatting and lifting ADL's. Timeframe: 8 weeks.    Plan: Progress as tolerated  Date: 6/3/2024  TX#: 2/8 Date:                 TX#: 3/ Date:                  TX#: 4/ Date:                 TX#: 5/ Date:   Tx#: 6/   Ther Ex: 30'  Bridges 2 x 10 5\" H   S/L Clamshells x10 5\" H ea R/L  Sidestepping 2x1'   (Seated RB)  Monster Walks 2x1'        Manual: 10'  MyoBuddy: L Hip  STM w Ball/STM: R and L Gluteals  TrPR: R and L Glute Med                      HEP:  DL bridging, S/L clamshell, DLHR, and standing hip ABD and ext.     Charges: Manual x 1, Ther Ex x 2       Total Timed Treatment: 40 min    Total Treatment Time: 40 min

## 2024-06-05 ENCOUNTER — OFFICE VISIT (OUTPATIENT)
Dept: PHYSICAL THERAPY | Age: 79
End: 2024-06-05
Attending: FAMILY MEDICINE
Payer: MEDICARE

## 2024-06-05 PROCEDURE — 97110 THERAPEUTIC EXERCISES: CPT

## 2024-06-05 NOTE — PROGRESS NOTES
Date of service: 06/05/2024  Dx: Generalized weakness (R53.1), Decreased exercise tolerance (R68.89), History of recent hospitalization (Z92.89)       Insurance: [employer]  Insurance Limits: N/A  Visit #: 3  Authorized # of Visits: 8 recommended  POC/Auth Expiration: N/A  Authorizing Physician/Provider: Steven Insurance Primary/Secondary: MEDICARE / [employer]       # Auth Visits: N/A (10 for MCR)            Subjective: Patient reports tenderness was increased day after previous visit, reports mild improvement in tolerance to walking short term afterwards but pretty much back to the same as of the last day or so. Patient reports HEP is getting a little easier to perform   Pain: 4-5/10 (B)    Objective: Decr endurance, tolerates 1-2 standing exercises before seated rest breaks required.     Assessment: Continued focus on functional strengthening, balance, and postural corrections this day for more global focus. Patient demonstrated difficulty initiating scapular retraction with rows initially, added seated scap retraction for better mechanics focus. Patient able to perform STS initially with only UE on lap for first 3-4 reps, then required UE on arm rests to complete. Patient instructed to add this to HEP for better LE strength and endurance challenge. Patient would benefit from continued care to improve activity tolerance and balance.    Goals: (total of 8 visits)  Short-Term Goals:  Patient will improve LE endurance to facilitate intermittent standing for at least 2 hour(s) daily for household chores and community integration. Timeframe: 3-4 weeks.  Long-Term Goals:  The patient will improve LE strength and endurance to facilitate walking distances of 2 mile(s) daily for community integration. Timeframe: 6-8 weeks.  Patient will improve LE mobility, strength, and single-leg stabilization to meet strength requirements for reciprocal stair navigation pattern with [mild asymmetries, no asymmetries] for ascending and descending 3  flights of stairs daily for household and community ambulation. Timeframe: 8 weeks.  Patient will improve lower motor control to perform double-leg squat with symmetrical loading, without asymmetries, and with proper posterior chain loading to facilitate squatting and lifting ADL's. Timeframe: 8 weeks.    Plan: Progress as tolerated  Date: 6/3/2024  TX#: 2/8 Date: 06/05/2024          TX#: 3/8 Date:                 TX#: 4/ Date:                 TX#: 5/ Date:   Tx#: 6/   Ther Ex: 30'  Bridges 2 x 10 5\" H   S/L Clamshells x10 5\" H ea R/L  Sidestepping 2x1'   (Seated RB)  Monster Walks 2x1'  Ther Ex 45'  Added Gluteal Mobilization w Ball x 20 ea R/L  S/L Clamshells x10 5\" H ea R/L  SKTC 2x20\" ea R/L   Bridges 2 x 10 5\" H   Added Alternating H/L Mini Marches w Core Stab 2'   Added Tandem Stance   Added SLS 2x15\" ea R/L   DLHR x10   DLTR x10   Standing Rows x10   Standing Ext x 10   Seated Scap Retraction x20 5\" H   Seated LAQ x10 5\" H ea R/L   STS x10       Manual: 10'  MyoBuddy: L Hip  STM w Ball/STM: R and L Gluteals  TrPR: R and L Glute Med                      HEP:  DL bridging, S/L clamshell, DLHR, and standing hip ABD and ext.     Charges: Ther Ex x 3       Total Timed Treatment: 45 min    Total Treatment Time: 45 min

## 2024-06-10 ENCOUNTER — OFFICE VISIT (OUTPATIENT)
Dept: PHYSICAL THERAPY | Age: 79
End: 2024-06-10
Attending: FAMILY MEDICINE
Payer: MEDICARE

## 2024-06-10 PROCEDURE — 97110 THERAPEUTIC EXERCISES: CPT

## 2024-06-10 NOTE — PROGRESS NOTES
Date of service: 06/10/2024  Dx: Generalized weakness (R53.1), Decreased exercise tolerance (R68.89), History of recent hospitalization (Z92.89)       Insurance: [employer]  Insurance Limits: N/A  Visit #: 4  Authorized # of Visits: 8 recommended  POC/Auth Expiration: N/A  Authorizing Physician/Provider: Steven Insurance Primary/Secondary: MEDICARE / [employer]       # Auth Visits: N/A (10 for MCR)            Subjective: Patient reports aching pain continues but is less severe in all mentioned places before (low back, neck, shoulder) Patient reports he feels about 60% of full potential function, and that he started about 30-40% prior to beginning PT.   Pain: 4/10 beginning tx,     Objective: Decr endurance, tolerates 1-2 standing exercises before seated rest breaks required.     Assessment: Patient able to complete all standing exercises this day without seated rest, did modify treatment slightly to alternate table/standing exercises. Patient reports improved symptoms overall in low back, upper back, neck, and shoulder since beginning PT and continues to demo consistent progress with activity tolerance. Patient would benefit from continued alternating standing and seated/supine exercises and progress to more standing/functional strengthening as tolerated.     Goals: (total of 8 visits)  Short-Term Goals:  Patient will improve LE endurance to facilitate intermittent standing for at least 2 hour(s) daily for household chores and community integration. Timeframe: 3-4 weeks.  Long-Term Goals:  The patient will improve LE strength and endurance to facilitate walking distances of 2 mile(s) daily for community integration. Timeframe: 6-8 weeks.  Patient will improve LE mobility, strength, and single-leg stabilization to meet strength requirements for reciprocal stair navigation pattern with [mild asymmetries, no asymmetries] for ascending and descending 3 flights of stairs daily for household and community ambulation. Timeframe: 8  weeks.  Patient will improve lower motor control to perform double-leg squat with symmetrical loading, without asymmetries, and with proper posterior chain loading to facilitate squatting and lifting ADL's. Timeframe: 8 weeks.    Plan: Progress as tolerated  Date: 6/3/2024  TX#: 2/8 Date: 06/05/2024          TX#: 3/8 Date: 06/10/2024      TX#: 4/8 Date:                 TX#: 5/ Date:   Tx#: 6/   Ther Ex: 30'  Bridges 2 x 10 5\" H   S/L Clamshells x10 5\" H ea R/L  Sidestepping 2x1'   (Seated RB)  Monster Walks 2x1'  Ther Ex 45'  Added Gluteal Mobilization w Ball x 20 ea R/L  S/L Clamshells x10 5\" H ea R/L  SKTC 2x20\" ea R/L   Bridges 2 x 10 5\" H   Added Alternating H/L Mini Marches w Core Stab 2'   Added Tandem Stance   Added SLS 2x15\" ea R/L   DLHR x10   DLTR x10   Standing Rows x10   Standing Ext x 10   Seated Scap Retraction x20 5\" H   Seated LAQ x10 5\" H ea R/L   STS x10  Ther Ex 40'  Gluteal Mobilization w Ball x 20 ea R/L  Added Open Books x10 5\" H ea R/L  S/L Clamshells x10 5\" H ea R/L  Alternating H/L Mini Marches w Core Stab 2'   DLHR x10   DLTR x10   Tandem Stance 2x15\" ea R/L  SLS 2x15\" ea R/L   Seated Scap Retraction x20 5\" H   Standing Rows x10 GTB 5\" H  Standing Ext x 10 YTB 5\" H  STS x 10     Manual: 10'  MyoBuddy: L Hip  STM w Ball/STM: R and L Gluteals  TrPR: R and L Glute Med                      HEP:  DL bridging, S/L clamshell, DLHR, and standing hip ABD and ext.     Charges: Ther Ex x 3       Total Timed Treatment: 40 min    Total Treatment Time: 40 min

## 2024-06-12 ENCOUNTER — OFFICE VISIT (OUTPATIENT)
Dept: PHYSICAL THERAPY | Age: 79
End: 2024-06-12
Attending: FAMILY MEDICINE
Payer: MEDICARE

## 2024-06-12 PROCEDURE — 97110 THERAPEUTIC EXERCISES: CPT

## 2024-06-12 PROCEDURE — 97140 MANUAL THERAPY 1/> REGIONS: CPT

## 2024-06-12 NOTE — PROGRESS NOTES
Date of service: 06/12/2024  Dx: Generalized weakness (R53.1), Decreased exercise tolerance (R68.89), History of recent hospitalization (Z92.89)       Insurance: [employer]  Insurance Limits: N/A  Visit #: 5  Authorized # of Visits: 8 recommended  POC/Auth Expiration: N/A  Authorizing Physician/Provider: Steven   Insurance Primary/Secondary: MEDICARE / [employer]         Subjective: The patient reports onset of neck and shoulder pain starting last night, no known TIARA.     Objective:     Pain/Symptom Presentation: Patient reports pain over the right neck and shoulder.   Pain at rest: 6-7/10  Pain at worst: 6-7/10   Pain at rest post-tx: 3-4/10    Assessment: Due to reports of increased right-sided neck and shoulder pain, we modified today's session focusing manual therapy on pain management of the neck and shoulder and strengthening on the core and lower body to avoid further exacerbation. The patient was able to complete all exercises as instructed with minimal need for breaks, he was only slightly dizzy upon sitting after lying supine on the treatment table. The patient was with improved pain management upon leaving his appt today. We will re-assess the patient next session and will re-integrate postural strengthening exercises as tolerated next session. Patient would also benefit from an updated HEP next session.     Goals: (total of 8 visits)  Short-Term Goals:  Patient will improve LE endurance to facilitate intermittent standing for at least 2 hour(s) daily for household chores and community integration. Timeframe: 3-4 weeks.  Long-Term Goals:  The patient will improve LE strength and endurance to facilitate walking distances of 2 mile(s) daily for community integration. Timeframe: 6-8 weeks.  Patient will improve LE mobility, strength, and single-leg stabilization to meet strength requirements for reciprocal stair navigation pattern with no asymmetries for ascending and descending 3 flights of stairs daily for household  and community ambulation. Timeframe: 8 weeks.  Patient will improve lower motor control to perform double-leg squat with symmetrical loading, without asymmetries, and with proper posterior chain loading to facilitate squatting and lifting ADL's. Timeframe: 8 weeks.    Plan: Re-assess neck and shoulder pain and re-integrate postural strengthening if improved. Continue to progress functional LE strength and endurance training for improved tolerance to ADL's. Update HEP next session.   Date: 6/3/2024  TX#: 2/8 Date: 06/05/2024          TX#: 3/8 Date: 06/10/2024      TX#: 4/8 Date: 6/12/24            TX#: 5/8 Date:   Tx#: 6/   Ther Ex: 30'  Bridges 2 x 10 5\" H   S/L Clamshells x10 5\" H ea R/L  Sidestepping 2x1'   (Seated RB)  Monster Walks 2x1'  Ther Ex 45'  Added Gluteal Mobilization w Ball x 20 ea R/L  S/L Clamshells x10 5\" H ea R/L  SKTC 2x20\" ea R/L   Bridges 2 x 10 5\" H   Added Alternating H/L Mini Marches w Core Stab 2'   Added Tandem Stance   Added SLS 2x15\" ea R/L   DLHR x10   DLTR x10   Standing Rows x10   Standing Ext x 10   Seated Scap Retraction x20 5\" H   Seated LAQ x10 5\" H ea R/L   STS x10  Ther Ex 40'  Gluteal Mobilization w Ball x 20 ea R/L  Added Open Books x10 5\" H ea R/L  S/L Clamshells x10 5\" H ea R/L  Alternating H/L Mini Marches w Core Stab 2'   DLHR x10   DLTR x10   Tandem Stance 2x15\" ea R/L  SLS 2x15\" ea R/L   Seated Scap Retraction x20 5\" H   Standing Rows x10 GTB 5\" H  Standing Ext x 10 YTB 5\" H  STS x 10 Therapeutic Exercise: x 30min  Manual SB upper trap stretch: 2 x 30\" (R)  DL bridging: 1 x 10   [place]yling PPT: 1 x 20 x 3\"   SLR: 1 x 10 (B)   Supine elastic band hip ABD: 2 x 10 (B), green theraband   LAQ: x 20 (B), 2# ankle weight   DLHR x10   DLTR x10   Standing marching: 1 x 10 (B)  Tandem Stance 2x15\" ea R/L  STS x 10    Manual: 10'  MyoBuddy: L Hip  STM w Ball/STM: R and L Gluteals  TrPR: R and L Glute Med    Manual Therapy: x 10min  Soft tissue mobilization (supine): (R) upper trap,  levator, cervical paraspinals  Soft tissue mobilization (left sidelying): (R) levator, mid and low trap, rhomboids   Cervical traction: 2 x 30\"                   HEP:  DL bridging, S/L clamshell, DLHR, and standing hip ABD and ext.     Charges: Therex: x 2. MT x 1      Total Timed Treatment: 40min    Total Treatment Time: 40min

## 2024-06-13 ENCOUNTER — PATIENT OUTREACH (OUTPATIENT)
Dept: CASE MANAGEMENT | Age: 79
End: 2024-06-13

## 2024-06-13 NOTE — PROGRESS NOTES
Called patient and left a message for patient to call back when they can. Reviewed patient chart. Left contact my contact number [phone]        Time Spent This Encounter Total: 5  min medical record review  Monthly Minute Total including today: 5 minutes

## 2024-06-15 ENCOUNTER — HOSPITAL ENCOUNTER (OUTPATIENT)
Dept: CT IMAGING | Age: 79
Discharge: HOME | End: 2024-06-15
Attending: NEUROLOGICAL SURGERY

## 2024-06-15 DIAGNOSIS — S06.5XAA SUBDURAL HEMATOMA (HCC): ICD-10-CM

## 2024-06-15 PROCEDURE — 70450 CT HEAD/BRAIN W/O DYE: CPT | Performed by: NEUROLOGICAL SURGERY

## 2024-06-15 NOTE — PROGRESS NOTES
Patient Seen in: [facility]      History     Chief Complaint   Patient presents with    Headache     Not really a headache but periodic facial pain around the upper jaw and sinus region on right side of face - Entered by patient    Pain     Stated Complaint: Headache - Not really a headache but periodic facial pain around the upper jaw *    Subjective:   HPI    34-year-old well-appearing female presents with right sided intermittent facial pain times at least 1 month.  Patient states she has seen her dentist couple of times thinking it might be a dental infection but each time had normal exam and findings.  Patient reports she gets an episode of pain specifically on the right side upper teeth.  Patient's not sure if it is dental in nature or sinus in nature.  Patient denies fever.  She denies any other symptoms.    Objective:   History reviewed. No pertinent past medical history.           History reviewed. No pertinent surgical history.             No pertinent social history.            Review of Systems    Positive for stated complaint: Headache - Not really a headache but periodic facial pain around the upper jaw *  Other systems are as noted in HPI.  Constitutional and vital signs reviewed.      All other systems reviewed and negative except as noted above.    Physical Exam     ED Triage Vitals [06/15/24 1246]   /71   Pulse 68   Resp 16   Temp 98.9 °F (37.2 °C)   Temp src Temporal   SpO2 100 %   O2 Device None (Room air)       Current Vitals:   Vital Signs  BP: 104/71  Pulse: 68  Resp: 16  Temp: 98.9 °F (37.2 °C)  Temp src: Temporal    Oxygen Therapy  SpO2: 100 %  O2 Device: None (Room air)            Physical Exam  Vitals reviewed.   Constitutional:       General: She is not in acute distress.     Appearance: She is not ill-appearing.   HENT:      Head: Normocephalic.      Right Ear: Tympanic membrane, ear canal and external ear normal.      Left Ear: Tympanic membrane, ear canal and  Called patient and left a message for patient to call back when they can. Reviewed patient chart.  Left contact my contact number [phone]        Time Spent This Encounter Total: 5  min medical record review  Monthly Minute Total including today: 5 justin external ear normal.      Nose: Nose normal. No congestion or rhinorrhea.      Comments: Neg tenderness to percuss over frontal or maxillary sinuses.       Mouth/Throat:      Mouth: Mucous membranes are moist.      Pharynx: No oropharyngeal exudate or posterior oropharyngeal erythema.      Comments: Dentition is WNL.  There is no tenderness to percuss R upper teeth.  There is no gingival swelling or erythema.   Cardiovascular:      Rate and Rhythm: Normal rate and regular rhythm.   Pulmonary:      Effort: Pulmonary effort is normal.      Breath sounds: Normal breath sounds.   Musculoskeletal:         General: Normal range of motion.   Skin:     General: Skin is warm and dry.   Neurological:      General: No focal deficit present.      Mental Status: She is alert and oriented to person, place, and time.   Psychiatric:         Mood and Affect: Mood normal.         Behavior: Behavior normal.               ED Course   Labs Reviewed - No data to display                   MDM                                         Medical Decision Making  Amount and/or Complexity of Data Reviewed  Radiology: ordered.     Details: Sinus CT reviewed by IC provider.  Shows         Disposition and Plan     Clinical Impression:  No diagnosis found.     Disposition:  There is no disposition on file for this visit.  There is no disposition time on file for this visit.    Follow-up:  No follow-up provider specified.        Medications Prescribed:  Current Discharge Medication List                                          deviating towards the left with a left nasal septal spur.    CRIBIFORM PLATE:       Intact. Olfactory fossa depth measured between fovea ethmoidalis, and medial lamella of cribriform plate is approximately 3 mm bilaterally.  MASTOIDS: Normal.     OTHER: Partly visualized lucent lesion in right C3 vertebral body extending into the posterior elements has some components of fat and probably represents a hemangioma.              =====  CONCLUSION:   1. No acute sinusitis.  Minimal chronic inflammatory disease in maxillary sinuses and left sphenoid sinus.  2. A partially visualized lucent lesion in the C3 vertebral body likely represents a benign hemangioma.  If patient has neck pain, further evaluation with MRI recommended.  1.         Dictated by (CST): Jose Yang MD on 6/15/2024 at 2:26 PM       Finalized by (CST): Jose Yang MD on 6/15/2024 at 2:32 PM                               MDM                                         Medical Decision Making  34-year-old female presents with face pain.  Differential diagnosis includes dental pain, dental abscess, sinusitis.  Patient has unremarkable exam.  There is no dental tenderness or swelling.  There is no tenderness to percuss over sinuses.  Sinus CT was done and shows no acute sinusitis.  Shows some mild chronic maxillary sinusitis and left sphenoid sinus.  Results were discussed with patient.  She was given printed results as well as printed and given she was also rec up with her dentist as well as her primary care doctor.    Amount and/or Complexity of Data Reviewed  Radiology: ordered.     Details: Sinus CT reviewed by IC provider.  Shows no acute sinusitis.  Mild chronic sinusitis maxillary sinuses and L sphenoid sinus.    Risk  OTC drugs.        Disposition and Plan     Clinical Impression:  1. Facial pain         Disposition:  Discharge  6/15/2024  2:38 pm    Follow-up:  No follow-up provider specified.        Medications Prescribed:  Discharge Medication  List as of 6/15/2024  2:38 PM

## 2024-06-18 ENCOUNTER — OFFICE VISIT (OUTPATIENT)
Dept: SURGERY | Facility: CLINIC | Age: 79
End: 2024-06-18

## 2024-06-18 VITALS
DIASTOLIC BLOOD PRESSURE: 76 MMHG | WEIGHT: 235 LBS | BODY MASS INDEX: 34.8 KG/M2 | HEIGHT: 69 IN | HEART RATE: 64 BPM | SYSTOLIC BLOOD PRESSURE: 130 MMHG

## 2024-06-18 DIAGNOSIS — I62.03 CHRONIC SUBDURAL HEMATOMA (HCC): Primary | ICD-10-CM

## 2024-06-18 DIAGNOSIS — M54.6 CHRONIC MIDLINE THORACIC BACK PAIN: ICD-10-CM

## 2024-06-18 DIAGNOSIS — M54.2 NECK PAIN: ICD-10-CM

## 2024-06-18 DIAGNOSIS — G89.29 CHRONIC MIDLINE THORACIC BACK PAIN: ICD-10-CM

## 2024-06-18 DIAGNOSIS — M54.50 LUMBAR PAIN: ICD-10-CM

## 2024-06-18 PROCEDURE — 99203 OFFICE O/P NEW LOW 30 MIN: CPT | Performed by: PHYSICIAN ASSISTANT

## 2024-06-18 NOTE — PROGRESS NOTES
Established patient:  Reason for follow up:   1 month hospital f/u-SDH    Numeric Rating Scale:        Pain at Present: 5/10- neck and R shoulder pain    New imaging or testing since your last office visit:    CT head DOS 06/15/24

## 2024-06-18 NOTE — PROGRESS NOTES
[facility]   Outpatient Neurological Surgery Follow Up    David Reyes  : 1945  PCP: Thea Harris DO  Referring Provider: No ref. provider found    REASON FOR VISIT:  Chief Complaint   Patient presents with    New Patient       SUBJECTIVE:  Jesica Reyes is a very pleasant 79 y/o male with ischemic cardiomyopathy who presents today for 1 month follow up on chronic subdural hematoma found on imaging during recent hospitalization for generalized weakness.  Patient states his chronic right sided headaches have been improving. He denies any new dizziness, falls, weakness, visual disturbances, nausea or vomiting. He does complain of continued fatigue.     Patient also complains of neck, mid back, and low back pain exacerbated by standing. He states the pain \"goes up and down the whole spine.\" It is relieved by rest and by walking.  He denies any radiation of the pain into his arms or legs. He reports occasional tingling in the toes. Denies difficulty with fine motor tasks, dropping items, or gait instability. He denies incontinence of bowel or bladder or perineal numbness.     He has been doing PT weekly which he feels is helping both the fatigue and back pain.     OBJECTIVE:  PHYSICAL EXAMINATION:  VITAL SIGNS: /76   Pulse 64   Ht 69\"   Wt 235 lb (106.6 kg)   BMI 34.70 kg/m²   GENERAL:  Very pleasant male sitting in exam chair in no acute distress  HEENT:  Normocephalic atraumatic   LUNGS:  Respirations easy and even   NEUROLOGICAL:  Awake, alert and orientated. Speech fluent, comprehension intact, answering questions appropriately. PERRLA. EOMI. Face symmetric. Tongue midline. Palate elevates symmetrically. Finger to nose coordination intact. No pronator drift.   SPINE:  Gait/Coordination: Gait deferred.   Sensation: Sensory deficits noted on bilateral lower extremities to light touch: None   Palpation: Tender to palpation over midline cervical spine. Non tender  to palpation of midline thoracic or lumbar spine. Mild tenderness to palpation of trapezius bilaterally.   Upper Extremity Strength:     Deltoid  Biceps  Triceps    Intrinsics      Right 5 5 5 5 5     Left 5 5 5 5 5     Lower Extremity Strength:     Iliopsoas  Hamstrings   Quads    D-flexion P-flexion Great Toe   Right       5         5       5         5 5 5   Left       5         5       5         5 5 5     DTR's 1+ and symmetric throughout.   No Calvert's. No clonus. Spurling test negative.       DIAGNOSTIC DATA:    IMAGING:  Narrative   PROCEDURE:  CT BRAIN OR HEAD ([id number])     COMPARISON:  [facility], CT, CT BRAIN (W+WO) ([id number]), 10/06/2015, 11:28 AM.  [facility] , CT, CT BRAIN OR HEAD ([id number]), 5/15/2024, 10:53 AM.     INDICATIONS:  S06.5XAA Subdural hematoma (HCC)     TECHNIQUE:  Noncontrast CT scanning is performed through the brain. Dose reduction techniques were used. Dose information is transmitted to the ACR (American College of Radiology) NRDR (National Radiology Data Registry) which includes the Dose Index  Registry.     PATIENT STATED HISTORY: (As transcribed by Technologist)  Patient states surveillance due to subdural hematoma. Patient states he had a bad headache and had a CT scan that showed s bleed. Patient states headaches have resolved.         FINDINGS:    VENTRICLES/SULCI:  Ventricles and sulci are normal in size.    INTRACRANIAL:  There are no abnormal extraaxial fluid collections.  There is no midline shift.  There are no intraparenchymal brain abnormalities.  There is nothing specific for acute infarct.  There is no hemorrhage or mass lesion.    SINUSES:           No sign of acute sinusitis.    MASTOIDS:          No sign of acute inflammation.  SKULL:             No evidence for fracture or osseous abnormality.  OTHER:             None.                   Impression   CONCLUSION:  No acute intracranial abnormality.  Previously suggested chronic subdural hematoma is not  identified on the current study.  The CT appearance is unchanged in comparison to 10/6/2015.  If there is continued clinical concern, MRI evaluation is   recommended.           LOCATION:  [facility]        Dictated by (CST): Carlos Gilbert MD on 6/15/2024 at 2:45 PM      Finalized by (CST): Carlos Gilbert MD on 6/15/2024 at 2:49 PM         ASSESSMENT:    ICD-10-CM    1. Chronic subdural hematoma (HCC)  I62.03       2. Neck Pain M54.2 Physiatry Referral - In Network      3. Chronic midline thoracic back pain  M54.6 Physiatry Referral - In Network    G89.29       4. Lumbar pain  M54.50 Physiatry Referral - In Network          PLAN:  Chronic subdural hematoma  CT head from 6/15/24 reviewed and compared to CT head from 5/15/24. Agree with radiology that previously noted subdural hematoma is not identified on the new study.   Patient may resume 81mg aspirin daily, if medically recommended  Patient is neurologically intact with improvement of chronic headaches. Advised continued follow up with PCP for fatigue.   Neck pain  Chronic midline thoracic back pain   Lumbar pain  No concern for radiculopathy or neurogenic claudication, strength is 5/5 throughout on exam. Etiology of back pain is likely musculoskeletal. Shared decision making with patient to continue PT for back pain; he will follow with physiatry if no improvement. Physiatry referral given.   5. Follow up: No further neurosurgical intervention is indicated. Patient may follow up as needed or call or follow up sooner or go to the ED if any new, worsening, or concerning symptoms develop       Total time spent 30 Minutes.  % of time spent educating: Greater than 50% spent on counseling, education, and coordination of care.    Dulce Maria Sotelo PA-C  6/18/202411:04 AM      ATTESTATION:   Patient was seen and examined with Hina Sotelo PA-C. I agree with the above.  Patient endorses neck pain that occasionally radiates down the spine.  Headaches are improving.  Continues to  endorse lethargy.  Encouraged to follow-up with his primary care physician as well as cardiology.  On examination 5/5 strength throughout, neurologically intact.  CT head with resolution of prior subdural hematoma.  Patient may resume ASA daily, if medically recommended.  Physiatry referral if no continued improvement of neck and back pain with physical therapy.  Patient may follow-up as needed.

## 2024-06-18 NOTE — PATIENT INSTRUCTIONS
Refill policies:    Allow 2-3 business days for refills; controlled substances may take longer.  Contact your pharmacy at least 5 days prior to running out of medication and have them send an electronic request or submit request through the “request refill” option in your [employer] account.  Refills are not addressed on weekends; covering physicians do not authorize routine medications on weekends.  No narcotics or controlled substances are refilled after noon on Fridays or by on call physicians.  By law, narcotics must be electronically prescribed.  A 30 day supply with no refills is the maximum allowed.  If your prescription is due for a refill, you may be due for a follow up appointment.  To best provide you care, patients receiving routine medications need to be seen at least once a year.  Patients receiving narcotic/controlled substance medications need to be seen at least once every 3 months.  In the event that your preferred pharmacy does not have the requested medication in stock (e.g. Backordered), it is your responsibility to find another pharmacy that has the requested medication available.  We will gladly send a new prescription to that pharmacy at your request.    Scheduling Tests:    If your physician has ordered radiology tests such as MRI or CT scans, please contact Central Scheduling at [phone] right away to schedule the test.  Once scheduled, the [facility] Centralized Referral Team will work with your insurance carrier to obtain pre-certification or prior authorization.  Depending on your insurance carrier, approval may take 3-10 days.  It is highly recommended patients assure they have received an authorization before having a test performed.  If test is done without insurance authorization, patient may be responsible for the entire amount billed.      Precertification and Prior Authorizations:  If your physician has recommended that you have a procedure or additional testing performed the [facility]  Centralized Referral Team will contact your insurance carrier to obtain pre-certification or prior authorization.    You are strongly encouraged to contact your insurance carrier to verify that your procedure/test has been approved and is a COVERED benefit.  Although the [facility] Centralized Referral Team does its due diligence, the insurance carrier gives the disclaimer that \"Although the procedure is authorized, this does not guarantee payment.\"    Ultimately the patient is responsible for payment.   Thank you for your understanding in this matter.  Paperwork Completion:  If you require FMLA or disability paperwork for your recovery, please make sure to either drop it off or have it faxed to our office at [phone]. Be sure the form has your name and date of birth on it.  The form will be faxed to our Forms Department and they will complete it for you.  There is a 25$ fee for all forms that need to be filled out.  Please be aware there is a 10-14 day turnaround time.  You will need to sign a release of information (IMANI) form if your paperwork does not come with one.  You may call the Forms Department with any questions at [phone].  Their fax number is [phone].

## 2024-06-18 NOTE — PROGRESS NOTES
Date of service: 06/19/2024  Dx: Generalized weakness (R53.1), Decreased exercise tolerance (R68.89), History of recent hospitalization (Z92.89)       Insurance: [employer]  Insurance Limits: N/A  Visit #: 6  Authorized # of Visits: 8 recommended  POC/Auth Expiration: N/A  Authorizing Physician/Provider: Steven   Insurance Primary/Secondary: MEDICARE / [employer]ARP         Subjective: The patient reports much improved neck and shoulder pain. He reports one sore spot in the shoulder blade area that we had worked on last session. The patient reports that his endurance, stamina, and strength are improving with physical therapy.     Objective:     Pain/Symptom Presentation:   Pain at rest: 3/10  Pain at worst: 5/10     Assessment: Jesica is with much improved neck pain since his last PT appt. We were able to add some additional postural strengthening exercises this date, which he tolerated well. The patient participated well in his PT sessions this date and was able to progress his strengthening exercises with only one rest break. The patient was with some difficulty leading with his left leg for step-up exercise. The patient The patient was issued an updated HEP to reflect recent progressions in exercise regimen and facilitate additional progression in mobility and strength.       Goals: (total of 8 visits)  Short-Term Goals:  Patient will improve LE endurance to facilitate intermittent standing for at least 2 hour(s) daily for household chores and community integration. Timeframe: 3-4 weeks.  Long-Term Goals:  The patient will improve LE strength and endurance to facilitate walking distances of 2 mile(s) daily for community integration. Timeframe: 6-8 weeks.  Patient will improve LE mobility, strength, and single-leg stabilization to meet strength requirements for reciprocal stair navigation pattern with no asymmetries for ascending and descending 3 flights of stairs daily for household and community ambulation. Timeframe: 8  weeks.  Patient will improve lower motor control to perform double-leg squat with symmetrical loading, without asymmetries, and with proper posterior chain loading to facilitate squatting and lifting ADL's. Timeframe: 8 weeks.    Plan:Follow-up on tolerance to updated HEP.   Date: 6/3/2024  TX#: 2/8 Date: 06/05/2024          TX#: 3/8 Date: 06/10/2024      TX#: 4/8 Date: 6/12/24            TX#: 5/8 Date: 6/19/24  Tx#: 6/8   Ther Ex: 30'  Bridges 2 x 10 5\" H   S/L Clamshells x10 5\" H ea R/L  Sidestepping 2x1'   (Seated RB)  Monster Walks 2x1'  Ther Ex 45'  Added Gluteal Mobilization w Ball x 20 ea R/L  S/L Clamshells x10 5\" H ea R/L  SKTC 2x20\" ea R/L   Bridges 2 x 10 5\" H   Added Alternating H/L Mini Marches w Core Stab 2'   Added Tandem Stance   Added SLS 2x15\" ea R/L   DLHR x10   DLTR x10   Standing Rows x10   Standing Ext x 10   Seated Scap Retraction x20 5\" H   Seated LAQ x10 5\" H ea R/L   STS x10  Ther Ex 40'  Gluteal Mobilization w Ball x 20 ea R/L  Added Open Books x10 5\" H ea R/L  S/L Clamshells x10 5\" H ea R/L  Alternating H/L Mini Marches w Core Stab 2'   DLHR x10   DLTR x10   Tandem Stance 2x15\" ea R/L  SLS 2x15\" ea R/L   Seated Scap Retraction x20 5\" H   Standing Rows x10 GTB 5\" H  Standing Ext x 10 YTB 5\" H  STS x 10 Therapeutic Exercise: x 30min  Manual SB upper trap stretch: 2 x 30\" (R)  DL bridging: 1 x 10   Hookyling PPT: 1 x 20 x 3\"   SLR: 1 x 10 (B)   Supine elastic band hip ABD: 2 x 10 (B), green theraband   LAQ: x 20 (B), 2# ankle weight   DLHR x10   DLTR x10   Standing marching: 1 x 10 (B)  Tandem Stance 2x15\" ea R/L  STS x 10 Therapeutic Exercise: x 33min  Manual SB upper trap stretch: 2 x 30\" (R)  DL bridging: 1 x 10   SLR: 1 x 10 (B)  S/L Clamshell: 2 x 10 (B)  LAQ: x 20 (B), 2# ankle weight   DLHR x 10   Standing marching: 1 x 10 (B)  Standing hip ABD: 2 x 10 (B)  Standing hip ext: 2 x 10 (B)   Squat: 1 x 10   Theratube low rows: 2 x 10 (B), green   Theratube (B) shoulder ext: 2 x 10 (B),  green   Step-up: 1 x 10 (B), 6\" step - more difficult leading left  Lateral step-up: 1 x 10 (R), 1 x 5 (L), 6\" step - fatigued   Neuromuscular Re-education: x 2min  Tandem Stance: 2 x 20\" (B)    Manual: 10'  MyoBuddy: L Hip  STM w Ball/STM: R and L Gluteals  TrPR: R and L Glute Med    Manual Therapy: x 10min  Soft tissue mobilization (supine): (R) upper trap, levator, cervical paraspinals  Soft tissue mobilization (left sidelying): (R) levator, mid and low trap, rhomboids   Cervical traction: 2 x 30\"  Manual Therapy: x 10min  Soft tissue mobilization (supine): (R) upper trap, levator, cervical paraspinals  Cervical traction: 2 x 30\"                  HEP:  DL bridging, SLR, S/L clamshell, LAQ, standing marching, standing hip ABD and ext, squat, and tandem stance balance.     Charges: Therex: x 2. MT x 1      Total Timed Treatment: 45min    Total Treatment Time: 45min

## 2024-06-19 ENCOUNTER — OFFICE VISIT (OUTPATIENT)
Dept: PHYSICAL THERAPY | Age: 79
End: 2024-06-19
Attending: FAMILY MEDICINE

## 2024-06-19 PROCEDURE — 97110 THERAPEUTIC EXERCISES: CPT

## 2024-06-19 PROCEDURE — 97140 MANUAL THERAPY 1/> REGIONS: CPT

## 2024-06-22 RX ORDER — SPIRONOLACTONE 25 MG/1
25 TABLET ORAL
Qty: 90 TABLET | Refills: 0 | Status: SHIPPED | OUTPATIENT
Start: 2024-06-22

## 2024-06-22 NOTE — TELEPHONE ENCOUNTER
Hypertension Medications Protocol [id number]/22/2024 05:55 AM   Protocol Details CMP or BMP in past 12 months    Last BP reading less than 140/90    In person appointment or virtual visit in the past 12 mos or appointment in next 3 mos    EGFRCR or GFRNAA > 50       LOV  /5/23/24    Last Refill   spironolactone 25 MG Oral Tab 90 tablet 0 3/19/2024       Labs 5/15/24     Last BP 90/48        Future Appointments   Date Time Provider Department Center   6/24/2024  1:00 PM Leena Brooks PTA [facility] [place]   7/3/2024  1:45 PM Leena Brooks PTA [facility] [place]   7/10/2024  1:45 PM Leena Brooks PTA [facility] [place]   7/17/2024  2:15 PM Rosalinda Zuniga, PT [facility] [place]   9/9/2024 10:00 AM Ivana Mitchell RN, [facility]   10/3/2024 11:15 AM Thea Harris DO [facility] [place]      /

## 2024-06-24 ENCOUNTER — OFFICE VISIT (OUTPATIENT)
Dept: PHYSICAL THERAPY | Age: 79
End: 2024-06-24
Attending: FAMILY MEDICINE

## 2024-06-24 PROCEDURE — 97110 THERAPEUTIC EXERCISES: CPT

## 2024-06-24 PROCEDURE — 97140 MANUAL THERAPY 1/> REGIONS: CPT

## 2024-06-24 RX ORDER — METFORMIN HYDROCHLORIDE 500 MG/1
1000 TABLET, EXTENDED RELEASE ORAL 2 TIMES DAILY WITH MEALS
Qty: 360 TABLET | Refills: 0 | Status: SHIPPED | OUTPATIENT
Start: 2024-06-24

## 2024-06-24 NOTE — TELEPHONE ENCOUNTER
Diabetes Medication Protocol [id number]/24/2024 10:04 AM   Protocol Details Last A1C < 7.5 and within past 6 months    In person appointment or virtual visit in the past 6 mos or appointment in next 3 mos    Microalbumin procedure in past 12 months or taking ACE/ARB    EGFRCR or GFRNAA > 50    GFR in the past 12 months           LOV 5/23/24     Last Refill   Medication Quantity Refills Start End   metFORMIN  MG Oral Tablet 24 Hr 360 tablet 0 1/30/2024        Labs 5/15/24     Future Appointments   Date Time Provider Department Center   6/24/2024  1:00 PM Leena Brooks PTA [facility] [place]   7/3/2024  1:45 PM Leena Brooks PTA [facility] [place]   7/10/2024  1:45 PM Leena Brooks PTA [facility] [place]   7/17/2024  2:15 PM Rosalinda Zuniga, PT [facility] [place]   9/9/2024 10:00 AM Ivana Mitchell RN, [facility]   10/3/2024 11:15 AM Thea Harris DO [facility]

## 2024-06-24 NOTE — PROGRESS NOTES
Date of service: 06/24/2024  Dx: Generalized weakness (R53.1), Decreased exercise tolerance (R68.89), History of recent hospitalization (Z92.89)       Insurance: [employer]  Insurance Limits: N/A  Visit #: 7  Authorized # of Visits: 8 recommended  POC/Auth Expiration: N/A  Authorizing Physician/Provider: Steven   Insurance Primary/Secondary: MEDICARE / [employer]         Subjective: Patient reports pain 1-2/10 today, max 4-5/10 since previous visit still.     Objective:     Pain/Symptom Presentation:   Pain at rest: 3/10  Pain at worst: 5/10     Assessment: Patient reports having fall over the weekend while walking in his garden, hitting his left hand and knee and has been bruised but otherwise reports pain has been better in shoulder/neck. Patient states he still has tension in shoulder blade but that has improved, addressed this with cupping and trigger point release techniques. Patient then instructed to continue postural strengthening and BLE strengthening for improved balance and walking.      Goals: (total of 8 visits)  Short-Term Goals:  Patient will improve LE endurance to facilitate intermittent standing for at least 2 hour(s) daily for household chores and community integration. Timeframe: 3-4 weeks.  Long-Term Goals:  The patient will improve LE strength and endurance to facilitate walking distances of 2 mile(s) daily for community integration. Timeframe: 6-8 weeks.  Patient will improve LE mobility, strength, and single-leg stabilization to meet strength requirements for reciprocal stair navigation pattern with no asymmetries for ascending and descending 3 flights of stairs daily for household and community ambulation. Timeframe: 8 weeks.  Patient will improve lower motor control to perform double-leg squat with symmetrical loading, without asymmetries, and with proper posterior chain loading to facilitate squatting and lifting ADL's. Timeframe: 8 weeks.    Plan:Follow-up on tolerance to updated HEP.   Date: 6/12/24             TX#: 5/8 Date: 6/19/24  Tx#: 6/8 Date: 6/24/24  Tx#: 7/8   Therapeutic Exercise: x 30min  Manual SB upper trap stretch: 2 x 30\" (R)  DL bridging: 1 x 10   Hookyling PPT: 1 x 20 x 3\"   SLR: 1 x 10 (B)   Supine elastic band hip ABD: 2 x 10 (B), green theraband   LAQ: x 20 (B), 2# ankle weight   DLHR x10   DLTR x10   Standing marching: 1 x 10 (B)  Tandem Stance 2x15\" ea R/L  STS x 10 Therapeutic Exercise: x 33min  Manual SB upper trap stretch: 2 x 30\" (R)  DL bridging: 1 x 10   SLR: 1 x 10 (B)  S/L Clamshell: 2 x 10 (B)  LAQ: x 20 (B), 2# ankle weight   DLHR x 10   Standing marching: 1 x 10 (B)  Standing hip ABD: 2 x 10 (B)  Standing hip ext: 2 x 10 (B)   Squat: 1 x 10   Theratube low rows: 2 x 10 (B), green   Theratube (B) shoulder ext: 2 x 10 (B), green   Step-up: 1 x 10 (B), 6\" step - more difficult leading left  Lateral step-up: 1 x 10 (R), 1 x 5 (L), 6\" step - fatigued   Neuromuscular Re-education: x 2min  Tandem Stance: 2 x 20\" (B)  Ther Ex: 30'   Seated Scap Retraction x 20 5\" H  DL bridging: 1 x 10 5\" H  SLR: 1 x 10 (B)  Added H/L Hip Abduction RTB x 15 ea R/L   Tandem Stance: 2 x 30\" (B)    Manual Therapy: x 10min  Soft tissue mobilization (supine): (R) upper trap, levator, cervical paraspinals  Soft tissue mobilization (left sidelying): (R) levator, mid and low trap, rhomboids   Cervical traction: 2 x 30\"  Manual Therapy: x 10min  Soft tissue mobilization (supine): (R) upper trap, levator, cervical paraspinals  Cervical traction: 2 x 30\"  Manual: 15'  (Prone)  STM: Cervical Paraspinals   Cupping: Periscapular Muscles (R)  TrPR: Rhomboids, Thoracic Paraspinals              HEP:  DL bridging, SLR, S/L clamshell, LAQ, standing marching, standing hip ABD and ext, squat, and tandem stance balance.     Charges: Therex: x 2. MT x 1        Total Timed Treatment: 45min    Total Treatment Time: 45min

## 2024-07-03 ENCOUNTER — OFFICE VISIT (OUTPATIENT)
Dept: PHYSICAL THERAPY | Age: 79
End: 2024-07-03
Attending: FAMILY MEDICINE
Payer: MEDICARE

## 2024-07-03 PROCEDURE — 97110 THERAPEUTIC EXERCISES: CPT

## 2024-07-03 PROCEDURE — 97140 MANUAL THERAPY 1/> REGIONS: CPT

## 2024-07-03 NOTE — PROGRESS NOTES
Date of service: 07/03/2024  Dx: Generalized weakness (R53.1), Decreased exercise tolerance (R68.89), History of recent hospitalization (Z92.89)       Insurance: [employer]  Insurance Limits: N/A  Visit #: 8  Authorized # of Visits: 8 recommended  POC/Auth Expiration: N/A  Authorizing Physician/Provider: Steven   Insurance Primary/Secondary: MEDICARE / [employer]         Subjective: Patient reports pain at rest is minimal, still irritable with as little as 5-10 minutes of standing and has to sit down for relief. Patient reports consistent spasm in upper right shoulder blade, and feels about 70-80% confident with balance at this time.     Objective:     Pain/Symptom Presentation:   Pain at rest: 1-2/10  Pain at worst: 8-9/10     Assessment: Patient continues to improve in pain management at rest, but irritable with even short durations of standing that result in upper back/neck tension that requires him to sit down. Patient treatment this day focused on continued tissue mobility of periscapular region, but more emphasis on scapular stabilization/endurance to better assist posture as well as low trap activation to reduce UT compensations. Patient would benefit from continued progression with posture strengthening, and some balance training to reduce risk of falls. Provided updated HEP this visit to continue balance/LE strength and added postural strengthening, plan to assess response next visit.    Goals: (total of 8 visits)  Short-Term Goals:  Patient will improve LE endurance to facilitate intermittent standing for at least 2 hour(s) daily for household chores and community integration. Timeframe: 3-4 weeks.  Long-Term Goals:  The patient will improve LE strength and endurance to facilitate walking distances of 2 mile(s) daily for community integration. Timeframe: 6-8 weeks.  Patient will improve LE mobility, strength, and single-leg stabilization to meet strength requirements for reciprocal stair navigation pattern with no  asymmetries for ascending and descending 3 flights of stairs daily for household and community ambulation. Timeframe: 8 weeks.  Patient will improve lower motor control to perform double-leg squat with symmetrical loading, without asymmetries, and with proper posterior chain loading to facilitate squatting and lifting ADL's. Timeframe: 8 weeks.    Plan:Follow-up on tolerance to updated HEP.   Date: 6/12/24            TX#: 5/10 Date: 6/19/24  Tx#: 6/10 Date: 6/24/24  Tx#: 7/10 Date: 07/03/24  Tx#: 8/10   Therapeutic Exercise: x 30min  Manual SB upper trap stretch: 2 x 30\" (R)  DL bridging: 1 x 10   Hookyling PPT: 1 x 20 x 3\"   SLR: 1 x 10 (B)   Supine elastic band hip ABD: 2 x 10 (B), green theraband   LAQ: x 20 (B), 2# ankle weight   DLHR x10   DLTR x10   Standing marching: 1 x 10 (B)  Tandem Stance 2x15\" ea R/L  STS x 10 Therapeutic Exercise: x 33min  Manual SB upper trap stretch: 2 x 30\" (R)  DL bridging: 1 x 10   SLR: 1 x 10 (B)  S/L Clamshell: 2 x 10 (B)  LAQ: x 20 (B), 2# ankle weight   DLHR x 10   Standing marching: 1 x 10 (B)  Standing hip ABD: 2 x 10 (B)  Standing hip ext: 2 x 10 (B)   Squat: 1 x 10   Theratube low rows: 2 x 10 (B), green   Theratube (B) shoulder ext: 2 x 10 (B), green   Step-up: 1 x 10 (B), 6\" step - more difficult leading left  Lateral step-up: 1 x 10 (R), 1 x 5 (L), 6\" step - fatigued   Neuromuscular Re-education: x 2min  Tandem Stance: 2 x 20\" (B)  Ther Ex: 30'   Seated Scap Retraction x 20 5\" H  DL bridging: 1 x 10 5\" H  SLR: 1 x 10 (B)  Added H/L Hip Abduction RTB x 15 ea R/L   Tandem Stance: 2 x 30\" (B)  Ther Ex: 30'   Seated Scap Retraction x 20 5\" H  Added Seated Shoulder ER w Scap Ret x 10 5\" H   Added Low Trap Cable Activation 2 x 10 4# ea R/L  Added H/L Shoulder Ext (R) Stab @ 90 Deg Shoulder Flexion (L) x10 RTB  Added H/L Shoulder Ext (L) Stab @ 90 Deg Shoulder Flexion (R ) x10 RTB  Tandem Stance: 2 x 30\" (B)   SLS 2x15\" ea R/L          Manual Therapy: x 10min  Soft tissue  mobilization (supine): (R) upper trap, levator, cervical paraspinals  Soft tissue mobilization (left sidelying): (R) levator, mid and low trap, rhomboids   Cervical traction: 2 x 30\"  Manual Therapy: x 10min  Soft tissue mobilization (supine): (R) upper trap, levator, cervical paraspinals  Cervical traction: 2 x 30\"  Manual: 15'  (Prone)  STM: Cervical Paraspinals   Cupping: Periscapular Muscles (R)  TrPR: Rhomboids, Thoracic Paraspinals  Manual: 10'   TrPR/Cupping: R Rhomboids                HEP:  DL bridging, SLR, S/L clamshell, LAQ, standing marching, standing hip ABD and ext, squat, and tandem stance balance.     Access Code: [email or web address]  URL: [email or web address]  Date: 07/03/2024  Prepared by: Leena Brooks    Exercises  - Standing March with Counter Support  - 1 x daily - 7 x weekly - 1 sets - 1 min hold  - Standing Tandem Balance with Counter Support  - 1 x daily - 7 x weekly - 3 sets - 30s hold  - Sit to Stand  - 2 x daily - 7 x weekly - 1 sets - 10 reps  - Seated Scapular Retraction  - 2-3 x daily - 7 x weekly - 1 sets - 10 reps - 5s hold  - Single Arm Shoulder Extension with Anchored Resistance  - 1 x daily - 7 x weekly - 2 sets - 10 reps  - Single Arm Shoulder Extension with Anchored Resistance (Mirrored)  - 1 x daily - 7 x weekly - 2 sets - 10 reps    Charges: Therex: x 2, MT x 1        Total Timed Treatment: 40 min    Total Treatment Time: 40 min

## 2024-07-10 ENCOUNTER — OFFICE VISIT (OUTPATIENT)
Dept: PHYSICAL THERAPY | Age: 79
End: 2024-07-10
Attending: FAMILY MEDICINE
Payer: MEDICARE

## 2024-07-10 PROCEDURE — 97110 THERAPEUTIC EXERCISES: CPT

## 2024-07-10 PROCEDURE — 97140 MANUAL THERAPY 1/> REGIONS: CPT

## 2024-07-10 NOTE — PROGRESS NOTES
Date of service: 07/11/2024  Dx: Generalized weakness (R53.1), Decreased exercise tolerance (R68.89), History of recent hospitalization (Z92.89)       Insurance: [employer]  Insurance Limits: N/A  Visit #: 9  Authorized # of Visits: 8 recommended  POC/Auth Expiration: N/A  Authorizing Physician/Provider: Steven   Insurance Primary/Secondary: MEDICARE / [employer]         Subjective: Patient reports pain/stiffness has still been present in the neck but does seem like less overall.    Objective:     Pain/Symptom Presentation:   Pain at rest: 1-2/10  Pain at worst: 5-6/10     Assessment: Patient reports less pain at worst since previous visit with updated HEP, rating 5-6/10 compared to 8-9/10. Patient care this day focused on continued thoracic mobility focus, though patient reports standing tolerance has not improved past 5-10 minutes. Patient has demonstrated overall improvement in postural endurance/strength progressions in last few visits, would benefit form reassessment next visit and consider continuing 1x/week for 2-3 more visits.     Goals: (total of 8 visits)  Short-Term Goals:  Patient will improve LE endurance to facilitate intermittent standing for at least 2 hour(s) daily for household chores and community integration. Timeframe: 3-4 weeks.  Long-Term Goals:  The patient will improve LE strength and endurance to facilitate walking distances of 2 mile(s) daily for community integration. Timeframe: 6-8 weeks.  Patient will improve LE mobility, strength, and single-leg stabilization to meet strength requirements for reciprocal stair navigation pattern with no asymmetries for ascending and descending 3 flights of stairs daily for household and community ambulation. Timeframe: 8 weeks.  Patient will improve lower motor control to perform double-leg squat with symmetrical loading, without asymmetries, and with proper posterior chain loading to facilitate squatting and lifting ADL's. Timeframe: 8 weeks.    Plan:Follow-up on  tolerance to updated HEP.   Date: 6/12/24            TX#: 5/10 Date: 6/19/24  Tx#: 6/10 Date: 6/24/24  Tx#: 7/10 Date: 07/03/24  Tx#: 8/10 Date: 07/10/24  Tx#: 9/10   Therapeutic Exercise: x 30min  Manual SB upper trap stretch: 2 x 30\" (R)  DL bridging: 1 x 10   Hookyling PPT: 1 x 20 x 3\"   SLR: 1 x 10 (B)   Supine elastic band hip ABD: 2 x 10 (B), green theraband   LAQ: x 20 (B), 2# ankle weight   DLHR x10   DLTR x10   Standing marching: 1 x 10 (B)  Tandem Stance 2x15\" ea R/L  STS x 10 Therapeutic Exercise: x 33min  Manual SB upper trap stretch: 2 x 30\" (R)  DL bridging: 1 x 10   SLR: 1 x 10 (B)  S/L Clamshell: 2 x 10 (B)  LAQ: x 20 (B), 2# ankle weight   DLHR x 10   Standing marching: 1 x 10 (B)  Standing hip ABD: 2 x 10 (B)  Standing hip ext: 2 x 10 (B)   Squat: 1 x 10   Theratube low rows: 2 x 10 (B), green   Theratube (B) shoulder ext: 2 x 10 (B), green   Step-up: 1 x 10 (B), 6\" step - more difficult leading left  Lateral step-up: 1 x 10 (R), 1 x 5 (L), 6\" step - fatigued   Neuromuscular Re-education: x 2min  Tandem Stance: 2 x 20\" (B)  Ther Ex: 30'   Seated Scap Retraction x 20 5\" H  DL bridging: 1 x 10 5\" H  SLR: 1 x 10 (B)  Added H/L Hip Abduction RTB x 15 ea R/L   Tandem Stance: 2 x 30\" (B)  Ther Ex: 30'   Seated Scap Retraction x 20 5\" H  Added Seated Shoulder ER w Scap Ret x 10 5\" H   Added Low Trap Cable Activation 2 x 10 4# ea R/L  Added H/L Shoulder Ext (R) Stab @ 90 Deg Shoulder Flexion (L) x10 RTB  Added H/L Shoulder Ext (L) Stab @ 90 Deg Shoulder Flexion (R ) x10 RTB  Tandem Stance: 2 x 30\" (B)   SLS 2x15\" ea R/L        Ther Ex: 33'  Added Open Books x 10 5\" H ea R/L   Seated Scap Retraction x 20 5\" H  Added Standing TB Rows GrTB x 10 5\" H   Added Standing Shoulder Ext GrTB x 10 5\" H   Added Cable LT Activation (UT on Stretch) x 10 ea R/L 2#        Manual Therapy: x 10min  Soft tissue mobilization (supine): (R) upper trap, levator, cervical paraspinals  Soft tissue mobilization (left sidelying): (R)  levator, mid and low trap, rhomboids   Cervical traction: 2 x 30\"  Manual Therapy: x 10min  Soft tissue mobilization (supine): (R) upper trap, levator, cervical paraspinals  Cervical traction: 2 x 30\"  Manual: 15'  (Prone)  STM: Cervical Paraspinals   Cupping: Periscapular Muscles (R)  TrPR: Rhomboids, Thoracic Paraspinals  Manual: 10'   TrPR/Cupping: R Rhomboids  Manual: 10'  Cupping: Periscap/Thoracic Paraspinals 6'                 HEP:  DL bridging, SLR, S/L clamshell, LAQ, standing marching, standing hip ABD and ext, squat, and tandem stance balance.     Access Code: [email or web address]  URL: [email or web address]  Date: 07/03/2024  Prepared by: Leena Brooks    Exercises  - Standing March with Counter Support  - 1 x daily - 7 x weekly - 1 sets - 1 min hold  - Standing Tandem Balance with Counter Support  - 1 x daily - 7 x weekly - 3 sets - 30s hold  - Sit to Stand  - 2 x daily - 7 x weekly - 1 sets - 10 reps  - Seated Scapular Retraction  - 2-3 x daily - 7 x weekly - 1 sets - 10 reps - 5s hold  - Single Arm Shoulder Extension with Anchored Resistance  - 1 x daily - 7 x weekly - 2 sets - 10 reps  - Single Arm Shoulder Extension with Anchored Resistance (Mirrored)  - 1 x daily - 7 x weekly - 2 sets - 10 reps    Charges: Therex: x 2, MT x 1        Total Timed Treatment: 43 min    Total Treatment Time: 43 min

## 2024-07-11 ENCOUNTER — PATIENT OUTREACH (OUTPATIENT)
Dept: CASE MANAGEMENT | Age: 79
End: 2024-07-11

## 2024-07-11 NOTE — PROGRESS NOTES
Called patient and left a message for patient to call back when they can. Reviewed patient chart. Left contact my contact number [phone]        Time Spent This Encounter Total: 5  min medical record review  Monthly Minute Total including today: 5 minutes

## 2024-07-16 NOTE — PROGRESS NOTES
PHYSICAL THERAPY RE-EVALUATION:      Date of service: 7/17/2024  Dx: Generalized weakness (R53.1), Decreased exercise tolerance (R68.89), History of recent hospitalization (Z92.89)       Insurance: [employer]  Insurance Limits: N/A  Visit #: 9  Authorized # of Visits: 8 recommended  POC/Auth Expiration: N/A  Authorizing Physician/Provider: Steven   Insurance Primary/Secondary: MEDICARE / AARP         Subjective: \"I'm better than when I started. I don't get the tightness and pain in my neck and shoulders as frequently or as intense.\" The patient reports that his endurance and stamina is \"a little bit better. I'm not as winded and stressed going up and down stairs. My biggest problem is when I'm bending over for more than just to pick something up, I get winded and tired very easily. If I'm doing yardwork and bending over to do stuff, my ability to do that for a long period of time if very limited.\"     Objective:     Pain/Symptom Presentation:   Pain at rest: 3/10  Pain at worst: 5-6/10     Outcomes Measure(s):   Modified Oswestry: 46% disability (improved from 54% disability)';     Activity Measures:  Sleeping: Mild Activity Limitation: Patient is able to sleep up to 7-9 hours a night, but has to get up to go to the bathroom. He is still taking pain meds before bed.   Sitting: Mild Activity Limitation: Patient is able to sit with minimal limitations, prefers to sit over standing.   Standing: Moderate Activity Limitation: Patient is able to stand up to 15 minutes at a time.   Walking: Moderate Activity Limitation: Patient is able to walk up to 0.25 miles.   Ascending Stairs: Moderate Activity Limitation: Patient navigates up the stairs with step-to gait pattern.    Descending Stairs: Moderate Activity Limitation: Patient navigates down the stairs with step-to gait pattern.   Stairs: Moderate Activity Limitation: Patient is only able to navigate 1-2 flights of stairs at a time.      Inspection/Observation: Patient  demonstrates sitting postural dysfunction with forward head posture, excessive neck protraction and thoracic kyphosis, anterior tilt and downward rotation of the scapula, internal rotation of shoulder, decreased scapular and abdominal tone, and reduced lumbar lordosis.       Strength/MMT:        Knee Motion Strength    Right Left   Knee Extension 4/5 4/5   Knee Flexion 4+/5 4/5   *indicates activity was associated with pain          Hip Motion Strength    Right Left   Hip Flexion 4-5 4/5   Hip Extension 4-/5 4-/5   Hip ABD 4-/5 4-/5   Hip ER 4+/5 4+/5   Hip IR  5/5 5/5   *indicates activity was associated with pain     Strength/MMT:        Ankle Motion Strength    Right Left   Ankle OKC DF 4+/5 4+/5   Ankle OKC PF 4/5 4/5   Ankle OKC INV/forefoot ADD 5/5 5/5   Ankle OKC EV/forefoot ABD 5/5 5/5   *indicates activity was associated with pain     Assessment: Jesica is a 78 year old male that presents to physical therapy evaluation s/p 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 The patient noticed progressive head, neck, and spine pain that has worsened over the course of the past 4-6 months. The patient was found to have a chronic subdural hematoma after recent hospitalization 5/13/24. The patient reported remaining head, neck, and spine pain and complains of generalized weakness and endurance deficits. Jesica has been seen for 9 sessions in physical therapy for treatment including manual therapy interventions for neck and back pain and postural mobility as well as exercises progression in postural strengthening as well as LE strengthening for improved endurance and strength. The patient reports less frequent and less intense neck and back pain since starting therapy. He is still overall grossly limited in endurance and stamina - only able to stand for 15 minutes at a time and walking short distances. The patient reports limited tolerance to yardwork and household chores. He is navigating stairs with a  step-to gait pattern due to left knee pain. The patient would benefit from continued therapy for further progression in postural mobility and strength but also spending more time working on LE strength and endurance for improved tolerance to household chores and community integration.     Goals:   Short-Term Goals:  Patient will improve LE endurance to facilitate intermittent standing for at least 2 hour(s) daily for household chores and community integration. Timeframe: 3-4 weeks. (MET 7/17/24)  Long-Term Goals:  The patient will improve LE strength and endurance to facilitate walking distances of 2 mile(s) daily for community integration. Timeframe: 13 visits. (IN PROGRESS 7/17/24. Updated goal timeframe.)  Patient will improve LE mobility, strength, and single-leg stabilization to meet strength requirements for reciprocal stair navigation pattern with no asymmetries for ascending and descending 3 flights of stairs daily for household and community ambulation. Timeframe: 13 visits. (IN PROGRESS 7/17/24. Updated goal timeframe.)  Patient will improve lower motor control to perform double-leg squat with symmetrical loading, without asymmetries, and with proper posterior chain loading to facilitate squatting and lifting ADL's. Timeframe: 13 visits. (IN PROGRESS 7/17/24. Updated goal timeframe.)    Plan: Patient will be seen for 1x/week for 3-4 weeks, for an additional 3-4 visits. We will re-evaluate the patient at that time in order to determine functional progress, evaluate short-term goal completion, and establish an updated plan of care. Possible treatment interventions will/may include: Therapeutic Activities, Therapeutic Exercise, Neuromuscular Re-education, Manual Therapy, Home Exercise Program Instruction, Patient Education, Self-Care/Home Management, Gait Training, and Modalities as needed.     Date: 6/12/24            TX#: 5/10 Date: 6/19/24  Tx#: 6/10 Date: 6/24/24  Tx#: 7/10 Date: 07/03/24  Tx#: 8/10 Date:  07/10/24  Tx#: 9/10   Therapeutic Exercise: x 30min  Manual SB upper trap stretch: 2 x 30\" (R)  DL bridging: 1 x 10   [place] PPT: 1 x 20 x 3\"   SLR: 1 x 10 (B)   Supine elastic band hip ABD: 2 x 10 (B), green theraband   LAQ: x 20 (B), 2# ankle weight   DLHR x10   DLTR x10   Standing marching: 1 x 10 (B)  Tandem Stance 2x15\" ea R/L  STS x 10 Therapeutic Exercise: x 33min  Manual SB upper trap stretch: 2 x 30\" (R)  DL bridging: 1 x 10   SLR: 1 x 10 (B)  S/L Clamshell: 2 x 10 (B)  LAQ: x 20 (B), 2# ankle weight   DLHR x 10   Standing marching: 1 x 10 (B)  Standing hip ABD: 2 x 10 (B)  Standing hip ext: 2 x 10 (B)   Squat: 1 x 10   Theratube low rows: 2 x 10 (B), green   Theratube (B) shoulder ext: 2 x 10 (B), green   Step-up: 1 x 10 (B), 6\" step - more difficult leading left  Lateral step-up: 1 x 10 (R), 1 x 5 (L), 6\" step - fatigued   Neuromuscular Re-education: x 2min  Tandem Stance: 2 x 20\" (B)  Ther Ex: 30'   Seated Scap Retraction x 20 5\" H  DL bridging: 1 x 10 5\" H  SLR: 1 x 10 (B)  Added H/L Hip Abduction RTB x 15 ea R/L   Tandem Stance: 2 x 30\" (B)  Ther Ex: 30'   Seated Scap Retraction x 20 5\" H  Added Seated Shoulder ER w Scap Ret x 10 5\" H   Added Low Trap Cable Activation 2 x 10 4# ea R/L  Added H/L Shoulder Ext (R) Stab @ 90 Deg Shoulder Flexion (L) x10 RTB  Added H/L Shoulder Ext (L) Stab @ 90 Deg Shoulder Flexion (R ) x10 RTB  Tandem Stance: 2 x 30\" (B)   SLS 2x15\" ea R/L        Ther Ex: x 30min    DL bridging: 2 x 10   S/L thoracic rotation: 1 x 10 (B)   H/L Shoulder Ext (R), Stab @ 90 Deg Shoulder Flexion (L): x10 RTB  H/L Shoulder Ext (L), Stab @ 90 Deg Shoulder Flexion (R): x10 RTB  Elastic band pull apart (supine): 2 x 10 (B), red theraband   Elastic band (B) low rows: 2 x 10 (B), green theratube  Elastic band (B) shoulder ext: 2 x 10 (B), green theratube    Elastic band standing hip ABD: 2 x 10 (B), red theraband  Elastic band standing hip ext: 2 x 10 (B), red theraband   Squat: 1 x 10         Manual Therapy: x 10min  Soft tissue mobilization (supine): (R) upper trap, levator, cervical paraspinals  Soft tissue mobilization (left sidelying): (R) levator, mid and low trap, rhomboids   Cervical traction: 2 x 30\"  Manual Therapy: x 10min  Soft tissue mobilization (supine): (R) upper trap, levator, cervical paraspinals  Cervical traction: 2 x 30\"  Manual: 15'  (Prone)  STM: Cervical Paraspinals   Cupping: Periscapular Muscles (R)  TrPR: Rhomboids, Thoracic Paraspinals  Manual: 10'   TrPR/Cupping: R Rhomboids  Manual: x 15min  Soft tissue mobilization (prone): (B) thoracic and lumbar paraspinals, low back musculature, QL, rhomboids, mid and low trap  Lumbar PA accessory joint mobs - L1-L5: Grade 3, 2 x 10\" each     Thoracic PA accessory joint mobs - T1-T12: Grade 3, 2 x 10\" each                 HEP:  DL bridging, SLR, S/L clamshell, LAQ, standing marching, standing hip ABD and ext, squat, and tandem stance balance.     Access Code: [email or web address]  URL: [email or web address]  Date: 07/03/2024  Prepared by: Leena Brooks    Exercises  - Standing March with Counter Support  - 1 x daily - 7 x weekly - 1 sets - 1 min hold  - Standing Tandem Balance with Counter Support  - 1 x daily - 7 x weekly - 3 sets - 30s hold  - Sit to Stand  - 2 x daily - 7 x weekly - 1 sets - 10 reps  - Seated Scapular Retraction  - 2-3 x daily - 7 x weekly - 1 sets - 10 reps - 5s hold  - Single Arm Shoulder Extension with Anchored Resistance  - 1 x daily - 7 x weekly - 2 sets - 10 reps  - Single Arm Shoulder Extension with Anchored Resistance (Mirrored)  - 1 x daily - 7 x weekly - 2 sets - 10 reps    Charges: Therex: x 2, MT x 1        Total Timed Treatment: 45min    Total Treatment Time: 45min

## 2024-07-17 ENCOUNTER — OFFICE VISIT (OUTPATIENT)
Dept: PHYSICAL THERAPY | Age: 79
End: 2024-07-17
Attending: FAMILY MEDICINE
Payer: MEDICARE

## 2024-07-17 PROCEDURE — 97110 THERAPEUTIC EXERCISES: CPT

## 2024-07-17 PROCEDURE — 97140 MANUAL THERAPY 1/> REGIONS: CPT

## 2024-07-26 ENCOUNTER — OFFICE VISIT (OUTPATIENT)
Dept: PHYSICAL THERAPY | Age: 79
End: 2024-07-26
Attending: FAMILY MEDICINE
Payer: MEDICARE

## 2024-07-26 PROCEDURE — 97140 MANUAL THERAPY 1/> REGIONS: CPT

## 2024-07-26 PROCEDURE — 97110 THERAPEUTIC EXERCISES: CPT

## 2024-07-26 NOTE — PROGRESS NOTES
Date of service: 7/26/2024  Dx: Generalized weakness (R53.1), Decreased exercise tolerance (R68.89), History of recent hospitalization (Z92.89)       Insurance: AARP  Insurance Limits: N/A  Visit #: 9  Authorized # of Visits: 8 recommended  POC/Auth Expiration: N/A  Authorizing Physician/Provider: Steven   Insurance Primary/Secondary: MEDICARE / AARP         Subjective: Patient reports he was fairly busy yesterday and noted pain in his neck/upper got up to 7-8/10 but otherwise is usually mild with normal activity    Objective:     Pain/Symptom Presentation:   Pain at rest: 3/10  Pain at worst: 7-8/10     Outcomes Measure(s):   Modified Oswestry: 46% disability (improved from 54% disability)';     Activity Measures:  Sleeping: Mild Activity Limitation: Patient is able to sleep up to 7-9 hours a night, but has to get up to go to the bathroom. He is still taking pain meds before bed.   Sitting: Mild Activity Limitation: Patient is able to sit with minimal limitations, prefers to sit over standing.   Standing: Moderate Activity Limitation: Patient is able to stand up to 15 minutes at a time.   Walking: Moderate Activity Limitation: Patient is able to walk up to 0.25 miles.   Ascending Stairs: Moderate Activity Limitation: Patient navigates up the stairs with step-to gait pattern.    Descending Stairs: Moderate Activity Limitation: Patient navigates down the stairs with step-to gait pattern.   Stairs: Moderate Activity Limitation: Patient is only able to navigate 1-2 flights of stairs at a time.      Inspection/Observation: Patient demonstrates sitting postural dysfunction with forward head posture, excessive neck protraction and thoracic kyphosis, anterior tilt and downward rotation of the scapula, internal rotation of shoulder, decreased scapular and abdominal tone, and reduced lumbar lordosis.       Strength/MMT:        Knee Motion Strength    Right Left   Knee Extension 4/5 4/5   Knee Flexion 4+/5 4/5   *indicates  activity was associated with pain          Hip Motion Strength    Right Left   Hip Flexion 4-5 4/5   Hip Extension 4-/5 4-/5   Hip ABD 4-/5 4-/5   Hip ER 4+/5 4+/5   Hip IR  5/5 5/5   *indicates activity was associated with pain     Strength/MMT:        Ankle Motion Strength    Right Left   Ankle OKC DF 4+/5 4+/5   Ankle OKC PF 4/5 4/5   Ankle OKC INV/forefoot ADD 5/5 5/5   Ankle OKC EV/forefoot ABD 5/5 5/5   *indicates activity was associated with pain     Assessment: Patient continues to report recurring upper back/neck pain, although has improved since beginning PT. Addressed this day with STM/MFR of lateral cervical muscles this day and added scalene stretches, as well as continued cupping to periscapular, lower cervical muscles. Patient continues to be limited in endurance/activity tolerance and postural endurance, would benefit from continued focus on this for remainder of care.     Goals:   Short-Term Goals:  Patient will improve LE endurance to facilitate intermittent standing for at least 2 hour(s) daily for household chores and community integration. Timeframe: 3-4 weeks. (MET 7/17/24)  Long-Term Goals:  The patient will improve LE strength and endurance to facilitate walking distances of 2 mile(s) daily for community integration. Timeframe: 13 visits. (IN PROGRESS 7/17/24. Updated goal timeframe.)  Patient will improve LE mobility, strength, and single-leg stabilization to meet strength requirements for reciprocal stair navigation pattern with no asymmetries for ascending and descending 3 flights of stairs daily for household and community ambulation. Timeframe: 13 visits. (IN PROGRESS 7/17/24. Updated goal timeframe.)  Patient will improve lower motor control to perform double-leg squat with symmetrical loading, without asymmetries, and with proper posterior chain loading to facilitate squatting and lifting ADL's. Timeframe: 13 visits. (IN PROGRESS 7/17/24. Updated goal timeframe.)    Plan: Patient will  be seen for 1x/week for 3-4 weeks, for an additional 3-4 visits. We will re-evaluate the patient at that time in order to determine functional progress, evaluate short-term goal completion, and establish an updated plan of care. Possible treatment interventions will/may include: Therapeutic Activities, Therapeutic Exercise, Neuromuscular Re-education, Manual Therapy, Home Exercise Program Instruction, Patient Education, Self-Care/Home Management, Gait Training, and Modalities as needed.     Date: 24  Tx#: 7/10 Date: 24  Tx#: 8/10 Date: 07/10/24  Tx#: 9/10 Date: 24  Tx#: 9/10   Ther Ex: 30'   Seated Scap Retraction x 20 5\" H  DL bridging: 1 x 10 5\" H  SLR: 1 x 10 (B)  Added H/L Hip Abduction RTB x 15 ea R/L   Tandem Stance: 2 x 30\" (B)  Ther Ex: 30'   Seated Scap Retraction x 20 5\" H  Added Seated Shoulder ER w Scap Ret x 10 5\" H   Added Low Trap Cable Activation 2 x 10 4# ea R/L  Added H/L Shoulder Ext (R) Stab @ 90 Deg Shoulder Flexion (L) x10 RTB  Added H/L Shoulder Ext (L) Stab @ 90 Deg Shoulder Flexion (R ) x10 RTB  Tandem Stance: 2 x 30\" (B)   SLS 2x15\" ea R/L        Ther Ex: x 30min    DL bridging: 2 x 10   S/L thoracic rotation: 1 x 10 (B)   H/L Shoulder Ext (R), Stab @ 90 Deg Shoulder Flexion (L): x10 RTB  H/L Shoulder Ext (L), Stab @ 90 Deg Shoulder Flexion (R): x10 RTB  Elastic band pull apart (supine): 2 x 10 (B), red theraband   Elastic band (B) low rows: 2 x 10 (B), green theratube  Elastic band (B) shoulder ext: 2 x 10 (B), green theratube    Elastic band standing hip ABD: 2 x 10 (B), red theraband  Elastic band standing hip ext: 2 x 10 (B), red theraband   Squat: 1 x 10      Ther Ex: 30'  Open Books x 10 5\" H ea R/L  Added Scalene Stretch 5x30\" (ea R/L without alternating)  H/L Shoulder Ext (R), Stab @ 90 Deg Shoulder Flexion (L): x10 RTB  H/L Shoulder Ext (L), Stab @ 90 Deg Shoulder Flexion (R): x10 RTB  Elastic band pull apart (supine): 2 x 10 (B), red theraband   DL bridgin x  10     Manual: 15'  (Prone)  STM: Cervical Paraspinals   Cupping: Periscapular Muscles (R)  TrPR: Rhomboids, Thoracic Paraspinals  Manual: 10'   TrPR/Cupping: R Rhomboids  Manual: x 15min  Soft tissue mobilization (prone): (B) thoracic and lumbar paraspinals, low back musculature, QL, rhomboids, mid and low trap  Lumbar PA accessory joint mobs - L1-L5: Grade 3, 2 x 10\" each     Thoracic PA accessory joint mobs - T1-T12: Grade 3, 2 x 10\" each Manual: 10'  (Prone) Cupping: Periscapular (B) Paraspinals 6'  STM: R Scalenes                 HEP:  DL bridging, SLR, S/L clamshell, LAQ, standing marching, standing hip ABD and ext, squat, and tandem stance balance.     Access Code: [email or web address]  URL: [email or web address]  Date: 07/03/2024  Prepared by: Leena Brooks    Exercises  - Standing March with Counter Support  - 1 x daily - 7 x weekly - 1 sets - 1 min hold  - Standing Tandem Balance with Counter Support  - 1 x daily - 7 x weekly - 3 sets - 30s hold  - Sit to Stand  - 2 x daily - 7 x weekly - 1 sets - 10 reps  - Seated Scapular Retraction  - 2-3 x daily - 7 x weekly - 1 sets - 10 reps - 5s hold  - Single Arm Shoulder Extension with Anchored Resistance  - 1 x daily - 7 x weekly - 2 sets - 10 reps  - Single Arm Shoulder Extension with Anchored Resistance (Mirrored)  - 1 x daily - 7 x weekly - 2 sets - 10 reps    Charges: Therex: x 2, MT x 1        Total Timed Treatment: 40 min    Total Treatment Time: 40 min

## 2024-07-31 NOTE — PROGRESS NOTES
Date of service: 08/01/2024  Dx: Generalized weakness (R53.1), Decreased exercise tolerance (R68.89), History of recent hospitalization (Z92.89)       Insurance: AARP  Insurance Limits: N/A  Visit #: 10  Authorized # of Visits: 8 recommended  POC/Auth Expiration: N/A  Authorizing Physician/Provider: Steven   Insurance Primary/Secondary: MEDICARE / AARP         Subjective: Patient reports pain in his neck/upper back has been better since previous visit and reports better mobility with turning his head, looking up, down.    Objective:     Pain/Symptom Presentation:   Pain at rest: 2-3/10  Pain at worst: 4-5/10     Outcomes Measure(s):   Modified Oswestry: 46% disability (improved from 54% disability)';       Assessment: Patient presents to PT with day reporting improved symptoms of cervical and thoracic area, addressed remaining tension and discomfort with manual and stretching, continued scapular strengthening. Patient also instructed through pec stretching in doorway following trigger point release, would benefit from continuing this with lower pectorals next visit. Patient also /painful tricep/posterior shoulder per subjective reports ending session this day.    Goals:   Short-Term Goals:  Patient will improve LE endurance to facilitate intermittent standing for at least 2 hour(s) daily for household chores and community integration. Timeframe: 3-4 weeks. (MET 7/17/24)  Long-Term Goals:  The patient will improve LE strength and endurance to facilitate walking distances of 2 mile(s) daily for community integration. Timeframe: 13 visits. (IN PROGRESS 7/17/24. Updated goal timeframe.)  Patient will improve LE mobility, strength, and single-leg stabilization to meet strength requirements for reciprocal stair navigation pattern with no asymmetries for ascending and descending 3 flights of stairs daily for household and community ambulation. Timeframe: 13 visits. (IN PROGRESS 7/17/24. Updated goal  timeframe.)  Patient will improve lower motor control to perform double-leg squat with symmetrical loading, without asymmetries, and with proper posterior chain loading to facilitate squatting and lifting ADL's. Timeframe: 13 visits. (IN PROGRESS 7/17/24. Updated goal timeframe.)    Plan: Patient will be seen for 1x/week for 3-4 weeks, for an additional 3-4 visits. We will re-evaluate the patient at that time in order to determine functional progress, evaluate short-term goal completion, and establish an updated plan of care. Possible treatment interventions will/may include: Therapeutic Activities, Therapeutic Exercise, Neuromuscular Re-education, Manual Therapy, Home Exercise Program Instruction, Patient Education, Self-Care/Home Management, Gait Training, and Modalities as needed.     Date: 6/24/24  Tx#: 7/10 Date: 07/03/24  Tx#: 8/10 Date: 07/10/24  Tx#: 9/10 Date: 07/26/24  Tx#: 9/10 Date: 08/01/24  Tx#: 10/14   Ther Ex: 30'   Seated Scap Retraction x 20 5\" H  DL bridging: 1 x 10 5\" H  SLR: 1 x 10 (B)  Added H/L Hip Abduction RTB x 15 ea R/L   Tandem Stance: 2 x 30\" (B)  Ther Ex: 30'   Seated Scap Retraction x 20 5\" H  Added Seated Shoulder ER w Scap Ret x 10 5\" H   Added Low Trap Cable Activation 2 x 10 4# ea R/L  Added H/L Shoulder Ext (R) Stab @ 90 Deg Shoulder Flexion (L) x10 RTB  Added H/L Shoulder Ext (L) Stab @ 90 Deg Shoulder Flexion (R ) x10 RTB  Tandem Stance: 2 x 30\" (B)   SLS 2x15\" ea R/L        Ther Ex: x 30min    DL bridging: 2 x 10   S/L thoracic rotation: 1 x 10 (B)   H/L Shoulder Ext (R), Stab @ 90 Deg Shoulder Flexion (L): x10 RTB  H/L Shoulder Ext (L), Stab @ 90 Deg Shoulder Flexion (R): x10 RTB  Elastic band pull apart (supine): 2 x 10 (B), red theraband   Elastic band (B) low rows: 2 x 10 (B), green theratube  Elastic band (B) shoulder ext: 2 x 10 (B), green theratube    Elastic band standing hip ABD: 2 x 10 (B), red theraband  Elastic band standing hip ext: 2 x 10 (B), red theraband    Squat: 1 x 10      Ther Ex: 30'  Open Books x 10 5\" H ea R/L  Added Scalene Stretch 5x30\" (ea R/L without alternating)  H/L Shoulder Ext (R), Stab @ 90 Deg Shoulder Flexion (L): x10 RTB  H/L Shoulder Ext (L), Stab @ 90 Deg Shoulder Flexion (R): x10 RTB  Elastic band pull apart (supine): 2 x 10 (B), red theraband   DL bridging: 2 x 10   Ther Ex: 30'  Open Books x 10 5\" H ea R/L  Added 90 Deg Pec Stretch (R) in Doorway 5x20\"   Scalene Stretch 5x30\" (ea R/L without alternating)  H/L Shoulder Ext (R), Stab @ 90 Deg Shoulder Flexion (L): x10 RTB  H/L Shoulder Ext (L), Stab @ 90 Deg Shoulder Flexion (R): x10 RTB  Elastic band pull apart (supine): 2 x 10 (B), red theraband      Manual: 15'  (Prone)  STM: Cervical Paraspinals   Cupping: Periscapular Muscles (R)  TrPR: Rhomboids, Thoracic Paraspinals  Manual: 10'   TrPR/Cupping: R Rhomboids  Manual: x 15min  Soft tissue mobilization (prone): (B) thoracic and lumbar paraspinals, low back musculature, QL, rhomboids, mid and low trap  Lumbar PA accessory joint mobs - L1-L5: Grade 3, 2 x 10\" each     Thoracic PA accessory joint mobs - T1-T12: Grade 3, 2 x 10\" each Manual: 10'  (Prone) Cupping: Periscapular (B) Paraspinals 6'  STM: R Scalenes   Manual: 10'  STM: Scalenes, (R) Pectorals, UT  TrPR: Pectorals, Scalenes                 HEP:  DL bridging, SLR, S/L clamshell, LAQ, standing marching, standing hip ABD and ext, squat, and tandem stance balance.     Access Code: [email or web address]  URL: [email or web address]  Date: 07/03/2024  Prepared by: Leena Brooks    Exercises  - Standing March with Counter Support  - 1 x daily - 7 x weekly - 1 sets - 1 min hold  - Standing Tandem Balance with Counter Support  - 1 x daily - 7 x weekly - 3 sets - 30s hold  - Sit to Stand  - 2 x daily - 7 x weekly - 1 sets - 10 reps  - Seated Scapular Retraction  - 2-3 x daily - 7 x weekly - 1 sets - 10 reps - 5s hold  - Single Arm Shoulder Extension with Anchored Resistance  - 1 x daily - 7  x weekly - 2 sets - 10 reps  - Single Arm Shoulder Extension with Anchored Resistance (Mirrored)  - 1 x daily - 7 x weekly - 2 sets - 10 reps    Charges: Therex: x 2, MT x 1        Total Timed Treatment: 40 min    Total Treatment Time: 40 min

## 2024-08-01 ENCOUNTER — OFFICE VISIT (OUTPATIENT)
Dept: PHYSICAL THERAPY | Age: 79
End: 2024-08-01
Attending: FAMILY MEDICINE
Payer: MEDICARE

## 2024-08-01 PROCEDURE — 97140 MANUAL THERAPY 1/> REGIONS: CPT

## 2024-08-01 PROCEDURE — 97110 THERAPEUTIC EXERCISES: CPT

## 2024-08-06 NOTE — PROGRESS NOTES
Date of service: 08/08/2024  Dx: Generalized weakness (R53.1), Decreased exercise tolerance (R68.89), History of recent hospitalization (Z92.89)       Insurance: [employer]  Insurance Limits: N/A  Visit #: 11  Authorized # of Visits: 8 recommended  POC/Auth Expiration: N/A  Authorizing Physician/Provider: Steven   Insurance Primary/Secondary: MEDICARE / AARP         Subjective: Patient reports standing/walking tolerance is about 10-15 minutes before significant onset of symptoms in his shoulders/neck/upper back.     Objective:     Pain/Symptom Presentation:   Pain at rest: 1-2/10  Pain at worst: 7/10, after long period of time on feet    Outcomes Measure(s):   Modified Oswestry: 46% disability (improved from 54% disability)';       Assessment: Patient continues to report lower pain levels at rest, but still moderate with activity and limited tolerance to walking/standing still present. Patient reports main complaints have been recurring shoulder/neck pain, but upper back pain has been better. Addressed this with continued cervical manual and added chin tucks this day. Patient then continued scapular activation/strengthening, still having UT pain that suggests continuous shoulder hiking. Patient would benefit from more scapular depression focus in future visits, and continue postural training for cervical spine.    Goals:   Short-Term Goals:  Patient will improve LE endurance to facilitate intermittent standing for at least 2 hour(s) daily for household chores and community integration. Timeframe: 3-4 weeks. (MET 7/17/24)  Long-Term Goals:  The patient will improve LE strength and endurance to facilitate walking distances of 2 mile(s) daily for community integration. Timeframe: 13 visits. (IN PROGRESS 7/17/24. Updated goal timeframe.)  Patient will improve LE mobility, strength, and single-leg stabilization to meet strength requirements for reciprocal stair navigation pattern with no asymmetries for ascending and  descending 3 flights of stairs daily for household and community ambulation. Timeframe: 13 visits. (IN PROGRESS 7/17/24. Updated goal timeframe.)  Patient will improve lower motor control to perform double-leg squat with symmetrical loading, without asymmetries, and with proper posterior chain loading to facilitate squatting and lifting ADL's. Timeframe: 13 visits. (IN PROGRESS 7/17/24. Updated goal timeframe.)    Plan: Patient will be seen for 1x/week for 3-4 weeks, for an additional 3-4 visits. We will re-evaluate the patient at that time in order to determine functional progress, evaluate short-term goal completion, and establish an updated plan of care. Possible treatment interventions will/may include: Therapeutic Activities, Therapeutic Exercise, Neuromuscular Re-education, Manual Therapy, Home Exercise Program Instruction, Patient Education, Self-Care/Home Management, Gait Training, and Modalities as needed.     Date: 07/10/24  Tx#: 9/10 Date: 07/26/24  Tx#: 9/10 Date: 08/01/24  Tx#: 10/14 Date: 08/08/24  Tx#: 11/14   Ther Ex: x 30min    DL bridging: 2 x 10   S/L thoracic rotation: 1 x 10 (B)   H/L Shoulder Ext (R), Stab @ 90 Deg Shoulder Flexion (L): x10 RTB  H/L Shoulder Ext (L), Stab @ 90 Deg Shoulder Flexion (R): x10 RTB  Elastic band pull apart (supine): 2 x 10 (B), red theraband   Elastic band (B) low rows: 2 x 10 (B), green theratube  Elastic band (B) shoulder ext: 2 x 10 (B), green theratube    Elastic band standing hip ABD: 2 x 10 (B), red theraband  Elastic band standing hip ext: 2 x 10 (B), red theraband   Squat: 1 x 10      Ther Ex: 30'  Open Books x 10 5\" H ea R/L  Added Scalene Stretch 5x30\" (ea R/L without alternating)  H/L Shoulder Ext (R), Stab @ 90 Deg Shoulder Flexion (L): x10 RTB  H/L Shoulder Ext (L), Stab @ 90 Deg Shoulder Flexion (R): x10 RTB  Elastic band pull apart (supine): 2 x 10 (B), red theraband   DL bridging: 2 x 10   Ther Ex: 30'  Open Books x 10 5\" H ea R/L  Added 90 Deg Pec  Stretch (R) in Doorway 5x20\"   Scalene Stretch 5x30\" (ea R/L without alternating)  H/L Shoulder Ext (R), Stab @ 90 Deg Shoulder Flexion (L): x10 RTB  H/L Shoulder Ext (L), Stab @ 90 Deg Shoulder Flexion (R): x10 RTB  Elastic band pull apart (supine): 2 x 10 (B), red theraband    Ther Ex:  25'  Added Supine Chin Tucks x 20 5\" H   Added H/L Alternating Shoulder Flexion/Ext x 15 ea R/L   Added H/L Shoulder HA OH x 15   Elastic band pull apart (supine): 2 x 10 (B), red theraband   H/L Shoulder Ext (R), Stab @ 90 Deg Shoulder Flexion (L): 2 x 10 RTB  H/L Shoulder Ext (L), Stab @ 90 Deg Shoulder Flexion (R): 2 x 10 RTB  Open Books x 10 5\" H ea R/L   Manual: x 15min  Soft tissue mobilization (prone): (B) thoracic and lumbar paraspinals, low back musculature, QL, rhomboids, mid and low trap  Lumbar PA accessory joint mobs - L1-L5: Grade 3, 2 x 10\" each     Thoracic PA accessory joint mobs - T1-T12: Grade 3, 2 x 10\" each Manual: 10'  (Prone) Cupping: Periscapular (B) Paraspinals 6'  STM: R Scalenes   Manual: 10'  STM: Scalenes, (R) Pectorals, UT  TrPR: Pectorals, Scalenes Manual: 15'  STM: Scalenes, (R) Pectorals, UT  TrPR: Pectorals, Scalenes  Cervical Distraction 2'                  HEP:  DL bridging, SLR, S/L clamshell, LAQ, standing marching, standing hip ABD and ext, squat, and tandem stance balance.     Access Code: [email or web address]  URL: [email or web address]  Date: 07/03/2024  Prepared by: Leena Brooks    Exercises  - Standing March with Counter Support  - 1 x daily - 7 x weekly - 1 sets - 1 min hold  - Standing Tandem Balance with Counter Support  - 1 x daily - 7 x weekly - 3 sets - 30s hold  - Sit to Stand  - 2 x daily - 7 x weekly - 1 sets - 10 reps  - Seated Scapular Retraction  - 2-3 x daily - 7 x weekly - 1 sets - 10 reps - 5s hold  - Single Arm Shoulder Extension with Anchored Resistance  - 1 x daily - 7 x weekly - 2 sets - 10 reps  - Single Arm Shoulder Extension with Anchored Resistance  (Mirrored)  - 1 x daily - 7 x weekly - 2 sets - 10 reps    Charges: Therex: x 2, MT x 1        Total Timed Treatment: 40 min    Total Treatment Time: 40 min

## 2024-08-08 ENCOUNTER — OFFICE VISIT (OUTPATIENT)
Dept: PHYSICAL THERAPY | Age: 79
End: 2024-08-08
Attending: FAMILY MEDICINE
Payer: MEDICARE

## 2024-08-08 PROCEDURE — 97140 MANUAL THERAPY 1/> REGIONS: CPT

## 2024-08-08 PROCEDURE — 97110 THERAPEUTIC EXERCISES: CPT

## 2024-08-15 ENCOUNTER — PATIENT OUTREACH (OUTPATIENT)
Dept: CASE MANAGEMENT | Age: 79
End: 2024-08-15

## 2024-08-15 NOTE — PROGRESS NOTES
Date of service: 08/16/2024  Dx: Generalized weakness (R53.1), Decreased exercise tolerance (R68.89), History of recent hospitalization (Z92.89)       Insurance: [employer]  Insurance Limits: N/A  Visit #: 12  Authorized # of Visits: 8 recommended  POC/Auth Expiration: N/A  Authorizing Physician/Provider: Steven   Insurance Primary/Secondary: MEDICARE / AARP         Subjective: Patient reports he was feeling good and challenged last visit, but while being busy yesterday up on his feet a lot, reports pain got up to 8-9/10 until he rested. Patient reports overall higher levels of pain are less frequent. Patient reports lower neck pain is better than last week, seems to be more in outer shoulder, shoulder blades, and chest mostly. Patient states neck pain is still there at times and can get severe with continuous activity, but says this isn't often.    Objective:     Pain/Symptom Presentation:   Pain at rest: 1/10  Pain at worst: 8-9/10, but short term and only once     Outcomes Measure(s):   Modified Oswestry: 46% disability (improved from 54% disability)';       Assessment: Patient care this day focused on more lower trap activation/isolation to reduce stress on UT and Levator, and continued tissue mobility of anterior shoulder with gentle manual. Patient would benefit from reassessment next visit with supervising PT, and progress postural strengthening as tolerated. Plan to begin more core stabilization focus in future visits as well for better support of lower back as well.    Goals:   Short-Term Goals:  Patient will improve LE endurance to facilitate intermittent standing for at least 2 hour(s) daily for household chores and community integration. Timeframe: 3-4 weeks. (MET 7/17/24)  Long-Term Goals:  The patient will improve LE strength and endurance to facilitate walking distances of 2 mile(s) daily for community integration. Timeframe: 13 visits. (IN PROGRESS 7/17/24. Updated goal timeframe.)  Patient will improve LE  mobility, strength, and single-leg stabilization to meet strength requirements for reciprocal stair navigation pattern with no asymmetries for ascending and descending 3 flights of stairs daily for household and community ambulation. Timeframe: 13 visits. (IN PROGRESS 7/17/24. Updated goal timeframe.)  Patient will improve lower motor control to perform double-leg squat with symmetrical loading, without asymmetries, and with proper posterior chain loading to facilitate squatting and lifting ADL's. Timeframe: 13 visits. (IN PROGRESS 7/17/24. Updated goal timeframe.)    Plan: Patient will be seen for 1x/week for 3-4 weeks, for an additional 3-4 visits. We will re-evaluate the patient at that time in order to determine functional progress, evaluate short-term goal completion, and establish an updated plan of care. Possible treatment interventions will/may include: Therapeutic Activities, Therapeutic Exercise, Neuromuscular Re-education, Manual Therapy, Home Exercise Program Instruction, Patient Education, Self-Care/Home Management, Gait Training, and Modalities as needed.     Date: 08/01/24  Tx#: 11/14 Date: 08/08/24  Tx#: 12/14 Date: 08/16/24  Tx#: 13/14   Ther Ex: 30'  Open Books x 10 5\" H ea R/L  Added 90 Deg Pec Stretch (R) in Doorway 5x20\"   Scalene Stretch 5x30\" (ea R/L without alternating)  H/L Shoulder Ext (R), Stab @ 90 Deg Shoulder Flexion (L): x10 RTB  H/L Shoulder Ext (L), Stab @ 90 Deg Shoulder Flexion (R): x10 RTB  Elastic band pull apart (supine): 2 x 10 (B), red theraband    Ther Ex:  25'  Added Supine Chin Tucks x 20 5\" H   Added H/L Alternating Shoulder Flexion/Ext x 15 ea R/L   Added H/L Shoulder HA OH x 15   Elastic band pull apart (supine): 2 x 10 (B), red theraband   H/L Shoulder Ext (R), Stab @ 90 Deg Shoulder Flexion (L): 2 x 10 RTB  H/L Shoulder Ext (L), Stab @ 90 Deg Shoulder Flexion (R): 2 x 10 RTB  Open Books x 10 5\" H ea R/L Ther Ex:  25'  Supine Chin Tucks x 20 5\" H   Elastic band pull  apart (supine): 2 x 10 (B), red theraband   H/L Shoulder Ext (R), Stab @ 90 Deg Shoulder Flexion (L): 2 x 10 RTB  H/L Shoulder Ext (L), Stab @ 90 Deg Shoulder Flexion (R): 2 x 10 RTB  H/L Alternating Shoulder Flexion/Ext x 15 ea R/L   H/L Shoulder HA in ER; OH x 15   Added Cable LT Activation x 10 4# ea R/L    Manual: 10'  STM: Scalenes, (R) Pectorals, UT  TrPR: Pectorals, Scalenes Manual: 15'  STM: Scalenes, (R) Pectorals, UT  TrPR: Pectorals, Scalenes  Cervical Distraction 2'    Manual: 10'  STM: Pectorals (B); Gentle on Left Implant, UT (Supine   Cupping: (R) Delt Proximally              HEP:  DL bridging, SLR, S/L clamshell, LAQ, standing marching, standing hip ABD and ext, squat, and tandem stance balance.     Access Code: [email or web address]  URL: [email or web address]  Date: 07/03/2024  Prepared by: Leena Brooks    Exercises  - Standing March with Counter Support  - 1 x daily - 7 x weekly - 1 sets - 1 min hold  - Standing Tandem Balance with Counter Support  - 1 x daily - 7 x weekly - 3 sets - 30s hold  - Sit to Stand  - 2 x daily - 7 x weekly - 1 sets - 10 reps  - Seated Scapular Retraction  - 2-3 x daily - 7 x weekly - 1 sets - 10 reps - 5s hold  - Single Arm Shoulder Extension with Anchored Resistance  - 1 x daily - 7 x weekly - 2 sets - 10 reps  - Single Arm Shoulder Extension with Anchored Resistance (Mirrored)  - 1 x daily - 7 x weekly - 2 sets - 10 reps    Charges: Therex: x 2, MT x 1        Total Timed Treatment: 40 min    Total Treatment Time: 40 min

## 2024-08-15 NOTE — PROGRESS NOTES
Called patient and left a message for patient to call back when they can. Reviewed patient chart. Left contact my contact number [phone]        Time Spent This Encounter Total: 5  min medical record review  Monthly Minute Total including today: 5 minutes

## 2024-08-16 ENCOUNTER — OFFICE VISIT (OUTPATIENT)
Dept: PHYSICAL THERAPY | Age: 79
End: 2024-08-16
Attending: FAMILY MEDICINE
Payer: MEDICARE

## 2024-08-16 PROCEDURE — 97110 THERAPEUTIC EXERCISES: CPT

## 2024-08-16 PROCEDURE — 97140 MANUAL THERAPY 1/> REGIONS: CPT

## 2024-08-20 ENCOUNTER — TELEPHONE (OUTPATIENT)
Dept: FAMILY MEDICINE CLINIC | Facility: CLINIC | Age: 79
End: 2024-08-20

## 2024-08-20 NOTE — PROGRESS NOTES
Date of service: 8/21/2024    Dx: Generalized weakness (R53.1), Decreased exercise tolerance (R68.89), History of recent hospitalization (Z92.89)     Insurance Primary/Secondary: MEDICARE / AARP       Insurance Limits: N/A  Visit #: 15  Authorized # of Visits: 16 recommended  POC/Auth Expiration: N/A  Date of Last PN: 7/17/24 (Visit #10)  Authorizing Physician/Provider: Steven        Subjective: \"Things are okay. I don't seem to be gaining on my back and neck pain. They seem to be pretty much the same now.\" The patient reports that he hasn't been keeping up with the exercises \"as much as he should.\"     Objective:     Pain/Symptom Presentation:   Pain at rest: 1/10  Pain at worst: 8/10      Assessment: Jesica admits that he hasn't seen much improvement in his low and mid-back pain recently. He admits that he hasn't been compliant with his HEP recently. In discussion with the patient, we will see him for one remaining appt and will progress him to discharge with a HEP to follow up with MD as needed for next steps in POC.     Goals:   Short-Term Goals:  Patient will improve LE endurance to facilitate intermittent standing for at least 2 hour(s) daily for household chores and community integration. Timeframe: 3-4 weeks. (MET 7/17/24)  Long-Term Goals:  The patient will improve LE strength and endurance to facilitate walking distances of 2 mile(s) daily for community integration. Timeframe: 13 visits. (IN PROGRESS 7/17/24. Updated goal timeframe.)  Patient will improve LE mobility, strength, and single-leg stabilization to meet strength requirements for reciprocal stair navigation pattern with no asymmetries for ascending and descending 3 flights of stairs daily for household and community ambulation. Timeframe: 13 visits. (IN PROGRESS 7/17/24. Updated goal timeframe.)  Patient will improve lower motor control to perform double-leg squat with symmetrical loading, without asymmetries, and with proper posterior chain loading  to facilitate squatting and lifting ADL's. Timeframe: 13 visits. (IN PROGRESS 7/17/24. Updated goal timeframe.)    Plan: Progress to discharge next session and update HEP.     Date: 08/01/24  Tx#: 11/14 Date: 08/08/24  Tx#: 12/14 Date: 08/16/24  Tx#: 13/14 Date: 8/21/24  Tx: 15/14   Ther Ex: 30'  Open Books x 10 5\" H ea R/L  Added 90 Deg Pec Stretch (R) in Doorway 5x20\"   Scalene Stretch 5x30\" (ea R/L without alternating)  H/L Shoulder Ext (R), Stab @ 90 Deg Shoulder Flexion (L): x10 RTB  H/L Shoulder Ext (L), Stab @ 90 Deg Shoulder Flexion (R): x10 RTB  Elastic band pull apart (supine): 2 x 10 (B), red theraband    Ther Ex:  25'  Added Supine Chin Tucks x 20 5\" H   Added H/L Alternating Shoulder Flexion/Ext x 15 ea R/L   Added H/L Shoulder HA OH x 15   Elastic band pull apart (supine): 2 x 10 (B), red theraband   H/L Shoulder Ext (R), Stab @ 90 Deg Shoulder Flexion (L): 2 x 10 RTB  H/L Shoulder Ext (L), Stab @ 90 Deg Shoulder Flexion (R): 2 x 10 RTB  Open Books x 10 5\" H ea R/L Ther Ex:  25'  Supine Chin Tucks x 20 5\" H   Elastic band pull apart (supine): 2 x 10 (B), red theraband   H/L Shoulder Ext (R), Stab @ 90 Deg Shoulder Flexion (L): 2 x 10 RTB  H/L Shoulder Ext (L), Stab @ 90 Deg Shoulder Flexion (R): 2 x 10 RTB  H/L Alternating Shoulder Flexion/Ext x 15 ea R/L   H/L Shoulder HA in ER; OH x 15   Added Cable LT Activation x 10 4# ea R/L  Therapeutic Exercise: x 25min  Prone scap retraction: 2 x 10 x 3\" (B) - manual cues for scap retraction and depression  Prone quad stretch: x 30\" (B)  Manual hamstring stretch: x 30\" (B)   Supine wipers: 1 x 10 (B)   Elastic band pull apart (supine): 2 x 10 (B), red theraband   Seated lumbar flex stretch: x 30\"   Seated hamstring stretch: x 30\" (B)   Seated boat pose: 1 x 10    Manual: 10'  STM: Scalenes, (R) Pectorals, UT  TrPR: Pectorals, Scalenes Manual: 15'  STM: Scalenes, (R) Pectorals, UT  TrPR: Pectorals, Scalenes  Cervical Distraction 2'    Manual: 10'  STM: Pectorals  (B); Gentle on Left Implant, UT (Supine   Cupping: (R) Delt Proximally  Manual Therapy: x 15min  Soft tissue mobilization: low back musculature, QL, lumbar paraspinals     Lumbar PA accessory joint mobs - L1-L5: Grade 3, 4 x 10\" each - TTP noted in upper lumbar spine    Thoracic PA accessory joint mobs - T1-T12: Grade 3, 2 x 10\" each               HEP:  DL bridging, SLR, S/L clamshell, LAQ, standing marching, standing hip ABD and ext, squat, and tandem stance balance.     Access Code: [email or web address]  URL: [email or web address]  Date: 07/03/2024  Prepared by: Leena Brooks    Exercises  - Standing March with Counter Support  - 1 x daily - 7 x weekly - 1 sets - 1 min hold  - Standing Tandem Balance with Counter Support  - 1 x daily - 7 x weekly - 3 sets - 30s hold  - Sit to Stand  - 2 x daily - 7 x weekly - 1 sets - 10 reps  - Seated Scapular Retraction  - 2-3 x daily - 7 x weekly - 1 sets - 10 reps - 5s hold  - Single Arm Shoulder Extension with Anchored Resistance  - 1 x daily - 7 x weekly - 2 sets - 10 reps  - Single Arm Shoulder Extension with Anchored Resistance (Mirrored)  - 1 x daily - 7 x weekly - 2 sets - 10 reps    Charges: Therex: x 2, MT x 1        Total Timed Treatment: 40min    Total Treatment Time: 40min

## 2024-08-21 ENCOUNTER — MED REC SCAN ONLY (OUTPATIENT)
Dept: FAMILY MEDICINE CLINIC | Facility: CLINIC | Age: 79
End: 2024-08-21

## 2024-08-21 ENCOUNTER — OFFICE VISIT (OUTPATIENT)
Dept: PHYSICAL THERAPY | Age: 79
End: 2024-08-21
Attending: FAMILY MEDICINE
Payer: MEDICARE

## 2024-08-21 PROCEDURE — 97140 MANUAL THERAPY 1/> REGIONS: CPT

## 2024-08-21 PROCEDURE — 97110 THERAPEUTIC EXERCISES: CPT

## 2024-08-28 ENCOUNTER — OFFICE VISIT (OUTPATIENT)
Dept: PHYSICAL THERAPY | Age: 79
End: 2024-08-28
Attending: FAMILY MEDICINE
Payer: MEDICARE

## 2024-08-28 ENCOUNTER — APPOINTMENT (OUTPATIENT)
Dept: PHYSICAL THERAPY | Age: 79
End: 2024-08-28
Attending: FAMILY MEDICINE
Payer: MEDICARE

## 2024-08-28 PROCEDURE — 97140 MANUAL THERAPY 1/> REGIONS: CPT

## 2024-08-28 PROCEDURE — 97110 THERAPEUTIC EXERCISES: CPT

## 2024-08-28 NOTE — PROGRESS NOTES
PHYSICAL THERAPY DISCHARGE:      Date of service: 8/21/2024    Dx: Generalized weakness (R53.1), Decreased exercise tolerance (R68.89), History of recent hospitalization (Z92.89)     Insurance Primary/Secondary: MEDICARE / AARP       Insurance Limits: N/A  Visit #: 16  Authorized # of Visits: 16 recommended  POC/Auth Expiration: N/A  Date of Last PN: 7/17/24 (Visit #10)  Authorizing Physician/Provider: Steven        Subjective: The patient reports that his neck and back are better. He reports some remaining pain on the right side of his neck and upper back.     Objective:     Pain/Symptom Presentation:   Pain at rest: 2-3/10  Pain at worst: 6-7/10     Strength/MMT:           Knee Motion Strength    Right Left   Knee Extension 4/5 4/5   Knee Flexion 4+/5 4+/5   *indicates activity was associated with pain             Hip Motion Strength    Right Left   Hip Flexion 4/5 4/5   Hip Extension 4-/5 4-/5   Hip ABD 4-/5 4-/5   Hip ER 4+/5 4+/5   Hip IR  5/5 5/5   *indicates activity was associated with pain     Strength/MMT:           Ankle Motion Strength    Right Left   Ankle OKC DF 5/5 5/5   Ankle OKC PF 4+/5 4+/5   Ankle OKC INV/forefoot ADD 5/5 5/5   Ankle OKC EV/forefoot ABD 5/5 5/5   *indicates activity was associated with pain      Assessment: Jesica is a 79 year old male that presents to physical therapy evaluation s/p 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 The patient was found to have a chronic subdural hematoma after recent hospitalization 5/13/24. The patient reported remaining head, neck, and spine pain and complains of generalized weakness and endurance deficits. Jesica has been seen for 16 sessions in physical therapy for treatment including manual therapy interventions for neck and back pain and postural mobility as well as exercises progression in postural strengthening as well as LE strengthening for improved endurance and strength. The patient reports less frequent and less intense neck and  back pain, though still present. The patient's overall strength and endurance has improved some, however the patient admits irregular compliance with his HEP. The patient's progress has been slow recently. In discussion with the patient, we will progress him to discharge to continue with a HEP and to follow-up with MD if his pain persists.     Goals:   Short-Term Goals:  Patient will improve LE endurance to facilitate intermittent standing for at least 2 hour(s) daily for household chores and community integration. Timeframe: 3-4 weeks. (MET 7/17/24)  Long-Term Goals:  The patient will improve LE strength and endurance to facilitate walking distances of 2 mile(s) daily for community integration. Timeframe: 13 visits. (NOT MET 8/28/24)  Patient will improve LE mobility, strength, and single-leg stabilization to meet strength requirements for reciprocal stair navigation pattern with no asymmetries for ascending and descending 3 flights of stairs daily for household and community ambulation. Timeframe: 13 visits. (NOT MET 8/28/24)    Plan: Patient will be discharged from PT to continue with HEP.     Date: 08/01/24  Tx#: 11/14 Date: 08/08/24  Tx#: 12/14 Date: 08/16/24  Tx#: 13/14 Date: 8/21/24  Tx: 15/14   Ther Ex: 30'  Open Books x 10 5\" H ea R/L  Added 90 Deg Pec Stretch (R) in Doorway 5x20\"   Scalene Stretch 5x30\" (ea R/L without alternating)  H/L Shoulder Ext (R), Stab @ 90 Deg Shoulder Flexion (L): x10 RTB  H/L Shoulder Ext (L), Stab @ 90 Deg Shoulder Flexion (R): x10 RTB  Elastic band pull apart (supine): 2 x 10 (B), red theraband    Ther Ex:  25'  Added Supine Chin Tucks x 20 5\" H   Added H/L Alternating Shoulder Flexion/Ext x 15 ea R/L   Added H/L Shoulder HA OH x 15   Elastic band pull apart (supine): 2 x 10 (B), red theraband   H/L Shoulder Ext (R), Stab @ 90 Deg Shoulder Flexion (L): 2 x 10 RTB  H/L Shoulder Ext (L), Stab @ 90 Deg Shoulder Flexion (R): 2 x 10 RTB  Open Books x 10 5\" H ea R/L Ther Ex:   25'  Supine Chin Tucks x 20 5\" H   Elastic band pull apart (supine): 2 x 10 (B), red theraband   H/L Shoulder Ext (R), Stab @ 90 Deg Shoulder Flexion (L): 2 x 10 RTB  H/L Shoulder Ext (L), Stab @ 90 Deg Shoulder Flexion (R): 2 x 10 RTB  H/L Alternating Shoulder Flexion/Ext x 15 ea R/L   H/L Shoulder HA in ER; OH x 15   Added Cable LT Activation x 10 4# ea R/L  Therapeutic Exercise: x 25min  Supine wipers: 1 x 10 (B)   Sidelying thoracic rotation: 1 x 10  (B)   Seated lumbar flex stretch: x 30\"   Seated hamstring stretch: x 30\" (B)   Elastic band pull apart (supine): 2 x 10 (B), red theraband   Sit to stand: 2 x 10   Standing hip ABD, ext, and marching: 2 x 10 (B) each    Manual: 10'  STM: Scalenes, (R) Pectorals, UT  TrPR: Pectorals, Scalenes Manual: 15'  STM: Scalenes, (R) Pectorals, UT  TrPR: Pectorals, Scalenes  Cervical Distraction 2'    Manual: 10'  STM: Pectorals (B); Gentle on Left Implant, UT (Supine   Cupping: (R) Delt Proximally  Manual Therapy: x 15min  Soft tissue mobilization: low back musculature, QL, lumbar paraspinals     Lumbar PA accessory joint mobs - L1-L5: Grade 3, 4 x 10\" each - TTP noted in upper lumbar spine    Thoracic PA accessory joint mobs - T1-T12: Grade 3, 2 x 10\" each               HEP:    Access Code: [email or web address]  URL: [email or web address]  Date: 08/28/2024  Prepared by: Rosalinda Zuniga    Exercises  - Sidelying Thoracic Lumbar Rotation  - 1 x daily - 7 x weekly - 1 sets - 10 reps  - Supine Hip Internal and External Rotation  - 1 x daily - 7 x weekly - 1 sets - 10 reps  - Seated Flexion Stretch  - 1 x daily - 7 x weekly - 1 sets - 30\" hold  - Seated Hamstring Stretch  - 1 x daily - 7 x weekly - 1 sets - 30\" hold  - Standing Shoulder Horizontal Abduction with Resistance  - 1 x daily - 7 x weekly - 2 sets - 10 reps  - Sit to Stand  - 2 x daily - 7 x weekly - 1 sets - 10 reps  - Standing Hip Abduction with Counter Support  - 1 x daily - 7 x weekly - 2 sets - 10 reps  -  Standing Hip Extension with Counter Support  - 1 x daily - 7 x weekly - 2 sets - 10 reps  - Standing March with Counter Support  - 1 x daily - 7 x weekly - 2 sets - 1 min hold    Patient was issued green and yellow therabands for added resistance to select exercises when appropriate.     Charges: Therex: x 2, MT x 1        Total Timed Treatment: 40min    Total Treatment Time: 40min

## 2024-08-28 NOTE — PATIENT INSTRUCTIONS
Thank you for coming to your physical therapy appt! During your appt today you were issued a HEP handout from [employer] which can also be accessed using the information below. The exercises chosen are meant to supplement the treatment you received and reinforce the progress made in physical therapy. It is important to stay consistent with your exercises to help facilitate and maximize your recovery!      Access Code: [email or web address]  URL: [email or web address]  Date: 08/28/2024  Prepared by: Rosalinda Zuniga    Exercises  - Sidelying Thoracic Lumbar Rotation  - 1 x daily - 7 x weekly - 1 sets - 10 reps  - Supine Hip Internal and External Rotation  - 1 x daily - 7 x weekly - 1 sets - 10 reps  - Seated Flexion Stretch  - 1 x daily - 7 x weekly - 1 sets - 30\" hold  - Seated Hamstring Stretch  - 1 x daily - 7 x weekly - 1 sets - 30\" hold  - Standing Shoulder Horizontal Abduction with Resistance  - 1 x daily - 7 x weekly - 2 sets - 10 reps  - Sit to Stand  - 2 x daily - 7 x weekly - 1 sets - 10 reps  - Standing Hip Abduction with Counter Support  - 1 x daily - 7 x weekly - 2 sets - 10 reps  - Standing Hip Extension with Counter Support  - 1 x daily - 7 x weekly - 2 sets - 10 reps  - Standing March with Counter Support  - 1 x daily - 7 x weekly - 2 sets - 1 min hold

## 2024-09-09 ENCOUNTER — NURSE ONLY (OUTPATIENT)
Dept: ENDOCRINOLOGY CLINIC | Facility: CLINIC | Age: 79
End: 2024-09-09
Payer: MEDICARE

## 2024-09-09 VITALS — WEIGHT: 241 LBS | BODY MASS INDEX: 36 KG/M2

## 2024-09-09 NOTE — PROGRESS NOTES
David Reyes : 1945 was seen for Diabetic Education Follow up:    Date: 24  Referring Provider: Dr. LINWOOD Harris  Start time: 10am End time: 10:30am    Assessment:     Diagnosis: Controlled Type 2    Reason for visit: 6 month follow-up    Changes since last visit:  - Meals:Pt. does all the cooking   - Medications:Toujeo 12 units, Metformin 1,000 mg twice daily  - Exercise:Difficult to walk or engage in exercise due to back pain   - Last A1C 24 6.6%    SMB24 to 24       Fastin-127 mg/dl    Prelunch: 106 mg/dl    Education:     DIABETES REVIEW:  - Importance of improved BG control in preventing complications    HEALTHY EATING:  - effect of food on BG, timing of meal, use of snacks/fiber, barriers: he does the cooking so has control over what they eat    MONITORING:  - Type of meter:Contour Next EZ  - Testing Schedule: only testing fasting    TAKING MEDICATION:  Oral Agents:   Metformin 1,000 mg twice daily    Injectables:  Toujeo 12 units daily  Site inspection:rotating around abd.    REDUCING RISKS:  - relationship between glucose control and risk for complications in eye, heart, kidneys,nerves,teeth,foot care, skin,  Up to date w/Dilated eye exam  Foot Care Guidelines  Pt. c/o numbness & tingling in toes of both feet;notices it after taking shoes & socks off  before showering  Pt. c/o of numbness & tingling in hands  BUN & Creatinine: continue to rise  GFR: continues to drop     Recommendations:      1. Follow recommended meal plan.   2. Test BG fasting,pre/2 hrs post meals once daily    3. Bring glucose logs to all provider visits for review.   4. Encouraged continue current doses of medications   5. Encouraged  to call diabetes center with any questions or concerns.   6. Follow-up in 6 months    Patient verbalized understanding and has no further questions at this time.    Ivana Mitchell RN, [facility]

## 2024-09-18 ENCOUNTER — PATIENT OUTREACH (OUTPATIENT)
Dept: CASE MANAGEMENT | Age: 79
End: 2024-09-18

## 2024-09-18 NOTE — PROGRESS NOTES
Called patient and left a message for patient to call back when they can. Reviewed patient chart. Left contact my contact number [phone]        Time Spent This Encounter Total: 5  min medical record review  Monthly Minute Total including today: 5 minutes    
120

## 2024-09-20 ENCOUNTER — APPOINTMENT (OUTPATIENT)
Dept: GENERAL RADIOLOGY | Facility: HOSPITAL | Age: 79
End: 2024-09-20
Payer: MEDICARE

## 2024-09-20 ENCOUNTER — HOSPITAL ENCOUNTER (INPATIENT)
Facility: HOSPITAL | Age: 79
LOS: 6 days | Discharge: NURSING, REHAB OR LONG-TERM CARE | End: 2024-09-28
Attending: EMERGENCY MEDICINE | Admitting: HOSPITALIST
Payer: MEDICARE

## 2024-09-20 DIAGNOSIS — J98.01 BRONCHOSPASM: ICD-10-CM

## 2024-09-20 DIAGNOSIS — U07.1 COVID-19: ICD-10-CM

## 2024-09-20 DIAGNOSIS — B34.9 ACUTE BRONCHOSPASM DUE TO VIRAL INFECTION: ICD-10-CM

## 2024-09-20 DIAGNOSIS — J98.01 ACUTE BRONCHOSPASM DUE TO VIRAL INFECTION: ICD-10-CM

## 2024-09-20 LAB
ALBUMIN SERPL-MCNC: 4.4 G/DL (ref 3.2–4.8)
ALBUMIN/GLOB SERPL: 1.3 {RATIO} (ref 1–2)
ALP LIVER SERPL-CCNC: 57 U/L
ALT SERPL-CCNC: 30 U/L
ANION GAP SERPL CALC-SCNC: 13 MMOL/L (ref 0–18)
AST SERPL-CCNC: 29 U/L (ref ?–34)
BASOPHILS # BLD AUTO: 0 X10(3) UL (ref 0–0.2)
BASOPHILS NFR BLD AUTO: 0 %
BILIRUB SERPL-MCNC: 0.5 MG/DL (ref 0.2–1.1)
BUN BLD-MCNC: 26 MG/DL (ref 9–23)
CALCIUM BLD-MCNC: 9.8 MG/DL (ref 8.7–10.4)
CHLORIDE SERPL-SCNC: 103 MMOL/L (ref 98–112)
CO2 SERPL-SCNC: 19 MMOL/L (ref 21–32)
CREAT BLD-MCNC: 1.8 MG/DL
EGFRCR SERPLBLD CKD-EPI 2021: 38 ML/MIN/1.73M2 (ref 60–?)
EOSINOPHIL # BLD AUTO: 0 X10(3) UL (ref 0–0.7)
EOSINOPHIL NFR BLD AUTO: 0 %
ERYTHROCYTE [DISTWIDTH] IN BLOOD BY AUTOMATED COUNT: 16.1 %
GLOBULIN PLAS-MCNC: 3.4 G/DL (ref 2–3.5)
GLUCOSE BLD-MCNC: 224 MG/DL (ref 70–99)
HCT VFR BLD AUTO: 29.2 %
HGB BLD-MCNC: 9.2 G/DL
IMM GRANULOCYTES # BLD AUTO: 0.04 X10(3) UL (ref 0–1)
IMM GRANULOCYTES NFR BLD: 0.7 %
LYMPHOCYTES # BLD AUTO: 0.2 X10(3) UL (ref 1–4)
LYMPHOCYTES NFR BLD AUTO: 3.4 %
MCH RBC QN AUTO: 29.7 PG (ref 26–34)
MCHC RBC AUTO-ENTMCNC: 31.5 G/DL (ref 31–37)
MCV RBC AUTO: 94.2 FL
MONOCYTES # BLD AUTO: 1.08 X10(3) UL (ref 0.1–1)
MONOCYTES NFR BLD AUTO: 18.1 %
NEUTROPHILS # BLD AUTO: 4.65 X10 (3) UL (ref 1.5–7.7)
NEUTROPHILS # BLD AUTO: 4.65 X10(3) UL (ref 1.5–7.7)
NEUTROPHILS NFR BLD AUTO: 77.8 %
OSMOLALITY SERPL CALC.SUM OF ELEC: 292 MOSM/KG (ref 275–295)
PLATELET # BLD AUTO: 55 10(3)UL (ref 150–450)
PLATELETS.RETICULATED NFR BLD AUTO: 15.5 % (ref 0–7)
POTASSIUM SERPL-SCNC: 5 MMOL/L (ref 3.5–5.1)
PROT SERPL-MCNC: 7.8 G/DL (ref 5.7–8.2)
RBC # BLD AUTO: 3.1 X10(6)UL
SODIUM SERPL-SCNC: 135 MMOL/L (ref 136–145)
WBC # BLD AUTO: 6 X10(3) UL (ref 4–11)

## 2024-09-20 PROCEDURE — 71045 X-RAY EXAM CHEST 1 VIEW: CPT | Performed by: EMERGENCY MEDICINE

## 2024-09-20 RX ORDER — HALOPERIDOL 5 MG/ML
2 INJECTION INTRAMUSCULAR ONCE
Status: DISCONTINUED | OUTPATIENT
Start: 2024-09-20 | End: 2024-09-20

## 2024-09-21 ENCOUNTER — APPOINTMENT (OUTPATIENT)
Dept: GENERAL RADIOLOGY | Facility: HOSPITAL | Age: 79
End: 2024-09-21
Attending: INTERNAL MEDICINE
Payer: MEDICARE

## 2024-09-21 ENCOUNTER — APPOINTMENT (OUTPATIENT)
Dept: CT IMAGING | Facility: HOSPITAL | Age: 79
End: 2024-09-21
Attending: EMERGENCY MEDICINE
Payer: MEDICARE

## 2024-09-21 ENCOUNTER — APPOINTMENT (OUTPATIENT)
Dept: CT IMAGING | Facility: HOSPITAL | Age: 79
End: 2024-09-21
Attending: NURSE PRACTITIONER
Payer: MEDICARE

## 2024-09-21 ENCOUNTER — APPOINTMENT (OUTPATIENT)
Dept: CV DIAGNOSTICS | Facility: HOSPITAL | Age: 79
End: 2024-09-21
Payer: MEDICARE

## 2024-09-21 ENCOUNTER — APPOINTMENT (OUTPATIENT)
Dept: GENERAL RADIOLOGY | Facility: HOSPITAL | Age: 79
End: 2024-09-21
Attending: NURSE PRACTITIONER
Payer: MEDICARE

## 2024-09-21 PROBLEM — J98.01 ACUTE BRONCHOSPASM DUE TO VIRAL INFECTION: Status: ACTIVE | Noted: 2024-09-21

## 2024-09-21 PROBLEM — D64.9 ANEMIA: Status: ACTIVE | Noted: 2024-09-21

## 2024-09-21 PROBLEM — E87.1 HYPONATREMIA: Status: ACTIVE | Noted: 2024-09-21

## 2024-09-21 PROBLEM — N17.9 ACUTE KIDNEY INJURY: Status: ACTIVE | Noted: 2024-09-21

## 2024-09-21 PROBLEM — B34.9 ACUTE BRONCHOSPASM DUE TO VIRAL INFECTION: Status: ACTIVE | Noted: 2024-09-21

## 2024-09-21 PROBLEM — J98.01 BRONCHOSPASM: Status: ACTIVE | Noted: 2024-09-21

## 2024-09-21 PROBLEM — N17.9 ACUTE KIDNEY INJURY (HCC): Status: ACTIVE | Noted: 2024-09-21

## 2024-09-21 PROBLEM — R73.9 HYPERGLYCEMIA: Status: ACTIVE | Noted: 2024-09-21

## 2024-09-21 PROBLEM — E87.20 METABOLIC ACIDOSIS: Status: ACTIVE | Noted: 2024-09-21

## 2024-09-21 PROBLEM — U07.1 COVID-19: Status: ACTIVE | Noted: 2024-09-21

## 2024-09-21 LAB
ALBUMIN SERPL-MCNC: 4.1 G/DL (ref 3.2–4.8)
ALBUMIN SERPL-MCNC: 4.3 G/DL (ref 3.2–4.8)
ALBUMIN/GLOB SERPL: 1.6 {RATIO} (ref 1–2)
ALBUMIN/GLOB SERPL: 1.6 {RATIO} (ref 1–2)
ALP LIVER SERPL-CCNC: 48 U/L
ALP LIVER SERPL-CCNC: 49 U/L
ALT SERPL-CCNC: 25 U/L
ALT SERPL-CCNC: 27 U/L
ANION GAP SERPL CALC-SCNC: 12 MMOL/L (ref 0–18)
ANION GAP SERPL CALC-SCNC: 13 MMOL/L (ref 0–18)
ANION GAP SERPL CALC-SCNC: 15 MMOL/L (ref 0–18)
APTT PPP: 29.4 SECONDS (ref 23–36)
ARTERIAL PATENCY WRIST A: POSITIVE
AST SERPL-CCNC: 32 U/L (ref ?–34)
AST SERPL-CCNC: 61 U/L (ref ?–34)
ATRIAL RATE: 106 BPM
ATRIAL RATE: 88 BPM
BASE EXCESS BLDA CALC-SCNC: -2.8 MMOL/L (ref ?–2)
BASE EXCESS BLDA CALC-SCNC: -8.6 MMOL/L (ref ?–2)
BASE EXCESS BLDV CALC-SCNC: -8.3 MMOL/L
BASOPHILS # BLD AUTO: 0 X10(3) UL (ref 0–0.2)
BASOPHILS # BLD AUTO: 0 X10(3) UL (ref 0–0.2)
BASOPHILS # BLD AUTO: 0.01 X10(3) UL (ref 0–0.2)
BASOPHILS # BLD AUTO: 0.01 X10(3) UL (ref 0–0.2)
BASOPHILS NFR BLD AUTO: 0 %
BASOPHILS NFR BLD AUTO: 0 %
BASOPHILS NFR BLD AUTO: 0.1 %
BASOPHILS NFR BLD AUTO: 0.2 %
BILIRUB SERPL-MCNC: 0.2 MG/DL (ref 0.2–1.1)
BILIRUB SERPL-MCNC: 0.4 MG/DL (ref 0.2–1.1)
BILIRUB UR QL STRIP.AUTO: NEGATIVE
BODY TEMPERATURE: 98.6 F
BODY TEMPERATURE: 98.6 F
BUN BLD-MCNC: 32 MG/DL (ref 9–23)
BUN BLD-MCNC: 34 MG/DL (ref 9–23)
BUN BLD-MCNC: 35 MG/DL (ref 9–23)
CA-I BLD-SCNC: 1.08 MMOL/L (ref 0.95–1.32)
CA-I BLD-SCNC: 1.1 MMOL/L (ref 0.95–1.32)
CA-I BLD-SCNC: 1.1 MMOL/L (ref 0.95–1.32)
CALCIUM BLD-MCNC: 8.5 MG/DL (ref 8.7–10.4)
CALCIUM BLD-MCNC: 8.5 MG/DL (ref 8.7–10.4)
CALCIUM BLD-MCNC: 8.7 MG/DL (ref 8.7–10.4)
CHLORIDE SERPL-SCNC: 104 MMOL/L (ref 98–112)
CHLORIDE SERPL-SCNC: 105 MMOL/L (ref 98–112)
CHLORIDE SERPL-SCNC: 107 MMOL/L (ref 98–112)
CHOLEST SERPL-MCNC: 142 MG/DL (ref ?–200)
CLARITY UR REFRACT.AUTO: CLEAR
CO2 SERPL-SCNC: 16 MMOL/L (ref 21–32)
CO2 SERPL-SCNC: 18 MMOL/L (ref 21–32)
CO2 SERPL-SCNC: 20 MMOL/L (ref 21–32)
COHGB MFR BLD: 1.2 % SAT (ref 0–3)
COHGB MFR BLD: 1.4 % SAT (ref 0–3)
COLOR UR AUTO: YELLOW
CREAT BLD-MCNC: 1.74 MG/DL
CREAT BLD-MCNC: 1.85 MG/DL
CREAT BLD-MCNC: 2.06 MG/DL
CRP SERPL-MCNC: 16.4 MG/DL (ref ?–0.5)
D DIMER PPP FEU-MCNC: 2.12 UG/ML FEU (ref ?–0.77)
EGFRCR SERPLBLD CKD-EPI 2021: 33 ML/MIN/1.73M2 (ref 60–?)
EGFRCR SERPLBLD CKD-EPI 2021: 37 ML/MIN/1.73M2 (ref 60–?)
EGFRCR SERPLBLD CKD-EPI 2021: 40 ML/MIN/1.73M2 (ref 60–?)
EOSINOPHIL # BLD AUTO: 0 X10(3) UL (ref 0–0.7)
EOSINOPHIL NFR BLD AUTO: 0 %
ERYTHROCYTE [DISTWIDTH] IN BLOOD BY AUTOMATED COUNT: 16.4 %
ERYTHROCYTE [DISTWIDTH] IN BLOOD BY AUTOMATED COUNT: 16.5 %
ERYTHROCYTE [DISTWIDTH] IN BLOOD BY AUTOMATED COUNT: 16.6 %
ERYTHROCYTE [DISTWIDTH] IN BLOOD BY AUTOMATED COUNT: 16.7 %
ERYTHROCYTE [DISTWIDTH] IN BLOOD BY AUTOMATED COUNT: 16.7 %
ERYTHROCYTE [SEDIMENTATION RATE] IN BLOOD: 87 MM/HR
EST. AVERAGE GLUCOSE BLD GHB EST-MCNC: 123 MG/DL (ref 68–126)
EST. AVERAGE GLUCOSE BLD GHB EST-MCNC: 123 MG/DL (ref 68–126)
EXPIRATORY PRESSURE: 5 CM H2O
EXPIRATORY PRESSURE: 5 CM H2O
FIO2: 30 %
FLUAV + FLUBV RNA SPEC NAA+PROBE: NEGATIVE
FLUAV + FLUBV RNA SPEC NAA+PROBE: NEGATIVE
FOLATE SERPL-MCNC: 13.3 NG/ML (ref 5.4–?)
GLOBULIN PLAS-MCNC: 2.5 G/DL (ref 2–3.5)
GLOBULIN PLAS-MCNC: 2.7 G/DL (ref 2–3.5)
GLUCOSE BLD-MCNC: 104 MG/DL (ref 70–99)
GLUCOSE BLD-MCNC: 105 MG/DL (ref 70–99)
GLUCOSE BLD-MCNC: 127 MG/DL (ref 70–99)
GLUCOSE BLD-MCNC: 183 MG/DL (ref 70–99)
GLUCOSE BLD-MCNC: 209 MG/DL (ref 70–99)
GLUCOSE BLD-MCNC: 231 MG/DL (ref 70–99)
GLUCOSE BLD-MCNC: 245 MG/DL (ref 70–99)
GLUCOSE BLD-MCNC: 308 MG/DL (ref 70–99)
GLUCOSE BLD-MCNC: 318 MG/DL (ref 70–99)
GLUCOSE BLD-MCNC: 322 MG/DL (ref 70–99)
GLUCOSE BLD-MCNC: 373 MG/DL (ref 70–99)
GLUCOSE BLD-MCNC: 378 MG/DL (ref 70–99)
GLUCOSE BLD-MCNC: 399 MG/DL (ref 70–99)
GLUCOSE BLD-MCNC: 420 MG/DL (ref 70–99)
GLUCOSE BLD-MCNC: 434 MG/DL (ref 70–99)
GLUCOSE BLD-MCNC: 98 MG/DL (ref 70–99)
GLUCOSE UR STRIP.AUTO-MCNC: 50 MG/DL
HAPTOGLOB SERPL-MCNC: 259 MG/DL (ref 30–200)
HBA1C MFR BLD: 5.9 % (ref ?–5.7)
HBA1C MFR BLD: 5.9 % (ref ?–5.7)
HCO3 BLDA-SCNC: 18.3 MEQ/L (ref 21–27)
HCO3 BLDA-SCNC: 22.8 MEQ/L (ref 21–27)
HCO3 BLDV-SCNC: 18.2 MEQ/L (ref 22–26)
HCT VFR BLD AUTO: 24.6 %
HCT VFR BLD AUTO: 25.7 %
HCT VFR BLD AUTO: 25.9 %
HCT VFR BLD AUTO: 26.7 %
HCT VFR BLD AUTO: 26.7 %
HDLC SERPL-MCNC: 36 MG/DL (ref 40–59)
HGB BLD-MCNC: 12 G/DL
HGB BLD-MCNC: 7.9 G/DL
HGB BLD-MCNC: 8.1 G/DL
HGB BLD-MCNC: 8.2 G/DL
HGB BLD-MCNC: 8.5 G/DL
HGB BLD-MCNC: 8.5 G/DL
HGB BLD-MCNC: 8.7 G/DL
HGB RETIC QN AUTO: 32.8 PG (ref 28.2–36.6)
HYALINE CASTS #/AREA URNS AUTO: PRESENT /LPF
IMM GRANULOCYTES # BLD AUTO: 0.09 X10(3) UL (ref 0–1)
IMM GRANULOCYTES # BLD AUTO: 0.12 X10(3) UL (ref 0–1)
IMM GRANULOCYTES # BLD AUTO: 0.13 X10(3) UL (ref 0–1)
IMM GRANULOCYTES # BLD AUTO: 0.21 X10(3) UL (ref 0–1)
IMM GRANULOCYTES NFR BLD: 1.8 %
IMM GRANULOCYTES NFR BLD: 1.9 %
IMM GRANULOCYTES NFR BLD: 2.2 %
IMM GRANULOCYTES NFR BLD: 2.5 %
IMM RETICS NFR: 0.42 RATIO (ref 0.1–0.3)
INR BLD: 1.16 (ref 0.8–1.2)
INSP PRESSURE: 10 CM H2O
INSP PRESSURE: 12 CM H2O
INSP PRESSURE: 12 CM H2O
IRON SATN MFR SERPL: 4 %
IRON SERPL-MCNC: 13 UG/DL
L PNEUMO AG UR QL: NEGATIVE
LACTATE BLD-SCNC: 2.4 MMOL/L (ref 0.5–2)
LACTATE BLD-SCNC: 5.9 MMOL/L (ref 0.5–2)
LACTATE BLD-SCNC: 6.4 MMOL/L (ref 0.5–2)
LACTATE SERPL-SCNC: 2.7 MMOL/L (ref 0.5–2)
LACTATE SERPL-SCNC: 5.2 MMOL/L (ref 0.5–2)
LACTATE SERPL-SCNC: 6.2 MMOL/L (ref 0.5–2)
LDH SERPL L TO P-CCNC: 277 U/L
LDH SERPL L TO P-CCNC: 512 U/L
LDLC SERPL CALC-MCNC: 90 MG/DL (ref ?–100)
LEUKOCYTE ESTERASE UR QL STRIP.AUTO: NEGATIVE
LYMPHOCYTES # BLD AUTO: 0.21 X10(3) UL (ref 1–4)
LYMPHOCYTES # BLD AUTO: 0.22 X10(3) UL (ref 1–4)
LYMPHOCYTES # BLD AUTO: 0.25 X10(3) UL (ref 1–4)
LYMPHOCYTES # BLD AUTO: 0.42 X10(3) UL (ref 1–4)
LYMPHOCYTES NFR BLD AUTO: 4 %
LYMPHOCYTES NFR BLD AUTO: 4.2 %
LYMPHOCYTES NFR BLD AUTO: 4.3 %
LYMPHOCYTES NFR BLD AUTO: 4.3 %
MAGNESIUM SERPL-MCNC: 1.9 MG/DL (ref 1.6–2.6)
MAGNESIUM SERPL-MCNC: 2 MG/DL (ref 1.6–2.6)
MCH RBC QN AUTO: 29.2 PG (ref 26–34)
MCH RBC QN AUTO: 29.3 PG (ref 26–34)
MCH RBC QN AUTO: 29.4 PG (ref 26–34)
MCH RBC QN AUTO: 29.4 PG (ref 26–34)
MCH RBC QN AUTO: 29.9 PG (ref 26–34)
MCHC RBC AUTO-ENTMCNC: 31.5 G/DL (ref 31–37)
MCHC RBC AUTO-ENTMCNC: 31.7 G/DL (ref 31–37)
MCHC RBC AUTO-ENTMCNC: 31.8 G/DL (ref 31–37)
MCHC RBC AUTO-ENTMCNC: 31.8 G/DL (ref 31–37)
MCHC RBC AUTO-ENTMCNC: 32.1 G/DL (ref 31–37)
MCV RBC AUTO: 92.2 FL
MCV RBC AUTO: 92.4 FL
MCV RBC AUTO: 92.4 FL
MCV RBC AUTO: 93.1 FL
MCV RBC AUTO: 93.2 FL
METHGB MFR BLD: 0.6 % SAT (ref 0.4–1.5)
METHGB MFR BLD: 1 % SAT (ref 0.4–1.5)
MONOCYTES # BLD AUTO: 0.53 X10(3) UL (ref 0.1–1)
MONOCYTES # BLD AUTO: 0.73 X10(3) UL (ref 0.1–1)
MONOCYTES # BLD AUTO: 1.52 X10(3) UL (ref 0.1–1)
MONOCYTES # BLD AUTO: 2.01 X10(3) UL (ref 0.1–1)
MONOCYTES NFR BLD AUTO: 10.8 %
MONOCYTES NFR BLD AUTO: 14 %
MONOCYTES NFR BLD AUTO: 20.7 %
MONOCYTES NFR BLD AUTO: 24.4 %
MRSA DNA SPEC QL NAA+PROBE: NEGATIVE
NEUTROPHILS # BLD AUTO: 4.06 X10 (3) UL (ref 1.5–7.7)
NEUTROPHILS # BLD AUTO: 4.06 X10(3) UL (ref 1.5–7.7)
NEUTROPHILS # BLD AUTO: 4.15 X10 (3) UL (ref 1.5–7.7)
NEUTROPHILS # BLD AUTO: 4.15 X10(3) UL (ref 1.5–7.7)
NEUTROPHILS # BLD AUTO: 4.34 X10 (3) UL (ref 1.5–7.7)
NEUTROPHILS # BLD AUTO: 4.34 X10(3) UL (ref 1.5–7.7)
NEUTROPHILS # BLD AUTO: 7.08 X10 (3) UL (ref 1.5–7.7)
NEUTROPHILS # BLD AUTO: 7.08 X10(3) UL (ref 1.5–7.7)
NEUTROPHILS NFR BLD AUTO: 69.7 %
NEUTROPHILS NFR BLD AUTO: 72.7 %
NEUTROPHILS NFR BLD AUTO: 79.3 %
NEUTROPHILS NFR BLD AUTO: 82.9 %
NITRITE UR QL STRIP.AUTO: NEGATIVE
NONHDLC SERPL-MCNC: 106 MG/DL (ref ?–130)
NT-PROBNP SERPL-MCNC: ABNORMAL PG/ML (ref ?–450)
OSMOLALITY SERPL CALC.SUM OF ELEC: 292 MOSM/KG (ref 275–295)
OSMOLALITY SERPL CALC.SUM OF ELEC: 296 MOSM/KG (ref 275–295)
OSMOLALITY SERPL CALC.SUM OF ELEC: 302 MOSM/KG (ref 275–295)
OXYHGB MFR BLDA: 97.7 % (ref 92–100)
OXYHGB MFR BLDA: 97.7 % (ref 92–100)
OXYHGB MFR BLDV: 78.2 % (ref 72–78)
P AXIS: 66 DEGREES
P-R INTERVAL: 188 MS
P-R INTERVAL: 192 MS
P/F RATIO: 470 MMHG
PCO2 BLDA: 28 MM HG (ref 35–45)
PCO2 BLDA: 30 MM HG (ref 35–45)
PCO2 BLDV: 33 MM HG (ref 38–50)
PEEP: 5 CM H2O
PH BLDA: 7.36 [PH] (ref 7.35–7.45)
PH BLDA: 7.44 [PH] (ref 7.35–7.45)
PH BLDV: 7.32 [PH] (ref 7.33–7.43)
PH UR STRIP.AUTO: 5 [PH] (ref 5–8)
PLATELET # BLD AUTO: 47 10(3)UL (ref 150–450)
PLATELET # BLD AUTO: 56 10(3)UL (ref 150–450)
PLATELET # BLD AUTO: 56 10(3)UL (ref 150–450)
PLATELET # BLD AUTO: 65 10(3)UL (ref 150–450)
PLATELET # BLD AUTO: 65 10(3)UL (ref 150–450)
PLATELETS.RETICULATED NFR BLD AUTO: 13 % (ref 0–7)
PLATELETS.RETICULATED NFR BLD AUTO: 13.8 % (ref 0–7)
PLATELETS.RETICULATED NFR BLD AUTO: 16.5 % (ref 0–7)
PLATELETS.RETICULATED NFR BLD AUTO: 17 % (ref 0–7)
PLATELETS.RETICULATED NFR BLD AUTO: 17 % (ref 0–7)
PO2 BLDA: 141 MM HG (ref 80–100)
PO2 BLDA: 250 MM HG (ref 80–100)
PO2 BLDV: 44 MM HG (ref 30–50)
POTASSIUM BLD-SCNC: 3.7 MMOL/L (ref 3.6–5.1)
POTASSIUM BLD-SCNC: 3.9 MMOL/L (ref 3.6–5.1)
POTASSIUM BLD-SCNC: 4.2 MMOL/L (ref 3.6–5.1)
POTASSIUM SERPL-SCNC: 3.7 MMOL/L (ref 3.5–5.1)
POTASSIUM SERPL-SCNC: 3.9 MMOL/L (ref 3.5–5.1)
POTASSIUM SERPL-SCNC: 4.7 MMOL/L (ref 3.5–5.1)
PROCALCITONIN SERPL-MCNC: 0.43 NG/ML (ref ?–0.05)
PROT SERPL-MCNC: 6.6 G/DL (ref 5.7–8.2)
PROT SERPL-MCNC: 7 G/DL (ref 5.7–8.2)
PROT UR STRIP.AUTO-MCNC: 20 MG/DL
PROTHROMBIN TIME: 14.9 SECONDS (ref 11.6–14.8)
Q-T INTERVAL: 384 MS
Q-T INTERVAL: 434 MS
QRS DURATION: 138 MS
QRS DURATION: 152 MS
QTC CALCULATION (BEZET): 510 MS
QTC CALCULATION (BEZET): 525 MS
R AXIS: 123 DEGREES
R AXIS: 211 DEGREES
RBC # BLD AUTO: 2.64 X10(6)UL
RBC # BLD AUTO: 2.76 X10(6)UL
RBC # BLD AUTO: 2.81 X10(6)UL
RBC # BLD AUTO: 2.89 X10(6)UL
RBC # BLD AUTO: 2.89 X10(6)UL
RETICS # AUTO: 39.4 X10(3) UL (ref 22.5–147.5)
RETICS/RBC NFR AUTO: 1.5 %
RSV RNA SPEC NAA+PROBE: NEGATIVE
SARS-COV-2 RNA RESP QL NAA+PROBE: DETECTED
SODIUM BLD-SCNC: 132 MMOL/L (ref 135–145)
SODIUM BLD-SCNC: 132 MMOL/L (ref 135–145)
SODIUM BLD-SCNC: 134 MMOL/L (ref 135–145)
SODIUM SERPL-SCNC: 135 MMOL/L (ref 136–145)
SODIUM SERPL-SCNC: 136 MMOL/L (ref 136–145)
SODIUM SERPL-SCNC: 139 MMOL/L (ref 136–145)
SP GR UR STRIP.AUTO: 1.02 (ref 1–1.03)
STREP PNEUMO ANTIGEN, URINE: NEGATIVE
T AXIS: 39 DEGREES
T AXIS: 46 DEGREES
TOTAL IRON BINDING CAPACITY: 294 UG/DL (ref 250–425)
TRANSFERRIN SERPL-MCNC: 226 MG/DL (ref 215–365)
TRIGL SERPL-MCNC: 80 MG/DL (ref 30–149)
TROPONIN I SERPL HS-MCNC: 1393 NG/L
TROPONIN I SERPL HS-MCNC: 150 NG/L
TROPONIN I SERPL HS-MCNC: 4699 NG/L
TROPONIN I SERPL HS-MCNC: 5460 NG/L
TROPONIN I SERPL HS-MCNC: 6036 NG/L
TSI SER-ACNC: 2.02 MIU/ML (ref 0.55–4.78)
UROBILINOGEN UR STRIP.AUTO-MCNC: NORMAL MG/DL
VENT RATE: 10 /MIN
VENTRICULAR RATE: 106 BPM
VENTRICULAR RATE: 88 BPM
VIT B12 SERPL-MCNC: 310 PG/ML (ref 211–911)
VLDLC SERPL CALC-MCNC: 13 MG/DL (ref 0–30)
WBC # BLD AUTO: 4.9 X10(3) UL (ref 4–11)
WBC # BLD AUTO: 5.2 X10(3) UL (ref 4–11)
WBC # BLD AUTO: 6.2 X10(3) UL (ref 4–11)
WBC # BLD AUTO: 9.7 X10(3) UL (ref 4–11)
WBC # BLD AUTO: 9.7 X10(3) UL (ref 4–11)

## 2024-09-21 PROCEDURE — 93306 TTE W/DOPPLER COMPLETE: CPT

## 2024-09-21 PROCEDURE — 99291 CRITICAL CARE FIRST HOUR: CPT | Performed by: INTERNAL MEDICINE

## 2024-09-21 PROCEDURE — 99223 1ST HOSP IP/OBS HIGH 75: CPT | Performed by: HOSPITALIST

## 2024-09-21 PROCEDURE — 71045 X-RAY EXAM CHEST 1 VIEW: CPT | Performed by: NURSE PRACTITIONER

## 2024-09-21 PROCEDURE — 0BH17EZ INSERTION OF ENDOTRACHEAL AIRWAY INTO TRACHEA, VIA NATURAL OR ARTIFICIAL OPENING: ICD-10-PCS | Performed by: INTERNAL MEDICINE

## 2024-09-21 PROCEDURE — 71250 CT THORAX DX C-: CPT | Performed by: NURSE PRACTITIONER

## 2024-09-21 PROCEDURE — 70450 CT HEAD/BRAIN W/O DYE: CPT | Performed by: EMERGENCY MEDICINE

## 2024-09-21 PROCEDURE — 74176 CT ABD & PELVIS W/O CONTRAST: CPT | Performed by: NURSE PRACTITIONER

## 2024-09-21 PROCEDURE — 3E033RZ INTRODUCTION OF ANTIARRHYTHMIC INTO PERIPHERAL VEIN, PERCUTANEOUS APPROACH: ICD-10-PCS | Performed by: INTERNAL MEDICINE

## 2024-09-21 PROCEDURE — 5A1945Z RESPIRATORY VENTILATION, 24-96 CONSECUTIVE HOURS: ICD-10-PCS | Performed by: INTERNAL MEDICINE

## 2024-09-21 PROCEDURE — B543ZZA ULTRASONOGRAPHY OF RIGHT JUGULAR VEINS, GUIDANCE: ICD-10-PCS | Performed by: INTERNAL MEDICINE

## 2024-09-21 PROCEDURE — 5A09357 ASSISTANCE WITH RESPIRATORY VENTILATION, LESS THAN 24 CONSECUTIVE HOURS, CONTINUOUS POSITIVE AIRWAY PRESSURE: ICD-10-PCS | Performed by: EMERGENCY MEDICINE

## 2024-09-21 PROCEDURE — 99223 1ST HOSP IP/OBS HIGH 75: CPT | Performed by: INTERNAL MEDICINE

## 2024-09-21 PROCEDURE — 05HM33Z INSERTION OF INFUSION DEVICE INTO RIGHT INTERNAL JUGULAR VEIN, PERCUTANEOUS APPROACH: ICD-10-PCS | Performed by: INTERNAL MEDICINE

## 2024-09-21 PROCEDURE — 5A2204Z RESTORATION OF CARDIAC RHYTHM, SINGLE: ICD-10-PCS | Performed by: INTERNAL MEDICINE

## 2024-09-21 PROCEDURE — 76937 US GUIDE VASCULAR ACCESS: CPT | Performed by: NURSE PRACTITIONER

## 2024-09-21 PROCEDURE — 31500 INSERT EMERGENCY AIRWAY: CPT | Performed by: INTERNAL MEDICINE

## 2024-09-21 PROCEDURE — 3E033XZ INTRODUCTION OF VASOPRESSOR INTO PERIPHERAL VEIN, PERCUTANEOUS APPROACH: ICD-10-PCS | Performed by: INTERNAL MEDICINE

## 2024-09-21 PROCEDURE — 99292 CRITICAL CARE ADDL 30 MIN: CPT | Performed by: INTERNAL MEDICINE

## 2024-09-21 PROCEDURE — 36620 INSERTION CATHETER ARTERY: CPT | Performed by: NURSE PRACTITIONER

## 2024-09-21 PROCEDURE — XW033E5 INTRODUCTION OF REMDESIVIR ANTI-INFECTIVE INTO PERIPHERAL VEIN, PERCUTANEOUS APPROACH, NEW TECHNOLOGY GROUP 5: ICD-10-PCS | Performed by: INTERNAL MEDICINE

## 2024-09-21 PROCEDURE — 3E0333Z INTRODUCTION OF ANTI-INFLAMMATORY INTO PERIPHERAL VEIN, PERCUTANEOUS APPROACH: ICD-10-PCS | Performed by: INTERNAL MEDICINE

## 2024-09-21 PROCEDURE — 03HY32Z INSERTION OF MONITORING DEVICE INTO UPPER ARTERY, PERCUTANEOUS APPROACH: ICD-10-PCS | Performed by: INTERNAL MEDICINE

## 2024-09-21 PROCEDURE — 99291 CRITICAL CARE FIRST HOUR: CPT | Performed by: EMERGENCY MEDICINE

## 2024-09-21 PROCEDURE — 71045 X-RAY EXAM CHEST 1 VIEW: CPT | Performed by: INTERNAL MEDICINE

## 2024-09-21 RX ORDER — ASPIRIN 81 MG/1
324 TABLET, CHEWABLE ORAL ONCE
Status: COMPLETED | OUTPATIENT
Start: 2024-09-21 | End: 2024-09-21

## 2024-09-21 RX ORDER — POLYETHYLENE GLYCOL 3350 17 G/17G
17 POWDER, FOR SOLUTION ORAL DAILY PRN
Status: DISCONTINUED | OUTPATIENT
Start: 2024-09-21 | End: 2024-09-24

## 2024-09-21 RX ORDER — DEXMEDETOMIDINE HYDROCHLORIDE 4 UG/ML
INJECTION, SOLUTION INTRAVENOUS CONTINUOUS
Status: DISCONTINUED | OUTPATIENT
Start: 2024-09-21 | End: 2024-09-22

## 2024-09-21 RX ORDER — SODIUM CHLORIDE 9 MG/ML
1000 INJECTION, SOLUTION INTRAVENOUS CONTINUOUS
Status: DISCONTINUED | OUTPATIENT
Start: 2024-09-21 | End: 2024-09-21

## 2024-09-21 RX ORDER — ASPIRIN 325 MG
325 TABLET, DELAYED RELEASE (ENTERIC COATED) ORAL DAILY
Status: DISCONTINUED | OUTPATIENT
Start: 2024-09-22 | End: 2024-09-23 | Stop reason: SDUPTHER

## 2024-09-21 RX ORDER — LEVOFLOXACIN 5 MG/ML
500 INJECTION, SOLUTION INTRAVENOUS ONCE
Status: DISCONTINUED | OUTPATIENT
Start: 2024-09-21 | End: 2024-09-21 | Stop reason: DRUGHIGH

## 2024-09-21 RX ORDER — DEXTROSE MONOHYDRATE AND SODIUM CHLORIDE 5; .45 G/100ML; G/100ML
INJECTION, SOLUTION INTRAVENOUS CONTINUOUS PRN
Status: DISCONTINUED | OUTPATIENT
Start: 2024-09-21 | End: 2024-09-28

## 2024-09-21 RX ORDER — SENNOSIDES 8.8 MG/5ML
10 LIQUID ORAL NIGHTLY PRN
Status: DISCONTINUED | OUTPATIENT
Start: 2024-09-21 | End: 2024-09-28

## 2024-09-21 RX ORDER — ACETAMINOPHEN 10 MG/ML
1000 INJECTION, SOLUTION INTRAVENOUS EVERY 6 HOURS PRN
Status: DISCONTINUED | OUTPATIENT
Start: 2024-09-21 | End: 2024-09-26

## 2024-09-21 RX ORDER — METOCLOPRAMIDE HYDROCHLORIDE 5 MG/ML
5 INJECTION INTRAMUSCULAR; INTRAVENOUS EVERY 8 HOURS PRN
Status: DISCONTINUED | OUTPATIENT
Start: 2024-09-21 | End: 2024-09-28

## 2024-09-21 RX ORDER — FUROSEMIDE 10 MG/ML
40 INJECTION INTRAMUSCULAR; INTRAVENOUS ONCE
Status: COMPLETED | OUTPATIENT
Start: 2024-09-21 | End: 2024-09-21

## 2024-09-21 RX ORDER — DEXTROSE MONOHYDRATE 25 G/50ML
50 INJECTION, SOLUTION INTRAVENOUS
Status: DISCONTINUED | OUTPATIENT
Start: 2024-09-21 | End: 2024-09-28

## 2024-09-21 RX ORDER — ONDANSETRON 2 MG/ML
4 INJECTION INTRAMUSCULAR; INTRAVENOUS EVERY 6 HOURS PRN
Status: DISCONTINUED | OUTPATIENT
Start: 2024-09-21 | End: 2024-09-21 | Stop reason: SDUPTHER

## 2024-09-21 RX ORDER — NICOTINE POLACRILEX 4 MG
15 LOZENGE BUCCAL
Status: DISCONTINUED | OUTPATIENT
Start: 2024-09-21 | End: 2024-09-28

## 2024-09-21 RX ORDER — BISACODYL 10 MG
10 SUPPOSITORY, RECTAL RECTAL
Status: DISCONTINUED | OUTPATIENT
Start: 2024-09-21 | End: 2024-09-21

## 2024-09-21 RX ORDER — PHENYLEPHRINE HCL IN 0.9% NACL 50MG/250ML
PLASTIC BAG, INJECTION (ML) INTRAVENOUS CONTINUOUS
Status: DISCONTINUED | OUTPATIENT
Start: 2024-09-21 | End: 2024-09-22

## 2024-09-21 RX ORDER — MIDAZOLAM HYDROCHLORIDE 1 MG/ML
INJECTION INTRAMUSCULAR; INTRAVENOUS
Status: COMPLETED
Start: 2024-09-21 | End: 2024-09-21

## 2024-09-21 RX ORDER — SENNOSIDES 8.6 MG
17.2 TABLET ORAL NIGHTLY PRN
Status: DISCONTINUED | OUTPATIENT
Start: 2024-09-21 | End: 2024-09-21 | Stop reason: SDUPTHER

## 2024-09-21 RX ORDER — ACETAMINOPHEN 500 MG
1000 TABLET ORAL EVERY 6 HOURS PRN
Status: DISCONTINUED | OUTPATIENT
Start: 2024-09-21 | End: 2024-09-28

## 2024-09-21 RX ORDER — MELATONIN
800 EVERY OTHER DAY
Status: DISCONTINUED | OUTPATIENT
Start: 2024-09-21 | End: 2024-09-28

## 2024-09-21 RX ORDER — BENZONATATE 100 MG/1
100 CAPSULE ORAL 3 TIMES DAILY
Status: DISCONTINUED | OUTPATIENT
Start: 2024-09-21 | End: 2024-09-23

## 2024-09-21 RX ORDER — ACETAMINOPHEN 650 MG/1
650 SUPPOSITORY RECTAL EVERY 4 HOURS PRN
Status: DISCONTINUED | OUTPATIENT
Start: 2024-09-21 | End: 2024-09-28

## 2024-09-21 RX ORDER — ALBUTEROL SULFATE 5 MG/ML
SOLUTION RESPIRATORY (INHALATION)
Status: COMPLETED
Start: 2024-09-21 | End: 2024-09-21

## 2024-09-21 RX ORDER — ENOXAPARIN SODIUM 100 MG/ML
40 INJECTION SUBCUTANEOUS DAILY
Status: DISCONTINUED | OUTPATIENT
Start: 2024-09-21 | End: 2024-09-21

## 2024-09-21 RX ORDER — DEXAMETHASONE SODIUM PHOSPHATE 4 MG/ML
6 VIAL (ML) INJECTION ONCE
Status: COMPLETED | OUTPATIENT
Start: 2024-09-22 | End: 2024-09-22

## 2024-09-21 RX ORDER — GABAPENTIN 100 MG/1
100 CAPSULE ORAL 3 TIMES DAILY
Status: DISCONTINUED | OUTPATIENT
Start: 2024-09-21 | End: 2024-09-21

## 2024-09-21 RX ORDER — ALBUTEROL SULFATE 5 MG/ML
10 SOLUTION RESPIRATORY (INHALATION) CONTINUOUS
Status: DISCONTINUED | OUTPATIENT
Start: 2024-09-21 | End: 2024-09-21

## 2024-09-21 RX ORDER — EZETIMIBE 10 MG/1
10 TABLET ORAL DAILY
Status: DISCONTINUED | OUTPATIENT
Start: 2024-09-21 | End: 2024-09-28

## 2024-09-21 RX ORDER — ACETAMINOPHEN 160 MG/5ML
650 SOLUTION ORAL EVERY 4 HOURS PRN
Status: DISCONTINUED | OUTPATIENT
Start: 2024-09-21 | End: 2024-09-28

## 2024-09-21 RX ORDER — ACETAMINOPHEN 325 MG/1
650 TABLET ORAL EVERY 4 HOURS PRN
Status: DISCONTINUED | OUTPATIENT
Start: 2024-09-21 | End: 2024-09-28

## 2024-09-21 RX ORDER — PHENYLEPHRINE HCL IN 0.9% NACL 50MG/250ML
PLASTIC BAG, INJECTION (ML) INTRAVENOUS
Status: COMPLETED
Start: 2024-09-21 | End: 2024-09-21

## 2024-09-21 RX ORDER — SODIUM CHLORIDE 9 MG/ML
INJECTION, SOLUTION INTRAVENOUS CONTINUOUS
Status: DISCONTINUED | OUTPATIENT
Start: 2024-09-21 | End: 2024-09-21

## 2024-09-21 RX ORDER — POLYETHYLENE GLYCOL 3350 17 G/17G
17 POWDER, FOR SOLUTION ORAL DAILY PRN
Status: DISCONTINUED | OUTPATIENT
Start: 2024-09-21 | End: 2024-09-28

## 2024-09-21 RX ORDER — METOPROLOL SUCCINATE 25 MG/1
25 TABLET, EXTENDED RELEASE ORAL
Status: DISCONTINUED | OUTPATIENT
Start: 2024-09-21 | End: 2024-09-21

## 2024-09-21 RX ORDER — VANCOMYCIN HYDROCHLORIDE
1500 EVERY 24 HOURS
Status: DISCONTINUED | OUTPATIENT
Start: 2024-09-22 | End: 2024-09-22

## 2024-09-21 RX ORDER — MELATONIN
1600 EVERY OTHER DAY
Status: DISCONTINUED | OUTPATIENT
Start: 2024-09-22 | End: 2024-09-28

## 2024-09-21 RX ORDER — LEVOFLOXACIN 5 MG/ML
750 INJECTION, SOLUTION INTRAVENOUS
Status: DISCONTINUED | OUTPATIENT
Start: 2024-09-21 | End: 2024-09-21

## 2024-09-21 RX ORDER — NICOTINE POLACRILEX 4 MG
30 LOZENGE BUCCAL
Status: DISCONTINUED | OUTPATIENT
Start: 2024-09-21 | End: 2024-09-28

## 2024-09-21 RX ORDER — METHYLPREDNISOLONE SODIUM SUCCINATE 125 MG/2ML
125 INJECTION, POWDER, LYOPHILIZED, FOR SOLUTION INTRAMUSCULAR; INTRAVENOUS ONCE
Status: COMPLETED | OUTPATIENT
Start: 2024-09-21 | End: 2024-09-21

## 2024-09-21 RX ORDER — HEPARIN SODIUM 5000 [USP'U]/ML
5000 INJECTION, SOLUTION INTRAVENOUS; SUBCUTANEOUS EVERY 8 HOURS SCHEDULED
Status: DISCONTINUED | OUTPATIENT
Start: 2024-09-21 | End: 2024-09-24

## 2024-09-21 RX ORDER — SODIUM PHOSPHATE, DIBASIC AND SODIUM PHOSPHATE, MONOBASIC 7; 19 G/230ML; G/230ML
1 ENEMA RECTAL ONCE AS NEEDED
Status: DISCONTINUED | OUTPATIENT
Start: 2024-09-21 | End: 2024-09-24 | Stop reason: ALTCHOICE

## 2024-09-21 RX ORDER — BISACODYL 10 MG
10 SUPPOSITORY, RECTAL RECTAL
Status: DISCONTINUED | OUTPATIENT
Start: 2024-09-21 | End: 2024-09-28

## 2024-09-21 RX ORDER — ETOMIDATE 2 MG/ML
INJECTION INTRAVENOUS
Status: COMPLETED
Start: 2024-09-21 | End: 2024-09-21

## 2024-09-21 RX ORDER — THIAMINE HYDROCHLORIDE 100 MG/ML
200 INJECTION, SOLUTION INTRAMUSCULAR; INTRAVENOUS DAILY
Status: COMPLETED | OUTPATIENT
Start: 2024-09-21 | End: 2024-09-25

## 2024-09-21 RX ORDER — GUAIFENESIN 600 MG/1
600 TABLET, EXTENDED RELEASE ORAL 2 TIMES DAILY
Status: DISCONTINUED | OUTPATIENT
Start: 2024-09-21 | End: 2024-09-23

## 2024-09-21 RX ORDER — CHLORHEXIDINE GLUCONATE ORAL RINSE 1.2 MG/ML
15 SOLUTION DENTAL
Status: DISCONTINUED | OUTPATIENT
Start: 2024-09-21 | End: 2024-09-24

## 2024-09-21 RX ORDER — SODIUM PHOSPHATE, DIBASIC AND SODIUM PHOSPHATE, MONOBASIC 7; 19 G/230ML; G/230ML
1 ENEMA RECTAL ONCE AS NEEDED
Status: DISCONTINUED | OUTPATIENT
Start: 2024-09-21 | End: 2024-09-28

## 2024-09-21 RX ORDER — INSULIN DEGLUDEC 100 U/ML
20 INJECTION, SOLUTION SUBCUTANEOUS DAILY
Status: DISCONTINUED | OUTPATIENT
Start: 2024-09-21 | End: 2024-09-21

## 2024-09-21 RX ORDER — MIDAZOLAM HYDROCHLORIDE 1 MG/ML
2 INJECTION INTRAMUSCULAR; INTRAVENOUS EVERY 5 MIN PRN
Status: DISCONTINUED | OUTPATIENT
Start: 2024-09-21 | End: 2024-09-24

## 2024-09-21 RX ORDER — DEXAMETHASONE SODIUM PHOSPHATE 4 MG/ML
6 VIAL (ML) INJECTION EVERY 24 HOURS
Status: DISCONTINUED | OUTPATIENT
Start: 2024-09-21 | End: 2024-09-21

## 2024-09-21 RX ORDER — INSULIN DEGLUDEC 100 U/ML
7 INJECTION, SOLUTION SUBCUTANEOUS NIGHTLY
Status: DISCONTINUED | OUTPATIENT
Start: 2024-09-21 | End: 2024-09-23

## 2024-09-21 RX ORDER — METRONIDAZOLE 500 MG/100ML
500 INJECTION, SOLUTION INTRAVENOUS EVERY 12 HOURS
Status: DISCONTINUED | OUTPATIENT
Start: 2024-09-21 | End: 2024-09-24

## 2024-09-21 RX ORDER — IPRATROPIUM BROMIDE AND ALBUTEROL SULFATE 2.5; .5 MG/3ML; MG/3ML
3 SOLUTION RESPIRATORY (INHALATION)
Status: DISCONTINUED | OUTPATIENT
Start: 2024-09-21 | End: 2024-09-22

## 2024-09-21 NOTE — CONSULTS
[facility] Pharmacy Dosing Service      Initial Pharmacokinetic Consult for Vancomycin Dosing     David Reyes is a 79 year old male who is being initiated on vancomycin therapy for pneumonia.  Pharmacy has been asked to dose vancomycin by Dr. Cervantes.  The initial treatment and monitoring approach will be non-AUC strategy.        Weight and Temperature:    Wt Readings from Last 1 Encounters:   24 107.8 kg (237 lb 10.5 oz)        Temp Readings from Last 1 Encounters:   24 98.7 °F (37.1 °C) (Temporal)      Labs:   Recent Labs   Lab 24  0634   CREATSERUM 1.80* 2.06*      Estimated Creatinine Clearance: 30 mL/min (A) (based on SCr of 2.06 mg/dL (H)).     Recent Labs   Lab 24  0634   WBC 6.0 4.9          The Pharmacokinetic Target is:    Trough/random 10-15 mg/L    Renal Dosing Considerations:    LORENA/ARF     Assessment/Plan:   Initial/Loading dose: Will receive 2250 mg IV (25 mg/kg, capped at 2250 mg) x 1 loading dose.      Maintenance dose: Pharmacy will dose vancomycin at 1500 mg IV every 24 hours    Monitorin) Plan for vancomycin trough to be obtained at steady state    2) Pharmacy will order SCr as clinically indicated to assess renal function.    3) Pharmacy will monitor for toxicity and efficacy, adjust vancomycin dose and/or frequency, and order vancomycin levels as appropriate per the Pharmacy and Therapeutics Committee approved protocol until discontinuation of the medication.       We appreciate the opportunity to assist in the care of this patient.     Snow Hughes, PharmD  2024  11:54 AM  [facility]: [phone]

## 2024-09-21 NOTE — PLAN OF CARE
Received pt alert and oriented x4. Calm and cooperative. Precedex restarted later in shift for anxiety/WOB. Bipap throughout shift, saturating well. Dry cough. Afebrile throughout shift. Levophed titrated off. A-line and CVC placed for closer monitoring. Lasix given x1. Straight cath x1 then able to urinate on own. Echo done this am. Trending troponins and lactics. Cardiology consulted. Heme consulted and labs added. Insulin gtt started this am for hyperglycemia, stopped per order set. CT C/A/P, see results. Wife updated on POC.     1845 pt flipped into SVT, confirmed with EKG. Critical care MD notified, BP and airway became unstable. Intubated at 1910. See MD note and MAR.

## 2024-09-21 NOTE — ED INITIAL ASSESSMENT (HPI)
Per wife, pt has been cough since Friday and spitting up phlegm. Per wife, pt had a few episodes of vomiting at home. Wife feels pt is more confused today, pt A&O x3 in triage.

## 2024-09-21 NOTE — PROGRESS NOTES
Noted that pt on Methotrexate for RA. Will give 4 doses leucovorin to accelerate methotrexate clearance given LORENA, this may help counts further.    Brigid Gresham M.D.    [facility]    [facility]  [address] [place], [address]

## 2024-09-21 NOTE — CONSULTS
[facility]  Report of Consultation    David Reyes Patient Status:  Observation    1945 MRN [id number]   Location [facility] 4SW-A Attending Kj Gonzalez DO   Hosp Day # 0 PCP Thea Harris DO     Reason for Consultation:    Anemia, thrombocytopenia.    History of Present Illness:    David Reyes is a 79 year old male that was admitted last night through the ER with worsening dyspnea, altered mental status.  On admission, he tested positive for COVID.  He is currently in the ICU, on BiPAP.  Consult requested for thrombocytopenia.    On chart review, he is noted to have thrombocytopenia as far back as .  Also noted to be anemic for the same time.    History:  Past Medical History:    Anxiety state    Back problem    CAD (coronary artery disease)    Congestive heart disease (HCC)    Congestive heart failure (HCC)    CORONARY ARTERY DISEASE    cabg    Coronary atherosclerosis    Diabetes (HCC)    DM (diabetes mellitus) (HCC)    Heart attack (HCC)    High blood pressure    High cholesterol    Other and unspecified hyperlipidemia    Rheumatoid arthritis (HCC)    Sleep apnea    Subdural hematoma (HCC)    Unspecified sleep apnea    Visual impairment     Past Surgical History:   Procedure Laterality Date    Angiogram  2015    Angioplasty (coronary)  2/23/15    RCA    Bypass surgery          Cabg      LAD, Diagonal    Cardiac defibrillator placement  14    [employer] dual chamber ICD    Cath drug eluting stent      Tonsillectomy       Family History   Problem Relation Age of Onset    Cancer Father     Heart Disease Mother     Other (alzheimers) Mother     Stroke Neg       reports that he quit smoking about 38 years ago. His smoking use included cigarettes. He started smoking about 64 years ago. He has a 26 pack-year smoking history. He has never used smokeless tobacco. He reports current alcohol use of about 2.0 standard drinks of alcohol per week. He reports that  he does not use drugs.    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       Medications:   ipratropium-albuterol  3 mL Nebulization Q6H WA    guaiFENesin ER  600 mg Oral BID    benzonatate  100 mg Oral TID    ezetimibe  10 mg Oral Daily    [START ON 9/22/2024] folic acid  1,600 mcg Oral QOD    folic acid  800 mcg Oral QOD    gabapentin  100 mg Oral TID    metoprolol succinate ER  25 mg Oral 2x Daily(Beta Blocker)    dexamethasone  6 mg Intravenous Q24H    heparin  5,000 Units Subcutaneous Q8H [facility]    [START ON 9/22/2024] aspirin  325 mg Oral Daily    levofloxacin  750 mg Intravenous Q48H    cefTRIAXone  2 g Intravenous Q24H    doxycycline  100 mg Intravenous Q12H       glucose **OR** glucose **OR** glucose-vitamin C **OR** dextrose **OR** glucose **OR** glucose **OR** glucose-vitamin C    acetaminophen    metoclopramide    polyethylene glycol (PEG 3350)    sennosides    bisacodyl    fleet enema    Review of Systems:  A comprehensive 14 point review of systems was completed.  Pertinent positives and negatives noted in the the HPI.    Physical Exam:  Vital signs: Blood pressure 109/63, pulse 85, temperature 98.7 °F (37.1 °C), temperature source Temporal, resp. rate 20, height 1.753 m (5' 9\"), weight 107.8 kg (237 lb 10.5 oz), SpO2 99%.    Patient not examined due to active COVID-19 infection.    Laboratory Data:    Lab Results   Component Value Date    WBC 4.9 09/21/2024    HGB 7.9 09/21/2024    HCT 24.6 09/21/2024    PLT 47.0 09/21/2024    CREATSERUM 2.06 09/21/2024    BUN 32 09/21/2024     09/21/2024    K 3.9 09/21/2024     09/21/2024    CO2 18.0 09/21/2024     09/21/2024    CA 8.5 09/21/2024    ALB 4.1 09/21/2024    ALKPHO 49 09/21/2024    BILT 0.4 09/21/2024    TP 6.6 09/21/2024    AST 32 09/21/2024    ALT 25 09/21/2024    INR 1.16 09/20/2024    PTP  14.9 09/20/2024    PGLU 399 09/21/2024       Imaging:    Imaging data reviewed in Epic.    Assessment/Plan:    # Thrombocytopenia: Acute on chronic, noted as far back as 2021.  Likely consumption due to ongoing infection.  LDH mildly elevated.  Will rule out hemolysis.  Additional labs pending.  Continue supportive care.  Transfuse for active bleeding or platelets < 10K.    # Anemia: Acute on chronic, noted since 2021.  Iron deficient, will start IV iron.  Of note, this will raise ferritin and should not be interpreted as worsening COVID-19 infection.  Will complete additional workup.    Thank you for allowing me to participate in the care David Reyes. I will continue to follow along.      Brigid Gresham M.D.    [facility]    [address] [place], [address]    9/21/2024

## 2024-09-21 NOTE — PROGRESS NOTES
Patient arrived from ED on BiPAP. Patient alert and orientated x3-4 with complaints of chronic pain to his back. Complaints of shortness of breath, BiPAP maintained this shift. Precedex gtt on for anxiousness and restlessness; off this shift for vital sign instability. MD made aware of hypotension, see new orders. Levophed started and titrated as tolerated and as ordered in MAR.

## 2024-09-21 NOTE — HISTORICAL OFFICE NOTE
[facility]  [address] [place] [address], 4th [place] [address]  [phone]      David Reyes  Progress Note  Demographics:  Name: David Reyes YOB: 1945  Age: 78, Male Medical Record No: [id number]  Visited Date/Time: 07/24/2024 01:00 PM    Chief Complaints  6 month follow up  History of Present Illness  Mr. Mccoy is a pleasant 78-year-old gentleman with a history of heart failure with reduced ejection fraction.  He has ischemic cardiomyopathy with an ejection fraction of 25 to 30%, BiV ICD and history of bypass surgery and PCI.  He has severe pulmonary hypertension involved.  Entresto was changed back to ARB because it was too expensive.  Beta-blocker dosing was increased but he is not sure if this is caused him to be significantly fatigued.  Will have him decrease the morning dose to 25 mg and the afternoon dose to say the same as 50 mg for metoprolol.    His rheumatoid arthritis is controlled with methotrexate.  He also takes folic acid.  Intermittently he goes on prednisone.    His next visit with Dr. Mullen should be in October 2024.  This is the same day that he should see Dr. Paz in follow-up.    Previous EP device check showed SVT events following 2 VF zone and patient received ATP therapy in late July 2023.  Dr. Mullen recommended to increase metoprolol succinate up to 75 mg p.o. daily to suppress SVT episodes.     2023-  -as compared to echo May 2022 mild improvement in LV systolic function.  LVEF 25 to 30% from 20% post BiV.  Left ventricle size mildly decreased but still markedly enlarged.  Moderate to severe global hypokinesis but with normal left atrial pressure.  Moderately calcified aortic valve 1.9 cm core with mean gradient of 8 mmHg with no significant stenosis in only trace AI.  Calcified mitral valve 1+ MR with normal pulmonary pressures.  Mildly enlarged right ventricle size with preserved RV function.    March 2024 echo was  recently done and there is no change from 2023    Plavix was dc in hospital in 09/2022 - due to low platelets in 50's to 70's but he stayed on baby ASA.    He has history of lisinopril intolerance with cough.  Losartan is well-tolerated.    Follow-up Lexiscan nuclear stress test -March 2022 -no new ischemia but fixed defect from prior infarct and anterolateral wall small area.  LVEF was 22% with moderately enlarged left ventricle.  Liver enzymes normal.  Stable vitals.  Paced rhythm.    Initially his LVEF was 10% in the past.    PCI of RCA with drug-coated stent February 2015.  PCI of left circumflex chronic total occlusion with drug-coated stent -February 2015.  CAD with LAD chronic total occlusion but patient has widely patent LIMA to LAD graft which also supplies retrogradely the major diagonal branch.    RHC -2013 -severe pulmonary hypertension at 60 mmHg and PVR of 4.1 Wood units.    Currently he is doing well.  He is hemodynamically compensated and asymptomatic.  Cardiac risk factors Hypertension, Dyslipidemia and Former smoker  Past Medical History  1.ACC/AHA stage C chronic systolic heart failure  2.Cardiomyopathy, ischemic  3.CAD (coronary artery disease)  4.Pure hypercholesterolemia  5.Pulmonary hypertension secondary to increased PVR  6.Aortic stenosis, mild  7.S/P coronary artery stent placement  8.ICD (implantable cardioverter-defibrillator) in place  9.Carotid artery stenosis  10.Obesity  11.Rheumatoid arthritis  12.Sleep apnea, obstructive  13.Pre-op testing  14.Hx of CABG  15.Diabetes mellitus type 2 in obese  Past Surgical History  1.S/P coronary artery stent placement  2.Hx of CABG  Family History  1. Mother - S/P CABG x 3  Social History  Smoking status Former [id number] (End Date)  Tobacco usage - No (Non-smoker for personal reasons (finding))  Alcohol usage - Yes (occasional )  Review of systems  Cardiovascular No history of Chest pain, CUMMINGS, Palpitations, Syncope, PND, Orthopnea, Edema and  Claudication  Respiratory SOB  Physical Examination  Vitals Left Arm Sitting BP 98 / 50 mmHg, Pulse rate 43 bpm, Height in 5' 9\", BMI: 35.1, Weight in 238 lbs (or) 107.95 kgs and BSA : 2.33 cc/m²  General Appearance No Acute Distress and Appropriate  Head/Eyes/Ears/Nose/Mouth/Throat Conjunctiva pink, Sclera Clear, Mucous membranes Moist and No pallor  Neck No carotid bruits and No JVD  Respiratory Lungs clear with normal breath sounds  Cardiovascular rrr, nl s1 s2, 2/6 systolic and  Gastrointestinal Abdomen soft and Non-tender  Musculoskeletal Normal spine  Gait Normal gait  Strength and tone Normal muscle strength and Normal muscle tone  Upper Extremities No edema  Lower Extremities Pulses 2+ and equal bilaterally and No edema  Skin Warm and dry  Neurologic / Psychiatric Alert and Oriented  Speech Normal speech  Allergies  1.Penicillins - Allergen(Reaction:cripling effect, Severity:Severe)  2.Statins-HMG-CoA Reductase Inhibitors - Allergen(Reaction:myalgias, Severity:Moderate)  Medications (Info obtained by: Verbal)  1.aspirin 81 MG tablet, 1 tab daily  2.ergocalciferol (vitamin D2) 1,250 mcg (50,000 unit) capsule, take 1 tablet weekly x 8 weeks then decrease to 50,000 units monthly  3.EZETIMIBE 10MG TABLETS, TAKE 1 TABLET BY MOUTH EVERY DAY  4.folic acid (FOLATE) 1 MG tablet, Take 1 mg by mouth daily.  5.LOSARTAN 25MG TABLETS, TAKE 1 TABLET BY MOUTH EVERY EVENING  6.lovastatin 40 mg tablet, Take 1 tablet orally 2 times a day.  7.metFORMIN 1,000 mg tablet, Take 1 tablet orally 2 times a day.  8.methotrexate 2.5 MG Tab, 4 tabs weekly, as dir  9.metoprolol succinate ER 50 mg tablet,extended release 24 hr, Take 1 tablet orally 2 times a day.  10.TORSEMIDE 20MG TABLETS, TAKE 1 TABLET BY MOUTH TWICE DAILY  11.Toujeo SolBandar U-300 Insulin 300 unit/mL (1.5 mL) subcutaneous pen, Inject 24 units into the skin nightly  Impression  1.ACC/AHA stage C chronic systolic heart failure  2.Cardiomyopathy, ischemic  3.CAD (coronary  artery disease)  4.Pure hypercholesterolemia  5.Pulmonary hypertension secondary to increased PVR  6.Aortic stenosis, mild  7.S/P coronary artery stent placement  8.ICD (implantable cardioverter-defibrillator) in place  9.Carotid artery stenosis  10.Obesity  11.Rheumatoid arthritis  12.Sleep apnea, obstructive  13.Hx of CABG  14.Diabetes mellitus type 2 in obese  Assessment & Plan  Mr. Mccoy is a pleasant 78-year-old gentleman with a history of heart failure with reduced ejection fraction.  He has ischemic cardiomyopathy with an ejection fraction of 25 to 30%, BiV ICD and history of bypass surgery and PCI.  He has severe pulmonary hypertension involved.  Entresto was changed back to ARB because it was too expensive.  Beta-blocker dosing was increased but he is not sure if this is caused him to be significantly fatigued.  Will have him decrease the morning dose to 25 mg and the afternoon dose to say the same as 50 mg for metoprolol.    His rheumatoid arthritis is controlled with methotrexate.  He also takes folic acid.  Intermittently he goes on prednisone.    His next visit with Dr. Mullen should be in October 2024.  This is the same day that he should see Dr. Paz in follow-up.    Previous EP device check showed SVT events following 2 VF zone and patient received ATP therapy in late July 2023.  Dr. Mullen recommended to increase metoprolol succinate up to 75 mg p.o. daily to suppress SVT episodes.     2023-  -as compared to echo May 2022 mild improvement in LV systolic function.  LVEF 25 to 30% from 20% post BiV.  Left ventricle size mildly decreased but still markedly enlarged.  Moderate to severe global hypokinesis but with normal left atrial pressure.  Moderately calcified aortic valve 1.9 cm core with mean gradient of 8 mmHg with no significant stenosis in only trace AI.  Calcified mitral valve 1+ MR with normal pulmonary pressures.  Mildly enlarged right ventricle size with preserved RV  function.    March 2024 echo was recently done and there is no change from 2023    Plavix was dc in hospital in 09/2022 - due to low platelets in 50's to 70's but he stayed on baby ASA.    He has history of lisinopril intolerance with cough.  Losartan is well-tolerated.    Follow-up Lexiscan nuclear stress test -March 2022 -no new ischemia but fixed defect from prior infarct and anterolateral wall small area.  LVEF was 22% with moderately enlarged left ventricle.  Liver enzymes normal.  Stable vitals.  Paced rhythm.    Initially his LVEF was 10% in the past.    PCI of RCA with drug-coated stent February 2015.  PCI of left circumflex chronic total occlusion with drug-coated stent -February 2015.  CAD with LAD chronic total occlusion but patient has widely patent LIMA to LAD graft which also supplies retrogradely the major diagonal branch.    RHC -2013 -severe pulmonary hypertension at 60 mmHg and PVR of 4.1 Wood units.    Currently he is doing well.  He is hemodynamically compensated and asymptomatic.    Please see Dr. Mullen and Dr. Paz in October 2024 on the same day  Decrease Toprol in the morning to 25 mg and keep it at 50 mg in the afternoon.  Please let us know if this decreases some of his complaints of fatigue.    Please get blood work before you see Dr. Paz and Sebas in October-CBC, CMP, fasting lipid panel, TSH, proBNP, vitamin D, hemoglobin A1c    Would continue to recommend a low fat, low cholesterol diet as well as a regular exercise program, as they are able to tolerate, and will follow this very nice person in office. Please return to clinic to see me in 6 months.  Labs and Diagnostics ordered  1.Hemoglobin A1c (2 Months)  2.Lipid panel - Fasting (2 Months)  3.TSH (Thyroid Stimulating Hormone) Panel (2 Months)  4.Vitamin D, Total (2 Months)  5.CBC (Complete Blood Count) (2 Months)  6.CMP (comprehensive metabolic panel) (2 Months)  7.proBNP (2 Months)  Future appointments  1.Referral Visit  - Thea Steven ([email or web address]) : (Today)  2.Follow up visit - Marcela Sung MD (6 Months)  3.Follow up visit - John Paz MD (3 Months)  4.Follow up visit - Brendan Mullen MD (3 Months)  Miscellaneous  1.Weight monitoring (regime/therapy)  Nurses documentation  Refills: no  Upcoming surgeries: no   Use of assistant devices: no  Verbal EKG: no  Medication list and triage completed by: EC   Patient instructions  Please see Dr. Mullen and Dr. Paz in October 2024 on the same day  Decrease Toprol in the morning to 25 mg and keep it at 50 mg in the afternoon.  Please let us know if this decreases some of his complaints of fatigue.    Please get blood work before you see Dr. Paz and Sebas in October-CBC, CMP, fasting lipid panel, TSH, proBNP, vitamin D, hemoglobin A1c    Follow up in 6 months  Lab Details  CBC W/ DIFFERENTIAL  02/01/2024 05:04:59 PM  WBC 4.1 4.0-11.0 x10(3) uL  F  RED BLOOD COUNT 3.68 3.80-5.80 x10(6)uL L F  HGB 11.0 13.0-17.5 g/dL L F  HCT 34.9 39.0-53.0 % L F  PLATELETS 53.0 150.0-450.0 10(3)uL L F  MEAN CELL VOLUME 94.8 80.0-100.0 fL  F  MEAN CORPUSCULAR HEMOGLOBIN 29.9 26.0-34.0 pg  F  MEAN CORPUSCULAR HGB CONC 31.5 31.0-37.0 g/dL  F  RED CELL DISTRIBUTION WIDTH CV 16.0 %  F  NEUTROPHIL ABS PRELIM 2.28 1.50-7.70 x10 (3) uL  F  NEUTROPHIL ABSOLUTE 2.28 1.50-7.70 x10(3) uL  F  LYMPHOCYTE ABSOLUTE 1.24 1.00-4.00 x10(3) uL  F  MONOCYTE ABSOLUTE 0.52 0.10-1.00 x10(3) uL  F  EOSINOPHIL ABSOLUTE 0.05 0.00-0.70 x10(3) uL  F  BASOPHIL ABSOLUTE 0.02 0.00-0.20 x10(3) uL  F  IMMATURE GRANULOCYTE COUNT 0.02 0.00-1.00 x10(3) uL  F  NEUTROPHIL % 55.2 %  F  LYMPHOCYTE % 30.0 %  F  MONOCYTE % 12.6 %  F  EOSINOPHIL % 1.2 %  F  BASOPHIL % 0.5 %  F  IMMATURE GRANULOCYTE RATIO % 0.5 %  F  T4, FREE (S)  02/01/2024 03:57:37 PM  FREE T4 0.9 0.8-1.7 ng/dL  F  VITAMIN D, 25-HYDROXY  02/01/2024 03:53:36 PM  25-HYDROXYVITAMIN D (TOTAL) 21.4 30.0-100.0 ng/mL L F  LIPID PANEL  02/01/2024 03:28:39  PM  CHOLESTEROL 120 <200 mg/dL  F  HDL CHOL 36 40-59 mg/dL L F  TRIGLYCERIDES 121  mg/dL  F  LDL CHOLESTROL 62 <100 mg/dL  F  VLDL 18 0-30 mg/dL  F  NON HDL CHOL 84 <130 mg/dL  F  FASTING PATIENT LIPID ANSWER Yes   F  TSH W REFLEX TO FREE T4  02/01/2024 03:28:39 PM  TSH 4.240 0.358-3.740 mIU/mL H F  PRO BETA NATRIURETIC PEPTIDE  02/01/2024 03:28:39 PM  PRO-BETA NATRIURETIC PEPTIDE 4382 <450 pg/mL H F  COMP METABOLIC PANEL (14)  02/01/2024 03:28:38 PM  GLUCOSE 133 70-99 mg/dL H F  SODIUM 140 136-145 mmol/L  F  POTASSIUM 4.6 3.5-5.1 mmol/L  F  CHLORIDE 109  mmol/L  F  CO2 28.0 21.0-32.0 mmol/L  F  ANION GAP 3 0-18 mmol/L  F  BUN 24 9-23 mg/dL H F  CREATININE 1.46 0.70-1.30 mg/dL H F  CALCIUM 9.2 8.5-10.1 mg/dL  F  OSMOLALITY CALCULATED 296 275-295 mOsm/kg H F  E GFR CR 50 >=60 mL/min/1.73m2 L F  AST 33 15-37 U/L  F  ALT 32 16-61 U/L  F  ALKALINE PHOSPHATASE 58  U/L  F  BILIRUBIN, TOTAL 0.6 0.1-2.0 mg/dL  F  TOTAL PROTEIN 7.4 6.4-8.2 g/dL  F  ALBUMIN 3.8 3.4-5.0 g/dL  F  GLOBULIN 3.6 2.8-4.4 g/dL  F  A/G RATIO 1.1 1.0-2.0  F  FASTING PATIENT CMP ANSWER Yes   F  Diagnostics Details  Trans Thoracic Echocardiogram 03/01/2024  1.The study quality is good.    2.The left ventricle is severely enlarged. Wall thickness is normal. Global left ventricular systolic function is severely decreased. The left ventricular ejection fraction is 25%. The left ventricle diastolic function is impaired (Grade II) with an elevated left atrial pressure. Severe diffuse global hypokinesis noted. Lumason was used to enhance visualization of endocardial borders.    3.Diffuse thickening of the aortic valve cusps is noted with reduced cuspal excursion. Aortic cusp separation measures 1.1 cm. Mild aortic valve stenosis is present but can be underestimated with low LVEF. Aortic valve area continuity equation is 1.6 cm². The trans-aortic peak velocity is 2.2 m/s. The trans-aortic mean gradient is 12.0 mmHg. Mild (1+) aortic  regurgitation.    4.The right ventricle is normal in size. Right ventricular systolic function is borderline normal. A linear echo density is noted in the right ventricle, suggestive of a ICD lead.    5.The estimated pulmonary artery systolic pressure is 35 mmHg, normal.    6.The left atrium is mildly dilated measuring 4.4 cm.    7.Mild calcification of the mitral valve is noted. Trace mitral regurgitation.    8.As compared to prior study 02/2023 - no significant changes.    Nuclear PET 03/18/2022  1.Stress EKG is normal.    2.The heart rate recovery is normal.    1.This is an abnormal perfusion study. Study is consistent with prior infarction in the anterolateral wall small area    2.This scan is suggestive of moderate risk for future cardiovascular events.    3.Small fixed perfusion abnormality of moderate intensity in the anterior lateral region.    4.The left ventricular cavity is noted to be markedly enlarged on the stress studies. The stress left ventricular ejection fraction was calculated to be 22% and left ventricular global function is severely reduced. The rest left ventricular cavity is noted to be markedly enlarged. The rest left ventricular ejection fraction was calculated to be 17% and rest left ventricular global function is severely reduced.    5.When compared to the resting ejection fraction (17%), the stress ejection fraction (22%) has increased.    6.The study quality is good.    Carotid Ultrasound 02/10/2022  1.The study quality is good.    2.1-39% stenosis in the proximal right internal carotid artery based on Bluth Criteria.    3.1-39% stenosis in the proximal left internal carotid artery based on Bluth Criteria.    4.Antegrade right vertebral artery flow.    5.Antegrade left vertebral artery flow.    CPOE Orders carried out by: Traci Peralta  Care Providers: Marcela Sung MD and Traci Peralta  Electronically Authenticated by  Marcela Sung MD  07/24/2024 02:12:11 PM  Disclaimer: Components of this note  were documented using voice recognition system and are subject to errors not corrected at proofreading. Contact the author of this note for any clarifications.

## 2024-09-21 NOTE — PROGRESS NOTES
09/21/24 1248   BiPAP   $ RT Standby Charge (per 15 min) 1   Device V60   BiPAP / CPAP CE# 12   BiPAP bacteria filter Yes   BIPAP plugged into main power? Yes   Mode Spontaneous/Timed   Interface Full face mask   Mask Size Large   Control Settings   Set Rate 10 breaths/min   Set IPAP 12   Set EPAP 5   Oxygen Percent 30 %   Inspiratory time 0.8   Insp rise time 4   SIPAP   Resp (!) 32   FiO2 (%) 30 %   BiPAP/CPAP Alarm Settings   Hi Rate 50   Low Rate 10   Hi VT 1700   Low    Hi Pressure 30   Low Pressure 2   Low Pressure Delay 20   Low MV 2   BiPAP/CPAP Monitored Parameters   Pulse 93   SpO2 98 %   PIP 13   Total Rate 32 breaths/min   Minute Volume 49   Tidal Volume 1523   Total Leak 15   Trigger % 100   Ti/Ttot % 34   Toleration Well     Pt remain on BiPAP with above setting. Not stable for weaning.

## 2024-09-21 NOTE — ED PROVIDER NOTES
Patient Seen in: [facility] Emergency Department      History     Chief Complaint   Patient presents with    Altered Mental Status    Cough/URI     Stated Complaint: AMS x2 days    Subjective:   HPI    79-year-old male presents ED for evaluation of difficulty breathing.  Triage note states altered mental status was more of difficulty breathing cough.  Patient has had difficulty breathing and cough for 1 week.  Patient has history of CHF, pacemaker placement.  He does complain of wheezing.  Cough has been productive.  Has had some chills at home but no documented temperature.  He is have a runny nose.  Patient states he had 2 episodes of vomiting after coughing.  Chest pain only with coughing.  Wife states he is slightly more confused than normal    Objective:   Past Medical History:    CAD (coronary artery disease)    Congestive heart disease (HCC)    Congestive heart failure (HCC)    CORONARY ARTERY DISEASE    cabg    Coronary atherosclerosis    Diabetes (HCC)    DM (diabetes mellitus) (HCC)    Heart attack (HCC)    High cholesterol    Other and unspecified hyperlipidemia    Rheumatoid arthritis (HCC)    Sleep apnea    Unspecified sleep apnea              Past Surgical History:   Procedure Laterality Date    Angiogram  2015    Angioplasty (coronary)  2/23/15    RCA    Bypass surgery          Cabg      LAD, Diagonal    Cardiac defibrillator placement  14    [employer] dual chamber ICD    Cath drug eluting stent      Tonsillectomy                  Social History     Socioeconomic History    Marital status:    Tobacco Use    Smoking status: Former     Current packs/day: 0.00     Average packs/day: 1 pack/day for 26.0 years (26.0 ttl pk-yrs)     Types: Cigarettes     Start date: 1960     Quit date: 1986     Years since quittin.3    Smokeless tobacco: Never    Tobacco comments:     NON-SMOKER   Vaping Use    Vaping status: Never Used   Substance and Sexual Activity     Alcohol use: Yes     Alcohol/week: 0.0 standard drinks of alcohol     Comment: wine / OCC    Drug use: No   Other Topics Concern    Caffeine Concern No    Exercise No     Social Determinants of Health     Food Insecurity: No Food Insecurity (5/13/2024)    Food Insecurity     Food Insecurity: Never true   Transportation Needs: No Transportation Needs (5/13/2024)    Transportation Needs     Lack of Transportation: No   Physical Activity: Inactive (8/18/2020)    Received from [facility], [facility]    Exercise Vital Sign     Days of Exercise per Week: 0 days     Minutes of Exercise per Session: 0 min    [facility], [facility]    Social Connections   Housing Stability: Low Risk  (5/13/2024)    Housing Stability     Housing Instability: No              Review of Systems    Positive for stated Chief Complaint: Altered Mental Status and Cough/URI    Other systems are as noted in HPI.  Constitutional and vital signs reviewed.      All other systems reviewed and negative except as noted above.    Physical Exam     ED Triage Vitals [09/20/24 2144]   /65   Pulse 93   Resp 19   Temp 97.9 °F (36.6 °C)   Temp src Oral   SpO2 95 %   O2 Device None (Room air)       Current Vitals:   Vital Signs  BP: 100/50  Pulse: 109  Resp: (!) 28  Temp: 97.1 °F (36.2 °C)  Temp src: Temporal  MAP (mmHg): (!) 60    Oxygen Therapy  SpO2: 93 %  O2 Device: Bi-PAP  Mode: Spontaneous/Timed  FiO2 (%): 30 %  Pulse Oximetry Type: Continuous  Oximetry Probe Site Changed: Yes            Physical Exam    GENERAL: Trace lower extremity edema alert and oriented X 3   HEENT: Normocephalic, atraumatic.  Moist mucous membranes.  Pupils equal round reactive to light and accommodation, extraocular motion is intact, sclerae white, conjunctiva is pink.  Oropharynx is unremarkable, no exudate.  NECK: Supple, trachea midline, no lymphadenopathy.   LUNG: Patient has diffuse inspiratory  and expiratory wheezes.  Patient has moderate tachypnea noted  CARDIOVASCULAR: Regular rate and rhythm.  Normal S1S2.  No S3S4 or murmur.  ABDOMEN: Bowel sounds are present. Soft. nondistended, no pulsatile masses. nontender  MUSCULOSKELETAL: No calf tenderness.  Dorsalis and Posterior Tibial pulses present. No clubbing. No cyanosis.    SKIN EXAMINATIoN: Warm and dry with normal appearance.  No rashes or lesions.  NEUROLOGICAL:  Motor strength intact all groups.  normal sensation, speech intact    ED Course     Labs Reviewed   CBC WITH DIFFERENTIAL WITH PLATELET - Abnormal; Notable for the following components:       Result Value    RBC 3.10 (*)     HGB 9.2 (*)     HCT 29.2 (*)     PLT 55.0 (*)     Immature Platelet Fraction 15.5 (*)     Lymphocyte Absolute 0.20 (*)     Monocyte Absolute 1.08 (*)     All other components within normal limits   COMP METABOLIC PANEL (14) - Abnormal; Notable for the following components:    Glucose 224 (*)     Sodium 135 (*)     CO2 19.0 (*)     BUN 26 (*)     Creatinine 1.80 (*)     eGFR-Cr 38 (*)     All other components within normal limits   URINALYSIS, ROUTINE - Abnormal; Notable for the following components:    Glucose Urine 50 (*)     Ketones Urine Trace (*)     Blood Urine 1+ (*)     Protein Urine 20 (*)     RBC Urine 3-5 (*)     Squamous Epi. Cells Few (*)     Hyaline Casts Present (*)     All other components within normal limits   TROPONIN I HIGH SENSITIVITY - Abnormal; Notable for the following components:    Troponin I (High Sensitivity) 150 (*)     All other components within normal limits   PRO BETA NATRIURETIC PEPTIDE - Abnormal; Notable for the following components:    Pro-Beta Natriuretic Peptide 14,448 (*)     All other components within normal limits   LIPID PANEL - Abnormal; Notable for the following components:    HDL Cholesterol 36 (*)     All other components within normal limits   PROCALCITONIN - Abnormal; Notable for the following components:    Procalcitonin  0.43 (*)     All other components within normal limits   ABG PANEL W ELECT AND LACTATE - Abnormal; Notable for the following components:    ABG pCO2 30 (*)     ABG pO2 250 (*)     ABG Base Excess -2.8 (*)     Total Hemoglobin 12.0 (*)     Sodium Blood Gas 132 (*)     Lactic Acid (Blood Gas) 2.4 (*)     All other components within normal limits   POCT GLUCOSE - Abnormal; Notable for the following components:    POC Glucose 308 (*)     All other components within normal limits   SARS-COV-2/FLU A AND B/RSV BY PCR (GENEXPERT) - Abnormal; Notable for the following components:    SARS-CoV-2 (COVID-19) - (GeneXpert) Detected (*)     All other components within normal limits    Narrative:     This test is intended for the qualitative detection and differentiation of SARS-CoV-2, influenza A, influenza B, and respiratory syncytial virus (RSV) viral RNA in nasopharyngeal or nares swabs from individuals suspected of respiratory viral infection consistent with COVID-19 by their healthcare provider. Signs and symptoms of respiratory viral infection due to SARS-CoV-2, influenza, and RSV can be similar.    Test performed using the Xpert Xpress SARS-CoV-2/FLU/RSV (real time RT-PCR)  assay on the GeneXpert instrument, [employer], [place] [address].   This test is being used under the Food and Drug Administration's Emergency Use Authorization.    The authorized Fact Sheet for Healthcare Providers for this assay is available upon request from the laboratory.   SCAN SLIDE   STREPTOCOCCUS PNEUMONIAE AG, URINE   TROPONIN I HIGH SENSITIVITY   LEGIONELLA URINE AG SEROGRP 1   HEMOGLOBIN A1C   TROPONIN I HIGH SENSITIVITY   CBC WITH DIFFERENTIAL WITH PLATELET   COMP METABOLIC PANEL (14)   RAINBOW DRAW LAVENDER   RAINBOW DRAW LIGHT GREEN   RAINBOW DRAW BLUE   RAINBOW DRAW GOLD   SPUTUM CULTURE   ED/MRSA SCREEN BY PCR-CC   BLOOD CULTURE   BLOOD CULTURE   Hemoglobin 9.2.  Sodium 135.  BUN 26.  Creatinine 1.8  EKG    Rate, intervals and axes as  noted on EKG Report.  Rate: 106  Rhythm: Atrial sensed ventricular paced rhythm  Reading: Paced                 I personally reviewed xray films of chest and independent interpretation shows no evidence for pneumonia.  I also reviewed formal xray report as read by radiology with findings below:  XR CHEST AP PORTABLE  (CPT=71045)    Result Date: 9/21/2024  PROCEDURE:  XR CHEST AP PORTABLE  (CPT=71045)  TECHNIQUE:  AP chest radiograph was obtained.  COMPARISON:  [facility] , XR, XR CHEST AP PORTABLE  (CPT=71045), 5/13/2024, 6:03 PM.  INDICATIONS:  Patient presents with cough, dyspnea and chest pains.  PATIENT STATED HISTORY: (As transcribed by Technologist)  Patient complains of cough, dyspnea, and diffuse chest pains.    FINDINGS:  Stable cardiomegaly and cardiac pacemaker.  Lungs are clear.  Normal pulmonary vasculature.  No pleural disease.  No acute osseous findings.            CONCLUSION:  1. Stable cardiomegaly and cardiac pacemaker. 2. Lungs are clear without evidence of acute cardiopulmonary disease.   LOCATION:  [facility]      Dictated by (CST): Amelia Kathleen DO on 9/21/2024 at 0:05 AM     Finalized by (CST): Amelia Kathleen DO on 9/21/2024 at 0:06 AM           I personally reviewd CT images of head and independent interpretation shows no evidence for bleed.  I also viewed formal radiology report as read by radiology with findings below:  CT of head read by vision rad radiology shows no evidence for acute intracranial process      Medications   albuterol (Ventolin) (5 MG/ML) 0.5% nebulizer solution 10 mg (10 mg Nebulization New Bag 9/21/24 0347)   glucose (Dex4) 15 GM/59ML oral liquid 15 g (has no administration in time range)     Or   glucose (Glutose) 40% oral gel 15 g (has no administration in time range)     Or   glucose-vitamin C (Dex-4) chewable tab 4 tablet (has no administration in time range)     Or   dextrose 50% injection 50 mL (has no administration in time range)     Or   glucose (Dex4) 15 GM/59ML oral  liquid 30 g (has no administration in time range)     Or   glucose (Glutose) 40% oral gel 30 g (has no administration in time range)     Or   glucose-vitamin C (Dex-4) chewable tab 8 tablet (has no administration in time range)   ipratropium-albuterol (Duoneb) 0.5-2.5 (3) MG/3ML inhalation solution 3 mL (has no administration in time range)   guaiFENesin ER (Mucinex) 12 hr tab 600 mg (has no administration in time range)   benzonatate (Tessalon) cap 100 mg (has no administration in time range)   acetaminophen (Tylenol Extra Strength) tab 1,000 mg (has no administration in time range)   metoclopramide (Reglan) 5 mg/mL injection 5 mg (has no administration in time range)   polyethylene glycol (PEG 3350) (Miralax) 17 g oral packet 17 g (has no administration in time range)   sennosides (Senokot) tab 17.2 mg (has no administration in time range)   bisacodyl (Dulcolax) 10 MG rectal suppository 10 mg (has no administration in time range)   fleet enema (Fleet) rectal enema 133 mL (has no administration in time range)   insulin aspart (NovoLOG) 100 Units/mL FlexPen 1-68 Units (has no administration in time range)   insulin aspart (NovoLOG) 100 Units/mL FlexPen 1-10 Units (has no administration in time range)   ezetimibe (Zetia) tab 10 mg (has no administration in time range)   folic acid (Folvite) tab 1,600 mcg (has no administration in time range)   folic acid (Folvite) tab 800 mcg (has no administration in time range)   gabapentin (Neurontin) cap 100 mg (has no administration in time range)   insulin degludec (Tresiba) 100 units/mL flextouch 20 Units (has no administration in time range)   metoprolol succinate ER (Toprol XL) 24 hr tab 25 mg (has no administration in time range)   dexamethasone (Decadron) 4 MG/ML injection 6 mg (has no administration in time range)   dexmedeTOMIDine in sodium chloride 0.9% (Precedex) 400 mcg/100mL infusion premix (0.4 mcg/kg/hr × 108.9 kg Intravenous Rate/Dose Change 9/21/24 8541)    remdesivir (Veklury) 200 mg in sodium chloride 0.9% 100 mL IVPB (has no administration in time range)   sodium chloride 0.9% infusion ( Intravenous New Bag 9/21/24 0433)   heparin (Porcine) 5000 UNIT/ML injection 5,000 Units (has no administration in time range)   aspirin DR tab 325 mg (has no administration in time range)   levoFLOXacin in dextrose 5% (Levaquin) 750 mg/150mL IVPB premix 750 mg (750 mg Intravenous New Bag 9/21/24 0527)   methylPREDNISolone sodium succinate (Solu-MEDROL) injection 125 mg (125 mg Intravenous Given 9/21/24 0123)   aspirin chewable tab 324 mg (324 mg Oral Given 9/21/24 0229)   ipratropium (Atrovent) 0.02 % nebulizer solution 0.5 mg (0.5 mg Nebulization Given 9/21/24 0347)   ipratropium (Atrovent) 0.02 % nebulizer solution 0.5 mg (0.5 mg Nebulization Given 9/21/24 0357)       MDM      Patient is a 79-year-old male presents to ED for evaluation of shortness of breath, cough.  Cough x 1 week.  Differential pneumonia, bronchospasm, CHF, ACS.  Patient had laboratory test performed showing hemoglobin of 9.2 which is within range of his baseline.  Sodium 135.  Troponin elevated at 150.  BNP elevated at 14, 448 which is above his baseline.  Chest x-ray showed no evidence for pneumonia.  COVID-positive.  Patient was initially given continuous albuterol.  He was given IV Solu-Medrol.  He was reevaluated and still had diffuse inspiratory and expiratory wheezes after hour-long treatment.  He was placed on BiPAP.  Case was discussed with pulmonary intensivist who came down to evaluate patient and agreed with BiPAP and further bronchodilators.  He recommended Precedex for sedation.  Second hour-long treatment was started after he was reevaluated again and still had diffuse inspiratory and expiratory wheezes.  Intensivist recommended head CT due to his mild confusion which was negative for bleed.  Symptoms likely secondary to COVID infection causing severe bronchospasm.  Recommend ICU admission.   Case discussed with hospitalist as well.  Patiently placed in ICU.  ABG reassuring without evidence for CO2 retention.  Critical care time was 75 minutes. This critical care time is exclusive of procedures critical care time includes monitoring of patient's cardiopulmonary and hemodynamic status, interpretation of laboratory values, and discussion of case with physician and consultants.  Admission disposition: 9/21/2024  1:58 AM                                        Medical Decision Making      Disposition and Plan     Clinical Impression:  1. COVID-19    2. Acute bronchospasm due to viral infection    3. Bronchospasm    4.  Elevated troponin  5.  Elevated BNP    Disposition:  Admit  9/21/2024  1:58 am    Follow-up:  No follow-up provider specified.        Medications Prescribed:  Current Discharge Medication List                            Hospital Problems       Present on Admission  Date Reviewed: 6/18/2024            ICD-10-CM Noted POA    * (Principal) COVID-19 U07.1 9/21/2024 Unknown    Acute bronchospasm due to viral infection J98.01, B34.9 9/21/2024 Unknown    Acute kidney injury (HCC) N17.9 9/21/2024 Yes    Anemia D64.9 9/21/2024 Yes    Bronchospasm J98.01 9/21/2024 Unknown    Hyperglycemia R73.9 9/21/2024 Yes    Hyponatremia E87.1 9/21/2024 Yes    Metabolic acidosis E87.20 9/21/2024 Yes

## 2024-09-21 NOTE — PROCEDURES
Arterial Line  Performed by: Arlin MORIN     General Information and Staff     Procedure Start: 0830  Patient Location:  ICU  Indication: continuous blood pressure monitoring and blood sampling needed    Site Identification: real time ultrasound guided, sterile technique used     Procedure Details     Catheter Size:  20 G  Catheter Length:  1 and 3/4 inchCatheter Type:  Arrow  Seldinger Technique?: Yes    Laterality:  left  Site: radial    Site Prep: chlorhexidine  Line Secured:  Tape and Tegaderm     Assessment: Kang's test negative, Good flash, guidewire and catheter advanced without difficulty, pulsatile blood flow noted.    Events: patient tolerated procedure well with no complications       QUENTIN Torres-BC  ICU  Phone  [phone]   Pager [phone]

## 2024-09-21 NOTE — CONSULTS
ICU  Critical Care APRN Consult Note    NAME: David Reyes - ROOM: [facility] - MRN: [id number] - Age: 79 year old - :1945    History Of Present Illness:  David Reyes is a 79 year old male with PMHx significant for CAD s/p CABG, ICD, HFrEF, DM2, COPD, former smoker, FLAKO without use of CPAP, HTN, HLD, and RA. Patient presents to the ED with cough x1 week and shortness of breath. History is obtained from patient's wife at bedside. She reports that patient has had complaints of shortness of breath and a non productive cough for one week, but the past 1-2 days has had increasing phlegm, vomiting 3-4 times, increased confusion and low grade fever 100.7, promting her to bring him into the ED. Lab work in the ED was notable for Na 135, Co2 19, BUN 26, creat 1.8, troponin 150, pro BNP 14,448, PCT 0.43, and plt 55. RVP was positive for COVID and CXR showed bilateral infiltrates. Patient was seen in the ED on bipap, restless, and increased work of breathing with accessory muscle use, unable to speak more than 1-2 words. Abdominal exam benign. He is now being admitted to the ICU for continued management.    Past Medical History:  Past Medical History:    CAD (coronary artery disease)    Congestive heart disease (HCC)    Congestive heart failure (HCC)    CORONARY ARTERY DISEASE    cabg    Coronary atherosclerosis    Diabetes (HCC)    DM (diabetes mellitus) (HCC)    Heart attack (HCC)    High cholesterol    Other and unspecified hyperlipidemia    Rheumatoid arthritis (HCC)    Sleep apnea    Unspecified sleep apnea     Past Surgical History:   Past Surgical History:   Procedure Laterality Date    Angiogram  2015    Angioplasty (coronary)  2/23/15    RCA    Bypass surgery      2007    Cabg      LAD, Diagonal    Cardiac defibrillator placement  14    [employer] dual chamber ICD    Cath drug eluting stent      Tonsillectomy        Family History:   Family History   Problem Relation Age of Onset     Cancer Father     Heart Disease Mother     Other (alzheimers) Mother     Stroke Neg      Social History:    reports that he quit smoking about 38 years ago. His smoking use included cigarettes. He started smoking about 64 years ago. He has a 26 pack-year smoking history. He has never used smokeless tobacco. He reports current alcohol use. He reports that he does not use drugs.     Review of Systems:   A comprehensive 10 point review of systems was completed.  Pertinent positives and negatives noted in the HPI.    Current Facility-Administered Medications   Medication Dose Route Frequency    albuterol (Ventolin) (5 MG/ML) 0.5% nebulizer solution 10 mg  10 mg Nebulization Continuous    sodium chloride 0.9% infusion 1,000 mL  1,000 mL Intravenous Continuous    dexmedeTOMIDine in sodium chloride 0.9% (Precedex) 400 mcg/100mL infusion premix  0.2-1.5 mcg/kg/hr Intravenous Continuous     OBJECTIVE  Vitals:  /60   Pulse 71   Temp 98.3 °F (36.8 °C) (Oral)   Resp 18   Ht 175.3 cm (5' 9\")   Wt 240 lb (108.9 kg)   SpO2 95%   BMI 35.44 kg/m²              Physical Exam:    General Appearance: Alert, restless, noncooperative, appears stated age  Neck: No JVD, no adenopathy, trachea midline  Lungs: Rhonchi to auscultation bilaterally, respirations labored with accessory muscle use  Heart: Regular rate and rhythm, S1 and S2 normal, no murmur, rub or gallop  Abdomen: Soft, distended, non-tender, hypoactive bowel sounds active all four quadrants, no masses, no organomegaly  Extremities: Atraumatic, no cyanosis, nonpitting edema BLE, capillary refill <3 sec.    Pulses: 2+ and symmetric all extremities  Skin: Skin color, texture, turgor normal for ethnicity, no rashes or lesions, warm and dry  Neurologic: Normal strength    Data this admission:  XR CHEST AP PORTABLE  (CPT=71045)    Result Date: 9/21/2024  CONCLUSION:  1. Stable cardiomegaly and cardiac pacemaker. 2. Lungs are clear without evidence of acute  cardiopulmonary disease.   LOCATION:  [facility]      Dictated by (CST): Amelia Kathleen,  on 9/21/2024 at 0:05 AM     Finalized by (CST): Amelia Kathleen,  on 9/21/2024 at 0:06 AM         Labs:  Lab Results   Component Value Date    WBC 6.0 09/20/2024    HGB 9.2 09/20/2024    HCT 29.2 09/20/2024    PLT 55.0 09/20/2024    CREATSERUM 1.80 09/20/2024    BUN 26 09/20/2024     09/20/2024    K 5.0 09/20/2024     09/20/2024    CO2 19.0 09/20/2024     09/20/2024    CA 9.8 09/20/2024    ALB 4.4 09/20/2024    ALKPHO 57 09/20/2024    BILT 0.5 09/20/2024    TP 7.8 09/20/2024    AST 29 09/20/2024    ALT 30 09/20/2024       Assessment/Plan:    Acute hypoxic respiratory failure 2/2 COVID vs CAP vs COPD exacerbation  -CXR with bilateral infiltrate  -RVP +COVID  -ABG reviewed  -No leukocytosis, monitor for fevers  -PCT 0.43  -Continue with bipap  -Check blood and sputum culture  -Check strep/legionella/MRSA  -Levofloxacin IV  -Duonebs  -Remdesevir  -Dexemethasone  -ID consult    Encephalopathy, likely 2/2 to above  -ABG without hypercapnea  -Check CT brain    Acute on chronic kidney disease 2/2 poor PO intake, dehydration  -Creat 1.8  -IVF  -Monitor I/O  -Daily BMP    Elevated troponin, likely 2/2 demand ischemia  -initial trop 150, trend until clear  -EKG reviewed  -ASA daily  -Cardiology consult    HFrEF, EF 20-25% in May 2024  -Pro BNP 14,448  -CXR without sign of pulmonary edema  -Order BLE doppler  -Order Echo    Thrombocytopenia, appears chronic  -Monitor daily CBC    Hx HTN, HLD  -per internal medicine    Hx DM  -SSI    F/E/N:    -IVF  -NPO  -Follow lytes and replete PRN    Proph:  -SQ heparin  -SCD    Dispo:   -Full code, confirmed with wife at bedside  -ICU for close monitoring    Plan of care discussed with intensivist on-call, Dr. Mcelroy.    Kelley Valles, [facility]  Critical Care    A total of 45 minutes of critical care time (exclusive of billable procedures) was administered. This involved direct  patient intervention, complex decision making, and/or extensive discussions (>50% face to face time) with the patient, family, and clinical staff.    The 21st Century Cures Act makes medical notes like these available to patients in the interest of transparency. Please be advised this is a medical document. Medical documents are intended to carry relevant information, facts as evident, and the clinical opinion of the practitioner. The medical note is intended as peer to peer communication and may appear blunt or direct. It is written in medical language and may contain abbreviations or verbiage that are unfamiliar.      Attestation:     I have seen and evaluated the patient.  I have reviewed the nurse practioner's note and agree with findings and plan as documented. Changes/additions if necessary are listed as below.     A total of  36 minutes were spent providing critical care excluding any billable procedure.  The patient is critically ill and at high risk of organ deterioration    Sam Guerra MD

## 2024-09-21 NOTE — PROCEDURES
[facility] Patient Status:  Observation    1945 MRN [id number]   Location [facility] 4SW-A Attending Kj Gonzalez DO   Hosp Day # 0 PCP Thea Harris DO         Procedure        : right internal jugular triple lumen catheter with ultrasound guidance  Indication          : shock, vasopressors  Consent           : yes- wife and patient  Timeout           : performed at bedside  (s)     :  RADHA Torres  Complications : none  EBL                 : 1ml  Meds:                : 3mL 1% lidocaine      Consent was able to be obtained due to emergent nature of procedure.    Timeout performed at bedside. Patient prepped and draped in sterile fashion, and pre-medicated as above. The right internal jugular was visualized by ultrasound, and distinguished from the adjacent artery as being easily compressible. The right internal jugular was seen to be entered by the 18g introducer needle under direct ultrasound guidance with one skin break(s). Venous location was verified by return of dark, non-pulsatile blood return. A 7fr triple lumen catheter was advanced by modified seldinger technique. All ports flush and draw easily.    No obvious complications noted.  Chest x-ray is pending.    MIKEL Torres

## 2024-09-21 NOTE — PROGRESS NOTES
Neuro  Acute encephalopathy-->resolved    Respiratory  Acute hypoxic respiratory failure  COVID Positive  Pneumonia, likely viral    -CXR with bilateral infiltrate, no pulm edema  -RVP +COVID  -Continue with bipap for IWOB, precedex PRN for anxiety  -Sputum culture pending collection  -strep/legionella pending  -MRSA pending  -Remdesevir (9/20-)  -Dexemethasone (9/20-)  -s/p continuous nebs in ED, continue Duonebs  -Ideally would like to obtain CT chest, although pt unlikely to be able to tolerate laying flat at this time     Cardiovascular  Shock:    Elevated troponin--Likely demand ischemia  HFrEF s/p  BiV ICD , pHTN, mild aortic stenosis           GI/  Heme  Endo

## 2024-09-21 NOTE — PROGRESS NOTES
Patient was admitted please see overnight history and physical for full examination.    Today lactate was checked and lactate was markedly elevated.  Patient was tachypneic and on noninvasive ventilation.  He was not significantly improving so we wanted to get more information so we placed an arterial line and a central line.    After arterial line placement we noticed blood pressure was normal.  He was able to be weaned off of Levophed.  His echocardiogram did show a markedly reduced ejection fraction with an EF likely around 20%.  His troponin was rising but only into the thousands out above 10,000 and I did not think he was having an acute myocardial infarction although the troponins continue to rise.    In addition he had no hypoxia his FiO2 is 30%.  His echo did not show a large dilated right ventricle he was minimally hypoxic and so none of this seemed to be from pulmonary embolism likely.  1 would expect that if he had tension secondary to pulmonary embolism there would be artery dysfunction and a dilated RBCs which was not the case    We placed a central line to obtain a mixed venous saturation to see if this could be more cardiac in nature CVP was around 10-11, and his mixed venous was 78%.  This was not consistent with cardiogenic shock more consistent with sepsis.  We then sent him down for a CT scan of his chest abdomen and pelvis due to the acute kidney injury he received this without contrast.  CT showed multiple areas of groundglass opacities in both lungs, as well as in the right lower lobe areas of consolidation and groundglass and some old scarring with bronchiectasis.    Possible pneumonia viral pneumonia considered he is COVID-positive.    In summary this is a 79-year-old male that presents to us with acute respiratory failure and tachypnea and lactic acidosis    Problems    Lactic acidosis  Acute respiratory failure secondary to metabolic acidosis  Chronic systolic heart failure present on  admission  Status post ICD and left ventricular lead placement for heart failure with reduced ejection fraction  Coronary artery disease status postcoronary artery bypass grafting  COVID-19 pneumonia  Sepsis sepsis syndrome  Community-acquired pneumonia  Acute kidney injury with acute tubular necrosis  Chronic kidney disease  Hypertension    Plan    Neuro  The patient is awake alert he does become agitated on noninvasive ventilation and he is at increased work of breathing.  He is placed on noninvasive ventilation has been placed on Precedex    The cause of his tachypnea is not entirely clear unless it is related to his increased lactic acid?  He does not appear to be in congestive heart failure see cardiac below.    If he ends up intubated we will sedate him with fentanyl plus or minus propofol or Precedex.    Cardiovascular  Heart failure reduced EF  He does not appear to be woefully volume overloaded  His chest CT is not consistent with congestive heart failure  He does not have large pleural effusion and cephalization that we would expect in someone who is presenting with respiratory failure from CHF  His mixed venous is elevated  His CVP is elevated but not markedly so.    No indications for inotropic therapy  Will not do aggressive diuresis given the fact that he has an elevated mixed venous the CVP is not markedly elevated at this time.  He has no fluid on his chest x-ray and no JVD no dilated IVC    Respiratory  Acute respiratory failure  Possible community-acquired pneumonia  Possible atypical pneumonia  Pro-Khang was low however  Send CRP  Remains on steroids  Remains on broad-spectrum antibiotics including doxycycline  Follow-up Legionella  Follow-up strep pneumo antigen    Continue noninvasive ventilation, follow-up blood gas repeat and labs.  If he continues to worsen will consider endotracheal intubation for respiratory failure.    Other possibilities less likely include  PJP given methotrexate use but  this typically is not an indication for PJP ppx    methotrexate induced lung injury can present as ggos and in a  like pattern with several rounded areas of ground glass oapcities. Tx would include steroids (he's on this) stopping methotrexate (he's off for now) and wacthing. This would be a diagnosis of exclusion and seems less likely.    If intubated would send BAL look for eosinophils, PJP PCR, cell count diff, fungal cx     Renal  Acute kidney injury with acute tubular necrosis  Unclear the exact etiology  He does have COVID could be dehydrated does not appear volume overloaded  He did receive a fluid bolus that was small will he did have about 900 cc of urinary retention.  Follow-up urinalysis    Infectious disease  He has multiple areas of small groundglass opacities in both lungs this can be seen with atypical pneumonia and was seen commonly with COVID-pneumonia during COVID however it does not have the typical pattern of COVID-19 pneumonia and this has not been evident of recent.  He had an echocardiogram that did not show any acute vegetations to suggest endocarditis although and a pacemaker lead would be more difficult to identify.    If his blood cultures are positive specifically for gram-positive organisms that may need transesophageal echo to rule out complications in his leads.  He remains on vancomycin ceftriaxone and Flagyl for the time being.    COVID-19 pneumonia  I believe is likely a secondary problem not a primary problem  He remains on remdesivir and dexamethasone.  The dexamethasone could also be used for community-acquired pneumonia and not just for COVID-19 pneumonia    Lactic acidosis  Could be tightly from albuterol holding off on neb treatments for the moment to see if there is a downtrend    Thiamine deficiency is in the differential we will start thiamine 200 mg IV twice daily seems less likely.    Type a lactic acidosis from sepsis seems most likely ongoing resuscitative efforts  with antimicrobials aggressive volume resuscitation has been held at this time.    Endocrine  Hyperglycemia  He was started on insulin drip  Will put him on a little background dextrose given his blood sugars now less than 250 50 cc an hour of D5 NS.  TSH has been ordered we will follow this up    Hematology oncology  He has pancytopenia chronically  Hematology was put on consult  Probably unrelated he has had thrombocytopenia he is anemic and his last outpatient labs had a white blood cell count around 3.  Not sure if he has myelodysplastic syndrome myelofibrosis or other if he is ever had a bone marrow evaluation.  Will follow-up with hematology oncology.    Optimally he would be put on heparin for non-ST elevation myocardial infarction but given his platelet count is just over 50,000 and was under 50,000 earlier in the day and concerned about this.  Depending on which direction we know with his troponins may consider placing him on a heparin drip no bolus.    He has an absolute monocytosis which can be seen with RA    Prophylaxis  Subcutaneous heparin  No GI prophylaxis is indicated but may be needed if he is intubated    Tubes lines and drains he has an A-line a central line for the time being.    His disposition remains a medical intensive care    Unfortunately his prognosis is extremely guarded    Critical care attending    36 minutes of critical care time were spent at the bedside performing history, physical, complex data interpretation, radiographic interpretation, discussion with consultants, and creating a diagnostic and therapeutic treatment plan for this critically ill patient. Time spent was excluding time on procedures and resident teaching.    Date of Service  9/21/24

## 2024-09-21 NOTE — H&P
[facility]     David Reyes Patient Status:  Emergency    1945 MRN [id number]   Location [facility] EMERGENCY DEPARTMENT Attending Jordan Benedict MD   Hosp Day # 0 PCP Thea Harris DO     Chief Complaint: Shortness of breath    Subjective:    History of Present Illness:     David Reyes is a 79 year old male with a history of CAD sp CABG, essential hypertension, dyslipidemia, chronic combined heart failure EF 20-25% sp ICD, diabetes mellitus, rheumatoid arthritis and FLAKO who presents with shortness of breath. Patient began to feel short of breath a week ago. He has had a productive cough. No chest pain. No known fevers. He has had nausea and vomiting. Poor PO intake. Wife too has been ill with URI. Patient presented to the ER for evaluation. He has severe bronchospasm. Steroids and nebs given. Placed on BIPAP.     History/Other:    Past Medical History:  Past Medical History:    CAD (coronary artery disease)    Congestive heart disease (HCC)    Congestive heart failure (HCC)    CORONARY ARTERY DISEASE    cabg    Coronary atherosclerosis    Diabetes (HCC)    DM (diabetes mellitus) (HCC)    Heart attack (HCC)    High cholesterol    Other and unspecified hyperlipidemia    Rheumatoid arthritis (HCC)    Sleep apnea    Unspecified sleep apnea     Past Surgical History:   Past Surgical History:   Procedure Laterality Date    Angiogram  2015    Angioplasty (coronary)  2/23/15    RCA    Bypass surgery          Cabg      LAD, Diagonal    Cardiac defibrillator placement  14    [employer] dual chamber ICD    Cath drug eluting stent      Tonsillectomy        Family History:   Family History   Problem Relation Age of Onset    Cancer Father     Heart Disease Mother     Other (alzheimers) Mother     Stroke Neg      Social History:    reports that he quit smoking about 38 years ago. His smoking use included cigarettes. He started smoking about 64  years ago. He has a 26 pack-year smoking history. He has never used smokeless tobacco. He reports current alcohol use. He reports that he does not use drugs.     Allergies:   Allergies   Allergen Reactions    Pcn [Penicillins]      \"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       Medications:    No current facility-administered medications on file prior to encounter.     Current Outpatient Medications on File Prior to Encounter   Medication Sig Dispense Refill    metFORMIN  MG Oral Tablet 24 Hr Take 2 tablets (1,000 mg total) by mouth 2 (two) times daily with meals. 360 tablet 0    SPIRONOLACTONE 25 MG Oral Tab TAKE 1 TABLET(25 MG) BY MOUTH DAILY WITH BREAKFAST 90 tablet 0    gabapentin 100 MG Oral Cap Take 1 capsule (100 mg total) by mouth 3 (three) times daily. 90 capsule 1    metoprolol succinate ER 50 MG Oral Tablet 24 Hr Take 1 tablet (50 mg total) by mouth 2 (two) times daily.      ergocalciferol 1.25 MG (85934 UT) Oral Cap       Insulin Glargine, 2 Unit Dial, (TOUJEO MAX SOLOSTAR) 300 UNIT/ML Subcutaneous Solution Pen-injector Inject 24 Units into the skin daily. 27 mL 1    CONTOUR NEXT TEST In Vitro Strip Test 3 times daily Dx Code: E11.9 Insulin - dependent diabetes 300 each 1    Microlet Lancets Does not apply Misc CHECK BLOOD SUGAR THREE TIMES DAILY 300 each 3    losartan 25 MG Oral Tab Take 1 tablet (25 mg total) by mouth every evening.      BD PEN NEEDLE LILO 2ND GEN 32G X 4 MM Does not apply Misc USE DAILY 300 each 3    torsemide 20 MG Oral Tab Take 1 tablet (20 mg total) by mouth BID (Diuretic). 60 tablet 1    Vitamin B-1 100 MG Oral Tab Take 1 tablet (100 mg total) by mouth daily.      methotrexate 2.5 MG Oral Tab Take 4 tablets (10 mg total) by mouth every 7 days. 48 tablet 1    ezetimibe 10 MG Oral Tab Take 1 tablet (10 mg total) by mouth daily.      Lovastatin 40 MG Oral Tab Take 1 tablet (40 mg total) by mouth  in the morning and 1 tablet (40 mg total) before bedtime.  2    folic acid 800 MCG Oral Tab Take 1 tablet (800 mcg total) by mouth every other day. Alternate with 2 tablets (1600mcg) every other day    About 1000 mcg daily      HYDROcodone-acetaminophen  MG Oral Tab Take 1-2 tablets by mouth every 4 (four) hours as needed for Pain. (Patient not taking: Reported on 6/18/2024) 20 tablet 0       Review of Systems:   A comprehensive review of systems was completed.    Pertinent positives and negatives noted in the HPI.    Objective:   Physical Exam:    /60   Pulse 71   Temp 98.3 °F (36.8 °C) (Oral)   Resp 18   Ht 5' 9\" (1.753 m)   Wt 240 lb (108.9 kg)   SpO2 95%   BMI 35.44 kg/m²   General: No acute distress, Alert  Respiratory: Diffuse exp wheeze bilaterally   Cardiovascular: S1, S2. Regular rate and rhythm  Abdomen: Soft, Non-tender, non-distended, positive bowel sounds  Extremities: trace edema    Results:    Labs:      Labs Last 24 Hours:    Recent Labs   Lab 09/20/24 2155   RBC 3.10*   HGB 9.2*   HCT 29.2*   MCV 94.2   MCH 29.7   MCHC 31.5   RDW 16.1   NEPRELIM 4.65   WBC 6.0   PLT 55.0*       Recent Labs   Lab 09/20/24 2205   *   BUN 26*   CREATSERUM 1.80*   EGFRCR 38*   CA 9.8   ALB 4.4   *   K 5.0      CO2 19.0*   ALKPHO 57   AST 29   ALT 30   BILT 0.5   TP 7.8       Lab Results   Component Value Date    PT 17.1 (H) 04/27/2014    PT 16.7 (H) 04/26/2014    INR 1.43 (H) 04/27/2014    INR 1.39 (H) 04/26/2014       Recent Labs   Lab 09/20/24 2205   TROPHS 150*       Recent Labs   Lab 09/20/24 2205   PBNP 14,448*       No results for input(s): \"PCT\" in the last 168 hours.    Imaging: Imaging data reviewed in Epic.    Assessment & Plan:      #Acute hypoxic respiratory failure d/t COVID with acute bronchospasm  -Admit  -Decadron given hypoxia  -ID consult for Remdesivir  -Nebs  -Antitussives   -Oxygen, BIPAP  -Await PCT  -Monitor respiratory status   -CCM consult     #Acute  kidney injury  #Hyponatremia  #Metabolic acidosis  -Hold diuretics  IVF x 12 hours  -Monitor UOP, creatinine and electrolytes     #Anemia  #Thrombocytopenia, chronic   -Monitor counts    #Elevated troponin, suspect type II MI  #CAD sp CABG  #Essential hypertension  #Dyslipidemia  #Chronic combined heart failure EF 20-25% sp ICD  -Decrease Toprol to 25 BID from 50 BID  -Hold Losartan  -Hold diuretics  -Repeat troponin  -Monitor hemodynamics  -Telemetry  -Cardiology consult    #Diabetes mellitus  -Tresiba  -Correctional  -Carb scale  -Will likely need to adjust d/t steroids    #Rheumatoid arthritis  -Hold Methotrexate    #FLAKO    #DVT Px: Lovenox    Plan of care discussed with patient, wife and ER.    Finesse Proctor MD    Supplementary Documentation:     The 21st Century Cures Act makes medical notes like these available to patients in the interest of transparency. Please be advised this is a medical document. Medical documents are intended to carry relevant information, facts as evident, and the clinical opinion of the practitioner. The medical note is intended as peer to peer communication and may appear blunt or direct. It is written in medical language and may contain abbreviations or verbiage that are unfamiliar.

## 2024-09-21 NOTE — CONSULTS
[facility]   part of [facility] ID CONSULT NOTE    David Reyes Patient Status:  Observation    1945 MRN [id number]   Location [facility] 4SW-A Attending Kj Gonzalez,    Hosp Day # 0 PCP Thea Harris DO       Reason for Consultation:    Antibiotics:     ASSESSMENT:    # Multifocal pneumonia - mostly 2/2 covid however some concern for bacterial PNA given degree of hypotension. Differential includes acute PE, cardiogenic shock give lack of fever, wbc elevation.    MRSA nares but keep vanc pending blood cultures   # BiV ICD  #DMII  # Penicillin allergy  # RA on methotrexate     PLAN:    - vanc, ceftriaxone, flagyl, doxycycline   - continue steroids, remdesivir   - await blood cultures, legionella, strep, sputum culture   - consider VQ scan if able with covid   - Above antibiotics requiring regular monitoring of labs for toxicity  -  Follow fever curve, wbc  -  Reviewed labs, micro, imaging reports, available old records, discussed with patient, ICU team     Thank you for allowing us to participate in the care of this patient. Please do not hesitate to call if you have any questions.   We will continue to follow with you and will make further recommendations based on his progress.    History of Present Illness:  David Reyes is a a(n) 79 year old male. Past medical history of CAD, rheumatoid arthritis, COPD , cardiomyopathy status post ICD who presents with shortness of breath. Notes acute onset cough, sob. Denies any n/v/d, joint pain, dysuria, flank pain.  Afebrile since admission tachycardic since improved, hypotensive requiring pressors, on BiPAP.  Tested positive for COVID and was started on dexamethasone, remdesivir.  Labs notable for hyperglycemia, elevated creatinine, anemia, lymphopenia without leukocytosis, UA with negative nitrites, leukoesterase.    Sick contact wife with upper respiratory infection.    History:  Past Medical History:    Anxiety state     Back problem    CAD (coronary artery disease)    Congestive heart disease (HCC)    Congestive heart failure (HCC)    CORONARY ARTERY DISEASE    cabg    Coronary atherosclerosis    Diabetes (HCC)    DM (diabetes mellitus) (HCC)    Heart attack (HCC)    High blood pressure    High cholesterol    Other and unspecified hyperlipidemia    Rheumatoid arthritis (HCC)    Sleep apnea    Subdural hematoma (HCC)    Unspecified sleep apnea    Visual impairment     Past Surgical History:   Procedure Laterality Date    Angiogram  2/11/2015    Angioplasty (coronary)  2/23/15    RCA    Bypass surgery      2007    Cabg  2004    LAD, Diagonal    Cardiac defibrillator placement  6/7/14    [employer] dual chamber ICD    Cath drug eluting stent      Tonsillectomy       Family History   Problem Relation Age of Onset    Cancer Father     Heart Disease Mother     Other (alzheimers) Mother     Stroke Neg       reports that he quit smoking about 38 years ago. His smoking use included cigarettes. He started smoking about 64 years ago. He has a 26 pack-year smoking history. He has never used smokeless tobacco. He reports current alcohol use of about 2.0 standard drinks of alcohol per week. He reports that he does not use drugs.    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       Medications:    Current Facility-Administered Medications:     albuterol (Ventolin) (5 MG/ML) 0.5% nebulizer solution 10 mg, 10 mg, Nebulization, Continuous    glucose (Dex4) 15 GM/59ML oral liquid 15 g, 15 g, Oral, Q15 Min PRN **OR** glucose (Glutose) 40% oral gel 15 g, 15 g, Oral, Q15 Min PRN **OR** glucose-vitamin C (Dex-4) chewable tab 4 tablet, 4 tablet, Oral, Q15 Min PRN **OR** dextrose 50% injection 50 mL, 50 mL, Intravenous, Q15 Min PRN **OR** glucose (Dex4) 15 GM/59ML oral liquid 30 g, 30 g, Oral, Q15 Min  PRN **OR** glucose (Glutose) 40% oral gel 30 g, 30 g, Oral, Q15 Min PRN **OR** glucose-vitamin C (Dex-4) chewable tab 8 tablet, 8 tablet, Oral, Q15 Min PRN    ipratropium-albuterol (Duoneb) 0.5-2.5 (3) MG/3ML inhalation solution 3 mL, 3 mL, Nebulization, Q6H WA    guaiFENesin ER (Mucinex) 12 hr tab 600 mg, 600 mg, Oral, BID    benzonatate (Tessalon) cap 100 mg, 100 mg, Oral, TID    acetaminophen (Tylenol Extra Strength) tab 1,000 mg, 1,000 mg, Oral, Q6H PRN    metoclopramide (Reglan) 5 mg/mL injection 5 mg, 5 mg, Intravenous, Q8H PRN    polyethylene glycol (PEG 3350) (Miralax) 17 g oral packet 17 g, 17 g, Oral, Daily PRN    sennosides (Senokot) tab 17.2 mg, 17.2 mg, Oral, Nightly PRN    bisacodyl (Dulcolax) 10 MG rectal suppository 10 mg, 10 mg, Rectal, Daily PRN    fleet enema (Fleet) rectal enema 133 mL, 1 enema, Rectal, Once PRN    ezetimibe (Zetia) tab 10 mg, 10 mg, Oral, Daily    [START ON 9/22/2024] folic acid (Folvite) tab 1,600 mcg, 1,600 mcg, Oral, QOD    folic acid (Folvite) tab 800 mcg, 800 mcg, Oral, QOD    gabapentin (Neurontin) cap 100 mg, 100 mg, Oral, TID    metoprolol succinate ER (Toprol XL) 24 hr tab 25 mg, 25 mg, Oral, 2x Daily(Beta Blocker)    dexamethasone (Decadron) 4 MG/ML injection 6 mg, 6 mg, Intravenous, Q24H    dexmedeTOMIDine in sodium chloride 0.9% (Precedex) 400 mcg/100mL infusion premix, 0.2-1.5 mcg/kg/hr, Intravenous, Continuous    sodium chloride 0.9% infusion, , Intravenous, Continuous    heparin (Porcine) 5000 UNIT/ML injection 5,000 Units, 5,000 Units, Subcutaneous, Q8H MELISSA    [START ON 9/22/2024] aspirin DR tab 325 mg, 325 mg, Oral, Daily    norepinephrine (Levophed) 4 mg/250mL infusion premix, 0.5-30 mcg/min, Intravenous, Continuous    insulin regular human (Novolin R, Humulin R) 100 Units in sodium chloride 0.9% 100 mL standard infusion (100 mL), 1-32 Units/hr, Intravenous, Continuous    cefTRIAXone (Rocephin) 2 g in sodium chloride 0.9% 100 mL IVPB-ADDV, 2 g, Intravenous,  Q24H    doxycycline hyclate (Vibramycin) 100 mg in sodium chloride 0.9% 100 mL IVPB, 100 mg, Intravenous, Q12H    sodium ferric gluconate (Ferrlecit) 125 mg in sodium chloride 0.9% 100mL IVPB premix, 125 mg, Intravenous, Daily    vancomycin (Vancocin) 2.25 g in sodium chloride 0.9% 500 mL IVPB premix, 25 mg/kg, Intravenous, Once **FOLLOWED BY** [START ON 9/22/2024] vancomycin (Vancocin) 1.5 g in sodium chloride 0.9% 250mL IVPB premix, 1,500 mg, Intravenous, Q24H    sodium chloride 0.9 % IV bolus 500 mL, 500 mL, Intravenous, Once    Review of Systems:  CONSTITUTIONAL:  No weight loss, weakness or fatigue.  HEENT:  Eyes:  No visual loss, blurred vision, double vision or yellow sclerae. Ears, Nose, Throat:  No hearing loss, sneezing, congestion, runny nose or sore throat.  SKIN:  No rash or itching.  CARDIOVASCULAR:  No chest pain, chest pressure or chest discomfort  RESPIRATORY: see hpi  GASTROINTESTINAL:  No anorexia, nausea, vomiting or diarrhea. No abdominal pain or blood.  GENITOURINARY:  No Burning on urination.   NEUROLOGICAL:  No headache, dizziness, syncope, paralysis, ataxia, numbness or tingling in the extremities.  MUSCULOSKELETAL:  No muscle, back pain, joint pain or stiffness.    Physical Exam:  Vital signs: Blood pressure 109/63, pulse 92, temperature 97.7 °F (36.5 °C), temperature source Temporal, resp. rate (!) 27, height 175.3 cm (5' 9\"), weight 237 lb 10.5 oz (107.8 kg), SpO2 98%.    General: Alert, oriented, NAD  HEENT: Moist mucous membranes. On BIPAP   Neck: No lymphadenopathy.  Supple.  Cardiovascular: RRR  Respiratory: course breath sounds anteriorly   Abdomen: mildly distended - RLQ tenderness w/o rebound   Musculoskeletal: No edema noted  Integument: No lesions. No erythema.    Laboratory Data:  Recent Labs   Lab 09/21/24  1204   RBC 2.76*   HGB 8.1*   HCT 25.7*   MCV 93.1   MCH 29.3   MCHC 31.5   RDW 16.6   NEPRELIM 4.15   WBC 5.2   PLT 56.0*     Recent Labs   Lab 09/20/24  7693  09/21/24  0634   * 373*   BUN 26* 32*   CREATSERUM 1.80* 2.06*   CA 9.8 8.5*   ALB 4.4 4.1   * 135*   K 5.0 3.9    104   CO2 19.0* 18.0*   ALKPHO 57 49   AST 29 32   ALT 30 25   BILT 0.5 0.4   TP 7.8 6.6       Microbiology: Reviewed in EMR    Radiology: Reviewed      MD Trenton Perez [facility]  [phone]  9/21/2024

## 2024-09-21 NOTE — PROGRESS NOTES
ICU  Critical Care APRN Progress  Note    NAME: David Reyes - ROOM: [facility] - MRN: [id number] - Age: 79 year old - :1945    SUBJECTIVE: Increased work of breathing on BIPAP, levophed started on 4mcg. Feeling anxious and complaints of mild chest discomfort/pressure.       Current Facility-Administered Medications   Medication Dose Route Frequency    albuterol (Ventolin) (5 MG/ML) 0.5% nebulizer solution 10 mg  10 mg Nebulization Continuous    glucose (Dex4) 15 GM/59ML oral liquid 15 g  15 g Oral Q15 Min PRN    Or    glucose (Glutose) 40% oral gel 15 g  15 g Oral Q15 Min PRN    Or    glucose-vitamin C (Dex-4) chewable tab 4 tablet  4 tablet Oral Q15 Min PRN    Or    dextrose 50% injection 50 mL  50 mL Intravenous Q15 Min PRN    Or    glucose (Dex4) 15 GM/59ML oral liquid 30 g  30 g Oral Q15 Min PRN    Or    glucose (Glutose) 40% oral gel 30 g  30 g Oral Q15 Min PRN    Or    glucose-vitamin C (Dex-4) chewable tab 8 tablet  8 tablet Oral Q15 Min PRN    ipratropium-albuterol (Duoneb) 0.5-2.5 (3) MG/3ML inhalation solution 3 mL  3 mL Nebulization Q6H WA    guaiFENesin ER (Mucinex) 12 hr tab 600 mg  600 mg Oral BID    benzonatate (Tessalon) cap 100 mg  100 mg Oral TID    acetaminophen (Tylenol Extra Strength) tab 1,000 mg  1,000 mg Oral Q6H PRN    metoclopramide (Reglan) 5 mg/mL injection 5 mg  5 mg Intravenous Q8H PRN    polyethylene glycol (PEG 3350) (Miralax) 17 g oral packet 17 g  17 g Oral Daily PRN    sennosides (Senokot) tab 17.2 mg  17.2 mg Oral Nightly PRN    bisacodyl (Dulcolax) 10 MG rectal suppository 10 mg  10 mg Rectal Daily PRN    fleet enema (Fleet) rectal enema 133 mL  1 enema Rectal Once PRN    insulin aspart (NovoLOG) 100 Units/mL FlexPen 1-68 Units  1-68 Units Subcutaneous TID CC    insulin aspart (NovoLOG) 100 Units/mL FlexPen 1-10 Units  1-10 Units Subcutaneous TID AC and HS    ezetimibe (Zetia) tab 10 mg  10 mg Oral Daily    [START ON 2024] folic acid (Folvite) tab 1,600 mcg  1,600  mcg Oral QOD    folic acid (Folvite) tab 800 mcg  800 mcg Oral QOD    gabapentin (Neurontin) cap 100 mg  100 mg Oral TID    insulin degludec (Tresiba) 100 units/mL flextouch 20 Units  20 Units Subcutaneous Daily    metoprolol succinate ER (Toprol XL) 24 hr tab 25 mg  25 mg Oral 2x Daily(Beta Blocker)    dexamethasone (Decadron) 4 MG/ML injection 6 mg  6 mg Intravenous Q24H    dexmedeTOMIDine in sodium chloride 0.9% (Precedex) 400 mcg/100mL infusion premix  0.2-1.5 mcg/kg/hr Intravenous Continuous    remdesivir (Veklury) 200 mg in sodium chloride 0.9% 100 mL IVPB  200 mg Intravenous Once    sodium chloride 0.9% infusion   Intravenous Continuous    heparin (Porcine) 5000 UNIT/ML injection 5,000 Units  5,000 Units Subcutaneous Q8H [facility]    [START ON 9/22/2024] aspirin DR tab 325 mg  325 mg Oral Daily    levoFLOXacin in dextrose 5% (Levaquin) 750 mg/150mL IVPB premix 750 mg  750 mg Intravenous Q48H    norepinephrine (Levophed) 4 mg/250mL infusion premix        norepinephrine (Levophed) 4 mg/250mL infusion premix  0.5-30 mcg/min Intravenous Continuous     OBJECTIVE  Vitals:  BP (!) 89/38   Pulse 96   Temp 97.1 °F (36.2 °C) (Temporal)   Resp (!) 29   Ht 175.3 cm (5' 9\")   Wt 237 lb 10.5 oz (107.8 kg)   SpO2 98%   BMI 35.10 kg/m²              Physical Exam:    General Appearance: Alert, oriented x3, increased work of breathing on BIPAP, states feeling a little anxious  Neck: No JVD  Lungs: Rhonchi to auscultation bilaterally, respirations labored with accessory muscle use  Heart: Regular rate and rhythm-paced, S1 and S2 normal, no murmur, rub or gallop  Abdomen: Soft, distended, non-tender, hypoactive bowel sounds  Extremities: Atraumatic, no cyanosis, nonpitting edema BLE, capillary refill <3 sec.    Pulses: 2+ and symmetric all extremities  Skin: Skin color, texture, turgor normal for ethnicity, no rashes or lesions, warm and dry  Neurologic: Normal strength    Data this admission:  XR CHEST AP PORTABLE   (CPT=71045)    Result Date: 9/21/2024  CONCLUSION:  1. Stable cardiomegaly and cardiac pacemaker. 2. Lungs are clear without evidence of acute cardiopulmonary disease.   LOCATION:  [facility]      Dictated by (CST): Amelia Kathleen DO on 9/21/2024 at 0:05 AM     Finalized by (CST): Amelia Kathleen DO on 9/21/2024 at 0:06 AM         Labs:  Lab Results   Component Value Date    WBC 6.0 09/20/2024    HGB 9.2 09/20/2024    HCT 29.2 09/20/2024    PLT 55.0 09/20/2024    CREATSERUM 1.80 09/20/2024    BUN 26 09/20/2024     09/20/2024    K 5.0 09/20/2024     09/20/2024    CO2 19.0 09/20/2024     09/20/2024    CA 9.8 09/20/2024    ALB 4.4 09/20/2024    ALKPHO 57 09/20/2024    BILT 0.5 09/20/2024    TP 7.8 09/20/2024    AST 29 09/20/2024    ALT 30 09/20/2024       Assessment/Plan:    Acute hypoxic respiratory failure 2/2 COVID, CAP in an immunocompromised host (On methotrexate). Hx of underlying COPD  -CXR with bilateral infiltrate, no pulm edema  -RVP +COVID  -Continue with bipap for IWOB, precedex PRN for anxiety  -Sputum culture pending collection  -strep/legionella pending  -MRSA pending  -Remdesevir (9/20-)  -Dexemethasone (9/20-)  -s/p continuous nebs in ED, continue Duonebs  -Ideally would like to obtain CT chest, although pt unlikely to be able to tolerate laying flat at this time    Shock: Sepsis vs cardiogenic, ICD in place  -s/p 1 L IVF bolus in ED  -LA 2.4, repeat pending  -No leukocytosis, afebrile  -PCT 0.43  -Levofloxacin IV (9/20-)-will discuss with ID switching to ceftriaxone/doxy  -Blood cultures pending  -UA negative  -TTE pending  -ID consult    Encephalopathy, likely 2/2 to above. Now oriented x4  -ABG without hypercapnea  -CT brain negative for acute process     LORENA on CKD, NAGMA: Likely 2/2 dehydration given poor po intake, lactic acidosis  -BL Cr- ~ 1.4   -Creat 1.8 on admit  -Monitor I/O  -Daily BMP    Elevated troponin: likely 2/2 demand ischemia, cannot rule out NSTEMI given significant  cardiac history  -initial trop 150, repeat 1393. Trend until peak  -Unable to start heparin gtt due to low plts  -EKG V-paced  -TTE pending  -ASA daily  -Cardiology consult    HFrEF s/p  BiV ICD , pHTN, mild aortic stenosis  - EF 20-25% in May 2024,   -Pro BNP 14,448 (Higher than in the past)  -CXR without sign of pulmonary edema  -Hold PTA BB given borderline BP  -Not on plavix PTA due to low plts since 2022  -TTE pending    Acute on Chronic Thrombocytopenia: Likely acute component 2/2 above  -Plts lower than baseline  -Monitor for signs of bleeding  -Cont DVT proph if plts > 30  -Monitor daily CBC    Acute on chronic normocytic Anemia  -Hgb appears lower than baseline  -Unclear if patient has seen heme in the past  -Will consult heme  -No signs of bleeding    RA  -On methotrexate at home    Hx HTN, HLD  -Hold losartan, BB    Hyperglycemia, hx DM  -A1c pending  -Will start insulin gtt for non-DKA hyperglycemia   -Accu-checks per protocol  -Hold PTA metformin    Hx Chronic subdural hematoma (May 2024)    FLAKO  -Non-compliant with CPAP at home    F/E/N:    -NPO  -Follow lytes and replete PRN    Proph:  -SQ heparin  -SCD for DVT proph    Dispo:   -Full code-confirmed on admit with wife. Discussion held by this writer with patient given he is now oriented x4. Explained severity of his illness and what his wishes would be. He remains a full code and understands that if he should decline further, he would need intubation. Will update wife as well.   -ICU for close monitoring    Plan of care discussed with intensivist on-call, Dr. Dionne Strickland, ACNP-BC  ICU  Phone  [phone]   Pager [phone]

## 2024-09-22 ENCOUNTER — APPOINTMENT (OUTPATIENT)
Dept: ULTRASOUND IMAGING | Facility: HOSPITAL | Age: 79
End: 2024-09-22
Attending: NURSE PRACTITIONER
Payer: MEDICARE

## 2024-09-22 ENCOUNTER — APPOINTMENT (OUTPATIENT)
Dept: GENERAL RADIOLOGY | Facility: HOSPITAL | Age: 79
End: 2024-09-22
Attending: EMERGENCY MEDICINE
Payer: MEDICARE

## 2024-09-22 ENCOUNTER — APPOINTMENT (OUTPATIENT)
Dept: GENERAL RADIOLOGY | Facility: HOSPITAL | Age: 79
End: 2024-09-22
Attending: INTERNAL MEDICINE
Payer: MEDICARE

## 2024-09-22 ENCOUNTER — APPOINTMENT (OUTPATIENT)
Dept: GENERAL RADIOLOGY | Facility: HOSPITAL | Age: 79
End: 2024-09-22
Attending: NURSE PRACTITIONER
Payer: MEDICARE

## 2024-09-22 PROBLEM — J96.01 ACUTE RESPIRATORY FAILURE WITH HYPOXIA (HCC): Status: ACTIVE | Noted: 2024-09-22

## 2024-09-22 PROBLEM — J18.9 COMMUNITY ACQUIRED PNEUMONIA: Status: ACTIVE | Noted: 2024-09-22

## 2024-09-22 PROBLEM — J18.9 PNEUMONIA: Status: ACTIVE | Noted: 2024-09-22

## 2024-09-22 PROBLEM — R65.21 SEPTIC SHOCK (HCC): Status: ACTIVE | Noted: 2024-09-22

## 2024-09-22 PROBLEM — R79.89 ELEVATED TROPONIN: Status: ACTIVE | Noted: 2024-09-22

## 2024-09-22 PROBLEM — A41.9 SEPTIC SHOCK (HCC): Status: ACTIVE | Noted: 2024-09-22

## 2024-09-22 PROBLEM — D84.9 IMMUNOCOMPROMISED STATE (HCC): Status: ACTIVE | Noted: 2024-09-22

## 2024-09-22 PROBLEM — R00.0 WIDE-COMPLEX TACHYCARDIA: Status: ACTIVE | Noted: 2024-09-22

## 2024-09-22 PROBLEM — D64.9 ACUTE ON CHRONIC ANEMIA: Status: ACTIVE | Noted: 2024-09-22

## 2024-09-22 LAB
ALBUMIN SERPL-MCNC: 4 G/DL (ref 3.2–4.8)
ALBUMIN/GLOB SERPL: 1.6 {RATIO} (ref 1–2)
ALP LIVER SERPL-CCNC: 42 U/L
ALT SERPL-CCNC: 29 U/L
AMMONIA PLAS-MCNC: <10 UMOL/L (ref 11–32)
ANION GAP SERPL CALC-SCNC: 12 MMOL/L (ref 0–18)
ANION GAP SERPL CALC-SCNC: 14 MMOL/L (ref 0–18)
AST SERPL-CCNC: 66 U/L (ref ?–34)
ATRIAL RATE: 147 BPM
BASE EXCESS BLDV CALC-SCNC: -3.1 MMOL/L
BASOPHILS # BLD AUTO: 0.01 X10(3) UL (ref 0–0.2)
BASOPHILS NFR BLD AUTO: 0.1 %
BASOPHILS NFR BRONCH: 0 %
BILIRUB SERPL-MCNC: 0.3 MG/DL (ref 0.2–1.1)
BUN BLD-MCNC: 33 MG/DL (ref 9–23)
BUN BLD-MCNC: 33 MG/DL (ref 9–23)
CA-I BLD-SCNC: 1.02 MMOL/L (ref 0.95–1.32)
CALCIUM BLD-MCNC: 8.2 MG/DL (ref 8.7–10.4)
CALCIUM BLD-MCNC: 8.8 MG/DL (ref 8.7–10.4)
CHLORIDE SERPL-SCNC: 106 MMOL/L (ref 98–112)
CHLORIDE SERPL-SCNC: 106 MMOL/L (ref 98–112)
CO2 SERPL-SCNC: 15 MMOL/L (ref 21–32)
CO2 SERPL-SCNC: 17 MMOL/L (ref 21–32)
COLOR FLD: YELLOW
CREAT BLD-MCNC: 1.66 MG/DL
CREAT BLD-MCNC: 1.81 MG/DL
EGFRCR SERPLBLD CKD-EPI 2021: 38 ML/MIN/1.73M2 (ref 60–?)
EGFRCR SERPLBLD CKD-EPI 2021: 42 ML/MIN/1.73M2 (ref 60–?)
EOSINOPHIL # BLD AUTO: 0 X10(3) UL (ref 0–0.7)
EOSINOPHIL NFR BLD AUTO: 0 %
EOSINOPHIL NFR BRONCH: 0 %
ERYTHROCYTE [DISTWIDTH] IN BLOOD BY AUTOMATED COUNT: 16.4 %
FIO2: 40 %
GLOBULIN PLAS-MCNC: 2.5 G/DL (ref 2–3.5)
GLUCOSE BLD-MCNC: 128 MG/DL (ref 70–99)
GLUCOSE BLD-MCNC: 128 MG/DL (ref 70–99)
GLUCOSE BLD-MCNC: 134 MG/DL (ref 70–99)
GLUCOSE BLD-MCNC: 153 MG/DL (ref 70–99)
GLUCOSE BLD-MCNC: 161 MG/DL (ref 70–99)
GLUCOSE BLD-MCNC: 165 MG/DL (ref 70–99)
GLUCOSE BLD-MCNC: 173 MG/DL (ref 70–99)
GLUCOSE BLD-MCNC: 184 MG/DL (ref 70–99)
HCO3 BLDV-SCNC: 21.7 MEQ/L (ref 22–26)
HCT VFR BLD AUTO: 25 %
HGB BLD-MCNC: 8.1 G/DL
IMM GRANULOCYTES # BLD AUTO: 0.29 X10(3) UL (ref 0–1)
IMM GRANULOCYTES NFR BLD: 3.1 %
LACTATE SERPL-SCNC: 1.3 MMOL/L (ref 0.5–2)
LYMPHOCYTES # BLD AUTO: 0.39 X10(3) UL (ref 1–4)
LYMPHOCYTES NFR BLD AUTO: 4.2 %
LYMPHOCYTES NFR BRONCH: 1 %
MAGNESIUM SERPL-MCNC: 2.2 MG/DL (ref 1.6–2.6)
MCH RBC QN AUTO: 29.8 PG (ref 26–34)
MCHC RBC AUTO-ENTMCNC: 32.4 G/DL (ref 31–37)
MCV RBC AUTO: 91.9 FL
MONOCYTES # BLD AUTO: 1.82 X10(3) UL (ref 0.1–1)
MONOCYTES NFR BLD AUTO: 19.7 %
MONOS+MACROS NFR BRONCH: 4 %
NEUTROPHILS # BLD AUTO: 6.71 X10 (3) UL (ref 1.5–7.7)
NEUTROPHILS # BLD AUTO: 6.71 X10(3) UL (ref 1.5–7.7)
NEUTROPHILS NFR BLD AUTO: 72.9 %
NEUTROPHILS NFR BRONCH: 95 %
OSMOLALITY SERPL CALC.SUM OF ELEC: 289 MOSM/KG (ref 275–295)
OSMOLALITY SERPL CALC.SUM OF ELEC: 289 MOSM/KG (ref 275–295)
OXYHGB MFR BLDV: 67.5 % (ref 72–78)
PCO2 BLDV: 35 MM HG (ref 38–50)
PEEP: 5 CM H2O
PH BLDV: 7.39 [PH] (ref 7.33–7.43)
PLATELET # BLD AUTO: 64 10(3)UL (ref 150–450)
PLATELETS.RETICULATED NFR BLD AUTO: 10.3 % (ref 0–7)
PO2 BLDV: 36 MM HG (ref 30–50)
POTASSIUM BLD-SCNC: 4.7 MMOL/L (ref 3.6–5.1)
POTASSIUM SERPL-SCNC: 4.6 MMOL/L (ref 3.5–5.1)
POTASSIUM SERPL-SCNC: 5 MMOL/L (ref 3.5–5.1)
PROT SERPL-MCNC: 6.5 G/DL (ref 5.7–8.2)
Q-T INTERVAL: 342 MS
QRS DURATION: 142 MS
QTC CALCULATION (BEZET): 536 MS
R AXIS: 96 DEGREES
RBC # BLD AUTO: 2.72 X10(6)UL
SODIUM BLD-SCNC: 133 MMOL/L (ref 135–145)
SODIUM SERPL-SCNC: 135 MMOL/L (ref 136–145)
SODIUM SERPL-SCNC: 135 MMOL/L (ref 136–145)
T AXIS: -77 DEGREES
TIDAL VOLUME: 500 ML
TOTAL CELLS COUNTED FLD: 100
TROPONIN I SERPL HS-MCNC: 7752 NG/L
TROPONIN I SERPL HS-MCNC: 9055 NG/L
VENT RATE: 12 /MIN
VENTRICULAR RATE: 148 BPM
WBC # BLD AUTO: 9.2 X10(3) UL (ref 4–11)

## 2024-09-22 PROCEDURE — 0B9F8ZX DRAINAGE OF RIGHT LOWER LUNG LOBE, VIA NATURAL OR ARTIFICIAL OPENING ENDOSCOPIC, DIAGNOSTIC: ICD-10-PCS | Performed by: EMERGENCY MEDICINE

## 2024-09-22 PROCEDURE — 99499 UNLISTED E&M SERVICE: CPT | Performed by: HOSPITALIST

## 2024-09-22 PROCEDURE — 71045 X-RAY EXAM CHEST 1 VIEW: CPT | Performed by: INTERNAL MEDICINE

## 2024-09-22 PROCEDURE — 71045 X-RAY EXAM CHEST 1 VIEW: CPT | Performed by: EMERGENCY MEDICINE

## 2024-09-22 PROCEDURE — 74018 RADEX ABDOMEN 1 VIEW: CPT | Performed by: NURSE PRACTITIONER

## 2024-09-22 PROCEDURE — 31624 DX BRONCHOSCOPE/LAVAGE: CPT | Performed by: EMERGENCY MEDICINE

## 2024-09-22 PROCEDURE — 93970 EXTREMITY STUDY: CPT | Performed by: NURSE PRACTITIONER

## 2024-09-22 PROCEDURE — 99233 SBSQ HOSP IP/OBS HIGH 50: CPT | Performed by: STUDENT IN AN ORGANIZED HEALTH CARE EDUCATION/TRAINING PROGRAM

## 2024-09-22 PROCEDURE — 99291 CRITICAL CARE FIRST HOUR: CPT | Performed by: EMERGENCY MEDICINE

## 2024-09-22 RX ORDER — VANCOMYCIN HYDROCHLORIDE
1500
Status: DISCONTINUED | OUTPATIENT
Start: 2024-09-23 | End: 2024-09-22

## 2024-09-22 RX ORDER — MEPERIDINE HYDROCHLORIDE 25 MG/ML
12.5 INJECTION INTRAMUSCULAR; INTRAVENOUS; SUBCUTANEOUS ONCE
Status: DISCONTINUED | OUTPATIENT
Start: 2024-09-22 | End: 2024-09-22

## 2024-09-22 RX ORDER — SODIUM BICARBONATE 325 MG/1
325 TABLET ORAL AS NEEDED
Status: DISCONTINUED | OUTPATIENT
Start: 2024-09-22 | End: 2024-09-24

## 2024-09-22 RX ORDER — DEXAMETHASONE SODIUM PHOSPHATE 4 MG/ML
6 VIAL (ML) INJECTION EVERY 24 HOURS
Status: COMPLETED | OUTPATIENT
Start: 2024-09-23 | End: 2024-09-24

## 2024-09-22 RX ORDER — MEPERIDINE HYDROCHLORIDE 25 MG/ML
12.5 INJECTION INTRAMUSCULAR; INTRAVENOUS; SUBCUTANEOUS ONCE
Status: COMPLETED | OUTPATIENT
Start: 2024-09-22 | End: 2024-09-22

## 2024-09-22 NOTE — H&P
PT attempt: venous doppler ordered, pending result, will hold off PT eval and follow up when results are in.

## 2024-09-22 NOTE — PROGRESS NOTES
Cardiology Progress Note    David Reyes Patient Status:  Inpatient    1945 MRN [id number]   Formerly [facility] 4SW-A Attending Kj Gonzalez DO   Hosp Day # 0 PCP Thea Harris DO       Subjective:     Patient seen and examined this morning.  Intubated, sedated.  Overnight, had wide-complex tachycardia requiring unsuccessful cardioversion.  Rhythm apparently was responsive to IV amiodarone.  Noted to have worsening respiratory status requiring intubation followed by bronchoscopy early this morning.    Objective:   Temp: 100.4 °F (38 °C)  Pulse: 94  Resp: 13  AO: 119/53  FiO2 (%): 30 %    Intake/Output:     Intake/Output Summary (Last 24 hours) at 2024 1249  Last data filed at 2024 1235  Gross per 24 hour   Intake 4501.75 ml   Output 2625 ml   Net 1876.75 ml       Last 3 Weights   24 0400 243 lb 6.2 oz (110.4 kg)   24 0420 237 lb 10.5 oz (107.8 kg)   24 2144 240 lb (108.9 kg)   24 1316 241 lb (109.3 kg)   24 1001 235 lb (106.6 kg)         Physical Exam:     General: Intubated, sedated.  HEENT: Normocephalic, anicteric sclera, neck supple.  Cardiac: Regular rate and rhythm. S1, S2 normal. No murmur.  Lungs: Coarse bilaterally.  Abdomen: Soft, non-tender.   Extremities: Without clubbing, cyanosis.  Trace edema bilateral.  Neurologic: Sedated.  Skin: Warm and dry.     Laboratory/Data:    Labs:    Lab Results   Component Value Date    TSH 2.024 2024    ESRML 87 2024    CRP 16.40 2024       Recent Labs   Lab 24  2155 24  0634 24  1204 24  1557 24  1914 24  0543   WBC 6.0 4.9 5.2 6.2 9.7  9.7 9.2   HGB 9.2* 7.9* 8.1* 8.2* 8.5*  8.5* 8.1*   MCV 94.2 93.2 93.1 92.2 92.4  92.4 91.9   PLT 55.0* 47.0* 56.0* 56.0* 65.0*  65.0* 64.0*   INR 1.16  --   --   --   --   --        Recent Labs   Lab 245 24  0634 24  1557 24  1914 24  0543   * 135* 139 136 135*   K 5.0 3.9 3.7  4.7 4.6    104 107 105 106   CO2 19.0* 18.0* 20.0* 16.0* 17.0*   BUN 26* 32* 34* 35* 33*   CREATSERUM 1.80* 2.06* 1.74* 1.85* 1.81*   CA 9.8 8.5* 8.5* 8.7 8.2*   MG  --   --  2.0 1.9  --    * 373* 105* 127* 128*       Recent Labs   Lab 09/20/24  2205 09/21/24  0634 09/21/24  1557 09/22/24  0543   ALT 30 25 27 29   AST 29 32 61* 66*   ALB 4.4 4.1 4.3 4.0   LDH  --  277* 512*  --        No results for input(s): \"TROP\" in the last 168 hours.    Allergies:   Allergies   Allergen Reactions    Pcn [Penicillins]      \"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       Medications:  Current Facility-Administered Medications   Medication Dose Route Frequency    ceFEPIme (Maxipime) 1 g in sodium chloride 0.9% 100 mL IVPB-MBP  1 g Intravenous Q12H    [START ON 9/23/2024] remdesivir (Veklury) 100 mg in sodium chloride 0.9% 270 mL IVPB  100 mg Intravenous Q24H    dexamethasone (Decadron) 4 MG/ML injection 6 mg  6 mg Intravenous Q24H    glucose (Dex4) 15 GM/59ML oral liquid 15 g  15 g Oral Q15 Min PRN    Or    glucose (Glutose) 40% oral gel 15 g  15 g Oral Q15 Min PRN    Or    glucose-vitamin C (Dex-4) chewable tab 4 tablet  4 tablet Oral Q15 Min PRN    Or    dextrose 50% injection 50 mL  50 mL Intravenous Q15 Min PRN    Or    glucose (Dex4) 15 GM/59ML oral liquid 30 g  30 g Oral Q15 Min PRN    Or    glucose (Glutose) 40% oral gel 30 g  30 g Oral Q15 Min PRN    Or    glucose-vitamin C (Dex-4) chewable tab 8 tablet  8 tablet Oral Q15 Min PRN    guaiFENesin ER (Mucinex) 12 hr tab 600 mg  600 mg Oral BID    benzonatate (Tessalon) cap 100 mg  100 mg Oral TID    acetaminophen (Tylenol Extra Strength) tab 1,000 mg  1,000 mg Oral Q6H PRN    metoclopramide (Reglan) 5 mg/mL injection 5 mg  5 mg Intravenous Q8H PRN    polyethylene glycol (PEG 3350) (Miralax) 17 g oral packet 17 g  17 g Oral Daily PRN    fleet enema (Fleet) rectal enema 133 mL  1 enema  Rectal Once PRN    ezetimibe (Zetia) tab 10 mg  10 mg Oral Daily    folic acid (Folvite) tab 1,600 mcg  1,600 mcg Oral QOD    folic acid (Folvite) tab 800 mcg  800 mcg Oral QOD    heparin (Porcine) 5000 UNIT/ML injection 5,000 Units  5,000 Units Subcutaneous Q8H MELISSA    aspirin DR tab 325 mg  325 mg Oral Daily    norepinephrine (Levophed) 4 mg/250mL infusion premix  0.5-30 mcg/min Intravenous Continuous    sodium ferric gluconate (Ferrlecit) 125 mg in sodium chloride 0.9% 100mL IVPB premix  125 mg Intravenous Daily    vancomycin (Vancocin) 1.5 g in sodium chloride 0.9% 250mL IVPB premix  1,500 mg Intravenous Q24H    metRONIDAZOLE in sodium chloride 0.79% (Flagyl) 5 mg/mL IVPB premix 500 mg  500 mg Intravenous Q12H    dextrose 5%-sodium chloride 0.45% infusion   Intravenous Continuous PRN    thiamine 100 mg/mL injection 200 mg  200 mg Intravenous Daily    leucovorin (Wellcovorin) 25 mg in sodium chloride 0.9% 250 mL IVPB  25 mg Intravenous Q6H    guaiFENesin (Robitussin) 100 MG/5 ML oral liquid 100 mg  100 mg Oral Q4H PRN    insulin degludec (Tresiba) 100 units/mL flextouch 7 Units  7 Units Subcutaneous Nightly    insulin aspart (NovoLOG) 100 Units/mL FlexPen 2-10 Units  2-10 Units Subcutaneous TID AC and HS    amiodarone (Cordarone) 450 mg in dextrose 5% 250 mL infusion  0.5 mg/min Intravenous Continuous    fentaNYL (Sublimaze) 50 mcg/mL injection 25 mcg  25 mcg Intravenous Q30 Min PRN    Or    fentaNYL (Sublimaze) 50 mcg/mL injection 50 mcg  50 mcg Intravenous Q30 Min PRN    acetaminophen (Tylenol) tab 650 mg  650 mg Oral Q4H PRN    Or    acetaminophen (Tylenol) 160 MG/5ML oral liquid 650 mg  650 mg Oral Q4H PRN    Or    acetaminophen (Tylenol) rectal suppository 650 mg  650 mg Rectal Q4H PRN    Or    acetaminophen (Ofirmev) 10 mg/mL infusion premix 1,000 mg  1,000 mg Intravenous Q6H PRN    fentaNYL (Sublimaze) 25 mcg BOLUS FROM BAG infusion  25 mcg Intravenous Q30 Min PRN    Or    fentaNYL (Sublimaze) 50 mcg BOLUS  FROM BAG infusion  50 mcg Intravenous Q30 Min PRN    fentaNYL in sodium chloride 0.9% (Sublimaze) 1000 mcg/100mL infusion premix   mcg/hr Intravenous Continuous PRN    polyethylene glycol (PEG 3350) (Miralax) 17 g oral packet 17 g  17 g Oral Daily PRN    senna (Senokot) 8.8 MG/5ML oral syrup 17.6 mg  10 mL Oral Nightly PRN    bisacodyl (Dulcolax) 10 MG rectal suppository 10 mg  10 mg Rectal Daily PRN    fleet enema (Fleet) rectal enema 133 mL  1 enema Rectal Once PRN    chlorhexidine gluconate (Peridex) 0.12 % oral solution 15 mL  15 mL Mouth/Throat BID@0800,2000    midazolam (Versed) 2 MG/2ML injection 2 mg  2 mg Intravenous Q5 Min PRN    pantoprazole (Protonix) 40 mg in sodium chloride 0.9% PF 10 mL IV push  40 mg Intravenous QAM AC    phenylephrine in sodium chloride 0.9% (Lion-Synephrine) 50 mg/250mL infusion premix   mcg/min Intravenous Continuous    vasopressin (Vasostrict) 20 Units in sodium chloride 0.9% 100 mL infusion for septic shock  0.01-0.03 Units/min Intravenous Continuous    propofol (Diprivan) 10 mg/mL infusion premix  5-50 mcg/kg/min (Dosing Weight) Intravenous Continuous    fentaNYL (Sublimaze) 50 mcg/mL injection 50 mcg  50 mcg Intravenous Once         Assessment:  Elevated high-sensitivity troponin - suspect supply/demand mismatch given underlying hx as noted below in the setting of COVID - however cannot r/o NSTEMI   Acute hypoxic respiratory failure-worsening, s/p bronchoscopy 9/22 AM  Wide-complex tachycardia, suspected VT noted 9/21 evening s/p unsuccessful cardioversion, responsive to amiodarone IV  COVID +/ poss superimposed PNA  Immunocompromise state  Chronic HFrEF,  LVEF previously noted 20-25%  CAD s/p CABG w/ LIMA-LAD graft - hx of PCI RCA, Lcx 2015  S/p BiV ICD   Mild aortic stenosis   Severe pulmonary HTN   RA  SVT episodes 6/2023  Thrombocytopenia   Acute on chronic normocytic anemia       Plan:  ECHO reviewed with similar findings compared to before, LVEF 20-25%.  Mild  aortic valve stenosis.  No pericardial effusion.  EKG reviewed, given underlying cardiac history, I lean toward VT as culprit.  Device interrogation requested.  Continue IV amiodarone in the meantime.  Troponin peaked.  I suspect it is overall related to supply/demand mismatch in the setting of COVID pneumonitis.  Higher risk for anticoagulation given platelet count, hemoglobin.  Platelet count mildly improving.  Can consider heparin without bolus.  Overall, not a candidate for coronary angiography given likely inability to tolerate antiplatelet regimen in the future.  Continue supportive care in the interim.  On exam, patient does not overtly look significantly volume overloaded.  Wean Levophed as tolerated.        Emerson Gonzalez DO  Cardiologist  [facility]  9/22/2024 12:49 PM        Note to the patient: The 21st Century Cures Act makes medical notes like these available to patients in the interest of transparency. However, be advised that this is a medical document. It is intended as peer to peer communication. It is written in medical language and may contain abbreviations or verbiage that are unfamiliar. It may appear blunt or direct. Medical documents are intended to carry relevant information, facts as evident, and clinical opinion of the practitioner.     Disclaimer: Components of this note were documented using voice recognition system and are subject to errors not corrected at proofreading. Contact the author of this note for any clarifications.

## 2024-09-22 NOTE — PROGRESS NOTES
Please see full note from the APC.  This is a follow-up note given the patient's critical state.    Yesterday the patient did go into what appeared to be ventricular tachycardia.  He was bolused amiodarone and the case was discussed with cardiology.  Ultimately the patient underwent cardioversion and became hypoxic.  He was therefore intubated, and put on sedation.  When I came in today I did do a bronchoscopy in order to evaluate for atypical infections which is much less likely given he is just on methotrexate and not on more significant disease modifying drugs.  However given his into leading symptoms over a progressive period of time we did do a bronchoscopy to rule out a occult infectious process separate from his COVID infection.    In summary 79-year-old male with a past medical history of chronic systolic heart failure presents to us with acute hypoxic respiratory failure COVID-19 pneumonia and heart failure with reduced ejection fraction and diastolic dysfunction.    Problems  Diastolic heart failure  Acute hypoxic respiratory failure  COVID-19 pneumonia  Pancytopenia  Rheumatoid arthritis  Hyponatremia  Acute kidney injury on chronic kidney disease with acute renal failure  Lactic acidosis  Non-ST elevation myocardial infarction    Plan  Neuro  Patient is sedated with propofol and fentanyl  Goal RASS will be -2 to -1.    Cardiovascular  Shock patient on 6 mics of norepinephrine I had them discontinue Lion-Synephrine.  With the heart failure Lion-Synephrine may worsen LV performance without any inotropic therapy or beta agonism and therefore Levophed was my preference in the situation.  He did have the episode of ventricular tachycardia yesterday I still think that norepinephrine is a better choice at this time.    Non-ST elevation myocardial infarction  His troponin is downtrending from 9070 700.  He has not been put on a heparin drip because of the chronic thrombocytopenia and that is not a believe that  this is an acute occlusion MI.  This is felt more to be due to demand in the setting of acute kidney injury.  Therefore cardiology is okay with holding off and we are as well. Can reveal as needed for hep drip    Ventricular tachycardia  Placed on amiodarone  Will wean down to 0.5 mg  May be able to switch over to p.o. amnio tomorrow will discuss with cards    Fluid status  The patient has LV dysfunction his LVEF is 20%.  But he did have signs of hypovolemia RV was small IVC was collapsible he had been having a fever.  Will give 1 500 cc bolus now    Respiratory  Acute hypoxic respiratory failure  His FiO2 is improving.    His pH is normal.  Patient remains on 8 cc/kg tidal volume currently 500 PEEP of 8 given the patient's body habitus and abdominal girth we increase the PEEP from 5-8.    GI  Mild elevation in his AST normal ALT differentials broad includes viral, congestion, ischemia, versus other.  Will continue to follow this and see if there is rising tomorrow    Renal  Chronic kidney disease it appears that his baseline creatinine is somewhere around 1.5 in the last year.  He comes in at 1.8 currently.  Will continue to follow and monitor    Hyponatremia  Sodium is low at 135 reasonable we will continue to monitor at this time.    Endo  The patient had markedly elevated blood sugars yesterday he was put on steroids he remains on dexamethasone every 24 hours for a total days given the history of COVID and community-acquired pneumonia  His blood glucoses are in the 180s currently.  Will continue to follow.  Management has been with 7 units of Tresiba and a sliding scale of insulin.    Rheumatology  The patient did get leucovorin  Holding methotrexate  Did do bronchoscopy today    Infectious disease  He remains on Flagyl as well as cefepime and vancomycin.  His MRSA nares is negative would be okay with discontinuing vanco from my perspective.    COVID-19 pneumonia  Dexamethasone, patient has some groundglass  opacities diffusely bilaterally on chest CT has COVID-positive and symptoms of COVID for the last 7 days although not profoundly hypoxemic does not appear to be in typical COVID ARDS.  Dexamethasone  Remdesivir per ID    Prophylaxis  Heparin subcu  GI prophylaxis with Protonix 40 mg daily    Disposition is a medical intensive care    Prognosis remains extremely guarded    High possibility of death this admission    52 minutes of critical care time were spent at the bedside performing history, physical, complex data interpretation, radiographic interpretation, discussion with consultants, and creating a diagnostic and therapeutic treatment plan for this critically ill patient. Time spent was excluding time on procedures and resident teaching.    Date of Service    September 22, 2024

## 2024-09-22 NOTE — PLAN OF CARE
Received pt orally intubated. Sedated on Propofol and Fentanyl. BAL this am at bedside, cultures sent. Attempted to wean sedation but pt fighting ventilator. RASS -3/-4 for vent compliance. Tmax 102, temp probe smith placed, PRN Ofirmev given. Cooling blanket applied with good result. Rigors this evening. Demerol given x1. Amio gtt continues, per cardiology continue IV not transition to PO. PPM interrogated today. Vasopressors titrated for MAP >60 or SBP >90. US BLE negative for DVT. OG, TF started, abdomen distended, KUB negative. Wife at bedside and updated on POC.

## 2024-09-22 NOTE — RESPIRATORY THERAPY NOTE
Current Mechanical Ventilator Settings:  - Mode: VC+  - Rate: 12  - Tidal Volume(mL):500  - FiO2: 30%  - PEEP 8  - inspiratory time 0.9    Breath Sounds: Clear, Diminish    ETT Size: 7.5 @ 25    Measured Parameters:  Peak Inspiratory Pressure(cmH2O): 21  Plateu Pressure(cmH2O) : 21  Tidal Volume(mL): 530 to 841  Rate:17    Shift Events noted: Pt remain critical and not stable for weaning. Peep increased to 8 per MD order.

## 2024-09-22 NOTE — PLAN OF CARE
[employer] device interrogated per order and RN request. Pt tolerated. Report sent to ICU tube station, RN aware.

## 2024-09-22 NOTE — PROCEDURES
[facility] Patient Status:  Observation    1945 MRN [id number]   Location [facility] 4SW-A Attending Kj Gonzalez,    Hosp Day # 0 PCP Thea Harris DO     Intubation    Indications/diagnosis: Acute hypoxemic respiratory failure    Procedure Details:  Intubation Method:  Video-assisted  Patient Status:  Sedated  Preoxygenation:  BVM    Sedation:      Sedation:  Etomidate and fentanyl  Laryngoscope Size: Mac 3  Tube Size (mm):  7.5  Tube Type:  Cuffed  Number of Attempts:  1  Cricoid Pressure:  no  Cords Visualized:  no  Post-Procedure Assessment:  Chest Rise, ETCO2 Monitor, and CO2 Detector  Breath Sounds:  Equal  Cuff inflated?  yes  ETT to lip (cm): 23  Tube secured with:  ETT callahan  Chest x-ray interpreted by: Ordered not yet done    Patient tolerance:  Patient tolerated the procedure well with no immediate complications.    Comments: Case was discussed with the wife was at the bedside    Sam Guerra MD  2024

## 2024-09-22 NOTE — PLAN OF CARE
Notified by critical care MD that patient was in wide complex tachycardia 140's-150's.    Critical care MD requesting consideration for heparin/ACS secondary to WCT.    Repeat troponin 6036 with prior at 5460    Plt 64 with is improved but still low    Reviewed with Dr. Gonzalez by phone:  1) Amiodarone bolus and gtt  2) Can re-bolus amiodarone if needed for rate/rhythm control  3) Not highly suspicious for ACS, likely secondary to COVID  4) We do not have an objection to heparin gtt if intensivist wishes to start, however we would recommend close monitoring of the patient as he has a history of bleeding on plavix  5) Would be a poor candidate for PCI with prior history    Allison Rosales, APRVA  09/21/24  8:48 PM

## 2024-09-22 NOTE — CONSULTS
[facility]   part of [facility] ID CONSULT NOTE    David Reyes Patient Status:  Observation    1945 MRN [id number]   Location [facility] 4SW-A Attending Kj Gonzalez DO   Hosp Day # 0 PCP Thea Harris DO       Reason for Consultation:    Febrile overnight, went into wide-complex tachycardia overnight with unsuccessfully cardioversion but improved on amiodarone. Intubated and underwent bronchoscopy. Now requiring pressors.     Antibiotics: vancomycin -, cefepime -, flagyl -     levofloxacin , Ceftrixaone   ASSESSMENT:    # Multifocal pneumonia - mostly 2/2 covid however some concern for bacterial PNA given degree of hypotension. Differential includes acute PE, cardiogenic shock give lack of fever, wbc elevation.    MRSA nares but keep vanc pending blood cultures   # BiV ICD  #DMII  # Penicillin allergy  # RA on methotrexate     PLAN:  - stop doxy  - vanc, flagyl,  - stop ceftriaxone, start cefepime    - continue steroids, remdesivir   - await blood cultures,  bronchoscopy culture   - Above antibiotics requiring regular monitoring of labs for toxicity  -  Follow fever curve, wbc  -  Reviewed labs, micro, imaging reports, available old records, discussed with patient, ICU team     Thank you for allowing us to participate in the care of this patient. Please do not hesitate to call if you have any questions.   We will continue to follow with you and will make further recommendations based on his progress.    History of Present Illness:  David Reyes is a a(n) 79 year old male. Past medical history of CAD, rheumatoid arthritis, COPD , cardiomyopathy status post ICD who presents with shortness of breath. Notes acute onset cough, sob. Denies any n/v/d, joint pain, dysuria, flank pain.  Afebrile since admission tachycardic since improved, hypotensive requiring pressors, on BiPAP.  Tested positive for COVID and was started on dexamethasone, remdesivir.   Labs notable for hyperglycemia, elevated creatinine, anemia, lymphopenia without leukocytosis, UA with negative nitrites, leukoesterase.    Sick contact wife with upper respiratory infection.      Physical Exam:  Vital signs: Blood pressure 109/63, pulse 66, temperature 98.8 °F (37.1 °C), resp. rate 13, height 175.3 cm (5' 9\"), weight 243 lb 6.2 oz (110.4 kg), SpO2 99%.    General: intubated, sedated  HEENT: Moist mucous membranes. On BIPAP   Neck: No lymphadenopathy.  Supple.  Cardiovascular: RRR  Respiratory: course breath sounds anteriorly   Abdomen: mildly distended - RLQ tenderness w/o rebound   Musculoskeletal: No edema noted  Integument: No lesions. No erythema.    Laboratory Data:  Recent Labs   Lab 09/22/24  0543   RBC 2.72*   HGB 8.1*   HCT 25.0*   MCV 91.9   MCH 29.8   MCHC 32.4   RDW 16.4   NEPRELIM 6.71   WBC 9.2   PLT 64.0*     Recent Labs   Lab 09/21/24  0634 09/21/24  1557 09/21/24  1914 09/22/24  0543   * 105* 127* 128*   BUN 32* 34* 35* 33*   CREATSERUM 2.06* 1.74* 1.85* 1.81*   CA 8.5* 8.5* 8.7 8.2*   ALB 4.1 4.3  --  4.0   * 139 136 135*   K 3.9 3.7 4.7 4.6    107 105 106   CO2 18.0* 20.0* 16.0* 17.0*   ALKPHO 49 48  --  42*   AST 32 61*  --  66*   ALT 25 27  --  29   BILT 0.4 0.2  --  0.3   TP 6.6 7.0  --  6.5       Microbiology: Reviewed in EMR    Radiology: Reviewed      Omid Cervantes MD   [facility]  [phone]  9/21/2024

## 2024-09-22 NOTE — DIETARY NOTE
[facility]   part of [facility]    NUTRITION ASSESSMENT    Unable to diagnose malnutrition criteria at this time.      NUTRITION INTERVENTION:    Coordination of Nutrition Care - SLP consult prior to diet advancement.  Meal and Snacks - Monitor and encourage adequate PO intake. NPO.  Medical Food Supplements - Will evaluate need when diet is advanced. NPO.  Vitamin and Mineral Supplements - continue Folic Acid and Thiamine.     Enteral Nutrition - via OG     *Initiate Vital 1.2 at 20 ml/hr. Increase 10 ml q 6 hours, as tolerated, to goal rate of 50 ml/hr + 1 packet of Prosource per day.    If no IVF, recommend free water flush of 190 ml q 4 hours or per MD.   Goal TF + 1 Prosource to provide: 1200 ml, 1530 kcal (Total Calories: 2042 kcal, including lipid kcal from propofol), 105 gm protein, 972 ml free H20, and Total H20 (TF+free water flush)= 2112 ml.     PATIENT STATUS:     9/22/24- 80 y/o male admitted w/ bronchospasm. + COVID19 (9/21). Pt seen d/t nutrition consult for at risk and TF recs, and MST score of 3.  Pt intubated yesterday (9/21). Propofol infusing at 19.4 ml/hr, which provides: 512 lipid kcal. S/p bronchoscopy today.  Per H&P, pt w/ SOB x 1 week and decreased PO PTA.   PMH:remote tobacco use, HTN, DL, CAD, CABG, CHF, DM, CKD, RA, FLAKO.       ANTHROPOMETRICS:  Ht: 175.3 cm (5' 9\")  Wt: 110.4 kg (243 lb 6.2 oz).   BMI: Body mass index is 35.94 kg/m².  IBW: 72.7 kg      WEIGHT HISTORY:   Noted no significant wt changes per EMR.    Wt Readings from Last 10 Encounters:   09/22/24 110.4 kg (243 lb 6.2 oz)   09/09/24 109.3 kg (241 lb)   06/18/24 106.6 kg (235 lb)   05/23/24 106.1 kg (234 lb)   05/13/24 107 kg (236 lb)   05/10/24 107 kg (236 lb)   04/08/24 111.3 kg (245 lb 6.4 oz)   03/04/24 110.7 kg (244 lb)   10/12/23 110.8 kg (244 lb 6 oz)   02/20/23 126.6 kg (279 lb)        NUTRITION:  Diet: NPO + TF     Food Allergies: No  Cultural/Ethnic/Spiritism Preferences Addressed: Yes    Percent Meals  Eaten (last 3 days)       Date/Time Percent Meals Eaten (%)    09/21/24 0800 0 %    09/21/24 1200 0 %    09/21/24 1600 0 %    09/22/24 0800 0 %    09/22/24 1200 0 %          GI system review:  Last BM Date: 09/20/24  Skin and wounds: skin intact    NUTRITION RELATED PHYSICAL FINDINGS:     1. Body Fat/Muscle Mass: no wasting noted / well nourished via glass door. On airborne isolation precautions for + COVID.     2. Fluid Accumulation: none per RN documentation     NUTRITION PRESCRIPTION: 107.8 kg-237 lb (9/21)  Calories: 1960 calories/day ([place]; MV:8.6 L/min, Temp:37.9 C)  Protein: 109-145 grams protein/day (1.5-2.0 grams protein per kg per IBW;72.7 kg)  Fluid: ~1 ml/kcal or per MD discretion    NUTRITION DIAGNOSIS/PROBLEM:  Inadequate energy intake related to  physiologic causes increasing nutrient needs and decreased ability to consume sufficient energy intakes  as evidenced by  current vent/NPO status.       MONITOR AND EVALUATE/NUTRITION GOALS:  Return to PO intake or advance diet in 24-48 hrs - New  Start alternative nutrition in 24-48 hrs if diet is not able to advance- New      MEDICATIONS:  Propofol, Levophed, Fentanyl, Amiodarone gtt, IV abx, Decadron, Folic Acid, Insulin, Remdesivir, IV Iron, Thiamine    LABS:  POC Glu:, Glu:128, Na++:134, BUN:33, Cr:1.81, GFR:38, CRP:16.40 (9/21)    Pt is at High nutrition risk    Rosalinda Arevalo MS, RD, LDN  Clinical Dietitian  Ext:[phone]

## 2024-09-22 NOTE — PROGRESS NOTES
Patient not seen, chart reviewed.  Platelets better since admission and stable.  Received leucovorin yesterday as on methotrexate for RA.  No need to continue.  IV iron started yesterday for iron deficiency.  Hb stable.    Brigid Gresham M.D.    [facility]    [address] [place], [address]    9/22/2024    10:32 AM

## 2024-09-22 NOTE — PROGRESS NOTES
[facility]   part of [facility]     Hospitalist Progress Note     David Reyes Patient Status:  Inpatient    1945 MRN [id number]   Location [facility] 4SW-A Attending Kj Gonzalez DO   Hosp Day # 0 PCP Thea Harris DO     Chief Complaint: Dyspnea    Subjective:     Patient resting in bed sedated and intubated    Objective:    Review of Systems:   A comprehensive review of systems was completed; pertinent positive and negatives stated in subjective.    Vital signs:  Temp:  [98.4 °F (36.9 °C)-102.2 °F (39 °C)] 100.4 °F (38 °C)  Pulse:  [] 88  Resp:  [14-40] 18  SpO2:  [95 %-100 %] 98 %  AO: ()/(41-92) 122/54  FiO2 (%):  [30 %-100 %] 30 %    Physical Exam:    General: No acute distress  Respiratory: No wheezes, no rhonchi, intubated  Cardiovascular: S1, S2, regular rate and rhythm  Abdomen: Soft, Non-tender, non-distended, positive bowel sounds  Neuro: No new focal deficits.  Sedated  Extremities: No edema    Diagnostic Data:    Labs:  Recent Labs   Lab 24  0634 24  1204 24  0543   WBC 6.0 4.9 5.2 6.2 9.7  9.7 9.2   HGB 9.2* 7.9* 8.1* 8.2* 8.5*  8.5* 8.1*   MCV 94.2 93.2 93.1 92.2 92.4  92.4 91.9   PLT 55.0* 47.0* 56.0* 56.0* 65.0*  65.0* 64.0*   INR 1.16  --   --   --   --   --        Recent Labs   Lab 24  0634 24  15524  0543   * 105* 127* 128*   BUN 32* 34* 35* 33*   CREATSERUM 2.06* 1.74* 1.85* 1.81*   CA 8.5* 8.5* 8.7 8.2*   ALB 4.1 4.3  --  4.0   * 139 136 135*   K 3.9 3.7 4.7 4.6    107 105 106   CO2 18.0* 20.0* 16.0* 17.0*   ALKPHO 49 48  --  42*   AST 32 61*  --  66*   ALT 25 27  --  29   BILT 0.4 0.2  --  0.3   TP 6.6 7.0  --  6.5       Estimated Creatinine Clearance: 34.2 mL/min (A) (based on SCr of 1.81 mg/dL (H)).    Recent Labs   Lab 24  1914 24  2359 24  0543   TROPHS 6,036* 9,055* 7,752*       Recent Labs   Lab  09/20/24  2155   PTP 14.9*   INR 1.16       Lab Results   Component Value Date    TSH 2.024 09/21/2024             Microbiology    Hospital Encounter on 09/20/24   1. Blood Culture     Status: None (Preliminary result)    Collection Time: 09/21/24  6:34 AM    Specimen: Blood,peripheral   Result Value Ref Range    Blood Culture Result No Growth 1 Day N/A         Imaging: Reviewed in Epic.    Medications:    cefepime  1 g Intravenous Q12H    guaiFENesin ER  600 mg Oral BID    benzonatate  100 mg Oral TID    ezetimibe  10 mg Oral Daily    folic acid  1,600 mcg Oral QOD    folic acid  800 mcg Oral QOD    heparin  5,000 Units Subcutaneous Q8H MELISSA    aspirin  325 mg Oral Daily    sodium ferric gluconate  125 mg Intravenous Daily    vancomycin  1,500 mg Intravenous Q24H    metRONIDAZOLE  500 mg Intravenous Q12H    thiamine  200 mg Intravenous Daily    leucovorin (Wellcovorin) 25 mg in sodium chloride 0.9% 250 mL IVPB  25 mg Intravenous Q6H    insulin degludec  7 Units Subcutaneous Nightly    insulin aspart  2-10 Units Subcutaneous TID AC and HS    chlorhexidine gluconate  15 mL Mouth/Throat BID@0800,2000    pantoprazole  40 mg Intravenous QAM AC    fentaNYL  50 mcg Intravenous Once       Assessment & Plan:      #Acute hypoxic respiratory failure  #COVID  #Superimposed bacterial vs atypical pneumonia   #Immunocompromised state, on methotrexate for RA  -CT chest reviewed and shows multiple areas of consolidation, round groundglass spiculated consolidations  -On vent  -S/p bronch on 9/22  -Urine antigens negative  -MRSA negative  -Sputum culture in lab  -Bronc culture in lab  -Fungus stain and culture in lab  -Pneumocystitis PCR in lab  -S/p leucovorin x 4 doses to accelerate methotrexate clearance in setting of LORENA per hematology  -IV antibiotics  -Given Remdesivir x 1 and Solumedrol x 1 on 9/21; clarify with ID on continuation of Remdesivir and steroids   -Pulmonary following    #Septic shock  -Blood cultures NGTD  -Lactic  acid normalized  -IV antibiotics  -On pressors; wean as tolerated  -ID following    #Wide-complex tachycardia  -Amiodarone drip  -Cardiology following    #LORENA on CKD, baseline creatinine 1.4  #Hyponatremia  #Metabolic acidosis  -Continue to monitor on daily labs    #Acute on chronic anemia  #Acute on chronic thrombocytopenia  -Likely in the setting of infection  -IV iron  -Monitor on daily labs  -Hematology following    #Elevated troponin  #CAD s/p CABG  -Troponin peaked  -Aspirin  -Unable to start heparin drip in the setting of thrombocytopenia  -Cardiology following    #Chronic combined heart failure, EF 20-25% s/p ICD  -Echo reviewed and shows EF 20-25%, severe diffuse hypokinesis with regional variations, abnormal left ventricular relaxation  -Spironolactone, metoprolol on hold    #Diabetes type 2  -Tresiba  -Hyperglycemia protocol    #Hypertension  -Losartan on hold    #Hyperlipidemia  -Ezetimibe    #Rheumatoid arthritis  -Methotrexate on hold  -S/p leucovorin x 4 doses to accelerate methotrexate clearance in setting of LORENA per hematology    #FLAKO  -Noncompliant with CPAP at home      Kj Gonzalez DO    Supplementary Documentation:     Quality:  DVT Mechanical Prophylaxis:   SCDs,    DVT Pharmacologic Prophylaxis   Medication    heparin (Porcine) 5000 UNIT/ML injection 5,000 Units                Code Status: Full Code  Beltre: Beltre catheter in place  Beltre Duration (in days): 1  Central line: central venous catheter in place  GABE:     Discharge is dependent on: Clinical improvement  At this point Mr. Reyes is expected to be discharge to: TBD    The 21st Century Cures Act makes medical notes like these available to patients in the interest of transparency. Please be advised this is a medical document. Medical documents are intended to carry relevant information, facts as evident, and the clinical opinion of the practitioner. The medical note is intended as peer to peer communication and may appear blunt or  direct. It is written in medical language and may contain abbreviations or verbiage that are unfamiliar.              **Certification      PHYSICIAN Certification of Need for Inpatient Hospitalization - Initial Certification    Patient will require inpatient services that will reasonably be expected to span two midnight's based on the clinical documentation in H+P.   Based on patients current state of illness, I anticipate that, after discharge, patient will require TBD.

## 2024-09-22 NOTE — PLAN OF CARE
Pt intubated and sedated. Grimaces and chews on the ET tube when stimulated. Moves all four extremities, does not follow commands. Weaned FiO2 down to 40%. Febrile; administered Tylenol PRN, applied ice packs. Actively titrating vasopressors to  keep SBP>90 or MAP>60. BUE restrained to keep pt from inadvertent harm and to protect medical equipments. Will continue to closely monitor.      Problem: SAFETY ADULT - FALL  Goal: Free from fall injury  Description: INTERVENTIONS:  - Assess pt frequently for physical needs  - Identify cognitive and physical deficits and behaviors that affect risk of falls.  - [place] fall precautions as indicated by assessment.  - Educate pt/family on patient safety including physical limitations  - Instruct pt to call for assistance with activity based on assessment  - Modify environment to reduce risk of injury  - Provide assistive devices as appropriate  - Consider OT/PT consult to assist with strengthening/mobility  - Encourage toileting schedule  Outcome: Progressing     Problem: Safety Risk - Non-Violent Restraints  Goal: Patient will remain free from self-harm  Description: INTERVENTIONS:  - Apply the least restrictive restraint to prevent harm  - Notify patient and family of reasons restraints applied  - Assess for any contributing factors to confusion (electrolyte disturbances, delirium, medications)  - Discontinue any unnecessary medical devices as soon as possible  - Assess the patient's physical comfort, circulation, skin condition, hydration, nutrition and elimination needs   - Reorient and redirection as needed  - Assess for the need to continue restraints  Outcome: Progressing     Problem: Delirium  Goal: Minimize duration of delirium  Description: Interventions:  - Encourage use of hearing aids, eye glasses  - Promote highest level of mobility daily  - Provide frequent reorientation  - Promote wakefulness i.e. lights on, blinds open  - Promote sleep, encourage patient's  normal rest cycle i.e. lights off, TV off, minimize noise and interruptions  - Encourage family to assist in orientation and promotion of home routines  Outcome: Not Progressing     Problem: CARDIOVASCULAR - ADULT  Goal: Maintains optimal cardiac output and hemodynamic stability  Description: INTERVENTIONS:  - Monitor vital signs, rhythm, and trends  - Monitor for bleeding, hypotension and signs of decreased cardiac output  - Evaluate effectiveness of vasoactive medications to optimize hemodynamic stability  - Monitor arterial and/or venous puncture sites for bleeding and/or hematoma  - Assess quality of pulses, skin color and temperature  - Assess for signs of decreased coronary artery perfusion - ex. Angina  - Evaluate fluid balance, assess for edema, trend weights  Outcome: Progressing  Goal: Absence of cardiac arrhythmias or at baseline  Description: INTERVENTIONS:  - Continuous cardiac monitoring, monitor vital signs, obtain 12 lead EKG if indicated  - Evaluate effectiveness of antiarrhythmic and heart rate control medications as ordered  - Initiate emergency measures for life threatening arrhythmias  - Monitor electrolytes and administer replacement therapy as ordered  Outcome: Progressing     Problem: RESPIRATORY - ADULT  Goal: Achieves optimal ventilation and oxygenation  Description: INTERVENTIONS:  - Assess for changes in respiratory status  - Assess for changes in mentation and behavior  - Position to facilitate oxygenation and minimize respiratory effort  - Oxygen supplementation based on oxygen saturation or ABGs  - Provide Smoking Cessation handout, if applicable  - Encourage broncho-pulmonary hygiene including cough, deep breathe, Incentive Spirometry  - Assess the need for suctioning and perform as needed  - Assess and instruct to report SOB or any respiratory difficulty  - Respiratory Therapy support as indicated  - Manage/alleviate anxiety  - Monitor for signs/symptoms of CO2 retention  Outcome:  Progressing

## 2024-09-22 NOTE — PROGRESS NOTES
Critical care note:  I was called by RN because patient started to have wide QRS tachycardia with heart rate of 148-150, patient was having increasing shortness of breath so he was placed on BiPAP, systolic blood pressure at that time was in the 90s which is similar to his baseline before this happened, stat labs were ordered, EKG showed wide QRS tachycardia, nonspecific intraventricular block, T wave abnormalities (this is a preliminary reading, full and official report per cardiology to follow) I tried to call Dr. Emerson Gonzalez from cardiology through [facility] and I was referred to Allison Rosales who recommended amiodarone bolus and drip and to hold off heparin for now because of thrombocytopenia, while I was talking with Allison Rosales nurse came out of the room that blood pressure started to drop so I asked her to start Lion-Synephrine drip, patient was cardioverted without change in rhythm, 150 of amiodarone was given IV and then drip was ordered, patient was in severe respiratory distress so he was intubated emergently and was placed on full vent support.  Case was discussed with bedside RN and with the wife at the bedside, total critical care time was 35 minutes excluding any billable procedure.

## 2024-09-22 NOTE — PROGRESS NOTES
09/22/24 0440   Vent Information   $ RT Standby Charge (per 15 min) 1   Vent Initiation Date 09/21/24   Vent Initiation Time 1910   Ventilation Day(s) 2   Interface Invasive   Vent Type    Vent plugged into main power? Yes   Vent -4   Vent Mode VC+   Settings   FiO2 (%) (S)  40 %   Resp Rate (Set) 12   Vt (Set, mL) 500 mL   Waveform Decelerating ramp   PEEP/CPAP (cm H2O) 5 cm H20   Insp Time (sec) 0.9 sec   Trigger Sensitivity Flow (L/min) 3 L/min   Humidification Heater   H2O Bag Level 3/4 Full   Heater Temperature 98.6 °F (37 °C)   Readings   Total RR 15   Minute Ventilation (L/min) 8.1 L/min   Inspiratory Tidal Volume 500 mL   Expiratory Tidal Volume 448 mL   PIP Observed (cm H2O) 11 cm H2O   MAP (cm H2O) 6.5   I/E Ratio 1:3.0   Plateau Pressure (cm H2O) 19 cm H2O   Static Compliance (L/cm H2O) 30   ETT   Placement Date/Time: 09/21/24 1910   Airway Size: 7.5 mm  Cuffed: Cuffed  Insertion attempts: 1  Technique: Video laryngoscopy;Stylet;Rapid sequence  Placement Verification: Colorimetric EtCO2 device   Secured at (cm) 25 cm   Suctioned? N   Measured From Lips   Secured Location Right   Secured by Commercial tube callahan   Site Condition Dry   Site changed Rotated   Req'd equipment at bedside Bag mask

## 2024-09-22 NOTE — OPERATIVE REPORT
Procedure: Bronchoscopy and sampling with bronchioloalveolar lavage    Indication: Hypoxic respiratory failure 3 of disease modifying drugs with methotrexate    Procedure: Patient was sedated with fentanyl and propofol.  The bronchoscope was advanced.  The right mainstem bronchus was identified and the bronchoscope was inserted.  The upper lobe was clear with minimal scant secretions.  The bronchus intermedius had thin mucoid secretions which were removed.  The bronchoscope was then advanced into the right lower lobe.    Scant secretions were identified and there was a few areas of mild bleeding with irrigation that stopped.  Once the bronchoscope was inserted distally into a wedge position 30 cc of saline were instilled approximately 10 cc of return.  Another 10 cc of saline were instilled and 10 cc were returned.    Fluid that was returning had a scant pink color not worsening in nature.  Mixed with some mucus.    The fluid was labeled and sent to lab for analysis for cell count differential PCP PCR and DFA at this time.    Patient tolerated the procedure well with no immediate complications.    Follow-up chest x-ray    Date of service September 22, 2024

## 2024-09-22 NOTE — PROGRESS NOTES
ICU  Critical Care APRN Progress  Note    NAME: David Reyes - ROOM: [facility] - MRN: [id number] - Age: 79 year old - :1945    SUBJECTIVE: Wide omplex tachycardia overnigth with -150 with increasing SOB placed on BIPAP. Hypotensive placed on pressors, cardioverted with no improvement in HR, s/p amio 150mg IV bolus and gtt. Pt went into severe resp distress and was intubated.     Febrile this AM, tmax 102.2, on levophed gtt, gabbi weaned off. Bronched with cultures sent.    Current Facility-Administered Medications   Medication Dose Route Frequency    ceFEPIme (Maxipime) 1 g in sodium chloride 0.9% 100 mL IVPB-MBP  1 g Intravenous Q12H    glucose (Dex4) 15 GM/59ML oral liquid 15 g  15 g Oral Q15 Min PRN    Or    glucose (Glutose) 40% oral gel 15 g  15 g Oral Q15 Min PRN    Or    glucose-vitamin C (Dex-4) chewable tab 4 tablet  4 tablet Oral Q15 Min PRN    Or    dextrose 50% injection 50 mL  50 mL Intravenous Q15 Min PRN    Or    glucose (Dex4) 15 GM/59ML oral liquid 30 g  30 g Oral Q15 Min PRN    Or    glucose (Glutose) 40% oral gel 30 g  30 g Oral Q15 Min PRN    Or    glucose-vitamin C (Dex-4) chewable tab 8 tablet  8 tablet Oral Q15 Min PRN    guaiFENesin ER (Mucinex) 12 hr tab 600 mg  600 mg Oral BID    benzonatate (Tessalon) cap 100 mg  100 mg Oral TID    acetaminophen (Tylenol Extra Strength) tab 1,000 mg  1,000 mg Oral Q6H PRN    metoclopramide (Reglan) 5 mg/mL injection 5 mg  5 mg Intravenous Q8H PRN    polyethylene glycol (PEG 3350) (Miralax) 17 g oral packet 17 g  17 g Oral Daily PRN    fleet enema (Fleet) rectal enema 133 mL  1 enema Rectal Once PRN    ezetimibe (Zetia) tab 10 mg  10 mg Oral Daily    folic acid (Folvite) tab 1,600 mcg  1,600 mcg Oral QOD    folic acid (Folvite) tab 800 mcg  800 mcg Oral QOD    heparin (Porcine) 5000 UNIT/ML injection 5,000 Units  5,000 Units Subcutaneous Q8H MELISSA    aspirin DR tab 325 mg  325 mg Oral Daily    norepinephrine (Levophed) 4 mg/250mL infusion premix   0.5-30 mcg/min Intravenous Continuous    sodium ferric gluconate (Ferrlecit) 125 mg in sodium chloride 0.9% 100mL IVPB premix  125 mg Intravenous Daily    vancomycin (Vancocin) 1.5 g in sodium chloride 0.9% 250mL IVPB premix  1,500 mg Intravenous Q24H    metRONIDAZOLE in sodium chloride 0.79% (Flagyl) 5 mg/mL IVPB premix 500 mg  500 mg Intravenous Q12H    dextrose 5%-sodium chloride 0.45% infusion   Intravenous Continuous PRN    thiamine 100 mg/mL injection 200 mg  200 mg Intravenous Daily    leucovorin (Wellcovorin) 25 mg in sodium chloride 0.9% 250 mL IVPB  25 mg Intravenous Q6H    guaiFENesin (Robitussin) 100 MG/5 ML oral liquid 100 mg  100 mg Oral Q4H PRN    insulin degludec (Tresiba) 100 units/mL flextouch 7 Units  7 Units Subcutaneous Nightly    insulin aspart (NovoLOG) 100 Units/mL FlexPen 2-10 Units  2-10 Units Subcutaneous TID AC and HS    amiodarone (Cordarone) 450 mg in dextrose 5% 250 mL infusion  0.5 mg/min Intravenous Continuous    fentaNYL (Sublimaze) 50 mcg/mL injection 25 mcg  25 mcg Intravenous Q30 Min PRN    Or    fentaNYL (Sublimaze) 50 mcg/mL injection 50 mcg  50 mcg Intravenous Q30 Min PRN    acetaminophen (Tylenol) tab 650 mg  650 mg Oral Q4H PRN    Or    acetaminophen (Tylenol) 160 MG/5ML oral liquid 650 mg  650 mg Oral Q4H PRN    Or    acetaminophen (Tylenol) rectal suppository 650 mg  650 mg Rectal Q4H PRN    Or    acetaminophen (Ofirmev) 10 mg/mL infusion premix 1,000 mg  1,000 mg Intravenous Q6H PRN    fentaNYL (Sublimaze) 25 mcg BOLUS FROM BAG infusion  25 mcg Intravenous Q30 Min PRN    Or    fentaNYL (Sublimaze) 50 mcg BOLUS FROM BAG infusion  50 mcg Intravenous Q30 Min PRN    fentaNYL in sodium chloride 0.9% (Sublimaze) 1000 mcg/100mL infusion premix   mcg/hr Intravenous Continuous PRN    polyethylene glycol (PEG 3350) (Miralax) 17 g oral packet 17 g  17 g Oral Daily PRN    senna (Senokot) 8.8 MG/5ML oral syrup 17.6 mg  10 mL Oral Nightly PRN    bisacodyl (Dulcolax) 10 MG rectal  suppository 10 mg  10 mg Rectal Daily PRN    fleet enema (Fleet) rectal enema 133 mL  1 enema Rectal Once PRN    chlorhexidine gluconate (Peridex) 0.12 % oral solution 15 mL  15 mL Mouth/Throat BID@0800,2000    midazolam (Versed) 2 MG/2ML injection 2 mg  2 mg Intravenous Q5 Min PRN    pantoprazole (Protonix) 40 mg in sodium chloride 0.9% PF 10 mL IV push  40 mg Intravenous QAM AC    phenylephrine in sodium chloride 0.9% (Lion-Synephrine) 50 mg/250mL infusion premix   mcg/min Intravenous Continuous    vasopressin (Vasostrict) 20 Units in sodium chloride 0.9% 100 mL infusion for septic shock  0.01-0.03 Units/min Intravenous Continuous    propofol (Diprivan) 10 mg/mL infusion premix  5-50 mcg/kg/min (Dosing Weight) Intravenous Continuous    fentaNYL (Sublimaze) 50 mcg/mL injection 50 mcg  50 mcg Intravenous Once     OBJECTIVE  Vitals:  /63   Pulse 82   Temp (!) 102.2 °F (39 °C)   Resp 23   Ht 175.3 cm (5' 9\")   Wt 243 lb 6.2 oz (110.4 kg)   SpO2 97%   BMI 35.94 kg/m²            Vent Mode: VC+  FiO2 (%):  [30 %-100 %] 40 %  S RR:  [12] 12  S VT:  [500 mL] 500 mL  PEEP/CPAP (cm H2O):  [5 cm H20-8 cm H20] 8 cm H20  MAP (cm H2O):  [6.5-10] 10      Physical Exam:    General Appearance: intubated, sedated  Neck: No JVD  Lungs: Rhonchi to auscultation bilaterally  Heart: Regular rate and [id number]  Abdomen: Soft, distended, non-tender, hypoactive bowel sounds  Extremities: Atraumatic, no cyanosis, nonpitting edema BLE, capillary refill <3 sec.    Pulses: 2+ and symmetric all extremities  Skin: Skin color, texture, turgor normal for ethnicity, no rashes or lesions, warm and dry  Neurologic: Normal strength    Data this admission:  XR CHEST AP PORTABLE  (CPT=71045)    Result Date: 9/21/2024  CONCLUSION:  1. Stable cardiomegaly and cardiac pacemaker. 2. Lungs are clear without evidence of acute cardiopulmonary disease.   LOCATION:  [facility]      Dictated by (CST): Amelia Kathleen DO on 9/21/2024 at 0:05 AM      Finalized by (CST): Amelia Kathleen DO on 9/21/2024 at 0:06 AM       XR CHEST AP PORTABLE  (CPT=71045)    Result Date: 9/22/2024  CONCLUSION:  Increase consolidations in the lungs and small bilateral pleural effusions may be due to fluid overload.   LOCATION:  [facility]      Dictated by (CST): Miguel Saavedra MD on 9/22/2024 at 9:25 AM     Finalized by (CST): Miguel Saavedra MD on 9/22/2024 at 9:26 AM       XR CHEST AP PORTABLE  (CPT=71045)    Result Date: 9/21/2024  CONCLUSION:  1. Endotracheal tube terminates at the thoracic inlet 2. New mild pulmonary edema.   LOCATION:  [facility]      Dictated by (CST): Stromberg, LeRoy, MD on 9/21/2024 at 8:15 PM     Finalized by (CST): Stromberg, LeRoy, MD on 9/21/2024 at 8:17 PM       CT CHEST+ABDOMEN+PELVIS(CPT=71250/39967)    Result Date: 9/21/2024  CONCLUSION:   1. Multiple regions of consolidation the lungs are noted.  The largest is in the right lower lobe.  Round ground-glass spiculated consolidations scattered throughout the lungs are noted.  This may be due to atypical infectious process.  Possibility of septic emboli cannot be excluded.  2. Mild thickening in the descending colon is of questionable significance.  This may be due to a mild colitis.      LOCATION:  [id number]    Dictated by (CST): Miguel Saavedra MD on 9/21/2024 at 3:00 PM     Finalized by (CST): Miguel Saavedra MD on 9/21/2024 at 3:08 PM       XR CHEST AP PORTABLE  (CPT=71045)    Result Date: 9/21/2024  CONCLUSION:  Right internal jugular central line has tip in the SVC.   LOCATION:  [id number]      Dictated by (CST): Miguel Saavedra MD on 9/21/2024 at 1:28 PM     Finalized by (CST): Miguel Saavedra MD on 9/21/2024 at 1:28 PM            Labs:  Lab Results   Component Value Date    WBC 9.2 09/22/2024    HGB 8.1 09/22/2024    HCT 25.0 09/22/2024    PLT 64.0 09/22/2024    CREATSERUM 1.81 09/22/2024    BUN 33 09/22/2024     09/22/2024    K 4.6 09/22/2024     09/22/2024    CO2 17.0  09/22/2024     09/22/2024    CA 8.2 09/22/2024    ALB 4.0 09/22/2024    ALKPHO 42 09/22/2024    BILT 0.3 09/22/2024    TP 6.5 09/22/2024    AST 66 09/22/2024    ALT 29 09/22/2024    PTT 29.4 09/21/2024    MG 1.9 09/21/2024       Assessment/Plan:    Acute hypoxic respiratory failure 2/2 possible community acquired pneumonia vs atypical PNA,+ COVID. Immunocompromised on methotrexate, although less likely PJP. Hx of underlying COPD and CHF  -Intubated overnight 9/21-22 for resp distress while on BIPAP after episode of WCT s/p DCCV  -CT chest with multiple areas consolidation, round ground-glass spiculated consolidations scattered throughout the lungs, possible atypical infectious process, vs septic emboli  -RVP +COVID  -Bronch 9/22- Cx, fungal cx, cell count, PJP sent  -strep/legionella negative  -MRSA negative  -Remdesevir (9/20-)  -Dexemethasone (9/20-)  -continue antbx as below    Shock: Unclear etiology. Sepsis vs cardiogenic  -LA elevated, now cleared  -No leukocytosis, now febrile. Tmax 102.2  -PCT 0.43  -cefepime (9/22-), IV vanco (9/21-), flagyl (9/21-), s/p Doxy (9/21-), ceftriaxone (9/21-)  -Blood cultures pending  -UA negative  -TTE with EF unchanged from prior (20-25% with RWMAs)  -ID consult    Acute Encephalopathy, likely 2/2 to above.  -ABG without hypercapnea  -CT brain negative for acute process     LORENA on CKD, NAGMA: Likely 2/2 dehydration given poor po intake, lactic acidosis  -BL Cr- ~ 1.4   -Creat 1.8 on admit, up to 2.06, now improving/stable  -Monitor I/O via smith  -Daily BMP    Elevated troponin: likely 2/2 demand ischemia, cannot rule out NSTEMI given significant cardiac history  -initial trop 150, peak at 7752, no longer trending  -Unable to start heparin gtt due to low plts  -EKG V-paced  -ASA daily  -Cardiology following    Wife complex tachycardia; VT vs   -s/p amio bolus and amiodarone gtt  -EKG with wide QRS tachycardia  -cards following    Elevated LFTs: Likely due to above,  shock/sepsis  -AST 66  -Daily CMP  -If continues to trend up, consider abdominal US    HFrEF s/p  BiV ICD , pHTN, mild aortic stenosis  - EF 20-25%, unchanged from p rior  -Pro BNP 14,448 (Higher than in the past)  -CXR without sign of pulmonary edema  -Hold PTA BB given borderline BP  -Not on plavix PTA due to low plts since 2022    Acute on Chronic Thrombocytopenia: Likely acute component 2/2 above  -Plts lower than baseline on admit, now stable  -Monitor for signs of bleeding  -Cont DVT proph if plts > 30  -Monitor daily CBC    Acute on chronic normocytic Anemia: iron deficiency  -Hgb stable  -No signs of bleeding  -IV iron  -Heme following  -Daily CBC    RA  -On methotrexate at home-holding  - s/p leucovorin on 9/21, no need to continue    Hx HTN, HLD  -Hold losartan, BB    Hyperglycemia, hx DM  -A1c 5.9  -Accu-checks Q 6hrs  -ISS, degludec  -Hold PTA metformin    Hx Chronic subdural hematoma (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 Dionne Strickland, [facility]  [facility]  Phone  [phone]   Pager [phone]

## 2024-09-23 LAB
ALBUMIN SERPL-MCNC: 3.7 G/DL (ref 3.2–4.8)
ALBUMIN/GLOB SERPL: 1.5 {RATIO} (ref 1–2)
ALP LIVER SERPL-CCNC: 42 U/L
ALT SERPL-CCNC: 26 U/L
ANION GAP SERPL CALC-SCNC: 10 MMOL/L (ref 0–18)
AST SERPL-CCNC: 62 U/L (ref ?–34)
ATRIAL RATE: 136 BPM
BASOPHILS # BLD: 0 X10(3) UL (ref 0–0.2)
BASOPHILS NFR BLD: 0 %
BILIRUB SERPL-MCNC: 0.4 MG/DL (ref 0.2–1.1)
BUN BLD-MCNC: 35 MG/DL (ref 9–23)
CALCIUM BLD-MCNC: 8 MG/DL (ref 8.7–10.4)
CHLORIDE SERPL-SCNC: 105 MMOL/L (ref 98–112)
CO2 SERPL-SCNC: 18 MMOL/L (ref 21–32)
CREAT BLD-MCNC: 1.66 MG/DL
EGFRCR SERPLBLD CKD-EPI 2021: 42 ML/MIN/1.73M2 (ref 60–?)
EOSINOPHIL # BLD: 0 X10(3) UL (ref 0–0.7)
EOSINOPHIL NFR BLD: 0 %
ERYTHROCYTE [DISTWIDTH] IN BLOOD BY AUTOMATED COUNT: 17.1 %
GLOBULIN PLAS-MCNC: 2.4 G/DL (ref 2–3.5)
GLUCOSE BLD-MCNC: 151 MG/DL (ref 70–99)
GLUCOSE BLD-MCNC: 213 MG/DL (ref 70–99)
GLUCOSE BLD-MCNC: 229 MG/DL (ref 70–99)
GLUCOSE BLD-MCNC: 245 MG/DL (ref 70–99)
GLUCOSE BLD-MCNC: 245 MG/DL (ref 70–99)
GLUCOSE BLD-MCNC: 251 MG/DL (ref 70–99)
GLUCOSE BLD-MCNC: 252 MG/DL (ref 70–99)
GLUCOSE BLD-MCNC: 257 MG/DL (ref 70–99)
GLUCOSE BLD-MCNC: 263 MG/DL (ref 70–99)
GLUCOSE BLD-MCNC: 290 MG/DL (ref 70–99)
GLUCOSE BLD-MCNC: 330 MG/DL (ref 70–99)
HCT VFR BLD AUTO: 25.3 %
HGB BLD-MCNC: 7.8 G/DL
LYMPHOCYTES NFR BLD: 0.4 X10(3) UL (ref 1–4)
LYMPHOCYTES NFR BLD: 4 %
MAGNESIUM SERPL-MCNC: 2.1 MG/DL (ref 1.6–2.6)
MCH RBC QN AUTO: 29.4 PG (ref 26–34)
MCHC RBC AUTO-ENTMCNC: 30.8 G/DL (ref 31–37)
MCV RBC AUTO: 95.5 FL
MONOCYTES # BLD: 0.9 X10(3) UL (ref 0.1–1)
MONOCYTES NFR BLD: 9 %
NEUTROPHILS # BLD AUTO: 7.46 X10 (3) UL (ref 1.5–7.7)
NEUTROPHILS NFR BLD: 85 %
NEUTS BAND NFR BLD: 2 %
NEUTS HYPERSEG # BLD: 8.7 X10(3) UL (ref 1.5–7.7)
NRBC BLD MANUAL-RTO: 4 %
OSMOLALITY SERPL CALC.SUM OF ELEC: 290 MOSM/KG (ref 275–295)
PHOSPHATE SERPL-MCNC: 3.4 MG/DL (ref 2.4–5.1)
PLATELET # BLD AUTO: 68 10(3)UL (ref 150–450)
PLATELET MORPHOLOGY: NORMAL
PLATELETS.RETICULATED NFR BLD AUTO: 17.1 % (ref 0–7)
POTASSIUM SERPL-SCNC: 4.3 MMOL/L (ref 3.5–5.1)
PROT SERPL-MCNC: 6.1 G/DL (ref 5.7–8.2)
Q-T INTERVAL: 354 MS
QRS DURATION: 140 MS
QTC CALCULATION (BEZET): 526 MS
R AXIS: 113 DEGREES
RBC # BLD AUTO: 2.65 X10(6)UL
SODIUM SERPL-SCNC: 133 MMOL/L (ref 136–145)
T AXIS: -47 DEGREES
TOTAL CELLS COUNTED BLD: 100
VENTRICULAR RATE: 133 BPM
WBC # BLD AUTO: 10 X10(3) UL (ref 4–11)

## 2024-09-23 PROCEDURE — 99232 SBSQ HOSP IP/OBS MODERATE 35: CPT | Performed by: STUDENT IN AN ORGANIZED HEALTH CARE EDUCATION/TRAINING PROGRAM

## 2024-09-23 RX ORDER — DEXMEDETOMIDINE HYDROCHLORIDE 4 UG/ML
INJECTION, SOLUTION INTRAVENOUS CONTINUOUS
Status: DISCONTINUED | OUTPATIENT
Start: 2024-09-23 | End: 2024-09-24

## 2024-09-23 RX ORDER — ASPIRIN 325 MG
325 TABLET ORAL DAILY
Status: DISCONTINUED | OUTPATIENT
Start: 2024-09-23 | End: 2024-09-28

## 2024-09-23 RX ORDER — INSULIN DEGLUDEC 100 U/ML
10 INJECTION, SOLUTION SUBCUTANEOUS NIGHTLY
Status: DISCONTINUED | OUTPATIENT
Start: 2024-09-23 | End: 2024-09-23

## 2024-09-23 NOTE — RESPIRATORY THERAPY NOTE
Current Mechanical Ventilator Settings:  - Mode: VC+  - Rate: 12  - Tidal Volume(mL):500  - FiO2: 30  - PEEP 12  - inspiratory time 1.0    Breath Sounds: diminished    ETT Size: 7.5    Measured Parameters:  Peak Inspiratory Pressure(cmH2O): 19  Plateu Pressure(cmH2O) : 19  Tidal Volume(mL): 533  Rate:14    Shift Events noted:

## 2024-09-23 NOTE — PROGRESS NOTES
ICU  Critical Care APRN Progress  Note    NAME: David Reyes - ROOM: [facility] - MRN: [id number] - Age: 79 year old - :1945    SUBJECTIVE: Wide omplex tachycardia again overnight despite being on amio gtt. Amio bolus 150mg given.Remains on levo gtt. Patient becomes dyssynchronous with vent when attempting to wean sedation. Tmax 102.9 last 24 hours.     Current Facility-Administered Medications   Medication Dose Route Frequency    ceFEPIme (Maxipime) 1 g in sodium chloride 0.9% 100 mL IVPB-MBP  1 g Intravenous Q12H    remdesivir (Veklury) 100 mg in sodium chloride 0.9% 270 mL IVPB  100 mg Intravenous Q24H    dexamethasone (Decadron) 4 MG/ML injection 6 mg  6 mg Intravenous Q24H    pancrelipase (Lip-Prot-Amyl) (Zenpep) DR particles cap 10,000 Units  10,000 Units Per G Tube PRN    And    sodium bicarbonate tab 325 mg  325 mg Per G Tube PRN    insulin aspart (NovoLOG) 100 Units/mL FlexPen 2-10 Units  2-10 Units Subcutaneous q6h    glucose (Dex4) 15 GM/59ML oral liquid 15 g  15 g Oral Q15 Min PRN    Or    glucose (Glutose) 40% oral gel 15 g  15 g Oral Q15 Min PRN    Or    glucose-vitamin C (Dex-4) chewable tab 4 tablet  4 tablet Oral Q15 Min PRN    Or    dextrose 50% injection 50 mL  50 mL Intravenous Q15 Min PRN    Or    glucose (Dex4) 15 GM/59ML oral liquid 30 g  30 g Oral Q15 Min PRN    Or    glucose (Glutose) 40% oral gel 30 g  30 g Oral Q15 Min PRN    Or    glucose-vitamin C (Dex-4) chewable tab 8 tablet  8 tablet Oral Q15 Min PRN    acetaminophen (Tylenol Extra Strength) tab 1,000 mg  1,000 mg Oral Q6H PRN    metoclopramide (Reglan) 5 mg/mL injection 5 mg  5 mg Intravenous Q8H PRN    polyethylene glycol (PEG 3350) (Miralax) 17 g oral packet 17 g  17 g Oral Daily PRN    fleet enema (Fleet) rectal enema 133 mL  1 enema Rectal Once PRN    ezetimibe (Zetia) tab 10 mg  10 mg Oral Daily    folic acid (Folvite) tab 1,600 mcg  1,600 mcg Oral QOD    folic acid (Folvite) tab 800 mcg  800 mcg Oral QOD    heparin  (Porcine) 5000 UNIT/ML injection 5,000 Units  5,000 Units Subcutaneous Q8H MELISSA    aspirin DR tab 325 mg  325 mg Oral Daily    norepinephrine (Levophed) 4 mg/250mL infusion premix  0.5-30 mcg/min Intravenous Continuous    metRONIDAZOLE in sodium chloride 0.79% (Flagyl) 5 mg/mL IVPB premix 500 mg  500 mg Intravenous Q12H    dextrose 5%-sodium chloride 0.45% infusion   Intravenous Continuous PRN    thiamine 100 mg/mL injection 200 mg  200 mg Intravenous Daily    guaiFENesin (Robitussin) 100 MG/5 ML oral liquid 100 mg  100 mg Oral Q4H PRN    insulin degludec (Tresiba) 100 units/mL flextouch 7 Units  7 Units Subcutaneous Nightly    amiodarone (Cordarone) 450 mg in dextrose 5% 250 mL infusion  0.5 mg/min Intravenous Continuous    fentaNYL (Sublimaze) 50 mcg/mL injection 25 mcg  25 mcg Intravenous Q30 Min PRN    Or    fentaNYL (Sublimaze) 50 mcg/mL injection 50 mcg  50 mcg Intravenous Q30 Min PRN    acetaminophen (Tylenol) tab 650 mg  650 mg Oral Q4H PRN    Or    acetaminophen (Tylenol) 160 MG/5ML oral liquid 650 mg  650 mg Oral Q4H PRN    Or    acetaminophen (Tylenol) rectal suppository 650 mg  650 mg Rectal Q4H PRN    Or    acetaminophen (Ofirmev) 10 mg/mL infusion premix 1,000 mg  1,000 mg Intravenous Q6H PRN    fentaNYL (Sublimaze) 25 mcg BOLUS FROM BAG infusion  25 mcg Intravenous Q30 Min PRN    Or    fentaNYL (Sublimaze) 50 mcg BOLUS FROM BAG infusion  50 mcg Intravenous Q30 Min PRN    fentaNYL in sodium chloride 0.9% (Sublimaze) 1000 mcg/100mL infusion premix   mcg/hr Intravenous Continuous PRN    polyethylene glycol (PEG 3350) (Miralax) 17 g oral packet 17 g  17 g Oral Daily PRN    senna (Senokot) 8.8 MG/5ML oral syrup 17.6 mg  10 mL Oral Nightly PRN    bisacodyl (Dulcolax) 10 MG rectal suppository 10 mg  10 mg Rectal Daily PRN    fleet enema (Fleet) rectal enema 133 mL  1 enema Rectal Once PRN    chlorhexidine gluconate (Peridex) 0.12 % oral solution 15 mL  15 mL Mouth/Throat BID@0800,2000    midazolam  (Versed) 2 MG/2ML injection 2 mg  2 mg Intravenous Q5 Min PRN    pantoprazole (Protonix) 40 mg in sodium chloride 0.9% PF 10 mL IV push  40 mg Intravenous QAM AC    vasopressin (Vasostrict) 20 Units in sodium chloride 0.9% 100 mL infusion for septic shock  0.01-0.03 Units/min Intravenous Continuous    propofol (Diprivan) 10 mg/mL infusion premix  5-50 mcg/kg/min (Dosing Weight) Intravenous Continuous    fentaNYL (Sublimaze) 50 mcg/mL injection 50 mcg  50 mcg Intravenous Once     OBJECTIVE  Vitals:  /63   Pulse 70   Temp 97.3 °F (36.3 °C)   Resp 15   Ht 175.3 cm (5' 9\")   Wt 252 lb 6.8 oz (114.5 kg)   SpO2 98%   BMI 37.28 kg/m²            Vent Mode: VC+  FiO2 (%):  [30 %] 30 %  S RR:  [12] 12  S VT:  [500 mL] 500 mL  PEEP/CPAP (cm H2O):  [5 cm H20-8 cm H20] 5 cm H20  MAP (cm H2O):  [11-17] 14      Physical Exam:    General Appearance: intubated, sedated  Neck: No JVD  Lungs: Rhonchi to auscultation bilaterally  Heart: Regular rate and [id number]  Abdomen: Soft, distended, non-tender, hypoactive bowel sounds  Extremities: Atraumatic, no cyanosis, nonpitting edema BLE, capillary refill <3 sec.    Pulses: 2+ and symmetric all extremities  Skin: Skin color, texture, turgor normal for ethnicity, no rashes or lesions, warm and dry  Neurologic: Normal strength    Data this admission:  XR CHEST AP PORTABLE  (CPT=71045)    Result Date: 9/21/2024  CONCLUSION:  1. Stable cardiomegaly and cardiac pacemaker. 2. Lungs are clear without evidence of acute cardiopulmonary disease.   LOCATION:  [facility]      Dictated by (CST): Amelia Kathleen DO on 9/21/2024 at 0:05 AM     Finalized by (CST): Amelia Kathleen DO on 9/21/2024 at 0:06 AM       XR ABDOMEN (1 VIEW) (CPT=74018)    Result Date: 9/22/2024  CONCLUSION:  Nonspecific mildly distended air-filled loops of bowel.  Bowel gas pattern is unremarkable.   LOCATION:  [facility]   Dictated by (CST): Miguel Saavedra MD on 9/22/2024 at 5:15 PM     Finalized by (CST): Quentin  MD Miguel on 9/22/2024 at 5:16 PM       US VENOUS DOPPLER LEG BILAT - DIAG IMG (CPT=93970)    Result Date: 9/22/2024  CONCLUSION:  No evidence of DVT in bilateral lower extremities.   LOCATION:  [facility]   Dictated by (CST): Miguel Saavedra MD on 9/22/2024 at 4:40 PM     Finalized by (CST): Miguel Saavedra MD on 9/22/2024 at 4:40 PM            Labs:  Lab Results   Component Value Date    WBC 10.0 09/23/2024    HGB 7.8 09/23/2024    HCT 25.3 09/23/2024    PLT 68.0 09/23/2024    CREATSERUM 1.66 09/23/2024    BUN 35 09/23/2024     09/23/2024    K 4.3 09/23/2024     09/23/2024    CO2 18.0 09/23/2024     09/23/2024    CA 8.0 09/23/2024    ALB 3.7 09/23/2024    ALKPHO 42 09/23/2024    BILT 0.4 09/23/2024    TP 6.1 09/23/2024    AST 62 09/23/2024    ALT 26 09/23/2024    MG 2.1 09/23/2024    PHOS 3.4 09/23/2024       Assessment/Plan:    Acute hypoxic respiratory failure 2/2 possible community acquired pneumonia vs atypical PNA,+ COVID. Immunocompromise state on methotrexate, although less likely PJP. Hx of underlying COPD and CHF  -Intubated overnight 9/21-22 for resp distress while on BIPAP after episode of WCT s/p DCCV  -CT chest with multiple areas consolidation, round ground-glass spiculated consolidations scattered throughout the lungs, possible atypical infectious process, vs septic emboli  -RVP +COVID  -Bronch 9/22- Cx, fungal cx, cell count, PJP sent  -strep/legionella negative  -MRSA negative  -Remdesevir (9/20-)  -Dexemethasone (9/20-)  -continue antbx as below    Shock: Unclear etiology. Sepsis vs cardiogenic  -LA elevated, now cleared  -No leukocytosis, febrile, Tmax 102.9 last 24 hrs  -PCT 0.43  -cefepime (9/22-), IV vanco (9/21-), flagyl (9/21-), s/p Doxy (9/21-), ceftriaxone (9/21-)  -Blood cultures NGTD  -UA negative  -TTE with EF unchanged from prior (20-25% with RWMAs)  -ID consult    Acute Encephalopathy, likely 2/2 to above.  -ABG without hypercapnea  -CT brain negative for acute  process   -SAT daily if able-unable to wean sedation today given dyssynchronous with vent     LORENA on CKD, NAGMA: Likely 2/2 dehydration given poor po intake, lactic acidosis  -BL Cr- ~ 1.4   -Creat 1.8 on admit, up to 2.06, now improving/stable  -Monitor I/O via smith  -Daily BMP    Elevated troponin: likely 2/2 demand ischemia, cannot rule out NSTEMI given significant cardiac history  -initial trop 150, peak at 7752, no longer trending  -Hold on heparin gtt given higher risk for a/c with low plts  -EKG V-paced  -ASA daily  -Cardiology following    Wide complex tachycardia; possible VT on 9/21 and again 9/23   -s/p amio bolus 150mg 9/21, 9/23 overnight  -s/p unsuccessful cardioversion 9/21, responsive to IV amio  -continue amiodarone gtt 0.5mg/hr  -EKG with wide QRS tachycardia  -cards following    Elevated LFTs: Likely due to above, shock/sepsis  -AST elevated, but stable  -monitor    HFrEF s/p  BiV ICD , pHTN, mild aortic stenosis  - EF 20-25%, unchanged from p rior  -Pro BNP 14,448 (Higher than in the past)  -CXR without sign of pulmonary edema  -Hold PTA BB given borderline BP  -Not on plavix PTA due to low plts since 2022    Acute on Chronic Thrombocytopenia: Likely acute component 2/2 above  -Plts lower than baseline on admit, now stable  -Monitor for signs of bleeding  -Cont DVT proph if plts > 30  -Monitor daily CBC    Acute on chronic normocytic Anemia: iron deficiency  -Hgb stable  -No signs of bleeding  -IV iron  -Heme following  -Daily CBC    RA  -On methotrexate at home-holding  - s/p leucovorin on 9/21, no need to continue    Hx HTN, HLD  -Hold losartan, BB    Hyperglycemia, hx DM  -A1c 5.9  -Accu-checks Q 6hrs  -ISS, degludec  -Hold PTA metformin    Hx Chronic subdural hematoma (May 2024)    FLAKO  -Non-compliant with CPAP at home    F/E/N:    -TFs  -Follow lytes and replete PRN    Proph:  -SQ heparin  -SCD    Lines/Drains/Tubes  -CVC 9/21  -[place] 9/21  -Smith 9/21  -D[place]hoff    Dispo:   -Full  code-confirmed on admit with wife and patient  -ICU for close monitoring    Plan of care discussed with intensivist Dr. Jorge Stirckland, [facility]  ICU  Phone  [phone]   Pager [phone]

## 2024-09-23 NOTE — PROGRESS NOTES
[facility]   part of [facility]     Hospitalist Progress Note     David Reyes Patient Status:  Inpatient    1945 MRN [id number]   Location [facility] 4SW-A Attending Kj Gonzalez DO   Hosp Day # 1 PCP Thea Harris DO     Chief Complaint: Dyspnea    Subjective:     Patient resting in bed sedated and intubated    Objective:    Review of Systems:   A comprehensive review of systems was completed; pertinent positive and negatives stated in subjective.    Vital signs:  Temp:  [96.8 °F (36 °C)-102.9 °F (39.4 °C)] 98.1 °F (36.7 °C)  Pulse:  [] 69  Resp:  [10-27] 11  SpO2:  [92 %-99 %] 98 %  AO: ()/(42-59) 108/48  FiO2 (%):  [30 %] 30 %    Physical Exam:    General: No acute distress  Respiratory: No wheezes, no rhonchi, intubated  Cardiovascular: S1, S2, regular rate and rhythm  Abdomen: Soft, Non-tender, non-distended, positive bowel sounds  Neuro: No new focal deficits.  Sedated  Extremities: No edema    Diagnostic Data:    Labs:  Recent Labs   Lab 24  2155 24  0634 24  1204 24  1557 24  19124  0543 24  0547   WBC 6.0   < > 5.2 6.2 9.7  9.7 9.2 10.0   HGB 9.2*   < > 8.1* 8.2* 8.5*  8.5* 8.1* 7.8*   MCV 94.2   < > 93.1 92.2 92.4  92.4 91.9 95.5   PLT 55.0*   < > 56.0* 56.0* 65.0*  65.0* 64.0* 68.0*   BAND  --   --   --   --   --   --  2   INR 1.16  --   --   --   --   --   --     < > = values in this interval not displayed.       Recent Labs   Lab 24  1557 24  1914 24  0543 24  2314 24  0547   *   < > 128* 134* 213*   BUN 34*   < > 33* 33* 35*   CREATSERUM 1.74*   < > 1.81* 1.66* 1.66*   CA 8.5*   < > 8.2* 8.8 8.0*   ALB 4.3  --  4.0  --  3.7      < > 135* 135* 133*   K 3.7   < > 4.6 5.0 4.3      < > 106 106 105   CO2 20.0*   < > 17.0* 15.0* 18.0*   ALKPHO 48  --  42*  --  42*   AST 61*  --  66*  --  62*   ALT 27  --  29  --  26   BILT 0.2  --  0.3  --  0.4   TP 7.0  --  6.5  --  6.1     < > = values in this interval not displayed.       Estimated Creatinine Clearance: 37.3 mL/min (A) (based on SCr of 1.66 mg/dL (H)).    Recent Labs   Lab 09/21/24  1914 09/21/24  2359 09/22/24  0543   TROPHS 6,036* 9,055* 7,752*       Recent Labs   Lab 09/20/24  2155   PTP 14.9*   INR 1.16                    Microbiology    Hospital Encounter on 09/20/24   1. Blood Culture     Status: None (Preliminary result)    Collection Time: 09/21/24  6:34 AM    Specimen: Blood,peripheral   Result Value Ref Range    Blood Culture Result No Growth 2 Days N/A         Imaging: Reviewed in Epic.    Medications:    insulin degludec  10 Units Subcutaneous Nightly    cefepime  2 g Intravenous Q12H    insulin aspart  4-20 Units Subcutaneous 4 times per day    aspirin  325 mg Per NG Tube Daily    remdesivir  100 mg Intravenous Q24H    dexamethasone  6 mg Intravenous Q24H    ezetimibe  10 mg Oral Daily    folic acid  1,600 mcg Oral QOD    folic acid  800 mcg Oral QOD    heparin  5,000 Units Subcutaneous Q8H MELISSA    metRONIDAZOLE  500 mg Intravenous Q12H    thiamine  200 mg Intravenous Daily    chlorhexidine gluconate  15 mL Mouth/Throat BID@0800,2000    pantoprazole  40 mg Intravenous QAM AC    fentaNYL  50 mcg Intravenous Once       Assessment & Plan:      #Acute hypoxic respiratory failure  #COVID  #Superimposed bacterial vs atypical pneumonia   #Immunocompromised state, on methotrexate for RA  -CT chest reviewed and shows multiple areas of consolidation, round groundglass spiculated consolidations  -On vent  -S/p bronch on 9/22  -Urine antigens negative  -MRSA negative  -Sputum culture in lab  -Bronch culture NGTD  -Fungus stain and culture in lab  -Pneumocystitis PCR in lab  -S/p leucovorin x 4 doses to accelerate methotrexate clearance in setting of LORENA per hematology  -IV antibiotics  -Remdesivir  -Decadron  -Pulmonary following    #Septic shock  -Blood cultures NGTD  -Lactic acid normalized  -IV antibiotics  -On pressors; wean  as tolerated  -ID following    #Wide-complex tachycardia  -S/p unsuccessful cardioversion 9/21  -Amiodarone drip  -Cardiology following    #LORENA on CKD, baseline creatinine 1.4  #Hyponatremia  #Metabolic acidosis  -Continue to monitor on daily labs    #Acute on chronic anemia  #Acute on chronic thrombocytopenia  -Likely in the setting of infection  -IV iron  -Monitor on daily labs  -Hematology following    #Elevated troponin  #CAD s/p CABG  -Troponin peaked  -Aspirin  -Unable to start heparin drip in the setting of thrombocytopenia  -Cardiology following    #Chronic combined heart failure, EF 20-25% s/p ICD  -Echo reviewed and shows EF 20-25%, severe diffuse hypokinesis with regional variations, abnormal left ventricular relaxation  -Spironolactone, metoprolol on hold    #Diabetes type 2  -Tresiba  -Hyperglycemia protocol    #Hypertension  -Losartan on hold    #Hyperlipidemia  -Ezetimibe    #Rheumatoid arthritis  -Methotrexate on hold  -S/p leucovorin x 4 doses to accelerate methotrexate clearance in setting of LORENA per hematology    #FLAKO  -Noncompliant with CPAP at home      Kj Gonzalez DO    Supplementary Documentation:     Quality:  DVT Mechanical Prophylaxis:   SCDs,    DVT Pharmacologic Prophylaxis   Medication    heparin (Porcine) 5000 UNIT/ML injection 5,000 Units    DVT Pharmacologic prophylaxis: Aspirin 325 mg           Code Status: Full Code  Beltre: Beltre catheter in place  Beltre Duration (in days): 1  Central line: central venous catheter in place  GABE:     Discharge is dependent on: Clinical improvement  At this point Mr. Reyes is expected to be discharge to: TBD    The 21st Century Cures Act makes medical notes like these available to patients in the interest of transparency. Please be advised this is a medical document. Medical documents are intended to carry relevant information, facts as evident, and the clinical opinion of the practitioner. The medical note is intended as peer to peer  communication and may appear blunt or direct. It is written in medical language and may contain abbreviations or verbiage that are unfamiliar.              **Certification      PHYSICIAN Certification of Need for Inpatient Hospitalization - Initial Certification    Patient will require inpatient services that will reasonably be expected to span two midnight's based on the clinical documentation in H+P.   Based on patients current state of illness, I anticipate that, after discharge, patient will require TBD.

## 2024-09-23 NOTE — RESPIRATORY THERAPY NOTE
Current Mechanical Ventilator Settings:  - Mode: VC+  - Rate: 12  - Tidal Volume(mL):500  - FiO2: 30%  - PEEP 5  - inspiratory time 0.9    Breath Sounds: Clear, Diminish to rhonchi.    ETT Size: 7.5 @ 25    Measured Parameters:  Peak Inspiratory Pressure(cmH2O): 24  Plateu Pressure(cmH2O) : 16  Tidal Volume(mL): 516  Rate:15    Shift Events noted: Pt remain stable on ventilator with above setting. Pt not stable for SBT.

## 2024-09-23 NOTE — PROGRESS NOTES
[facility]   part of [facility] ID PROGRESS NOTE    David Reyes Patient Status:  Inpatient    1945 MRN [id number]   Location [facility] 4SW-A Attending Kj Gonzalez DO   Hosp Day # 1 PCP Thea Harris DO     Antimicrobials: Cefepime (-), Flagyl -), s/p levofloxacin , CTX , vanco .    RDV D#3    Subjective: Patient seen and examined today. Remains intubated (Fi02 30%).  Fevers overnight- afebrile On 5mcg currently, off cooling blanket now. No diarrhea per RN, no BM today. On 5mcg levo.     Objective:    /63   Pulse 68   Temp 97.2 °F (36.2 °C)   Resp 11   Ht 175.3 cm (5' 9\")   Wt 252 lb 6.8 oz (114.5 kg)   SpO2 99%   BMI 37.28 kg/m²     Gen:   NAD, Intubated.   HEENT:  Moist mucous membranes  CV/lungs:  RRR, Diminished bilaterally.   Abdom:  Soft, NT. + Distended. +BS  Skin/extrem:  No rashes    Labs:   Recent Labs   Lab 24  1204 24  1557 24  0543 24  0547   WBC 5.2 6.2 9.7  9.7 9.2 10.0   HGB 8.1* 8.2* 8.5*  8.5* 8.1* 7.8*   PLT 56.0* 56.0* 65.0*  65.0* 64.0* 68.0*       Recent Labs   Lab 24  1557 24  0543 24  2314 24  0547    136 135* 135* 133*   K 3.7 4.7 4.6 5.0 4.3    105 106 106 105   CO2 20.0* 16.0* 17.0* 15.0* 18.0*   BUN 34* 35* 33* 33* 35*   CREATSERUM 1.74* 1.85* 1.81* 1.66* 1.66*       Recent Labs   Lab 24  2205 24  0634 24  1557 24  0543 24  0547   ALT 30 25 27 29 26   AST 29 32 61* 66* 62*     Problem list:    Patient Active Problem List   Diagnosis    Nonischemic cardiomyopathy (HCC)    FLAKO (obstructive sleep apnea)    Congestive heart failure (HCC)    CAD (coronary artery disease)    Rheumatoid arthritis involving multiple sites with positive rheumatoid factor (HCC)    Type 2 diabetes mellitus with hyperglycemia, with long-term current use of insulin (HCC)    Normocytic anemia    Thrombocytopenia (HCC)     Ischemic cardiomyopathy    Dyspnea on exertion    Dry mouth    Pure hypercholesterolemia    Vitamin D deficiency    Pulmonary hypertension secondary to increased PVR (HCC)    Carotid artery stenosis    Morbid (severe) obesity due to excess calories (HCC)    Chronic obstructive pulmonary disease, unspecified (HCC)    CUMMINGS (dyspnea on exertion)    Weakness generalized    Aortic stenosis, mild    Hyponatremia    Hyperglycemia    Anemia    Acute kidney injury (HCC)    Metabolic acidosis    COVID-19    Acute bronchospasm due to viral infection    Bronchospasm    Pneumonia    Acute respiratory failure with hypoxia (HCC)    Immunocompromised state (HCC)    Septic shock (HCC)    Wide-complex tachycardia    Elevated troponin    Acute on chronic anemia    Community acquired pneumonia       ASSESSMENT:  Multifocal pneumonia - mostly 2/2 covid; however, concern for superimposed bacterial PNA  Covid- 19 + 9/21. Legionella and strep pneumo Uags negative. MRSA of the nares negative.   Fevers, no leukocytosis  S/p bronchoscopy with BAL 9/27  Acute hypoxic respiratory failure, d/t above  Shock, ? septic shock related to #1 vs other (possibly cardiogenic). Pressors requirements stable.   Wide complex tachycardia; elevated troponin.   BiV ICD in place  H/o CHF (EF 20-25%), CAD  DM II  RA on methotrexate (on hold currently)  Thrombocytopenia  Penicillin allergy    PLAN:  - continue IV cefepime and flagyl  - continue remdesivir  - continue steroids   - follow blood cultures- ngtd  - follow pending studies/cultures from bronchoscopy  - follow temps and wbc; follow inflammatory markers  - follow O2 needs; pressor requirements    Discussed case with [facility]  [phone]  9/23/2024

## 2024-09-23 NOTE — PHYSICAL THERAPY NOTE
Physical therapy continues to follow.  Pt remains intubated and sedated, requiring increased sedation overnight due to increased WOB and asynchrony with ventilator.  Pt also currently requiring pressors.  Will hold therapy evaluation at this time, but continue to follow and plan to initiate as clinically appropriate.

## 2024-09-23 NOTE — OCCUPATIONAL THERAPY NOTE
OT evaluation order follow up. Spoke with PT. Per PT, \"Pt remains intubated and sedated, requiring increased sedation overnight due to increased WOB and asynchrony with ventilator. Pt also currently requiring pressors. Will hold therapy evaluation at this time, but continue to follow and plan to initiate as clinically appropriate.\"

## 2024-09-23 NOTE — PLAN OF CARE
Patient remains intubated and sedated. Propofol and fentanyl titrated as tolerated and as ordered in MAR; see MAR. MD notified of sustained and symptomatic wide QRS tachycardia and increased work of breathing with ventilator asynchrony; MD at bedside, see new orders. Levophed titrated as tolerated and as ordered in MAR; see MAR.

## 2024-09-23 NOTE — PROGRESS NOTES
INFECTIOUS DISEASE  PROGRESS NOTE            [facility]    David Reyes Patient Status:  Inpatient    1945 MRN [id number]   Location [facility] 4SW-A Attending Kj Gonzalez DO   Hosp Day # 1 PCP Thea Harris DO     Antibiotics: #2  Cefepime #2    Remdesivir #2    Subjective:  : intubated and sedated    Objective:  Temp:  [96.8 °F (36 °C)-102.9 °F (39.4 °C)] 98.1 °F (36.7 °C)  Pulse:  [] 69  Resp:  [10] 15  SpO2:  [92 %-99 %] 98 %  AO: ()/(42-59) 109/49  FiO2 (%):  [30 %] 30 %    General: sedated on vent  Vital signs:Temp:  [96.8 °F (36 °C)-102.9 °F (39.4 °C)] 98.1 °F (36.7 °C)  Pulse:  [] 69  Resp:  [10] 15  SpO2:  [92 %-99 %] 98 %  AO: ()/(42-59) 109/49  FiO2 (%):  [30 %] 30 %  HEENT: ET tube  Right neck line  Respiratory: Non labored, symmetric excursion, normal respirations  Abdomen: Soft, nontender, nondistended.   Musculoskeletal: joints: no swelling   Integument: No lesions. No erythema. No open wounds.  Labs:     COVID-19 Lab Results    COVID-19  Lab Results   Component Value Date    COVID19 Detected (A) 2024    COVID19 Not Detected 2024    COVID19 Not Detected 05/10/2024       Pro-Calcitonin  Recent Labs   Lab 24  2205   PCT 0.43*       Cardiac  Recent Labs   Lab 24  2205   PBNP 14,448*       Creatinine Kinase  No results for input(s): \"CK\" in the last 168 hours.    Inflammatory Markers  Recent Labs   Lab 24  0634 24  1105 24  1557   CRP  --   --  16.40*   *  --  512*   DDIMER  --  2.12*  --        Recent Labs   Lab 24  0547   RBC 2.65*   HGB 7.8*   HCT 25.3*   MCV 95.5   MCH 29.4   MCHC 30.8*   RDW 17.1   NEPRELIM 7.46   WBC 10.0   PLT 68.0*   NEUT 85   LYMPH 4   MON 9   EOS 0   NRBC 4*         Recent Labs   Lab 24  1557 24  1914 24  0543 24  2314 24  0547   *   < > 128* 134* 213*   BUN 34*   < > 33* 33* 35*   CREATSERUM 1.74*   < > 1.81* 1.66* 1.66*   CA  8.5*   < > 8.2* 8.8 8.0*   ALB 4.3  --  4.0  --  3.7      < > 135* 135* 133*   K 3.7   < > 4.6 5.0 4.3      < > 106 106 105   CO2 20.0*   < > 17.0* 15.0* 18.0*   ALKPHO 48  --  42*  --  42*   AST 61*  --  66*  --  62*   ALT 27  --  29  --  26   BILT 0.2  --  0.3  --  0.4   TP 7.0  --  6.5  --  6.1    < > = values in this interval not displayed.       No results found for: \"VANCT\"  Microbiology    Hospital Encounter on 09/20/24   1. Blood Culture     Status: None (Preliminary result)    Collection Time: 09/21/24  6:34 AM    Specimen: Blood,peripheral   Result Value Ref Range    Blood Culture Result No Growth 2 Days N/A         Radiology    CXR cardiomegaly and vascular congestion    ECHO 9/21/24: EF 20-25%    Problem list reviewed:  Patient Active Problem List   Diagnosis    Nonischemic cardiomyopathy (HCC)    FLAKO (obstructive sleep apnea)    Congestive heart failure (HCC)    CAD (coronary artery disease)    Rheumatoid arthritis involving multiple sites with positive rheumatoid factor (HCC)    Type 2 diabetes mellitus with hyperglycemia, with long-term current use of insulin (HCC)    Normocytic anemia    Thrombocytopenia (HCC)    Ischemic cardiomyopathy    Dyspnea on exertion    Dry mouth    Pure hypercholesterolemia    Vitamin D deficiency    Pulmonary hypertension secondary to increased PVR (HCC)    Carotid artery stenosis    Morbid (severe) obesity due to excess calories (HCC)    Chronic obstructive pulmonary disease, unspecified (HCC)    CUMMINGS (dyspnea on exertion)    Weakness generalized    Aortic stenosis, mild    Hyponatremia    Hyperglycemia    Anemia    Acute kidney injury (HCC)    Metabolic acidosis    COVID-19    Acute bronchospasm due to viral infection    Bronchospasm    Pneumonia    Acute respiratory failure with hypoxia (HCC)    Immunocompromised state (HCC)    Septic shock (HCC)    Wide-complex tachycardia    Elevated troponin    Acute on chronic anemia    Community acquired pneumonia              ASSESSMENT/PLAN:  1.  COVID  No records of previous vaccinations  Underlying cardiomyopathy EF 20-25% recent echo      -per notes cough started \"Friday\" 9/13  Brought to ED on 9/20 with worsening SOB and productive cough, vomited at home  Respiratory failure, intubated since 9/21  SP bronch on 9/22, cultures pending  -picture of  respiratory complications (CHF, COPD, pneumonia) triggered by COVID, rather than a primary active viral process    2. Cardiomypathy, -25%, elevated troponin  CHF  -cardiology following    3. Pneumonia  -following covid infection (symptoms 1 week prior to admission) with underlying CHF  Sputum cultures pending  Adjust abx per micro    4. Resp/vent per pulmonary  Was started on decadron given covid diagnosis  As above    Frank Cr MD, MD [facility]  [phone]

## 2024-09-23 NOTE — PROGRESS NOTES
Cardiology Progress Note    David Reyes Patient Status:  Inpatient    1945 MRN [id number]   [facility] 4SW-A Attending Kj Gonzalez DO   Hosp Day # 1 PCP Thea Harris DO       Subjective:     Intubated, sedated.  Rhythm quiet on IV amio.    Objective:   Temp: 97.2 °F (36.2 °C)  Pulse: 70  Resp: 15  AO: 108/48  FiO2 (%): 30 %    Intake/Output:     Intake/Output Summary (Last 24 hours) at 2024 1052  Last data filed at 2024 0903  Gross per 24 hour   Intake 4605 ml   Output 1705 ml   Net 2900 ml       Last 3 Weights   24 0600 252 lb 6.8 oz (114.5 kg)   24 0400 243 lb 6.2 oz (110.4 kg)   24 0420 237 lb 10.5 oz (107.8 kg)   24 2144 240 lb (108.9 kg)   24 1316 241 lb (109.3 kg)   24 1001 235 lb (106.6 kg)         Physical Exam:     General: Intubated, sedated.  HEENT: Normocephalic, anicteric sclera, neck supple.  Cardiac: Regular rate and rhythm. S1, S2 normal. No murmur.  Lungs: Coarse bilaterally.  Abdomen: Soft, non-tender.   Extremities: Without clubbing, cyanosis.  Trace edema bilateral.  Neurologic: Sedated.  Skin: Warm and dry.     Laboratory/Data:    Labs:           Recent Labs   Lab 24  2155 24  0634 24  1204 24  1557 24  1914 24  0543 24  0547   WBC 6.0   < > 5.2 6.2 9.7  9.7 9.2 10.0   HGB 9.2*   < > 8.1* 8.2* 8.5*  8.5* 8.1* 7.8*   MCV 94.2   < > 93.1 92.2 92.4  92.4 91.9 95.5   PLT 55.0*   < > 56.0* 56.0* 65.0*  65.0* 64.0* 68.0*   BAND  --   --   --   --   --   --  2   INR 1.16  --   --   --   --   --   --     < > = values in this interval not displayed.       Recent Labs   Lab 24  1557 24  1914 24  0543 24  2314 24  0547    136 135* 135* 133*   K 3.7 4.7 4.6 5.0 4.3    105 106 106 105   CO2 20.0* 16.0* 17.0* 15.0* 18.0*   BUN 34* 35* 33* 33* 35*   CREATSERUM 1.74* 1.85* 1.81* 1.66* 1.66*   CA 8.5* 8.7 8.2* 8.8 8.0*   MG 2.0 1.9  --  2.2 2.1    PHOS  --   --   --   --  3.4   * 127* 128* 134* 213*       Recent Labs   Lab 09/20/24  2205 09/21/24  0634 09/21/24  1557 09/22/24  0543 09/23/24  0547   ALT 30 25 27 29 26   AST 29 32 61* 66* 62*   ALB 4.4 4.1 4.3 4.0 3.7   LDH  --  277* 512*  --   --        No results for input(s): \"TROP\" in the last 168 hours.    Allergies:   Allergies   Allergen Reactions    Pcn [Penicillins]      \"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       Medications:  Current Facility-Administered Medications   Medication Dose Route Frequency    ceFEPIme (Maxipime) 1 g in sodium chloride 0.9% 100 mL IVPB-MBP  1 g Intravenous Q12H    remdesivir (Veklury) 100 mg in sodium chloride 0.9% 270 mL IVPB  100 mg Intravenous Q24H    dexamethasone (Decadron) 4 MG/ML injection 6 mg  6 mg Intravenous Q24H    pancrelipase (Lip-Prot-Amyl) (Zenpep) DR particles cap 10,000 Units  10,000 Units Per G Tube PRN    And    sodium bicarbonate tab 325 mg  325 mg Per G Tube PRN    insulin aspart (NovoLOG) 100 Units/mL FlexPen 2-10 Units  2-10 Units Subcutaneous q6h    glucose (Dex4) 15 GM/59ML oral liquid 15 g  15 g Oral Q15 Min PRN    Or    glucose (Glutose) 40% oral gel 15 g  15 g Oral Q15 Min PRN    Or    glucose-vitamin C (Dex-4) chewable tab 4 tablet  4 tablet Oral Q15 Min PRN    Or    dextrose 50% injection 50 mL  50 mL Intravenous Q15 Min PRN    Or    glucose (Dex4) 15 GM/59ML oral liquid 30 g  30 g Oral Q15 Min PRN    Or    glucose (Glutose) 40% oral gel 30 g  30 g Oral Q15 Min PRN    Or    glucose-vitamin C (Dex-4) chewable tab 8 tablet  8 tablet Oral Q15 Min PRN    acetaminophen (Tylenol Extra Strength) tab 1,000 mg  1,000 mg Oral Q6H PRN    metoclopramide (Reglan) 5 mg/mL injection 5 mg  5 mg Intravenous Q8H PRN    polyethylene glycol (PEG 3350) (Miralax) 17 g oral packet 17 g  17 g Oral Daily PRN    fleet enema (Fleet) rectal enema 133 mL  1 enema Rectal Once PRN     ezetimibe (Zetia) tab 10 mg  10 mg Oral Daily    folic acid (Folvite) tab 1,600 mcg  1,600 mcg Oral QOD    folic acid (Folvite) tab 800 mcg  800 mcg Oral QOD    heparin (Porcine) 5000 UNIT/ML injection 5,000 Units  5,000 Units Subcutaneous Q8H MELISSA    aspirin DR tab 325 mg  325 mg Oral Daily    norepinephrine (Levophed) 4 mg/250mL infusion premix  0.5-30 mcg/min Intravenous Continuous    metRONIDAZOLE in sodium chloride 0.79% (Flagyl) 5 mg/mL IVPB premix 500 mg  500 mg Intravenous Q12H    dextrose 5%-sodium chloride 0.45% infusion   Intravenous Continuous PRN    thiamine 100 mg/mL injection 200 mg  200 mg Intravenous Daily    guaiFENesin (Robitussin) 100 MG/5 ML oral liquid 100 mg  100 mg Oral Q4H PRN    insulin degludec (Tresiba) 100 units/mL flextouch 7 Units  7 Units Subcutaneous Nightly    amiodarone (Cordarone) 450 mg in dextrose 5% 250 mL infusion  0.5 mg/min Intravenous Continuous    fentaNYL (Sublimaze) 50 mcg/mL injection 25 mcg  25 mcg Intravenous Q30 Min PRN    Or    fentaNYL (Sublimaze) 50 mcg/mL injection 50 mcg  50 mcg Intravenous Q30 Min PRN    acetaminophen (Tylenol) tab 650 mg  650 mg Oral Q4H PRN    Or    acetaminophen (Tylenol) 160 MG/5ML oral liquid 650 mg  650 mg Oral Q4H PRN    Or    acetaminophen (Tylenol) rectal suppository 650 mg  650 mg Rectal Q4H PRN    Or    acetaminophen (Ofirmev) 10 mg/mL infusion premix 1,000 mg  1,000 mg Intravenous Q6H PRN    fentaNYL (Sublimaze) 25 mcg BOLUS FROM BAG infusion  25 mcg Intravenous Q30 Min PRN    Or    fentaNYL (Sublimaze) 50 mcg BOLUS FROM BAG infusion  50 mcg Intravenous Q30 Min PRN    fentaNYL in sodium chloride 0.9% (Sublimaze) 1000 mcg/100mL infusion premix   mcg/hr Intravenous Continuous PRN    polyethylene glycol (PEG 3350) (Miralax) 17 g oral packet 17 g  17 g Oral Daily PRN    senna (Senokot) 8.8 MG/5ML oral syrup 17.6 mg  10 mL Oral Nightly PRN    bisacodyl (Dulcolax) 10 MG rectal suppository 10 mg  10 mg Rectal Daily PRN    fleet  enema (Fleet) rectal enema 133 mL  1 enema Rectal Once PRN    chlorhexidine gluconate (Peridex) 0.12 % oral solution 15 mL  15 mL Mouth/Throat BID@0800,2000    midazolam (Versed) 2 MG/2ML injection 2 mg  2 mg Intravenous Q5 Min PRN    pantoprazole (Protonix) 40 mg in sodium chloride 0.9% PF 10 mL IV push  40 mg Intravenous QAM AC    vasopressin (Vasostrict) 20 Units in sodium chloride 0.9% 100 mL infusion for septic shock  0.01-0.03 Units/min Intravenous Continuous    propofol (Diprivan) 10 mg/mL infusion premix  5-50 mcg/kg/min (Dosing Weight) Intravenous Continuous    fentaNYL (Sublimaze) 50 mcg/mL injection 50 mcg  50 mcg Intravenous Once         Assessment:  Elevated high-sensitivity troponin - suspect supply/demand mismatch given underlying hx as noted below in the setting of COVID - however cannot r/o NSTEMI   Acute hypoxic respiratory failure-worsening, s/p bronchoscopy 9/22 AM.  Intubated.  ID and pulm notes read.  Wide-complex tachycardia, suspected VT noted 9/21 evening s/p unsuccessful cardioversion, responsive to amiodarone IV.  Quiet for 48 hours.  COVID +/ poss superimposed PNA  Immunocompromise state  Chronic HFrEF,  LVEF previously noted 20-25%  CAD s/p CABG w/ LIMA-LAD graft - hx of PCI RCA, Lcx 2015  S/p BiV ICD   Mild aortic stenosis   Severe pulmonary HTN   RA  SVT episodes 6/2023  Thrombocytopenia   Acute on chronic normocytic anemia       Plan:  Continuing IV amiodarone  Remains critically ill, plan per pulm and ID.    Chepe BOWER.

## 2024-09-24 LAB
ALBUMIN SERPL-MCNC: 3.4 G/DL (ref 3.2–4.8)
ALBUMIN/GLOB SERPL: 1.4 {RATIO} (ref 1–2)
ALP LIVER SERPL-CCNC: 48 U/L
ALT SERPL-CCNC: 25 U/L
ANION GAP SERPL CALC-SCNC: 8 MMOL/L (ref 0–18)
AST SERPL-CCNC: 58 U/L (ref ?–34)
BASOPHILS # BLD: 0 X10(3) UL (ref 0–0.2)
BASOPHILS NFR BLD: 0 %
BILIRUB SERPL-MCNC: 0.3 MG/DL (ref 0.2–1.1)
BUN BLD-MCNC: 33 MG/DL (ref 9–23)
CALCIUM BLD-MCNC: 7.9 MG/DL (ref 8.7–10.4)
CHLORIDE SERPL-SCNC: 109 MMOL/L (ref 98–112)
CO2 SERPL-SCNC: 20 MMOL/L (ref 21–32)
CREAT BLD-MCNC: 1.33 MG/DL
CRP SERPL-MCNC: 26.5 MG/DL (ref ?–0.5)
EGFRCR SERPLBLD CKD-EPI 2021: 55 ML/MIN/1.73M2 (ref 60–?)
EOSINOPHIL # BLD: 0 X10(3) UL (ref 0–0.7)
EOSINOPHIL NFR BLD: 0 %
ERYTHROCYTE [DISTWIDTH] IN BLOOD BY AUTOMATED COUNT: 16.6 %
GLOBULIN PLAS-MCNC: 2.4 G/DL (ref 2–3.5)
GLUCOSE BLD-MCNC: 142 MG/DL (ref 70–99)
GLUCOSE BLD-MCNC: 160 MG/DL (ref 70–99)
GLUCOSE BLD-MCNC: 165 MG/DL (ref 70–99)
GLUCOSE BLD-MCNC: 169 MG/DL (ref 70–99)
GLUCOSE BLD-MCNC: 172 MG/DL (ref 70–99)
GLUCOSE BLD-MCNC: 173 MG/DL (ref 70–99)
GLUCOSE BLD-MCNC: 176 MG/DL (ref 70–99)
GLUCOSE BLD-MCNC: 178 MG/DL (ref 70–99)
GLUCOSE BLD-MCNC: 179 MG/DL (ref 70–99)
GLUCOSE BLD-MCNC: 180 MG/DL (ref 70–99)
GLUCOSE BLD-MCNC: 181 MG/DL (ref 70–99)
GLUCOSE BLD-MCNC: 183 MG/DL (ref 70–99)
GLUCOSE BLD-MCNC: 211 MG/DL (ref 70–99)
GLUCOSE BLD-MCNC: 234 MG/DL (ref 70–99)
HCT VFR BLD AUTO: 22.8 %
HGB BLD-MCNC: 7.3 G/DL
LYMPHOCYTES NFR BLD: 0.47 X10(3) UL (ref 1–4)
LYMPHOCYTES NFR BLD: 6 %
MAGNESIUM SERPL-MCNC: 2.3 MG/DL (ref 1.6–2.6)
MCH RBC QN AUTO: 29.8 PG (ref 26–34)
MCHC RBC AUTO-ENTMCNC: 32 G/DL (ref 31–37)
MCV RBC AUTO: 93.1 FL
METAMYELOCYTES # BLD: 0.47 X10(3) UL
METAMYELOCYTES NFR BLD: 6 %
MONOCYTES # BLD: 0.94 X10(3) UL (ref 0.1–1)
MONOCYTES NFR BLD: 12 %
MYELOCYTES # BLD: 0.23 X10(3) UL
MYELOCYTES NFR BLD: 3 %
NEUTROPHILS # BLD AUTO: 5.17 X10 (3) UL (ref 1.5–7.7)
NEUTROPHILS NFR BLD: 59 %
NEUTS BAND NFR BLD: 14 %
NEUTS HYPERSEG # BLD: 5.69 X10(3) UL (ref 1.5–7.7)
NRBC BLD MANUAL-RTO: 3 %
OSMOLALITY SERPL CALC.SUM OF ELEC: 296 MOSM/KG (ref 275–295)
PHOSPHATE SERPL-MCNC: 2.8 MG/DL (ref 2.4–5.1)
PLATELET # BLD AUTO: 75 10(3)UL (ref 150–450)
PLATELET MORPHOLOGY: NORMAL
PLATELETS.RETICULATED NFR BLD AUTO: 17 % (ref 0–7)
POTASSIUM SERPL-SCNC: 4.3 MMOL/L (ref 3.5–5.1)
PROT SERPL-MCNC: 5.8 G/DL (ref 5.7–8.2)
RBC # BLD AUTO: 2.45 X10(6)UL
SODIUM SERPL-SCNC: 137 MMOL/L (ref 136–145)
TOTAL CELLS COUNTED BLD: 100
TRIGL SERPL-MCNC: 125 MG/DL (ref 30–149)
WBC # BLD AUTO: 7.8 X10(3) UL (ref 4–11)

## 2024-09-24 PROCEDURE — 99291 CRITICAL CARE FIRST HOUR: CPT | Performed by: INTERNAL MEDICINE

## 2024-09-24 PROCEDURE — 99232 SBSQ HOSP IP/OBS MODERATE 35: CPT | Performed by: STUDENT IN AN ORGANIZED HEALTH CARE EDUCATION/TRAINING PROGRAM

## 2024-09-24 RX ORDER — INSULIN DEGLUDEC 100 U/ML
20 INJECTION, SOLUTION SUBCUTANEOUS DAILY
Status: DISCONTINUED | OUTPATIENT
Start: 2024-09-24 | End: 2024-09-28

## 2024-09-24 RX ORDER — ONDANSETRON 2 MG/ML
4 INJECTION INTRAMUSCULAR; INTRAVENOUS EVERY 6 HOURS PRN
Status: DISCONTINUED | OUTPATIENT
Start: 2024-09-24 | End: 2024-09-28

## 2024-09-24 RX ORDER — FUROSEMIDE 10 MG/ML
40 INJECTION INTRAMUSCULAR; INTRAVENOUS ONCE
Status: DISCONTINUED | OUTPATIENT
Start: 2024-09-24 | End: 2024-09-24

## 2024-09-24 RX ORDER — ONDANSETRON 2 MG/ML
INJECTION INTRAMUSCULAR; INTRAVENOUS
Status: COMPLETED
Start: 2024-09-24 | End: 2024-09-24

## 2024-09-24 RX ORDER — FUROSEMIDE 10 MG/ML
40 INJECTION INTRAMUSCULAR; INTRAVENOUS
Status: DISCONTINUED | OUTPATIENT
Start: 2024-09-24 | End: 2024-09-28

## 2024-09-24 RX ORDER — ENOXAPARIN SODIUM 100 MG/ML
40 INJECTION SUBCUTANEOUS DAILY
Status: DISCONTINUED | OUTPATIENT
Start: 2024-09-24 | End: 2024-09-28

## 2024-09-24 NOTE — PROGRESS NOTES
INFECTIOUS DISEASE  PROGRESS NOTE            [facility]    David Reyes Patient Status:  Inpatient    1945 MRN [id number]   Location [facility] 4SW-A Attending Kj Gonzalez,    Hosp Day # 2 PCP Thea Harris DO     Antibiotics: #3  Cefepime     Remdesivir #3    Subjective:  Extubated today    Objective:  Temp:  [97.5 °F (36.4 °C)-99 °F (37.2 °C)] 98.8 °F (37.1 °C)  Pulse:  [64-74] 66  Resp:  [10-26] 24  SpO2:  [97 %-99 %] 97 %  AO: ()/(37-82) 106/47  FiO2 (%):  [30 %] 30 %    General: awake in no distress  Vital signs:Temp:  [97.5 °F (36.4 °C)-99 °F (37.2 °C)] 98.8 °F (37.1 °C)  Pulse:  [64-74] 66  Resp:  [10-26] 24  SpO2:  [97 %-99 %] 97 %  AO: ()/(37-82) 106/47  FiO2 (%):  [30 %] 30 %  Heent; 02 NC  Respiratory: Non labored, symmetric excursion, normal respirations  Abdomen: Soft, nontender, nondistended.   Musculoskeletal: joints: no swelling   Integument: No lesions. No erythema. No open wounds.  Labs:     COVID-19 Lab Results    COVID-19  Lab Results   Component Value Date    COVID19 Detected (A) 2024    COVID19 Not Detected 2024    COVID19 Not Detected 05/10/2024       Pro-Calcitonin  Recent Labs   Lab 24  2205   PCT 0.43*       Cardiac  Recent Labs   Lab 24   PBNP 14,448*       Creatinine Kinase  No results for input(s): \"CK\" in the last 168 hours.    Inflammatory Markers  Recent Labs   Lab 24  0634 24  1105 24  1557 24  0403   CRP  --   --  16.40* 26.50*   *  --  512*  --    DDIMER  --  2.12*  --   --        Recent Labs   Lab 24  0403   RBC 2.45*   HGB 7.3*   HCT 22.8*   MCV 93.1   MCH 29.8   MCHC 32.0   RDW 16.6   NEPRELIM 5.17   WBC 7.8   PLT 75.0*   NEUT 59   LYMPH 6   MON 12   EOS 0   NRBC 3*         Recent Labs   Lab 24  0543 24  2314 24  0547 24  0403   * 134* 213* 183*   BUN 33* 33* 35* 33*   CREATSERUM 1.81* 1.66* 1.66* 1.33*   CA 8.2* 8.8 8.0* 7.9*   ALB 4.0   --  3.7 3.4   * 135* 133* 137   K 4.6 5.0 4.3 4.3    106 105 109   CO2 17.0* 15.0* 18.0* 20.0*   ALKPHO 42*  --  42* 48   AST 66*  --  62* 58*   ALT 29  --  26 25   BILT 0.3  --  0.4 0.3   TP 6.5  --  6.1 5.8       No results found for: \"VANCT\"  Microbiology    Hospital Encounter on 09/20/24   1. Blood Culture     Status: None (Preliminary result)    Collection Time: 09/21/24  6:34 AM    Specimen: Blood,peripheral   Result Value Ref Range    Blood Culture Result No Growth 3 Days N/A         Radiology    CXR cardiomegaly and vascular congestion    ECHO 9/21/24: EF 20-25%    Problem list reviewed:  Patient Active Problem List   Diagnosis    Nonischemic cardiomyopathy (HCC)    FLAKO (obstructive sleep apnea)    Congestive heart failure (HCC)    CAD (coronary artery disease)    Rheumatoid arthritis involving multiple sites with positive rheumatoid factor (HCC)    Type 2 diabetes mellitus with hyperglycemia, with long-term current use of insulin (HCC)    Normocytic anemia    Thrombocytopenia (HCC)    Ischemic cardiomyopathy    Dyspnea on exertion    Dry mouth    Pure hypercholesterolemia    Vitamin D deficiency    Pulmonary hypertension secondary to increased PVR (HCC)    Carotid artery stenosis    Morbid (severe) obesity due to excess calories (HCC)    Chronic obstructive pulmonary disease, unspecified (HCC)    CUMMINGS (dyspnea on exertion)    Weakness generalized    Aortic stenosis, mild    Hyponatremia    Hyperglycemia    Anemia    Acute kidney injury (HCC)    Metabolic acidosis    COVID-19    Acute bronchospasm due to viral infection    Bronchospasm    Pneumonia    Acute respiratory failure with hypoxia (HCC)    Immunocompromised state (HCC)    Septic shock (HCC)    Wide-complex tachycardia    Elevated troponin    Acute on chronic anemia    Community acquired pneumonia             ASSESSMENT/PLAN:  1.  COVID  No records of previous vaccinations  Underlying cardiomyopathy EF 20-25% recent echo      -per notes  cough started  9/13, 1 week prior to admission    Brought to ED on 9/20 with worsening SOB and productive cough, vomited at home  Respiratory failure, intubated since 9/21 and extubatged on 9/24  SP bronch on 9/22 (culture no growth)    -picture of  respiratory complications (CHF, COPD, pneumonia) triggered by COVID, rather than a primary active viral process    2. Cardiomypathy, -25%, elevated troponin  CHF  -cardiology following    3. Pneumonia  -following covid infection (symptoms 1 week prior to admission) with underlying CHF  Sputum cultures no growth      Replace cefepime with ceftriaxone  Can dc remdesivir  Decadron dc in am if stable 02        Frank Cr MD, MD  [facility]  [phone]

## 2024-09-24 NOTE — PROGRESS NOTES
Cardiology Progress Note    David Reyes Patient Status:  Inpatient    1945 MRN [id number]   [facility] 4SW-A Attending Kj Gonzalez DO   Hosp Day # 2 PCP Thea Harris DO       Subjective:     Intubated, sedated.  Rhythm remains quiet on IV amio.    Objective:   Temp: 98.4 °F (36.9 °C)  Pulse: 64  Resp: 11  AO: 110/49  FiO2 (%): 30 %    Intake/Output:     Intake/Output Summary (Last 24 hours) at 2024 0751  Last data filed at 2024 0600  Gross per 24 hour   Intake 3663.9 ml   Output 2060 ml   Net 1603.9 ml       Last 3 Weights   24 0600 255 lb 8.2 oz (115.9 kg)   24 0600 252 lb 6.8 oz (114.5 kg)   24 0400 243 lb 6.2 oz (110.4 kg)   24 0420 237 lb 10.5 oz (107.8 kg)   24 2144 240 lb (108.9 kg)   24 1316 241 lb (109.3 kg)   24 1001 235 lb (106.6 kg)         Physical Exam:     General: Intubated, sedated.  HEENT: Normocephalic, anicteric sclera, neck supple.  Cardiac: Regular rate and rhythm. S1, S2 normal. No murmur.  Lungs: Coarse bilaterally.  Abdomen: Soft, non-tender.   Extremities: Without clubbing, cyanosis.  Trace edema bilateral.  Neurologic: Sedated.  Skin: Warm and dry.     Laboratory/Data:    Labs:    Lab Results   Component Value Date    CRP 26.50 2024         Recent Labs   Lab 24  2155 24  0634 24  1557 24  1914 24  0543 24  0547 24  0403   WBC 6.0   < > 6.2 9.7  9.7 9.2 10.0 7.8   HGB 9.2*   < > 8.2* 8.5*  8.5* 8.1* 7.8* 7.3*   MCV 94.2   < > 92.2 92.4  92.4 91.9 95.5 93.1   PLT 55.0*   < > 56.0* 65.0*  65.0* 64.0* 68.0* 75.0*   BAND  --   --   --   --   --  2 14   INR 1.16  --   --   --   --   --   --     < > = values in this interval not displayed.       Recent Labs   Lab 24  1557 24  1914 24  0543 24  2314 24  0547 24  0403    136 135* 135* 133* 137   K 3.7 4.7 4.6 5.0 4.3 4.3    105 106 106 105 109   CO2 20.0* 16.0* 17.0*  15.0* 18.0* 20.0*   BUN 34* 35* 33* 33* 35* 33*   CREATSERUM 1.74* 1.85* 1.81* 1.66* 1.66* 1.33*   CA 8.5* 8.7 8.2* 8.8 8.0* 7.9*   MG 2.0 1.9  --  2.2 2.1  --    PHOS  --   --   --   --  3.4 2.8   * 127* 128* 134* 213* 183*       Recent Labs   Lab 09/21/24  0634 09/21/24  1557 09/22/24  0543 09/23/24  0547 09/24/24  0403   ALT 25 27 29 26 25   AST 32 61* 66* 62* 58*   ALB 4.1 4.3 4.0 3.7 3.4   * 512*  --   --   --        No results for input(s): \"TROP\" in the last 168 hours.    Allergies:   Allergies   Allergen Reactions    Pcn [Penicillins]      \"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       Medications:  Current Facility-Administered Medications   Medication Dose Route Frequency    ceFEPIme (Maxpime) 2 g in sodium chloride 0.9% 100 mL IVPB-MBP  2 g Intravenous Q12H    aspirin tab 325 mg  325 mg Per NG Tube Daily    insulin regular human (Novolin R, Humulin R) 100 Units in sodium chloride 0.9% 100 mL standard infusion (100 mL)  1-32 Units/hr Intravenous Continuous    dexmedeTOMIDine in sodium chloride 0.9% (Precedex) 400 mcg/100mL infusion premix  0.2-1.5 mcg/kg/hr (Dosing Weight) Intravenous Continuous    remdesivir (Veklury) 100 mg in sodium chloride 0.9% 270 mL IVPB  100 mg Intravenous Q24H    dexamethasone (Decadron) 4 MG/ML injection 6 mg  6 mg Intravenous Q24H    pancrelipase (Lip-Prot-Amyl) (Zenpep) DR particles cap 10,000 Units  10,000 Units Per G Tube PRN    And    sodium bicarbonate tab 325 mg  325 mg Per G Tube PRN    glucose (Dex4) 15 GM/59ML oral liquid 15 g  15 g Oral Q15 Min PRN    Or    glucose (Glutose) 40% oral gel 15 g  15 g Oral Q15 Min PRN    Or    glucose-vitamin C (Dex-4) chewable tab 4 tablet  4 tablet Oral Q15 Min PRN    Or    dextrose 50% injection 50 mL  50 mL Intravenous Q15 Min PRN    Or    glucose (Dex4) 15 GM/59ML oral liquid 30 g  30 g Oral Q15 Min PRN    Or    glucose (Glutose) 40% oral  gel 30 g  30 g Oral Q15 Min PRN    Or    glucose-vitamin C (Dex-4) chewable tab 8 tablet  8 tablet Oral Q15 Min PRN    acetaminophen (Tylenol Extra Strength) tab 1,000 mg  1,000 mg Oral Q6H PRN    metoclopramide (Reglan) 5 mg/mL injection 5 mg  5 mg Intravenous Q8H PRN    polyethylene glycol (PEG 3350) (Miralax) 17 g oral packet 17 g  17 g Oral Daily PRN    fleet enema (Fleet) rectal enema 133 mL  1 enema Rectal Once PRN    ezetimibe (Zetia) tab 10 mg  10 mg Oral Daily    folic acid (Folvite) tab 1,600 mcg  1,600 mcg Oral QOD    folic acid (Folvite) tab 800 mcg  800 mcg Oral QOD    heparin (Porcine) 5000 UNIT/ML injection 5,000 Units  5,000 Units Subcutaneous Q8H MELISSA    norepinephrine (Levophed) 4 mg/250mL infusion premix  0.5-30 mcg/min Intravenous Continuous    metRONIDAZOLE in sodium chloride 0.79% (Flagyl) 5 mg/mL IVPB premix 500 mg  500 mg Intravenous Q12H    dextrose 5%-sodium chloride 0.45% infusion   Intravenous Continuous PRN    thiamine 100 mg/mL injection 200 mg  200 mg Intravenous Daily    guaiFENesin (Robitussin) 100 MG/5 ML oral liquid 100 mg  100 mg Oral Q4H PRN    amiodarone (Cordarone) 450 mg in dextrose 5% 250 mL infusion  0.5 mg/min Intravenous Continuous    fentaNYL (Sublimaze) 50 mcg/mL injection 25 mcg  25 mcg Intravenous Q30 Min PRN    Or    fentaNYL (Sublimaze) 50 mcg/mL injection 50 mcg  50 mcg Intravenous Q30 Min PRN    acetaminophen (Tylenol) tab 650 mg  650 mg Oral Q4H PRN    Or    acetaminophen (Tylenol) 160 MG/5ML oral liquid 650 mg  650 mg Oral Q4H PRN    Or    acetaminophen (Tylenol) rectal suppository 650 mg  650 mg Rectal Q4H PRN    Or    acetaminophen (Ofirmev) 10 mg/mL infusion premix 1,000 mg  1,000 mg Intravenous Q6H PRN    fentaNYL (Sublimaze) 25 mcg BOLUS FROM BAG infusion  25 mcg Intravenous Q30 Min PRN    Or    fentaNYL (Sublimaze) 50 mcg BOLUS FROM BAG infusion  50 mcg Intravenous Q30 Min PRN    fentaNYL in sodium chloride 0.9% (Sublimaze) 1000 mcg/100mL infusion premix    mcg/hr Intravenous Continuous PRN    polyethylene glycol (PEG 3350) (Miralax) 17 g oral packet 17 g  17 g Oral Daily PRN    senna (Senokot) 8.8 MG/5ML oral syrup 17.6 mg  10 mL Oral Nightly PRN    bisacodyl (Dulcolax) 10 MG rectal suppository 10 mg  10 mg Rectal Daily PRN    fleet enema (Fleet) rectal enema 133 mL  1 enema Rectal Once PRN    chlorhexidine gluconate (Peridex) 0.12 % oral solution 15 mL  15 mL Mouth/Throat BID@0800,2000    midazolam (Versed) 2 MG/2ML injection 2 mg  2 mg Intravenous Q5 Min PRN    pantoprazole (Protonix) 40 mg in sodium chloride 0.9% PF 10 mL IV push  40 mg Intravenous QAM AC    vasopressin (Vasostrict) 20 Units in sodium chloride 0.9% 100 mL infusion for septic shock  0.01-0.03 Units/min Intravenous Continuous    propofol (Diprivan) 10 mg/mL infusion premix  5-50 mcg/kg/min (Dosing Weight) Intravenous Continuous    fentaNYL (Sublimaze) 50 mcg/mL injection 50 mcg  50 mcg Intravenous Once         Assessment:  Elevated high-sensitivity troponin - suspect supply/demand mismatch given underlying hx as noted below in the setting of COVID - however cannot r/o NSTEMI   Acute hypoxic respiratory failure-worsening, s/p bronchoscopy 9/22 AM.  Intubated.  ID and pulm notes read.  Wide-complex tachycardia, suspected VT noted 9/21 evening s/p unsuccessful cardioversion, responsive to amiodarone IV.  Quiet for 72 hours.  COVID +/ poss superimposed PNA  Immunocompromise state  Chronic HFrEF,  LVEF previously noted 20-25%  CAD s/p CABG w/ LIMA-LAD graft - hx of PCI RCA, Lcx 2015  S/p BiV ICD   Mild aortic stenosis   Severe pulmonary HTN   RA  SVT episodes 6/2023  Thrombocytopenia   Acute on chronic normocytic anemia       Plan:  Continuing IV amiodarone  40 mg IVP lasix BID =- he is overloaded.  Remains critically ill, plan per pulm and ID.    Chepe BOWER.

## 2024-09-24 NOTE — PROGRESS NOTES
[facility]   part of [facility] ID PROGRESS NOTE    David Reyes Patient Status:  Inpatient    1945 MRN [id number]   Location [facility] 4SW-A Attending Kj Gonzalez DO   Hosp Day # 2 PCP Thea Harris DO     Antimicrobials: Cefepime (-), Flagyl -), s/p levofloxacin , CTX , doxycycline , vanco .    RDV D#4  Decadron D#4    Subjective: Patient seen and examined today. Remains intubated (Fi02 30%). Remains on levo, down to 4mcg. No further fevers.     Objective:    /63   Pulse 64   Temp 98.4 °F (36.9 °C)   Resp 18   Ht 175.3 cm (5' 9\")   Wt 255 lb 8.2 oz (115.9 kg)   SpO2 98%   BMI 37.73 kg/m²     Gen:   NAD, Intubated.   HEENT:  Moist mucous membranes  CV/lungs:  RRR, Some rhonchi noted bilaterally.   Abdom:  Soft, NT. Less distended. +BS  Skin/extrem:  No rashes. Some LE edema.     Labs:   Recent Labs   Lab 24  1557 24  0543 24  0547 24  0403   WBC 6.2 9.7  9.7 9.2 10.0 7.8   HGB 8.2* 8.5*  8.5* 8.1* 7.8* 7.3*   PLT 56.0* 65.0*  65.0* 64.0* 68.0* 75.0*       Recent Labs   Lab 24  0543 24  2314 24  0547 24  0403    135* 135* 133* 137   K 4.7 4.6 5.0 4.3 4.3    106 106 105 109   CO2 16.0* 17.0* 15.0* 18.0* 20.0*   BUN 35* 33* 33* 35* 33*   CREATSERUM 1.85* 1.81* 1.66* 1.66* 1.33*       Recent Labs   Lab 24  0634 24  1557 24  0543 24  0547 24  0403   ALT 25 27 29 26 25   AST 32 61* 66* 62* 58*     Problem list:    Patient Active Problem List   Diagnosis    Nonischemic cardiomyopathy (HCC)    FLAKO (obstructive sleep apnea)    Congestive heart failure (HCC)    CAD (coronary artery disease)    Rheumatoid arthritis involving multiple sites with positive rheumatoid factor (HCC)    Type 2 diabetes mellitus with hyperglycemia, with long-term current use of insulin (HCC)    Normocytic anemia    Thrombocytopenia (HCC)    Ischemic  cardiomyopathy    Dyspnea on exertion    Dry mouth    Pure hypercholesterolemia    Vitamin D deficiency    Pulmonary hypertension secondary to increased PVR (HCC)    Carotid artery stenosis    Morbid (severe) obesity due to excess calories (HCC)    Chronic obstructive pulmonary disease, unspecified (HCC)    CUMMINGS (dyspnea on exertion)    Weakness generalized    Aortic stenosis, mild    Hyponatremia    Hyperglycemia    Anemia    Acute kidney injury (HCC)    Metabolic acidosis    COVID-19    Acute bronchospasm due to viral infection    Bronchospasm    Pneumonia    Acute respiratory failure with hypoxia (HCC)    Immunocompromised state (HCC)    Septic shock (HCC)    Wide-complex tachycardia    Elevated troponin    Acute on chronic anemia    Community acquired pneumonia       ASSESSMENT:  Multifocal pneumonia - assume covid pneumonia with possible superimposed bacterial PNA.  Presented to SOB/cough x 1 week, increasing phlegm with vomiting prior to admission per chart review. Covid- 19 + 9/21. No previous vaccinations recorded.   Legionella and strep pneumo Uags negative. MRSA of the nares negative.  Fevers but no leukocytosis  S/p bronchoscopy with BAL 9/27  Acute hypoxic respiratory failure, d/t above, also ? possible component of pulm edema.   Shock, ? septic shock related to #1 vs other (possibly cardiogenic). Improving pressor requirements  Wide complex tachycardia with possible episodes of VT; elevated troponin. Cards following.  BiV ICD in place  CHF (EF 20-25%), CAD  DM II  RA on methotrexate (on hold currently)  Thrombocytopenia, improving.   Penicillin allergy    PLAN:  - continue IV cefepime and flagyl until further information on pending cultures  - continue remdesivir  - continue steroids   - follow blood cultures- ngtd  - follow sputum culture  - follow pending studies/cultures from bronchoscopy  - follow temps and wbc; follow inflammatory markers  - follow O2 needs; pressor requirements    Discussed case  with RN.     [facility]  [phone]

## 2024-09-24 NOTE — RESPIRATORY THERAPY NOTE
Current Mechanical Ventilator Settings:  - Mode: VC+  - Rate: 12  - Tidal Volume(mL):600  - FiO2: 30  - PEEP 5  - inspiratory time 1.05    Breath Sounds: Rhonchi    ETT Size: 7.5    Measured Parameters:  Peak Inspiratory Pressure(cmH2O): 15  Plateu Pressure(cmH2O) : 17  Tidal Volume(mL): 660  Rate:18    Shift Events noted: Overnight suctioning a moderate amount of thick tan secretions via ETT. No changes made

## 2024-09-24 NOTE — PROGRESS NOTES
David Reyes Patient Status:  Inpatient    1945 MRN [id number]   [facility] 4SW-A Attending Kj Gonzalez DO   Hosp Day # 2 PCP Thea Harris DO     Critical Care Progress Note          Subjective:  Weaned off propofol overnight, switched to precedex. SAT/SBT today.    Objective:    Medications:   cefepime  2 g Intravenous Q12H    aspirin  325 mg Per NG Tube Daily    remdesivir  100 mg Intravenous Q24H    dexamethasone  6 mg Intravenous Q24H    ezetimibe  10 mg Oral Daily    folic acid  1,600 mcg Oral QOD    folic acid  800 mcg Oral QOD    heparin  5,000 Units Subcutaneous Q8H MELISSA    metRONIDAZOLE  500 mg Intravenous Q12H    thiamine  200 mg Intravenous Daily    chlorhexidine gluconate  15 mL Mouth/Throat BID@0800,    pantoprazole  40 mg Intravenous QAM AC    fentaNYL  50 mcg Intravenous Once       Vent Mode: VC+  FiO2 (%):  [30 %] 30 %  S RR:  [12] 12  S VT:  [500 mL-600 mL] 600 mL  PEEP/CPAP (cm H2O):  [5 cm H20-8 cm H20] 5 cm H20  MAP (cm H2O):  [7.3-14] 8.8      Intake/Output Summary (Last 24 hours) at 2024 0728  Last data filed at 2024 0600  Gross per 24 hour   Intake 3663.9 ml   Output 2060 ml   Net 1603.9 ml       /63   Pulse 64   Temp 98.4 °F (36.9 °C)   Resp 11   Ht 175.3 cm (5' 9\")   Wt 255 lb 8.2 oz (115.9 kg)   SpO2 99%   BMI 37.73 kg/m²     Physical Exam:    General Appearance: intubated, sedated  Neck: No JVD  Lungs: Rhonchi to auscultation bilaterally  Heart: Regular rate and sarymb-O-cbaoz  Abdomen: Soft, distended, non-tender, hypoactive bowel sounds  Extremities: 2+ pitting edema to BLE.    Pulses: 1+ and symmetric all extremities  Skin: Skin color, texture, turgor normal for ethnicity, no rashes or lesions, warm and dry      Recent Labs   Lab 24  0403   RBC 2.45*   HGB 7.3*   HCT 22.8*   MCV 93.1   MCH 29.8   MCHC 32.0   RDW 16.6   NEPRELIM 5.17   WBC 7.8   PLT 75.0*     Recent Labs   Lab 24  0543 24  2314 24  0561  09/24/24  0403   * 134* 213* 183*   BUN 33* 33* 35* 33*   CREATSERUM 1.81* 1.66* 1.66* 1.33*   CA 8.2* 8.8 8.0* 7.9*   ALB 4.0  --  3.7 3.4   * 135* 133* 137   K 4.6 5.0 4.3 4.3    106 105 109   CO2 17.0* 15.0* 18.0* 20.0*   ALKPHO 42*  --  42* 48   AST 66*  --  62* 58*   ALT 29  --  26 25   BILT 0.3  --  0.4 0.3   TP 6.5  --  6.1 5.8     Recent Labs   Lab 09/21/24  1048   ABGPHT 7.36   YREVPR9J 28*   HGQCV0U 141*   ABGHCO3 18.3*   ABGBE -8.6*   TEMP 98.6   MIKI Not Applicable   SITE Arterial Line   DEV Bi-PAP   THGB 8.7*     No results for input(s): \"BNP\" in the last 168 hours.  No results for input(s): \"TROP\", \"CK\" in the last 168 hours.    No results found.       Assessment/Plan:    Acute hypoxic respiratory failure 2/2 possible community acquired pneumonia vs atypical PNA,+ COVID. Immunocompromise state on methotrexate. Now with possible volume overload.  - Intubated 9/22 for resp distress while on BIPAP after episode of WCT s/p DCCV  - CT chest with multiple areas consolidation, round ground-glass spiculated consolidations scattered throughout the lungs, possible atypical infectious process, vs septic emboli  - RVP + COVID  - S/p bronch- Cx, fungal cx, cell count, PJP sent  - Strep/legionella, MRSA PCR negative  - Remdesevir (9/20-)  - Dexemethasone (9/20-)  - Trial 40mg IVP lasix today, net positive 6.5L since admission  - SAT/SBT daily  - Antibiotics as below     Shock: Unclear etiology. Sepsis vs cardiogenic  - LA elevated, now cleared  - No leukocytosis, low grade fevers  - PCT 0.43  - Cefepime (9/22-), flagyl (9/21-), s/p Doxy (9/21-9/22), ceftriaxone (9/)  - Blood cultures NGTD  - UA negative  - TTE with EF unchanged from prior (20-25% with RWMAs)  - Vasopressors to maintain MAP >65, SBP >90. Currently requiring low dose norepinephrine  - ID following     Acute Encephalopathy, likely 2/2 to above.  - ABG without hypercapnea  - CT brain negative for acute process   - SAT daily      LORENA on CKD, NAGMA: Likely 2/2 dehydration given poor po intake, lactic acidosis  - BL Cr- ~ 1.4   - Monitor I/O via smith  - Trial IVP lasix today per cards  - Avoid nephrotoxins  - Daily BMP     Elevated troponin: likely 2/2 demand ischemia, cannot rule out NSTEMI given significant cardiac history  - initial trop 150, peak at 7752, no longer trending  - Hold on heparin gtt given higher risk for a/c with low plts  - EKG V-paced  - ASA daily  - Cardiology following     Wide complex tachycardia; possible VT on 9/21 and again 9/23   - s/p amio bolus 150mg 9/21, 9/23 overnight  - s/p unsuccessful cardioversion 9/21, responsive to IV amio  - Continue IV amiodarone  - EKG with wide QRS tachycardia  - cards following     Elevated LFTs: Likely due to above, shock/sepsis  - AST elevated, but stable  - monitor     HFrEF s/p  BiV ICD , pHTN, mild aortic stenosis  - EF 20-25%, unchanged from prior  - Pro BNP 14,448  - CXR without sign of pulmonary edema  - Hold PTA BB given borderline BP  - Not on plavix PTA due to low plts since 2022     Acute on Chronic Thrombocytopenia: Likely acute component 2/2 above  - Plts lower than baseline on admit, now stable  - Monitor for signs of bleeding  - Cont DVT proph if plts > 30  - Monitor daily CBC     Acute on chronic normocytic Anemia: iron deficiency  - Hgb stable  - No signs of bleeding  - IV iron  - Heme following  - Daily CBC     RA  - On methotrexate at home-holding  - s/p leucovorin on 9/21, no need to continue     Hx HTN, HLD  - Hold losartan, BB     Hyperglycemia, hx DM  - A1c 5.9  - Insulin drip per critical care protoscol  - Accu-checks Q 1  - Hold PTA metformin     Hx Chronic subdural 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 Jorge Goodman [facility]  Critical Care  [id number]      Addendum    I agree with critical care APN note above. I have independently seen & evaluated the patient.  I agree with the management plan outlined above with the above changes/additions.    Pt doing better today.  Extubated this AM.  Weaning pressors      Jerad Patel  Pulmonary And Critical Care  On call Intensivist 09/24/24       Medically necessary and clinically appropriate Critical Care Time: 35 minutes

## 2024-09-24 NOTE — RESPIRATORY THERAPY NOTE
SBT Safety Screen: Performed by R.T.  No myocardial ischemia in past 24 hours  Spontaneous respiratory effort present  FiO2: 30%   / PEEP: 5  Off or decreasing vasopressors/inotrope doses  Stable hemodynamics, BP  (107/48)  HR    HR = 63  MAP > 60     MAP = 65  SpO2 > 90%   Saturation = 98    No new arrhythmias    SBT: PASS/FAIL? (Pass)  Start time:  0857  Settings:  Pressure Support: 5  FiO2:  30%  Reason for Failure (if present): None identified    Weaning Parameters:  RR: 18  RSBI: 32  NIF: -46  VT: 563  VC: 1095       Pt safely extubated to 4 L NC.

## 2024-09-24 NOTE — PHYSICAL THERAPY NOTE
PHYSICAL THERAPY EVALUATION - INPATIENT     Room Number: 468/468-A  Evaluation Date: 9/24/2024  Type of Evaluation: Initial  Physician Order: PT Eval and Treat    Presenting Problem: COVID-19  Co-Morbidities : CHF, PPM, CAD s/p CABG, HTN, DL, DM  Reason for Therapy: Mobility Dysfunction and Discharge Planning    PHYSICAL THERAPY ASSESSMENT   Patient is currently functioning below baseline with bed mobility, transfers, gait, and maintaining seated position.  Prior to admission, patient's baseline is independent.  Patient is requiring maximum to dependent assist as a result of the following impairments: decreased functional strength, decreased endurance/aerobic capacity, impaired sitting and standing balance, decreased muscular endurance, cognitive deficits (delayed processing), medical status, decreased compliance/participation, and increased O2 needs from baseline. Physical therapy will continue to follow for duration of hospitalization.      Physical therapy needs upon discharge remain undetermined given medical acuity.    PLAN  PT Treatment Plan: Bed mobility;Patient education;Family education;Gait training;Strengthening;Stair training;Transfer training;Balance training  Rehab Potential : Good  Frequency (Obs): 3-5x/week  Number of Visits to Meet Established Goals: 5      CURRENT GOALS    Goal #1 Patient is able to demonstrate supine - sit EOB @ level: minimum assistance     Goal #2 Patient is able to demonstrate transfers Sit to/from Stand at assistance level: maximum assistance     Goal #3 Patient is able to ambulate 15 feet with assist device: walker - rolling at assistance level: maximum assistance     Goal #4 The pt is able to demonstrate static unsupported sit at EOB x5 minutes.   Goal #5    Goal #6    Goal Comments: Goals established on 9/24/2024      PHYSICAL THERAPY MEDICAL/SOCIAL HISTORY  History related to current admission: Patient is a 79 year old male who presented to the ED on 9/20/2024 from home  for difficulty breathing , cough, chills and runny nose x1 week.  Pt diagnosed with COVID-19 and respiratory distress.  Pt admitted to the ICU and developed respiratory failure requiring intubation on .  Pt extubated on 24.      HOME SITUATION  Type of Home:  (Duplex)   Home Layout: One level                Lives With: Spouse  Drives: Yes     Patient Regularly Uses: Reading glasses    Prior Level of [place]: The pt reports that he is typically independent.  Pt states he is unemployed but is looking for work.    SUBJECTIVE  Pt concerned with multiple lines, \"I can't move.  I'm connected to this.\"      OBJECTIVE     Fall Risk: High fall risk    WEIGHT BEARING RESTRICTION  Weight Bearing Restriction: None                PAIN ASSESSMENT  Ratin          COGNITION  Overall Cognitive Status:  Impaired  Arousal/Alertness:  delayed responses to stimuli and lethargic  Orientation Level:  oriented to place, oriented to situation, and oriented to person  Following Commands:  follows one step commands with increased time and follows one step commands with repetition  Initiation: cues to initiate tasks and hand over hand to initiate tasks  Safety Judgement:  decreased awareness of need for assistance and decreased awareness of need for safety  Awareness of Deficits:  decreased awareness of deficits    RANGE OF MOTION AND STRENGTH ASSESSMENT  Pt unable to follow commands for formal MMT.  Pt exhibits BUE shoulder AROM from 0-90 and able to hold against gravity, but grossly weak 3-/5.  BUE elbow and hand/wrist AROM WFL.    In supine position pt unable to fully lift BLE up off of bed or fully flex knees.  Pt exhibits full BLE ankle DF/PF.    BALANCE  Static Sitting: Poor +  Dynamic Sitting: Poor  Static Standing: Not tested  Dynamic Standing: Not tested        ACTIVITY TOLERANCE  Pulse: 67  Heart Rate Source: Monitor  Resp: 18                O2 WALK  Oxygen Therapy  SPO2% on Oxygen at Rest: 97  At rest oxygen flow  (liters per minute): 2          AM-PAC '6-Clicks' INPATIENT SHORT FORM - BASIC MOBILITY  How much difficulty does the patient currently have...  Patient Difficulty: Turning over in bed (including adjusting bedclothes, sheets and blankets)?: A Lot   Patient Difficulty: Sitting down on and standing up from a chair with arms (e.g., wheelchair, bedside commode, etc.): Unable   Patient Difficulty: Moving from lying on back to sitting on the side of the bed?: Unable   How much help from another person does the patient currently need...   Help from Another: Moving to and from a bed to a chair (including a wheelchair)?: Total   Help from Another: Need to walk in hospital room?: Total   Help from Another: Climbing 3-5 steps with a railing?: Total       AM-PAC Score:  Raw Score: 7   Approx Degree of Impairment: 92.36%   Standardized Score (AM-PAC Scale): 26.42   CMS Modifier (G-Code): CM    FUNCTIONAL ABILITY STATUS  Gait Assessment   Functional Mobility/Gait Assessment  Gait Assistance: Not tested    Skilled Therapy Provided     Bed Mobility:  Rolling: Max A, with hand over hand assistance for UE placement on handrail  Supine to sit: 2 person Max A   Sit to supine: 2 person Max A     Transfer Mobility:  Sit to stand: NT   Stand to sit: NT  Gait = NT    Therapist's Comments: Pt completed supine>sidelying>sit to EOB with 2 person assist.  Pt able to sit upright at EOB x10 minutes.  Pt c/o nausea upon sitting and actively dry heaving.  RN alerted and at bedside.  Pt exhibited left sided lean with static sitting, requiring intermittent Min A to maintain static sit.    Exercise/Education Provided:  Bed mobility  Functional activity tolerated  Gait training  Neuromuscular re-educate  Strengthening  Lower therapeutic exercise:  Ankle pumps    Patient End of Session: Needs met;Call light within reach;RN aware of session/findings;All patient questions and concerns addressed;In bed;SCDs in place      Patient Evaluation Complexity  Level:  History High - 3 or more personal factors and/or co-morbidities   Examination of body systems High - addressing a total of 4 or more elements   Clinical Presentation  Moderate - Evolving   Clinical Decision Making Moderate Complexity       PT Session Time: 30 minutes  Therapeutic Activity: 15 minutes

## 2024-09-24 NOTE — SLP NOTE
ADULT SWALLOWING EVALUATION    ASSESSMENT    ASSESSMENT/OVERALL IMPRESSION:  Patient seen for swallowing evaluation post extubation with concern for aspiration risk. Patient admitted to hospital due to SOB. He was found to have COVID-19. He has a history of CAD s/p CABG, essential HTN, dyslipidemia, chronic combined heart failure EF 20-25% s/p ICD, DM, RA and FLAKO. Patient required intubation due to respiratory distress. He was intubated from 9/21/24 until he was extubated this morning. No documented history of dysphagia. Upon my entry into his room, he is alert to voice. RR prior to po was 19. He did not appear in respiratory distress.     Oral mechanism exam unremarkable for focal deficit. Voice is hoarse but vocal quality and intensity improved as visit progressed. SLP presented ice chips, thin by straw and solid trials. Patient with intact oral retrieval and containment with all po trials. Oral prep and transit were functional. Mastication of solids WFL with complete oral clearance. Laryngeal excursion judged to be of adequate strength and timeliness to palpation. Patient did not exhibit any overt signs of aspiration during my visit. Of note, toward end of po trials, he did exhibit increased oral hold time, likely related to coordinating breathing and swallowing as his RR did increase to 24 as session progressed.    Recommend initiate regular consistency solids and thin liquids with assistance with feeding to control rate and bite/sip size and observe aspiration precautions including upright for all po, slow rate and small bites and sips. Updated RN.          RECOMMENDATIONS   Diet Recommendations - Solids: Regular  Diet Recommendations - Liquids: Thin Liquids                           Aspiration Precautions: Upright position;Slow rate;Small bites and sips  Medication Administration Recommendations: One pill at a time  Treatment Plan/Recommendations: Aspiration precautions    HISTORY   MEDICAL HISTORY  Reason for  Referral: R/O aspiration (post extubation)    Problem List  Principal Problem:    COVID-19  Active Problems:    Hyponatremia    Hyperglycemia    Anemia    Acute kidney injury (HCC)    Metabolic acidosis    Acute bronchospasm due to viral infection    Bronchospasm    Pneumonia    Acute respiratory failure with hypoxia (HCC)    Immunocompromised state (HCC)    Septic shock (HCC)    Wide-complex tachycardia    Elevated troponin    Acute on chronic anemia    Community acquired pneumonia      Past Medical History  Past Medical History:    Anxiety state    Back problem    CAD (coronary artery disease)    Congestive heart disease (HCC)    Congestive heart failure (HCC)    CORONARY ARTERY DISEASE    cabg    Coronary atherosclerosis    Diabetes (HCC)    DM (diabetes mellitus) (HCC)    Heart attack (HCC)    High blood pressure    High cholesterol    Other and unspecified hyperlipidemia    Rheumatoid arthritis (HCC)    Sleep apnea    Subdural hematoma (HCC)    Unspecified sleep apnea    Visual impairment          Diet Prior to Admission: Regular;Thin liquids  Precautions: Aspiration    Patient/Family Goals: none stated    SWALLOWING HISTORY  Current Diet Consistency: NPO (TF)  Dysphagia History: none documented  Imaging Results:   CXR from 9/22/24 revealed:  CONCLUSION:  Increase consolidations in the lungs and small bilateral pleural effusions may be due to fluid overload.         [place]  [facility]                  Dictated by (CST): Miguel Saavedra MD on 9/22/2024 at 9:25 AM       Finalized by (CST): Miguel Saavedra MD on 9/22/2024 at 9:26 AM       SUBJECTIVE       OBJECTIVE   ORAL MOTOR EXAMINATION  Dentition: Functional  Symmetry: Within Functional Limits  Strength: Within Functional Limits  Tone: Within Functional Limits  Range of Motion: Within Functional Limits  Rate of Motion: Within Functional Limits    Voice Quality: Hoarse  Respiratory Status: Unlabored (RR 19-24 throughout)  Consistencies Trialed: Thin  liquids;Hard solid  Method of Presentation: Staff/Clinician assistance;Straw;Single sips;Consecutive swallows  Patient Positioning: Upright;Midline (in bed)    Oral Phase of Swallow: Within Functional Limits                      Pharyngeal Phase of Swallow: Within Functional Limits           (Please note: Silent aspiration cannot be evaluated clinically. Videofluoroscopic Swallow Study is required to rule-out silent aspiration.)    Esophageal Phase of Swallow: No complaints consistent with possible esophageal involvement              GOALS  Goal #1 The patient will tolerate regular consistency and thin liquids without overt signs or symptoms of aspiration with 100 % accuracy over 1-2 session(s).  In Progress   Goal #2 The patient/family/caregiver will demonstrate understanding and implementation of aspiration precautions and swallow strategies independently over 1-2 session(s).    In Progress     FOLLOW UP  Treatment Plan/Recommendations: Aspiration precautions  Number of Visits to Meet Established Goals: 2  Follow Up Needed (Documentation Required): Yes  SLP Follow-up Date: 09/25/24    Thank you for your referral.   If you have any questions, please contact Frank Mejia MS CCC-SLP  Pager [phone]

## 2024-09-24 NOTE — PROGRESS NOTES
[facility]   part of [facility]     Hospitalist Progress Note     David Reyes Patient Status:  Inpatient    1945 MRN [id number]   Location [facility] 4SW-A Attending Kj Gonzalez DO   Hosp Day # 2 PCP Thea Harris DO     Chief Complaint: Dyspnea    Subjective:     Patient resting in bed. He was extubated and denies any dyspnea at this time     Objective:    Review of Systems:   A comprehensive review of systems was completed; pertinent positive and negatives stated in subjective.    Vital signs:  Temp:  [97.7 °F (36.5 °C)-99 °F (37.2 °C)] 98.6 °F (37 °C)  Pulse:  [64-74] 67  Resp:  [10-26] 18  SpO2:  [97 %-99 %] 97 %  AO: ()/(37-82) 124/56  FiO2 (%):  [30 %] 30 %    Physical Exam:    General: No acute distress  Respiratory: No wheezes, no rhonchi  Cardiovascular: S1, S2, regular rate and rhythm  Abdomen: Soft, Non-tender, non-distended, positive bowel sounds  Neuro: No new focal deficits.  Extremities: No edema    Diagnostic Data:    Labs:  Recent Labs   Lab 24  2155 24  0634 24  1557 24  1914 24  0543 24  0547 24  0403   WBC 6.0   < > 6.2 9.7  9.7 9.2 10.0 7.8   HGB 9.2*   < > 8.2* 8.5*  8.5* 8.1* 7.8* 7.3*   MCV 94.2   < > 92.2 92.4  92.4 91.9 95.5 93.1   PLT 55.0*   < > 56.0* 65.0*  65.0* 64.0* 68.0* 75.0*   BAND  --   --   --   --   --  2 14   INR 1.16  --   --   --   --   --   --     < > = values in this interval not displayed.       Recent Labs   Lab 24  0543 24  2314 24  0547 24  0403   * 134* 213* 183*   BUN 33* 33* 35* 33*   CREATSERUM 1.81* 1.66* 1.66* 1.33*   CA 8.2* 8.8 8.0* 7.9*   ALB 4.0  --  3.7 3.4   * 135* 133* 137   K 4.6 5.0 4.3 4.3    106 105 109   CO2 17.0* 15.0* 18.0* 20.0*   ALKPHO 42*  --  42* 48   AST 66*  --  62* 58*   ALT 29  --  26 25   BILT 0.3  --  0.4 0.3   TP 6.5  --  6.1 5.8       Estimated Creatinine Clearance: 46.5 mL/min (A) (based on SCr of 1.33 mg/dL  (H)).    Recent Labs   Lab 09/21/24  1914 09/21/24  2359 09/22/24  0543   TROPHS 6,036* 9,055* 7,752*       Recent Labs   Lab 09/20/24  2155   PTP 14.9*   INR 1.16                    Microbiology    Hospital Encounter on 09/20/24   1. Blood Culture     Status: None (Preliminary result)    Collection Time: 09/21/24  6:34 AM    Specimen: Blood,peripheral   Result Value Ref Range    Blood Culture Result No Growth 3 Days N/A         Imaging: Reviewed in Epic.    Medications:    furosemide  40 mg Intravenous BID (Diuretic)    enoxaparin  40 mg Subcutaneous Daily    cefTRIAXone  2 g Intravenous Q24H    ondansetron        aspirin  325 mg Per NG Tube Daily    ezetimibe  10 mg Oral Daily    folic acid  1,600 mcg Oral QOD    folic acid  800 mcg Oral QOD    thiamine  200 mg Intravenous Daily       Assessment & Plan:      #Acute hypoxic respiratory failure, improved   #COVID  #Superimposed bacterial vs atypical pneumonia   #Immunocompromised state, on methotrexate for RA  -CT chest reviewed and shows multiple areas of consolidation, round groundglass spiculated consolidations  -On vent  -S/p bronch on 9/22  -Extubated 9/24  -Urine antigens negative  -MRSA negative  -Sputum culture NGTD   -Bronch culture NGTD  -Fungus stain and culture in lab  -Pneumocystitis PCR in lab  -S/p leucovorin x 4 doses to accelerate methotrexate clearance in setting of LORENA per hematology  -IV antibiotics  -IV Lasix  -Remdesivir discontinued by ID   -Decadron; plan to discontinue tomorrow morning if O2 stable   -Pulmonary following    #Septic shock  -Blood cultures NGTD  -Lactic acid normalized  -IV antibiotics  -On pressors; wean as tolerated  -ID following    #Wide-complex tachycardia  -S/p unsuccessful cardioversion 9/21  -Amiodarone drip  -Cardiology following    #LORENA on CKD, baseline creatinine 1.4  #Hyponatremia  #Metabolic acidosis  -Continue to monitor on daily labs    #Acute on chronic anemia  #Acute on chronic thrombocytopenia  -Likely in the  setting of infection  -IV iron  -Monitor on daily labs  -Hematology following    #Elevated troponin  #CAD s/p CABG  -Troponin peaked  -Aspirin  -Unable to start heparin drip in the setting of thrombocytopenia  -Cardiology following    #Chronic combined heart failure, EF 20-25% s/p ICD  -Echo reviewed and shows EF 20-25%, severe diffuse hypokinesis with regional variations, abnormal left ventricular relaxation  -Spironolactone, metoprolol on hold    #Diabetes type 2  -Tresiba  -Hyperglycemia protocol    #Hypertension  -Losartan on hold    #Hyperlipidemia  -Ezetimibe    #Rheumatoid arthritis  -Methotrexate on hold  -S/p leucovorin x 4 doses to accelerate methotrexate clearance in setting of LORENA per hematology    #FLAKO  -Noncompliant with CPAP at home      Kj Gonzalez DO    Supplementary Documentation:     Quality:  DVT Mechanical Prophylaxis:   SCDs,    DVT Pharmacologic Prophylaxis   Medication    enoxaparin (Lovenox) 40 MG/0.4ML SUBQ injection 40 mg    DVT Pharmacologic prophylaxis: Aspirin 325 mg           Code Status: Full Code  Beltre: Beltre catheter in place  Beltre Duration (in days): 1  Central line: central venous catheter in place  GABE:     Discharge is dependent on: Clinical improvement  At this point Mr. Reyes is expected to be discharge to: TBD    The 21st Century Cures Act makes medical notes like these available to patients in the interest of transparency. Please be advised this is a medical document. Medical documents are intended to carry relevant information, facts as evident, and the clinical opinion of the practitioner. The medical note is intended as peer to peer communication and may appear blunt or direct. It is written in medical language and may contain abbreviations or verbiage that are unfamiliar.              **Certification      PHYSICIAN Certification of Need for Inpatient Hospitalization - Initial Certification    Patient will require inpatient services that will reasonably be expected  to span two midnight's based on the clinical documentation in H+P.   Based on patients current state of illness, I anticipate that, after discharge, patient will require TBD.

## 2024-09-24 NOTE — OCCUPATIONAL THERAPY NOTE
OCCUPATIONAL THERAPY EVALUATION - INPATIENT     Room Number: 468/468-A  Evaluation Date: 9/24/2024  Type of Evaluation: Initial  Presenting Problem: COVID-19    Physician Order: IP Consult to Occupational Therapy  Reason for Therapy: ADL/IADL Dysfunction and Discharge Planning    OCCUPATIONAL THERAPY ASSESSMENT   Patient is currently functioning below baseline with toileting, bathing, upper body dressing, lower body dressing, grooming, bed mobility, transfers, dynamic sitting balance, and energy conservation strategies. Prior to admission, patient's baseline is independent, lives with spouse , drives.  Patient is requiring maximum assistance as a result of the following impairments: decreased functional strength, decreased functional reach, decreased endurance, impaired sitting  balance, medical status, increased O2 needs from baseline, and decreased insight to deficits. Occupational Therapy will continue to follow for duration of hospitalization.    Patient will benefit from continued skilled OT Services while in house to promote increasing endurance independence and tolerance for ADLs.  Patient was extubated shortly before assessment and was somewhat groggy/lethargic. Will continue to assess DC recommendation as patient progresses. Undetermined DC recommendation at this time    History Related to Current Admission: Patient is a 79 year old male admitted on 9/20/2024 with Presenting Problem: COVID-19. Co-Morbidities : severe pulmonary HTN, chronic HFrEF, CAD s/p PCI and CABG, RA, thrombocytopenia, ICD    WEIGHT BEARING RESTRICTION  Weight Bearing Restriction: None              Recommendations for nursing staff:   Transfers: TBA further; recommend total lift for now  Toileting location: bed level    EVALUATION SESSION:  Patient Start of Session: supine; on pressors; cleared to dangle at edge of bed per RN    FUNCTIONAL TRANSFER ASSESSMENT     BED MOBILITY  Supine to Sit : Maximum Assist  Sit to Supine (OT): Maximum  Assist  Scooting: MAX    BALANCE ASSESSMENT  Static Sitting: Minimal Assist  Sitting Bilateral: Moderate Assist    FUNCTIONAL ADL ASSESSMENT  LB Dressing Seated: Maximum Assist      ACTIVITY TOLERANCE: Denied dizziness ; reported nausea while seated at side of bed        Resp: 17              O2 SATURATIONS  Oxygen Therapy  SPO2% on Oxygen at Rest: 96  At rest oxygen flow (liters per minute): 2    COGNITION  Arousal/Alertness:  appropriate responses to stimuli, delayed responses to stimuli, and cues needed to open eyes  Attention Span:  attends with cues to redirect and difficulty attending to directions  Orientation Level:  oriented x4  Following Commands:  follows one step commands with increased time  Initiation: cues to initiate tasks  Awareness of Errors:  decreased awareness of errors   Awareness of Deficits:  decreased awareness of deficits      Upper Extremity   ROM: AROM within functional limits   Strength: within functional limits grossly 5/5  Coordination  Gross motor: wfl  Fine motor: wfl  Sensation:  denied UE deficits    EDUCATION PROVIDED  Patient: Role of Occupational Therapy; Plan of Care; Functional Transfer Techniques  Patient's Response to Education: Requires Further Education    Equipment used: none  Demonstrates functional use, Would benefit from additional trial yes     Therapist comments:   Explained OT role and purpose of assessment. Increased time to help patient increase alertness. Patient able to read wall clock accurately. Assisted patient with rolling and sidelying to sitting. Patient reported nausea but did not vomit. RN updated. Poor trunk control noted x1 with L sided lean but after tactile and verbal cues he was able to maintain midline with CGA/MIN A. Patient noted to be pale during session, also with some sweating after return to supine. Patient assisted with positioning in bed, left with needs met and call light in reach.     Patient End of Session: SCDs in place;All patient  questions and concerns addressed;RN aware of session/findings;Call light within reach;Needs met;In bed    OCCUPATIONAL PROFILE    HOME SITUATION  Type of Home: Other (Comment) (duplex)  Home Layout: One level  Lives With: Spouse    Toilet and Equipment: Standard height toilet  Shower/Tub and Equipment: Walk-in shower       Occupation/Status: looking for work (patient did not state his occupation)     Drives: Yes  Patient Regularly Uses: Reading glasses    Prior Level of Function:   Lives with spouse in a duplex, drives, states he spends his days looking for work but did not elaborate.   Independent, denied any recent falls.    SUBJECTIVE   Participatory    PAIN ASSESSMENT  Ratin          OBJECTIVE  Precautions: Bed/chair alarm  Fall Risk: High fall risk      ASSESSMENTS    AM-PAC ‘6-Clicks’ Inpatient Daily Activity Short Form  -   Putting on and taking off regular lower body clothing?: A Lot  -   Bathing (including washing, rinsing, drying)?: A Lot  -   Toileting, which includes using toilet, bedpan or urinal? : A Lot  -   Putting on and taking off regular upper body clothing?: A Lot  -   Taking care of personal grooming such as brushing teeth?: A Lot  -   Eating meals?: A Lot    AM-PAC Score:  Score: 12  Approx Degree of Impairment: 66.57%  Standardized Score (AM-PAC Scale): 30.6    ADDITIONAL TESTS     NEUROLOGICAL FINDINGS      COGNITION ASSESSMENTS       PLAN  OT Treatment Plan: Endurance training;UE strengthening/ROM;Functional transfer training;ADL training;Energy conservation/work simplification techniques;Balance activities;Cognitive reorientation;Patient/Family education;Patient/Family training;Equipment eval/education;Compensatory technique education;Continued evaluation;Fine motor coordination activities  Rehab Potential : Fair  Frequency: 3-5x/week  Number of Visits to Meet Established Goals: 6    ADL Goals   Patient will perform grooming: with stand by assist and while standing at sink  Patient will  perform lower body dressing:  with setup, with stand by assist, and with adaptive equipment PRN  Patient will perform toileting: with stand by assist    Functional Transfer Goals  Patient will transfer from sit to supine:  with supervision  Patient will transfer from supine to sit:  with supervision  Patient will transfer to toilet:  with supervision    UE Exercise Program Goal  Patient will be supervision with bilateral AROM HEP (home exercise program).    Additional Goals  Patient will describe energy conservation methods he can use for ADLs     Therapist Goals  Cognitive screening as needed    Patient Evaluation Complexity Level:   Occupational Profile/Medical History HIGH - Extensive review of history including review of medical or therapy records    Specific performance deficits impacting engagement in ADL/IADL MODERATE  3 - 5 performance deficits   Client Assessment/Performance Deficits MODERATE - Comorbidities and min to mod modifications of tasks    Clinical Decision Making HIGH - Analysis of occupational profile, comprehensive assessments, multiple treatment options    Overall Complexity MODERATE     OT Session Time: 20 minutes    Therapeutic Activity: 18 minutes

## 2024-09-24 NOTE — RESPIRATORY THERAPY NOTE
Current Mechanical Ventilator Settings:  - Mode: VC+  - Rate: 12  - Tidal Volume(mL):600  - FiO2: 30  - PEEP 5  - inspiratory time 1.05    Breath Sounds: Clear/dim    ETT Size: 7.5    Measured Parameters:  Peak Inspiratory Pressure(cmH2O): 13  Plateu Pressure(cmH2O) : 19  Tidal Volume(mL): 650  Rate:13    Shift Events noted: Received pt intubated and sedated @1500. Pt trying to take deep breaths w/high VT @times. Settings changed by MD. Eli minimal tan secretions. Pt remains on above settings.

## 2024-09-24 NOTE — PLAN OF CARE
Patient remains intubated and sedated. Patient eyes opens to voice and follows commands. Per MD, propofol weaned off and precedex gtt started. Sedation titrated as tolerated and as ordered in MAR, see MAR. Levophed titrated as tolerated and as ordered in MAR, see MAR. Insulin gtt titrated as ordered in MAR, see MAR.

## 2024-09-25 PROBLEM — E11.9 TYPE 2 DIABETES MELLITUS WITHOUT COMPLICATION, WITH LONG-TERM CURRENT USE OF INSULIN (HCC): Status: ACTIVE | Noted: 2024-09-25

## 2024-09-25 PROBLEM — Z79.4 TYPE 2 DIABETES MELLITUS WITHOUT COMPLICATION, WITH LONG-TERM CURRENT USE OF INSULIN (HCC): Status: ACTIVE | Noted: 2024-09-25

## 2024-09-25 LAB
ALBUMIN SERPL-MCNC: 3.1 G/DL (ref 3.2–4.8)
ALBUMIN/GLOB SERPL: 1.1 {RATIO} (ref 1–2)
ALP LIVER SERPL-CCNC: 54 U/L
ALT SERPL-CCNC: 25 U/L
ANION GAP SERPL CALC-SCNC: 8 MMOL/L (ref 0–18)
AST SERPL-CCNC: 50 U/L (ref ?–34)
BASOPHILS # BLD: 0 X10(3) UL (ref 0–0.2)
BASOPHILS NFR BLD: 0 %
BILIRUB SERPL-MCNC: 0.3 MG/DL (ref 0.2–1.1)
BUN BLD-MCNC: 43 MG/DL (ref 9–23)
CALCIUM BLD-MCNC: 8.2 MG/DL (ref 8.7–10.4)
CHLORIDE SERPL-SCNC: 108 MMOL/L (ref 98–112)
CO2 SERPL-SCNC: 22 MMOL/L (ref 21–32)
CREAT BLD-MCNC: 1.38 MG/DL
EGFRCR SERPLBLD CKD-EPI 2021: 53 ML/MIN/1.73M2 (ref 60–?)
EOSINOPHIL # BLD: 0 X10(3) UL (ref 0–0.7)
EOSINOPHIL NFR BLD: 0 %
ERYTHROCYTE [DISTWIDTH] IN BLOOD BY AUTOMATED COUNT: 16.6 %
GLOBULIN PLAS-MCNC: 2.7 G/DL (ref 2–3.5)
GLUCOSE BLD-MCNC: 175 MG/DL (ref 70–99)
GLUCOSE BLD-MCNC: 176 MG/DL (ref 70–99)
GLUCOSE BLD-MCNC: 176 MG/DL (ref 70–99)
GLUCOSE BLD-MCNC: 215 MG/DL (ref 70–99)
GLUCOSE BLD-MCNC: 239 MG/DL (ref 70–99)
HCT VFR BLD AUTO: 22 %
HGB BLD-MCNC: 7.3 G/DL
LYMPHOCYTES NFR BLD: 0.56 X10(3) UL (ref 1–4)
LYMPHOCYTES NFR BLD: 16 %
MCH RBC QN AUTO: 30 PG (ref 26–34)
MCHC RBC AUTO-ENTMCNC: 33.2 G/DL (ref 31–37)
MCV RBC AUTO: 90.5 FL
METAMYELOCYTES # BLD: 0.07 X10(3) UL
METAMYELOCYTES NFR BLD: 2 %
MONOCYTES # BLD: 0.25 X10(3) UL (ref 0.1–1)
MONOCYTES NFR BLD: 7 %
MORPHOLOGY: NORMAL
NEUTROPHILS # BLD AUTO: 1.91 X10 (3) UL (ref 1.5–7.7)
NEUTROPHILS NFR BLD: 69 %
NEUTS BAND NFR BLD: 6 %
NEUTS HYPERSEG # BLD: 2.63 X10(3) UL (ref 1.5–7.7)
NRBC BLD MANUAL-RTO: 2 %
OSMOLALITY SERPL CALC.SUM OF ELEC: 301 MOSM/KG (ref 275–295)
PHOSPHATE SERPL-MCNC: 3 MG/DL (ref 2.4–5.1)
PLATELET # BLD AUTO: 66 10(3)UL (ref 150–450)
PLATELET MORPHOLOGY: NORMAL
PLATELETS.RETICULATED NFR BLD AUTO: 17 % (ref 0–7)
PNEUMOCYST SMR DFA: NEGATIVE
POTASSIUM SERPL-SCNC: 4 MMOL/L (ref 3.5–5.1)
PROT SERPL-MCNC: 5.8 G/DL (ref 5.7–8.2)
RBC # BLD AUTO: 2.43 X10(6)UL
SODIUM SERPL-SCNC: 138 MMOL/L (ref 136–145)
TOTAL CELLS COUNTED BLD: 100
WBC # BLD AUTO: 3.5 X10(3) UL (ref 4–11)

## 2024-09-25 PROCEDURE — 99232 SBSQ HOSP IP/OBS MODERATE 35: CPT | Performed by: STUDENT IN AN ORGANIZED HEALTH CARE EDUCATION/TRAINING PROGRAM

## 2024-09-25 PROCEDURE — 99222 1ST HOSP IP/OBS MODERATE 55: CPT | Performed by: CLINICAL NURSE SPECIALIST

## 2024-09-25 PROCEDURE — 99233 SBSQ HOSP IP/OBS HIGH 50: CPT | Performed by: INTERNAL MEDICINE

## 2024-09-25 RX ORDER — AMIODARONE HYDROCHLORIDE 200 MG/1
400 TABLET ORAL 2 TIMES DAILY WITH MEALS
Status: DISCONTINUED | OUTPATIENT
Start: 2024-09-25 | End: 2024-09-28

## 2024-09-25 RX ORDER — ALBUTEROL SULFATE 90 UG/1
2 INHALANT RESPIRATORY (INHALATION) EVERY 4 HOURS PRN
Status: DISCONTINUED | OUTPATIENT
Start: 2024-09-25 | End: 2024-09-28

## 2024-09-25 NOTE — PROGRESS NOTES
David Reyes Patient Status:  Inpatient    1945 MRN [id number]   Location [facility] 4SW-A Attending Kj Gonzalez DO   Hosp Day # 3 PCP Thea Harris DO     Critical Care Progress Note          Subjective:  No acute events overnight.  Patient with cough    Objective:    Medications:   amiodarone  400 mg Oral BID with meals    sodium ferric gluconate  125 mg Intravenous Daily    furosemide  40 mg Intravenous BID (Diuretic)    enoxaparin  40 mg Subcutaneous Daily    cefTRIAXone  2 g Intravenous Q24H    insulin degludec  20 Units Subcutaneous Daily    insulin aspart  1-5 Units Subcutaneous TID AC and HS    aspirin  325 mg Per NG Tube Daily    ezetimibe  10 mg Oral Daily    folic acid  1,600 mcg Oral QOD    folic acid  800 mcg Oral QOD             Intake/Output Summary (Last 24 hours) at 2024 0857  Last data filed at 2024 0827  Gross per 24 hour   Intake 963.1 ml   Output 3150 ml   Net -2186.9 ml       /57 (BP Location: Right arm)   Pulse 75   Temp 98.2 °F (36.8 °C) (Temporal)   Resp 14   Ht 175.3 cm (5' 9\")   Wt 249 lb 9 oz (113.2 kg)   SpO2 97%   BMI 36.85 kg/m²     Physical Exam:    General Appearance: Alert, cooperative, no distress  Neck: No JVD  Lungs: Course to auscultation bilaterally  Heart: Regular rate and [id number]-C-[id number]  Abdomen: Soft, distended, non-tender, hypoactive bowel sounds  Extremities: 2+ pitting edema to BLE.    Pulses: 1+ and symmetric all extremities  Skin: Skin color, texture, turgor normal for ethnicity, no rashes or lesions, warm and dry      Recent Labs   Lab 24  0431   RBC 2.43*   HGB 7.3*   HCT 22.0*   MCV 90.5   MCH 30.0   MCHC 33.2   RDW 16.6   NEPRELIM 1.91   WBC 3.5*   PLT 66.0*     Recent Labs   Lab 24  0547 24  0403 24  0431   * 183* 176*   BUN 35* 33* 43*   CREATSERUM 1.66* 1.33* 1.38*   CA 8.0* 7.9* 8.2*   ALB 3.7 3.4 3.1*   * 137 138   K 4.3 4.3 4.0    109 108   CO2 18.0* 20.0* 22.0   ALKPHO  42* 48 54   AST 62* 58* 50*   ALT 26 25 25   BILT 0.4 0.3 0.3   TP 6.1 5.8 5.8     Recent Labs   Lab 09/21/24  1048   ABGPHT 7.36   QUJSSF6Y 28*   YKDCW3Z 141*   ABGHCO3 18.3*   ABGBE -8.6*   TEMP 98.6   MIKI Not Applicable   SITE Arterial Line   DEV Bi-PAP   THGB 8.7*     No results for input(s): \"BNP\" in the last 168 hours.  No results for input(s): \"TROP\", \"CK\" in the last 168 hours.    No results found.       Assessment/Plan:    Acute hypoxic respiratory failure 2/2 possible community acquired pneumonia vs atypical PNA,+ COVID. Immunocompromise state on methotrexate. Now with possible volume overload.  - Intubated 9/22 and extubated 9/24  - RVP + COVID  - S/p bronch- Cx, fungal cx, cell count, PJP pending  - Strep/legionella, MRSA PCR negative  - Remdesevir (9/20-)  - Dexemethasone (9/20-)  - Lasix BID per cardiology  - Antibiotics as below  -albuterol PRN for bronchospasm     Shock: Unclear etiology. Sepsis vs cardiogenic  - LA elevated, now cleared  - No leukocytosis, low grade fevers  - PCT 0.43  - Cefepime (9/22-), flagyl (9/21-9/24), s/p Doxy (9/21-9/22), ceftriaxone (9/)  - Blood cultures NGTD  - UA negative  - TTE with EF unchanged from prior (20-25% with RWMAs)  - Vasopressors to maintain MAP >65, SBP >90. Currently off  - ID following     Acute Encephalopathy, likely 2/2 to above.  - ABG without hypercapnea  - CT brain negative for acute process      OLRENA on CKD, NAGMA: Likely 2/2 dehydration given poor po intake, lactic acidosis  - BL Cr- ~ 1.4   - Monitor I/O via smith  - lasix per cards  - Avoid nephrotoxins  - Daily BMP     Elevated troponin: likely 2/2 demand ischemia, cannot rule out NSTEMI given significant cardiac history  - initial trop 150, peak at 7752, no longer trending  - Hold on heparin gtt given higher risk for a/c with low plts  - EKG V-paced  - ASA daily  - Cardiology following     Wide complex tachycardia; possible VT on 9/21 and again 9/23   - s/p amio bolus 150mg 9/21, 9/23  overnight  - s/p unsuccessful cardioversion 9/21, responsive to IV amio  - transition IV amiodarone to oral  - EKG with wide QRS tachycardia  - cards following     Elevated LFTs: Likely due to above, shock/sepsis  - AST elevated, but stable  - monitor     HFrEF s/p  BiV ICD , pHTN, mild aortic stenosis  - EF 20-25%, unchanged from prior  - Pro BNP 14,448  - CXR without sign of pulmonary edema  - Hold PTA BB given borderline BP  - Not on plavix PTA due to low plts since 2022     Acute on Chronic Thrombocytopenia: Likely acute component 2/2 above  - Plts lower than baseline on admit, now stable  - Monitor for signs of bleeding  - Cont DVT proph if plts > 30  - Monitor daily CBC     Acute on chronic normocytic Anemia: iron deficiency  - Hgb stable  - No signs of bleeding  - IV iron  - Heme following  - Daily CBC     RA  - On methotrexate at home-holding  - s/p leucovorin on 9/21, no need to continue     Hx HTN, HLD  - Hold losartan, BB     Hyperglycemia, hx DM  - A1c 5.9  - Insulin Subq  - Accu-checks AC & HS  - Hold PTA metformin     Hx Chronic subdural hematoma (May 2024)     FLAKO  - Non-compliant with CPAP at home     F/E/N:    - TFs  - Follow lytes and replete PRN     Proph:  - SQ heparin  - SCD  - PT/OT     Dispo:   - Full code  - ICU monitoring, may transfer to telemetry      Plan of care discussed with intensivist, Dr. Dorian Altamirano, [facility]  Critical Care NP  Phone [phone]        Intensivist addendum:   I agree with APN note above. I have independently seen & evaluated the patient.  I agree with the management plan outlined above with the following changes/additions:     Overall improved. Ok to floor. Will defer to hospitalist in regards to which pulmonary group they would like to follow  on transfer.    Ryley Alarcon M.D.  Pulmonary / Critical Care  [facility] in house Intensivist.

## 2024-09-25 NOTE — CONSULTS
[facility]  Diabetes Consult Note    David Reyes Patient Status:  Inpatient    1945 MRN [id number]   Location [facility] 4SW-A Attending Kj Gonzalez,    Hosp Day # 3 PCP Thea Harris DO     Reason for Consult:   T2DM management recommendations      Provider Requesting Consult:  Lamar Goodman, ICU APN      Diagnosis:  Patient Active Problem List   Diagnosis    Nonischemic cardiomyopathy (HCC)    FLAKO (obstructive sleep apnea)    Congestive heart failure (HCC)    CAD (coronary artery disease)    Rheumatoid arthritis involving multiple sites with positive rheumatoid factor (HCC)    Type 2 diabetes mellitus with hyperglycemia, with long-term current use of insulin (HCC)    Normocytic anemia    Thrombocytopenia (HCC)    Ischemic cardiomyopathy    Dyspnea on exertion    Dry mouth    Pure hypercholesterolemia    Vitamin D deficiency    Pulmonary hypertension secondary to increased PVR (HCC)    Carotid artery stenosis    Morbid (severe) obesity due to excess calories (HCC)    Chronic obstructive pulmonary disease, unspecified (HCC)    CUMMINGS (dyspnea on exertion)    Weakness generalized    Aortic stenosis, mild    Hyponatremia    Hyperglycemia    Anemia    Acute kidney injury (HCC)    Metabolic acidosis    COVID-19    Acute bronchospasm due to viral infection    Bronchospasm    Pneumonia    Acute respiratory failure with hypoxia (HCC)    Immunocompromised state (HCC)    Septic shock (HCC)    Wide-complex tachycardia    Elevated troponin    Acute on chronic anemia    Community acquired pneumonia    Type 2 diabetes mellitus without complication, with long-term current use of insulin (HCC)         Medical History:  Past Medical History:    Anxiety state    Back problem    CAD (coronary artery disease)    Congestive heart disease (HCC)    Congestive heart failure (HCC)    CORONARY ARTERY DISEASE    cabg    Coronary atherosclerosis    Diabetes (HCC)    DM (diabetes mellitus) (HCC)    Heart attack (HCC)     High blood pressure    High cholesterol    Other and unspecified hyperlipidemia    Rheumatoid arthritis (HCC)    Sleep apnea    Subdural hematoma (HCC)    Unspecified sleep apnea    Visual impairment     Past Surgical History:   Procedure Laterality Date    Angiogram  2/11/2015    Angioplasty (coronary)  2/23/15    RCA    Bypass surgery      2007    Cabg  2004    LAD, Diagonal    Cardiac defibrillator placement  6/7/14    [employer] dual chamber ICD    Cath drug eluting stent      Tonsillectomy       Family History   Problem Relation Age of Onset    Cancer Father     Heart Disease Mother     Other (alzheimers) Mother     Stroke Neg          Diabetes history:  Type:  T2DM  Onset: 2019  Family history of DM: father with T2DM      Allergies:   Allergies   Allergen Reactions    Pcn [Penicillins]      \"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         Medications: Complete list reviewed. Active diabetes medications include degludec and aspart.      Labs:  Recent Labs   Lab 09/24/24  1157 09/24/24  1412 09/24/24  1607 09/24/24  2105 09/25/24  0833   PGLU 173* 160* 142* 181* 175*     Recent Labs     09/22/24  2314 09/23/24  0001 09/23/24  0547 09/23/24  1156 09/24/24  0403 09/24/24  0502 09/25/24  0431 09/25/24  0833   *  --  133*  --  137  --  138  --      --  105  --  109  --  108  --    CO2 15.0*  --  18.0*  --  20.0*  --  22.0  --    BUN 33*  --  35*  --  33*  --  43*  --    CREATSERUM 1.66*  --  1.66*  --  1.33*  --  1.38*  --    PGLU  --    < > 229*   < >  --    < >  --  175*   CA 8.8  --  8.0*  --  7.9*  --  8.2*  --    ALB  --   --  3.7  --  3.4  --  3.1*  --     < > = values in this interval not displayed.                    History of Present Illness: David Reyes is a 79 year old male with a PMH of HFrEF (EF 20%) s/p ICD placement, CAD s/p CABG, HTN, HLD, T2DM, rheumatoid arthritis (on methotrexate), and  FLAKO w/o CPAP use admitted 9/20/2024 with complaints of difficulty breathing, fevers and chills. Patient was found to be COVID+ and was intubated 9/21/2024 d/t acute hypoxic respiratory failure; he was successfully extubated 9/24/2024 after a complicated course, including WCT vs. VT and intermittent vasopressor requirement.         Assessment/Recommendations:  Upon interview today, patient states he was diagnosed with T2DM in 2019 and follows with his PCP for management; he states he is compliant with both his metformin and lantus administration. Patient states he lives with his wife who helps him with his medications and checking his glucose via glucometer when needed. Explained to patient, given his admission diagnosis of 5.9, he will not need to continue basal/bolus insulin at discharge; he can return to his prior to admission anti-diabetic medications and doses. In addition, explained that his next A1C will likely be higher due to fluctuations in glucose seen during this admission r/t critical care treatment. Patient endorses understanding of plan of care.       Plan:  Inpatient recommendations -   Degludec = continue 20u every day  Aspart = initiate carb coverage at 1:10gm CHO & correction coverage at 1:30>140  Outpatient discharge recommendations - restart prior to admission anti-diabetic regimen  Metformin 1000mg BID  Glargine 24u QD      Prescription Recommendations:  Medicare:  Insulin:   Novolog, Fiasp, Apidra, Admelog, Levemir, Lantus, Basaglar, Tresiba, Toujeo  (If no secondary insurance, may need prior auth)  Supplies:  BD pen needles (Aline)  Glucometer:  Contour Next      Provider F/U Recommendations:  Transition of Care Clinic  PCP - Dr. Harris ([place])      A total of 60 minutes were spent with the patient, 100% was spent counseling and coordinating care for T2 diabetes self-management including nutrition, exercise, blood glucose monitoring, insulin administration, medications, treatment options,  follow-up coordination and resources.    Janae Hernandez, APRN  9/25/2024  12:50 PM

## 2024-09-25 NOTE — PROGRESS NOTES
Assumed care of patient following shift report. AOx4. Weaned to room air. Congested, strong and productive cough- using oral suctioning frequently. V paced on tele with frequent PVCs. Afebrile. Normotensive. CVC removed per protocol. Increased urine production, tolerating IV lasix. Tolerating diet. Up in chair with bobby lift. Denies pain. Updated wife over the phone.

## 2024-09-25 NOTE — PLAN OF CARE
Not using Dual antiplt therapy at this time given thrombocytopenia, and Echo without significant change.   Not on statin r/t history of myalgia on both Lipitor and Crestor.   Resume home Zetia.       Matilde hinton

## 2024-09-25 NOTE — PROGRESS NOTES
INFECTIOUS DISEASE  PROGRESS NOTE            [facility]    David Reyes Patient Status:  Inpatient    1945 MRN [id number]   Location [facility] 4SW-A Attending Kj Gonzalez,    Hosp Day # 3 PCP Thea Harris DO     Antibiotics: #4  Ceftriaxone #1    Remdesivir completed    Subjective:  Resting 02 NC 2L    Objective:  Temp:  [96.9 °F (36.1 °C)-98.8 °F (37.1 °C)] 98.2 °F (36.8 °C)  Pulse:  [] 75  Resp:  [] 14  BP: ()/() 112/57  SpO2:  [89 %-100 %] 97 %  AO: ()/(42-56) 99/50    General: awake in no distress  Vital signs:Temp:  [96.9 °F (36.1 °C)-98.8 °F (37.1 °C)] 98.2 °F (36.8 °C)  Pulse:  [] 75  Resp:  [] 14  BP: ()/() 112/57  SpO2:  [89 %-100 %] 97 %  AO: ()/(42-56) 99/50  Heent; 02 NC  Respiratory: Non labored, symmetric excursion, normal respirations  Abdomen: non distended  No edema  Labs:     COVID-19 Lab Results    COVID-19  Lab Results   Component Value Date    COVID19 Detected (A) 2024    COVID19 Not Detected 2024    COVID19 Not Detected 05/10/2024       Pro-Calcitonin  Recent Labs   Lab 24  2205   PCT 0.43*       Cardiac  Recent Labs   Lab 245   PBNP 14,448*       Creatinine Kinase  No results for input(s): \"CK\" in the last 168 hours.    Inflammatory Markers  Recent Labs   Lab 24  0634 24  1105 24  1557 24  0403   CRP  --   --  16.40* 26.50*   *  --  512*  --    DDIMER  --  2.12*  --   --        Recent Labs   Lab 24  0431   RBC 2.43*   HGB 7.3*   HCT 22.0*   MCV 90.5   MCH 30.0   MCHC 33.2   RDW 16.6   NEPRELIM 1.91   WBC 3.5*   PLT 66.0*   NEUT 69   LYMPH 16   MON 7   EOS 0   NRBC 2*         Recent Labs   Lab 24  0547 24  0403 24  0431   * 183* 176*   BUN 35* 33* 43*   CREATSERUM 1.66* 1.33* 1.38*   CA 8.0* 7.9* 8.2*   ALB 3.7 3.4 3.1*   * 137 138   K 4.3 4.3 4.0    109 108   CO2 18.0* 20.0* 22.0   ALKPHO 42* 48 54    AST 62* 58* 50*   ALT 26 25 25   BILT 0.4 0.3 0.3   TP 6.1 5.8 5.8       No results found for: \"VANCT\"  Microbiology    Hospital Encounter on 09/20/24   1. Blood Culture     Status: None (Preliminary result)    Collection Time: 09/21/24  6:34 AM    Specimen: Blood,peripheral   Result Value Ref Range    Blood Culture Result No Growth 3 Days N/A         Radiology    CXR cardiomegaly and vascular congestion    ECHO 9/21/24: EF 20-25%    Problem list reviewed:  Patient Active Problem List   Diagnosis    Nonischemic cardiomyopathy (HCC)    FLAKO (obstructive sleep apnea)    Congestive heart failure (HCC)    CAD (coronary artery disease)    Rheumatoid arthritis involving multiple sites with positive rheumatoid factor (HCC)    Type 2 diabetes mellitus with hyperglycemia, with long-term current use of insulin (HCC)    Normocytic anemia    Thrombocytopenia (HCC)    Ischemic cardiomyopathy    Dyspnea on exertion    Dry mouth    Pure hypercholesterolemia    Vitamin D deficiency    Pulmonary hypertension secondary to increased PVR (HCC)    Carotid artery stenosis    Morbid (severe) obesity due to excess calories (HCC)    Chronic obstructive pulmonary disease, unspecified (HCC)    CUMMINGS (dyspnea on exertion)    Weakness generalized    Aortic stenosis, mild    Hyponatremia    Hyperglycemia    Anemia    Acute kidney injury (HCC)    Metabolic acidosis    COVID-19    Acute bronchospasm due to viral infection    Bronchospasm    Pneumonia    Acute respiratory failure with hypoxia (HCC)    Immunocompromised state (HCC)    Septic shock (HCC)    Wide-complex tachycardia    Elevated troponin    Acute on chronic anemia    Community acquired pneumonia             ASSESSMENT/PLAN:  1.  COVID  No records of previous vaccinations  Underlying cardiomyopathy EF 20-25% recent echo      -per notes cough started  9/13, 1 week prior to admission    Brought to ED on 9/20 with worsening SOB and productive cough, vomited at home  Respiratory failure,  intubated since 9/21 and extubatged on 9/24  SP bronch on 9/22 (culture no growth)    -picture of  respiratory complications (CHF, COPD, pneumonia) triggered by COVID, rather than a primary active viral process    2. Cardiomypathy, -25%, elevated troponin  CHF  -cardiology following    3. Pneumonia  -following covid infection (symptoms 1 week prior to admission) with underlying CHF  Sputum cultures no growth      Continue Ceftriaxone, complete 7 days antibiotics  Completed antiviral  Hold off on further steroids        Frank Cr MD, MD  [facility]  [phone]

## 2024-09-25 NOTE — PROGRESS NOTES
[facility]   part of [facility] ID PROGRESS NOTE    David Reyes Patient Status:  Inpatient    1945 MRN [id number]   Location [facility] 4SW-A Attending Kj Gonzalez,    Hosp Day # 3 PCP Thea Harris DO     Antimicrobials: IV CTX (restarted -), Cefepime (-), Flagyl -), s/p levofloxacin , CTX , doxycycline , vanco .    S/p RDV x 3  S/p (- )    Subjective: Patient seen and examined today. Reports a cough. Denies any SOB or abdominal pain. On room air. Off pressors. Afebrile. On room air.     Objective:    /88 (BP Location: Right arm)   Pulse 86   Temp 98.2 °F (36.8 °C) (Temporal)   Resp 17   Ht 175.3 cm (5' 9\")   Wt 249 lb 9 oz (113.2 kg)   SpO2 95%   BMI 36.85 kg/m²     Gen:   NAD, Alert, answering questions appropriately. On room air.   HEENT:  Moist mucous membranes  CV/lungs:  RRR, Some rhonchi noted bilaterally.   Abdom:  Soft, NT. Less distended. +BS  Skin/extrem:  No rashes.     Labs:   Recent Labs   Lab 24  0543 24  0547 24  0403 24  0431   WBC 9.7  9.7 9.2 10.0 7.8 3.5*   HGB 8.5*  8.5* 8.1* 7.8* 7.3* 7.3*   PLT 65.0*  65.0* 64.0* 68.0* 75.0* 66.0*       Recent Labs   Lab 24  0543 244 24  0547 24  0403 24  0431   * 135* 133* 137 138   K 4.6 5.0 4.3 4.3 4.0    106 105 109 108   CO2 17.0* 15.0* 18.0* 20.0* 22.0   BUN 33* 33* 35* 33* 43*   CREATSERUM 1.81* 1.66* 1.66* 1.33* 1.38*       Recent Labs   Lab 24  1557 24  0543 24  0547 24  0403 24  0431   ALT 27 29 26 25 25   AST 61* 66* 62* 58* 50*     Problem list:    Patient Active Problem List   Diagnosis    Nonischemic cardiomyopathy (HCC)    FLAKO (obstructive sleep apnea)    Congestive heart failure (HCC)    CAD (coronary artery disease)    Rheumatoid arthritis involving multiple sites with positive rheumatoid factor (HCC)    Type 2 diabetes mellitus  with hyperglycemia, with long-term current use of insulin (HCC)    Normocytic anemia    Thrombocytopenia (HCC)    Ischemic cardiomyopathy    Dyspnea on exertion    Dry mouth    Pure hypercholesterolemia    Vitamin D deficiency    Pulmonary hypertension secondary to increased PVR (HCC)    Carotid artery stenosis    Morbid (severe) obesity due to excess calories (HCC)    Chronic obstructive pulmonary disease, unspecified (HCC)    CUMMINGS (dyspnea on exertion)    Weakness generalized    Aortic stenosis, mild    Hyponatremia    Hyperglycemia    Anemia    Acute kidney injury (HCC)    Metabolic acidosis    COVID-19    Acute bronchospasm due to viral infection    Bronchospasm    Pneumonia    Acute respiratory failure with hypoxia (HCC)    Immunocompromised state (HCC)    Septic shock (HCC)    Wide-complex tachycardia    Elevated troponin    Acute on chronic anemia    Community acquired pneumonia    Type 2 diabetes mellitus without complication, with long-term current use of insulin ([facility])       ASSESSMENT:  Multifocal pneumonia - assume covid pneumonia with possible superimposed bacterial PNA.  Presented to SOB/cough x 1 week, increasing phlegm with vomiting prior to admission per chart review. Covid- 19 + 9/21. No previous vaccinations recorded. Completed 3 day course of RDV.  Legionella and strep pneumo Uags negative. MRSA of the nares negative. Sputum culture negative.   Had fevers but no leukocytosis  S/p bronchoscopy with BAL 9/27, cx ngtd  Acute hypoxic respiratory failure, d/t above, also ? possible component of pulm edema. Resolved.   Shock, ? septic shock related to #1 vs other (possibly cardiogenic). Resolved, off pressors.   Wide complex tachycardia with possible episodes of VT; elevated troponin. Cards following.  BiV ICD in place  CHF (EF 20-25%), CAD  DM II  RA on methotrexate (on hold currently)  Thrombocytopenia  Penicillin allergy. Tolerates cephalosporins.    PLAN:  - continue IV CTX for now  - follow blood  cultures- ngtd  - follow pending studies/cultures from bronchoscopy  - follow temps and wbc    Discussed case with RN and patient.    [facility]  [phone]

## 2024-09-25 NOTE — OCCUPATIONAL THERAPY NOTE
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   Independent, denied any recent falls.      ASSESSMENT   Patient demonstrates limited progress this session, goals remain in progress.    Patient continues to function below baseline with toileting, bathing, upper body dressing, lower body dressing, eating, transfers, energy conservation strategies, and aerobic capacity.   Contributing factors to remaining limitations include decreased functional strength, decreased functional reach, decreased endurance, impaired standing balance, decreased muscular endurance, medical status, limited UE  ROM, and increased O2 needs from baseline.  Next session anticipate patient to progress grooming, transfers, static standing balance, functional standing tolerance, and aerobic capacity.  Occupational Therapy will continue to follow patient for duration of hospitalization.    Patient continues to benefit from continued skilled OT services: to promote return to prior level of function and safety with continuous assistance and gradual rehabilitative therapy.        OT Device Recommendations: TBD    History: Patient is a 79 year old male admitted on 9/20/2024 with Presenting Problem: COVID-19. Co-Morbidities : severe pulmonary HTN, chronic HFrEF, CAD s/p PCI and CABG, RA, thrombocytopenia, ICD    WEIGHT BEARING RESTRICTION  Weight Bearing Restriction: None          Recommendations for  nursing staff:   Transfers: total lift  Toileting location: bed level    TREATMENT SESSION:  Patient Start of Session: in chair; RN just finished getting him up with use of overhead lift  FUNCTIONAL TRANSFER ASSESSMENT     BED MOBILITY  Supine to Sit : Maximum Assist  Sit to Supine (OT): Maximum Assist  Scooting: MAX    BALANCE ASSESSMENT  Static Sitting: Minimal Assist  Sitting Bilateral: Moderate Assist    FUNCTIONAL ADL ASSESSMENT  Eating: Supervision  Grooming Seated: Stand-by Assist  LB Dressing Seated: Maximum Assist      ACTIVITY TOLERANCE:   Patient on 2L nasal cannula.                  O2 SATURATIONS  Oxygen Therapy  SPO2% on Oxygen at Rest: 95  At rest oxygen flow (liters per minute): 2    EDUCATION PROVIDED  Patient: Posture/Positioning; UE HEP for ROM  Patient's Response to Education: Verbalized Understanding      Equipment used: none  Demonstrates functional use, Would benefit from additional trial yes     Exercises:    Exercises Repetitions Comments   Scapular elevation     Scapular retraction     Shoulder rolls     Shoulder flexion     Shoulder abduction     Shoulder internal/external rotation     Forward punch     Elbow flexion     Elbow extension     Forearm pronation/supination     Wrist flexion/extension     Gross grasp/fist pumps     Ankle pumps     Knee extension     Marching         Therapist comments:   OT cued patient to perform hand pumps x 10 , then to use armrests to pull trunk forward into unsupported sitting. MN A needed . Patient able to maintain position for 60 seconds. He needed a rest break and was able to operate suction after cues were given. Patient was instructed on seated LE AROM therex, see PT note. He was agreeable to try unsupported sitting again. OT cued patient for upright head and upper trunk posture which he could achieve with visual cues, however he then began coughing and required basin for feeling that he might vomit. Needed to use suction repeatedly. Nurse updated.  Patient reported he was fatigued. OT assisted patient with meal tray set up and explained recommendation for cylinder foam to enlarge utensil handles. Patient observed feeding self but with some effort due to bilateral hand swelling and reduced dexterity. Rehab aide contacted to deliver foam handles to nurse    Patient End of Session: All patient questions and concerns addressed;RN aware of session/findings;Call light within reach;Needs met;Up in chair;Alarm set;Discussed recommendations with /    SUBJECTIVE  Cooperative, seeking to do things for himself ( adjust self in chair, use suction )    PAIN ASSESSMENT  Ratin           OBJECTIVE  Precautions: Bed/chair alarm    AM-PAC ‘6-Clicks’ Inpatient Daily Activity Short Form  -   Putting on and taking off regular lower body clothing?: A Lot  -   Bathing (including washing, rinsing, drying)?: A Lot  -   Toileting, which includes using toilet, bedpan or urinal? : A Lot  -   Putting on and taking off regular upper body clothing?: A Lot  -   Taking care of personal grooming such as brushing teeth?: A Little  -   Eating meals?: A Little    AM-PAC Score:  Score: 14  Approx Degree of Impairment: 59.67%  Standardized Score (AM-PAC Scale): 33.39    PLAN  OT Treatment Plan: Endurance training;UE strengthening/ROM;Functional transfer training;ADL training;Energy conservation/work simplification techniques;Balance activities;Cognitive reorientation;Patient/Family education;Patient/Family training;Equipment eval/education;Compensatory technique education;Continued evaluation;Fine motor coordination activities  Rehab Potential : Fair  Frequency: 3-5x/week    OT Goals: ongoing 24  ADL Goals   Patient will perform grooming: with stand by assist and while standing at sink  Patient will perform lower body dressing:  with setup, with stand by assist, and with adaptive equipment PRN  Patient will perform toileting: with stand by assist     Functional  Transfer Goals  Patient will transfer from sit to supine:  with supervision  Patient will transfer from supine to sit:  with supervision  Patient will transfer to toilet:  with supervision     UE Exercise Program Goal  Patient will be supervision with bilateral AROM HEP (home exercise program).     Additional Goals  Patient will describe energy conservation methods he can use for ADLs      Therapist Goals  Cognitive screening as needed    OT Session Time: 15 minutes  Self-Care Home Management: 5 minutes  Therapeutic Activity: 10 minutes

## 2024-09-25 NOTE — PLAN OF CARE
Pt received intubated this morning. Extubated to 2L this morning. AO x4, following commands. Rhonchorous throughout, strong productive cough. V-paced. Levo titrated off. Receive on insulin gtt, subcutaneous insulin given this evening, insulin gtt to be stopped at 2000. SLP gallito, diet advanced to regular with thin liquids. A-line and smith removed. Wife at bedside this evening. Pt and spouse updated to plan of care. See MAR and flowsheets for more info.

## 2024-09-25 NOTE — PROGRESS NOTES
[facility]   part of [facility]     Hospitalist Progress Note     David Reyes Patient Status:  Inpatient    1945 MRN [id number]   Location [facility] 4SW-A Attending Kj Gonzalez DO   Hosp Day # 3 PCP Thea Harris DO     Chief Complaint: Dyspnea    Subjective:     Patient resting in bed and reports no dyspnea at this time.  He has cough    Objective:    Review of Systems:   A comprehensive review of systems was completed; pertinent positive and negatives stated in subjective.    Vital signs:  Temp:  [96.9 °F (36.1 °C)-98.4 °F (36.9 °C)] 98.2 °F (36.8 °C)  Pulse:  [] 86  Resp:  [11-24] 17  BP: ()/() 123/88  SpO2:  [89 %-100 %] 95 %  AO: ()/(42-52) 99/50    Physical Exam:    General: No acute distress  Respiratory: No wheezes, no rhonchi  Cardiovascular: S1, S2, regular rate and rhythm  Abdomen: Soft, Non-tender, non-distended, positive bowel sounds  Neuro: No new focal deficits.  Extremities: No edema    Diagnostic Data:    Labs:  Recent Labs   Lab 24  2155 24  0634 24  1914 24  0543 24  0547 24  0403 24  0431   WBC 6.0   < > 9.7  9.7 9.2 10.0 7.8 3.5*   HGB 9.2*   < > 8.5*  8.5* 8.1* 7.8* 7.3* 7.3*   MCV 94.2   < > 92.4  92.4 91.9 95.5 93.1 90.5   PLT 55.0*   < > 65.0*  65.0* 64.0* 68.0* 75.0* 66.0*   BAND  --   --   --   --  2 14 6   INR 1.16  --   --   --   --   --   --     < > = values in this interval not displayed.       Recent Labs   Lab 24  0547 24  0403 24  0431   * 183* 176*   BUN 35* 33* 43*   CREATSERUM 1.66* 1.33* 1.38*   CA 8.0* 7.9* 8.2*   ALB 3.7 3.4 3.1*   * 137 138   K 4.3 4.3 4.0    109 108   CO2 18.0* 20.0* 22.0   ALKPHO 42* 48 54   AST 62* 58* 50*   ALT 26 25 25   BILT 0.4 0.3 0.3   TP 6.1 5.8 5.8       Estimated Creatinine Clearance: 44.8 mL/min (A) (based on SCr of 1.38 mg/dL (H)).    Recent Labs   Lab 24  1914 24  2359 24  0543   BLANCA  6,036* 9,055* 7,752*       Recent Labs   Lab 09/20/24  2155   PTP 14.9*   INR 1.16                    Microbiology    Hospital Encounter on 09/20/24   1. Blood Culture     Status: None (Preliminary result)    Collection Time: 09/21/24  6:34 AM    Specimen: Blood,peripheral   Result Value Ref Range    Blood Culture Result No Growth 4 Days N/A         Imaging: Reviewed in Epic.    Medications:    amiodarone  400 mg Oral BID with meals    sodium ferric gluconate  125 mg Intravenous Daily    insulin aspart  1-68 Units Subcutaneous TID AC&HS    insulin aspart  1-20 Units Subcutaneous TID CC and HS    furosemide  40 mg Intravenous BID (Diuretic)    enoxaparin  40 mg Subcutaneous Daily    cefTRIAXone  2 g Intravenous Q24H    insulin degludec  20 Units Subcutaneous Daily    aspirin  325 mg Per NG Tube Daily    ezetimibe  10 mg Oral Daily    folic acid  1,600 mcg Oral QOD    folic acid  800 mcg Oral QOD       Assessment & Plan:      #Acute hypoxic respiratory failure, improved   #COVID  #Superimposed bacterial vs atypical pneumonia   #Immunocompromised state, on methotrexate for RA  -CT chest reviewed and shows multiple areas of consolidation, round groundglass spiculated consolidations  -S/p bronch on 9/22  -Extubated 9/24  -Urine antigens negative  -MRSA negative  -Sputum culture NGTD   -Bronch culture NGTD  -Fungus stain and culture in lab  -Pneumocystitis PCR in lab  -S/p leucovorin x 4 doses to accelerate methotrexate clearance in setting of LORENA per hematology  -IV antibiotics  -IV Lasix per cards  -Remdesivir discontinued by ID   -Decadron discontinued by ID   -Pulmonary following    #Septic shock, resolved  -Blood cultures NGTD  -Lactic acid normalized  -IV antibiotics  -Weaned off pressors  -ID following    #Wide-complex tachycardia, suspected VT   -S/p unsuccessful cardioversion 9/21  -Transition to oral amiodarone today per cards  -Cardiology following    #LORENA on CKD, resolved (Baseline creatinine  1.4)  #Hyponatremia, resolved  #Metabolic acidosis, resolved    #Acute on chronic anemia  #Acute on chronic thrombocytopenia  -Likely in the setting of infection  -IV iron  -Monitor on daily labs  -Hematology following    #Elevated troponin  #CAD s/p CABG  -Troponin peaked  -Aspirin  -Unable to start heparin drip in the setting of thrombocytopenia  -Cardiology following    #Chronic combined heart failure, EF 20-25% s/p ICD  -Echo reviewed and shows EF 20-25%, severe diffuse hypokinesis with regional variations, abnormal left ventricular relaxation  -Metoprolol  -Spironolactone on hold    #Diabetes type 2  -Tresiba  -Hyperglycemia protocol    #Hypertension  -Losartan on hold    #Hyperlipidemia  -Ezetimibe    #Rheumatoid arthritis  -Methotrexate on hold  -S/p leucovorin x 4 doses to accelerate methotrexate clearance in setting of LORENA per hematology    #FLAKO  -Noncompliant with CPAP at home      Kj Gonzalez DO    Supplementary Documentation:     Quality:  DVT Mechanical Prophylaxis:   SCDs,    DVT Pharmacologic Prophylaxis   Medication    enoxaparin (Lovenox) 40 MG/0.4ML SUBQ injection 40 mg    DVT Pharmacologic prophylaxis: Aspirin 325 mg           Code Status: Full Code  Beltre: External urinary catheter in place  Beltre Duration (in days): 1  Central line: central venous catheter in place  GABE:     Discharge is dependent on: Clinical improvement  At this point Mr. Reyes is expected to be discharge to: TBD    The 21st Century Cures Act makes medical notes like these available to patients in the interest of transparency. Please be advised this is a medical document. Medical documents are intended to carry relevant information, facts as evident, and the clinical opinion of the practitioner. The medical note is intended as peer to peer communication and may appear blunt or direct. It is written in medical language and may contain abbreviations or verbiage that are unfamiliar.              **Certification      PHYSICIAN  Certification of Need for Inpatient Hospitalization - Initial Certification    Patient will require inpatient services that will reasonably be expected to span two midnight's based on the clinical documentation in H+P.   Based on patients current state of illness, I anticipate that, after discharge, patient will require TBD.

## 2024-09-25 NOTE — PLAN OF CARE
Pt received at change of shift. Alert and oriented x4. Follows commands. On NC/CPAP at night. Rhonchorous, strong productive cough. Oxygen saturation stable. Afebrile. V paced on tele monitor. Amiodarone gtt infusing (see MAR). Blood pressure stable. Insulin gtt stopped (see MAR). Transitioned to subcutaneous insulin. x3 BMs. Nausea noted. Zofran PRN given (see MAR). Reglan PRN given (see MAR). Voids via primofit with adequate output. Denies pain. No family at bedside. See Flowsheets and MAR for further documentation.

## 2024-09-25 NOTE — PROGRESS NOTES
Cardiology Progress Note    David Reyes Patient Status:  Inpatient    1945 MRN [id number]   [facility] 4SW-A Attending Kj Gonzalez DO   Hosp Day # 3 PCP Thea Harris DO       Subjective:   Extubated .  Good UO with lasix.    Rhythm remains quiet on IV amio.    Objective:   Temp: 98 °F (36.7 °C)  Pulse: 84  Resp: 21  BP: 118/74  AO: 99/50  FiO2 (%): 30 %    Intake/Output:     Intake/Output Summary (Last 24 hours) at 2024 0651  Last data filed at 2024 0420  Gross per 24 hour   Intake 863.1 ml   Output 2925 ml   Net -2061.9 ml       Last 3 Weights   24 0400 249 lb 9 oz (113.2 kg)   24 0600 255 lb 8.2 oz (115.9 kg)   24 0600 252 lb 6.8 oz (114.5 kg)   24 0400 243 lb 6.2 oz (110.4 kg)   24 0420 237 lb 10.5 oz (107.8 kg)   24 2144 240 lb (108.9 kg)   24 1316 241 lb (109.3 kg)   24 1001 235 lb (106.6 kg)         Physical Exam:     General: comfortable.  HEENT: Normocephalic, anicteric sclera, neck supple.  Cardiac: Regular rate and rhythm. S1, S2 normal. No murmur.  Lungs: Coarse bilaterally.  Abdomen: Soft, non-tender.   Extremities: Without clubbing, cyanosis.  Trace edema bilateral.  Neurologic: Sedated.  Skin: Warm and dry.     Laboratory/Data:    Labs:             Recent Labs   Lab 24  2155 24  0634 24  1914 24  0543 24  0547 24  0403 24  0431   WBC 6.0   < > 9.7  9.7 9.2 10.0 7.8 3.5*   HGB 9.2*   < > 8.5*  8.5* 8.1* 7.8* 7.3* 7.3*   MCV 94.2   < > 92.4  92.4 91.9 95.5 93.1 90.5   PLT 55.0*   < > 65.0*  65.0* 64.0* 68.0* 75.0* 66.0*   BAND  --   --   --   --  2 14  --    INR 1.16  --   --   --   --   --   --     < > = values in this interval not displayed.       Recent Labs   Lab 24  1557 24  1914 24  0543 24  2314 24  0547 24  0403 24  0431    136 135* 135* 133* 137 138   K 3.7 4.7 4.6 5.0 4.3 4.3 4.0    105 106 106 105 109  108   CO2 20.0* 16.0* 17.0* 15.0* 18.0* 20.0* 22.0   BUN 34* 35* 33* 33* 35* 33* 43*   CREATSERUM 1.74* 1.85* 1.81* 1.66* 1.66* 1.33* 1.38*   CA 8.5* 8.7 8.2* 8.8 8.0* 7.9* 8.2*   MG 2.0 1.9  --  2.2 2.1 2.3  --    PHOS  --   --   --   --  3.4 2.8 3.0   * 127* 128* 134* 213* 183* 176*       Recent Labs   Lab 09/21/24  0634 09/21/24  1557 09/22/24  0543 09/23/24  0547 09/24/24  0403 09/25/24  0431   ALT 25 27 29 26 25 25   AST 32 61* 66* 62* 58* 50*   ALB 4.1 4.3 4.0 3.7 3.4 3.1*   * 512*  --   --   --   --        No results for input(s): \"TROP\" in the last 168 hours.    Allergies:   Allergies   Allergen Reactions    Pcn [Penicillins]      \"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       Medications:  Current Facility-Administered Medications   Medication Dose Route Frequency    furosemide (Lasix) 10 mg/mL injection 40 mg  40 mg Intravenous BID (Diuretic)    enoxaparin (Lovenox) 40 MG/0.4ML SUBQ injection 40 mg  40 mg Subcutaneous Daily    cefTRIAXone (Rocephin) 2 g in sodium chloride 0.9% 100 mL IVPB-ADDV  2 g Intravenous Q24H    ondansetron (Zofran) 4 MG/2ML injection 4 mg  4 mg Intravenous Q6H PRN    insulin degludec (Tresiba) 100 units/mL flextouch 20 Units  20 Units Subcutaneous Daily    insulin aspart (NovoLOG) 100 Units/mL FlexPen 1-5 Units  1-5 Units Subcutaneous TID AC and HS    aspirin tab 325 mg  325 mg Per NG Tube Daily    glucose (Dex4) 15 GM/59ML oral liquid 15 g  15 g Oral Q15 Min PRN    Or    glucose (Glutose) 40% oral gel 15 g  15 g Oral Q15 Min PRN    Or    glucose-vitamin C (Dex-4) chewable tab 4 tablet  4 tablet Oral Q15 Min PRN    Or    dextrose 50% injection 50 mL  50 mL Intravenous Q15 Min PRN    Or    glucose (Dex4) 15 GM/59ML oral liquid 30 g  30 g Oral Q15 Min PRN    Or    glucose (Glutose) 40% oral gel 30 g  30 g Oral Q15 Min PRN    Or    glucose-vitamin C (Dex-4) chewable tab 8 tablet  8 tablet  Oral Q15 Min PRN    acetaminophen (Tylenol Extra Strength) tab 1,000 mg  1,000 mg Oral Q6H PRN    metoclopramide (Reglan) 5 mg/mL injection 5 mg  5 mg Intravenous Q8H PRN    polyethylene glycol (PEG 3350) (Miralax) 17 g oral packet 17 g  17 g Oral Daily PRN    fleet enema (Fleet) rectal enema 133 mL  1 enema Rectal Once PRN    ezetimibe (Zetia) tab 10 mg  10 mg Oral Daily    folic acid (Folvite) tab 1,600 mcg  1,600 mcg Oral QOD    folic acid (Folvite) tab 800 mcg  800 mcg Oral QOD    dextrose 5%-sodium chloride 0.45% infusion   Intravenous Continuous PRN    thiamine 100 mg/mL injection 200 mg  200 mg Intravenous Daily    guaiFENesin (Robitussin) 100 MG/5 ML oral liquid 100 mg  100 mg Oral Q4H PRN    amiodarone (Cordarone) 450 mg in dextrose 5% 250 mL infusion  0.5 mg/min Intravenous Continuous    acetaminophen (Tylenol) tab 650 mg  650 mg Oral Q4H PRN    Or    acetaminophen (Tylenol) 160 MG/5ML oral liquid 650 mg  650 mg Oral Q4H PRN    Or    acetaminophen (Tylenol) rectal suppository 650 mg  650 mg Rectal Q4H PRN    Or    acetaminophen (Ofirmev) 10 mg/mL infusion premix 1,000 mg  1,000 mg Intravenous Q6H PRN    senna (Senokot) 8.8 MG/5ML oral syrup 17.6 mg  10 mL Oral Nightly PRN    bisacodyl (Dulcolax) 10 MG rectal suppository 10 mg  10 mg Rectal Daily PRN         Assessment:  Elevated high-sensitivity troponin - suspect supply/demand mismatch given underlying hx as noted below in the setting of COVID - however cannot r/o NSTEMI   Acute hypoxic respiratory failure-worsening, s/p bronchoscopy 9/22 AM.  Intubated. Extubated 9/24.  Wide-complex tachycardia, suspected VT noted 9/21 evening s/p unsuccessful cardioversion, responsive to amiodarone IV.  Quiet since 9/21.  COVID +/ poss superimposed PNA  Immunocompromise state  Chronic HFrEF,  LVEF previously noted 20-25%.  Has extra volume.  CAD s/p CABG w/ LIMA-LAD graft - hx of PCI RCA, Lcx 2015  S/p BiV ICD   Mild aortic stenosis   Severe pulmonary HTN    RA  Thrombocytopenia - stable  Acute on chronic normocytic anemia - stable Hb near 7.3,      Plan:  PO amio load, stop IV amio.  40 mg IVP lasix BID - he is overloaded.  Mobilize as able.    Chepe BOWER.

## 2024-09-26 LAB
ALBUMIN SERPL-MCNC: 3.6 G/DL (ref 3.2–4.8)
ALBUMIN/GLOB SERPL: 1.4 {RATIO} (ref 1–2)
ALP LIVER SERPL-CCNC: 54 U/L
ALT SERPL-CCNC: 37 U/L
ANION GAP SERPL CALC-SCNC: 7 MMOL/L (ref 0–18)
ASPERGILLUS AG BAL/SERUM: 0.09 INDEX
AST SERPL-CCNC: 110 U/L (ref ?–34)
BASOPHILS # BLD AUTO: 0.01 X10(3) UL (ref 0–0.2)
BASOPHILS NFR BLD AUTO: 0.2 %
BILIRUB SERPL-MCNC: 0.3 MG/DL (ref 0.2–1.1)
BUN BLD-MCNC: 44 MG/DL (ref 9–23)
CALCIUM BLD-MCNC: 8.3 MG/DL (ref 8.7–10.4)
CHLORIDE SERPL-SCNC: 107 MMOL/L (ref 98–112)
CO2 SERPL-SCNC: 24 MMOL/L (ref 21–32)
CREAT BLD-MCNC: 1.3 MG/DL
EGFRCR SERPLBLD CKD-EPI 2021: 57 ML/MIN/1.73M2 (ref 60–?)
EOSINOPHIL # BLD AUTO: 0.01 X10(3) UL (ref 0–0.7)
EOSINOPHIL NFR BLD AUTO: 0.2 %
ERYTHROCYTE [DISTWIDTH] IN BLOOD BY AUTOMATED COUNT: 16.5 %
GLOBULIN PLAS-MCNC: 2.5 G/DL (ref 2–3.5)
GLUCOSE BLD-MCNC: 111 MG/DL (ref 70–99)
GLUCOSE BLD-MCNC: 124 MG/DL (ref 70–99)
GLUCOSE BLD-MCNC: 131 MG/DL (ref 70–99)
GLUCOSE BLD-MCNC: 190 MG/DL (ref 70–99)
GLUCOSE BLD-MCNC: 95 MG/DL (ref 70–99)
HCT VFR BLD AUTO: 22.1 %
HGB BLD-MCNC: 7.3 G/DL
IMM GRANULOCYTES # BLD AUTO: 0.21 X10(3) UL (ref 0–1)
IMM GRANULOCYTES NFR BLD: 4.2 %
LYMPHOCYTES # BLD AUTO: 1.47 X10(3) UL (ref 1–4)
LYMPHOCYTES NFR BLD AUTO: 29.6 %
MCH RBC QN AUTO: 29.3 PG (ref 26–34)
MCHC RBC AUTO-ENTMCNC: 33 G/DL (ref 31–37)
MCV RBC AUTO: 88.8 FL
MONOCYTES # BLD AUTO: 0.62 X10(3) UL (ref 0.1–1)
MONOCYTES NFR BLD AUTO: 12.5 %
NEUTROPHILS # BLD AUTO: 2.65 X10 (3) UL (ref 1.5–7.7)
NEUTROPHILS # BLD AUTO: 2.65 X10(3) UL (ref 1.5–7.7)
NEUTROPHILS NFR BLD AUTO: 53.3 %
OSMOLALITY SERPL CALC.SUM OF ELEC: 299 MOSM/KG (ref 275–295)
PLATELET # BLD AUTO: 60 10(3)UL (ref 150–450)
PLATELET MORPHOLOGY: NORMAL
PLATELETS.RETICULATED NFR BLD AUTO: 15.6 % (ref 0–7)
POTASSIUM SERPL-SCNC: 4.8 MMOL/L (ref 3.5–5.1)
PROT SERPL-MCNC: 6.1 G/DL (ref 5.7–8.2)
RBC # BLD AUTO: 2.49 X10(6)UL
SODIUM SERPL-SCNC: 138 MMOL/L (ref 136–145)
WBC # BLD AUTO: 5 X10(3) UL (ref 4–11)

## 2024-09-26 PROCEDURE — 99232 SBSQ HOSP IP/OBS MODERATE 35: CPT | Performed by: INTERNAL MEDICINE

## 2024-09-26 PROCEDURE — 99233 SBSQ HOSP IP/OBS HIGH 50: CPT | Performed by: INTERNAL MEDICINE

## 2024-09-26 RX ORDER — IPRATROPIUM BROMIDE AND ALBUTEROL SULFATE 2.5; .5 MG/3ML; MG/3ML
3 SOLUTION RESPIRATORY (INHALATION)
Status: DISCONTINUED | OUTPATIENT
Start: 2024-09-26 | End: 2024-09-27

## 2024-09-26 RX ORDER — METOPROLOL SUCCINATE 50 MG/1
50 TABLET, EXTENDED RELEASE ORAL
Status: DISCONTINUED | OUTPATIENT
Start: 2024-09-26 | End: 2024-09-28

## 2024-09-26 RX ORDER — SPIRONOLACTONE 25 MG/1
25 TABLET ORAL DAILY
Status: DISCONTINUED | OUTPATIENT
Start: 2024-09-26 | End: 2024-09-28

## 2024-09-26 RX ORDER — BENZONATATE 200 MG/1
200 CAPSULE ORAL 3 TIMES DAILY
Status: DISCONTINUED | OUTPATIENT
Start: 2024-09-26 | End: 2024-09-28

## 2024-09-26 RX ORDER — METFORMIN HCL 500 MG
1000 TABLET, EXTENDED RELEASE 24 HR ORAL 2 TIMES DAILY WITH MEALS
Qty: 360 TABLET | Refills: 0 | Status: SHIPPED | OUTPATIENT
Start: 2024-09-26

## 2024-09-26 RX ORDER — GUAIFENESIN 600 MG/1
600 TABLET, EXTENDED RELEASE ORAL 2 TIMES DAILY
Status: DISCONTINUED | OUTPATIENT
Start: 2024-09-26 | End: 2024-09-28

## 2024-09-26 RX ORDER — CODEINE PHOSPHATE AND GUAIFENESIN 10; 100 MG/5ML; MG/5ML
10 SOLUTION ORAL EVERY 4 HOURS PRN
Status: DISCONTINUED | OUTPATIENT
Start: 2024-09-26 | End: 2024-09-28

## 2024-09-26 RX ORDER — SPIRONOLACTONE 25 MG/1
25 TABLET ORAL
Qty: 90 TABLET | Refills: 0 | Status: SHIPPED | OUTPATIENT
Start: 2024-09-26

## 2024-09-26 RX ORDER — FLUTICASONE PROPIONATE AND SALMETEROL 250; 50 UG/1; UG/1
1 POWDER RESPIRATORY (INHALATION) 2 TIMES DAILY
Status: DISCONTINUED | OUTPATIENT
Start: 2024-09-26 | End: 2024-09-28

## 2024-09-26 NOTE — DISCHARGE INSTRUCTIONS
2 Gram Sodium Diet    GENERAL INFORMATION:    What is a 2 gram sodium diet? A 2 gram sodium diet limits high sodium foods in your diet. No table salt is allowed at meals or during cooking while you are on this diet. The amount of milk is also limited because of the amount of sodium it contains. A high amount of sodium in your diet can make your blood pressure go up and can cause other health problems. The goal of a 2 gram sodium diet is to prevent or lower high blood pressure. This diet can also keep your body from holding extra fluid. Problems with your liver and kidneys are another reason to follow this diet.     What can I do to make a 2 gram sodium part of my lifestyle? Changing what you eat and drink may be hard at first. Think of these changes as \"lifestyle\" changes, not just \"diet\" changes. You may need to make these changes part of your daily routine. Following a 2 gram sodium diet may help to improve your health. Two grams of sodium is the same as 2000 milligrams (mg) of sodium.     Keep a list of items allowed on this diet in your kitchen to remind you about the diet. Take this food list with you to the grocery store to help you choose foods that are low in sodium.       Carry a list of items allowed on this diet to remind you about the diet when you are away from home. Tell your family or friends about this diet so that they can remind you about it.       Ask your caregiver, a dietitian ([id number]), or a nutritionist (noo-TRI-shun-ist) any questions you may have about your diet plan. A dietitian or nutritionist can work with you to find the right diet plan for you.     How can I use food labels to choose foods that are low in sodium?Reading food labels is a good way to learn how much sodium is in foods. Ask your caregiver for more information about how to read food labels. Food labels list the amount of sodium in the food in milligrams. Avoid foods that contain more than 500 mg of sodium in one  serving. Buy low-sodium substitutes for the foods you enjoy. Following are some words about sodium that may appear on a label.    Sodium-free: Less than five mg in each serving.      Very low sodium: 35 mg of sodium or less in each serving.      Low sodium: 140 mg of sodium or less in each serving.      Reduced sodium: At least 25 percent less sodium in each serving. For example, if the food usually has 800 mg of sodium, the same food made with reduced sodium would contain 600 mg of sodium.      Light in sodium: Fifty percent less sodium in each serving. For example, if the food usually has 500 mg of sodium in each serving, the same food prepared \"light in sodium\" would have 250 mg of sodium.      Unsalted, No added salt, and Without added salt: No salt is added during processing.      Lightly salted: Fifty percent less sodium has been added to the food than would normally be added. For example, if 1000 mg of sodium were normally added, only 500 mg would be added to a food that is \"lightly salted\".     What should I avoid eating and drinking while on a 2 gram sodium diet?Breads, cereals, rice and pasta:    Breads, rolls and crackers with salted tops.      Quick breads.      Instant hot cereals.       Pancakes.      Commercial (store-bought) bread stuffing, self-rising flour and biscuit mixes.       Commercial breadcrumbs, cracker crumbs, or bread stuffing.      Rice or pasta mixes.    Fruits and vegetables:  Fruits processed with salt or sodium-containing ingredients, such as some dried fruits.      Regular canned vegetables.       Sauerkraut, pickled vegetables, and others prepared in brine.      Frozen vegetables in sauces.      Vegetables seasoned with ham, granados, or salt pork.      Commercially prepared potato mixes.    Meats and meat products:  Smoked, cured, salted, koshered, or canned meat, fish or poultry, including granados, chipped beef, cold cuts, ham, hot dogs, sausage, sardines, anchovies, crab, lobster,  imitation seafood, marinated herring, and pickled meats and frozen breaded meats.      Pickled eggs.      Processed cheese, cheese spreads, and sauces.      Salted nuts.    Beverages and soups:   Malted milk, milkshake and chocolate milk.      Regular vegetable or tomato juice.      Commercially softened water (for drinking or cooking).       Regular canned or dehydrated soups.      Broths and bouillon.    Fats, desserts and sweets:  Regular salad dressings containing granados fat, granados bits, and salt pork.      Snack dips made with instant soup mixes or processed cheese.      Instant pudding mixes and cake mixes.    Seasonings, sauces, mixes, and other foods:  Seasoning made with salt including garlic salt, celery salt, onion salt, and seasoned salt.      Sea salt, rock salt, kosher salt, meat tenderizers and monosodium glutamate (MSG).       Regular soy sauce, barbecue sauce, teriyaki sauce, steak sauce, Worcestershire sauce, and most flavored vinegars.      Canned gravy and mixes, regular condiments and salted snack foods.      Olives.    What can I eat and drink while on a 2 gram sodium diet?    On a 2 gram sodium diet, you may eat enriched white, wheat, rye, and pumpernickel bread, hard rolls, and dinner rolls. You may also include muffins, cornbread, waffles, and most dry cereals and cooked cereal without added salt. Unsalted crackers and breadsticks, and low-sodium or homemade bread crumbs can also be included.      Most fresh, canned, and frozen fruits and vegetables can be eaten. Be sure to choose low-sodium canned vegetables. Fresh or frozen beef, lamb, pork, poultry, fish and shrimp can be eaten, as well as canned tuna or salmon that has been rinsed. Eggs and egg substitutes, and low-sodium cheese including low-sodium ricotta cream cheese, and low-sodium cottage cheese are good choices. Choose yogurt, low-sodium peanut butter, and dried peas and beans (legumes). Low-sodium frozen dinners (with less than 500  mg in a serving) may also be eaten.      You may drink milk, but limit it to 16 ounces (two cups) daily. Buttermilk may be drank, but limit the amount to 8 ounces (one cup) each week. Eggnog may also be enjoyed. All fruit juices, low-sodium, and salt-free vegetable juices are okay to drink, as are low-sodium carbonated sodas and other drinks. Soups that are okay to eat include low-sodium canned and dried soups, broths, and bouillon. Homemade broth and soups made without added salt and made with allowed vegetables are okay to eat. Cream soups made with the allowed amount of milk are also good choices.      Fats such as butter or margarine, vegetable oils, unsalted salad dressings, or one tablespoon or less of regular salad dressings may be included in this diet. Light, sour, and heavy cream may also be included. Desserts and sweets made with milk should be counted as servings of milk.       Pepper, herbs, spices, and vinegar may be used, as well as hot pepper sauce. One teaspoon of low-sodium soy sauce may be used each day, as well as two tablespoons of salsa or less. Use low-sodium condiments such as catsup, chili sauce, and mustard. Fresh horseradish, lemon or limejuice can be used to add flavor to meals. Snacks may include unsalted tortilla chips, pretzels, potato chips, and popcorn.    What other guidelines should I follow while on a 2 gram sodium diet?     Avoid using salt and ingredients such as baking soda and soy sauce that are high in sodium. Be careful to avoid these during food preparation, and at the table. Meals eaten at restaurants, especially fast food restaurants are often high in sodium. Some restaurants have nutrition information that tells you the amount of sodium in their foods. When ordering food at a restaurant, ask your  to prepare your food with less, or no salt.      Talk with your caregiver about using salt substitutes. Some salt substitutes have ingredients that may change the way  some of your medicines work. Check with your caregiver or pharmacist about products that may contain sodium. Some of these products include antacids, medicine, toothpaste and chewing tobacco.    Risks    Eating more than two grams of sodium in a day may cause your body to hold extra fluid, causing you to gain weight. It may make your blood pressure higher. Eating more than two grams of sodium in a day may change the way your medicines work.       Very rarely, a two gram sodium diet may cause the amount of sodium in your blood to be too low. Low amounts of sodium in your blood may cause nausea, confusion, and make you less alert. Call your caregiver right away if you have any of these symptoms.    CARE AGREEMENT:    You have the right to help plan your care. To help with this plan, you must learn about your diet. You can then discuss treatment options with your caregivers. Work with them to decide what care may be used to treat you. You always have the right to refuse treatment.        © 2916-4220  [employer]. All rights reserved.

## 2024-09-26 NOTE — PHYSICAL THERAPY NOTE
PHYSICAL THERAPY TREATMENT NOTE - INPATIENT    Room Number: 468/468-A     Session: 2  Number of Visits to Meet Established Goals: 5    Presenting Problem: COVID-19  Co-Morbidities : severe pulmonary HTN, chronic HFrEF, CAD s/p PCI and CABG, RA, thrombocytopenia, ICD    ASSESSMENT   Patient demonstrates fair progress this session as pt exhibits improved alertness and participation, yet overall activity tolerance remains significantly impaired, goals remain in progress.    Patient continues to function below baseline with bed mobility and maintaining seated position.  Pt is unable to complete standing transfers, ambulation or stairs at this time. Contributing factors to remaining limitations include decreased functional strength, decreased endurance/aerobic capacity, impaired sitting balance, decreased muscular endurance, cognitive deficits (delayed processing), medical status, and increased O2 needs from baseline.  Next session anticipate patient to progress bed mobility and maintaining seated position.  Physical Therapy will continue to follow patient for duration of hospitalization.    Patient continues to benefit from continued skilled PT services: to promote return to prior level of function and safety with continuous assistance and gradual rehabilitative therapy .    PLAN  PT Treatment Plan: Bed mobility;Patient education;Family education;Gait training;Strengthening;Stair training;Transfer training;Balance training  Rehab Potential : Good  Frequency (Obs): 3-5x/week    CURRENT GOALS     Goal #1 Patient is able to demonstrate supine - sit EOB @ level: minimum assistance      Goal #2 Patient is able to demonstrate transfers Sit to/from Stand at assistance level: maximum assistance      Goal #3 Patient is able to ambulate 15 feet with assist device: walker - rolling at assistance level: maximum assistance      Goal #4 The pt is able to demonstrate static unsupported sit at EOB x5 minutes.   Goal #5     Goal #6      Goal Comments: Goals established on 2024 all goals ongoing    SUBJECTIVE  \"That is my lifeline \" referring to suction    OBJECTIVE  Precautions: Bed/chair alarm    WEIGHT BEARING RESTRICTION  Weight Bearing Restriction: None                PAIN ASSESSMENT   Ratin          BALANCE                                                                                                                       Static Sitting: Fair -  Dynamic Sitting: Poor +           Static Standing: Not tested  Dynamic Standing: Not tested    ACTIVITY TOLERANCE  Room Air  SPO2=90-93% at rest  SPO2=88-90% with activity    AM-PAC '6-Clicks' INPATIENT SHORT FORM - BASIC MOBILITY  How much difficulty does the patient currently have...  Patient Difficulty: Turning over in bed (including adjusting bedclothes, sheets and blankets)?: A Lot   Patient Difficulty: Sitting down on and standing up from a chair with arms (e.g., wheelchair, bedside commode, etc.): Unable   Patient Difficulty: Moving from lying on back to sitting on the side of the bed?: A Lot   How much help from another person does the patient currently need...   Help from Another: Moving to and from a bed to a chair (including a wheelchair)?: Total   Help from Another: Need to walk in hospital room?: Total   Help from Another: Climbing 3-5 steps with a railing?: Total       AM-PAC Score:  Raw Score: 8   Approx Degree of Impairment: 86.62%   Standardized Score (AM-PAC Scale): 28.58   CMS Modifier (G-Code): CM    FUNCTIONAL ABILITY STATUS  Gait Assessment   Functional Mobility/Gait Assessment  Gait Assistance: Not tested    Skilled Therapy Provided  RN consulted prior to session, pt presents in semi sup  Pt self suctions secretions when coughing  Therapist cued pt for AROM therex BLE . RN arrived in room and requesting pt to t/f to BS chair via bobby.  Pt was cued for use of bed rail, rolling, momentum, body mechanics , trunk control and thoracic and cervical spine ext  .  Pt cued for seated therex BLE BUE  Pt left in chair, needs met, alarm set.     Bed Mobility:  Rolling: NT   Supine<>Sit: NT   Sit<>Supine: NT     Transfer Mobility:  Sit<>Stand: NT   Stand<>Sit: NT   Gait: NT    Therapist's Comments: VS monitored and stable, pt on RA c O2 sats WNL     THERAPEUTIC EXERCISES  Lower Extremity Ankle pumps  Heel slides  LAQ  Leg slides     Upper Extremity Elbow flex/ext  Shoulder flex /ext L shoulder limited to ~50degrees   Anterior forward trunk pulls for BUE and core strengthening      Position Sitting at EOB     Repetitions   5-10   Sets   x1     Patient End of Session: Up in chair;Needs met;Call light within reach;RN aware of session/findings;All patient questions and concerns addressed;Alarm set    PT Session Time: 25 minutes  Therapeutic Activity: 12 minutes  Therapeutic Exercise: 13 minutes

## 2024-09-26 NOTE — TELEPHONE ENCOUNTER
Hypertension Medications Protocol [id number]/26/2024 05:56 AM   Protocol Details CMP or BMP in past 12 months    Last BP reading less than 140/90    In person appointment or virtual visit in the past 12 mos or appointment in next 3 mos    EGFRCR or GFRNAA > 50          Diabetes Medication Protocol [id number]/26/2024 05:56 AM   Protocol Details Last A1C < 7.5 and within past 6 months    In person appointment or virtual visit in the past 6 mos or appointment in next 3 mos    Microalbumin procedure in past 12 months or taking ACE/ARB    EGFRCR or GFRNAA > 50    GFR in the past 12 months          LOV 8/28/24     Last Refill   metFORMIN  MG Oral Tablet 24 Hr 360 tablet 0 6/24/2024     SPIRONOLACTONE 25 MG Oral Tab 90 tablet 0 6/22/2024     Labs 09/26/24     Last BP  BP 90/48             Future Appointments   Date Time Provider Department Center   10/3/2024 11:15 AM Thea Harris DO [facility]   3/10/2025 10:00 AM Ivana Mitchell, OLE, [facility]

## 2024-09-26 NOTE — CONSULTS
[facility]  [facility]  Report of Consultation    David Reyes Patient Status:  Inpatient    1945 MRN [id number]   Location [facility] 4SW-A Attending Estephanie Moreira DO   Hosp Day # 4 PCP Thea Harris,      Consult to Cardiac Electrophysiology  Consult performed by: Cammy Ramirez APRN  Consult ordered by: Cammy Ramirez APRN  Reason for consult: Wide complex tachycardia          Reason for Consultation:  Device adjustment and review of Wide Complex Tachycardia    History of Present Illness:  David Reyes is a a(n) 79 year old male with past medical history of HFrEF, ICM with EF 25%,  CAD s/p CABG, pulmonary hypertension, and [employer] CRT-D upgrade  in hopes of improving fatigue and EF/ HF symptoms (which it did not). He has NO history of Afib/flutter, but did have episode of SVT 2023 that fell into VF Zone and received successful ATP x 1.     He presented  with SOB and elevated troponin to 9000. Given chronic thrombocytopenia an angiogram was not recommended.   That night had woresning respiratory distress, and onset of a wide QRS rhythm, rate ~ 150bpm.   He then became hypotensive and Cardioversion was attempted, but unsuccessful.  He was intubated and Amiodarone drip started.   He was extubated  and has had few short runs of NSVT since.     He denies chest pain, but is still sob and coughing.       History:  Past Medical History:    Anxiety state    Back problem    CAD (coronary artery disease)    Congestive heart disease (HCC)    Congestive heart failure (HCC)    CORONARY ARTERY DISEASE    cabg    Coronary atherosclerosis    Diabetes (HCC)    DM (diabetes mellitus) (HCC)    Heart attack (HCC)    High blood pressure    High cholesterol    Other and unspecified hyperlipidemia    Rheumatoid arthritis (HCC)    Sleep apnea    Subdural hematoma (HCC)    Unspecified sleep apnea    Visual impairment     Past  Surgical History:   Procedure Laterality Date    Angiogram  2/11/2015    Angioplasty (coronary)  2/23/15    RCA    Bypass surgery      2007    Cabg  2004    LAD, Diagonal    Cardiac defibrillator placement  6/7/14    [employer] dual chamber ICD    Cath drug eluting stent      Tonsillectomy       Family History   Problem Relation Age of Onset    Cancer Father     Heart Disease Mother     Other (alzheimers) Mother     Stroke Neg       reports that he quit smoking about 38 years ago. His smoking use included cigarettes. He started smoking about 64 years ago. He has a 26 pack-year smoking history. He has never used smokeless tobacco. He reports current alcohol use of about 2.0 standard drinks of alcohol per week. He reports that he does not use drugs.    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       Medications:    Current Facility-Administered Medications:     fluticasone-salmeterol (Advair Diskus) 250-50 MCG/ACT inhaler 1 puff, 1 puff, Inhalation, BID    guaiFENesin ER (Mucinex) 12 hr tab 600 mg, 600 mg, Oral, BID    ipratropium-albuterol (Duoneb) 0.5-2.5 (3) MG/3ML inhalation solution 3 mL, 3 mL, Nebulization, Q4H WA (5 times daily)    guaiFENesin-codeine (Robitussin AC) 100-10 MG/5ML oral solution 10 mL, 10 mL, Oral, Q4H PRN    benzonatate (Tessalon) cap 200 mg, 200 mg, Oral, TID    metoprolol succinate ER (Toprol XL) 24 hr tab 50 mg, 50 mg, Oral, Daily Beta Blocker    spironolactone (Aldactone) tab 25 mg, 25 mg, Oral, Daily    amiodarone (Pacerone) tab 400 mg, 400 mg, Oral, BID with meals    albuterol (Ventolin HFA) 108 (90 Base) MCG/ACT inhaler 2 puff, 2 puff, Inhalation, Q4H PRN    insulin aspart (NovoLOG) 100 Units/mL FlexPen 1-20 Units, 1-20 Units, Subcutaneous, TID CC and HS    insulin aspart (NovoLOG) 100 Units/mL FlexPen 1-68 Units, 1-68 Units,  Subcutaneous, TID CC and HS    furosemide (Lasix) 10 mg/mL injection 40 mg, 40 mg, Intravenous, BID (Diuretic)    enoxaparin (Lovenox) 40 MG/0.4ML SUBQ injection 40 mg, 40 mg, Subcutaneous, Daily    cefTRIAXone (Rocephin) 2 g in sodium chloride 0.9% 100 mL IVPB-ADDV, 2 g, Intravenous, Q24H    ondansetron (Zofran) 4 MG/2ML injection 4 mg, 4 mg, Intravenous, Q6H PRN    insulin degludec (Tresiba) 100 units/mL flextouch 20 Units, 20 Units, Subcutaneous, Daily    aspirin tab 325 mg, 325 mg, Per NG Tube, Daily    glucose (Dex4) 15 GM/59ML oral liquid 15 g, 15 g, Oral, Q15 Min PRN **OR** glucose (Glutose) 40% oral gel 15 g, 15 g, Oral, Q15 Min PRN **OR** glucose-vitamin C (Dex-4) chewable tab 4 tablet, 4 tablet, Oral, Q15 Min PRN **OR** dextrose 50% injection 50 mL, 50 mL, Intravenous, Q15 Min PRN **OR** glucose (Dex4) 15 GM/59ML oral liquid 30 g, 30 g, Oral, Q15 Min PRN **OR** glucose (Glutose) 40% oral gel 30 g, 30 g, Oral, Q15 Min PRN **OR** glucose-vitamin C (Dex-4) chewable tab 8 tablet, 8 tablet, Oral, Q15 Min PRN    acetaminophen (Tylenol Extra Strength) tab 1,000 mg, 1,000 mg, Oral, Q6H PRN    metoclopramide (Reglan) 5 mg/mL injection 5 mg, 5 mg, Intravenous, Q8H PRN    polyethylene glycol (PEG 3350) (Miralax) 17 g oral packet 17 g, 17 g, Oral, Daily PRN    fleet enema (Fleet) rectal enema 133 mL, 1 enema, Rectal, Once PRN    ezetimibe (Zetia) tab 10 mg, 10 mg, Oral, Daily    folic acid (Folvite) tab 1,600 mcg, 1,600 mcg, Oral, QOD    folic acid (Folvite) tab 800 mcg, 800 mcg, Oral, QOD    dextrose 5%-sodium chloride 0.45% infusion, , Intravenous, Continuous PRN    acetaminophen (Tylenol) tab 650 mg, 650 mg, Oral, Q4H PRN **OR** acetaminophen (Tylenol) 160 MG/5ML oral liquid 650 mg, 650 mg, Oral, Q4H PRN **OR** acetaminophen (Tylenol) rectal suppository 650 mg, 650 mg, Rectal, Q4H PRN **OR** [DISCONTINUED] acetaminophen (Ofirmev) 10 mg/mL infusion premix 1,000 mg, 1,000 mg, Intravenous, Q6H PRN    senna (Senokot)  8.8 MG/5ML oral syrup 17.6 mg, 10 mL, Oral, Nightly PRN    bisacodyl (Dulcolax) 10 MG rectal suppository 10 mg, 10 mg, Rectal, Daily PRN    Review of Systems:    Respiratory:  Positive for cough and shortness of breath.    Cardiovascular:  Negative for chest pain and leg swelling.   All other systems reviewed and are negative.      OBJECTIVE  Blood pressure 129/66, pulse 80, temperature 97.9 °F (36.6 °C), temperature source Temporal, resp. rate 17, height 5' 9\" (1.753 m), weight 247 lb 9.2 oz (112.3 kg), SpO2 93%.  Temp (24hrs), Av.9 °F (36.6 °C), Min:97.5 °F (36.4 °C), Max:98.7 °F (37.1 °C)    Wt Readings from Last 3 Encounters:   24 247 lb 9.2 oz (112.3 kg)   24 241 lb (109.3 kg)   24 235 lb (106.6 kg)       Telemetry: [facility]       Physical Exam:  Physical Exam  Constitutional:       General: He is not in acute distress.  HENT:      Head: Normocephalic.      Mouth/Throat:      Mouth: Mucous membranes are moist.   Eyes:      Conjunctiva/sclera: Conjunctivae normal.   Cardiovascular:      Rate and Rhythm: Normal rate and regular rhythm.   Pulmonary:      Effort: Pulmonary effort is normal.      Breath sounds: Wheezing and rhonchi present.   Abdominal:      Palpations: Abdomen is soft.   Musculoskeletal:      Right lower leg: No edema.      Left lower leg: No edema.   Skin:     General: Skin is warm.   Neurological:      Mental Status: He is alert and oriented to person, place, and time.            Diagnostics History:        Results:   Recent Labs   Lab 24  0403 24  04324  0422   * 176* 124*   BUN 33* 43* 44*   CREATSERUM 1.33* 1.38* 1.30   EGFRCR 55* 53* 57*   CA 7.9* 8.2* 8.3*    138 138   K 4.3 4.0 4.8    108 107   CO2 20.0* 22.0 24.0     Recent Labs   Lab 24  0403 24  0431 24  0422   RBC 2.45* 2.43* 2.49*   HGB 7.3* 7.3* 7.3*   HCT 22.8* 22.0* 22.1*   MCV 93.1 90.5 88.8   MCH 29.8 30.0 29.3   MCHC 32.0 33.2 33.0   RDW 16.6 16.6 16.5    NEPRELIM 5.17 1.91 2.65   WBC 7.8 3.5* 5.0   PLT 75.0* 66.0* 60.0*           No results for input(s): \"BNP\" in the last 168 hours.  Lab Results   Component Value Date    PT 17.1 (H) 04/27/2014    PT 16.7 (H) 04/26/2014    INR 1.16 09/20/2024    INR 1.43 (H) 04/27/2014    INR 1.39 (H) 04/26/2014     Lab Results   Component Value Date    TROP <0.046 04/26/2014         No results found.       Assessment and Plan    Wide QRS Tachycardia-   BS BIV ICD  HFrEF  H/o SVT    Plan:     Continue amio oral loading, difficult to tell if true VT vs SVT with underlying bundle.  Will checkekg with pacing inhibied to help delinae.   Will have rep set up VT monitoring zone at 150bpm  Continue amiodarone for now       Cammy Ramirez, APRN  9/26/2024  2:27 PM    79M with h/o HFrEF s/p CRT-D, CAD s/p CABG and PCI, SVT, RA p/w COVID PNA and WCT. Cardioversion attempted in ED without conversion. Now on amio with conversion to dual paced rhythm.     WCT  - Device interrogated and EKG's reviewed; WCT is SVT/AT with aberrancy with pt's known IVCD. Pt did not have VT.  - Pt having PVC's and short runs of NSVT as well  - Continue amiodarone (transition to PO) for suppression of atrial arrhythmias and PVC's  - VT monitor zone decreased to 150bpm  - Anticipate improvement in arrhythmia burden as PNA improves.     Aram Gilmore MD  Electrophysiology  [facility]

## 2024-09-26 NOTE — PLAN OF CARE
Received pt alert and oriented x4, FERRARA weakly, used lift to place in bed after sitting in chair for the evening. HS care provided for. Refused cpap at night both RT and I attempted to place. Cough present, able to use own suction to self suction. Scant to none secretions, at times he makes himself gag by sticking the catheter down. Prn Zofran/robitussin given.  Remains v paced. Bp stable, tolerating po, primofit changed. Updated wife on POC and pt having issues charging his iphone.

## 2024-09-26 NOTE — PROGRESS NOTES
[facility]   part of [facility] ID PROGRESS NOTE    David Reyes Patient Status:  Inpatient    1945 MRN [id number]   Location [facility] 4SW-A Attending Kj Gonzalez DO   Hosp Day # 4 PCP Thea Harris DO     Antimicrobials: IV CTX (restarted -)  S/p cefepime (-), Flagyl -), levofloxacin , CTX , doxycycline , vanco .    S/p RDV x 3  S/p (- )    Subjective: Patient seen and examined today. Reports cough is about the same, self suctioning. Denies any abdominal pain. Some intermittent nausea, denies any vomiting. Afebrile. On room air.     Objective:    /62 (BP Location: Right arm)   Pulse 89   Temp 98 °F (36.7 °C) (Temporal)   Resp 16   Ht 175.3 cm (5' 9\")   Wt 247 lb 9.2 oz (112.3 kg)   SpO2 94%   BMI 36.56 kg/m²     Gen:   NAD, Alert, answering questions appropriately. On room air.   HEENT:  Moist mucous membranes  CV/lungs:  RRR, Some rhonchi and expiratory wheeze noted bilaterally.   Abdom:  Soft, NT. Less distended.  Skin/extrem:  No rashes.     Labs:   Recent Labs   Lab 24  0543 24  0547 24  0403 24  0431 24  0422   WBC 9.2 10.0 7.8 3.5* 5.0   HGB 8.1* 7.8* 7.3* 7.3* 7.3*   PLT 64.0* 68.0* 75.0* 66.0* 60.0*       Recent Labs   Lab 24  2314 24  0547 24  0403 24  0431 24  0422   * 133* 137 138 138   K 5.0 4.3 4.3 4.0 4.8    105 109 108 107   CO2 15.0* 18.0* 20.0* 22.0 24.0   BUN 33* 35* 33* 43* 44*   CREATSERUM 1.66* 1.66* 1.33* 1.38* 1.30       Recent Labs   Lab 24  0543 24  0547 24  0403 24  0431 24  0422   ALT 29 26 25 25 37   AST 66* 62* 58* 50* 110*     Problem list:    Patient Active Problem List   Diagnosis    Nonischemic cardiomyopathy (HCC)    FLAKO (obstructive sleep apnea)    Congestive heart failure (HCC)    CAD (coronary artery disease)    Rheumatoid arthritis involving multiple sites with positive  rheumatoid factor (HCC)    Type 2 diabetes mellitus with hyperglycemia, with long-term current use of insulin (HCC)    Normocytic anemia    Thrombocytopenia (HCC)    Ischemic cardiomyopathy    Dyspnea on exertion    Dry mouth    Pure hypercholesterolemia    Vitamin D deficiency    Pulmonary hypertension secondary to increased PVR (HCC)    Carotid artery stenosis    Morbid (severe) obesity due to excess calories (HCC)    Chronic obstructive pulmonary disease, unspecified (HCC)    CUMMINGS (dyspnea on exertion)    Weakness generalized    Aortic stenosis, mild    Hyponatremia    Hyperglycemia    Anemia    Acute kidney injury (HCC)    Metabolic acidosis    COVID-19    Acute bronchospasm due to viral infection    Bronchospasm    Pneumonia    Acute respiratory failure with hypoxia (HCC)    Immunocompromised state (HCC)    Septic shock (HCC)    Wide-complex tachycardia    Elevated troponin    Acute on chronic anemia    Community acquired pneumonia    Type 2 diabetes mellitus without complication, with long-term current use of insulin ([facility])       ASSESSMENT:  Multifocal pneumonia - assume covid pneumonia with possible superimposed bacterial PNA.  Presented to SOB/cough x 1 week, increasing phlegm with vomiting prior to admission per chart review. Covid- 19 + 9/21. No previous vaccinations recorded. Completed 3 day course of RDV.  Legionella and strep pneumo Uags negative. MRSA of the nares negative. Sputum culture negative.   Had fevers (resolved now) but no leukocytosis  S/p bronchoscopy with BAL 9/27, cx ngtd  Acute hypoxic respiratory failure, d/t above, also ? possible component of pulm edema. Resolved.   Shock, ? septic shock related to #1 vs other (possibly cardiogenic). Bcxs negative. Resolved, off pressors.   Wide complex tachycardia with possible episodes of VT; elevated troponin. Cards following.  BiV ICD in place  CHF (EF 20-25%), CAD  DM II  RA on methotrexate (on hold currently)  Thrombocytopenia  Penicillin  allergy. Tolerates cephalosporins.    PLAN:  - continue IV CTX for now  - follow respiratory sympotoms  - follow pending studies/cultures from bronchoscopy  - follow temps and wbc    Discussed case with RN and patient.    SANTA Hall   [facility]  [phone]

## 2024-09-26 NOTE — PROGRESS NOTES
[facility] Cardiology Progress Note    David Reyes Patient Status:  Inpatient    1945 MRN [id number]   Location [facility] 4SW-A Attending Estephanie Moreira DO   Hosp Day # 4 PCP Thea Harris DO     Subjective:  No acute events overnight  Flat affect overall, but states \"I'm here\"  Still with frequent cough  Feels \"hands are swollen\"    Objective:  /62 (BP Location: Right arm)   Pulse 89   Temp 98 °F (36.7 °C) (Temporal)   Resp 16   Ht 5' 9\" (1.753 m)   Wt 247 lb 9.2 oz (112.3 kg)   SpO2 94%   BMI 36.56 kg/m²     Telemetry: SR vpace, 4bt nsvt      Intake/Output:    Intake/Output Summary (Last 24 hours) at 2024 1029  Last data filed at 2024 0900  Gross per 24 hour   Intake 420 ml   Output 4100 ml   Net -3680 ml       Last 3 Weights   24 0000 247 lb 9.2 oz (112.3 kg)   24 0400 249 lb 9 oz (113.2 kg)   24 0600 255 lb 8.2 oz (115.9 kg)   24 0600 252 lb 6.8 oz (114.5 kg)   24 0400 243 lb 6.2 oz (110.4 kg)   24 0420 237 lb 10.5 oz (107.8 kg)   24 2144 240 lb (108.9 kg)   24 1316 241 lb (109.3 kg)   24 1001 235 lb (106.6 kg)       Labs:  Recent Labs   Lab 24  0403 24  0431 24  0422   * 176* 124*   BUN 33* 43* 44*   CREATSERUM 1.33* 1.38* 1.30   EGFRCR 55* 53* 57*   CA 7.9* 8.2* 8.3*    138 138   K 4.3 4.0 4.8    108 107   CO2 20.0* 22.0 24.0     Recent Labs   Lab 24  0403 24  0431 24  0422   RBC 2.45* 2.43* 2.49*   HGB 7.3* 7.3* 7.3*   HCT 22.8* 22.0* 22.1*   MCV 93.1 90.5 88.8   MCH 29.8 30.0 29.3   MCHC 32.0 33.2 33.0   RDW 16.6 16.6 16.5   NEPRELIM 5.17 1.91 2.65   WBC 7.8 3.5* 5.0   PLT 75.0* 66.0* 60.0*         Recent Labs   Lab 24  1914 24  2359 24  0543   TROPHS 6,036* 9,055* 7,752*       Diagnostics:             Physical Exam:    Physical Exam  Cardiovascular:      Rate and Rhythm: Normal rate and regular rhythm.   Pulmonary:       Effort: Pulmonary effort is normal.      Breath sounds: Wheezing and rhonchi present.   Abdominal:      Palpations: Abdomen is soft.   Musculoskeletal:      Right lower leg: No edema.      Left lower leg: No edema.   Neurological:      Mental Status: He is alert and oriented to person, place, and time.         Medications:   amiodarone  400 mg Oral BID with meals    sodium ferric gluconate  125 mg Intravenous Daily    insulin aspart  1-20 Units Subcutaneous TID CC and HS    insulin aspart  1-68 Units Subcutaneous TID CC and HS    furosemide  40 mg Intravenous BID (Diuretic)    enoxaparin  40 mg Subcutaneous Daily    cefTRIAXone  2 g Intravenous Q24H    insulin degludec  20 Units Subcutaneous Daily    aspirin  325 mg Per NG Tube Daily    ezetimibe  10 mg Oral Daily    folic acid  1,600 mcg Oral QOD    folic acid  800 mcg Oral QOD      dextrose 5%-sodium chloride 0.45% Stopped (09/21/24 8551)       Assessment:  80yo presented with several weeks of SOB,     Type II MI vs NSTEMI  Echo unchanged from 5/14/24m but trop up to 9K with likely VT  Poor candidate for PCI given chronic thrombocytopenia  Continue medical mgmt with aspirin alone given  tcp  COVID + with superimposed PNA  Extubated and on room air, still cough/wheeze  Acute on Chronic HFrEF  EF 20%  Down 3.4L yesterday,  still ~ 7 lbs above dry weight   HF protocol  WCT  Suspected Ventricular Tachycardia 9/21, s/p unsuccessful cardioversion   Rare triplet, 4bt nsvt  Continue amio oral loading   BS BIV ICD in place   Set DDDR , mode switch 170bpm, VT detection 180bpm, VF 200bpm  CAD  S/p CABG, h/o PCI-RCA. Lcx 2015  Severe PHTN  Mild Aortic Stenosis  H/o SVT 6/2023  Rheumatoid Arthritis- immunocompromised   Thrombocytopenia  Acute on Chronic normocytic anemia     Plan:    Resume metoprolol xl and aldactone (with diuresis) today  Look to introduce entersto in am, was on Losartan prior to admission  Continue IV diuresis and amio loading  EKG in am  Will review  device settings, and possibly lower VT threshold  Ok to transfer out of icu    Cammy Ramirez APRVA  9/26/2024  10:29 AM  Ph [phone] ([place])  Ph [phone] ([place])        Patient seen and examined independently.  Note reviewed and labs reviewed. Agree with above assessment and plan.  79-year-old male with history of chronic HFrEF, LVEF 20-25% with underlying ischemic cardiomyopathy s/p CABG/history PCI and s/p BiV ICD.  Presented with worsening respiratory status suspected 2/2 COVID and was noted to have significantly elevated high-sensitivity troponin which was suspected to be due to supply/demand mismatch.  However, cannot rule out NSTEMI.  However, given chronic thrombocytopenia, patient has previously not tolerated antiplatelet therapy.  Therefore, medical management was elected.  Course C/B wide-complex tachycardia, suspected VT s/p unsuccessful DCCV but responsive to IV amiodarone.  He has been started on p.o. amiodarone.  Clinically, improving from respiratory standpoint.  Continue to diurese gently from our standpoint.  Will request EP evaluation regarding BiV ICD device settings/optimization.  Will continue follow.        Emerson Gonzalez DO  Cardiologist  [facility]  9/26/2024 12:26 PM      Note to the patient: The 21st Century Cures Act makes medical notes like these available to patients in the interest of transparency. However, be advised that this is a medical document. It is intended as peer to peer communication. It is written in medical language and may contain abbreviations or verbiage that are unfamiliar. It may appear blunt or direct. Medical documents are intended to carry relevant information, facts as evident, and clinical opinion of the practitioner.     Disclaimer: Components of this note were documented using voice recognition system and are subject to errors not corrected at proofreading. Contact the author of this note for any clarifications.

## 2024-09-26 NOTE — PROGRESS NOTES
[facility]   part of [facility]     Hospitalist Progress Note     Dvaid Reyes Patient Status:  Inpatient    1945 MRN [id number]   Location [facility] 4SW-A Attending Kj Gonzalez DO   Hosp Day # 4 PCP Thea Harris DO     Chief Complaint: Dyspnea    Subjective:     Resting in bed, intermittently self-suctioning; overall, feeling better, breathing is improving     Objective:    Review of Systems:   A comprehensive review of systems was completed; pertinent positive and negatives stated in subjective.    Vital signs:  Temp:  [97.5 °F (36.4 °C)-98.7 °F (37.1 °C)] 97.9 °F (36.6 °C)  Pulse:  [74-95] 85  Resp:  [13-22] 20  BP: (106-129)/(54-66) 129/66  SpO2:  [91 %-97 %] 94 %    Physical Exam:    General: No acute distress  Respiratory: occasional expiratory wheezes, coarse throughout   Cardiovascular: S1, S2, regular rate and rhythm  Abdomen: Soft, Non-tender, non-distended, positive bowel sounds  Neuro: No new focal deficits.  Extremities: No edema    Diagnostic Data:    Labs:  Recent Labs   Lab 24  2155 24  0634 24  0543 24  0547 24  0403 24  0431 24  0422   WBC 6.0   < > 9.2 10.0 7.8 3.5* 5.0   HGB 9.2*   < > 8.1* 7.8* 7.3* 7.3* 7.3*   MCV 94.2   < > 91.9 95.5 93.1 90.5 88.8   PLT 55.0*   < > 64.0* 68.0* 75.0* 66.0* 60.0*   BAND  --   --   --  2 14 6  --    INR 1.16  --   --   --   --   --   --     < > = values in this interval not displayed.       Recent Labs   Lab 24  0403 24  0431 24  0422   * 176* 124*   BUN 33* 43* 44*   CREATSERUM 1.33* 1.38* 1.30   CA 7.9* 8.2* 8.3*   ALB 3.4 3.1* 3.6    138 138   K 4.3 4.0 4.8    108 107   CO2 20.0* 22.0 24.0   ALKPHO 48 54 54   AST 58* 50* 110*   ALT 25 25 37   BILT 0.3 0.3 0.3   TP 5.8 5.8 6.1       Estimated Creatinine Clearance: 47.6 mL/min (based on SCr of 1.3 mg/dL).    Recent Labs   Lab 24  1914 24  2359 24  0543   TROPHS 6,036* 9,055* 7,752*        Recent Labs   Lab 09/20/24  2155   PTP 14.9*   INR 1.16        Microbiology    Hospital Encounter on 09/20/24   1. Blood Culture     Status: None    Collection Time: 09/21/24  6:34 AM    Specimen: Blood,peripheral   Result Value Ref Range    Blood Culture Result No Growth 5 Days N/A         Imaging: Reviewed in Epic.    Medications:    fluticasone-salmeterol  1 puff Inhalation BID    guaiFENesin ER  600 mg Oral BID    ipratropium-albuterol  3 mL Nebulization Q4H WA (5 times daily)    benzonatate  200 mg Oral TID    metoprolol succinate ER  50 mg Oral Daily Beta Blocker    spironolactone  25 mg Oral Daily    amiodarone  400 mg Oral BID with meals    insulin aspart  1-20 Units Subcutaneous TID CC and HS    insulin aspart  1-68 Units Subcutaneous TID CC and HS    furosemide  40 mg Intravenous BID (Diuretic)    enoxaparin  40 mg Subcutaneous Daily    cefTRIAXone  2 g Intravenous Q24H    insulin degludec  20 Units Subcutaneous Daily    aspirin  325 mg Per NG Tube Daily    ezetimibe  10 mg Oral Daily    folic acid  1,600 mcg Oral QOD    folic acid  800 mcg Oral QOD       Assessment & Plan:      #Acute hypoxic respiratory failure  #COVID  #Superimposed bacterial vs atypical pneumonia   #Immunocompromised state, on methotrexate for RA  - CT chest reviewed and shows multiple areas of consolidation, round groundglass spiculated consolidations  - S/p bronch on 9/22  - Extubated 9/24  - completed RDV/decadron course   - Sputum culture NGTD   - Bronch culture NGTD  - S/p leucovorin x 4 doses to accelerate methotrexate clearance in setting of LORENA per hematology  - IV antibiotics  - nebs  - Pulmonary following    #Septic shock, resolved  - pressors weaned  - Blood cultures NGTD  - empiric ceftriaxone  - ID following    #Wide-complex tachycardia, suspected VT   - S/p unsuccessful cardioversion 9/21  - amiodarone PO  - Cardiology following > EP to evaluate     #LORENA on CKD, resolved   #Hyponatremia, resolved  #Metabolic  acidosis, resolved    #Acute on chronic anemia  #Acute on chronic thrombocytopenia  - Likely in the setting of infection  - IV iron  - follow CBC  - Hematology following    #Elevated troponin > unclear if NSTEMI or demand ischemia  #CAD s/p CABG  - Troponin peaked  - Aspirin  - Unable to start heparin drip in the setting of thrombocytopenia  - Cardiology following    #Chronic combined heart failure, EF 20-25% s/p ICD  - Echo reviewed and shows EF 20-25%, severe diffuse hypokinesis with regional variations, abnormal left ventricular relaxation  - Metoprolol  - Spironolactone on hold    #Diabetes type 2  -Tresiba  -Hyperglycemia protocol    #Hypertension  -Losartan on hold    #Hyperlipidemia  -Ezetimibe    #Rheumatoid arthritis  -Methotrexate on hold  -S/p leucovorin x 4 doses to accelerate methotrexate clearance in setting of LORENA per hematology    #FLAKO  -Noncompliant with CPAP at home    Estephanie Moreira, DO      Supplementary Documentation:     Quality:  DVT Mechanical Prophylaxis:   SCDs,    DVT Pharmacologic Prophylaxis   Medication    enoxaparin (Lovenox) 40 MG/0.4ML SUBQ injection 40 mg    DVT Pharmacologic prophylaxis: Aspirin 325 mg           Code Status: Full Code  Beltre: External urinary catheter in place  Beltre Duration (in days): 1  Central line:    GABE:     Discharge is dependent on: Clinical improvement  At this point Mr. Reyes is expected to be discharge to: TBD    The 21st Century Cures Act makes medical notes like these available to patients in the interest of transparency. Please be advised this is a medical document. Medical documents are intended to carry relevant information, facts as evident, and the clinical opinion of the practitioner. The medical note is intended as peer to peer communication and may appear blunt or direct. It is written in medical language and may contain abbreviations or verbiage that are unfamiliar.              **Certification      PHYSICIAN Certification of Need for  Inpatient Hospitalization - Initial Certification    Patient will require inpatient services that will reasonably be expected to span two midnight's based on the clinical documentation in H+P.   Based on patients current state of illness, I anticipate that, after discharge, patient will require TBD.

## 2024-09-26 NOTE — PLAN OF CARE
Duplicate - prescription request refused.   Received pt in bed. On isolation for covid. Denies pain. Cough, prn cough medicine. Tolerating diet. Primofit in place. Up to chair with ceiling lift.

## 2024-09-26 NOTE — SLP NOTE
SPEECH DAILY NOTE - INPATIENT    ASSESSMENT & PLAN   ASSESSMENT  Pt seen for dysphagia tx to assess tolerance with recommended diet, ensure proper utilization of aspiration precautions and provide pt/family education.  RN reported no observed signs of intolerance with po. Patient alert and up in bed. His voice is stronger than my initial encounter with him. He reports ongoing cough but notes it is not limited to the context of swallowing. Patient able to self present thin liquids by straw and demonstrated good overt tolerance.  Current po diet is appropriate.     Diet Recommendations - Solids: Regular  Diet Recommendations - Liquids: Thin Liquids       Aspiration Precautions: Upright position;Slow rate;Small bites and sips  Medication Administration Recommendations: One pill at a time    Patient Experiencing Pain: No                Treatment Plan  Treatment Plan/Recommendations: No further inpatient SLP service warranted    Interdisciplinary Communication: Discussed with RN            GOALS  Goal #1 The patient will tolerate regular consistency and thin liquids without overt signs or symptoms of aspiration with 100 % accuracy over 1-2 session(s).  Met   Goal #2 The patient/family/caregiver will demonstrate understanding and implementation of aspiration precautions and swallow strategies independently over 1-2 session(s).     Met     FOLLOW UP  Follow Up Needed (Documentation Required): No  SLP Follow-up Date: 09/25/24  Number of Visits to Meet Established Goals: 2    Session: 1    If you have any questions, please contact Frank Mejia MS CCC-SLP  Pager [phone]

## 2024-09-26 NOTE — PROGRESS NOTES
David Reyes Patient Status:  Inpatient    1945 MRN [id number]   Location [facility] 4SW-A Attending Kj Gonzalez DO   Hosp Day # 4 PCP Thea Harris DO     Critical Care Progress Note    Subjective: No issues overnight, now on RA, still with coughing      Objective:    Medications:   amiodarone  400 mg Oral BID with meals    sodium ferric gluconate  125 mg Intravenous Daily    insulin aspart  1-20 Units Subcutaneous TID CC and HS    insulin aspart  1-68 Units Subcutaneous TID CC and HS    furosemide  40 mg Intravenous BID (Diuretic)    enoxaparin  40 mg Subcutaneous Daily    cefTRIAXone  2 g Intravenous Q24H    insulin degludec  20 Units Subcutaneous Daily    aspirin  325 mg Per NG Tube Daily    ezetimibe  10 mg Oral Daily    folic acid  1,600 mcg Oral QOD    folic acid  800 mcg Oral QOD             Intake/Output Summary (Last 24 hours) at 2024 1036  Last data filed at 2024 0900  Gross per 24 hour   Intake 420 ml   Output 4100 ml   Net -3680 ml       /62 (BP Location: Right arm)   Pulse 89   Temp 98 °F (36.7 °C) (Temporal)   Resp 16   Ht 175.3 cm (5' 9\")   Wt 247 lb 9.2 oz (112.3 kg)   SpO2 94%   BMI 36.56 kg/m²     Physical Exam:    General Appearance: Alert, cooperative, no distress  Neck: No JVD  Lungs: Course to auscultation bilaterally, faint exp wheezing  Heart: Regular rate and [id number]  Abdomen: Soft, distended, non-tender, hypoactive bowel sounds  Extremities: 1+ pitting edema to BLE.    Pulses: 1+ and symmetric all extremities  Skin: Skin color, texture, turgor normal for ethnicity, no rashes or lesions, warm and dry      Recent Labs   Lab 24  0422   RBC 2.49*   HGB 7.3*   HCT 22.1*   MCV 88.8   MCH 29.3   MCHC 33.0   RDW 16.5   NEPRELIM 2.65   WBC 5.0   PLT 60.0*     Recent Labs   Lab 24  0403 24  0431 24  0422   * 176* 124*   BUN 33* 43* 44*   CREATSERUM 1.33* 1.38* 1.30   CA 7.9* 8.2* 8.3*   ALB 3.4 3.1* 3.6    138  138   K 4.3 4.0 4.8    108 107   CO2 20.0* 22.0 24.0   ALKPHO 48 54 54   AST 58* 50* 110*   ALT 25 25 37   BILT 0.3 0.3 0.3   TP 5.8 5.8 6.1     Recent Labs   Lab 09/21/24  1048   ABGPHT 7.36   GLPVSS3J 28*   CMGVY6Z 141*   ABGHCO3 18.3*   ABGBE -8.6*   TEMP 98.6   MIKI Not Applicable   SITE Arterial Line   DEV Bi-PAP   THGB 8.7*     No results for input(s): \"BNP\" in the last 168 hours.  No results for input(s): \"TROP\", \"CK\" in the last 168 hours.    No results found.       Assessment/Plan:    Acute hypoxic respiratory failure 2/2 possible community acquired pneumonia vs atypical PNA,+ COVID. Immunocompromise state on methotrexate, volume overload  - Intubated 9/22, extubated 9/24  - RVP + COVID  - S/p bronch- Cx and fungal negative, PJP pending  - Strep/legionella, MRSA PCR negative  - Remdesevir and dexamethasone complete for COVID  - Lasix BID  - Antibiotics as below  -duonebs, advair--cough/bronchospasm     Shock: Unclear etiology. Sepsis vs cardiogenic  - LA elevated, now cleared  - No leukocytosis, low grade fevers  - PCT 0.43  - continue Cefepime (9/22-)  - Blood cultures NGTD  - UA negative  - TTE with EF unchanged from prior (20-25% with RWMAs)  - Vasopressors off since 9/24  - ID following     Acute Encephalopathy, likely 2/2 to above.  - ABG without hypercapnea  - CT brain negative for acute process      LORENA on CKD, NAGMA: Likely 2/2 dehydration given poor po intake, lactic acidosis  - BL Cr- ~ 1.4, Cr- now at baseline  - Monitor I/Os  - lasix BID  - Avoid nephrotoxins  - Daily BMP     Elevated troponin: likely 2/2 demand ischemia, cannot rule out NSTEMI given significant cardiac history  - initial trop 150, peak at 7752, no longer trending  - Hold on heparin gtt given higher risk for a/c with low plts  - EKG V-paced  - ASA daily  - Cardiology following     Wide complex tachycardia; possible VT on 9/21 and again 9/23   - s/p amio bolus 150mg 9/21, 9/23 overnight  - s/p unsuccessful cardioversion  9/21, responsive to IV amio  - Now on oral amiodarone  - EKG with wide QRS tachycardia  - cards following     Elevated LFTs: Likely due to above, shock/sepsis  - AST elevated, was stable but now up-trending  - monitor     HFrEF s/p  BiV ICD , pHTN, mild aortic stenosis  - EF 20-25%, unchanged from prior  - Pro BNP 14,448  - Hold PTA BB given borderline BP  - Not on plavix PTA due to low plts since 2022     Acute on Chronic Thrombocytopenia: Likely acute component 2/2 above  - Plts lower than baseline on admit, now stable  - Monitor for signs of bleeding  - Cont DVT proph if plts > 30  - Monitor daily CBC     Acute on chronic normocytic Anemia: iron deficiency  - Hgb stable  - No signs of bleeding  - s/p IV iron  - Heme following  - Daily CBC     RA  - On methotrexate at home-holding  - s/p leucovorin on 9/21, no need to continue     Hx HTN, HLD  - Hold losartan, BB  - Zetia     Hyperglycemia, hx DM  - A1c 5.9  - Insulin Subq  - Accu-checks AC & HS  - Hold PTA metformin     Hx Chronic subdural hematoma (May 2024)     FLAKO  - Non-compliant with CPAP at home     F/E/N:    - TFs  - Follow lytes and replete PRN     Proph:  - subcutaneous lovenox  - SCD  - PT/OT     Dispo:   - Full code  - ICU monitoring, may transfer to telemetry      Plan of care discussed with intensivist, Dr. Neema Strickland, [facility]  ICU  Phone  [phone]   Pager [phone]    Pulmonary Critical Care Physician ADDENDUM     I have personally seen and examined the patient.  I have reviewed and agree with Arlin TORRES's documentation with the following highlights/changes.   Pt with persistent cough, states SOB is slightly improved.  Cough is productive of thick mucus. Pt remains on cefepime.  Will start advair for post viral RAD as well as duonebs.  Increase cough suppressants.  Pt stable to transfer to the floor.      Joshua PAYNE

## 2024-09-27 LAB
ALBUMIN SERPL-MCNC: 3.7 G/DL (ref 3.2–4.8)
ALBUMIN/GLOB SERPL: 1.4 {RATIO} (ref 1–2)
ALP LIVER SERPL-CCNC: 62 U/L
ALT SERPL-CCNC: 36 U/L
ANION GAP SERPL CALC-SCNC: 9 MMOL/L (ref 0–18)
AST SERPL-CCNC: 59 U/L (ref ?–34)
ATRIAL RATE: 78 BPM
BASOPHILS # BLD AUTO: 0.02 X10(3) UL (ref 0–0.2)
BASOPHILS NFR BLD AUTO: 0.3 %
BILIRUB SERPL-MCNC: 0.4 MG/DL (ref 0.2–1.1)
BUN BLD-MCNC: 44 MG/DL (ref 9–23)
CALCIUM BLD-MCNC: 8.7 MG/DL (ref 8.7–10.4)
CHLORIDE SERPL-SCNC: 105 MMOL/L (ref 98–112)
CO2 SERPL-SCNC: 23 MMOL/L (ref 21–32)
CREAT BLD-MCNC: 1.4 MG/DL
EGFRCR SERPLBLD CKD-EPI 2021: 52 ML/MIN/1.73M2 (ref 60–?)
EOSINOPHIL # BLD AUTO: 0.02 X10(3) UL (ref 0–0.7)
EOSINOPHIL NFR BLD AUTO: 0.3 %
ERYTHROCYTE [DISTWIDTH] IN BLOOD BY AUTOMATED COUNT: 16.2 %
GLOBULIN PLAS-MCNC: 2.6 G/DL (ref 2–3.5)
GLUCOSE BLD-MCNC: 110 MG/DL (ref 70–99)
GLUCOSE BLD-MCNC: 123 MG/DL (ref 70–99)
GLUCOSE BLD-MCNC: 157 MG/DL (ref 70–99)
GLUCOSE BLD-MCNC: 176 MG/DL (ref 70–99)
GLUCOSE BLD-MCNC: 216 MG/DL (ref 70–99)
HCT VFR BLD AUTO: 22.6 %
HGB BLD-MCNC: 7.5 G/DL
IMM GRANULOCYTES # BLD AUTO: 0.27 X10(3) UL (ref 0–1)
IMM GRANULOCYTES NFR BLD: 4.7 %
LYMPHOCYTES # BLD AUTO: 2.01 X10(3) UL (ref 1–4)
LYMPHOCYTES NFR BLD AUTO: 34.7 %
MCH RBC QN AUTO: 29.5 PG (ref 26–34)
MCHC RBC AUTO-ENTMCNC: 33.2 G/DL (ref 31–37)
MCV RBC AUTO: 89 FL
MONOCYTES # BLD AUTO: 0.8 X10(3) UL (ref 0.1–1)
MONOCYTES NFR BLD AUTO: 13.8 %
NEUTROPHILS # BLD AUTO: 2.67 X10 (3) UL (ref 1.5–7.7)
NEUTROPHILS # BLD AUTO: 2.67 X10(3) UL (ref 1.5–7.7)
NEUTROPHILS NFR BLD AUTO: 46.2 %
OSMOLALITY SERPL CALC.SUM OF ELEC: 297 MOSM/KG (ref 275–295)
P AXIS: 57 DEGREES
P-R INTERVAL: 222 MS
PLATELET # BLD AUTO: 70 10(3)UL (ref 150–450)
PLATELETS.RETICULATED NFR BLD AUTO: 13.8 % (ref 0–7)
POTASSIUM SERPL-SCNC: 3.6 MMOL/L (ref 3.5–5.1)
POTASSIUM SERPL-SCNC: 4 MMOL/L (ref 3.5–5.1)
PROT SERPL-MCNC: 6.3 G/DL (ref 5.7–8.2)
Q-T INTERVAL: 450 MS
QRS DURATION: 148 MS
QTC CALCULATION (BEZET): 513 MS
R AXIS: -36 DEGREES
RBC # BLD AUTO: 2.54 X10(6)UL
SODIUM SERPL-SCNC: 137 MMOL/L (ref 136–145)
T AXIS: -80 DEGREES
VENTRICULAR RATE: 78 BPM
WBC # BLD AUTO: 5.8 X10(3) UL (ref 4–11)

## 2024-09-27 PROCEDURE — 99232 SBSQ HOSP IP/OBS MODERATE 35: CPT | Performed by: INTERNAL MEDICINE

## 2024-09-27 PROCEDURE — 99233 SBSQ HOSP IP/OBS HIGH 50: CPT | Performed by: INTERNAL MEDICINE

## 2024-09-27 RX ORDER — POTASSIUM CHLORIDE 1500 MG/1
40 TABLET, EXTENDED RELEASE ORAL EVERY 4 HOURS
Status: COMPLETED | OUTPATIENT
Start: 2024-09-27 | End: 2024-09-27

## 2024-09-27 RX ORDER — MELATONIN
3 NIGHTLY
Status: DISCONTINUED | OUTPATIENT
Start: 2024-09-27 | End: 2024-09-28

## 2024-09-27 RX ORDER — IPRATROPIUM BROMIDE AND ALBUTEROL SULFATE 2.5; .5 MG/3ML; MG/3ML
3 SOLUTION RESPIRATORY (INHALATION)
Status: DISCONTINUED | OUTPATIENT
Start: 2024-09-27 | End: 2024-09-28

## 2024-09-27 NOTE — PLAN OF CARE
Patient alert and orientated x4 with no complaints of pain. With complaints of SOB and cough; medication administered as ordered. On room air and CPAP overnight.

## 2024-09-27 NOTE — PROGRESS NOTES
CRITICAL CARE            S: He is not having much shortness of breath or cough today.     Meds:   melatonin  3 mg Oral Nightly    fluticasone-salmeterol  1 puff Inhalation BID    guaiFENesin ER  600 mg Oral BID    ipratropium-albuterol  3 mL Nebulization Q4H WA (5 times daily)    benzonatate  200 mg Oral TID    metoprolol succinate ER  50 mg Oral Daily Beta Blocker    spironolactone  25 mg Oral Daily    amiodarone  400 mg Oral BID with meals    insulin aspart  1-20 Units Subcutaneous TID CC and HS    insulin aspart  1-68 Units Subcutaneous TID CC and HS    furosemide  40 mg Intravenous BID (Diuretic)    enoxaparin  40 mg Subcutaneous Daily    cefTRIAXone  2 g Intravenous Q24H    insulin degludec  20 Units Subcutaneous Daily    aspirin  325 mg Per NG Tube Daily    ezetimibe  10 mg Oral Daily    folic acid  1,600 mcg Oral QOD    folic acid  800 mcg Oral QOD       Prn Meds:    guaiFENesin-codeine    albuterol    ondansetron    glucose **OR** glucose **OR** glucose-vitamin C **OR** dextrose **OR** glucose **OR** glucose **OR** glucose-vitamin C    acetaminophen    metoclopramide    polyethylene glycol (PEG 3350)    fleet enema    dextrose 5%-sodium chloride 0.45%    acetaminophen **OR** acetaminophen **OR** acetaminophen **OR** [DISCONTINUED] acetaminophen    senna    bisacodyl    Infusions:   dextrose 5%-sodium chloride 0.45% Stopped (09/21/24 1751)       OBJECTIVE:  Vitals:    09/27/24 0000 09/27/24 0143 09/27/24 0400 09/27/24 0630   BP: 115/62  104/69 98/57   Pulse: 76 70 71    Resp: 16 16 16    Temp: 97.4 °F (36.3 °C)  97.5 °F (36.4 °C)    TempSrc: Temporal  Temporal    SpO2: 100% 99% 96%    Weight:       Height:         O2: room air    Gen - Alert, oriented x 3, in no apparent distress  Lungs - CTAB  CV - regular rate & rhythm. Normal S1, S2. No murmurs, rubs, or gallops appreciated.  Abdomen - soft, nontender to palpation , obese  Extremities - No cyanosis, clubbing, edema appreciated.        Labs:  Recent  Labs   Lab 09/25/24  0431 09/26/24 0422 09/27/24  0506   WBC 3.5* 5.0 5.8   HGB 7.3* 7.3* 7.5*   PLT 66.0* 60.0* 70.0*     Recent Labs   Lab 09/21/24  1204 09/21/24  1557 09/21/24  1914 09/22/24  0915 09/22/24  2314 09/23/24  0547 09/24/24  0403 09/25/24 0431 09/26/24 0422 09/27/24  0506   NA  --  139   < >  --  135* 133* 137 138 138 137   K  --  3.7   < >  --  5.0 4.3 4.3 4.0 4.8 3.6   CL  --  107   < >  --  106 105 109 108 107 105   CO2  --  20.0*   < >  --  15.0* 18.0* 20.0* 22.0 24.0 23.0   BUN  --  34*   < >  --  33* 35* 33* 43* 44* 44*   CREATSERUM  --  1.74*   < >  --  1.66* 1.66* 1.33* 1.38* 1.30 1.40*   GLU  --  105*   < >  --  134* 213* 183* 176* 124* 123*   ANIONGAP  --  12   < >  --  14 10 8 8 7 9   ALB  --  4.3   < >  --   --  3.7 3.4 3.1* 3.6 3.7   CA  --  8.5*   < >  --  8.8 8.0* 7.9* 8.2* 8.3* 8.7   MG  --  2.0   < >  --  2.2 2.1 2.3  --   --   --    PHOS  --   --   --   --   --  3.4 2.8 3.0  --   --    ALKPHO  --  48   < >  --   --  42* 48 54 54 62   AST  --  61*   < >  --   --  62* 58* 50* 110* 59*   ALT  --  27   < >  --   --  26 25 25 37 36   BILT  --  0.2   < >  --   --  0.4 0.3 0.3 0.3 0.4   TP  --  7.0   < >  --   --  6.1 5.8 5.8 6.1 6.3   LACTI 6.2* 2.7*  --  1.3  --   --   --   --   --   --     < > = values in this interval not displayed.     Recent Labs   Lab 09/20/24 2155 09/21/24  1105   INR 1.16  --    PTT  --  29.4     Recent Labs   Lab 09/20/24 2205 09/21/24  0634 09/21/24  1914 09/21/24  2359 09/22/24  0543   PBNP 14,448*  --   --   --   --    TROPHS 150*   < > 6,036* 9,055* 7,752*    < > = values in this interval not displayed.     Recent Labs   Lab 09/20/24 2205 09/21/24  0634 09/21/24  1105 09/21/24  1557 09/22/24  1244 09/24/24  0403   LDH  --  277*  --  512*  --   --    DDIMER  --   --  2.12*  --   --   --    CRP  --   --   --  16.40*  --  26.50*   PCT 0.43*  --   --   --   --   --    NH3  --   --   --   --  <10*  --      Recent Labs   Lab 09/21/24  0245 09/21/24  1048    ABGPHT 7.44 7.36   KZPDZZ0A 30* 28*   JBJCK3C 250* 141*   ABGHCO3 22.8 18.3*   ABGBE -2.8* -8.6*       Recent Labs   Lab 09/21/24  0014 09/21/24  1340   COVID19 Detected*  --    INFAPCR Negative  --    INFBPCR Negative  --    RSV Negative  --    STREPPNEUMAG  --  Negative   LEGIONAG  --  Negative       Imaging reviewed    ASSESSMENT AND PLAN      Acute hypoxemic respiratory failure - intubated 9/22-9/24 secondary to COVID and pneumonia. Improving.   -supplemental oxygen prn   -continue antibiotics   -lasix per cardiology  COVID / superimposed bacterial pneumonia. Bronch cultures from 9/22 were negative   -completed remdesivir and dexamethasone    -continue ceftriaxone  Shock - resolved  -monitor bp  Encephalopathy - suspect due to infection. Improved  -supportive care   LORENA on CKD - improved  -monitor labs, urine output  Elevated troponin - suspect demand ischemia  -cardiology is following  Ventricular tachycardia - failed cardioversion in ER. Patient has BIV-ICD  -continue amiodarone  -per cardiology  HFrEF (EF 20-25) pHTN, aortic stenosis, CAD s/p CABG  -per cardiology  Abnormal LFT - improved  -monitor  Thrombocytopenia - chronic  -monitor plts   Anemia - stable  -monitor hgb  Rheumatoid arthritis - on methotrexate at home   -outpatient rheum follow up   DM  -monitor accuchecks  -insulin aspart, degludec  FLAKO  -outpt follow up with [place] [facility]   FEN  -fluids: none  -electrolytes: monitor electrolytes and replace prn  -nutrition: cardiac diet  Proph  -LMWH  Dispo  -full code  -transfer to floor      Cm Raman M.D.  Pulmonary/Critical Care

## 2024-09-27 NOTE — CM/SW NOTE
09/27/24 0900   CM/SW Referral Data   Referral Source    Reason for Referral Discharge planning   Informant Patient     HOME SITUATION  Type of Home:  (Duplex)   Home Layout: One level     Lives With: Spouse  Drives: Yes  Patient Regularly Uses: Reading glasses     Prior Level of [place]: The pt reports that he is typically independent.  Pt states he is unemployed but is looking for work.     PT=GT  Referral sent via [employer], [employer] complete. Met with pt to present SKYE choice. Pt stating that he has never been inpt rehab before, only OP at [facility] in [place]. Pt would like to review list and consider if he would be willing to go to [facility]. Pt lives with spouse who is typically in good health but is not feeling well at the moment.     Needs for up for [facility] choice.     Cristian Alejandro, DEANA RN, CM  X [phone]

## 2024-09-27 NOTE — PLAN OF CARE
Pt daniele most care. Pt was not motivated to move. Pt wanted to sleep all day. Pt educated on movement and ICU delirium prevention. Pt refused. All VSS. Good urinary output,. Pt on diuretics. No complaints during shift.    Noc endorsed all care given to pt. Pt currently resting in bed, waiting for meal  to be delivered. Bed locked and alarm on.

## 2024-09-27 NOTE — PROGRESS NOTES
Patient not seen, counts reviewed, platelets continue to improve.  Will follow peripherally for now.    Brigid Gresham M.D.    [facility]    [facility]  [address] [place], [address]

## 2024-09-27 NOTE — SPIRITUAL CARE NOTE
Spiritual Care Visit Note    Patient Name: David Reyes Date of Spiritual Care Visit: 24   : 1945 Primary Dx: COVID-19       Referred By:      Spiritual Care Taxonomy:    Intended Effects: Build relationship of care and support    Methods: Offer spiritual/Sabianist support;Offer emotional support;Encourage sharing of feelings    Interventions: Acknowledge current situation;Active listening;Ask guided questions about cultural and Sabianist values;Discuss spirituality/Baptism with someone;Identify supportive relationship(s);Medicine Bow;Provide compassionate touch    Visit Type/Summary:     - Spiritual Care: Consulted with RN prior to visit. Offered empathic listening and emotional support. Patient and family expressed appreciation for  visit. Provided information regarding how to contact Spiritual Care and left a Spiritual Care information card. Provided support for Patient's spiritual/Sabianist requests. Offered prayer.  remains available for follow up.    Spiritual Care support can be requested via an Epic consult. For urgent/immediate needs, please contact the On Call  at: [place]: ext [phone]    Linda Liriano MA

## 2024-09-27 NOTE — PROGRESS NOTES
Progress Note  David Reyes Patient Status:  Inpatient    1945 MRN [id number]   [facility] 4SW-A Attending Estephanie Moreira DO   Hosp Day # 5 PCP Thea Harris,      Subjective:  He feels breathing is better. On room air. Denies chest discomfort.     Objective:  BP 92/52   Pulse 72   Temp 97.4 °F (36.3 °C) (Temporal)   Resp 15   Ht 5' 9\" (1.753 m)   Wt 265 lb 3.4 oz (120.3 kg)   SpO2 95%   BMI 39.17 kg/m²     Intake/Output:    Intake/Output Summary (Last 24 hours) at 2024 1251  Last data filed at 2024 1200  Gross per 24 hour   Intake 1165.6 ml   Output 3550 ml   Net -2384.4 ml       Last 3 Weights   24 0600 265 lb 3.4 oz (120.3 kg)   24 0000 247 lb 9.2 oz (112.3 kg)   24 0400 249 lb 9 oz (113.2 kg)   24 0600 255 lb 8.2 oz (115.9 kg)   24 0600 252 lb 6.8 oz (114.5 kg)   24 0400 243 lb 6.2 oz (110.4 kg)   24 0420 237 lb 10.5 oz (107.8 kg)   24 2144 240 lb (108.9 kg)   24 1316 241 lb (109.3 kg)   24 1001 235 lb (106.6 kg)       Labs:  Recent Labs   Lab 24  0431 24  0422 24  0506   * 124* 123*   BUN 43* 44* 44*   CREATSERUM 1.38* 1.30 1.40*   EGFRCR 53* 57* 52*   CA 8.2* 8.3* 8.7    138 137   K 4.0 4.8 3.6    107 105   CO2 22.0 24.0 23.0     Recent Labs   Lab 24  0431 24  0422 24  0506   RBC 2.43* 2.49* 2.54*   HGB 7.3* 7.3* 7.5*   HCT 22.0* 22.1* 22.6*   MCV 90.5 88.8 89.0   MCH 30.0 29.3 29.5   MCHC 33.2 33.0 33.2   RDW 16.6 16.5 16.2   NEPRELIM 1.91 2.65 2.67   WBC 3.5* 5.0 5.8   PLT 66.0* 60.0* 70.0*         Recent Labs   Lab 24  1914 24  2359 24  0543   TROPHS 6,036* 9,055* 7,752*       Diagnostics:   Telemetry: AV paced, short runs of WCT typically 3 beat runs with one isolated 12 beat run max    Review of Systems   Cardiovascular:  Negative for chest pain.   Respiratory:  Positive for shortness of breath.        Physical  Exam:  General: Alert and oriented in no apparent distress.  HEENT: Pupils equal. Mucous membranes moist.   Neck: mild JVD+  Cardiac:  Normal S1 S2, Regular. No murmur  Lungs: Clear without wheezes or crackles    Abdomen: Soft, non-tender, ND  Extremities: 1+ LE edema   Neurologic: No focal deficits. Normal affect.  Skin: Warm and dry,    Medications:   melatonin  3 mg Oral Nightly    potassium chloride  40 mEq Oral Q4H    ipratropium-albuterol  3 mL Nebulization Q6H WA    fluticasone-salmeterol  1 puff Inhalation BID    guaiFENesin ER  600 mg Oral BID    benzonatate  200 mg Oral TID    metoprolol succinate ER  50 mg Oral Daily Beta Blocker    spironolactone  25 mg Oral Daily    amiodarone  400 mg Oral BID with meals    insulin aspart  1-20 Units Subcutaneous TID CC and HS    insulin aspart  1-68 Units Subcutaneous TID CC and HS    furosemide  40 mg Intravenous BID (Diuretic)    enoxaparin  40 mg Subcutaneous Daily    cefTRIAXone  2 g Intravenous Q24H    insulin degludec  20 Units Subcutaneous Daily    aspirin  325 mg Per NG Tube Daily    ezetimibe  10 mg Oral Daily    folic acid  1,600 mcg Oral QOD    folic acid  800 mcg Oral QOD      dextrose 5%-sodium chloride 0.45% Stopped (09/21/24 1751)       Assessment:    Acute hypoxic respiratory failure, resolved   Secondary to COVID, now extubated on room air  COVID+ with superimposed pneumonia  NSTEMI, type II vs I  HsTrop peaked at 9055, down trending.   Denies angina  Likely demand with acute illness COVID/AHRF but cannot rule out ischemia. Unfortunately poor candidate for PCI given chronic thrombocytopenia. Medical management with ASA monotherapy given tcp.   EKG paced. TTE unchanged from prior 5/2024, known reduced LVEF with ICM  Acute on chronic HFrEF, ischemic cardiomyopathy  Decompensated on arrival  TTE with LVEF 20-25%  GDMT: Spironolactone 25 mg daily. Toprol-XL 50 mg daily. Home ARB held with lower BP here and will resume likely tomorrow if BP allows. Consider  outpatient SGLT2  Continue IV lasix 40 mg BID  Wide complex tachycardia, secondary to SVT and short runs of NSVT, history of BI-V ICD  S/p unsuccessful cardioversion in ED. Converted to AV paced rhythm with amiodarone. Continue PO amiodarone loading 400 mg BID, Toprol-XL. Keep K>4, Mg>2  VT detection zone lowered to 150  CAD s/p CABG LIMA-LAD, prior PCI RCA and Lcx 2015  Denies angina  ASA. BB. Zetia. History of zetia intolerance  Severe pulmonary hypertension  Rheumatoid arthritis - immunocompromised  Thrombocytopenia  Acute on chronic anemia    Plan:    Continue PO amiodarone load 400 mg BID. He has received 4.5 g of amiodarone this admission. Given some NSVT, can do 10 g load with continuing amiodarone 400 mg BID for 7 days and reducing to 200 mg daily on 10/5/24  Continue IV lasix 40 mg BID. Optimal urinary response net -2.9 L with stable labs. Consider PO diuretics in next couple days. Continue current GDMT and recommend reintroduction of ARB as blood pressure allows. Acceptable asymptomatic SBP 90s with low EF.      Plan of care discussed with patient, RN.    SANTA Tanner  9/27/2024  12:51 PM    Patient seen and examined independently.  Note reviewed and labs reviewed. Agree with above assessment and plan. 79-year-old male with history of chronic HFrEF, LVEF 20-25% with underlying ischemic cardiomyopathy s/p CABG/history PCI and s/p BiV ICD. Presented with worsening respiratory status suspected 2/2 COVID and was noted to have significantly elevated high-sensitivity troponin which was suspected to be due to supply/demand mismatch. However, cannot rule out NSTEMI. However, given chronic thrombocytopenia, patient has previously not tolerated antiplatelet therapy. Therefore, medical management was elected. Course C/B wide-complex tachycardia which appears to be SVT s/p unsuccessful DCCV but responsive to IV amiodarone. Device settings modified. Started on po amio loading. Continue supportive care. On IV lasix  40 mg bid for now. Daily metabolic panel.         Emerson Gonzalez DO  Cardiologist  [facility]  9/27/2024 1:56 PM      Note to the patient: The 21st Century Cures Act makes medical notes like these available to patients in the interest of transparency. However, be advised that this is a medical document. It is intended as peer to peer communication. It is written in medical language and may contain abbreviations or verbiage that are unfamiliar. It may appear blunt or direct. Medical documents are intended to carry relevant information, facts as evident, and clinical opinion of the practitioner.     Disclaimer: Components of this note were documented using voice recognition system and are subject to errors not corrected at proofreading. Contact the author of this note for any clarifications.

## 2024-09-27 NOTE — PROGRESS NOTES
[facility]   part of [facility]     Hospitalist Progress Note     David Reyes Patient Status:  Inpatient    1945 MRN [id number]   Location [facility] 4SW-A Attending Kj Gonzalez DO   Hosp Day # 5 PCP Thea Harris DO     Chief Complaint: Dyspnea    Subjective:     Resting in bed, continues to feel better, but breathing not back to baseline and continues to have significant cough/mucous production     Objective:    Review of Systems:   A comprehensive review of systems was completed; pertinent positive and negatives stated in subjective.    Vital signs:  Temp:  [97.3 °F (36.3 °C)-97.8 °F (36.6 °C)] 97.4 °F (36.3 °C)  Pulse:  [70-83] 72  Resp:  [14-24] 15  BP: ()/(52-83) 92/52  SpO2:  [92 %-100 %] 95 %    Physical Exam:    General: No acute distress  Respiratory: occasional expiratory wheezes, coarse throughout   Cardiovascular: S1, S2, regular rate and rhythm  Abdomen: Soft, Non-tender, non-distended, positive bowel sounds  Neuro: No new focal deficits.  Extremities: No edema    Diagnostic Data:    Labs:  Recent Labs   Lab 24  2155 24  0634 24  0547 24  0403 24  0431 24  0422 24  0506   WBC 6.0   < > 10.0 7.8 3.5* 5.0 5.8   HGB 9.2*   < > 7.8* 7.3* 7.3* 7.3* 7.5*   MCV 94.2   < > 95.5 93.1 90.5 88.8 89.0   PLT 55.0*   < > 68.0* 75.0* 66.0* 60.0* 70.0*   BAND  --   --  2 14 6  --   --    INR 1.16  --   --   --   --   --   --     < > = values in this interval not displayed.       Recent Labs   Lab 24  0431 24  0422 24  0506   * 124* 123*   BUN 43* 44* 44*   CREATSERUM 1.38* 1.30 1.40*   CA 8.2* 8.3* 8.7   ALB 3.1* 3.6 3.7    138 137   K 4.0 4.8 3.6    107 105   CO2 22.0 24.0 23.0   ALKPHO 54 54 62   AST 50* 110* 59*   ALT 25 37 36   BILT 0.3 0.3 0.4   TP 5.8 6.1 6.3       Estimated Creatinine Clearance: 44.2 mL/min (A) (based on SCr of 1.4 mg/dL (H)).    Recent Labs   Lab 24  1914 24  7147  09/22/24  0543   TROPHS 6,036* 9,055* 7,752*       Recent Labs   Lab 09/20/24  2155   PTP 14.9*   INR 1.16        Microbiology    Hospital Encounter on 09/20/24   1. Blood Culture     Status: None    Collection Time: 09/21/24  6:34 AM    Specimen: Blood,peripheral   Result Value Ref Range    Blood Culture Result No Growth 5 Days N/A         Imaging: Reviewed in Epic.    Medications:    melatonin  3 mg Oral Nightly    potassium chloride  40 mEq Oral Q4H    ipratropium-albuterol  3 mL Nebulization Q6H WA    fluticasone-salmeterol  1 puff Inhalation BID    guaiFENesin ER  600 mg Oral BID    benzonatate  200 mg Oral TID    metoprolol succinate ER  50 mg Oral Daily Beta Blocker    spironolactone  25 mg Oral Daily    amiodarone  400 mg Oral BID with meals    insulin aspart  1-20 Units Subcutaneous TID CC and HS    insulin aspart  1-68 Units Subcutaneous TID CC and HS    furosemide  40 mg Intravenous BID (Diuretic)    enoxaparin  40 mg Subcutaneous Daily    cefTRIAXone  2 g Intravenous Q24H    insulin degludec  20 Units Subcutaneous Daily    aspirin  325 mg Per NG Tube Daily    ezetimibe  10 mg Oral Daily    folic acid  1,600 mcg Oral QOD    folic acid  800 mcg Oral QOD       Assessment & Plan:      #Acute hypoxic respiratory failure  #COVID  #Superimposed bacterial vs atypical pneumonia   #Immunocompromised state, on methotrexate for RA  - CT chest reviewed and shows multiple areas of consolidation, round groundglass spiculated consolidations  - S/p leucovorin x 4 doses to accelerate methotrexate clearance  - S/p bronch on 9/22  - Extubated 9/24  - completed RDV/decadron course   - Sputum culture NGTD   - Bronch culture NGTD  - empiric ceftriaxone   - nebs  - Pulm/ID following    #Septic shock, resolved  - pressors weaned  - Blood cultures NGTD  - empiric ceftriaxone  - ID following    #Wide-complex tachycardia > SVT  - S/p unsuccessful cardioversion 9/21  - amiodarone PO  - Cardiology following     #Elevated troponin >  unclear if NSTEMI or demand ischemia  #CAD s/p CABG  - Troponin peaked  - Aspirin  - Unable to start heparin drip in the setting of thrombocytopenia and poor candidate for anti-platelet therapy d/t chronic thrombocytopenia; plan for medical management at this time  - Cardiology following    #acute on chronic combined heart failure with exacerbation, EF 20-25% s/p ICD  - Echo reviewed and shows EF 20-25%, severe diffuse hypokinesis with regional variations, abnormal left ventricular relaxation  - Metoprolol  - Spironolactone resumed  - diuresing with IV lasix     #LORENA on CKD, resolved   #Hyponatremia, resolved  #Metabolic acidosis, resolved    #Acute on chronic anemia  #Acute on chronic thrombocytopenia  - IV iron  - follow CBC  - Hematology following    #Diabetes type 2  -Tresiba  -Hyperglycemia protocol    #Hypertension  -Losartan on hold    #Hyperlipidemia  -Ezetimibe    #Rheumatoid arthritis  -Methotrexate on hold  -S/p leucovorin x 4 doses to accelerate methotrexate clearance in setting of LORENA per hematology    #FLAKO  -Noncompliant with CPAP at home    Estephanie Moreira,       Supplementary Documentation:     Quality:  DVT Mechanical Prophylaxis:   SCDs,    DVT Pharmacologic Prophylaxis   Medication    enoxaparin (Lovenox) 40 MG/0.4ML SUBQ injection 40 mg    DVT Pharmacologic prophylaxis: Aspirin 325 mg           Code Status: Full Code  Beltre: External urinary catheter in place  Beltre Duration (in days): 1  Central line:    GABE:     Discharge is dependent on: Clinical improvement  At this point Mr. Reyes is expected to be discharge to: TBD    The 21st Century Cures Act makes medical notes like these available to patients in the interest of transparency. Please be advised this is a medical document. Medical documents are intended to carry relevant information, facts as evident, and the clinical opinion of the practitioner. The medical note is intended as peer to peer communication and may appear blunt or  direct. It is written in medical language and may contain abbreviations or verbiage that are unfamiliar.              **Certification      PHYSICIAN Certification of Need for Inpatient Hospitalization - Initial Certification    Patient will require inpatient services that will reasonably be expected to span two midnight's based on the clinical documentation in H+P.   Based on patients current state of illness, I anticipate that, after discharge, patient will require TBD.

## 2024-09-27 NOTE — PROGRESS NOTES
[facility]   part of [facility] ID PROGRESS NOTE    David Reyes Patient Status:  Inpatient    1945 MRN [id number]   Location [facility] 4SW-A Attending Kj Gonzalez,    Hosp Day # 5 PCP Thea Harris DO     Antimicrobials: IV CTX (restarted -)  S/p cefepime (-), Flagyl -), levofloxacin , CTX , doxycycline , vanco .    S/p RDV x 3  S/p (- )    Subjective: Patient seen and examined today. Cough and SOB continue to improve. Denies any abdominal pain. Afebrile. On room air.     Objective:    BP 92/52   Pulse 72   Temp 97.4 °F (36.3 °C) (Temporal)   Resp 15   Ht 175.3 cm (5' 9\")   Wt 265 lb 3.4 oz (120.3 kg)   SpO2 95%   BMI 39.17 kg/m²     Gen:   NAD, Alert, answering questions appropriately. On room air.   HEENT:  Moist mucous membranes  CV/lungs:  RRR, Clear to auscultation bilaterally, anteriorly  Abdom:  Soft, NT.   Skin/extrem:  No rashes.     Labs:   Recent Labs   Lab 24  0547 24  0403 24  0431 24  0422 24  0506   WBC 10.0 7.8 3.5* 5.0 5.8   HGB 7.8* 7.3* 7.3* 7.3* 7.5*   PLT 68.0* 75.0* 66.0* 60.0* 70.0*       Recent Labs   Lab 24  0547 24  0403 24  0431 24  0422 24  0506   * 137 138 138 137   K 4.3 4.3 4.0 4.8 3.6    109 108 107 105   CO2 18.0* 20.0* 22.0 24.0 23.0   BUN 35* 33* 43* 44* 44*   CREATSERUM 1.66* 1.33* 1.38* 1.30 1.40*       Recent Labs   Lab 24  0547 24  0403 24  0431 24  0422 24  0506   ALT 26 25 25 37 36   AST 62* 58* 50* 110* 59*     Problem list:    Patient Active Problem List   Diagnosis    Nonischemic cardiomyopathy (HCC)    FLAKO (obstructive sleep apnea)    Congestive heart failure (HCC)    CAD (coronary artery disease)    Rheumatoid arthritis involving multiple sites with positive rheumatoid factor (HCC)    Type 2 diabetes mellitus with hyperglycemia, with long-term current use of insulin (HCC)     Normocytic anemia    Thrombocytopenia (HCC)    Ischemic cardiomyopathy    Dyspnea on exertion    Dry mouth    Pure hypercholesterolemia    Vitamin D deficiency    Pulmonary hypertension secondary to increased PVR (HCC)    Carotid artery stenosis    Morbid (severe) obesity due to excess calories (HCC)    Chronic obstructive pulmonary disease, unspecified (HCC)    CUMMINGS (dyspnea on exertion)    Weakness generalized    Aortic stenosis, mild    Hyponatremia    Hyperglycemia    Anemia    Acute kidney injury (HCC)    Metabolic acidosis    COVID-19    Acute bronchospasm due to viral infection    Bronchospasm    Pneumonia    Acute respiratory failure with hypoxia (HCC)    Immunocompromised state (HCC)    Septic shock (HCC)    Wide-complex tachycardia    Elevated troponin    Acute on chronic anemia    Community acquired pneumonia    Type 2 diabetes mellitus without complication, with long-term current use of insulin ([facility])       ASSESSMENT:  Multifocal pneumonia - assume covid pneumonia with possible superimposed bacterial PNA.  Presented to SOB/cough x 1 week, increasing phlegm with vomiting prior to admission per chart review. Covid- 19 + 9/21. No previous vaccinations recorded. Completed 3 day course of RDV.  Legionella and strep pneumo Uags negative. MRSA of the nares negative. Sputum culture negative.   Had fevers (resolved now) but no leukocytosis  S/p bronchoscopy with BAL 9/27, cx ngtd  Acute hypoxic respiratory failure, d/t above, also ? possible component of pulm edema. Resolved.   Shock, ? septic shock related to #1 vs other (possibly cardiogenic). Bcxs negative. Resolved, off pressors.   Wide complex tachycardia with possible episodes of VT; elevated troponin. Cards following.  BiV ICD in place  CHF (EF 20-25%), CAD  DM II  RA on methotrexate (on hold currently)  Thrombocytopenia, improving  Penicillin allergy. Tolerates cephalosporins.    PLAN:  - continue IV CTX for now--> plan to complete 7 day course  - follow  respiratory sympotoms  - follow pending studies/cultures from bronchoscopy  - follow temps    Discussed case with RN and patient.    [facility]  [phone]

## 2024-09-28 VITALS
BODY MASS INDEX: 35.36 KG/M2 | HEIGHT: 69 IN | HEART RATE: 74 BPM | SYSTOLIC BLOOD PRESSURE: 110 MMHG | DIASTOLIC BLOOD PRESSURE: 55 MMHG | TEMPERATURE: 98 F | OXYGEN SATURATION: 95 % | RESPIRATION RATE: 21 BRPM | WEIGHT: 238.75 LBS

## 2024-09-28 LAB
ANION GAP SERPL CALC-SCNC: 5 MMOL/L (ref 0–18)
BASOPHILS # BLD AUTO: 0.01 X10(3) UL (ref 0–0.2)
BASOPHILS NFR BLD AUTO: 0.2 %
BUN BLD-MCNC: 36 MG/DL (ref 9–23)
CALCIUM BLD-MCNC: 8.9 MG/DL (ref 8.7–10.4)
CHLORIDE SERPL-SCNC: 106 MMOL/L (ref 98–112)
CO2 SERPL-SCNC: 27 MMOL/L (ref 21–32)
CREAT BLD-MCNC: 1.14 MG/DL
EGFRCR SERPLBLD CKD-EPI 2021: 66 ML/MIN/1.73M2 (ref 60–?)
EOSINOPHIL # BLD AUTO: 0.02 X10(3) UL (ref 0–0.7)
EOSINOPHIL NFR BLD AUTO: 0.4 %
ERYTHROCYTE [DISTWIDTH] IN BLOOD BY AUTOMATED COUNT: 16.3 %
GLUCOSE BLD-MCNC: 111 MG/DL (ref 70–99)
GLUCOSE BLD-MCNC: 112 MG/DL (ref 70–99)
GLUCOSE BLD-MCNC: 120 MG/DL (ref 70–99)
GLUCOSE BLD-MCNC: 135 MG/DL (ref 70–99)
HCT VFR BLD AUTO: 22.6 %
HGB BLD-MCNC: 7.5 G/DL
IMM GRANULOCYTES # BLD AUTO: 0.23 X10(3) UL (ref 0–1)
IMM GRANULOCYTES NFR BLD: 5 %
LYMPHOCYTES # BLD AUTO: 1.45 X10(3) UL (ref 1–4)
LYMPHOCYTES NFR BLD AUTO: 31.5 %
MCH RBC QN AUTO: 30.4 PG (ref 26–34)
MCHC RBC AUTO-ENTMCNC: 33.2 G/DL (ref 31–37)
MCV RBC AUTO: 91.5 FL
MONOCYTES # BLD AUTO: 0.75 X10(3) UL (ref 0.1–1)
MONOCYTES NFR BLD AUTO: 16.3 %
NEUTROPHILS # BLD AUTO: 2.15 X10 (3) UL (ref 1.5–7.7)
NEUTROPHILS # BLD AUTO: 2.15 X10(3) UL (ref 1.5–7.7)
NEUTROPHILS NFR BLD AUTO: 46.6 %
OSMOLALITY SERPL CALC.SUM OF ELEC: 295 MOSM/KG (ref 275–295)
PLATELET # BLD AUTO: 75 10(3)UL (ref 150–450)
PLATELETS.RETICULATED NFR BLD AUTO: 12.6 % (ref 0–7)
POTASSIUM SERPL-SCNC: 3.8 MMOL/L (ref 3.5–5.1)
RBC # BLD AUTO: 2.47 X10(6)UL
SODIUM SERPL-SCNC: 138 MMOL/L (ref 136–145)
WBC # BLD AUTO: 4.6 X10(3) UL (ref 4–11)

## 2024-09-28 PROCEDURE — 99233 SBSQ HOSP IP/OBS HIGH 50: CPT | Performed by: INTERNAL MEDICINE

## 2024-09-28 PROCEDURE — 99239 HOSP IP/OBS DSCHRG MGMT >30: CPT | Performed by: INTERNAL MEDICINE

## 2024-09-28 RX ORDER — POTASSIUM CHLORIDE 1500 MG/1
40 TABLET, EXTENDED RELEASE ORAL ONCE
Status: COMPLETED | OUTPATIENT
Start: 2024-09-28 | End: 2024-09-28

## 2024-09-28 RX ORDER — AMIODARONE HYDROCHLORIDE 200 MG/1
TABLET ORAL
Qty: 42 TABLET | Refills: 0 | Status: SHIPPED | OUTPATIENT
Start: 2024-09-28

## 2024-09-28 RX ORDER — ASPIRIN 325 MG
325 TABLET ORAL DAILY
Qty: 90 TABLET | Refills: 1 | Status: SHIPPED | OUTPATIENT
Start: 2024-09-29

## 2024-09-28 NOTE — RESPIRATORY THERAPY NOTE
Patient on CPAP for the night.     Will continue to assess and follow RT protocol.    Katie Smith, RRT

## 2024-09-28 NOTE — PROGRESS NOTES
[facility]  Cardiology Progress Note    David Reyes Patient Status:  Inpatient    1945 MRN [id number]   Location [facility] 4SW-A Attending Estephanie Moreira, DO   Hosp Day # 6 PCP Thea Harris, DO       Subjective:  Feels \"better.    --------------------------------------------------------------------------------------------------------------------------------  ROS 12 systems reviewed and at baseline, pertinent findings above.      History:  Past Medical History:    Anxiety state    Back problem    CAD (coronary artery disease)    Congestive heart disease (HCC)    Congestive heart failure (HCC)    CORONARY ARTERY DISEASE    cabg    Coronary atherosclerosis    Diabetes (HCC)    DM (diabetes mellitus) (HCC)    Heart attack (HCC)    High blood pressure    High cholesterol    Other and unspecified hyperlipidemia    Rheumatoid arthritis (HCC)    Sleep apnea    Subdural hematoma (HCC)    Unspecified sleep apnea    Visual impairment     Past Surgical History:   Procedure Laterality Date    Angiogram  2015    Angioplasty (coronary)  2/23/15    RCA    Bypass surgery          Cabg      LAD, Diagonal    Cardiac defibrillator placement  14    [employer] dual chamber ICD    Cath drug eluting stent      Tonsillectomy       Family History   Problem Relation Age of Onset    Cancer Father     Heart Disease Mother     Other (alzheimers) Mother     Stroke Neg       reports that he quit smoking about 38 years ago. His smoking use included cigarettes. He started smoking about 64 years ago. He has a 26 pack-year smoking history. He has never used smokeless tobacco. He reports current alcohol use of about 2.0 standard drinks of alcohol per week. He reports that he does not use drugs.    Objective:   Temp: 97.8 °F (36.6 °C)  Pulse: 79  Resp: 19  BP: 106/61    Intake/Output:     Intake/Output Summary (Last 24 hours) at 2024 0940  Last data filed at 2024 0548  Gross per 24 hour    Intake 1150 ml   Output 2750 ml   Net -1600 ml       Physical Exam:  General: NAD.  HEENT: Normocephalic.  Neck: Supple.  Cardiac: RRR. S1, S2 normal. No murmur.  Lungs: Diminished, mild wheezing.  Extremities: No edema.        Assessment:    Acute hypoxic respiratory failure secondary to COVID +/- bacterial PNA  NSTEMI  HFrEF  Hx of CRT-D  CAD s/p CABG  Chronic thrombocytopenia      Plan:  Clinically seems to be improving. Less hypoxic.  Compensated cardiac status. Plan to switch to PO lasix tomorrow  Continue amiodarone loading then decrease dose to 200mg PO every day  Elevated Trp secondary to demand ischemia. Not candidate for invasive therapies in view of chronic thrombocytopenia. Will continuemedical management.    Discussed with patient. Questions answered.    Please call with questions.     Level of care: L3    Sveta Erickson MD  Interventional Cardiology  [facility]    9/28/2024  9:47 AM

## 2024-09-28 NOTE — CM/SW NOTE
09/28/24 1507   Discharge disposition   Expected discharge disposition subacute   Post Acute Care Provider [facility]   Discharge transportation [facility]       LOIS spoke with pt regarding update on [facility]. Informed pt that [facility] does not have a bed available and pt has to be discharged today. Pt agreeable to going to [facility] [place]. Reserved agency in [place]. RN updating spouse. SW attempted to call Torie at [facility], but went to voicemail. Msg sent to [facility] via [place] to inform of plan. SW called [facility] again to inform of plan - requested that they inform their manager on duty of the admit time. AJ at the  stated \"the manager on duty left for the day.\"     BLS set up for 5:30pm. PCS form completed/printed.     RN to call report at [place]: [phone]    [facility]: [phone]    Zofia Wright, MANFRED, LSW  Discharge Planner      Addendum: Confirmed w/ Padmaja via [place] message and Tatum at [facility] for admission today.

## 2024-09-28 NOTE — PROGRESS NOTES
[facility]   part of [facility]     Hospitalist Progress Note     David Reyes Patient Status:  Inpatient    1945 MRN [id number]   Location [facility] 4SW-A Attending Kj Gonzalez DO   Hosp Day # 6 PCP Thea Harris DO     Chief Complaint: Dyspnea    Subjective:     Resting comfortably in bed, cough improving, off O2. Encouraged him to make decision about SKYE choice     Objective:    Review of Systems:   A comprehensive review of systems was completed; pertinent positive and negatives stated in subjective.    Vital signs:  Temp:  [97.5 °F (36.4 °C)-98.2 °F (36.8 °C)] 97.5 °F (36.4 °C)  Pulse:  [67-79] 74  Resp:  [16-20] 19  BP: (102-121)/(57-88) 104/88  SpO2:  [90 %-97 %] 96 %    Physical Exam:    General: No acute distress  Respiratory: occasional expiratory wheezes, coarse throughout   Cardiovascular: S1, S2, regular rate and rhythm  Abdomen: Soft, Non-tender, non-distended, positive bowel sounds  Neuro: No new focal deficits.  Extremities: No edema    Diagnostic Data:    Labs:  Recent Labs   Lab 24  0547 24  0403 24  0431 24  0422 24  0506 24  0751   WBC 10.0 7.8 3.5* 5.0 5.8 4.6   HGB 7.8* 7.3* 7.3* 7.3* 7.5* 7.5*   MCV 95.5 93.1 90.5 88.8 89.0 91.5   PLT 68.0* 75.0* 66.0* 60.0* 70.0* 75.0*   BAND 2 14 6  --   --   --        Recent Labs   Lab 24  0431 24  0422 24  0506 24  1823 24  0751   * 124* 123*  --  112*   BUN 43* 44* 44*  --  36*   CREATSERUM 1.38* 1.30 1.40*  --  1.14   CA 8.2* 8.3* 8.7  --  8.9   ALB 3.1* 3.6 3.7  --   --     138 137  --  138   K 4.0 4.8 3.6 4.0 3.8    107 105  --  106   CO2 22.0 24.0 23.0  --  27.0   ALKPHO 54 54 62  --   --    AST 50* 110* 59*  --   --    ALT 25 37 36  --   --    BILT 0.3 0.3 0.4  --   --    TP 5.8 6.1 6.3  --   --        Estimated Creatinine Clearance: 54.3 mL/min (based on SCr of 1.14 mg/dL).    Recent Labs   Lab 24  5814  09/22/24  0543   TROPHS 6,036* 9,055* 7,752*       No results for input(s): \"PTP\", \"INR\" in the last 168 hours.       Microbiology    Hospital Encounter on 09/20/24   1. Blood Culture     Status: None    Collection Time: 09/21/24  6:34 AM    Specimen: Blood,peripheral   Result Value Ref Range    Blood Culture Result No Growth 5 Days N/A         Imaging: Reviewed in Epic.    Medications:    potassium chloride  40 mEq Oral Once    melatonin  3 mg Oral Nightly    ipratropium-albuterol  3 mL Nebulization Q6H WA    fluticasone-salmeterol  1 puff Inhalation BID    guaiFENesin ER  600 mg Oral BID    benzonatate  200 mg Oral TID    metoprolol succinate ER  50 mg Oral Daily Beta Blocker    spironolactone  25 mg Oral Daily    amiodarone  400 mg Oral BID with meals    insulin aspart  1-20 Units Subcutaneous TID CC and HS    insulin aspart  1-68 Units Subcutaneous TID CC and HS    furosemide  40 mg Intravenous BID (Diuretic)    enoxaparin  40 mg Subcutaneous Daily    insulin degludec  20 Units Subcutaneous Daily    aspirin  325 mg Per NG Tube Daily    ezetimibe  10 mg Oral Daily    folic acid  1,600 mcg Oral QOD    folic acid  800 mcg Oral QOD       Assessment & Plan:      #Acute hypoxic respiratory failure  #COVID  #Superimposed bacterial vs atypical pneumonia   #Immunocompromised state, on methotrexate for RA  - CT chest reviewed and shows multiple areas of consolidation, round groundglass spiculated consolidations  - S/p leucovorin x 4 doses to accelerate methotrexate clearance  - S/p bronch on 9/22  - Extubated 9/24  - completed RDV/decadron course   - Sputum culture NGTD   - Bronch culture NGTD  - empiric ceftriaxone course completed today  - nebs  - Pulm/ID following    #Septic shock, resolved  - pressors weaned  - Blood cultures NGTD  - empiric ceftriaxone  - ID following    #Wide-complex tachycardia > SVT  - S/p unsuccessful cardioversion 9/21  - amiodarone PO  - Cardiology following     #Elevated troponin > unclear if  NSTEMI or demand ischemia  #CAD s/p CABG  - Troponin peaked  - Aspirin  - Unable to start heparin drip in the setting of thrombocytopenia and poor candidate for anti-platelet therapy d/t chronic thrombocytopenia; plan for medical management at this time  - Cardiology following    #acute on chronic combined heart failure with exacerbation, EF 20-25% s/p ICD  - Echo reviewed and shows EF 20-25%, severe diffuse hypokinesis with regional variations, abnormal left ventricular relaxation  - Metoprolol  - Spironolactone resumed  - diuresing with IV lasix     #LORENA on CKD, resolved   #Hyponatremia, resolved  #Metabolic acidosis, resolved    #Acute on chronic anemia  #Acute on chronic thrombocytopenia  - IV iron  - follow CBC  - Hematology following    #Diabetes type 2  -Tresiba  -Hyperglycemia protocol    #Hypertension  -Losartan on hold    #Hyperlipidemia  -Ezetimibe    #Rheumatoid arthritis  -Methotrexate on hold  -S/p leucovorin x 4 doses to accelerate methotrexate clearance in setting of LORENA per hematology    #FLAKO  -Noncompliant with CPAP at home    Estephanie Moreira DO      Supplementary Documentation:     Quality:  DVT Mechanical Prophylaxis:   SCDs,    DVT Pharmacologic Prophylaxis   Medication    enoxaparin (Lovenox) 40 MG/0.4ML SUBQ injection 40 mg    DVT Pharmacologic prophylaxis: Aspirin 325 mg           Code Status: Full Code  Beltre: External urinary catheter in place  Beltre Duration (in days): 1  Central line:    GABE:     Discharge is dependent on: placement; clinically ready for discharge  At this point Mr. Reyes is expected to be discharge to: [facility]    The 21st Century Cures Act makes medical notes like these available to patients in the interest of transparency. Please be advised this is a medical document. Medical documents are intended to carry relevant information, facts as evident, and the clinical opinion of the practitioner. The medical note is intended as peer to peer communication and may appear  blunt or direct. It is written in medical language and may contain abbreviations or verbiage that are unfamiliar.              **Certification      PHYSICIAN Certification of Need for Inpatient Hospitalization - Initial Certification    Patient will require inpatient services that will reasonably be expected to span two midnight's based on the clinical documentation in H+P.   Based on patients current state of illness, I anticipate that, after discharge, patient will require TBD.

## 2024-09-28 NOTE — CM/SW NOTE
Spoke with pt. Pt agreeable to [facility] Reserved facility in [place]. Updates sent. Message sent to facility to see if pt can admit to the facility today. Await response.     Addendum: [facility] and can not accept pt today. SW will update RN and pt.     Addendum: Attempted to call wife to update on plan, but no answer. Requested call back. SW also attempted to speak with pt and did not get ahold of pt by phone. RN updated.     Zofia Wright, MSW, LSW  Discharge Planner

## 2024-09-28 NOTE — PROGRESS NOTES
INFECTIOUS DISEASE  PROGRESS NOTE            [facility]    David Reyes Patient Status:  Inpatient    1945 MRN [id number]   Location [facility] 4SW-A Attending Kj Gonzalez,    Hosp Day # 6 PCP Thea Harris DO     Antibiotics: #7  Ceftriaxone #4    Remdesivir completed    Subjective:  Resting on room air    Objective:  Temp:  [97.4 °F (36.3 °C)-98.2 °F (36.8 °C)] 97.8 °F (36.6 °C)  Pulse:  [67-79] 79  Resp:  [15-20] 19  BP: ()/(52-76) 106/61  SpO2:  [90 %-98 %] 93 %    General: awake in no distress  Vital signs:Temp:  [97.4 °F (36.3 °C)-98.2 °F (36.8 °C)] 97.8 °F (36.6 °C)  Pulse:  [67-79] 79  Resp:  [15-20] 19  BP: ()/(52-76) 106/61  SpO2:  [90 %-98 %] 93 %  Heent; no swelling  Respiratory: Non labored, symmetric excursion, normal respirations  Abdomen: non distended  No edema  Labs:     COVID-19 Lab Results    COVID-19  Lab Results   Component Value Date    COVID19 Detected (A) 2024    COVID19 Not Detected 2024    COVID19 Not Detected 05/10/2024       Pro-Calcitonin  No results for input(s): \"PCT\" in the last 168 hours.      Cardiac  No results for input(s): \"TROP\", \"PBNP\" in the last 168 hours.      Creatinine Kinase  No results for input(s): \"CK\" in the last 168 hours.    Inflammatory Markers  Recent Labs   Lab 24  1105 24  1557 24  0403   CRP  --  16.40* 26.50*   LDH  --  512*  --    DDIMER 2.12*  --   --        Recent Labs   Lab 24  0431 24  0422 24  0751   RBC 2.43*   < > 2.47*   HGB 7.3*   < > 7.5*   HCT 22.0*   < > 22.6*   MCV 90.5   < > 91.5   MCH 30.0   < > 30.4   MCHC 33.2   < > 33.2   RDW 16.6   < > 16.3   NEPRELIM 1.91   < > 2.15   WBC 3.5*   < > 4.6   PLT 66.0*   < > 75.0*   NEUT 69  --   --    LYMPH 16  --   --    MON 7  --   --    EOS 0  --   --    NRBC 2*  --   --     < > = values in this interval not displayed.         Recent Labs   Lab 24  0431 24  0422 24  0506 24  1822  09/28/24  0751   * 124* 123*  --  112*   BUN 43* 44* 44*  --  36*   CREATSERUM 1.38* 1.30 1.40*  --  1.14   CA 8.2* 8.3* 8.7  --  8.9   ALB 3.1* 3.6 3.7  --   --     138 137  --  138   K 4.0 4.8 3.6 4.0 3.8    107 105  --  106   CO2 22.0 24.0 23.0  --  27.0   ALKPHO 54 54 62  --   --    AST 50* 110* 59*  --   --    ALT 25 37 36  --   --    BILT 0.3 0.3 0.4  --   --    TP 5.8 6.1 6.3  --   --        No results found for: \"VANCT\"  Microbiology    Hospital Encounter on 09/20/24   1. Blood Culture     Status: None    Collection Time: 09/21/24  6:34 AM    Specimen: Blood,peripheral   Result Value Ref Range    Blood Culture Result No Growth 5 Days N/A         Radiology    CXR cardiomegaly and vascular congestion    ECHO 9/21/24: EF 20-25%    Problem list reviewed:  Patient Active Problem List   Diagnosis    Nonischemic cardiomyopathy (HCC)    FLAKO (obstructive sleep apnea)    Congestive heart failure (HCC)    CAD (coronary artery disease)    Rheumatoid arthritis involving multiple sites with positive rheumatoid factor (HCC)    Type 2 diabetes mellitus with hyperglycemia, with long-term current use of insulin (HCC)    Normocytic anemia    Thrombocytopenia (HCC)    Ischemic cardiomyopathy    Dyspnea on exertion    Dry mouth    Pure hypercholesterolemia    Vitamin D deficiency    Pulmonary hypertension secondary to increased PVR (HCC)    Carotid artery stenosis    Morbid (severe) obesity due to excess calories (HCC)    Chronic obstructive pulmonary disease, unspecified (HCC)    CUMMINGS (dyspnea on exertion)    Weakness generalized    Aortic stenosis, mild    Hyponatremia    Hyperglycemia    Anemia    Acute kidney injury (HCC)    Metabolic acidosis    COVID-19    Acute bronchospasm due to viral infection    Bronchospasm    Pneumonia    Acute respiratory failure with hypoxia (HCC)    Immunocompromised state (HCC)    Septic shock (HCC)    Wide-complex tachycardia    Elevated troponin    Acute on chronic anemia     Community acquired pneumonia    Type 2 diabetes mellitus without complication, with long-term current use of insulin (HCC)             ASSESSMENT/PLAN:  1.  COVID  No records of previous vaccinations  Underlying cardiomyopathy EF 20-25% recent echo      -per notes cough started  9/13, 1 week prior to admission    Brought to ED on 9/20 with worsening SOB and productive cough, vomited at home  Respiratory failure, intubated since 9/21 and extubatged on 9/24  SP bronch on 9/22 (culture no growth)    -picture of  respiratory complications (CHF, COPD, pneumonia) triggered by COVID, rather than a primary active viral process    Now on room air    2. Cardiomypathy, -25%, elevated troponin  CHF  -cardiology following    3. Pneumonia  -following covid infection (symptoms 1 week prior to admission) with underlying CHF  Sputum cultures no growth  Complete 7 days abx today        Frank Cr MD, MD [facility]  [phone]

## 2024-09-28 NOTE — PROGRESS NOTES
CRITICAL CARE            S: He is doing ok, still has some mild dyspnea. No other complaints.     Meds:   melatonin  3 mg Oral Nightly    ipratropium-albuterol  3 mL Nebulization Q6H WA    fluticasone-salmeterol  1 puff Inhalation BID    guaiFENesin ER  600 mg Oral BID    benzonatate  200 mg Oral TID    metoprolol succinate ER  50 mg Oral Daily Beta Blocker    spironolactone  25 mg Oral Daily    amiodarone  400 mg Oral BID with meals    insulin aspart  1-20 Units Subcutaneous TID CC and HS    insulin aspart  1-68 Units Subcutaneous TID CC and HS    furosemide  40 mg Intravenous BID (Diuretic)    enoxaparin  40 mg Subcutaneous Daily    cefTRIAXone  2 g Intravenous Q24H    insulin degludec  20 Units Subcutaneous Daily    aspirin  325 mg Per NG Tube Daily    ezetimibe  10 mg Oral Daily    folic acid  1,600 mcg Oral QOD    folic acid  800 mcg Oral QOD       Prn Meds:    guaiFENesin-codeine    albuterol    ondansetron    glucose **OR** glucose **OR** glucose-vitamin C **OR** dextrose **OR** glucose **OR** glucose **OR** glucose-vitamin C    acetaminophen    metoclopramide    polyethylene glycol (PEG 3350)    fleet enema    dextrose 5%-sodium chloride 0.45%    acetaminophen **OR** acetaminophen **OR** acetaminophen **OR** [DISCONTINUED] acetaminophen    senna    bisacodyl    Infusions:   dextrose 5%-sodium chloride 0.45% Stopped (09/21/24 1751)       OBJECTIVE:  Vitals:    09/28/24 0500 09/28/24 0600 09/28/24 0700 09/28/24 0800   BP:    106/61   Pulse: 68 74 69 79   Resp: 19 19 19 19   Temp:    97.8 °F (36.6 °C)   TempSrc:    Temporal   SpO2: 92% 96% 95% 93%   Weight:       Height:         O2: room air    Gen - Alert, oriented x 3, in no apparent distress  Lungs - CTAB  CV - regular rate & rhythm. Normal S1, S2. No murmurs, rubs, or gallops appreciated.  Abdomen - soft, nontender to palpation , obese  Extremities - No cyanosis, clubbing, edema appreciated.        Labs:  Recent Labs   Lab 09/26/24  0422 09/27/24  0506  09/28/24 0751   WBC 5.0 5.8 4.6   HGB 7.3* 7.5* 7.5*   PLT 60.0* 70.0* 75.0*     Recent Labs   Lab 09/21/24  1204 09/21/24  1557 09/21/24  1914 09/22/24  0915 09/22/24  2314 09/23/24  0547 09/24/24  0403 09/25/24  0431 09/26/24  0422 09/27/24  0506 09/27/24  1823 09/28/24 0751   NA  --  139   < >  --  135* 133* 137 138 138 137  --  138   K  --  3.7   < >  --  5.0 4.3 4.3 4.0 4.8 3.6 4.0 3.8   CL  --  107   < >  --  106 105 109 108 107 105  --  106   CO2  --  20.0*   < >  --  15.0* 18.0* 20.0* 22.0 24.0 23.0  --  27.0   BUN  --  34*   < >  --  33* 35* 33* 43* 44* 44*  --  36*   CREATSERUM  --  1.74*   < >  --  1.66* 1.66* 1.33* 1.38* 1.30 1.40*  --  1.14   GLU  --  105*   < >  --  134* 213* 183* 176* 124* 123*  --  112*   ANIONGAP  --  12   < >  --  14 10 8 8 7 9  --  5   ALB  --  4.3   < >  --   --  3.7 3.4 3.1* 3.6 3.7  --   --    CA  --  8.5*   < >  --  8.8 8.0* 7.9* 8.2* 8.3* 8.7  --  8.9   MG  --  2.0   < >  --  2.2 2.1 2.3  --   --   --   --   --    PHOS  --   --   --   --   --  3.4 2.8 3.0  --   --   --   --    ALKPHO  --  48   < >  --   --  42* 48 54 54 62  --   --    AST  --  61*   < >  --   --  62* 58* 50* 110* 59*  --   --    ALT  --  27   < >  --   --  26 25 25 37 36  --   --    BILT  --  0.2   < >  --   --  0.4 0.3 0.3 0.3 0.4  --   --    TP  --  7.0   < >  --   --  6.1 5.8 5.8 6.1 6.3  --   --    LACTI 6.2* 2.7*  --  1.3  --   --   --   --   --   --   --   --     < > = values in this interval not displayed.     Recent Labs   Lab 09/21/24  1105   PTT 29.4     Recent Labs   Lab 09/21/24  1914 09/21/24  2359 09/22/24  0543   TROPHS 6,036* 9,055* 7,752*     Recent Labs   Lab 09/21/24  1105 09/21/24  1557 09/22/24  1244 09/24/24  0403   LDH  --  512*  --   --    DDIMER 2.12*  --   --   --    CRP  --  16.40*  --  26.50*   NH3  --   --  <10*  --      Recent Labs   Lab 09/21/24  1048   ABGPHT 7.36   NNUQFH9S 28*   YKYRZ6M 141*   ABGHCO3 18.3*   ABGBE -8.6*       Recent Labs   Lab 09/21/24  1340   STREPPNEUMAG  Negative   LEGIONAG Negative       Imaging reviewed    ASSESSMENT AND PLAN      Acute hypoxemic respiratory failure - intubated 9/22-9/24 secondary to COVID and pneumonia. Improving.   -supplemental oxygen prn -- now on room air  -continue antibiotics   -lasix per cardiology  COVID / superimposed bacterial pneumonia. Bronch cultures from 9/22 were negative   -completed remdesivir and dexamethasone    -continue ceftriaxone, to finish today.  Shock - resolved  -monitor bp  Encephalopathy - suspect due to infection. Improved  -supportive care   LORENA on CKD - improved  -monitor labs, urine output  Elevated troponin - suspect demand ischemia  -cardiology is following  Ventricular tachycardia - failed cardioversion in ER. Patient has BIV-ICD  -continue amiodarone  -per cardiology  HFrEF (EF 20-25) pHTN, aortic stenosis, CAD s/p CABG  -per cardiology  Abnormal LFT - improved  -monitor  Thrombocytopenia - chronic  -monitor plts   Anemia - stable  -monitor hgb  Rheumatoid arthritis - on methotrexate at home   -outpatient rheum follow up   DM  -monitor accuchecks  -insulin aspart, degludec  FLAKO  -outpt follow up with [facility]   FEN  -fluids: none  -electrolytes: monitor electrolytes and replace prn  -nutrition: cardiac diet  Proph  -LMWH  Dispo  -full code  -transfer to floor    Cm Raman M.D.  Pulmonary/Critical Care

## 2024-09-28 NOTE — PLAN OF CARE
Received pt report at bedside. Pt daniele POC well. Discharge orders received from hospitalists and all consults. F/U appointments explained, discharge instructions given to patient. Education completed, all questions answered. All LDAs removed. Pt left via ambulance to [facility] at 1753. Charge nurse updated.

## 2024-10-04 ENCOUNTER — TELEPHONE (OUTPATIENT)
Dept: FAMILY MEDICINE CLINIC | Facility: CLINIC | Age: 79
End: 2024-10-04

## 2024-10-04 NOTE — TELEPHONE ENCOUNTER
PATIENT IS CALLING THIS MORNING.  HE WASN'T SURE WHEN HE WAS SCHEDULED FOR HIS MEDICARE VISIT.  I INFORMED PATIENT HIS APPOINTMENT WAS YESTERDAY.  PATIENT WANTED TO LET DR CARMEN KNOW,  INCASE SHE DIDN'T KNOW, IS THAT PATIENT IS AT [facility].  HE WILL CALL OFFICE AND RESCHEDULE HIS MEDICARE WELL VISIT

## 2024-10-14 ENCOUNTER — TELEPHONE (OUTPATIENT)
Dept: FAMILY MEDICINE CLINIC | Facility: CLINIC | Age: 79
End: 2024-10-14

## 2024-10-14 DIAGNOSIS — D72.819 LEUKOPENIA, UNSPECIFIED TYPE: Primary | ICD-10-CM

## 2024-10-14 NOTE — TELEPHONE ENCOUNTER
Spouse states that pt is now at [facility] on [facility] in [place] and has low white cell blood count.  Not sure what is happening or what doctor's name is that is taking care of spouse. Spouse also states pt had a really bad bloody nose for about an hour.  Would like a referral to hematologist that would be local that Dr. Harris prefers.

## 2024-10-15 NOTE — TELEPHONE ENCOUNTER
Patient wife notified and verbalized understanding.  Number to schedule provided    Wife states patient is able to get from wheelchair to bed but not able to walk much. States he is getting PT to get strength back

## 2024-10-15 NOTE — TELEPHONE ENCOUNTER
Left message on voicemail/answering machine for patient wife to call office.  [place] [facility] [phone]

## 2024-10-22 ENCOUNTER — PATIENT OUTREACH (OUTPATIENT)
Dept: CASE MANAGEMENT | Age: 79
End: 2024-10-22

## 2024-10-22 NOTE — PROGRESS NOTES
Called patient and left a message for patient to call back when they can. Reviewed patient chart. Left contact my contact number [phone]        Time Spent This Encounter Total: 5  min medical record review  Monthly Minute Total including today: 5 minutes

## 2024-10-28 ENCOUNTER — TELEPHONE (OUTPATIENT)
Dept: FAMILY MEDICINE CLINIC | Facility: CLINIC | Age: 79
End: 2024-10-28

## 2024-10-28 NOTE — TELEPHONE ENCOUNTER
RN spoke to wife- Spouse has been in rehab over at [facility]    She has some paperwork that she wants to drop off for us and would like to make copies.     I advised pt to drop off forms and we can make copies.

## 2024-10-28 NOTE — TELEPHONE ENCOUNTER
Pt's wife calling- pt was recently in the hospital for [facility] and now at [facility]. Pt would like to discuss some concerns they have.

## 2024-10-30 ENCOUNTER — TELEPHONE (OUTPATIENT)
Dept: HEMATOLOGY/ONCOLOGY | Facility: HOSPITAL | Age: 79
End: 2024-10-30

## 2024-10-30 NOTE — TELEPHONE ENCOUNTER
New consult for Leukopenia, unspecified type. Patient is being referred by Dr. Thea Harris. Esteban from [facility] is calling to schedule patient as patient is in rehab now per patient's wife. Please call Esteban back to schedule at [phone]. Called 10/30/24 GA

## 2024-11-11 NOTE — DIETARY NOTE
[facility]   part of [facility]  NUTRITION ASSESSMENT    Pt does not meet malnutrition criteria at this time.      NUTRITION INTERVENTION:    Meal and Snacks - Continue CHO Controlled diet as tolerated; Monitor and encourage adequate PO intake.  Medical Food Supplements -  Will evaluate need pending PO intake .  Nutrition Education - Uploaded Low Sodium Diet education into patient discharge instructions.    PATIENT STATUS: 9/26: Extubated 9/24. SLP evaluated and recommends regular solids and thin liquids. Attempted to call pt but unavailable. Per RN, pt with good appetite/PO intake, ate 100% bfast this AM. Pt having some nausea but otherwise no GI symptoms with last BM 9/24. NKFA. Will continue to monitor intake and need for ONS. Also received consult for HF diet education. Uploaded low sodium diet into discharge instructions.     9/22: 80 y/o male admitted on 9/20 w/ bronchospasm. + COVID19 (9/21). Pt seen d/t nutrition consult for at risk and TF recs, and MST score of 3. Pt intubated yesterday (9/21). Propofol infusing at 19.4 ml/hr, which provides: 512 lipid kcal. S/p bronchoscopy today. Per H&P, pt w/ SOB x 1 week and decreased PO PTA.     PMH: remote tobacco use, HTN, DL, CAD, CABG, CHF, DM, CKD, RA, FLAKO.     ANTHROPOMETRICS:  Ht: 175.3 cm (5' 9\")  Wt: 112.3 kg (247 lb 9.2 oz).   BMI: Body mass index is 36.56 kg/m².  IBW: 72.7 kg    WEIGHT HISTORY:   Noted no significant wt changes per EMR.  Wt Readings from Last 10 Encounters:   09/26/24 112.3 kg (247 lb 9.2 oz)   09/09/24 109.3 kg (241 lb)   06/18/24 106.6 kg (235 lb)   05/23/24 106.1 kg (234 lb)   05/13/24 107 kg (236 lb)   05/10/24 107 kg (236 lb)   04/08/24 111.3 kg (245 lb 6.4 oz)   03/04/24 110.7 kg (244 lb)   10/12/23 110.8 kg (244 lb 6 oz)   02/20/23 126.6 kg (279 lb)        NUTRITION:  Diet: CHO Controlled     Food Allergies: No  Cultural/Ethnic/Yazdanism Preferences Addressed: Yes    Percent Meals Eaten (last 3 days)       Date/Time Percent  Meals Eaten (%)    09/26/24 0800 100 %          GI system review:  Last BM Date: 09/24/24  Skin and wounds: skin intact    NUTRITION RELATED PHYSICAL FINDINGS:     1. Body Fat/Muscle Mass: no wasting noted / well nourished via glass door. On airborne isolation precautions for + COVID.     2. Fluid Accumulation: upper extremity edema, lower extremity edema, and b/l hands, b/l feet      NUTRITION PRESCRIPTION: 72.7 kg IBW  Calories: 9497-2281 calories/day (25-30 kcal/kg)  Protein: 109-145 grams protein/day (1.5-2.0 grams protein per kg IBW)  Fluid: ~1 ml/kcal or per MD discretion    NUTRITION DIAGNOSIS/PROBLEM:  Inadequate oral intake related to insufficient appetite resulting in inadequate nutrition intake as evidenced by documented/reported insufficient oral intake    MONITOR AND EVALUATE/NUTRITION GOALS:  PO intake of 75% of meals TID - New  PO intake of 75% of oral nutrition supplement/s - New  Weight stable within 1 to 2 lbs during admission - New  Return to PO intake or advance diet in 24-48 hrs - Resolved  Start alternative nutrition in 24-48 hrs if diet is not able to advance- Resolved      MEDICATIONS:  Abx, folic acid 1600 mcg every other day + 800 mcg every other day, lasix BID, novolog, Tresiba 20 units daily, ferrlecit     LABS:  , POC glucose x 24hrs: 175-239 mg/dl, A1c = 5.9%    Pt is at Moderate nutrition risk    Jasmyne Birmingham RD, LDN, [facility]  Clinical Dietitian  Spectra: [phone]       No

## 2024-11-18 ENCOUNTER — TELEPHONE (OUTPATIENT)
Dept: FAMILY MEDICINE CLINIC | Facility: CLINIC | Age: 79
End: 2024-11-18

## 2024-11-19 ENCOUNTER — MED REC SCAN ONLY (OUTPATIENT)
Dept: FAMILY MEDICINE CLINIC | Facility: CLINIC | Age: 79
End: 2024-11-19

## 2024-11-20 ENCOUNTER — APPOINTMENT (OUTPATIENT)
Dept: HEMATOLOGY/ONCOLOGY | Facility: HOSPITAL | Age: 79
End: 2024-11-20
Attending: INTERNAL MEDICINE
Payer: MEDICARE

## 2024-11-25 ENCOUNTER — PATIENT OUTREACH (OUTPATIENT)
Dept: CASE MANAGEMENT | Age: 79
End: 2024-11-25

## 2024-11-25 NOTE — PROGRESS NOTES
Called patient and left a message for patient to call back when they can. Reviewed patient chart. Left contact my contact number [phone]        Time Spent This Encounter Total: 5  min medical record review  Monthly Minute Total including today: 5 minutes

## 2024-12-02 ENCOUNTER — OFFICE VISIT (OUTPATIENT)
Dept: FAMILY MEDICINE CLINIC | Facility: CLINIC | Age: 79
End: 2024-12-02
Payer: MEDICARE

## 2024-12-02 VITALS
OXYGEN SATURATION: 96 % | BODY MASS INDEX: 34 KG/M2 | SYSTOLIC BLOOD PRESSURE: 120 MMHG | WEIGHT: 231 LBS | RESPIRATION RATE: 20 BRPM | HEART RATE: 73 BPM | DIASTOLIC BLOOD PRESSURE: 60 MMHG | TEMPERATURE: 98 F

## 2024-12-02 DIAGNOSIS — I65.29 STENOSIS OF CAROTID ARTERY, UNSPECIFIED LATERALITY: ICD-10-CM

## 2024-12-02 DIAGNOSIS — Z00.00 ENCOUNTER FOR ANNUAL HEALTH EXAMINATION: Primary | ICD-10-CM

## 2024-12-02 DIAGNOSIS — E87.1 HYPONATREMIA: ICD-10-CM

## 2024-12-02 DIAGNOSIS — Z12.5 SCREENING FOR MALIGNANT NEOPLASM OF PROSTATE: ICD-10-CM

## 2024-12-02 DIAGNOSIS — E11.65 TYPE 2 DIABETES MELLITUS WITH HYPERGLYCEMIA, WITH LONG-TERM CURRENT USE OF INSULIN (HCC): ICD-10-CM

## 2024-12-02 DIAGNOSIS — J12.82 PNEUMONIA DUE TO COVID-19 VIRUS: ICD-10-CM

## 2024-12-02 DIAGNOSIS — I50.22 CHRONIC SYSTOLIC CONGESTIVE HEART FAILURE (HCC): ICD-10-CM

## 2024-12-02 DIAGNOSIS — Z92.89 HISTORY OF RECENT HOSPITALIZATION: ICD-10-CM

## 2024-12-02 DIAGNOSIS — I35.0 AORTIC STENOSIS, MILD: ICD-10-CM

## 2024-12-02 DIAGNOSIS — G47.33 OSA (OBSTRUCTIVE SLEEP APNEA): ICD-10-CM

## 2024-12-02 DIAGNOSIS — I25.84 CORONARY ARTERY DISEASE DUE TO CALCIFIED CORONARY LESION: ICD-10-CM

## 2024-12-02 DIAGNOSIS — I27.21 PULMONARY HYPERTENSION SECONDARY TO INCREASED PVR (HCC): ICD-10-CM

## 2024-12-02 DIAGNOSIS — R00.0 WIDE-COMPLEX TACHYCARDIA: ICD-10-CM

## 2024-12-02 DIAGNOSIS — U07.1 PNEUMONIA DUE TO COVID-19 VIRUS: ICD-10-CM

## 2024-12-02 DIAGNOSIS — Z79.4 TYPE 2 DIABETES MELLITUS WITHOUT COMPLICATION, WITH LONG-TERM CURRENT USE OF INSULIN (HCC): ICD-10-CM

## 2024-12-02 DIAGNOSIS — I42.8 NONISCHEMIC CARDIOMYOPATHY (HCC): ICD-10-CM

## 2024-12-02 DIAGNOSIS — D64.9 ACUTE ON CHRONIC ANEMIA: ICD-10-CM

## 2024-12-02 DIAGNOSIS — J96.01 ACUTE RESPIRATORY FAILURE WITH HYPOXIA (HCC): ICD-10-CM

## 2024-12-02 DIAGNOSIS — D64.9 NORMOCYTIC ANEMIA: ICD-10-CM

## 2024-12-02 DIAGNOSIS — D69.6 THROMBOCYTOPENIA (HCC): ICD-10-CM

## 2024-12-02 DIAGNOSIS — I25.10 CORONARY ARTERY DISEASE DUE TO CALCIFIED CORONARY LESION: ICD-10-CM

## 2024-12-02 DIAGNOSIS — I25.5 ISCHEMIC CARDIOMYOPATHY: ICD-10-CM

## 2024-12-02 DIAGNOSIS — E55.9 VITAMIN D DEFICIENCY: ICD-10-CM

## 2024-12-02 DIAGNOSIS — E66.811 OBESITY (BMI 30.0-34.9): ICD-10-CM

## 2024-12-02 DIAGNOSIS — R06.09 DYSPNEA ON EXERTION: ICD-10-CM

## 2024-12-02 DIAGNOSIS — E78.00 PURE HYPERCHOLESTEROLEMIA: ICD-10-CM

## 2024-12-02 DIAGNOSIS — D84.9 IMMUNOCOMPROMISED STATE (HCC): ICD-10-CM

## 2024-12-02 DIAGNOSIS — J44.9 CHRONIC OBSTRUCTIVE PULMONARY DISEASE, UNSPECIFIED COPD TYPE (HCC): ICD-10-CM

## 2024-12-02 DIAGNOSIS — R53.1 WEAKNESS GENERALIZED: ICD-10-CM

## 2024-12-02 DIAGNOSIS — Z79.4 TYPE 2 DIABETES MELLITUS WITH HYPERGLYCEMIA, WITH LONG-TERM CURRENT USE OF INSULIN (HCC): ICD-10-CM

## 2024-12-02 DIAGNOSIS — D64.9 ANEMIA, UNSPECIFIED TYPE: ICD-10-CM

## 2024-12-02 DIAGNOSIS — M05.79 RHEUMATOID ARTHRITIS INVOLVING MULTIPLE SITES WITH POSITIVE RHEUMATOID FACTOR (HCC): ICD-10-CM

## 2024-12-02 DIAGNOSIS — E11.9 TYPE 2 DIABETES MELLITUS WITHOUT COMPLICATION, WITH LONG-TERM CURRENT USE OF INSULIN (HCC): ICD-10-CM

## 2024-12-02 DIAGNOSIS — E55.9 VITAMIN D DEFICIENCY DISEASE: ICD-10-CM

## 2024-12-02 DIAGNOSIS — R68.2 DRY MOUTH: ICD-10-CM

## 2024-12-02 PROBLEM — A41.9 SEPTIC SHOCK (HCC): Status: RESOLVED | Noted: 2024-09-22 | Resolved: 2024-12-02

## 2024-12-02 PROBLEM — R79.89 ELEVATED TROPONIN: Status: RESOLVED | Noted: 2024-09-22 | Resolved: 2024-12-02

## 2024-12-02 PROBLEM — R65.21 SEPTIC SHOCK (HCC): Status: RESOLVED | Noted: 2024-09-22 | Resolved: 2024-12-02

## 2024-12-02 PROBLEM — B34.9 ACUTE BRONCHOSPASM DUE TO VIRAL INFECTION: Status: RESOLVED | Noted: 2024-09-21 | Resolved: 2024-12-02

## 2024-12-02 PROBLEM — J98.01 BRONCHOSPASM: Status: RESOLVED | Noted: 2024-09-21 | Resolved: 2024-12-02

## 2024-12-02 PROBLEM — J18.9 PNEUMONIA: Status: RESOLVED | Noted: 2024-09-22 | Resolved: 2024-12-02

## 2024-12-02 PROBLEM — N17.9 ACUTE KIDNEY INJURY: Status: RESOLVED | Noted: 2024-09-21 | Resolved: 2024-12-02

## 2024-12-02 PROBLEM — R73.9 HYPERGLYCEMIA: Status: RESOLVED | Noted: 2024-09-21 | Resolved: 2024-12-02

## 2024-12-02 PROBLEM — E87.20 METABOLIC ACIDOSIS: Status: RESOLVED | Noted: 2024-09-21 | Resolved: 2024-12-02

## 2024-12-02 PROBLEM — J18.9 COMMUNITY ACQUIRED PNEUMONIA: Status: RESOLVED | Noted: 2024-09-22 | Resolved: 2024-12-02

## 2024-12-02 PROBLEM — J98.01 ACUTE BRONCHOSPASM DUE TO VIRAL INFECTION: Status: RESOLVED | Noted: 2024-09-21 | Resolved: 2024-12-02

## 2024-12-02 PROBLEM — N17.9 ACUTE KIDNEY INJURY (HCC): Status: RESOLVED | Noted: 2024-09-21 | Resolved: 2024-12-02

## 2024-12-02 RX ORDER — ASPIRIN 81 MG/1
81 TABLET ORAL DAILY
COMMUNITY

## 2024-12-02 NOTE — PROGRESS NOTES
HPI:   David Reyes is a 79 year old male who presents for a Medicare Subsequent Annual Wellness visit (Pt already had Initial Annual Wellness).    9/22/24: was admitted to ICU with COVID PNA x 10 days. Discharged to [facility], just discharged 2 weeks ago. Now home.   Still tired. Did not sleep well while he was in rehab. Sleeping a lot now. Has home health nurse once a week. Has PT and OT coming once per week.   He's using a walker most of the time. More unstable in the morning. Struggles with being on feet for long times, even getting breakfast ready, taking meds, etc. Back tightens up and becomes painful, better 5 min after sitting.   Coughing more, sneezing a lot. Yesterday coughed up some blood. Mostly it's been clear. It doesn't feel the same as when he was sick. Can't feel his decline in the same way.     RA: Following with Dr. Tillman and overall doing okay with methotrexate 4 tabs weekly. Had recent labs done.     CAD/heart failure: Following with cardiology. Will be seeing Dr. Gonzalez tomorrow.    EP replaced his defibrillator last year. That has helped a lot with CUMMINGS, but not completely gone.   BP running low since getting out of the hospital. At home it's 110/70's at lowest.   *gets swelling in the left ankle, hard to walk at times. Compression helps some. He had CABG and vein grafting from left leg.     Will also be seeing hematology for blood cell counts at [facility].     DM sugars have been  in the AM. Has seen DM educationAra, recently. Doing okay with insulin dosing, on 12-14 units nightly (has been reducing insulin lately). Taking metformin. Up to date with eye exam, due in January. Last A1c was 5.9, 3 months ago.     FLAKO: not using cpap machine at all. Stopped it awhile ago. Can't wear it for more than a couple of hours. Following with Dr. Willingham for cpap. Sleeping okay with a wedge pillow.     Struggling with finances. He can no longer work. They do not have long-term savings. They  are living off social security.     Had cataract surgery on both eyes.     He has been screened for Falls and is High Risk. Fall Prevention information provided to patient in After Visit Summary.    Do you feel unsteady when standing or walking?: Yes  Do you worry about falling?: Yes  Have you fallen in the past year?: Yes  How many times have you fallen?: 1  Were you injured?: Yes   Abnormal      David Reyes has some abnormal functions as listed below:  He has Walking problems based on screening of functional status. He has problems with Daily Activities based on screening of functional status.       Depression Screening (PHQ-2/PHQ-9): Over the LAST 2 WEEKS   Little interest or pleasure in doing things: Not at all  Feeling down, depressed, or hopeless: Not at all  PHQ-2 SCORE: 0  PHQ-2 SCORE: 0  , done 2024   If you checked off any problems, how difficult have these problems made it for you to do your work, take care of things at home, or get along with other people?: Not difficult at all    Last [place] Suicide Screening on 2024 was No Risk.        Advanced Directive:  He does NOT have a Living Will on file in [facility].   The patient has this document but we do not have it in [id number], and patient is instructed to get our office a copy of it for scanning into Epic.     He does NOT have a Power of  for Health Care on file in [facility].   The patient has this document but we do not have it in [facility], and patient is instructed to get our office a copy of it for scanning into Epic.       He smoked tobacco in the past but quit greater than 12 months ago.  Social History     Tobacco Use   Smoking Status Former    Current packs/day: 0.00    Average packs/day: 1 pack/day for 26.0 years (26.0 ttl pk-yrs)    Types: Cigarettes    Start date: 1960    Quit date: 1986    Years since quittin.5   Smokeless Tobacco Never   Tobacco Comments    NON-SMOKER        Mr. Reyes already takes aspirin and has it  on his medication list.   CAGE screening score of 0 on 12/2/2024, showing low risk of alcohol abuse.        Patient Care Team: Patient Care Team:  Thea Harris DO as PCP - General (Family Medicine)  Lynn Hilton CMA as [facility] Care Manager  Ivana Mitchell, RN, NORBERTO as Registered Nurse (Registered Nurse)  Marcela Sung MD as Consulting Physician (Cardiovascular Diseases)  Rosalinda Zuniga PT as Physical Therapist  Leena Brooks PTA as Physical Therapy Assistant (Physical Therapy)  Savanah Wallace PA-C (Physician Assistant)    Patient Active Problem List   Diagnosis    Nonischemic cardiomyopathy (HCC)    FLAKO (obstructive sleep apnea)    Congestive heart failure (HCC)    CAD (coronary artery disease)    Rheumatoid arthritis involving multiple sites with positive rheumatoid factor (HCC)    Type 2 diabetes mellitus with hyperglycemia, with long-term current use of insulin (HCC)    Normocytic anemia    Thrombocytopenia (HCC)    Ischemic cardiomyopathy    Dyspnea on exertion    Dry mouth    Pure hypercholesterolemia    Vitamin D deficiency    Pulmonary hypertension secondary to increased PVR (HCC)    Carotid artery stenosis    Obesity (BMI 30.0-34.9)    Chronic obstructive pulmonary disease, unspecified (HCC)    Weakness generalized    Aortic stenosis, mild    Hyponatremia    Anemia    Acute respiratory failure with hypoxia (HCC)    Immunocompromised state (HCC)    Wide-complex tachycardia    Acute on chronic anemia    Type 2 diabetes mellitus without complication, with long-term current use of insulin (HCC)     Wt Readings from Last 3 Encounters:   12/02/24 231 lb (104.8 kg)   09/28/24 238 lb 12.1 oz (108.3 kg)   09/09/24 241 lb (109.3 kg)      Last Cholesterol Labs:   Lab Results   Component Value Date    CHOLEST 142 09/20/2024    HDL 36 (L) 09/20/2024    LDL 90 09/20/2024    TRIG 125 09/24/2024          Last Chemistry Labs:   Lab Results   Component Value Date    AST 59 (H) 09/27/2024    ALT 36 09/27/2024     CA 8.9 09/28/2024    ALB 3.7 09/27/2024    TSH 2.024 09/21/2024    CREATSERUM 1.14 09/28/2024     (H) 09/28/2024        CBC  (most recent labs)   Lab Results   Component Value Date    WBC 4.6 09/28/2024    HGB 7.5 (L) 09/28/2024    PLT 75.0 (L) 09/28/2024        ALLERGIES:   He is allergic to pcn [penicillins] and statins.    CURRENT MEDICATIONS:   Outpatient Medications Marked as Taking for the 12/2/24 encounter (Office Visit) with Thea Harris, DO   Medication Sig    aspirin 81 MG Oral Tab EC Take 1 tablet (81 mg total) by mouth daily.    SPIRONOLACTONE 25 MG Oral Tab TAKE 1 TABLET(25 MG) BY MOUTH DAILY WITH BREAKFAST    METFORMIN  MG Oral Tablet 24 Hr TAKE 2 TABLETS(1000 MG) BY MOUTH TWICE DAILY WITH MEALS    gabapentin 100 MG Oral Cap Take 1 capsule (100 mg total) by mouth 3 (three) times daily.    metoprolol succinate ER 50 MG Oral Tablet 24 Hr Take 1 tablet (50 mg total) by mouth 2 (two) times daily.    Insulin Glargine, 2 Unit Dial, (TOUJEO MAX SOLOSTAR) 300 UNIT/ML Subcutaneous Solution Pen-injector Inject 24 Units into the skin daily.    CONTOUR NEXT TEST In Vitro Strip Test 3 times daily Dx Code: E11.9 Insulin - dependent diabetes    Microlet Lancets Does not apply Misc CHECK BLOOD SUGAR THREE TIMES DAILY    losartan 25 MG Oral Tab Take 1 tablet (25 mg total) by mouth every evening.    BD PEN NEEDLE LILO 2ND GEN 32G X 4 MM Does not apply Misc USE DAILY    torsemide 20 MG Oral Tab Take 1 tablet (20 mg total) by mouth BID (Diuretic).    Vitamin B-1 100 MG Oral Tab Take 1 tablet (100 mg total) by mouth daily.    ezetimibe 10 MG Oral Tab Take 1 tablet (10 mg total) by mouth daily.    Lovastatin 40 MG Oral Tab Take 1 tablet (40 mg total) by mouth in the morning and 1 tablet (40 mg total) before bedtime.    folic acid 800 MCG Oral Tab Take 1 tablet (800 mcg total) by mouth every other day. Alternate with 2 tablets (1600mcg) every other day    About 1000 mcg daily      MEDICAL INFORMATION:   He   has a past medical history of Acute kidney injury (HCC) (09/21/2024), Anxiety state, Back problem, CAD (coronary artery disease), Congestive heart disease (HCC), Congestive heart failure (HCC), CORONARY ARTERY DISEASE (2004), Coronary atherosclerosis, COVID-19 (09/21/2024), Diabetes (HCC), DM (diabetes mellitus) ([facility]), Heart attack ([facility]), High blood pressure, High cholesterol, Other and unspecified hyperlipidemia, Pneumonia (09/22/2024), Rheumatoid arthritis ([facility]), Sleep apnea, Subdural hematoma ([facility]), Unspecified sleep apnea, and Visual impairment.    He  has a past surgical history that includes tonsillectomy; Cardiac defibrillator placement (6/7/14); angiogram (2/11/2015); angioplasty (coronary) (2/23/15); cabg (2004); bypass surgery; and cath drug eluting stent.    His family history includes Cancer in his father; Heart Disease in his mother; alzheimers in his mother.   SOCIAL HISTORY:   He  reports that he quit smoking about 38 years ago. His smoking use included cigarettes. He started smoking about 64 years ago. He has a 26 pack-year smoking history. He has never used smokeless tobacco. He reports current alcohol use of about 2.0 standard drinks of alcohol per week. He reports that he does not use drugs.     REVIEW OF SYSTEMS:   GENERAL: feels well otherwise, see HPI   SKIN: denies any unusual skin lesions  EYES: denies blurred vision or double vision  HEENT: denies nasal congestion, sinus pain or ST  LUNGS: +  shortness of breath with exertion  CARDIOVASCULAR: denies chest pain on exertion, + SOB   GI: denies abdominal pain, denies heartburn, + distension   : 1 per night nocturia, no complaint of urinary incontinence  MUSCULOSKELETAL: denies back pain, + joint pains, see HPI   NEURO: denies headaches or dizziness   PSYCHE: denies depression or anxiety  HEMATOLOGIC: denies hx of anemia  ENDOCRINE: denies thyroid history  ALL/ASTHMA: denies hx of allergy or asthma    EXAM:   /60   Pulse 73   Temp  97.8 °F (36.6 °C) (Temporal)   Resp 20   Wt 231 lb (104.8 kg)   SpO2 96%   BMI 34.11 kg/m²   Estimated body mass index is 34.11 kg/m² as calculated from the following:    Height as of 9/21/24: 5' 9\" (1.753 m).    Weight as of this encounter: 231 lb (104.8 kg).    Medicare Hearing Assessment  (Required for AWV/SWV)    Hearing Screening    Time taken: 12/2/2024  1:00 PM  Screening Method: Finger Rub  Finger Rub Result: Pass                  Visual Acuity                           General Appearance:  Alert, cooperative, no distress, appears stated age   Head:  Normocephalic, without obvious abnormality, atraumatic   Eyes:  PERRL, conjunctiva/corneas clear, EOM's intact, both eyes   Ears:  Normal TM's and external ear canals, both ears   Nose: Nares normal, septum midline, mucosa normal, no drainage or sinus tenderness   Throat: Lips, mucosa, and tongue normal; teeth and gums normal   Neck: Supple, symmetrical, trachea midline, no adenopathy, thyroid: not enlarged, symmetric, no tenderness/mass/nodules,    Back:   Symmetric, no curvature, no CVA tenderness   Lungs:   Clear to auscultation bilaterally, respirations unlabored   Chest Wall:  No tenderness or deformity   Heart:  Regular rate and rhythm, S1, S2 normal, no murmur, rub or gallop   Abdomen:   Soft, non-tender, + distended, normal BS.    Extremities: Extremities normal, atraumatic, no cyanosis or edema   Pulses: 2+ and symmetric   Skin: Skin color, texture, turgor normal, no rashes or lesions   Lymph nodes: Cervical, supraclavicular, and axillary nodes normal   Neurologic: Normal   Bilateral barefoot skin diabetic exam is normal, visualized feet and the appearance is normal.  Bilateral monofilament/sensation of both feet is normal.   Pulsation pedal pulse exam of both lower legs/feet is normal as well.    Vaccination History     Immunization History   Administered Date(s) Administered    Albuterol Sulfate solution 0.083% 2.5 mg-3ml 09/03/2013, 09/21/2013     Pneumococcal (Prevnar 13) 11/25/2014    Pneumovax 23 01/01/2015, 04/25/2017   Pended Date(s) Pended    Influenza Vaccine Refused 10/03/2022        ASSESSMENT AND OTHER RELEVANT CHRONIC CONDITIONS:   David Reyes is a 79 year old male who presents for a Medicare Assessment.     PLAN SUMMARY:   Diagnoses and all orders for this visit:  1. Encounter for annual health examination  Completed today.   - CBC With Differential With Platelet; Future  - Comp Metabolic Panel (14); Future    2. History of recent hospitalization  Hospital follow up completed.     3. Acute respiratory failure with hypoxia (HCC)  Recheck CXR to make sure it is clearing up. Symptoms have slowly improved, but still SOB, some coughing.   Now home from rehab x 2 weeks after being there for 2 months. Has home health, recommend continued home health PT and OT. Encouraged him to work harder at that, like it's his full time job to get stronger.   - XR CHEST PA + LAT CHEST (CPT=71046); Future    4. Pneumonia due to COVID-19 virus  Noted.     5. Wide-complex tachycardia  Noted, following with cardiology.     6. Weakness generalized  PT/OT with home health. Continue to use walker for now.     7. Type 2 diabetes mellitus with hyperglycemia, with long-term current use of insulin (formerly [facility])  Check A1c and labs. Continue current insulin dosing.   - Hemoglobin A1C; Future  - Lipid Panel; Future  - TSH W Reflex To Free T4; Future  - Microalb/Creat Ratio, Random Urine; Future    8. Screening for malignant neoplasm of prostate  - PSA Total, Screen; Future    9. Vitamin D deficiency disease  - Vitamin D; Future    10. Vitamin D deficiency    11. Type 2 diabetes mellitus without complication, with long-term current use of insulin (HCC)  As above.     12. Thrombocytopenia (HCC)  Monitor.     13. Rheumatoid arthritis involving multiple sites with positive rheumatoid factor (HCC)  Stable, following with rheumatology.     14. Pure hypercholesterolemia  On statin.      15. Pulmonary hypertension secondary to increased PVR (HCC)  Following with cardiology.     16. FLAKO (obstructive sleep apnea)  Not compliant with cpap. Recommend follow up with pulm.     17. Normocytic anemia  Noted.     18. Nonischemic cardiomyopathy (HCC)  Following with cardiology.     19. Obesity (BMI 30.0-34.9)  Losing weight slowly. Recommend continued health eating.     20. Ischemic cardiomyopathy    21. Immunocompromised state (HCC)  Noted.     22. Hyponatremia  Recheck with labs.     23. Dyspnea on exertion  Recheck CXR.   - XR CHEST PA + LAT CHEST (CPT=71046); Future    24. Dry mouth  Noted. Likely due in part to meds.     25. Chronic systolic congestive heart failure (HCC)  Noted.     26. Chronic obstructive pulmonary disease, unspecified COPD type (HCC)  Following with pulm     27. Stenosis of carotid artery, unspecified laterality  Noted.     28. Coronary artery disease due to calcified coronary lesion  Noted.     29. Aortic stenosis, mild  Noted.     30. Anemia, unspecified type  Will monitor.     31. Acute on chronic anemia      Diet assessment: good     PLAN:  The patient indicates understanding of these issues and agrees to the plan.  Reinforced healthy diet, lifestyle, and exercise.    Return for Wellness Visit.     Thea Harris DO, 10/14/2021     General Health     In the past six months, have you lost more than 10 pounds without trying?: 2 - No  Has your appetite been poor?: Yes  How does the patient maintain a good energy level?: Appropriate Exercise  How would you describe your daily physical activity?: Light  How would you describe your current health state?: Fair  How do you maintain positive mental well-being?: Social Interaction;Visiting Family     Supplementary Documentation:   David Reyes's SCREENING SCHEDULE   Tests on this list are recommended by your physician but may not be covered, or covered at this frequency, by your insurer.   Please check with your insurance carrier  before scheduling to verify coverage.   PREVENTATIVE SERVICES FREQUENCY &  COVERAGE DETAILS LAST COMPLETION DATE   Diabetes Screening    Fasting Blood Sugar / Glucose    One screening every 12 months if never tested or if previously tested but not diagnosed with pre-diabetes   One screening every 6 months if diagnosed with pre-diabetes Lab Results   Component Value Date    GLUCOSE 196 (H) 06/17/2016     (H) 09/28/2024        Cardiovascular Disease Screening    Lipid Panel  Cholesterol  Lipoprotein (HDL)  Triglycerides Covered every 5 years for all Medicare beneficiaries without apparent signs or symptoms of cardiovascular disease Lab Results   Component Value Date    CHOLEST 142 09/20/2024    HDL 36 (L) 09/20/2024    LDL 90 09/20/2024    LDLD 90 09/10/2020    TRIG 125 09/24/2024         Electrocardiogram (EKG)   Covered if needed at Welcome to Medicare, and non-screening if indicated for medical reasons 09/27/2024      Ultrasound Screening for Abdominal Aortic Aneurysm (AAA) Covered once in a lifetime for one of the following risk factors    Men who are 65-75 years old and have ever smoked    Anyone with a family history -     Colorectal Cancer Screening  Covered for ages 50-85; only need ONE of the following:    Colonoscopy   Covered every 10 years    Covered every 2 years if patient is at high risk or previous colonoscopy was abnormal -    Health Maintenance   Topic Date Due    Colorectal Cancer Screening  Discontinued       Flexible Sigmoidoscopy   Covered every 4 years -    Fecal Occult Blood Test Covered annually -   Prostate Cancer Screening    Prostate-Specific Antigen (PSA) Annually No results found for: \"PSA\"  There are no preventive care reminders to display for this patient.   Immunizations    Influenza Covered once per flu season  Please get every year -  Influenza Vaccine(1) Never done    Pneumococcal Each vaccine ([id number] & [id number]) covered once after 65 Prevnar 13:  11/25/2014    [id number]: 04/25/2017     No recommendations at this time    Hepatitis B One screening covered for patients with certain risk factors   -  No recommendations at this time    Tetanus Toxoid Not covered by Medicare Part B unless medically necessary (cut with metal); may be covered with your pharmacy prescription benefits -    Tetanus, Diptheria and Pertusis TD and TDaP Not covered by Medicare Part B -  No recommendations at this time    Zoster Not covered by Medicare Part B; may be covered with your pharmacy  prescription benefits -  Zoster Vaccines(1 of 2) Never done       Diabetes      Hemoglobin A1C Annually; if last result is elevated, may be asked to retest more frequently.    Medicare covers every 3 months Lab Results   Component Value Date     09/21/2024    A1C 5.9 (H) 09/21/2024       No recommendations at this time    Creat/alb ratio Annually Lab Results   Component Value Date    MICROALBCREA  10/12/2023      Comment:      Unable to calculate due to Urine Microalbumin <0.5 mg/dL         LDL Annually Lab Results   Component Value Date    LDL 90 09/20/2024       Dilated Eye Exam Annually Last Diabetic Eye Exam:  Last Dilated Eye Exam: 08/19/24  Eye Exam shows Diabetic Retinopathy?: No         Annual Monitoring of Persistent Medications (ACE/ARB, digoxin diuretics, anticonvulsants)    Potassium Annually Lab Results   Component Value Date    K 3.8 09/28/2024         Creatinine   Annually Lab Results   Component Value Date    CREATSERUM 1.14 09/28/2024         BUN Annually Lab Results   Component Value Date    BUN 36 (H) 09/28/2024       Drug Serum Conc Annually No results found for: \"DIGOXIN\", \"DIG\", \"VALP\"             Chronic Obstructive Pulmonary Disease (COPD)    Spirometry Annually Spirometry date: 06/30/2022

## 2024-12-05 ENCOUNTER — MED REC SCAN ONLY (OUTPATIENT)
Dept: FAMILY MEDICINE CLINIC | Facility: CLINIC | Age: 79
End: 2024-12-05

## 2024-12-05 PROCEDURE — G0180 MD CERTIFICATION HHA PATIENT: HCPCS | Performed by: FAMILY MEDICINE

## 2024-12-09 ENCOUNTER — OFFICE VISIT (OUTPATIENT)
Dept: HEMATOLOGY/ONCOLOGY | Facility: HOSPITAL | Age: 79
End: 2024-12-09
Attending: INTERNAL MEDICINE
Payer: MEDICARE

## 2024-12-09 VITALS
BODY MASS INDEX: 34 KG/M2 | OXYGEN SATURATION: 97 % | WEIGHT: 232.5 LBS | SYSTOLIC BLOOD PRESSURE: 104 MMHG | RESPIRATION RATE: 16 BRPM | HEART RATE: 76 BPM | DIASTOLIC BLOOD PRESSURE: 61 MMHG | TEMPERATURE: 97 F

## 2024-12-09 DIAGNOSIS — T45.1X1A METHOTREXATE TOXICITY, ACCIDENTAL OR UNINTENTIONAL, INITIAL ENCOUNTER: ICD-10-CM

## 2024-12-09 DIAGNOSIS — D64.9 ANEMIA, UNSPECIFIED TYPE: ICD-10-CM

## 2024-12-09 DIAGNOSIS — N18.30 STAGE 3 CHRONIC KIDNEY DISEASE, UNSPECIFIED WHETHER STAGE 3A OR 3B CKD (HCC): ICD-10-CM

## 2024-12-09 DIAGNOSIS — D61.818 PANCYTOPENIA (HCC): Primary | ICD-10-CM

## 2024-12-09 LAB
ALBUMIN SERPL-MCNC: 4.1 G/DL (ref 3.2–4.8)
ALBUMIN/GLOB SERPL: 1.1 {RATIO} (ref 1–2)
ALP LIVER SERPL-CCNC: 51 U/L
ALT SERPL-CCNC: 8 U/L
ANION GAP SERPL CALC-SCNC: 12 MMOL/L (ref 0–18)
AST SERPL-CCNC: 19 U/L (ref ?–34)
BASOPHILS # BLD AUTO: 0 X10(3) UL (ref 0–0.2)
BASOPHILS NFR BLD AUTO: 0 %
BILIRUB SERPL-MCNC: 0.4 MG/DL (ref 0.2–1.1)
BUN BLD-MCNC: 21 MG/DL (ref 9–23)
CALCIUM BLD-MCNC: 10 MG/DL (ref 8.7–10.4)
CHLORIDE SERPL-SCNC: 105 MMOL/L (ref 98–112)
CO2 SERPL-SCNC: 21 MMOL/L (ref 21–32)
CREAT BLD-MCNC: 1.99 MG/DL
DEPRECATED HBV CORE AB SER IA-ACNC: 380 NG/ML
EGFRCR SERPLBLD CKD-EPI 2021: 34 ML/MIN/1.73M2 (ref 60–?)
EOSINOPHIL # BLD AUTO: 0.01 X10(3) UL (ref 0–0.7)
EOSINOPHIL NFR BLD AUTO: 0.4 %
ERYTHROCYTE [DISTWIDTH] IN BLOOD BY AUTOMATED COUNT: 17.5 %
FOLATE SERPL-MCNC: 39.2 NG/ML (ref 5.4–?)
GLOBULIN PLAS-MCNC: 3.8 G/DL (ref 2–3.5)
GLUCOSE BLD-MCNC: 121 MG/DL (ref 70–99)
HCT VFR BLD AUTO: 28.8 %
HGB BLD-MCNC: 8.6 G/DL
IMM GRANULOCYTES # BLD AUTO: 0.02 X10(3) UL (ref 0–1)
IMM GRANULOCYTES NFR BLD: 0.8 %
IRON SATN MFR SERPL: 40 %
IRON SERPL-MCNC: 123 UG/DL
LYMPHOCYTES # BLD AUTO: 0.82 X10(3) UL (ref 1–4)
LYMPHOCYTES NFR BLD AUTO: 32.4 %
MCH RBC QN AUTO: 28.6 PG (ref 26–34)
MCHC RBC AUTO-ENTMCNC: 29.9 G/DL (ref 31–37)
MCV RBC AUTO: 95.7 FL
MONOCYTES # BLD AUTO: 0.34 X10(3) UL (ref 0.1–1)
MONOCYTES NFR BLD AUTO: 13.4 %
NEUTROPHILS # BLD AUTO: 1.34 X10 (3) UL (ref 1.5–7.7)
NEUTROPHILS # BLD AUTO: 1.34 X10(3) UL (ref 1.5–7.7)
NEUTROPHILS NFR BLD AUTO: 53 %
OSMOLALITY SERPL CALC.SUM OF ELEC: 290 MOSM/KG (ref 275–295)
PLATELET # BLD AUTO: 75 10(3)UL (ref 150–450)
PLATELET MORPHOLOGY: NORMAL
POTASSIUM SERPL-SCNC: 4.6 MMOL/L (ref 3.5–5.1)
PROT SERPL-MCNC: 7.9 G/DL (ref 5.7–8.2)
RBC # BLD AUTO: 3.01 X10(6)UL
SODIUM SERPL-SCNC: 138 MMOL/L (ref 136–145)
TOTAL IRON BINDING CAPACITY: 308 UG/DL (ref 250–425)
TRANSFERRIN SERPL-MCNC: 231 MG/DL (ref 215–365)
VIT B12 SERPL-MCNC: 333 PG/ML (ref 211–911)
WBC # BLD AUTO: 2.5 X10(3) UL (ref 4–11)

## 2024-12-09 PROCEDURE — G2211 COMPLEX E/M VISIT ADD ON: HCPCS | Performed by: INTERNAL MEDICINE

## 2024-12-09 PROCEDURE — 99205 OFFICE O/P NEW HI 60 MIN: CPT | Performed by: INTERNAL MEDICINE

## 2024-12-09 NOTE — PROGRESS NOTES
Education Record    Learner:  Patient and Spouse    Disease / Diagnosis: Pancytopenia    Barriers / Limitations:  None   Comments:    Method:  Discussion   Comments:    General Topics:  Plan of care reviewed   Comments:    Outcome:  Shows understanding   Comments:    Here to evaluate pancytopenia. Feeling stronger now. He is home from rehab. He is doing therapy at home. He feels winded at times. Feels lower energy. Dizziness when he gets up too fast.

## 2024-12-09 NOTE — PROGRESS NOTES
Hematology/Oncology Initial Consultation Note    Patient Name: David Reyes  Medical Record Number: [id number]    YOB: 1945   Date of Consultation: 12/9/2024   PCP: Thea Harris DO  Other providers:  Dr Jordan Tillman (rheumatology)    Reason for Consultation:  David Reyes was seen today for the diagnosis of pancytopenia    History of Present Illness:      78 y/o M PMH chronic systolic heart failure, rheumatoid arthritis on methotrexate CKD stage 3 who presents for evaluation of pancytopenia.    - has bene on methotrexate since 2013, but dose has been gradually reduced over the years due to elevated LFTs per Dr Tillman' note  - around 4/2021 was when patient had new thrombocytopenia 126k, since then has leveled out to 50-70s in the last few years. In the last few months 60-70k  - notably around the same time as well, his kidney function has progressively declinedwith sCr being around previous baseline of 1.0; sCr in last few months have been ~1.3-1.8, although patient was hospitalized  - was admitted to ICU with COVID pneumonia in 9/2024  - reports he is now in home rehabbing and has been getting better although still gets winded with activity  - he notes he has not had a flare of his arthritis in many months  - notes slightly worse hemorrhoidal bleeding. Sometimes he has trace amt of blood when he coughs  - his appt with Dr Tillman is in 1/2025        Past Medical History:  Past Medical History:    Acute kidney injury (HCC)    Anxiety state    Back problem    CAD (coronary artery disease)    Congestive heart disease (HCC)    Congestive heart failure (HCC)    CORONARY ARTERY DISEASE    cabg    Coronary atherosclerosis    COVID-19    Diabetes (HCC)    DM (diabetes mellitus) (HCC)    Heart attack (HCC)    High blood pressure    High cholesterol    Other and unspecified hyperlipidemia    Pneumonia    Rheumatoid arthritis (HCC)    Sleep apnea    Subdural hematoma (HCC)    Unspecified sleep apnea     Visual impairment       Past Surgical History:   Procedure Laterality Date    Angiogram  2015    Angioplasty (coronary)  2/23/15    RCA    Bypass surgery          Cabg      LAD, Diagonal    Cardiac defibrillator placement  14    [employer] dual chamber ICD    Cath drug eluting stent      Tonsillectomy         Home Medications:  No outpatient medications have been marked as taking for the 24 encounter (Appointment) with James Chirinos MD.     -------  Medications Ordered Prior to Encounter[1]    Allergies:   Allergies[2]    Psychosocial History:  Social History     Social History Narrative    Not on file     Social History     Socioeconomic History    Marital status:    Tobacco Use    Smoking status: Former     Current packs/day: 0.00     Average packs/day: 1 pack/day for 26.0 years (26.0 ttl pk-yrs)     Types: Cigarettes     Start date: 1960     Quit date: 1986     Years since quittin.5    Smokeless tobacco: Never    Tobacco comments:     NON-SMOKER   Vaping Use    Vaping status: Never Used   Substance and Sexual Activity    Alcohol use: Yes     Alcohol/week: 2.0 standard drinks of alcohol     Types: 2 Glasses of wine per week    Drug use: No   Other Topics Concern    Caffeine Concern No    Exercise No     Social Drivers of Health     Food Insecurity: No Food Insecurity (2024)    Food Insecurity     Food Insecurity: Never true   Transportation Needs: No Transportation Needs (2024)    Transportation Needs     Lack of Transportation: No   Physical Activity: Inactive (2020)    Received from [facility]    Exercise Vital Sign     Days of Exercise per Week: 0 days     Minutes of Exercise per Session: 0 min    [facility]    Social Connections   Housing Stability: Low Risk  (2024)    Housing Stability     Housing Instability: No       Family Medical  History:  Family History   Problem Relation Age of Onset    Cancer Father     Heart Disease Mother     Other (alzheimers) Mother     Stroke Neg        Review of Systems:  A 10-point ROS was done with pertinent positives and negative per the HPI    Vital Signs:  Height: --  Weight: --  BSA (Calculated - sq m): --  Pulse: --  BP: --  Temp: --  Do Not Use - Resp Rate: --  SpO2: --    Wt Readings from Last 6 Encounters:   12/02/24 104.8 kg (231 lb)   09/28/24 108.3 kg (238 lb 12.1 oz)   09/09/24 109.3 kg (241 lb)   06/18/24 106.6 kg (235 lb)   05/23/24 106.1 kg (234 lb)   05/13/24 107 kg (236 lb)       Physical Examination:  General: Patient is alert and oriented, not in acute distress  Psych: Mood and affect are appropriate  Eyes: EOMI, PERRL  ENT: Oropharynx is clear, no adenopathy  CV:  no LE edema  Respiratory: Non-labored respirations  GI/Abd: Soft, non-tender  Neurological: Grossly intact   Skin: no rashes or petechiae    Laboratory:  Lab Results   Component Value Date    WBC 4.6 09/28/2024    WBC 5.8 09/27/2024    WBC 5.0 09/26/2024    HGB 7.5 (L) 09/28/2024    HGB 7.5 (L) 09/27/2024    HGB 7.3 (L) 09/26/2024    HCT 22.6 (L) 09/28/2024    MCV 91.5 09/28/2024    MCH 30.4 09/28/2024    MCHC 33.2 09/28/2024    RDW 16.3 09/28/2024    PLT 75.0 (L) 09/28/2024    PLT 70.0 (L) 09/27/2024    PLT 60.0 (L) 09/26/2024     Lab Results   Component Value Date     (H) 09/28/2024    BUN 36 (H) 09/28/2024    BUNCREA 25 (H) 12/04/2021    CREATSERUM 1.14 09/28/2024    CREATSERUM 1.40 (H) 09/27/2024    CREATSERUM 1.30 09/26/2024    ANIONGAP 5 09/28/2024    GFR 66 11/13/2017    GFRNAA 72 06/30/2022    GFRAA 83 06/30/2022    CA 8.9 09/28/2024    OSMOCALC 295 09/28/2024    ALKPHO 62 09/27/2024    AST 59 (H) 09/27/2024    ALT 36 09/27/2024    BILT 0.4 09/27/2024    TP 6.3 09/27/2024    ALB 3.7 09/27/2024    GLOBULIN 2.6 09/27/2024    AGRATIO 1.5 12/04/2021     09/28/2024    K 3.8 09/28/2024     09/28/2024    CO2 27.0  09/28/2024     Lab Results   Component Value Date    PTT 29.4 09/21/2024    PT 17.1 (H) 04/27/2014    INR 1.16 09/20/2024       Impression & Plan:     Pancytopenia  - secondary to methotrexate toxicity in the background of CKD stage 3; which is likely also exacerbated by his methotrexate  - sent a [employer] message, and left voicemail on Pat's listed phone to tell him to stop methotrexate completely  - his hemoglobin is slightly improved; his hemorrhoidal bleeding is likely exacerbated by thrombocytopenia. He may potentially be having GI ulceration from methotrexate toxicity leading to blood in cough  - folic acid and vit B12 normal. Continue daily folic acid    Rheumatoid arthritis  - needs to see Dr Tillman about rheumatoid arthritis management. Will update Dr Tillman that he needs his methotrexate discontinued    F/u: I asked the pt repeat his labs in 1 month s/p discontinuation of methotrexate    James Chirinos MD  Hematology/Medical Oncology  [facility]           [1]   Current Outpatient Medications on File Prior to Visit   Medication Sig Dispense Refill    aspirin 81 MG Oral Tab EC Take 1 tablet (81 mg total) by mouth daily.      amiodarone 200 MG Oral Tab Take 1 tab twice a day for the next 10 days, then reduce to ONE tab daily. (Patient not taking: Reported on 12/2/2024) 42 tablet 0    aspirin 325 MG Oral Tab 1 tablet (325 mg total) by Per NG Tube route daily. (Patient not taking: Reported on 12/2/2024) 90 tablet 1    SPIRONOLACTONE 25 MG Oral Tab TAKE 1 TABLET(25 MG) BY MOUTH DAILY WITH BREAKFAST 90 tablet 0    METFORMIN  MG Oral Tablet 24 Hr TAKE 2 TABLETS(1000 MG) BY MOUTH TWICE DAILY WITH MEALS 360 tablet 0    gabapentin 100 MG Oral Cap Take 1 capsule (100 mg total) by mouth 3 (three) times daily. 90 capsule 1    metoprolol succinate ER 50 MG Oral Tablet 24 Hr Take 1 tablet (50 mg total) by mouth 2 (two) times daily.      Insulin Glargine, 2 Unit Dial, (TOUJEO ISRRAEL SOLOSTAR) 300 UNIT/ML Subcutaneous  Solution Pen-injector Inject 24 Units into the skin daily. 27 mL 1    CONTOUR NEXT TEST In Vitro Strip Test 3 times daily Dx Code: E11.9 Insulin - dependent diabetes 300 each 1    Microlet Lancets Does not apply Misc CHECK BLOOD SUGAR THREE TIMES DAILY 300 each 3    losartan 25 MG Oral Tab Take 1 tablet (25 mg total) by mouth every evening.      BD PEN NEEDLE LILO 2ND GEN 32G X 4 MM Does not apply Misc USE DAILY 300 each 3    torsemide 20 MG Oral Tab Take 1 tablet (20 mg total) by mouth BID (Diuretic). 60 tablet 1    Vitamin B-1 100 MG Oral Tab Take 1 tablet (100 mg total) by mouth daily.      methotrexate 2.5 MG Oral Tab Take 4 tablets (10 mg total) by mouth every 7 days. (Patient not taking: Reported on 12/2/2024) 48 tablet 1    ezetimibe 10 MG Oral Tab Take 1 tablet (10 mg total) by mouth daily.      Lovastatin 40 MG Oral Tab Take 1 tablet (40 mg total) by mouth in the morning and 1 tablet (40 mg total) before bedtime.  2    folic acid 800 MCG Oral Tab Take 1 tablet (800 mcg total) by mouth every other day. Alternate with 2 tablets (1600mcg) every other day    About 1000 mcg daily       No current facility-administered medications on file prior to visit.   [2]   Allergies  Allergen Reactions    Pcn [Penicillins]      \"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

## 2024-12-10 ENCOUNTER — TELEPHONE (OUTPATIENT)
Dept: HEMATOLOGY/ONCOLOGY | Facility: HOSPITAL | Age: 79
End: 2024-12-10

## 2024-12-10 ENCOUNTER — HOME HEALTH CHARGES (OUTPATIENT)
Dept: FAMILY MEDICINE CLINIC | Facility: CLINIC | Age: 79
End: 2024-12-10

## 2024-12-10 DIAGNOSIS — A41.89 OTHER SPECIFIED SEPSIS (HCC): Primary | ICD-10-CM

## 2024-12-19 ENCOUNTER — PATIENT OUTREACH (OUTPATIENT)
Dept: CASE MANAGEMENT | Age: 79
End: 2024-12-19

## 2024-12-19 NOTE — PROGRESS NOTES
Called patient and left a message for patient to call back when they can. Reviewed patient chart. Left contact my contact number [phone]        Time Spent This Encounter Total: 5  min medical record review  Monthly Minute Total including today: 5 minutes

## 2024-12-20 ENCOUNTER — TELEPHONE (OUTPATIENT)
Dept: FAMILY MEDICINE CLINIC | Facility: CLINIC | Age: 79
End: 2024-12-20

## 2024-12-20 RX ORDER — SPIRONOLACTONE 25 MG/1
25 TABLET ORAL
Qty: 90 TABLET | Refills: 0 | Status: SHIPPED | OUTPATIENT
Start: 2024-12-20

## 2024-12-20 NOTE — TELEPHONE ENCOUNTER
PERLA FROM [facility] CALLED AND ADV THAT PT HAD GAINED 5LBS SINCE LAST WEEK.    ADV PT HAD RAN OUT OF MED BELOW     SPIRONOLACTONE 25 MG Oral Tab     WOULD LIKE TO KNOW IF HE CAN GET ANOTHER SCRIPT PRN    PLEASE ADV     THANK YOU    ** HOPING TO P/U SCRIPT TODAY **     THANK YOU

## 2024-12-20 NOTE — TELEPHONE ENCOUNTER
Nessa HANDY notified.  Asking if Dr Harris wants him to take any additional doses or just restart spironolactone. Has been off medication for a few days.    Advised to restart medication, continue to weight daily and call Monday with an update.    Current weight today is 232 lb    Routed to Dr Harris to advise if any further recommendations needed

## 2024-12-26 RX ORDER — METFORMIN HYDROCHLORIDE 500 MG/1
1000 TABLET, EXTENDED RELEASE ORAL 2 TIMES DAILY WITH MEALS
Qty: 360 TABLET | Refills: 0 | Status: SHIPPED | OUTPATIENT
Start: 2024-12-26

## 2024-12-26 NOTE — TELEPHONE ENCOUNTER
LOV 12/02/24  Last labs 12/09/24, 09/28/24  Last refill on 09/26/24, for #360 tabs, with 0 refills  METFORMIN  MG Oral Tablet 24 Hr     Diabetes Medication Protocol [id number]/26/2024 02:31 PM   Protocol Details EGFRCR or GFRNAA > 50    Last A1C < 7.5 and within past 6 months    In person appointment or virtual visit in the past 6 mos or appointment in next 3 mos    Microalbumin procedure in past 12 months or taking ACE/ARB    GFR in the past 12 months     Future Appointments   Date Time Provider Department Center   3/10/2025 10:00 AM Ivana Mitchell RN, [facility]     Order(s) pending, please review. Thank you.

## 2024-12-31 ENCOUNTER — TELEPHONE (OUTPATIENT)
Dept: FAMILY MEDICINE CLINIC | Facility: CLINIC | Age: 79
End: 2024-12-31

## 2024-12-31 NOTE — TELEPHONE ENCOUNTER
Weight today 233    Looking for orders for PRN diuretics    Patient will be discharged from home health services today    Would like order for outpatient physical therapy    Please adv  Thank you

## 2024-12-31 NOTE — TELEPHONE ENCOUNTER
Per v/o Dr Casper, continue spironolactone 25 mg daily. May take extra tab daily x 3 days as needed for symptoms of fluid overload. Notify provider if symptoms persist    Nessa HANDY notified and verbalized understanding.

## 2024-12-31 NOTE — TELEPHONE ENCOUNTER
Called and spoke with Halima HANDY who states patient will be discharged from home health today.    Per Halima, at evaluation today:  Edema under control.  Lungs clear, O2 95%,  respiration rate normal  Breathing labored but not in distress  Education on fluid overload and respiratory status have been given.     States on admission to home health weight 227 lb  Todays weight 233 lb    12/9/24 reading in epic  232 lb    Halima HANDY asking if can have orders for PRN diuretic   Previously had order to take additional spironolactone x 3 days as needed    Asking if covering can advise

## 2025-01-03 ENCOUNTER — MED REC SCAN ONLY (OUTPATIENT)
Dept: FAMILY MEDICINE CLINIC | Facility: CLINIC | Age: 80
End: 2025-01-03

## 2025-01-07 ENCOUNTER — LAB ENCOUNTER (OUTPATIENT)
Dept: LAB | Age: 80
End: 2025-01-07
Attending: FAMILY MEDICINE
Payer: MEDICARE

## 2025-01-07 ENCOUNTER — HOSPITAL ENCOUNTER (OUTPATIENT)
Dept: GENERAL RADIOLOGY | Age: 80
Discharge: HOME | End: 2025-01-07
Attending: FAMILY MEDICINE
Payer: MEDICARE

## 2025-01-07 DIAGNOSIS — E11.65 TYPE 2 DIABETES MELLITUS WITH HYPERGLYCEMIA, WITH LONG-TERM CURRENT USE OF INSULIN (HCC): ICD-10-CM

## 2025-01-07 DIAGNOSIS — D61.818 PANCYTOPENIA (HCC): ICD-10-CM

## 2025-01-07 DIAGNOSIS — J96.01 ACUTE RESPIRATORY FAILURE WITH HYPOXIA (HCC): ICD-10-CM

## 2025-01-07 DIAGNOSIS — Z12.5 SCREENING FOR MALIGNANT NEOPLASM OF PROSTATE: ICD-10-CM

## 2025-01-07 DIAGNOSIS — Z00.00 ENCOUNTER FOR ANNUAL HEALTH EXAMINATION: ICD-10-CM

## 2025-01-07 DIAGNOSIS — Z79.4 TYPE 2 DIABETES MELLITUS WITH HYPERGLYCEMIA, WITH LONG-TERM CURRENT USE OF INSULIN (HCC): ICD-10-CM

## 2025-01-07 DIAGNOSIS — E55.9 VITAMIN D DEFICIENCY DISEASE: ICD-10-CM

## 2025-01-07 DIAGNOSIS — R06.09 DYSPNEA ON EXERTION: ICD-10-CM

## 2025-01-07 LAB
ALBUMIN SERPL-MCNC: 4.8 G/DL (ref 3.2–4.8)
ALBUMIN/GLOB SERPL: 1.3 {RATIO} (ref 1–2)
ALP LIVER SERPL-CCNC: 60 U/L
ALT SERPL-CCNC: <7 U/L
ANION GAP SERPL CALC-SCNC: 8 MMOL/L (ref 0–18)
AST SERPL-CCNC: 20 U/L (ref ?–34)
BASOPHILS # BLD AUTO: 0.01 X10(3) UL (ref 0–0.2)
BASOPHILS NFR BLD AUTO: 0.4 %
BILIRUB SERPL-MCNC: 0.6 MG/DL (ref 0.2–1.1)
BUN BLD-MCNC: 45 MG/DL (ref 9–23)
CALCIUM BLD-MCNC: 10.5 MG/DL (ref 8.7–10.4)
CHLORIDE SERPL-SCNC: 98 MMOL/L (ref 98–112)
CHOLEST SERPL-MCNC: 180 MG/DL (ref ?–200)
CO2 SERPL-SCNC: 30 MMOL/L (ref 21–32)
COMPLEXED PSA SERPL-MCNC: 1.69 NG/ML (ref ?–4)
CREAT BLD-MCNC: 2.52 MG/DL
EGFRCR SERPLBLD CKD-EPI 2021: 26 ML/MIN/1.73M2 (ref 60–?)
EOSINOPHIL # BLD AUTO: 0.01 X10(3) UL (ref 0–0.7)
EOSINOPHIL NFR BLD AUTO: 0.4 %
ERYTHROCYTE [DISTWIDTH] IN BLOOD BY AUTOMATED COUNT: 17.2 %
EST. AVERAGE GLUCOSE BLD GHB EST-MCNC: 105 MG/DL (ref 68–126)
FASTING PATIENT LIPID ANSWER: NO
FASTING STATUS PATIENT QL REPORTED: NO
GLOBULIN PLAS-MCNC: 3.6 G/DL (ref 2–3.5)
GLUCOSE BLD-MCNC: 117 MG/DL (ref 70–99)
HBA1C MFR BLD: 5.3 % (ref ?–5.7)
HCT VFR BLD AUTO: 32.9 %
HDLC SERPL-MCNC: 34 MG/DL (ref 40–59)
HGB BLD-MCNC: 9.9 G/DL
IMM GRANULOCYTES # BLD AUTO: 0.01 X10(3) UL (ref 0–1)
IMM GRANULOCYTES NFR BLD: 0.4 %
LDLC SERPL CALC-MCNC: 118 MG/DL (ref ?–100)
LYMPHOCYTES # BLD AUTO: 0.97 X10(3) UL (ref 1–4)
LYMPHOCYTES NFR BLD AUTO: 38.2 %
MCH RBC QN AUTO: 28.6 PG (ref 26–34)
MCHC RBC AUTO-ENTMCNC: 30.1 G/DL (ref 31–37)
MCV RBC AUTO: 95.1 FL
MONOCYTES # BLD AUTO: 0.27 X10(3) UL (ref 0.1–1)
MONOCYTES NFR BLD AUTO: 10.6 %
NEUTROPHILS # BLD AUTO: 1.27 X10 (3) UL (ref 1.5–7.7)
NEUTROPHILS # BLD AUTO: 1.27 X10(3) UL (ref 1.5–7.7)
NEUTROPHILS NFR BLD AUTO: 50 %
NONHDLC SERPL-MCNC: 146 MG/DL (ref ?–130)
OSMOLALITY SERPL CALC.SUM OF ELEC: 295 MOSM/KG (ref 275–295)
PLATELET # BLD AUTO: 65 10(3)UL (ref 150–450)
PLATELETS.RETICULATED NFR BLD AUTO: 16.6 % (ref 0–7)
POTASSIUM SERPL-SCNC: 4.8 MMOL/L (ref 3.5–5.1)
PROT SERPL-MCNC: 8.4 G/DL (ref 5.7–8.2)
RBC # BLD AUTO: 3.46 X10(6)UL
SODIUM SERPL-SCNC: 136 MMOL/L (ref 136–145)
T4 FREE SERPL-MCNC: 1.5 NG/DL (ref 0.8–1.7)
TRIGL SERPL-MCNC: 156 MG/DL (ref 30–149)
TSI SER-ACNC: 8.27 UIU/ML (ref 0.55–4.78)
VIT D+METAB SERPL-MCNC: 47.7 NG/ML (ref 30–100)
VLDLC SERPL CALC-MCNC: 27 MG/DL (ref 0–30)
WBC # BLD AUTO: 2.5 X10(3) UL (ref 4–11)

## 2025-01-07 PROCEDURE — 83036 HEMOGLOBIN GLYCOSYLATED A1C: CPT

## 2025-01-07 PROCEDURE — 80061 LIPID PANEL: CPT

## 2025-01-07 PROCEDURE — 80053 COMPREHEN METABOLIC PANEL: CPT

## 2025-01-07 PROCEDURE — 84443 ASSAY THYROID STIM HORMONE: CPT

## 2025-01-07 PROCEDURE — 82306 VITAMIN D 25 HYDROXY: CPT

## 2025-01-07 PROCEDURE — 36415 COLL VENOUS BLD VENIPUNCTURE: CPT

## 2025-01-07 PROCEDURE — 71046 X-RAY EXAM CHEST 2 VIEWS: CPT | Performed by: FAMILY MEDICINE

## 2025-01-07 PROCEDURE — 84439 ASSAY OF FREE THYROXINE: CPT

## 2025-01-07 PROCEDURE — 85025 COMPLETE CBC W/AUTO DIFF WBC: CPT

## 2025-01-10 ENCOUNTER — TELEPHONE (OUTPATIENT)
Dept: FAMILY MEDICINE CLINIC | Facility: CLINIC | Age: 80
End: 2025-01-10

## 2025-01-10 DIAGNOSIS — N28.9 RENAL INSUFFICIENCY: Primary | ICD-10-CM

## 2025-01-10 NOTE — TELEPHONE ENCOUNTER
----- Message from Marcela CUMMINGS sent at 2025  4:20 PM CST -----  Patient's name and  verified     Patient stated his hematologist discontinued the medication for arthritis(? methotrexate)2 weeks ago, saying this was causing the kidney disease.     Patient has not seen the kidney doctor, Who do you recommend? Patient okay with placing information on [employer]     Patient notified and verbalized an understanding   
Referral placed for Dr. Alvarez for nephrology. Pls give pt info. See notes.   
Sent MCM  
5/day(s)

## 2025-01-17 ENCOUNTER — PATIENT OUTREACH (OUTPATIENT)
Dept: CASE MANAGEMENT | Age: 80
End: 2025-01-17

## 2025-01-17 NOTE — PROGRESS NOTES
Called patient and left a message for patient to call back when they can. Reviewed patient chart. Left contact my contact number [phone]        Time Spent This Encounter Total: 5  min medical record review  Monthly Minute Total including today: 5 minutes

## 2025-01-20 ENCOUNTER — TELEPHONE (OUTPATIENT)
Dept: FAMILY MEDICINE CLINIC | Facility: CLINIC | Age: 80
End: 2025-01-20

## 2025-01-20 DIAGNOSIS — E11.65 TYPE 2 DIABETES MELLITUS WITH HYPERGLYCEMIA, WITH LONG-TERM CURRENT USE OF INSULIN (HCC): ICD-10-CM

## 2025-01-20 DIAGNOSIS — Z79.4 TYPE 2 DIABETES MELLITUS WITH HYPERGLYCEMIA, WITH LONG-TERM CURRENT USE OF INSULIN (HCC): ICD-10-CM

## 2025-01-20 RX ORDER — PEN NEEDLE, DIABETIC 32GX 5/32"
1 NEEDLE, DISPOSABLE MISCELLANEOUS DAILY
Qty: 300 EACH | Refills: 3 | Status: SHIPPED | OUTPATIENT
Start: 2025-01-20

## 2025-01-20 NOTE — TELEPHONE ENCOUNTER
Script sent    Left message on voicemail/answering machine for patient to call office  Please let patient know script was sent if he returns call

## 2025-01-20 NOTE — TELEPHONE ENCOUNTER
PATIENT CALLING- NEEDS REFILL ON NEEDLES FOR TOUJEO.     PATIENT HAS BEEN OUT FOR A FEW DAYS. PATIENT STATES PHARMACY WAS TO SEND REFILL REQUEST.       [id number] - [place] [address] AT [facility] [address], [phone], [phone]

## 2025-02-03 ENCOUNTER — LABORATORY ENCOUNTER (OUTPATIENT)
Dept: LAB | Age: 80
End: 2025-02-03
Attending: FAMILY MEDICINE
Payer: MEDICARE

## 2025-02-03 DIAGNOSIS — I25.10 CAD (CORONARY ARTERY DISEASE): Primary | ICD-10-CM

## 2025-02-03 DIAGNOSIS — I27.20 PULMONARY HYPERTENSION (HCC): ICD-10-CM

## 2025-02-03 DIAGNOSIS — M05.79 SEROPOSITIVE RHEUMATOID ARTHRITIS OF MULTIPLE SITES (HCC): ICD-10-CM

## 2025-02-03 DIAGNOSIS — Z79.4 TYPE 2 DIABETES MELLITUS WITH HYPERGLYCEMIA, WITH LONG-TERM CURRENT USE OF INSULIN (HCC): ICD-10-CM

## 2025-02-03 DIAGNOSIS — E11.65 TYPE 2 DIABETES MELLITUS WITH HYPERGLYCEMIA, WITH LONG-TERM CURRENT USE OF INSULIN (HCC): ICD-10-CM

## 2025-02-03 DIAGNOSIS — E78.00 PURE HYPERCHOLESTEROLEMIA: ICD-10-CM

## 2025-02-03 DIAGNOSIS — I42.9 CARDIOMYOPATHY (HCC): ICD-10-CM

## 2025-02-03 LAB
ALBUMIN SERPL-MCNC: 4.2 G/DL (ref 3.2–4.8)
ALBUMIN/GLOB SERPL: 1.4 {RATIO} (ref 1–2)
ALP LIVER SERPL-CCNC: 49 U/L
ALT SERPL-CCNC: <7 U/L
ANION GAP SERPL CALC-SCNC: 13 MMOL/L (ref 0–18)
AST SERPL-CCNC: 16 U/L (ref ?–34)
BASOPHILS # BLD AUTO: 0 X10(3) UL (ref 0–0.2)
BASOPHILS NFR BLD AUTO: 0 %
BILIRUB DIRECT SERPL-MCNC: 0.1 MG/DL (ref ?–0.3)
BILIRUB SERPL-MCNC: 0.4 MG/DL (ref 0.2–1.1)
BILIRUB UR QL STRIP.AUTO: NEGATIVE
BUN BLD-MCNC: 33 MG/DL (ref 9–23)
C3 SERPL-MCNC: 141.5 MG/DL (ref 90–170)
C4 SERPL-MCNC: 31 MG/DL (ref 12–36)
CALCIUM BLD-MCNC: 9.1 MG/DL (ref 8.7–10.6)
CHLORIDE SERPL-SCNC: 105 MMOL/L (ref 98–112)
CLARITY UR REFRACT.AUTO: CLEAR
CO2 SERPL-SCNC: 25 MMOL/L (ref 21–32)
CREAT BLD-MCNC: 2.04 MG/DL
CREAT UR-SCNC: 138.4 MG/DL
CREAT UR-SCNC: 138.4 MG/DL
CRP SERPL-MCNC: 0.9 MG/DL (ref ?–0.5)
EGFRCR SERPLBLD CKD-EPI 2021: 33 ML/MIN/1.73M2 (ref 60–?)
EOSINOPHIL # BLD AUTO: 0 X10(3) UL (ref 0–0.7)
EOSINOPHIL NFR BLD AUTO: 0 %
ERYTHROCYTE [DISTWIDTH] IN BLOOD BY AUTOMATED COUNT: 16.2 %
ERYTHROCYTE [SEDIMENTATION RATE] IN BLOOD: 84 MM/HR
FASTING STATUS PATIENT QL REPORTED: NO
GLOBULIN PLAS-MCNC: 3.1 G/DL (ref 2–3.5)
GLUCOSE BLD-MCNC: 128 MG/DL (ref 70–99)
GLUCOSE UR STRIP.AUTO-MCNC: NORMAL MG/DL
HCT VFR BLD AUTO: 28.2 %
HGB BLD-MCNC: 8.7 G/DL
IMM GRANULOCYTES # BLD AUTO: 0.01 X10(3) UL (ref 0–1)
IMM GRANULOCYTES NFR BLD: 0.6 %
KETONES UR STRIP.AUTO-MCNC: NEGATIVE MG/DL
LEUKOCYTE ESTERASE UR QL STRIP.AUTO: NEGATIVE
LYMPHOCYTES # BLD AUTO: 0.76 X10(3) UL (ref 1–4)
LYMPHOCYTES NFR BLD AUTO: 46.9 %
MCH RBC QN AUTO: 28.4 PG (ref 26–34)
MCHC RBC AUTO-ENTMCNC: 30.9 G/DL (ref 31–37)
MCV RBC AUTO: 92.2 FL
MICROALBUMIN UR-MCNC: 1.2 MG/DL
MICROALBUMIN/CREAT 24H UR-RTO: 8.7 UG/MG (ref ?–30)
MONOCYTES # BLD AUTO: 0.19 X10(3) UL (ref 0.1–1)
MONOCYTES NFR BLD AUTO: 11.7 %
NEUTROPHILS # BLD AUTO: 0.66 X10 (3) UL (ref 1.5–7.7)
NEUTROPHILS # BLD AUTO: 0.66 X10(3) UL (ref 1.5–7.7)
NEUTROPHILS NFR BLD AUTO: 40.8 %
NITRITE UR QL STRIP.AUTO: NEGATIVE
OSMOLALITY SERPL CALC.SUM OF ELEC: 305 MOSM/KG (ref 275–295)
PH UR STRIP.AUTO: 5.5 [PH] (ref 5–8)
PLATELET # BLD AUTO: 70 10(3)UL (ref 150–450)
PLATELETS.RETICULATED NFR BLD AUTO: 19.6 % (ref 0–7)
POTASSIUM SERPL-SCNC: 4.1 MMOL/L (ref 3.5–5.1)
PROT SERPL-MCNC: 7.3 G/DL (ref 5.7–8.2)
PROT UR STRIP.AUTO-MCNC: NEGATIVE MG/DL
PROT UR-MCNC: 18.9 MG/DL (ref ?–14)
PROT/CREAT UR-RTO: 0.14
RBC # BLD AUTO: 3.06 X10(6)UL
RBC UR QL AUTO: NEGATIVE
SODIUM SERPL-SCNC: 143 MMOL/L (ref 136–145)
SP GR UR STRIP.AUTO: 1.02 (ref 1–1.03)
TSI SER-ACNC: 6.67 UIU/ML (ref 0.55–4.78)
UROBILINOGEN UR STRIP.AUTO-MCNC: NORMAL MG/DL
WBC # BLD AUTO: 1.6 X10(3) UL (ref 4–11)

## 2025-02-03 PROCEDURE — 86225 DNA ANTIBODY NATIVE: CPT

## 2025-02-03 PROCEDURE — 82570 ASSAY OF URINE CREATININE: CPT

## 2025-02-03 PROCEDURE — 86140 C-REACTIVE PROTEIN: CPT

## 2025-02-03 PROCEDURE — 81003 URINALYSIS AUTO W/O SCOPE: CPT

## 2025-02-03 PROCEDURE — 84156 ASSAY OF PROTEIN URINE: CPT

## 2025-02-03 PROCEDURE — 86038 ANTINUCLEAR ANTIBODIES: CPT

## 2025-02-03 PROCEDURE — 86160 COMPLEMENT ANTIGEN: CPT

## 2025-02-03 PROCEDURE — 36415 COLL VENOUS BLD VENIPUNCTURE: CPT

## 2025-02-03 PROCEDURE — 82248 BILIRUBIN DIRECT: CPT

## 2025-02-03 PROCEDURE — 82043 UR ALBUMIN QUANTITATIVE: CPT

## 2025-02-03 PROCEDURE — 80053 COMPREHEN METABOLIC PANEL: CPT

## 2025-02-03 PROCEDURE — 85652 RBC SED RATE AUTOMATED: CPT

## 2025-02-03 PROCEDURE — 86037 ANCA TITER EACH ANTIBODY: CPT

## 2025-02-03 PROCEDURE — 84443 ASSAY THYROID STIM HORMONE: CPT

## 2025-02-03 PROCEDURE — 85025 COMPLETE CBC W/AUTO DIFF WBC: CPT

## 2025-02-04 LAB
DSDNA IGG SERPL IA-ACNC: 1 IU/ML
ENA AB SER QL IA: 0.3 UG/L
ENA AB SER QL IA: NEGATIVE

## 2025-02-10 DIAGNOSIS — R79.89 ABNORMAL TSH: Primary | ICD-10-CM

## 2025-02-10 RX ORDER — LEVOTHYROXINE SODIUM 25 UG/1
25 TABLET ORAL
Qty: 90 TABLET | Refills: 0 | Status: SHIPPED | OUTPATIENT
Start: 2025-02-10

## 2025-02-10 NOTE — TELEPHONE ENCOUNTER
He saw cardiology.   TSH was elevated again. Recommend starting levothyroxine 25 mcg in the mornings to get that under control as that can affect heart function and energy.     Recall TSH in 6-8 weeks.

## 2025-02-12 ENCOUNTER — TELEPHONE (OUTPATIENT)
Dept: FAMILY MEDICINE CLINIC | Facility: CLINIC | Age: 80
End: 2025-02-12

## 2025-02-12 NOTE — TELEPHONE ENCOUNTER
He saw cardiology.   TSH was elevated again. Recommend starting levothyroxine 25 mcg in the mornings to get that under control as that can affect heart function and energy.      Recall TSH in 6-8 weeks.           Left message for the pt to call back to discuss the medication Dr. Harris prescribed.

## 2025-02-12 NOTE — TELEPHONE ENCOUNTER
Spoke with the pt and advised of the tsh level and how it works and why he needs the medication.  I apologized for the lack of communication on this medication  Also discussed side effects of the levothyroxine from Up to date    Pt wanted to know if he needs to be on this forever- advised yes.  Advised that he will need to recheck his labs in 10-12 weeks to make sure that the medication is working.      Advised to call if more questions- he v/u

## 2025-02-12 NOTE — TELEPHONE ENCOUNTER
Pt states he received a call from pharmacy stating his medication is ready.  Pt is not sure why levothyroxine was prescribed.  States he has been reading up on drug and concerned about potential side effects.  Please advise.  Thank you!

## 2025-02-19 ENCOUNTER — PATIENT OUTREACH (OUTPATIENT)
Dept: CASE MANAGEMENT | Age: 80
End: 2025-02-19

## 2025-02-19 NOTE — PROGRESS NOTES
Called patient for monthly ccm with no answer will try back another time. Reviewed patient chart. Left contact my contact number [phone]      Time Spent This Encounter Total: 5  min medical record review  Monthly Minute Total including today: 5 minutes

## 2025-03-10 ENCOUNTER — TELEPHONE (OUTPATIENT)
Facility: CLINIC | Age: 80
End: 2025-03-10

## 2025-03-17 ENCOUNTER — PATIENT OUTREACH (OUTPATIENT)
Dept: CASE MANAGEMENT | Age: 80
End: 2025-03-17

## 2025-03-17 ENCOUNTER — NURSE ONLY (OUTPATIENT)
Dept: ENDOCRINOLOGY CLINIC | Facility: CLINIC | Age: 80
End: 2025-03-17
Payer: MEDICARE

## 2025-03-17 DIAGNOSIS — E11.65 TYPE 2 DIABETES MELLITUS WITH HYPERGLYCEMIA, WITH LONG-TERM CURRENT USE OF INSULIN (HCC): ICD-10-CM

## 2025-03-17 DIAGNOSIS — Z79.4 TYPE 2 DIABETES MELLITUS WITH HYPERGLYCEMIA, WITH LONG-TERM CURRENT USE OF INSULIN (HCC): ICD-10-CM

## 2025-03-17 PROCEDURE — G0108 DIAB MANAGE TRN  PER INDIV: HCPCS | Performed by: DIETITIAN, REGISTERED

## 2025-03-17 NOTE — PROGRESS NOTES
Called patient for monthly ccm no answer will try back another time.Reviewed patient chart.       Time Spent This Encounter Total: 5  min medical record review  Monthly Minute Total including today: 5 minutes

## 2025-03-18 VITALS — WEIGHT: 222.5 LBS | BODY MASS INDEX: 33 KG/M2

## 2025-03-18 RX ORDER — INSULIN GLARGINE 300 U/ML
10 INJECTION, SOLUTION SUBCUTANEOUS DAILY
COMMUNITY
Start: 2025-03-18

## 2025-03-18 NOTE — PROGRESS NOTES
David Reyes : 1945 was seen for Diabetic Education Follow up:    Date: 3/17/25  Referring Provider: Dr. LINWOOD Harris  Start time: 2pm End time: 3:00pm    Assessment:     Diagnosis:Insulin controlled Type 2 DM    Reason for visit: 6 month follow-up    Changes since last visit:  - Meals:sleeps in late so eating a brunch & dinner  - Medications:Metformin ER 2 tabs twice daily;Toujeo 10 units daily  - Exercise:limited due to pain in feet & ankles that increases the longer he is standing   - Last A1C 5.3%    SMB25 to 3/17/25    14-day av mg/dl  Fastin - 116 mg/dl    2 HR post lunch: 112 mg/dl    Education:     DIABETES REVIEW:  - Importance of improved BG control in preventing complications    HEALTHY EATING:  - effect of food on BG, timing of meal, use of snacks/fiber, barriers:states he sleeps late so only eats 2 meals/day .Brunch consists of 1 cup Greek yogurt or sandwich & dinner consists of a protein & veggies such as green beans or sweet potatoes (no corn). May occ. have a dinner roll.Has reduced portions to 1/2 of what he used to eat.    BEING ACTIVE:  - limited due to bilateral foot/ankle pain    MONITORING:  - Type of meter:Contour Next EZ  - Testing Schedule: fasting only    TAKING MEDICATION:  Oral Agents:   Metformin ER 2 500 mg tabs twice daily    Injectables:   Toujeo 10 units daily;pt. has gradually been decreasing dose   Suggested pt. start to reduce dose of insulin by 2 units to 8 units & if readings still in the low 80's reduce to 6 units to prevent hypoglycemia    \"A1C Per 202 ADA standards of care for older population,  at this time it may be important for us to avoid hypoglycemia and slightly higher blood glucose levels should be accepted because we are more interested in preventing serious hypoglycemia episodes and side effects versus long term complications.Older adults who are otherwise healthy with few coexisting chronic illnesses and intact cognitive function and  functional status should have lower glycemic goals (such as A1C <7.0-7.5% while those with multiple coexisting chronic illnesses, cognitive impairment, or functional dependence should have less stringent glycemic goals (such as A1C <8.0%) \"    PROBLEM SOLVING:  Hypoglycemia  - Causes/symptoms/prevention/treatment-Rule of 15  - Low Blood Glucose: <70 mg/dL  - Contact MD if >2BG <70 in 1 week    REDUCING RISKS:  - relationship between glucose control and risk for complications in eye, heart, kidneys,nerves,teeth,foot care, skin,  Foot Care Guidelines: pt. removed his socks to show me swelling in his ankles(no pitting edema);left worse than right. Skin extremely dry.Denies numbness & tingling but c/o an intermittent shooting pain in both feet & ankles that is getting progressively worse over past year. Discussed contacting his Cardiologist to let them know about the swelling;states he doesn't have a follow-up for 2-3 months.Pt. asked if he was developing peripheral neuropathy; again suggested he be evaluated by a neurologist to rule it out.Will inform PCP of these complaints.  Last dilated eye exam 8/24  Current with urine for protein    Recommendations:      1. Follow recommended meal plan.   2. Test BG fasting and 2 hours post 1 meal /day   3. Bring glucose logs to all provider visits for review.   4. Encouraged activity as tolerated    5. Reduce Toujeo to 8 units ;if fastings still in low 80's reduce to 6 units after 1-2 wks   6. Will report shooting pain & swelling of ankles to PCP   7. Encouraged  to call diabetes center with any questions or concerns.   8. Follow-up as needed    Script sent to preferred pharmacy for glucose test strips and lancets per protocol.    Patient verbalized understanding and has no further questions at this time.    Ivana Mitchell RN, [facility]

## 2025-03-25 ENCOUNTER — TELEPHONE (OUTPATIENT)
Dept: FAMILY MEDICINE CLINIC | Facility: CLINIC | Age: 80
End: 2025-03-25

## 2025-03-25 NOTE — TELEPHONE ENCOUNTER
Thea Harris,   P Thea Harris Nurse  See DM education notes.  Please have him see me in 3 months for DM follow up and med adjustments.  KLE        Due for appt end of June.

## 2025-03-25 NOTE — TELEPHONE ENCOUNTER
Patient notified via detailed voicemail left at cell number (ok per  HIPAA consent)  Advised to call office back to schedule

## 2025-03-31 ENCOUNTER — TELEPHONE (OUTPATIENT)
Dept: FAMILY MEDICINE CLINIC | Facility: CLINIC | Age: 80
End: 2025-03-31

## 2025-04-11 ENCOUNTER — OFFICE VISIT (OUTPATIENT)
Dept: FAMILY MEDICINE CLINIC | Facility: CLINIC | Age: 80
End: 2025-04-11
Payer: MEDICARE

## 2025-04-11 VITALS
RESPIRATION RATE: 20 BRPM | OXYGEN SATURATION: 100 % | HEART RATE: 67 BPM | DIASTOLIC BLOOD PRESSURE: 70 MMHG | TEMPERATURE: 97 F | SYSTOLIC BLOOD PRESSURE: 130 MMHG | BODY MASS INDEX: 33 KG/M2 | WEIGHT: 222.19 LBS

## 2025-04-11 DIAGNOSIS — M25.572 CHRONIC PAIN OF BOTH ANKLES: ICD-10-CM

## 2025-04-11 DIAGNOSIS — M25.571 CHRONIC PAIN OF BOTH ANKLES: ICD-10-CM

## 2025-04-11 DIAGNOSIS — E11.65 TYPE 2 DIABETES MELLITUS WITH HYPERGLYCEMIA, WITH LONG-TERM CURRENT USE OF INSULIN (HCC): ICD-10-CM

## 2025-04-11 DIAGNOSIS — M25.473 ANKLE SWELLING, UNSPECIFIED LATERALITY: Primary | ICD-10-CM

## 2025-04-11 DIAGNOSIS — Z79.4 TYPE 2 DIABETES MELLITUS WITH HYPERGLYCEMIA, WITH LONG-TERM CURRENT USE OF INSULIN (HCC): ICD-10-CM

## 2025-04-11 DIAGNOSIS — G89.29 CHRONIC PAIN OF BOTH ANKLES: ICD-10-CM

## 2025-04-11 PROCEDURE — 99214 OFFICE O/P EST MOD 30 MIN: CPT | Performed by: FAMILY MEDICINE

## 2025-04-11 RX ORDER — GABAPENTIN 100 MG/1
100 CAPSULE ORAL 3 TIMES DAILY PRN
Qty: 90 CAPSULE | Refills: 0 | Status: SHIPPED | OUTPATIENT
Start: 2025-04-11

## 2025-04-11 NOTE — PROGRESS NOTES
David Reyes is a 79 year old male.  Chief Complaint   Patient presents with    Swelling     Ankles and feet 4-5 months     Pain     L knee pain        HPI:   Ankle swelling is worsening. L>R. Harder to walk.     Left knee is bothering him. The pain goes btwen knees and feet and never goes away. Pain will go up knee and across the shin.   Started over a year ago with an occasional shooting pain and numbness in feet.  Now happening never day.   Bottoms of the feet hurt to walk when they are swollen as well.   Worried about gout.     Does not wear compression stockings.     Using walker more and more. If not using it, he holds on to something at all times.   Had one recent fall outside and caught himself on the side of the house. No other falls.   He is more SOB than usual, coughing more, coughing up blood, dark mucous.   That started years ago.     Was on gabapentin in the past, but ran out of that a while ago and hasn't restarted.     ALLERGIES:  Allergies[1]      Current Medications[2]   Past Medical History[3]   Social History:  Short Social Hx on File[4]     BP Readings from Last 6 Encounters:   04/11/25 130/70   12/09/24 104/61   12/02/24 120/60   09/28/24 110/55   06/18/24 130/76   05/23/24 90/48       Wt Readings from Last 6 Encounters:   04/11/25 222 lb 3.2 oz (100.8 kg)   03/17/25 222 lb 8 oz (100.9 kg)   12/09/24 232 lb 8 oz (105.5 kg)   12/02/24 231 lb (104.8 kg)   09/28/24 238 lb 12.1 oz (108.3 kg)   09/09/24 241 lb (109.3 kg)       REVIEW OF SYSTEMS:   GENERAL HEALTH: see HPI   SKIN: denies any unusual skin lesions or rashes  RESPIRATORY: + shortness of breath with exertion  CARDIOVASCULAR: denies chest pain on exertion  GI: denies abdominal pain and denies heartburn  NEURO: denies headaches or dizziness     EXAM:   /70   Pulse 67   Temp 97.4 °F (36.3 °C) (Temporal)   Resp 20   Wt 222 lb 3.2 oz (100.8 kg)   SpO2 100%   BMI 32.81 kg/m²  Body mass index is 32.81 kg/m².      GENERAL: well  developed, well nourished,in no apparent distress  SKIN: no rashes,no suspicious lesions  HEENT: atraumatic, normocephalic,   NECK: supple,no adenopathy   LUNGS: clear to auscultation, no rales or wheezing   CARDIO: RRR without murmur  GI: good BS's,no masses, HSM or tenderness  EXTREMITIES: no cyanosis, clubbing or edema, + varicose and spider veins on b/l ankles, some tenderness around lateral malleolus. No redness or warmth to skin.     ASSESSMENT AND PLAN:     Encounter Diagnoses   Name Primary?    Ankle swelling, unspecified laterality Yes    Type 2 diabetes mellitus with hyperglycemia, with long-term current use of insulin (HCC)     Chronic pain of both ankles        Diagnoses and all orders for this visit:    Ankle swelling, unspecified laterality  -     gabapentin 100 MG Oral Cap; Take 1 capsule (100 mg total) by mouth 3 (three) times daily as needed.    Type 2 diabetes mellitus with hyperglycemia, with long-term current use of insulin (HCC)  -     gabapentin 100 MG Oral Cap; Take 1 capsule (100 mg total) by mouth 3 (three) times daily as needed.    Chronic pain of both ankles    Encouraged to get compression stockings, try gabapentin.   Could consider podiatry for inserts/insoles?     No orders of the defined types were placed in this encounter.              Meds & Refills for this Visit:  Requested Prescriptions     Signed Prescriptions Disp Refills    gabapentin 100 MG Oral Cap 90 capsule 0     Sig: Take 1 capsule (100 mg total) by mouth 3 (three) times daily as needed.             The patient indicates understanding of these issues and agrees to the plan.               [1]   Allergies  Allergen Reactions    Pcn [Penicillins]      \"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   [2]   Current Outpatient Medications   Medication Sig Dispense Refill    gabapentin 100 MG Oral Cap Take 1 capsule (100 mg total) by mouth 3 (three) times  daily as needed. 90 capsule 0    Insulin Glargine, 2 Unit Dial, (TOUJEO MAX SOLOSTAR) 300 UNIT/ML Subcutaneous Solution Pen-injector Inject 10 Units into the skin daily.      Insulin Pen Needle (BD PEN NEEDLE LILO 2ND GEN) 32G X 4 MM Does not apply Misc Apply 1 Application topically daily. 300 each 3    METFORMIN  MG Oral Tablet 24 Hr TAKE 2 TABLETS(1000 MG) BY MOUTH TWICE DAILY WITH MEALS 360 tablet 0    spironolactone 25 MG Oral Tab Take 1 tablet (25 mg total) by mouth daily with breakfast. 90 tablet 0    aspirin 81 MG Oral Tab EC Take 1 tablet (81 mg total) by mouth daily.      metoprolol succinate ER 50 MG Oral Tablet 24 Hr Take 1 tablet (50 mg total) by mouth 2 (two) times daily.      CONTOUR NEXT TEST In Vitro Strip Test 3 times daily Dx Code: E11.9 Insulin - dependent diabetes 300 each 1    Microlet Lancets Does not apply Misc CHECK BLOOD SUGAR THREE TIMES DAILY 300 each 3    losartan 25 MG Oral Tab Take 1 tablet (25 mg total) by mouth every evening.      torsemide 20 MG Oral Tab Take 1 tablet (20 mg total) by mouth BID (Diuretic). 60 tablet 1    Vitamin B-1 100 MG Oral Tab Take 1 tablet (100 mg total) by mouth daily.      methotrexate 2.5 MG Oral Tab Take 4 tablets (10 mg total) by mouth every 7 days. 48 tablet 1    ezetimibe 10 MG Oral Tab Take 1 tablet (10 mg total) by mouth daily.      Lovastatin 40 MG Oral Tab Take 1 tablet (40 mg total) by mouth in the morning and 1 tablet (40 mg total) before bedtime.  2    folic acid 800 MCG Oral Tab Take 1 tablet (800 mcg total) by mouth every other day. Alternate with 2 tablets (1600mcg) every other day    About 1000 mcg daily      levothyroxine 25 MCG Oral Tab Take 1 tablet (25 mcg total) by mouth before breakfast. (Patient not taking: Reported on 4/11/2025) 90 tablet 0    aspirin 325 MG Oral Tab 1 tablet (325 mg total) by Per NG Tube route daily. 90 tablet 1    gabapentin 100 MG Oral Cap Take 1 capsule (100 mg total) by mouth 3 (three) times daily. (Patient  not taking: Reported on 2025) 90 capsule 1   [3]   Past Medical History:   Acute kidney injury    Anxiety state    Back problem    CAD (coronary artery disease)    Congestive heart disease (HCC)    Congestive heart failure (HCC)    CORONARY ARTERY DISEASE    cabg    Coronary atherosclerosis    COVID-19    Diabetes (HCC)    DM (diabetes mellitus) (HCC)    Heart attack (HCC)    High blood pressure    High cholesterol    Other and unspecified hyperlipidemia    Pneumonia    Rheumatoid arthritis (HCC)    Sleep apnea    Subdural hematoma (HCC)    Unspecified sleep apnea    Visual impairment   [4]   Social History  Socioeconomic History    Marital status:    Tobacco Use    Smoking status: Former     Current packs/day: 0.00     Average packs/day: 1 pack/day for 26.0 years (26.0 ttl pk-yrs)     Types: Cigarettes     Start date: 1960     Quit date: 1986     Years since quittin.8    Smokeless tobacco: Never    Tobacco comments:     NON-SMOKER   Vaping Use    Vaping status: Never Used   Substance and Sexual Activity    Alcohol use: Yes     Alcohol/week: 2.0 standard drinks of alcohol     Types: 2 Glasses of wine per week    Drug use: No   Other Topics Concern    Caffeine Concern No    Exercise No     Social Drivers of Health     Food Insecurity: No Food Insecurity (2025)    NCSS - Food Insecurity     Worried About Running Out of Food in the Last Year: No     Ran Out of Food in the Last Year: No   Transportation Needs: No Transportation Needs (2025)    NCSS - Transportation     Lack of Transportation: No   Housing Stability: At Risk (2025)    NCSS - Housing/Utilities     Has Housing: Yes     Worried About Losing Housing: Yes     Unable to Get Utilities: No

## 2025-04-17 ENCOUNTER — PATIENT OUTREACH (OUTPATIENT)
Dept: CASE MANAGEMENT | Age: 80
End: 2025-04-17

## 2025-04-17 NOTE — PROGRESS NOTES
Called patient and left a message for patient to call back when they can. Reviewed patient chart. Left contact my contact number [phone]        Time Spent This Encounter Total: 5  min medical record review  Monthly Minute Total including today: 5 minutes

## 2025-04-28 ENCOUNTER — LAB ENCOUNTER (OUTPATIENT)
Dept: LAB | Age: 80
End: 2025-04-28
Attending: FAMILY MEDICINE
Payer: MEDICARE

## 2025-04-28 DIAGNOSIS — E11.65 TYPE 2 DIABETES MELLITUS WITH HYPERGLYCEMIA, WITH LONG-TERM CURRENT USE OF INSULIN (HCC): ICD-10-CM

## 2025-04-28 DIAGNOSIS — R79.89 ABNORMAL TSH: ICD-10-CM

## 2025-04-28 DIAGNOSIS — M25.473 ANKLE SWELLING, UNSPECIFIED LATERALITY: ICD-10-CM

## 2025-04-28 DIAGNOSIS — Z79.4 TYPE 2 DIABETES MELLITUS WITH HYPERGLYCEMIA, WITH LONG-TERM CURRENT USE OF INSULIN (HCC): ICD-10-CM

## 2025-04-28 LAB
T4 FREE SERPL-MCNC: 1.4 NG/DL (ref 0.8–1.7)
TSI SER-ACNC: 3.04 UIU/ML (ref 0.55–4.78)

## 2025-04-28 PROCEDURE — 84443 ASSAY THYROID STIM HORMONE: CPT

## 2025-04-28 PROCEDURE — 84439 ASSAY OF FREE THYROXINE: CPT

## 2025-04-28 PROCEDURE — 36415 COLL VENOUS BLD VENIPUNCTURE: CPT

## 2025-04-28 RX ORDER — GABAPENTIN 100 MG/1
100 CAPSULE ORAL 3 TIMES DAILY PRN
Qty: 90 CAPSULE | Refills: 0 | Status: SHIPPED | OUTPATIENT
Start: 2025-04-28

## 2025-04-28 RX ORDER — LEVOTHYROXINE SODIUM 25 UG/1
25 TABLET ORAL
Qty: 90 TABLET | Refills: 0 | Status: SHIPPED | OUTPATIENT
Start: 2025-04-28

## 2025-04-28 NOTE — TELEPHONE ENCOUNTER
Thyroid Medication Protocol [id number]/28/2025 05:52 AM   Protocol Details Last TSH value is normal    TSH in past 12 months    In person appointment or virtual visit in the past 12 mos or appointment in next 3 mos    Medication is active on med list          Neurology Medications [id number]/28/2025 05:52 AM   Protocol Details In person appointment or virtual visit in the past 6 mos or appointment in next 3 mos    Medication is active on med list          Last refill:   Medication Quantity Refills Start End   levothyroxine 25 MCG Oral Tab 90 tablet 0 2/10/2025      Medication Quantity Refills Start End   gabapentin 100 MG Oral Cap 90 capsule 1 5/15/2024      Last Visit: 4/11/25    Next Visit:   Future Appointments   Date Time Provider Department Center   4/28/2025  1:00 PM REF [facility] REF [facility] [place]         Forward to Dr. Harris please advise on refills. Thanks.

## 2025-05-13 ENCOUNTER — TELEPHONE (OUTPATIENT)
Dept: FAMILY MEDICINE CLINIC | Facility: CLINIC | Age: 80
End: 2025-05-13

## 2025-05-13 DIAGNOSIS — Z79.4 TYPE 2 DIABETES MELLITUS WITH HYPERGLYCEMIA, WITH LONG-TERM CURRENT USE OF INSULIN (HCC): ICD-10-CM

## 2025-05-13 DIAGNOSIS — E11.65 TYPE 2 DIABETES MELLITUS WITH HYPERGLYCEMIA, WITH LONG-TERM CURRENT USE OF INSULIN (HCC): ICD-10-CM

## 2025-05-13 RX ORDER — LANCETS
EACH MISCELLANEOUS
Qty: 300 EACH | Refills: 3 | Status: SHIPPED | OUTPATIENT
Start: 2025-05-13

## 2025-05-13 NOTE — TELEPHONE ENCOUNTER
[id number] - [place], [place] - [address] AT [facility] [address], [phone], [phone]   [address] [facility] [facility] [address] [facility] [address]   Phone: [phone] Fax: [phone]   Hours: Not open 24 hours     PATIENT CALLING THIS MORNING.  SAYS LANCETS WERE REJECTED THROUGH PHARMACY.        MICRO LANCETS 100 UNITS

## 2025-05-19 ENCOUNTER — PATIENT OUTREACH (OUTPATIENT)
Dept: CASE MANAGEMENT | Age: 80
End: 2025-05-19

## 2025-05-19 DIAGNOSIS — Z79.4 TYPE 2 DIABETES MELLITUS WITHOUT COMPLICATION, WITH LONG-TERM CURRENT USE OF INSULIN (HCC): ICD-10-CM

## 2025-05-19 DIAGNOSIS — E11.9 TYPE 2 DIABETES MELLITUS WITHOUT COMPLICATION, WITH LONG-TERM CURRENT USE OF INSULIN (HCC): ICD-10-CM

## 2025-05-19 DIAGNOSIS — E78.00 PURE HYPERCHOLESTEROLEMIA: ICD-10-CM

## 2025-05-19 DIAGNOSIS — J44.9 CHRONIC OBSTRUCTIVE PULMONARY DISEASE, UNSPECIFIED COPD TYPE (HCC): Primary | ICD-10-CM

## 2025-05-19 NOTE — PROGRESS NOTES
Spoke to David for Chronic Care Management.      Updates to patient care team/comments: No changes  Patient reported changes in medications: No changes  Med Adherence  Comment: Taking     Health Maintenance:   Health Maintenance   Topic Date Due    Zoster Vaccines (1 of 2) Never done    COVID-19 Vaccine (1 - 2024-25 season) Never done    Diabetes Care Foot Exam  10/12/2024    Annual Depression Screening  01/01/2025    Fall Risk Screening (Annual)  01/01/2025    Diabetes Care Dilated Eye Exam  08/19/2025    Diabetes Care A1C  07/07/2025    Influenza Vaccine (Season Ended) 10/01/2025    Annual Physical  12/02/2025    Diabetes Care: GFR  02/03/2026    Diabetes Care: Microalb/Creat Ratio (Annual)  Completed    Pneumococcal Vaccine: 50+ Years  Completed    Meningococcal B Vaccine  Aged Out    Colorectal Cancer Screening  Discontinued     Patient updates/concerns:    Patient stated he continues to have bilateral feet and ankle edema. Patient mentioned he is seeing a podiatrist for care and improvement and is trying to reduce the edema. Patient indicated he is wearing a boot-like wrap on the right ankle along with compression socks on both feet. Patient stated they have helped a little. Patient noted he is due for a follow-up in a few weeks. Patient mentioned he has an appointment later this afternoon with his cardiologist. Patient also continues to experience pain in his shoulders and neck but states the pain has been stable. Patient reports no other concerns at this time    Goals/Action Plan:    Active goal from previous outreach:     Bilateral foot and ankle edema  Patient reported progress towards goals:                - What: Patient stated he continues to have bilateral feet and ankle edema. Patient mentioned he is seeing a podiatrist for care and improvement and is trying to reduce the edema. Patient indicated he is wearing a boot-like wrap on the right ankle along with compression socks on both feet.           -  Where/When/How: Patient stated they have helped a little. Patient noted he is due for a follow-up in a few weeks. Patient mentioned he has an appointment later this afternoon with his cardiologist. Patient also continues to experience pain in his shoulders and neck but states the pain has been stable.  Patient Reported Barriers and Concerns: No new barriers at this time per patient.                   - Plan for overcoming barriers: Patient to continue to follow up with care team as scheduled.    Care Managers Interventions:   Patient was educated on proper nutrition, hydration and exercise. Patient is aware of our next outreach, has agreed to being contacted via telephone. Patient verbalized understanding. Patient is aware she may contact me if further assistance is needed.    Future Appointments: No future appointments.    Next Care Manager Follow Up Date: 1 month follow up or sooner if needed    Reason For Follow Up: review progress and or barriers towards patient's goals.     Time Spent This Encounter Total: 25 min medical record review, telephone communication, care plan updates where needed, education, goals, and action plan recreation/update. Provided acknowledgment and validation to patient's concerns.   Monthly Minute Total including today: 25  Physical assessment, complete health history, and need for CCM established by Thea Harris DO.

## 2025-05-20 ENCOUNTER — TELEPHONE (OUTPATIENT)
Dept: FAMILY MEDICINE CLINIC | Facility: CLINIC | Age: 80
End: 2025-05-20

## 2025-05-20 NOTE — TELEPHONE ENCOUNTER
Patient has labs done yesterday per cardiology    He was advised that CBC was off and he should follow up with pcp to see a specialist    Per patient, results were forwarded to pcp    Please adv  Thank you

## 2025-05-20 NOTE — TELEPHONE ENCOUNTER
Per epic, patient has upcoming appt with hematology  Future Appointments   Date Time Provider Department Center   5/22/2025 11:00 AM James Chirinos MD NP [facility]        Called and spoke with patient who states he just scheduled with hematology but wanted to make sure Dr Harris was aware and agreed that is who he should see.   Advised patient Dr Chirinos is who Dr Harris wants him to f/u with. Patient states he has call in to [facility] to request most recent lab results forwarded to Dr Chirinos

## 2025-05-22 ENCOUNTER — OFFICE VISIT (OUTPATIENT)
Age: 80
End: 2025-05-22
Attending: INTERNAL MEDICINE
Payer: MEDICARE

## 2025-05-22 VITALS
DIASTOLIC BLOOD PRESSURE: 60 MMHG | HEART RATE: 60 BPM | RESPIRATION RATE: 20 BRPM | TEMPERATURE: 97 F | SYSTOLIC BLOOD PRESSURE: 88 MMHG | WEIGHT: 219.19 LBS | OXYGEN SATURATION: 100 % | BODY MASS INDEX: 32 KG/M2

## 2025-05-22 DIAGNOSIS — D61.818 PANCYTOPENIA (HCC): Primary | ICD-10-CM

## 2025-05-22 DIAGNOSIS — N18.30 STAGE 3 CHRONIC KIDNEY DISEASE, UNSPECIFIED WHETHER STAGE 3A OR 3B CKD (HCC): ICD-10-CM

## 2025-05-22 LAB
ALBUMIN SERPL-MCNC: 4.3 G/DL (ref 3.2–4.8)
ALBUMIN/GLOB SERPL: 1.4 {RATIO} (ref 1–2)
ALP LIVER SERPL-CCNC: 51 U/L (ref 45–117)
ALT SERPL-CCNC: <7 U/L (ref 10–49)
ANION GAP SERPL CALC-SCNC: 9 MMOL/L (ref 0–18)
AST SERPL-CCNC: 18 U/L (ref ?–34)
BASOPHILS # BLD AUTO: 0 X10(3) UL (ref 0–0.2)
BASOPHILS NFR BLD AUTO: 0 %
BILIRUB SERPL-MCNC: 0.6 MG/DL (ref 0.2–1.1)
BUN BLD-MCNC: 33 MG/DL (ref 9–23)
CALCIUM BLD-MCNC: 9.4 MG/DL (ref 8.7–10.6)
CHLORIDE SERPL-SCNC: 104 MMOL/L (ref 98–112)
CO2 SERPL-SCNC: 24 MMOL/L (ref 21–32)
CREAT BLD-MCNC: 1.9 MG/DL (ref 0.7–1.3)
EGFRCR SERPLBLD CKD-EPI 2021: 36 ML/MIN/1.73M2 (ref 60–?)
EOSINOPHIL # BLD AUTO: 0 X10(3) UL (ref 0–0.7)
EOSINOPHIL NFR BLD AUTO: 0 %
ERYTHROCYTE [DISTWIDTH] IN BLOOD BY AUTOMATED COUNT: 18.7 %
GLOBULIN PLAS-MCNC: 3.1 G/DL (ref 2–3.5)
GLUCOSE BLD-MCNC: 112 MG/DL (ref 70–99)
HCT VFR BLD AUTO: 27.7 % (ref 39–53)
HGB BLD-MCNC: 8.1 G/DL (ref 13–17.5)
IGA SERPL-MCNC: 374.8 MG/DL (ref 40–350)
IGM SERPL-MCNC: 73.4 MG/DL (ref 50–300)
IMM GRANULOCYTES # BLD AUTO: 0.01 X10(3) UL (ref 0–1)
IMM GRANULOCYTES NFR BLD: 0.8 %
IMMUNOGLOBULIN PNL SER-MCNC: 1080 MG/DL (ref 650–1600)
LDH SERPL L TO P-CCNC: 262 U/L (ref 120–246)
LYMPHOCYTES # BLD AUTO: 0.72 X10(3) UL (ref 1–4)
LYMPHOCYTES NFR BLD AUTO: 56.3 %
MCH RBC QN AUTO: 25.9 PG (ref 26–34)
MCHC RBC AUTO-ENTMCNC: 29.2 G/DL (ref 31–37)
MCV RBC AUTO: 88.5 FL (ref 80–100)
MONOCYTES # BLD AUTO: 0.1 X10(3) UL (ref 0.1–1)
MONOCYTES NFR BLD AUTO: 7.8 %
NEUTROPHILS # BLD AUTO: 0.45 X10 (3) UL (ref 1.5–7.7)
NEUTROPHILS # BLD AUTO: 0.45 X10(3) UL (ref 1.5–7.7)
NEUTROPHILS NFR BLD AUTO: 35.1 %
OSMOLALITY SERPL CALC.SUM OF ELEC: 292 MOSM/KG (ref 275–295)
PLATELET # BLD AUTO: 104 10(3)UL (ref 150–450)
POTASSIUM SERPL-SCNC: 4.8 MMOL/L (ref 3.5–5.1)
PROT SERPL-MCNC: 7.4 G/DL (ref 5.7–8.2)
RBC # BLD AUTO: 3.13 X10(6)UL (ref 3.8–5.8)
SODIUM SERPL-SCNC: 137 MMOL/L (ref 136–145)
WBC # BLD AUTO: 1.3 X10(3) UL (ref 4–11)

## 2025-05-22 NOTE — PROGRESS NOTES
Education Record    Learner:  Patient    Disease / Diagnosis: Pancytopenia     Barriers / Limitations:  None   Comments:    Method:  Discussion   Comments:    General Topics:  Plan of care reviewed   Comments:    Outcome:  Shows understanding   Comments:    Here for lab results and review.

## 2025-05-22 NOTE — PROGRESS NOTES
Hematology/Oncology Clinic Follow Up Visit    Patient Name: David Reyes  Medical Record Number: [id number]    YOB: 1945   PCP: Thea Harris DO    Reason for Consultation:  David Reyes was seen today for the diagnosis of pancytopenia    History of Present Illness:      80 y/o M PMH chronic systolic heart failure, RA, CKD stage 3 who presents for follow up of pancytopenia.    - has been off methotrexate since 2024   - CKD stage 3 stable; last sCr 1.88 in 25  - 25 with persistent pancytopenia. WBC with 1.57 g/dL, hgb 8, plt count 118k  - he endorses ongoing fatigue, declining energy levels  - denies any fevers at home  - says his rheumatoid arthritis has not gotten worse    Past Medical History:  Past Medical History[1]  Past Surgical History[2]    Home Medications:  Current Medications[3]    Allergies:   Allergies[4]    Psychosocial History:  Social History     Socioeconomic History    Marital status:      Spouse name: Not on file    Number of children: Not on file    Years of education: Not on file    Highest education level: Not on file   Occupational History    Not on file   Tobacco Use    Smoking status: Former     Current packs/day: 0.00     Average packs/day: 1 pack/day for 26.0 years (26.0 ttl pk-yrs)     Types: Cigarettes     Start date: 1960     Quit date: 1986     Years since quittin.9    Smokeless tobacco: Never    Tobacco comments:     NON-SMOKER   Vaping Use    Vaping status: Never Used   Substance and Sexual Activity    Alcohol use: Yes     Alcohol/week: 2.0 standard drinks of alcohol     Types: 2 Glasses of wine per week    Drug use: No    Sexual activity: Not on file   Other Topics Concern     Service Not Asked    Blood Transfusions Not Asked    Caffeine Concern No    Occupational Exposure Not Asked    Hobby Hazards Not Asked    Sleep Concern Not Asked    Stress Concern Not Asked    Weight Concern Not Asked    Special Diet Not  Asked    Back Care Not Asked    Exercise No    Bike Helmet Not Asked    Seat Belt Not Asked    Self-Exams Not Asked   Social History Narrative    Not on file     Social Drivers of Health     Food Insecurity: No Food Insecurity (4/11/2025)    NCSS - Food Insecurity     Worried About Running Out of Food in the Last Year: No     Ran Out of Food in the Last Year: No   Transportation Needs: No Transportation Needs (4/11/2025)    NCSS - Transportation     Lack of Transportation: No   Housing Stability: At Risk (4/11/2025)    NCSS - Housing/Utilities     Has Housing: Yes     Worried About Losing Housing: Yes     Unable to Get Utilities: No       Family Medical History:  Family History[5]    Review of Systems:  A 10-point ROS was done with pertinent positives and negative per the HPI    Vital Signs:  Height: --  Weight: --  BSA (Calculated - sq m): --  Pulse: --  BP: --  Temp: --  Do Not Use - Resp Rate: --  SpO2: --    Wt Readings from Last 6 Encounters:   04/11/25 100.8 kg (222 lb 3.2 oz)   03/17/25 100.9 kg (222 lb 8 oz)   12/09/24 105.5 kg (232 lb 8 oz)   12/02/24 104.8 kg (231 lb)   09/28/24 108.3 kg (238 lb 12.1 oz)   09/09/24 109.3 kg (241 lb)       ECOG PS: 1    Physical Examination:  General: Patient is alert and oriented, not in acute distress  Psych: Mood and affect are appropriate  Eyes: EOMI, PERRL  ENT: Oropharynx is clear, no adenopathy  CV: nl S1/S2, no LE edema  Respiratory: CTAB, non-labored respirations  GI/Abd: Soft, non-tender   Neurological: Grossly intact   Lymphatics: No palpable cervical, supraclavicular, axillary, or inguinal lymphadenopathy    Laboratory:  Lab Results   Component Value Date    WBC 1.6 (L) 02/03/2025    WBC 2.5 (L) 01/07/2025    WBC 2.5 (L) 12/09/2024    HGB 8.7 (L) 02/03/2025    HGB 9.9 (L) 01/07/2025    HGB 8.6 (L) 12/09/2024    HCT 28.2 (L) 02/03/2025    MCV 92.2 02/03/2025    MCH 28.4 02/03/2025    MCHC 30.9 (L) 02/03/2025    RDW 16.2 02/03/2025    PLT 70.0 (L) 02/03/2025     PLT 65.0 (L) 01/07/2025    PLT 75.0 (L) 12/09/2024     Lab Results   Component Value Date     (H) 02/03/2025    BUN 33 (H) 02/03/2025    BUNCREA 25 (H) 12/04/2021    CREATSERUM 2.04 (H) 02/03/2025    CREATSERUM 2.52 (H) 01/07/2025    CREATSERUM 1.99 (H) 12/09/2024    ANIONGAP 13 02/03/2025    GFR 66 11/13/2017    GFRNAA 72 06/30/2022    GFRAA 83 06/30/2022    CA 9.1 02/03/2025    OSMOCALC 305 (H) 02/03/2025    ALKPHO 49 02/03/2025    AST 16 02/03/2025    ALT <7 (L) 02/03/2025    BILT 0.4 02/03/2025    TP 7.3 02/03/2025    ALB 4.2 02/03/2025    GLOBULIN 3.1 02/03/2025    AGRATIO 1.5 12/04/2021     02/03/2025    K 4.1 02/03/2025     02/03/2025    CO2 25.0 02/03/2025     Lab Results   Component Value Date    PTT 29.4 09/21/2024    PT 17.1 (H) 04/27/2014    INR 1.16 09/20/2024       Impression & Plan:     Pancytopenia  - his pancytopenia has not improved despite the discontinuation of methotrexate  - CBC repeated today; neutropenic with . His hgb is 8.1 g/dL, plt count 104k.  - persistent pancytopenia is concerning for an underlying hematologic malignancy. Discussed with Pat that he needs a bone marrow biopsy for further evaluation  - will send [facility] at time of marrow  - we discussed neutropenic precautions; if he is neutropenic, to go to ED as this is considered an oncologic emergency    RA  - not currently on meds    Chronic systolic heart failure  - 20-25% LVEF. Appears compensated today    CKD stage 3  - sCr stable    Follow up: pending bone marrow biopsy    [facility] Hematology Oncology             [1]   Past Medical History:   Acute kidney injury    Anxiety state    Back problem    CAD (coronary artery disease)    Congestive heart disease (HCC)    Congestive heart failure (HCC)    CORONARY ARTERY DISEASE    cabg    Coronary atherosclerosis    COVID-19    Diabetes (HCC)    DM (diabetes mellitus) (HCC)    Heart attack (HCC)    High blood pressure    High  cholesterol    Other and unspecified hyperlipidemia    Pneumonia    Rheumatoid arthritis (HCC)    Sleep apnea    Subdural hematoma (HCC)    Unspecified sleep apnea    Visual impairment   [2]   Past Surgical History:  Procedure Laterality Date    Angiogram  2/11/2015    Angioplasty (coronary)  2/23/15    RCA    Bypass surgery      2007    Cabg  2004    LAD, Diagonal    Cardiac defibrillator placement  6/7/14    [employer] dual chamber ICD    Cath drug eluting stent      Tonsillectomy     [3]   No outpatient medications have been marked as taking for the 5/22/25 encounter (Appointment) with James Chirinos MD.   [4]   Allergies  Allergen Reactions    Pcn [Penicillins]      \"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   [5]   Family History  Problem Relation Age of Onset    Cancer Father     Heart Disease Mother     Other (alzheimers) Mother     Stroke Neg

## 2025-05-27 LAB
ALBUMIN SERPL ELPH-MCNC: 3.28 G/DL (ref 3.75–5.21)
ALBUMIN/GLOB SERPL: 0.96 {RATIO} (ref 1–2)
ALPHA1 GLOB SERPL ELPH-MCNC: 0.51 G/DL (ref 0.19–0.46)
ALPHA2 GLOB SERPL ELPH-MCNC: 1.04 G/DL (ref 0.48–1.05)
B-GLOBULIN SERPL ELPH-MCNC: 0.88 G/DL (ref 0.68–1.23)
GAMMA GLOB SERPL ELPH-MCNC: 1 G/DL (ref 0.62–1.7)
KAPPA LC FREE SER-MCNC: 7.55 MG/DL (ref 0.33–1.94)
KAPPA LC FREE/LAMBDA FREE SER NEPH: 1.71 {RATIO} (ref 0.26–1.65)
LAMBDA LC FREE SERPL-MCNC: 4.42 MG/DL (ref 0.57–2.63)
PROT SERPL-MCNC: 6.7 G/DL (ref 5.7–8.2)

## 2025-05-29 ENCOUNTER — TELEPHONE (OUTPATIENT)
Dept: FAMILY MEDICINE CLINIC | Facility: CLINIC | Age: 80
End: 2025-05-29

## 2025-05-29 PROBLEM — I27.20 PULMONARY HYPERTENSION (HCC): Status: ACTIVE | Noted: 2025-04-30

## 2025-05-29 PROBLEM — M79.672 FOOT PAIN, BILATERAL: Status: ACTIVE | Noted: 2025-04-30

## 2025-05-29 PROBLEM — M79.671 FOOT PAIN, BILATERAL: Status: ACTIVE | Noted: 2025-04-30

## 2025-05-29 PROBLEM — E03.9 HYPOTHYROID: Status: ACTIVE | Noted: 2025-04-30

## 2025-05-29 NOTE — TELEPHONE ENCOUNTER
RECEIVED CALL THIS AFTERNOON FROM PATIENT.  PATIENT EXPLAINS HIS PLACARD IS EXPIRING.  COULD DR CARMEN FILL OUT A NEW FORM FOR PATIENT.

## 2025-05-29 NOTE — TELEPHONE ENCOUNTER
Patient notified Dr Harris portion completed and verbalized understanding.      Form at  for . Patient will need to complete his portion before turning in.    Copy sent to scan

## 2025-05-29 NOTE — TELEPHONE ENCOUNTER
[facility] Medicare Diabetic form completed and faxed to [facility] center and local pharmacy

## 2025-06-03 DIAGNOSIS — E11.65 TYPE 2 DIABETES MELLITUS WITH HYPERGLYCEMIA, WITH LONG-TERM CURRENT USE OF INSULIN (HCC): ICD-10-CM

## 2025-06-03 DIAGNOSIS — Z79.4 TYPE 2 DIABETES MELLITUS WITH HYPERGLYCEMIA, WITH LONG-TERM CURRENT USE OF INSULIN (HCC): ICD-10-CM

## 2025-06-03 RX ORDER — INSULIN GLARGINE 300 U/ML
24 INJECTION, SOLUTION SUBCUTANEOUS DAILY
Qty: 27 ML | Refills: 0 | Status: SHIPPED | OUTPATIENT
Start: 2025-06-03

## 2025-06-06 ENCOUNTER — HOSPITAL ENCOUNTER (OUTPATIENT)
Dept: CT IMAGING | Facility: HOSPITAL | Age: 80
Discharge: HOME | End: 2025-06-06
Attending: INTERNAL MEDICINE
Payer: MEDICARE

## 2025-06-06 ENCOUNTER — NURSE NAVIGATOR ENCOUNTER (OUTPATIENT)
Age: 80
End: 2025-06-06

## 2025-06-06 ENCOUNTER — NURSE ONLY (OUTPATIENT)
Dept: LAB | Facility: HOSPITAL | Age: 80
End: 2025-06-06
Attending: INTERNAL MEDICINE
Payer: MEDICARE

## 2025-06-06 VITALS
TEMPERATURE: 98 F | HEIGHT: 69 IN | BODY MASS INDEX: 32.14 KG/M2 | DIASTOLIC BLOOD PRESSURE: 81 MMHG | RESPIRATION RATE: 16 BRPM | WEIGHT: 217 LBS | OXYGEN SATURATION: 95 % | SYSTOLIC BLOOD PRESSURE: 94 MMHG | HEART RATE: 75 BPM

## 2025-06-06 DIAGNOSIS — D61.818 PANCYTOPENIA (HCC): Primary | ICD-10-CM

## 2025-06-06 DIAGNOSIS — D61.818 PANCYTOPENIA (HCC): ICD-10-CM

## 2025-06-06 LAB
BASOPHILS # BLD AUTO: 0 X10(3) UL (ref 0–0.2)
BASOPHILS NFR BLD AUTO: 0 %
EOSINOPHIL # BLD AUTO: 0 X10(3) UL (ref 0–0.7)
EOSINOPHIL NFR BLD AUTO: 0 %
ERYTHROCYTE [DISTWIDTH] IN BLOOD BY AUTOMATED COUNT: 19.9 %
GLUCOSE BLD-MCNC: 109 MG/DL (ref 70–99)
GLUCOSE BLD-MCNC: 120 MG/DL (ref 70–99)
HCT VFR BLD AUTO: 28.3 % (ref 39–53)
HGB BLD-MCNC: 8.2 G/DL (ref 13–17.5)
IMM GRANULOCYTES # BLD AUTO: 0.01 X10(3) UL (ref 0–1)
IMM GRANULOCYTES NFR BLD: 0.6 %
INR BLD: 1.24 (ref 0.8–1.2)
LYMPHOCYTES # BLD AUTO: 1.09 X10(3) UL (ref 1–4)
LYMPHOCYTES NFR BLD AUTO: 66.1 %
MCH RBC QN AUTO: 25.7 PG (ref 26–34)
MCHC RBC AUTO-ENTMCNC: 29 G/DL (ref 31–37)
MCV RBC AUTO: 88.7 FL (ref 80–100)
MONOCYTES # BLD AUTO: 0.1 X10(3) UL (ref 0.1–1)
MONOCYTES NFR BLD AUTO: 6.1 %
NEUTROPHILS # BLD AUTO: 0.45 X10 (3) UL (ref 1.5–7.7)
NEUTROPHILS # BLD AUTO: 0.45 X10(3) UL (ref 1.5–7.7)
NEUTROPHILS NFR BLD AUTO: 27.2 %
PLATELET # BLD AUTO: 137 10(3)UL (ref 150–450)
PROTHROMBIN TIME: 15.8 SECONDS (ref 11.6–14.8)
RBC # BLD AUTO: 3.19 X10(6)UL (ref 3.8–5.8)
WBC # BLD AUTO: 1.7 X10(3) UL (ref 4–11)

## 2025-06-06 PROCEDURE — 88314 HISTOCHEMICAL STAINS ADD-ON: CPT | Performed by: INTERNAL MEDICINE

## 2025-06-06 PROCEDURE — 88184 FLOWCYTOMETRY/ TC 1 MARKER: CPT | Performed by: INTERNAL MEDICINE

## 2025-06-06 PROCEDURE — 77012 CT SCAN FOR NEEDLE BIOPSY: CPT | Performed by: INTERNAL MEDICINE

## 2025-06-06 PROCEDURE — 88342 IMHCHEM/IMCYTCHM 1ST ANTB: CPT | Performed by: INTERNAL MEDICINE

## 2025-06-06 PROCEDURE — 88237 TISSUE CULTURE BONE MARROW: CPT | Performed by: INTERNAL MEDICINE

## 2025-06-06 PROCEDURE — 88291 CYTO/MOLECULAR REPORT: CPT | Performed by: INTERNAL MEDICINE

## 2025-06-06 PROCEDURE — 88185 FLOWCYTOMETRY/TC ADD-ON: CPT | Performed by: INTERNAL MEDICINE

## 2025-06-06 PROCEDURE — 88341 IMHCHEM/IMCYTCHM EA ADD ANTB: CPT | Performed by: INTERNAL MEDICINE

## 2025-06-06 PROCEDURE — 38222 DX BONE MARROW BX & ASPIR: CPT | Performed by: INTERNAL MEDICINE

## 2025-06-06 PROCEDURE — 88333 PATH CONSLTJ SURG CYTO XM 1: CPT | Performed by: INTERNAL MEDICINE

## 2025-06-06 PROCEDURE — 82962 GLUCOSE BLOOD TEST: CPT

## 2025-06-06 PROCEDURE — 88275 CYTOGENETICS 100-300: CPT | Performed by: INTERNAL MEDICINE

## 2025-06-06 PROCEDURE — 85097 BONE MARROW INTERPRETATION: CPT | Performed by: INTERNAL MEDICINE

## 2025-06-06 PROCEDURE — 36415 COLL VENOUS BLD VENIPUNCTURE: CPT

## 2025-06-06 PROCEDURE — 88313 SPECIAL STAINS GROUP 2: CPT | Performed by: INTERNAL MEDICINE

## 2025-06-06 PROCEDURE — 85610 PROTHROMBIN TIME: CPT

## 2025-06-06 PROCEDURE — 88305 TISSUE EXAM BY PATHOLOGIST: CPT | Performed by: INTERNAL MEDICINE

## 2025-06-06 PROCEDURE — 88271 CYTOGENETICS DNA PROBE: CPT | Performed by: INTERNAL MEDICINE

## 2025-06-06 PROCEDURE — 85025 COMPLETE CBC W/AUTO DIFF WBC: CPT

## 2025-06-06 PROCEDURE — 88264 CHROMOSOME ANALYSIS 20-25: CPT | Performed by: INTERNAL MEDICINE

## 2025-06-06 PROCEDURE — 99152 MOD SED SAME PHYS/QHP 5/>YRS: CPT | Performed by: INTERNAL MEDICINE

## 2025-06-06 RX ORDER — NALOXONE HYDROCHLORIDE 0.4 MG/ML
INJECTION, SOLUTION INTRAMUSCULAR; INTRAVENOUS; SUBCUTANEOUS
Status: DISCONTINUED
Start: 2025-06-06 | End: 2025-06-06 | Stop reason: WASHOUT

## 2025-06-06 RX ORDER — DEXTROSE MONOHYDRATE 25 G/50ML
50 INJECTION, SOLUTION INTRAVENOUS
Status: DISCONTINUED | OUTPATIENT
Start: 2025-06-06 | End: 2025-06-08

## 2025-06-06 RX ORDER — MIDAZOLAM HYDROCHLORIDE 1 MG/ML
INJECTION INTRAMUSCULAR; INTRAVENOUS
Status: COMPLETED
Start: 2025-06-06 | End: 2025-06-06

## 2025-06-06 RX ORDER — SODIUM CHLORIDE 9 MG/ML
INJECTION, SOLUTION INTRAVENOUS CONTINUOUS
Status: DISCONTINUED | OUTPATIENT
Start: 2025-06-06 | End: 2025-06-08

## 2025-06-06 RX ORDER — NICOTINE POLACRILEX 4 MG
30 LOZENGE BUCCAL
Status: DISCONTINUED | OUTPATIENT
Start: 2025-06-06 | End: 2025-06-08

## 2025-06-06 RX ORDER — NICOTINE POLACRILEX 4 MG
15 LOZENGE BUCCAL
Status: DISCONTINUED | OUTPATIENT
Start: 2025-06-06 | End: 2025-06-08

## 2025-06-06 RX ORDER — MIDAZOLAM HYDROCHLORIDE 1 MG/ML
1 INJECTION INTRAMUSCULAR; INTRAVENOUS EVERY 5 MIN PRN
Status: DISCONTINUED | OUTPATIENT
Start: 2025-06-06 | End: 2025-06-08

## 2025-06-06 RX ORDER — FLUMAZENIL 0.1 MG/ML
0.2 INJECTION INTRAVENOUS AS NEEDED
Status: DISCONTINUED | OUTPATIENT
Start: 2025-06-06 | End: 2025-06-08

## 2025-06-06 RX ORDER — NALOXONE HYDROCHLORIDE 0.4 MG/ML
0.08 INJECTION, SOLUTION INTRAMUSCULAR; INTRAVENOUS; SUBCUTANEOUS AS NEEDED
Status: DISCONTINUED | OUTPATIENT
Start: 2025-06-06 | End: 2025-06-08

## 2025-06-06 RX ORDER — FLUMAZENIL 0.1 MG/ML
INJECTION INTRAVENOUS
Status: DISCONTINUED
Start: 2025-06-06 | End: 2025-06-06 | Stop reason: WASHOUT

## 2025-06-06 RX ADMIN — MIDAZOLAM HYDROCHLORIDE 0.5 MG: 1 INJECTION INTRAMUSCULAR; INTRAVENOUS at 09:06:00

## 2025-06-06 RX ADMIN — SODIUM CHLORIDE: 9 INJECTION, SOLUTION INTRAVENOUS at 08:50:00

## 2025-06-06 RX ADMIN — MIDAZOLAM HYDROCHLORIDE 0.5 MG: 1 INJECTION INTRAMUSCULAR; INTRAVENOUS at 09:04:00

## 2025-06-06 NOTE — IMAGING NOTE
Received pt to radiology holding room.  Pt verified name and  as well as allergies.  Pt states NPO since  last night.  Pt states he does not take blood thinners and stopped his aspirin over 2 weeks ago.  Lab results of PT/INR and PLT reviewed.  Pt verbalizes concern about area of \"bed sore\" he got during rehab to his buttock interfering with procedure area.  Pt advised this area would not be near biopsy area. Informed consent obtained by Dr. Paige.  Pt positioned prone on scanner.  CRM and pulse ox monitoring applied, alarms enabled. Sterile prep and 1% lido to area by Dr. Paige.  Specimens obtained and handed off to pathology present in room.  While pt prone, pt noted to have dusky erythematous area to skin at intergluteal cleft R>L.  Skin appears intact, but irritated.  Triple abo/ telfa  dressing applied to biopsy site. CDI.  Pt positioned supine on gurney.  Pt awake and alert, conversing appropriately and in no apparent distress.   Pt transported to room 2249 with belongings. Pt accompanied by this RN, transporter, and wife Pat. TED regarding area of skin concern discussed with Karishma HANDY, pt and wife.  Recommended pt to f/u with pcp regarding skin care to this area and advised to change position frequently. Dressing noted to be saturated with blood at time of hand off site check.  Dressing changed to gauze with telfa. SBAR given at BS to jong Schwarz RN.

## 2025-06-06 NOTE — PROCEDURES
[facility]   part of [facility]  Procedure Note    David Reyes Patient Status:  Outpatient    1945 MRN [id number]   Location [facility] CT Attending James Chirinos MD   Hosp Day # 0 PCP Thea Harris DO     Procedure: Bone marrow biopsy    Pre-Procedure Diagnosis:  Pancytopenia    Post-Procedure Diagnosis: Pancytopenia    Anesthesia:  Sedation    Findings:  No complication    Specimens: Bone marrow core and aspirate    Blood Loss:  < 5 cc    Tourniquet Time: None  Complications:  None  Drains:  None    Secondary Diagnosis:  N/A    Urszula Paige MD  2025

## 2025-06-06 NOTE — DISCHARGE INSTRUCTIONS
Discharge/After Visit [facility] - Department of Radiology  Biopsy - Renal (Kidney), Liver, Lung, Bone, Retroperitoneal Location    Post Sedation  Follow these guidelines:  You should be watched overnight by a responsible adult. This person should make sure your condition remains stable.   Do not drink any alcohol for 24 hours after the procedure.  Don’t drive, operate dangerous machinery, make important business or personal decisions, or sign legal documents for 24 hours after the procedure.  Note: Your healthcare provider may tell you not to take any medicine by mouth for pain or sleep for a period of time. These medicines may react with the medicine you were given during your procedure. This could cause a much stronger response than usual.     Activity:  Take it easy for the rest of the day after your biopsy.   You may be sore at the site of the biopsy for the next 5 days.   Do not do any strenuous exercises or lift over five pounds for the next 24 hours.     Biopsy Site:  Keep a bandage on the biopsy site for 24 hours after the procedure.   You may shower after 24 hours, but no soaking in a bathtub for 48 hours.     Lung Biopsy: If chest pain or shortness of breath occurs, go to the nearest Emergency Room.   Liver Biopsy: If there is any increase in right-sided back, shoulder, or abdominal pain, go to the nearest Emergency Room. Call your doctor for unusual dizziness or weakness.   Renal Biopsy: Report any bloody urine, bleeding at the site, swelling, or pain to your ordering provider,      Diet: Drink plenty of fluids and resume your usual home diet. A slow return to normal foods minimizes nausea.    Pain Management:   You may use over-the-counter or prescribed pain relief medication if it is not contraindicated by another condition.     Medications:  You may resume your usual home medications. You may resume blood thinners 24 hours after the procedure if there is no bleeding from/hematoma at  the puncture site or bloody urine (Renal Biopsy only)     When to seek medical advice:  Call the provider that ordered the biopsy with any questions and to discuss test results. These may also be available in your [facility] My Chart. You may also contact the Radiology Nurse at [phone], M-F, 8-5 with any additional questions or concerns.  Dial [phone] and ask the  to page the on-call IR Radiologist if any of these occur:    A change in color or temperature of the area where the biopsy was performed.  You develop increasing pain or shortness of breath.  Unusual drowsiness, weakness, or dizziness.  Unusual vomiting.     IF YOU FEEL YOU ARE EXPERIENCING AN EMERGENCY,   CALL 911 OR GO TO THE NEAREST EMERGENCY ROOM      4.2.24 MO/  Radiology

## 2025-06-10 ENCOUNTER — TELEPHONE (OUTPATIENT)
Dept: FAMILY MEDICINE CLINIC | Facility: CLINIC | Age: 80
End: 2025-06-10

## 2025-06-10 NOTE — TELEPHONE ENCOUNTER
PT CALLED AND ADV HAD CT BIOPSY ASPIRATION AND NEEDED A STITCH TO CLOSE UP AREA.    WAS ADV ANYONE CAN REMOVE STITCH - WANT TO MAKE SURE IF THIS IS OK FOR DR TO DO.    PLEASE CALL AND ADV IF OK TO SET UP APPT.    THANK YOU

## 2025-06-11 NOTE — TELEPHONE ENCOUNTER
Spoke with the pt and scheduled appt for tomorrow  Future Appointments   Date Time Provider Department Center   6/12/2025  2:30 PM Thea Harris DO [facility] [place]

## 2025-06-12 ENCOUNTER — APPOINTMENT (OUTPATIENT)
Dept: CT IMAGING | Facility: HOSPITAL | Age: 80
End: 2025-06-12
Attending: EMERGENCY MEDICINE
Payer: MEDICARE

## 2025-06-12 ENCOUNTER — OFFICE VISIT (OUTPATIENT)
Dept: FAMILY MEDICINE CLINIC | Facility: CLINIC | Age: 80
End: 2025-06-12
Payer: MEDICARE

## 2025-06-12 ENCOUNTER — HOSPITAL ENCOUNTER (INPATIENT)
Facility: HOSPITAL | Age: 80
LOS: 12 days | Discharge: NURSING, REHAB OR LONG-TERM CARE | End: 2025-06-24
Attending: EMERGENCY MEDICINE | Admitting: INTERNAL MEDICINE
Payer: MEDICARE

## 2025-06-12 ENCOUNTER — APPOINTMENT (OUTPATIENT)
Dept: GENERAL RADIOLOGY | Facility: HOSPITAL | Age: 80
End: 2025-06-12
Payer: MEDICARE

## 2025-06-12 ENCOUNTER — APPOINTMENT (OUTPATIENT)
Dept: NUCLEAR MEDICINE | Facility: HOSPITAL | Age: 80
End: 2025-06-12
Attending: EMERGENCY MEDICINE
Payer: MEDICARE

## 2025-06-12 VITALS — SYSTOLIC BLOOD PRESSURE: 102 MMHG | OXYGEN SATURATION: 100 % | HEART RATE: 120 BPM | DIASTOLIC BLOOD PRESSURE: 68 MMHG

## 2025-06-12 DIAGNOSIS — D61.818 PANCYTOPENIA (HCC): Primary | ICD-10-CM

## 2025-06-12 DIAGNOSIS — N17.9 ACUTE KIDNEY INJURY: ICD-10-CM

## 2025-06-12 DIAGNOSIS — R00.0 TACHYCARDIA: ICD-10-CM

## 2025-06-12 DIAGNOSIS — I26.99 ACUTE PULMONARY EMBOLISM, UNSPECIFIED PULMONARY EMBOLISM TYPE, UNSPECIFIED WHETHER ACUTE COR PULMONALE PRESENT (HCC): ICD-10-CM

## 2025-06-12 DIAGNOSIS — R06.00 DYSPNEA, UNSPECIFIED TYPE: ICD-10-CM

## 2025-06-12 DIAGNOSIS — I50.23 ACUTE ON CHRONIC SYSTOLIC HEART FAILURE (HCC): ICD-10-CM

## 2025-06-12 DIAGNOSIS — R06.09 DOE (DYSPNEA ON EXERTION): Primary | ICD-10-CM

## 2025-06-12 DIAGNOSIS — E87.20 METABOLIC ACIDOSIS: ICD-10-CM

## 2025-06-12 DIAGNOSIS — R06.02 SHORTNESS OF BREATH: ICD-10-CM

## 2025-06-12 DIAGNOSIS — R04.2 HEMOPTYSIS: ICD-10-CM

## 2025-06-12 PROBLEM — I50.20 HFREF (HEART FAILURE WITH REDUCED EJECTION FRACTION) (HCC): Status: ACTIVE | Noted: 2025-06-12

## 2025-06-12 PROBLEM — I10 PRIMARY HYPERTENSION: Status: ACTIVE | Noted: 2025-06-12

## 2025-06-12 LAB
ALBUMIN SERPL-MCNC: 4.1 G/DL (ref 3.2–4.8)
ALBUMIN/GLOB SERPL: 1.2 {RATIO} (ref 1–2)
ALP LIVER SERPL-CCNC: 57 U/L (ref 45–117)
ALT SERPL-CCNC: <7 U/L (ref 10–49)
ANION GAP SERPL CALC-SCNC: 14 MMOL/L (ref 0–18)
ANTIBODY SCREEN: NEGATIVE
APTT PPP: 30.8 SECONDS (ref 23–36)
AST SERPL-CCNC: 24 U/L (ref ?–34)
BASE EXCESS BLDV CALC-SCNC: 1.6 MMOL/L
BASOPHILS # BLD AUTO: 0 X10(3) UL (ref 0–0.2)
BASOPHILS NFR BLD AUTO: 0 %
BILIRUB SERPL-MCNC: 0.8 MG/DL (ref 0.2–1.1)
BUN BLD-MCNC: 30 MG/DL (ref 9–23)
CALCIUM BLD-MCNC: 8.9 MG/DL (ref 8.7–10.6)
CHLORIDE SERPL-SCNC: 104 MMOL/L (ref 98–112)
CHOLEST SERPL-MCNC: 134 MG/DL (ref ?–200)
CO2 SERPL-SCNC: 20 MMOL/L (ref 21–32)
CREAT BLD-MCNC: 2.14 MG/DL (ref 0.7–1.3)
D DIMER PPP FEU-MCNC: 4.77 UG/ML FEU (ref ?–0.77)
EGFRCR SERPLBLD CKD-EPI 2021: 31 ML/MIN/1.73M2 (ref 60–?)
EOSINOPHIL # BLD AUTO: 0 X10(3) UL (ref 0–0.7)
EOSINOPHIL NFR BLD AUTO: 0 %
ERYTHROCYTE [DISTWIDTH] IN BLOOD BY AUTOMATED COUNT: 19.9 %
FLUAV + FLUBV RNA SPEC NAA+PROBE: NEGATIVE
FLUAV + FLUBV RNA SPEC NAA+PROBE: NEGATIVE
GLOBULIN PLAS-MCNC: 3.3 G/DL (ref 2–3.5)
GLUCOSE BLD-MCNC: 128 MG/DL (ref 70–99)
GLUCOSE BLD-MCNC: 158 MG/DL (ref 70–99)
GLUCOSE BLD-MCNC: 254 MG/DL (ref 70–99)
GLUCOSE: 268
HCO3 BLDV-SCNC: 25.3 MEQ/L (ref 22–26)
HCT VFR BLD AUTO: 26.5 % (ref 39–53)
HDLC SERPL-MCNC: 28 MG/DL (ref 40–59)
HGB BLD-MCNC: 7.8 G/DL (ref 13–17.5)
IMM GRANULOCYTES # BLD AUTO: 0.04 X10(3) UL (ref 0–1)
IMM GRANULOCYTES NFR BLD: 3.4 %
LACTATE SERPL-SCNC: 1.8 MMOL/L (ref 0.5–2)
LACTATE SERPL-SCNC: 2.2 MMOL/L (ref 0.5–2)
LDLC SERPL CALC-MCNC: 90 MG/DL (ref ?–100)
LYMPHOCYTES # BLD AUTO: 0.49 X10(3) UL (ref 1–4)
LYMPHOCYTES NFR BLD AUTO: 41.9 %
MCH RBC QN AUTO: 26 PG (ref 26–34)
MCHC RBC AUTO-ENTMCNC: 29.4 G/DL (ref 31–37)
MCV RBC AUTO: 88.3 FL (ref 80–100)
MONOCYTES # BLD AUTO: 0.13 X10(3) UL (ref 0.1–1)
MONOCYTES NFR BLD AUTO: 11.1 %
NEUTROPHILS # BLD AUTO: 0.51 X10 (3) UL (ref 1.5–7.7)
NEUTROPHILS # BLD AUTO: 0.51 X10(3) UL (ref 1.5–7.7)
NEUTROPHILS NFR BLD AUTO: 43.6 %
NONHDLC SERPL-MCNC: 106 MG/DL (ref ?–130)
NT-PROBNP SERPL-MCNC: ABNORMAL PG/ML (ref ?–450)
OSMOLALITY SERPL CALC.SUM OF ELEC: 295 MOSM/KG (ref 275–295)
OXYHGB MFR BLDV: 42.7 % (ref 72–78)
PCO2 BLDV: 38 MM HG (ref 38–50)
PH BLDV: 7.44 [PH] (ref 7.33–7.43)
PLATELET # BLD AUTO: 106 10(3)UL (ref 150–450)
PLATELET MORPHOLOGY: NORMAL
PLATELETS.RETICULATED NFR BLD AUTO: 11 % (ref 0–7)
PO2 BLDV: 29 MM HG (ref 30–50)
POTASSIUM SERPL-SCNC: 4.1 MMOL/L (ref 3.5–5.1)
PROT SERPL-MCNC: 7.4 G/DL (ref 5.7–8.2)
RBC # BLD AUTO: 3 X10(6)UL (ref 3.8–5.8)
RH BLOOD TYPE: POSITIVE
RSV RNA SPEC NAA+PROBE: NEGATIVE
SARS-COV-2 RNA RESP QL NAA+PROBE: NOT DETECTED
SODIUM SERPL-SCNC: 138 MMOL/L (ref 136–145)
TRIGL SERPL-MCNC: 82 MG/DL (ref 30–149)
TROPONIN I SERPL HS-MCNC: 71 NG/L (ref ?–53)
VLDLC SERPL CALC-MCNC: 13 MG/DL (ref 0–30)
WBC # BLD AUTO: 1.2 X10(3) UL (ref 4–11)

## 2025-06-12 PROCEDURE — 99215 OFFICE O/P EST HI 40 MIN: CPT | Performed by: FAMILY MEDICINE

## 2025-06-12 PROCEDURE — 71045 X-RAY EXAM CHEST 1 VIEW: CPT

## 2025-06-12 PROCEDURE — 99223 1ST HOSP IP/OBS HIGH 75: CPT | Performed by: STUDENT IN AN ORGANIZED HEALTH CARE EDUCATION/TRAINING PROGRAM

## 2025-06-12 PROCEDURE — 71250 CT THORAX DX C-: CPT | Performed by: EMERGENCY MEDICINE

## 2025-06-12 PROCEDURE — 78582 LUNG VENTILAT&PERFUS IMAGING: CPT | Performed by: EMERGENCY MEDICINE

## 2025-06-12 RX ORDER — GABAPENTIN 100 MG/1
100 CAPSULE ORAL DAILY
Status: DISCONTINUED | OUTPATIENT
Start: 2025-06-13 | End: 2025-06-24

## 2025-06-12 RX ORDER — NICOTINE POLACRILEX 4 MG
15 LOZENGE BUCCAL
Status: DISCONTINUED | OUTPATIENT
Start: 2025-06-12 | End: 2025-06-24

## 2025-06-12 RX ORDER — NICOTINE POLACRILEX 4 MG
15 LOZENGE BUCCAL
Status: DISCONTINUED | OUTPATIENT
Start: 2025-06-12 | End: 2025-06-12

## 2025-06-12 RX ORDER — SENNOSIDES 8.6 MG
17.2 TABLET ORAL NIGHTLY PRN
Status: DISCONTINUED | OUTPATIENT
Start: 2025-06-12 | End: 2025-06-24

## 2025-06-12 RX ORDER — DEXTROSE MONOHYDRATE 25 G/50ML
50 INJECTION, SOLUTION INTRAVENOUS
Status: DISCONTINUED | OUTPATIENT
Start: 2025-06-12 | End: 2025-06-24

## 2025-06-12 RX ORDER — LOSARTAN POTASSIUM 25 MG/1
25 TABLET ORAL DAILY
Status: DISCONTINUED | OUTPATIENT
Start: 2025-06-12 | End: 2025-06-24

## 2025-06-12 RX ORDER — TORSEMIDE 20 MG/1
20 TABLET ORAL
Status: DISCONTINUED | OUTPATIENT
Start: 2025-06-12 | End: 2025-06-15

## 2025-06-12 RX ORDER — ATORVASTATIN CALCIUM 10 MG/1
10 TABLET, FILM COATED ORAL NIGHTLY
Status: DISCONTINUED | OUTPATIENT
Start: 2025-06-12 | End: 2025-06-12 | Stop reason: ALTCHOICE

## 2025-06-12 RX ORDER — ECHINACEA PURPUREA EXTRACT 125 MG
1 TABLET ORAL
Status: DISCONTINUED | OUTPATIENT
Start: 2025-06-12 | End: 2025-06-24

## 2025-06-12 RX ORDER — NICOTINE POLACRILEX 4 MG
30 LOZENGE BUCCAL
Status: DISCONTINUED | OUTPATIENT
Start: 2025-06-12 | End: 2025-06-12

## 2025-06-12 RX ORDER — BENZONATATE 200 MG/1
200 CAPSULE ORAL 3 TIMES DAILY PRN
Status: DISCONTINUED | OUTPATIENT
Start: 2025-06-12 | End: 2025-06-24

## 2025-06-12 RX ORDER — ONDANSETRON 2 MG/ML
4 INJECTION INTRAMUSCULAR; INTRAVENOUS EVERY 6 HOURS PRN
Status: DISCONTINUED | OUTPATIENT
Start: 2025-06-12 | End: 2025-06-12

## 2025-06-12 RX ORDER — SPIRONOLACTONE 25 MG/1
25 TABLET ORAL
Status: DISCONTINUED | OUTPATIENT
Start: 2025-06-13 | End: 2025-06-24

## 2025-06-12 RX ORDER — LEVOTHYROXINE SODIUM 25 UG/1
25 TABLET ORAL
Status: DISCONTINUED | OUTPATIENT
Start: 2025-06-13 | End: 2025-06-24

## 2025-06-12 RX ORDER — DEXTROSE MONOHYDRATE 25 G/50ML
50 INJECTION, SOLUTION INTRAVENOUS
Status: DISCONTINUED | OUTPATIENT
Start: 2025-06-12 | End: 2025-06-12

## 2025-06-12 RX ORDER — POLYETHYLENE GLYCOL 3350 17 G/17G
17 POWDER, FOR SOLUTION ORAL DAILY PRN
Status: DISCONTINUED | OUTPATIENT
Start: 2025-06-12 | End: 2025-06-21

## 2025-06-12 RX ORDER — ACETAMINOPHEN 500 MG
1000 TABLET ORAL EVERY 8 HOURS PRN
Status: DISCONTINUED | OUTPATIENT
Start: 2025-06-12 | End: 2025-06-24

## 2025-06-12 RX ORDER — EZETIMIBE 10 MG/1
10 TABLET ORAL DAILY
Status: DISCONTINUED | OUTPATIENT
Start: 2025-06-12 | End: 2025-06-24

## 2025-06-12 RX ORDER — LOVASTATIN 40 MG/1
40 TABLET ORAL 2 TIMES DAILY
Status: DISCONTINUED | OUTPATIENT
Start: 2025-06-13 | End: 2025-06-24

## 2025-06-12 RX ORDER — METOPROLOL SUCCINATE 50 MG/1
50 TABLET, EXTENDED RELEASE ORAL
Status: DISCONTINUED | OUTPATIENT
Start: 2025-06-12 | End: 2025-06-24

## 2025-06-12 RX ORDER — NICOTINE POLACRILEX 4 MG
30 LOZENGE BUCCAL
Status: DISCONTINUED | OUTPATIENT
Start: 2025-06-12 | End: 2025-06-24

## 2025-06-12 RX ORDER — METOCLOPRAMIDE HYDROCHLORIDE 5 MG/ML
10 INJECTION INTRAMUSCULAR; INTRAVENOUS EVERY 8 HOURS PRN
Status: DISCONTINUED | OUTPATIENT
Start: 2025-06-12 | End: 2025-06-14

## 2025-06-12 RX ORDER — BISACODYL 10 MG
10 SUPPOSITORY, RECTAL RECTAL
Status: DISCONTINUED | OUTPATIENT
Start: 2025-06-12 | End: 2025-06-24

## 2025-06-12 RX ORDER — DEXTROSE MONOHYDRATE AND SODIUM CHLORIDE 5; .45 G/100ML; G/100ML
INJECTION, SOLUTION INTRAVENOUS CONTINUOUS
Status: ACTIVE | OUTPATIENT
Start: 2025-06-12 | End: 2025-06-12

## 2025-06-12 RX ORDER — HEPARIN SODIUM AND DEXTROSE 10000; 5 [USP'U]/100ML; G/100ML
18 INJECTION INTRAVENOUS ONCE
Status: COMPLETED | OUTPATIENT
Start: 2025-06-12 | End: 2025-06-13

## 2025-06-12 NOTE — CONSULTS
[facility] Hematology and Oncology Consult Note    Reason for Consult: Pancytopenia   Medical Record Number: [id number]   CSN: [id number]   Referring Physician: No ref. provider found  PCP: Thea Harris DO    History of Present Illness: 79M with a PMH MDS, CHF, DM2, HTN, HLD, RA, FLAKO and CAD presented from his PCP office on 6/12/25 with worsening dyspnea and cough. Noted to have some hemoptysis. He has noticed progressively worse dyspnea in the past 2 months but the past 24 hours have been worse. LE edema/swelling feels worse as well. No fevers at home.     He was recently diagnosed with MDS by Dr. Chirinos. Final pathology is pending.  On admission. D-dimer elevated. V/Q with a moderate sized defect and started on hep gtt.     Review of Systems: 12 Point ROS was completed and pertinent positives are in the HPI    Medications:  Current Hospital Medications[1]    Past Medical History[2]  Past Surgical History[3]  Set as collapsible by default.[4]   Family History[5]    Physical Exam  /83 (BP Location: Left arm)   Pulse 85   Temp 97.6 °F (36.4 °C) (Oral)   Resp 24   Wt 96.6 kg (213 lb)   SpO2 100%   BMI 31.45 kg/m²    General: NAD, AOX3  HEENT: clear op, mmm, no jvd. EOM intact, no scleral icterus   CV: RRR S1S2 no murmurs  Extremities: No edema  Lungs: mild increase wob  Abd: soft nt nd +BS no hepatosplenomegaly  Neuro: CN: II-XII grossly intact    Results:  Lab Results   Component Value Date    WBC 1.5 (L) 06/13/2025    HGB 7.4 (L) 06/13/2025    HCT 25.2 (L) 06/13/2025    MCV 89.4 06/13/2025    .0 (L) 06/13/2025    EOSABS 0.00 09/25/2024     Lab Results   Component Value Date     06/13/2025    K 4.0 06/13/2025    CO2 22.0 06/13/2025     06/13/2025    BUN 31 (H) 06/13/2025    GLUCOSE 196 (H) 06/17/2016    PHOS 3.6 06/13/2025    ALB 3.9 06/13/2025       Radiology: reviewed     Pathology  Final Diagnosis:     Bone marrow core biopsy, clot section, aspirate smears and touch  preparations:  -Myelodysplastic syndrome with 15% blasts involving a hypercellular bone marrow (~70% cellular).  See comment.        Electronically signed by Dorys Rutledge MD on 6/11/2025 at 1612 CDT        Final Diagnosis Comment      The patient's clinical history is appreciated.  The marrow is hypercellular and myeloid blasts are increased.  The aspirate smears are hemodilute but show increased blasts.  The percentage of blasts is based on an immunohistochemical stain for CD34 performed on the core biopsy.  By differential on the aspirate smear, blasts represent ~13% of total cellularity.  Flow cytometric immunophenotyping demonstrates a discrete myeloid blast population representing ~13% of total events.  These blasts are positive for CD34, HLA-DR and  and are negative for TdT and MPO (please see separate report [id number]).     Together, these findings support the diagnosis of a myelodysplastic syndrome.  Based on the ICC 2022 classification system, this is best classified as an MDS/AML as the blast count in the bone marrow exceeds 10%.  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     Conventional cytogenetics, TEMPUS molecular studies and FISH studies are pending and will be reported in an addendum.  This case underwent intradepartmental consensus on 6/11/2025.  These findings are communicated to Amanda Mercado RN, and BRIAN Craft, on behalf of Dr. Chirinos at 3:50 p.m. on 6/11/2025.       Radiology  V/Q  CONCLUSION:  Moderate sized apparent perfusion defect in the right lower lobe, concerning for pulmonary embolism.     CT Chest without contrast  CONCLUSION:  Noncontrast CT.  Small bilateral pleural effusion larger on the left, with moderately extensive ground-glass opacities, lower lobe atelectasis and scattered probable postinflammatory nodules.  Clinical and imaging follow-up suggested.    Coronary  calcifications are seen.  No pneumothorax or pneumomediastinum identified.       Assessment and Plan:  Hypoxia  -V/Q with a moderate sized perfusion defect  -History of CHF as well   -Continue heparin for now  -Dopplers pending  -pulmonary consult  -CTA chest once Cr approves   -Can consider eliquis on discharge    MDS-IB2  -Follows with Dr. Chirinos. Bone marrow from 6/6/25 with 15% blasts. Tempus, FISH, Cytogenetics pending.   -Pancytopenia initially thought to be related to methotrexate, but no improvement  -Transfuse for a Hb < 7  -Transfuse for a Plt < 50 given hemoptysis   -Can consider empiric abx with levaquin. If fevers, please start cefepime     Will follow peripherally over weekend.     ELDA Méndez MD  [facility]           [1]   Current Facility-Administered Medications:     heparin (Porcine) 77452 units/250mL infusion PE/DVT/THROMBUS CONTINUOUS, 200-3,000 Units/hr, Intravenous, Continuous    glucose (Dex4) 15 GM/59ML oral liquid 15 g, 15 g, Oral, Q15 Min PRN **OR** glucose (Glutose) 40% oral gel 15 g, 15 g, Oral, Q15 Min PRN **OR** dextrose 50% injection 50 mL, 50 mL, Intravenous, Q15 Min PRN **OR** glucose (Dex4) 15 GM/59ML oral liquid 30 g, 30 g, Oral, Q15 Min PRN **OR** glucose (Glutose) 40% oral gel 30 g, 30 g, Oral, Q15 Min PRN    ezetimibe (Zetia) tab 10 mg, 10 mg, Oral, Daily    gabapentin (Neurontin) cap 100 mg, 100 mg, Oral, Daily    levothyroxine (Synthroid) tab 25 mcg, 25 mcg, Oral, Before breakfast    [Held by provider] losartan (Cozaar) tab 25 mg, 25 mg, Oral, Daily    metoprolol succinate ER (Toprol XL) 24 hr tab 50 mg, 50 mg, Oral, 2x Daily(Beta Blocker)    spironolactone (Aldactone) tab 25 mg, 25 mg, Oral, Daily with breakfast    torsemide (Demadex) tab 20 mg, 20 mg, Oral, BID (Diuretic)    insulin aspart (NovoLOG) 100 Units/mL FlexPen 1-10 Units, 1-10 Units, Subcutaneous, TID AC and HS    acetaminophen (Tylenol Extra Strength) tab 1,000 mg, 1,000 mg,  Oral, Q8H PRN    melatonin tab 3 mg, 3 mg, Oral, Nightly PRN    polyethylene glycol (PEG 3350) (Miralax) 17 g oral packet 17 g, 17 g, Oral, Daily PRN    sennosides (Senokot) tab 17.2 mg, 17.2 mg, Oral, Nightly PRN    bisacodyl (Dulcolax) 10 MG rectal suppository 10 mg, 10 mg, Rectal, Daily PRN    metoclopramide (Reglan) 5 mg/mL injection 10 mg, 10 mg, Intravenous, Q8H PRN    benzonatate (Tessalon) cap 200 mg, 200 mg, Oral, TID PRN    guaiFENesin (Robitussin) 100 MG/5 ML oral liquid 200 mg, 200 mg, Oral, Q4H PRN    glycerin-hypromellose- (Artificial Tears) 0.2-0.2-1 % ophthalmic solution 1 drop, 1 drop, Both Eyes, QID PRN    sodium chloride (Saline Mist) 0.65 % nasal solution 1 spray, 1 spray, Each Nare, Q3H PRN    [Held by provider] **PATIENT SUPPLIED** Lovastatin TABS 40 mg, 40 mg, Oral, BID  [2]   Past Medical History:   Acute kidney injury    Anxiety state    Back problem    CAD (coronary artery disease)    Congestive heart disease (HCC)    Congestive heart failure (HCC)    CORONARY ARTERY DISEASE    cabg    Coronary atherosclerosis    COVID-19    Diabetes (HCC)    Disorder of thyroid    DM (diabetes mellitus) (HCC)    Heart attack (HCC)    High blood pressure    High cholesterol    Muscle weakness    USES A WALKER TO AMBULATE    Other and unspecified hyperlipidemia    Pneumonia    Rheumatoid arthritis (HCC)    Sleep apnea    NO MACHINE/TREATMENT    Subdural hematoma (HCC)    Unspecified sleep apnea    Visual impairment    READING GLASSES   [3]   Past Surgical History:  Procedure Laterality Date    Angiogram  2/11/2015    Angioplasty (coronary)  2/23/15    RCA    Bypass surgery      2007    Cabg  2004    LAD, Diagonal    Cardiac defibrillator placement  6/7/14    [employer] dual chamber ICD    Cath drug eluting stent      Tonsillectomy     [4]   Social History  Socioeconomic History    Marital status:    Tobacco Use    Smoking status: Former     Current packs/day: 0.00     Average packs/day:  1 pack/day for 26.0 years (26.0 ttl pk-yrs)     Types: Cigarettes     Start date: 1960     Quit date: 1986     Years since quittin.0    Smokeless tobacco: Never    Tobacco comments:     NON-SMOKER   Vaping Use    Vaping status: Never Used   Substance and Sexual Activity    Alcohol use: Yes     Alcohol/week: 2.0 standard drinks of alcohol     Types: 2 Glasses of wine per week     Comment: OCCASIONAL    Drug use: No   [5]   Family History  Problem Relation Age of Onset    Cancer Father     Heart Disease Mother     Other (alzheimers) Mother     Stroke Neg

## 2025-06-12 NOTE — ED QUICK NOTES
Nuc Med paged in. Spoke to Ponce stating he got the page and he will be on his way in. Ponce states the pt needs a chest x-ray first. This RN informed Ponce the pt already got a chest x-ray and it is resulted.

## 2025-06-12 NOTE — ED QUICK NOTES
Orders for admission, patient is aware of plan and ready to go upstairs. Any questions, please call ED OLE Simmons at extension [phone].     Patient Covid vaccination status: Unvaccinated     COVID Test Ordered in ED: SARS-CoV-2/Flu A and B/RSV by PCR (GeneXpert)    COVID Suspicion at Admission: N/A    Running Infusions: Medication Infusions[1] None    Mental Status/LOC at time of transport: A&Ox4    Other pertinent information:   CIWA score: N/A   NIH score:  N/A             [1]

## 2025-06-12 NOTE — ED QUICK NOTES
Spoke with Ponce from [place]. He states he needs the Genxpert test to come back and then he will send transport for the pt. ED MD aware

## 2025-06-12 NOTE — ED PROVIDER NOTES
Patient Seen in: [facility] Emergency Department        History  Chief Complaint   Patient presents with    Difficulty Breathing     Stated Complaint: SO 97% on RA    Subjective:   HPI        `    This is a 79-year-old male past medical history of coronary disease status post CABG, obesity, congestive heart failure with ICD, high cholesterol, rheumatoid arthritis,obstructive sleep apnea no CPAP, diabetes, who presents with shortness of breath and hemoptysis.  Patient is on aspirin.  Patient was in the office today for stitch removal from a bone marrow biopsy when he found to be acutely short of breath and \"not looking well \".  Patient states that he has had a hemoptysis for at least the last month maybe longer.  He states initially was brown but now it is bright red in color especially over the last week or so.  He states now its bright red blood he does at least 20 times a day.  In addition, he is very fatigued.  He denies any chest pain.  No fevers chills.  No abdominal pain.  No urinary symptoms.  He denies hematemesis or hematochezia.  He presents here from his physicians office for further evaluation.      Objective:     Past Medical History:    Acute kidney injury    Anxiety state    Back problem    CAD (coronary artery disease)    Congestive heart disease (HCC)    Congestive heart failure (HCC)    CORONARY ARTERY DISEASE    cabg    Coronary atherosclerosis    COVID-19    Diabetes (HCC)    Disorder of thyroid    DM (diabetes mellitus) (HCC)    Heart attack (HCC)    High blood pressure    High cholesterol    Muscle weakness    USES A WALKER TO AMBULATE    Other and unspecified hyperlipidemia    Pneumonia    Rheumatoid arthritis (HCC)    Sleep apnea    NO MACHINE/TREATMENT    Subdural hematoma (HCC)    Unspecified sleep apnea    Visual impairment    READING GLASSES              Past Surgical History:   Procedure Laterality Date    Angiogram  2/11/2015    Angioplasty (coronary)  2/23/15    RCA    Bypass  surgery      2007    Cabg  2004    LAD, Diagonal    Cardiac defibrillator placement  6/7/14    [employer] dual chamber ICD    Cath drug eluting stent      Tonsillectomy                  No pertinent social history.                              Physical Exam    ED Triage Vitals   BP 06/12/25 1321 118/62   Pulse 06/12/25 1318 102   Resp 06/12/25 1318 18   Temp 06/12/25 1318 97.4 °F (36.3 °C)   Temp src 06/12/25 1318 Oral   SpO2 06/12/25 1318 96 %   O2 Device 06/12/25 1428 None (Room air)       Current Vitals:   Vital Signs  BP: 125/87  Pulse: 117  Resp: 19  Temp: 97.4 °F (36.3 °C)  Temp src: Oral  MAP (mmHg): 98    Oxygen Therapy  SpO2: 100 %  O2 Device: None (Room air)            Physical Exam  GENERAL: Awake, alert oriented x3, ill-appearing.  Tachypneic.    SKIN: Pale, warm, and dry.  HEENT:  Pupils equally round and reactive to light. Conjuctiva clear.  Oropharynx is clear and moist.   Lungs: Diminished at the bases  HEART:  Regular rate and rhythm. S1 and S2. No murmurs, no rubs or gallops.   ABDOMEN: Soft, nontender and nondistended. Normoactive bowel sounds. No rebound. No guarding.   EXTREMITIES: +2 lower extremity edema bilaterally warm with brisk capillary refill.             ED Course  Labs Reviewed   CBC WITH DIFFERENTIAL WITH PLATELET - Abnormal; Notable for the following components:       Result Value    WBC 1.2 (*)     RBC 3.00 (*)     HGB 7.8 (*)     HCT 26.5 (*)     .0 (*)     Immature Platelet Fraction 11.0 (*)     MCHC 29.4 (*)     Neutrophil Absolute Prelim 0.51 (*)     Neutrophil Absolute 0.51 (*)     Lymphocyte Absolute 0.49 (*)     All other components within normal limits   COMP METABOLIC PANEL (14) - Abnormal; Notable for the following components:    Glucose 158 (*)     CO2 20.0 (*)     BUN 30 (*)     Creatinine 2.14 (*)     eGFR-Cr 31 (*)     ALT <7 (*)     All other components within normal limits   RBC MORPHOLOGY SCAN - Abnormal; Notable for the following components:    RBC  Morphology See morphology below (*)     All other components within normal limits   D-DIMER - Abnormal; Notable for the following components:    D-Dimer 4.77 (*)     All other components within normal limits   PRO BETA NATRIURETIC PEPTIDE - Abnormal; Notable for the following components:    Pro-Beta Natriuretic Peptide 46,096 (*)     All other components within normal limits   VENOUS BLOOD GAS - Abnormal; Notable for the following components:    Venous pH 7.44 (*)     Venous pO2 29 (*)     Venous O2Hb 42.7 (*)     All other components within normal limits    Narrative:     Results read back.    LACTIC ACID, PLASMA - Abnormal; Notable for the following components:    Lactic Acid 2.2 (*)     All other components within normal limits   TROPONIN I HIGH SENSITIVITY - Abnormal; Notable for the following components:    Troponin I (High Sensitivity) 71 (*)     All other components within normal limits   POCT GLUCOSE - Abnormal; Notable for the following components:    POC Glucose 128 (*)     All other components within normal limits   SARS-COV-2/FLU A AND B/RSV BY PCR (GENEXPERT) - Normal    Narrative:     This test is intended for the qualitative detection and differentiation of SARS-CoV-2, influenza A, influenza B, and respiratory syncytial virus (RSV) viral RNA in nasopharyngeal or nares swabs from individuals suspected of respiratory viral infection consistent with COVID-19 by their healthcare provider. Signs and symptoms of respiratory viral infection due to SARS-CoV-2, influenza, and RSV can be similar.    Test performed using the Xpert Xpress SARS-CoV-2/FLU/RSV (real time RT-PCR)  assay on the GeneXpert instrument, [employer], [place] [address].   This test is being used under the Food and Drug Administration's Emergency Use Authorization.    The authorized Fact Sheet for Healthcare Providers for this assay is available upon request from the laboratory.   SCAN SLIDE   LACTIC ACID REFLEX POST POSTIVE   PTT, ACTIVATED    LIPID PANEL   TYPE AND SCREEN    Narrative:     The following orders were created for panel order Type and screen.  Procedure                               Abnormality         Status                     ---------                               -----------         ------                     ABORH (Blood Type)[[id number]]                               Final result               Antibody Screen[[id number]]                                  Final result                 Please view results for these tests on the individual orders.   ABORH (BLOOD TYPE)   ANTIBODY SCREEN   RAINBOW DRAW BLUE   BLOOD CULTURE   BLOOD CULTURE     EKG    Rate, intervals and axes as noted on EKG Report.  Rate: 103  Rhythm: Paced/PVCs  Reading: No acute changes                CT CHEST (CPT=71250)  Result Date: 6/12/2025  PROCEDURE:  CT CHEST (CPT=71250)  COMPARISON:  [facility] , CT, CT CHEST+ABDOMEN+PELVIS(CPT=71250/09848), 9/21/2024, 2:12 PM.  INDICATIONS:  hemoptysis  TECHNIQUE:  Unenhanced multislice CT scanning is performed through the chest.  Dose reduction techniques were used. Dose information is transmitted to the ACR (American College of Radiology) NRDR (National Radiology Data Registry) which includes the Dose  Index Registry.  PATIENT STATED HISTORY: (As transcribed by Technologist)  Patient with increased shortness of breath, coughing up blood. Denies current chest pain.    FINDINGS:  This scan performed without IV contrast, with limitations thereof.  LUNGS/PLLEURA:  Small bilateral pleural effusion larger on the left measuring up to 3.6 cm thickness, no pneumothorax.  A trace amount of fluid enters the fissures without large loculation.  Moderate ground-glass opacities throughout the lungs especially  upper lobes.  Small nodule in the middle lobe image 94 measuring 4 mm.  5-6 mm nodule right lower lobe image 101.  There may be additional nodules obscured by the ground-glass changes and lower lobe atelectasis.   THORACIC AORTA:  No  aneurysm or other acute process. There are atherosclerotic changes including calcification involving the aorta, but no evidence for aneurysm involving the aorta.  MEDIASTINUM/HUGO:  Unremarkable.  CARDIAC:  Coronary artery calcifications are present.  CHEST WALL:  Unremarkable.  LIMITED ABDOMEN:  No acute process.  BONES:  No acute process.  OTHER:    None.             CONCLUSION:  Noncontrast CT.  Small bilateral pleural effusion larger on the left, with moderately extensive ground-glass opacities, lower lobe atelectasis and scattered probable postinflammatory nodules.  Clinical and imaging follow-up suggested.  Coronary calcifications are seen.  No pneumothorax or pneumomediastinum identified.  LOCATION:  [facility]   Dictated by (CST): Omid Randall MD on 6/12/2025 at 7:53 PM     Finalized by (CST): Omid Randall MD on 6/12/2025 at 8:00 PM       NM LUNG VQ VENT / PERFUSION SCAN  (CPT=78582)  Result Date: 6/12/2025  PROCEDURE:  NM LUNG VQ VENT / PERFUSION SCAN  (ZGW=04584)  COMPARISON:  [facility], XR, XR CHEST AP PORTABLE  (CPT=71045), 6/12/2025, 3:01 PM.  INDICATIONS:  eval for PE  TECHNIQUE:  The patient was ventilated with 113 mCi Xe-133 gas and posterior supine images of the lungs were obtained. This was followed by IV injection of 6.5 mCi Tc-99m MAA, and similar projection images were obtained.  FINDINGS:  Chest x-ray from today is reviewed. Posterior ventilation images show no defect or air trapping, and appears similar to the posterior perfusion images.  In addition to the posterior perfusion images, 7 additional standard perfusion images obtained.  Cardiac outline and mediastinal outline demonstrated on these images, but in addition on the RPO and right lateral view there is a perfusion defect in the right lung approximately superior segment right lower lobe, not corresponding to chest x-ray abnormality, and not corresponding to expected anatomic structures or mediastinal outline.  Concern for pathologic  perfusion defect in this area            CONCLUSION:  Moderate sized apparent perfusion defect in the right lower lobe, concerning for pulmonary embolism.   LOCATION:  [facility]   Dictated by (CST): Omid Randall MD on 6/12/2025 at 6:57 PM     Finalized by (CST): Omid Randall MD on 6/12/2025 at 7:00 PM       XR CHEST AP PORTABLE  (CPT=71045)  Result Date: 6/12/2025  PROCEDURE:  XR CHEST AP PORTABLE  (CPT=71045)  TECHNIQUE:  AP chest radiograph was obtained.  COMPARISON:  [facility], XR, XR CHEST PA + LAT CHEST (CPT=71046), 1/07/2025, 2:40 PM.  INDICATIONS:  SO 97% on RA  PATIENT STATED HISTORY: (As transcribed by Technologist)  Patient experiencing shortness of breath. He mentioned this happens frequently, but he hasn't gotten a diagnosis.    FINDINGS:  Left chest pacemaker and median sternotomy changes noted.  Cardiomegaly with prominence of the central pulmonary vascularity.  Mild scarring/atelectasis in the lower lungs.  Probable small bilateral pleural effusions.  No pneumothorax.  Degenerative changes the spine.            CONCLUSION:  Mild scarring/atelectasis in the lower lungs.  Probable small bilateral pleural effusions.    LOCATION:  [id number]      Dictated by (CST): Ino Bae MD on 6/12/2025 at 3:13 PM     Finalized by (CST): Ino Bae MD on 6/12/2025 at 3:13 PM                      MDM     This is a 79-year-old male past medical history of coronary disease status post CABG, obesity, congestive heart failure with ICD, high cholesterol, rheumatoid arthritis,obstructive sleep apnea no CPAP, diabetes, subdural hematoma who presents with shortness of breath and hemoptysis.  Patient is on aspirin.  Patient was in the office today for stitch removal from a bone marrow biopsy when he found to be acutely short of breath and \"not looking well \".  Patient states that he has had a mopped assist for about a year but it got acutely worse over the last few days.  He states now its bright  red blood he does at least 20 times a day.  Differential includes PE, malignancy, myelodysplastic syndrome, pneumonia.    IV line was established with normal saline.  Patient placed on cardiac monitor.  Continuous pulse oximetry.  Basic labs were obtained.    Venous blood gas: pH 7.44/CO2 38/pO2 29/bicarb 25      CBC: White blood cell count 1.2.  Hemoglobin 7.8.  Previous hemoglobin 8.2 on 6/6/2025.  Platelet 106.  ANC 0.5 CMP: BUN 30.  Creatinine 2.1.  Previous BUN/creatinine 33/1.9 this is around his baseline.  Glucose 158.  Bicarb 20.  D-dimer was elevated at 4.77.  Lactic acid was slightly elevated at 2.2.    Blood culture sent x 2 and pending.    Flu/COVID/RSV negative    proBNP 46,096  Troponin was elevated at 71.  Will trend.    Patient was typed and screened    Admission disposition: 6/12/2025  8:06 PM         Patient did not receive 30ml/kg of fluids despite having: elevated Lactate. The reason for doing so is: patient has heart failure. 125/ml/hr of fluids were given instead    4:27 PM discussed case with Dr. Méndez.  No request for any transfusions at this point.  They will follow patient.  If there is something infectious looking on CAT scan may cover with antibiotics.    VQ scan was obtained and demonstrated moderate sized apparent perfusion defect in the right lower lobe concerning for PE.    Discussed case with Dr. Méndez again at 7:20 PM.  Request heparin drip no bolus due to hemoptysis and request pulmonary consult.    CT chest noncontrast was obtained demonstrated small bilateral pleural effusions larger on the left with moderately extensive groundglass opacities.  Lower lobe atelectasis and postinflammatory nodules.    7:30 PM discussed case with Dr. Raymundo.  Agrees with heparin drip.  He will order TXA nebs on the floor.    Heparin drip initiated in ER.    Case discussed with nurse practitioner Allison Rosales from [facility] cardiology.  They will follow along with patient.  No further intervention at this  time.  Patient sees Dr. Gonzalez.      Patient and family aware of plan for admission expresses understanding.      Patient admitted to cardiac telemetry for further workup.    A total of 45 minutes of critical care time (exclusive of billable procedures) was administered to manage the patient's respiratory instability due to his pulmonary embolism and heart failure.  This involved direct patient intervention, complex decision making, and/or extensive discussions with the patient, family, and clinical staff.       Reviewed cardiology office note from  ASSESSMENT    Assessment  # acute hypoxic respiratory failure 9/2024 - 2/2 COVID with superimposed pneumonia C/B NSTEMI, type II vs I - likely demand with acute illness COVID/AHRF but cannot rule out ischemia. Unfortunately poor candidate for PCI given chronic thrombocytopenia. Medical management with ASA monotherapy given tcp.  # Chronic combined systolic and diastolic heart failure, ischemic cardiomyopathy -- TTE with LVEF 15-20%  # Hospitalization C/B Wide complex tachycardia, secondary to SVT and short runs of NSVT, history of BI-V ICD -- s/p VT detection zone lowered to 150  #Moderate to severe aortic valve stenosis  # CAD s/p CABG LIMA-LAD, prior PCI RCA and Lcx 2015  # Severe pulmonary hypertension  # Rheumatoid arthritis - immunocompromised  # Thrombocytopenia  # Chronic anemiaPlan  # Echo completed with LVEF of 20%. Moderate aortic valve stenosis. Grade 3 diastolic dysfunction. RV systolic function moderately reduced. Eval compromised by low output cardiac state.  # Volume status appears compensated on exam. Has not completed blood work. Obtain CBC, CMP today.  #May benefit from right heart cath/CardioMEMS.  #Continue losartan 25 mg daily, metoprolol succinate 50 mg twice daily, Aldactone 25 mg qd  # Follow-up with me in 6 months  # Follow-up with EP/advanced heart failure        Reviewed hematology office note as below      Expand All Collapse  All    Hematology/Oncology Clinic Follow Up Visit     Patient Name: David Reyes  Medical Record Number: [id number]                    YOB: 1945   PCP: Thea Harris DO     Reason for Consultation:  David Reyes was seen today for the diagnosis of pancytopenia     History of Present Illness:       80 y/o M PMH chronic systolic heart failure, RA, CKD stage 3 who presents for follow up of pancytopenia.     - has been off methotrexate since 12/2024   - CKD stage 3 stable; last sCr 1.88 in 4/29/25  - 4/29/25 with persistent pancytopenia. WBC with 1.57 g/dL, hgb 8, plt count 118k  - he endorses ongoing fatigue, declining energy levels  - denies any fevers at home  - says his rheumatoid arthritis has not gotten worse        Impression & Plan:      Pancytopenia  - his pancytopenia has not improved despite the discontinuation of methotrexate  - CBC repeated today; neutropenic with . His hgb is 8.1 g/dL, plt count 104k.  - persistent pancytopenia is concerning for an underlying hematologic malignancy. Discussed with Pat that he needs a bone marrow biopsy for further evaluation  - will send [facility] time of marrow  - we discussed neutropenic precautions; if he is neutropenic, to go to ED as this is considered an oncologic emergency     RA  - not currently on meds     Chronic systolic heart failure  - 20-25% LVEF. Appears compensated today     CKD stage 3  - sCr stable     Follow up: pending bone marrow biopsy     James Chirinos  [facility] Hematology Oncology                     Electronically signed by James Chirinos MD at 5/22/2025 12:29 PM    Disposition and Plan     Clinical Impression:  1. Pancytopenia (HCC)    2. Dyspnea, unspecified type    3. Hemoptysis    4. Metabolic acidosis    5. Acute kidney injury    6. Acute pulmonary embolism, unspecified pulmonary embolism type, unspecified whether acute cor pulmonale present (HCC)         Disposition:  Admit  6/12/2025  8:06  pm    Follow-up:  No follow-up provider specified.        Medications Prescribed:  Current Discharge Medication List                Supplementary Documentation:         Hospital Problems       Present on Admission  Date Reviewed: 6/12/2025          ICD-10-CM Noted POA    * (Principal) Pancytopenia (HCC) D61.818 9/21/2024 Unknown

## 2025-06-12 NOTE — PROGRESS NOTES
David Reyes is a 79 year old male.  No chief complaint on file.      HPI:   Pt in office for stitch removal from bone marrow biopsy done last week.     Pt was significantly short of breath, my staff met him at the door with a wheel chair and brought him back.   Wife says he's been coughing a lot the past few days, laying around a lot more than usual. Not eating much.   Pt reports coughing up phlegm tinged with mucous.   Has a bed sore on bottom that has been red.   No fevers.     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      Microlet Lancets Does not apply Misc CHECK BLOOD SUGAR THREE TIMES DAILY. Dx E11.65 insulin dependent 300 each 3    LEVOTHYROXINE 25 MCG Oral Tab TAKE 1 TABLET(25 MCG) BY MOUTH BEFORE BREAKFAST 90 tablet 0    GABAPENTIN 100 MG Oral Cap TAKE 1 CAPSULE(100 MG) BY MOUTH THREE TIMES DAILY AS NEEDED 90 capsule 0    Insulin Pen Needle (BD PEN NEEDLE LILO 2ND GEN) 32G X 4 MM Does not apply Misc Apply 1 Application topically daily. 300 each 3    METFORMIN  MG Oral Tablet 24 Hr TAKE 2 TABLETS(1000 MG) BY MOUTH TWICE DAILY WITH MEALS 360 tablet 0    spironolactone 25 MG Oral Tab Take 1 tablet (25 mg total) by mouth daily with breakfast. 90 tablet 0    aspirin 81 MG Oral Tab EC Take 1 tablet (81 mg total) by mouth in the morning. (Patient not taking: Reported on 6/12/2025)      gabapentin 100 MG Oral Cap Take 1 capsule (100 mg total) by mouth 3 (three) times daily. (Patient not taking: Reported on 4/11/2025) 90 capsule 1    metoprolol succinate ER 50 MG Oral Tablet 24 Hr Take 1 tablet (50 mg total) by mouth  in the morning and 1 tablet (50 mg total) before bedtime.      CONTOUR NEXT TEST In Vitro Strip Test 3 times daily Dx Code: E11.9 Insulin - dependent diabetes 300 each 1    losartan 25 MG Oral Tab Take 1 tablet (25 mg total) by mouth every evening.      torsemide 20 MG Oral Tab Take 1 tablet (20 mg total) by mouth BID (Diuretic). 60 tablet 1    Vitamin B-1 100 MG Oral Tab Take 1 tablet (100 mg total) by mouth in the morning.      ezetimibe 10 MG Oral Tab Take 1 tablet (10 mg total) by mouth in the morning.      Lovastatin 40 MG Oral Tab Take 1 tablet (40 mg total) by mouth in the morning and 1 tablet (40 mg total) before bedtime.  2    folic acid 800 MCG Oral Tab Take 1 tablet (800 mcg total) by mouth every other day. Alternate with 2 tablets (1600mcg) every other day    About 1000 mcg daily (Patient not taking: Reported on 2025)        Past Medical History:    Acute kidney injury    Anxiety state    Back problem    CAD (coronary artery disease)    Congestive heart disease (HCC)    Congestive heart failure (HCC)    CORONARY ARTERY DISEASE    cabg    Coronary atherosclerosis    COVID-19    Diabetes (HCC)    Disorder of thyroid    DM (diabetes mellitus) (HCC)    Heart attack (HCC)    High blood pressure    High cholesterol    Muscle weakness    USES A WALKER TO AMBULATE    Other and unspecified hyperlipidemia    Pneumonia    Rheumatoid arthritis (HCC)    Sleep apnea    NO MACHINE/TREATMENT    Subdural hematoma (HCC)    Unspecified sleep apnea    Visual impairment    READING GLASSES      Social History:  Social History     Socioeconomic History    Marital status:    Tobacco Use    Smoking status: Former     Current packs/day: 0.00     Average packs/day: 1 pack/day for 26.0 years (26.0 ttl pk-yrs)     Types: Cigarettes     Start date: 1960     Quit date: 1986     Years since quittin.0    Smokeless tobacco: Never    Tobacco comments:     NON-SMOKER   Vaping Use    Vaping status: Never Used    Substance and Sexual Activity    Alcohol use: Yes     Alcohol/week: 2.0 standard drinks of alcohol     Types: 2 Glasses of wine per week     Comment: OCCASIONAL    Drug use: No   Other Topics Concern    Caffeine Concern No    Exercise No     Social Drivers of Health     Food Insecurity: No Food Insecurity (4/11/2025)    NCSS - Food Insecurity     Worried About Running Out of Food in the Last Year: No     Ran Out of Food in the Last Year: No   Transportation Needs: No Transportation Needs (4/11/2025)    NCSS - Transportation     Lack of Transportation: No   Housing Stability: At Risk (4/11/2025)    NCSS - Housing/Utilities     Has Housing: Yes     Worried About Losing Housing: Yes     Unable to Get Utilities: No        BP Readings from Last 6 Encounters:   06/12/25 102/68   06/06/25 94/81   05/22/25 (!) 88/60   04/11/25 130/70   12/09/24 104/61   12/02/24 120/60       Wt Readings from Last 6 Encounters:   05/29/25 217 lb (98.4 kg)   05/22/25 219 lb 3.2 oz (99.4 kg)   04/11/25 222 lb 3.2 oz (100.8 kg)   03/17/25 222 lb 8 oz (100.9 kg)   12/09/24 232 lb 8 oz (105.5 kg)   12/02/24 231 lb (104.8 kg)       REVIEW OF SYSTEMS:   GENERAL HEALTH:not well. See HPI     EXAM:   /68   Pulse 120   SpO2 100%  There is no height or weight on file to calculate BMI.      GENERAL: pale, dyspneic, not well looking.   SKIN: no rashes,no suspicious lesions, stitch on back hard to get at, scabbed over, but does not appear infected. It was NOT removed today.   HEENT: atraumatic, normocephalic,   NECK: supple,no adenopathy   LUNGS: rales a bases R>L with wheezes and decreased air movement.   CARDIO: tachycardic, but regular without murmur  GI: good BS's,no masses, HSM or tenderness  EXTREMITIES: no cyanosis, clubbing, + swelling in ankles.     Blood glucose checked and was 200's. That is high for him.     ASSESSMENT AND PLAN:     Encounter Diagnoses   Name Primary?    CUMMINGS (dyspnea on exertion) Yes    Shortness of breath      Hemoptysis     Tachycardia        Diagnoses and all orders for this visit:    CUMMINGS (dyspnea on exertion)  -     Accucheck    Shortness of breath  -     Accucheck    Hemoptysis    Tachycardia    Given he is visibly short of breath and will all of his health conditions, I advised wife to bring him to ER. They prefer [facility], so she will drive him there.   Called ER to let them know as well.   PNA vs PE, vs heart failure.     No orders of the defined types were placed in this encounter.              Meds & Refills for this Visit:  Requested Prescriptions      No prescriptions requested or ordered in this encounter             The patient indicates understanding of these issues and agrees to the plan.

## 2025-06-12 NOTE — ED QUICK NOTES
This RN spoke with lab stating it will be about 10 minutes for the genxpert to result. ED MD aware

## 2025-06-13 ENCOUNTER — APPOINTMENT (OUTPATIENT)
Dept: ULTRASOUND IMAGING | Facility: HOSPITAL | Age: 80
End: 2025-06-13
Attending: STUDENT IN AN ORGANIZED HEALTH CARE EDUCATION/TRAINING PROGRAM
Payer: MEDICARE

## 2025-06-13 ENCOUNTER — APPOINTMENT (OUTPATIENT)
Dept: CV DIAGNOSTICS | Facility: HOSPITAL | Age: 80
End: 2025-06-13
Attending: STUDENT IN AN ORGANIZED HEALTH CARE EDUCATION/TRAINING PROGRAM
Payer: MEDICARE

## 2025-06-13 LAB
ALBUMIN SERPL-MCNC: 3.9 G/DL (ref 3.2–4.8)
ALBUMIN/GLOB SERPL: 1.3 {RATIO} (ref 1–2)
ALP LIVER SERPL-CCNC: 56 U/L (ref 45–117)
ALT SERPL-CCNC: 10 U/L (ref 10–49)
ANION GAP SERPL CALC-SCNC: 12 MMOL/L (ref 0–18)
APTT PPP: 116.2 SECONDS (ref 23–36)
APTT PPP: 80.6 SECONDS (ref 23–36)
APTT PPP: 83.2 SECONDS (ref 23–36)
ARTERIAL PATENCY WRIST A: POSITIVE
AST SERPL-CCNC: 29 U/L (ref ?–34)
ATRIAL RATE: 103 BPM
BASE EXCESS BLDA CALC-SCNC: -3.4 MMOL/L (ref ?–2)
BASOPHILS # BLD AUTO: 0 X10(3) UL (ref 0–0.2)
BASOPHILS NFR BLD AUTO: 0 %
BILIRUB SERPL-MCNC: 0.7 MG/DL (ref 0.2–1.1)
BODY TEMPERATURE: 98.6 F
BUN BLD-MCNC: 31 MG/DL (ref 9–23)
CA-I BLD-SCNC: 1.12 MMOL/L (ref 0.95–1.32)
CALCIUM BLD-MCNC: 8.8 MG/DL (ref 8.7–10.6)
CD10 CELLS NFR SPEC: 0 %
CD10 CELLS NFR SPEC: <1 %
CD10/CD19: <1 %
CD117 CELLS NFR SPEC: 80 %
CD11B CELLS NFR SPEC: 2 %
CD11B CELLS NFR SPEC: <1 %
CD123 BLASTS/BLASTS NFR SPEC: <1 %
CD13 CELLS NFR SPEC: 3 %
CD14 CELLS NFR SPEC: 1 %
CD15 CELLS NFR SPEC: 2 %
CD19 CELLS NFR SPEC: 0 %
CD19 CELLS NFR SPEC: 2 %
CD19+/CD200+: 1 %
CD2 CELLS NFR SPEC: 98 %
CD20 CELLS NFR SPEC: 2 %
CD200 CELLS: 3 %
CD3 CELLS NFR SPEC: 98 %
CD3+/TCRGD+: 1 %
CD3+CD4+ CELLS NFR SPEC: 74 %
CD3+CD4+ CELLS/CD3+CD8+ CLL SPEC: 3.2
CD3+CD8+ CELLS NFR SPEC: 23 %
CD3-/CD56+: <1 %
CD33 CELLS NFR SPEC: 9 %
CD33+/CD34+ A CELLS: 9 %
CD34 CELLS NFR SPEC: 89 %
CD34 CELLS NFR SPEC: <1 %
CD38 CELLS NFR SPEC: 63 %
CD38 CELLS NFR SPEC: <1 %
CD38+/CD19+: <1 %
CD45 CELLS NFR SPEC: 12 %
CD5 CELLS NFR SPEC: 97 %
CD5/CD19 CELLS: <1 %
CD64 CELLS NFR SPEC: <1 %
CD7 CELLS NFR SPEC: 0 %
CD7 CELLS NFR SPEC: 96 %
CELL SURF KAPPA/LAMBDA RATIO: 1
CELL SURF LAMBDA LIGHT CHAIN: 1 %
CELL SURFACE KAPPA LIGHT CHAIN: 1 %
CHLORIDE SERPL-SCNC: 105 MMOL/L (ref 98–112)
CO2 SERPL-SCNC: 22 MMOL/L (ref 21–32)
COHGB MFR BLD: 1.4 % SAT (ref 0–3)
CREAT BLD-MCNC: 1.95 MG/DL (ref 0.7–1.3)
EGFRCR SERPLBLD CKD-EPI 2021: 35 ML/MIN/1.73M2 (ref 60–?)
EOSINOPHIL # BLD AUTO: 0 X10(3) UL (ref 0–0.7)
EOSINOPHIL NFR BLD AUTO: 0 %
ERYTHROCYTE [DISTWIDTH] IN BLOOD BY AUTOMATED COUNT: 20.1 %
EST. AVERAGE GLUCOSE BLD GHB EST-MCNC: 120 MG/DL (ref 68–126)
GLOBULIN PLAS-MCNC: 3.1 G/DL (ref 2–3.5)
GLUCOSE BLD-MCNC: 122 MG/DL (ref 70–99)
GLUCOSE BLD-MCNC: 127 MG/DL (ref 70–99)
GLUCOSE BLD-MCNC: 142 MG/DL (ref 70–99)
GLUCOSE BLD-MCNC: 203 MG/DL (ref 70–99)
GLUCOSE BLD-MCNC: 205 MG/DL (ref 70–99)
HBA1C MFR BLD: 5.8 % (ref ?–5.7)
HCO3 BLDA-SCNC: 22.3 MEQ/L (ref 21–27)
HCT VFR BLD AUTO: 25.2 % (ref 39–53)
HGB BLD-MCNC: 7.4 G/DL (ref 13–17.5)
HGB BLD-MCNC: 7.7 G/DL (ref 13–17.5)
HLA-DR+ CELLS NFR SPEC: 74 %
IMM GRANULOCYTES # BLD AUTO: 0.01 X10(3) UL (ref 0–1)
IMM GRANULOCYTES NFR BLD: 0.7 %
INR BLD: 1.44 (ref 0.8–1.2)
L/M: 3 L/MIN
LACTATE BLD-SCNC: 2.5 MMOL/L (ref 0.5–2)
LYMPHOCYTES # BLD AUTO: 0.97 X10(3) UL (ref 1–4)
LYMPHOCYTES NFR BLD AUTO: 66.4 %
MAGNESIUM SERPL-MCNC: 2.3 MG/DL (ref 1.6–2.6)
MCH RBC QN AUTO: 26.2 PG (ref 26–34)
MCHC RBC AUTO-ENTMCNC: 29.4 G/DL (ref 31–37)
MCV RBC AUTO: 89.4 FL (ref 80–100)
METHGB MFR BLD: 1.5 % SAT (ref 0.4–1.5)
MONOCYTES # BLD AUTO: 0.14 X10(3) UL (ref 0.1–1)
MONOCYTES NFR BLD AUTO: 9.6 %
MPO CELLS: 19 %
NEUTROPHILS # BLD AUTO: 0.34 X10 (3) UL (ref 1.5–7.7)
NEUTROPHILS # BLD AUTO: 0.34 X10(3) UL (ref 1.5–7.7)
NEUTROPHILS NFR BLD AUTO: 23.3 %
OSMOLALITY SERPL CALC.SUM OF ELEC: 296 MOSM/KG (ref 275–295)
OXYHGB MFR BLDA: 96.1 % (ref 92–100)
P AXIS: 53 DEGREES
P-R INTERVAL: 180 MS
PCO2 BLDA: 28 MM HG (ref 35–45)
PH BLDA: 7.46 [PH] (ref 7.35–7.45)
PHOSPHATE SERPL-MCNC: 3.6 MG/DL (ref 2.4–5.1)
PLATELET # BLD AUTO: 102 10(3)UL (ref 150–450)
PLATELETS.RETICULATED NFR BLD AUTO: 10.4 % (ref 0–7)
PO2 BLDA: 136 MM HG (ref 80–100)
POTASSIUM BLD-SCNC: 4.5 MMOL/L (ref 3.6–5.1)
POTASSIUM SERPL-SCNC: 4 MMOL/L (ref 3.5–5.1)
PROCALCITONIN SERPL-MCNC: 0.29 NG/ML (ref ?–0.05)
PROT SERPL-MCNC: 7 G/DL (ref 5.7–8.2)
PROTHROMBIN TIME: 17.6 SECONDS (ref 11.6–14.8)
Q-T INTERVAL: 404 MS
QRS DURATION: 142 MS
QTC CALCULATION (BEZET): 529 MS
R AXIS: 216 DEGREES
RBC # BLD AUTO: 2.82 X10(6)UL (ref 3.8–5.8)
SODIUM BLD-SCNC: 132 MMOL/L (ref 135–145)
SODIUM SERPL-SCNC: 139 MMOL/L (ref 136–145)
T AXIS: 18 DEGREES
TCR G-D CELLS NFR SPEC: 1 %
TDT CELLS: <1 %
VENTRICULAR RATE: 103 BPM
WBC # BLD AUTO: 1.5 X10(3) UL (ref 4–11)

## 2025-06-13 PROCEDURE — 99291 CRITICAL CARE FIRST HOUR: CPT | Performed by: INTERNAL MEDICINE

## 2025-06-13 PROCEDURE — 99223 1ST HOSP IP/OBS HIGH 75: CPT | Performed by: INTERNAL MEDICINE

## 2025-06-13 PROCEDURE — 99233 SBSQ HOSP IP/OBS HIGH 50: CPT | Performed by: STUDENT IN AN ORGANIZED HEALTH CARE EDUCATION/TRAINING PROGRAM

## 2025-06-13 PROCEDURE — 93306 TTE W/DOPPLER COMPLETE: CPT | Performed by: STUDENT IN AN ORGANIZED HEALTH CARE EDUCATION/TRAINING PROGRAM

## 2025-06-13 PROCEDURE — 93970 EXTREMITY STUDY: CPT | Performed by: STUDENT IN AN ORGANIZED HEALTH CARE EDUCATION/TRAINING PROGRAM

## 2025-06-13 PROCEDURE — 5A09357 ASSISTANCE WITH RESPIRATORY VENTILATION, LESS THAN 24 CONSECUTIVE HOURS, CONTINUOUS POSITIVE AIRWAY PRESSURE: ICD-10-PCS | Performed by: HOSPITALIST

## 2025-06-13 RX ORDER — FUROSEMIDE 10 MG/ML
40 INJECTION INTRAMUSCULAR; INTRAVENOUS ONCE
Status: COMPLETED | OUTPATIENT
Start: 2025-06-13 | End: 2025-06-13

## 2025-06-13 RX ORDER — HEPARIN SODIUM AND DEXTROSE 10000; 5 [USP'U]/100ML; G/100ML
INJECTION INTRAVENOUS CONTINUOUS
Status: DISCONTINUED | OUTPATIENT
Start: 2025-06-13 | End: 2025-06-23

## 2025-06-13 RX ORDER — LEVOFLOXACIN 750 MG/1
750 TABLET, FILM COATED ORAL
Status: DISCONTINUED | OUTPATIENT
Start: 2025-06-13 | End: 2025-06-24

## 2025-06-13 RX ORDER — HEPARIN SODIUM AND DEXTROSE 10000; 5 [USP'U]/100ML; G/100ML
18 INJECTION INTRAVENOUS ONCE
Status: DISCONTINUED | OUTPATIENT
Start: 2025-06-13 | End: 2025-06-13

## 2025-06-13 NOTE — PLAN OF CARE
Problem: Diabetes/Glucose Control  Goal: Glucose maintained within prescribed range  Description: INTERVENTIONS:  - Monitor Blood Glucose as ordered  - Assess for signs and symptoms of hyperglycemia and hypoglycemia  - Administer ordered medications to maintain glucose within target range  - Assess barriers to adequate nutritional intake and initiate nutrition consult as needed  - Instruct patient on self management of diabetes  Outcome: Progressing     Problem: Patient/Family Goals  Goal: Patient/Family Long Term Goal  Description: Patient's Long Term Goal:   To return home asap    Interventions:  - Cardiology consult  2 D echo with doppler  - See additional Care Plan goals for specific interventions  Outcome: Progressing  Goal: Patient/Family Short Term Goal  Description: Patient's Short Term Goal:  t get rid of shortness of breath    Interventions:   - medication adjustment      Supplemental oxygen as needed  - See additional Care Plan goals for specific interventions  Outcome: Progressing     Problem: PAIN - ADULT  Goal: Verbalizes/displays adequate comfort level or patient's stated pain goal  Description: INTERVENTIONS:  - Encourage pt to monitor pain and request assistance  - Assess pain using appropriate pain scale  - Administer analgesics based on type and severity of pain and evaluate response  - Implement non-pharmacological measures as appropriate and evaluate response  - Consider cultural and social influences on pain and pain management  - Manage/alleviate anxiety  - Utilize distraction and/or relaxation techniques  - Monitor for opioid side effects  - Notify MD/LIP if interventions unsuccessful or patient reports new pain  - Anticipate increased pain with activity and pre-medicate as appropriate  Outcome: Progressing     Problem: SAFETY ADULT - FALL  Goal: Free from fall injury  Description: INTERVENTIONS:  - Assess pt frequently for physical needs  - Identify cognitive and physical deficits and  behaviors that affect risk of falls.  - [place] fall precautions as indicated by assessment.  - Educate pt/family on patient safety including physical limitations  - Instruct pt to call for assistance with activity based on assessment  - Modify environment to reduce risk of injury  - Provide assistive devices as appropriate  - Consider OT/PT consult to assist with strengthening/mobility  - Encourage toileting schedule  Outcome: Progressing     Problem: CARDIOVASCULAR - ADULT  Goal: Maintains optimal cardiac output and hemodynamic stability  Description: INTERVENTIONS:  - Monitor vital signs, rhythm, and trends  - Monitor for bleeding, hypotension and signs of decreased cardiac output  - Evaluate effectiveness of vasoactive medications to optimize hemodynamic stability  - Monitor arterial and/or venous puncture sites for bleeding and/or hematoma  - Assess quality of pulses, skin color and temperature  - Assess for signs of decreased coronary artery perfusion - ex. Angina  - Evaluate fluid balance, assess for edema, trend weights  Outcome: Progressing  Goal: Absence of cardiac arrhythmias or at baseline  Description: INTERVENTIONS:  - Continuous cardiac monitoring, monitor vital signs, obtain 12 lead EKG if indicated  - Evaluate effectiveness of antiarrhythmic and heart rate control medications as ordered  - Initiate emergency measures for life threatening arrhythmias  - Monitor electrolytes and administer replacement therapy as ordered  Outcome: Progressing     Problem: RESPIRATORY - ADULT  Goal: Achieves optimal ventilation and oxygenation  Description: INTERVENTIONS:  - Assess for changes in respiratory status  - Assess for changes in mentation and behavior  - Position to facilitate oxygenation and minimize respiratory effort  - Oxygen supplementation based on oxygen saturation or ABGs  - Provide Smoking Cessation handout, if applicable  - Encourage broncho-pulmonary hygiene including cough, deep breathe,  Incentive Spirometry  - Assess the need for suctioning and perform as needed  - Assess and instruct to report SOB or any respiratory difficulty  - Respiratory Therapy support as indicated  - Manage/alleviate anxiety  - Monitor for signs/symptoms of CO2 retention  Outcome: Progressing     Problem: METABOLIC/FLUID AND ELECTROLYTES - ADULT  Goal: Glucose maintained within prescribed range  Description: INTERVENTIONS:  - Monitor Blood Glucose as ordered  - Assess for signs and symptoms of hyperglycemia and hypoglycemia  - Administer ordered medications to maintain glucose within target range  - Assess barriers to adequate nutritional intake and initiate nutrition consult as needed  - Instruct patient on self management of diabetes  Outcome: Progressing  Goal: Electrolytes maintained within normal limits  Description: INTERVENTIONS:  - Monitor labs and rhythm and assess patient for signs and symptoms of electrolyte imbalances  - Administer electrolyte replacement as ordered  - Monitor response to electrolyte replacements, including rhythm and repeat lab results as appropriate  - Fluid restriction as ordered  - Instruct patient on fluid and nutrition restrictions as appropriate  Outcome: Progressing     Problem: HEMATOLOGIC - ADULT  Goal: Free from bleeding injury  Description: (Example usage: patient with low platelets)  INTERVENTIONS:  - Avoid intramuscular injections, enemas and rectal medication administration  - Ensure safe mobilization of patient  - Hold pressure on venipuncture sites to achieve adequate hemostasis  - Assess for signs and symptoms of internal bleeding  - Monitor lab trends  - Patient is to report abnormal signs of bleeding to staff  - Avoid use of toothpicks and dental floss  - Use electric shaver for shaving  - Use soft bristle tooth brush  - Limit straining and forceful nose blowing  Outcome: Progressing

## 2025-06-13 NOTE — HISTORICAL OFFICE NOTE
[facility]ility [facility]  [address] [place] [address], [address] [place] [address]  [phone]      David Reyes  Progress Note  Demographics:  Name: David Reyes YOB: 1945  Age: 79, Male Medical Record No: [id number]  Visited Date/Time: 05/19/2025 01:00 PM    Chief Complaints  3 month follow up  History of Present Illness  79-year-old male with chronic medical conditions including chronic HFrEF with underlying ischemic cardiomyopathy, s/p BiV ICD, chronic thrombocytopenia, rheumatoid arthritis, immunocompromise state.  Patient presents after recent hospitalization for acute hypoxic respiratory failure 2/2 COVID-pneumonia with superimposed bacterial pneumonia C/B NSTEMI, type I versus 2 C/B wide-complex tachycardia suspected to be due to SVT with initiation of amiodarone.  Invasive evaluation for coronary disease deferred due to thrombocytopenia then medical management was elected.       2/3/2025-since prior visit, clinically, the patient has been stable stating that his weight has remained relatively stable and that his dyspnea on exertion is also at baseline.  His exertional capacity is not normal but has not worsened according to him.  He denies having chest pain, palpitations, syncope.  He has seen electrophysiology in the interim.  He notes that he is not on amiodarone or lovastatin.    5/19/2025-patient states he is symptomatically doing well.  He is trying to become more active but he is debilitated by lower extremity discomfort.  He is seeing a specialist for this.  Overall, states he is able to exert himself reasonably similar to his baseline.  No other complaints today.  Cardiac risk factors Hypertension, Dyslipidemia and Former smoker  Past Medical History  1.ACC/AHA stage C chronic systolic heart failure  2.Cardiomyopathy, ischemic  3.CAD (coronary artery disease)  4.Pure hypercholesterolemia  5.Pulmonary hypertension secondary to increased PVR  6.Aortic stenosis,  mild  7.S/P coronary artery stent placement  8.ICD (implantable cardioverter-defibrillator) in place  9.Carotid artery stenosis  10.Obesity  11.Rheumatoid arthritis  12.Sleep apnea, obstructive  13.Pre-op testing  14.Hx of CABG  15.Diabetes mellitus type 2 in obese  Past Surgical History  1.S/P coronary artery stent placement  2.Hx of CABG  Family History  1. Mother - S/P CABG x 3  Social History  Smoking status Former [id number] (End Date)  Tobacco usage - No (Ex-smoker (finding))  Review of systems  Cardiovascular No history of Chest pain, CUMMINGS, Palpitations, Syncope, PND, Orthopnea, Edema and Claudication  Respiratory SOB  Physical Examination  Vitals Right Arm Sitting  / 68 mmHg, Pulse rate 61 bpm, Height in 5' 9\", BMI: 32.3, Weight in 219 lbs (or) 99.34 kgs and BSA : 2.23 cc/m²  General Appearance No Acute Distress  Cardiovascular   EKG/Other abnormalities  General: Alert and oriented x 3. No apparent distress. No respiratory or constitutional distress.  HEENT: Normocephalic, anicteric sclera, neck supple.  Cardiac: Regular rate and rhythm.  Grade 3 systolic murmur.  Lungs: Clear without wheezes, rales, rhonchi or dullness.  Normal excursions and effort.  Abdomen: Soft, non-tender.   Extremities: Without clubbing, cyanosis.  Trace edema lower extremities.  Neurologic: Alert, normal affect.  Skin: Warm and dry.  Allergies  1.Penicillins - Allergen(Reaction:cripling effect, Severity:Severe)  2.Statins-HMG-CoA Reductase Inhibitors - Allergen(Reaction:myalgias, Severity:Moderate)  Medications (Info obtained by: Verbal)  1.aspirin 81 mg tablet,delayed release, Take 1 tablet orally once a day.  2.ezetimibe 10 mg tablet, Take 1 tablet orally once a day.  3.folic acid (FOLATE) 1 MG tablet, Take 1 mg by mouth daily.  4.levothyroxine 25 mcg tablet, Take 1 tablet orally once a day.  5.losartan 25 mg tablet, Take 1 tablet orally once a day.  6.lovastatin 40 mg tablet, Take 1 tablet orally once a  day.  7.metFORMIN 1,000 mg tablet, Take 1 tablet orally 2 times a day.  8.methotrexate 2.5 MG Tab, 4 tabs weekly, as dir  9.metoprolol succinate ER 50 mg tablet,extended release 24 hr, Take 1 tablet orally 2 times a day.  10.spironolactone 25 mg tablet, Take 1 tablet orally once a day.  11.torsemide 20 mg tablet, Take 1 tablet orally 2 times a day.  12.Toujeo SoloStar U-300 Insulin 300 unit/mL (1.5 mL) subcutaneous pen, Inject 24 units into the skin nightly  13.Vitamin B-1 100 mg tablet, Take 1 tablet orally once a day.  Impression  1.ACC/AHA stage C chronic systolic heart failure  2.Cardiomyopathy, ischemic  3.CAD (coronary artery disease)  4.Pure hypercholesterolemia  5.Pulmonary hypertension secondary to increased PVR  6.Aortic stenosis, mild  7.S/P coronary artery stent placement  8.ICD (implantable cardioverter-defibrillator) in place  9.Carotid artery stenosis  10.Obesity  11.Rheumatoid arthritis  12.Sleep apnea, obstructive  13.Hx of CABG  14.Diabetes mellitus type 2 in obese  Assessment & Plan  Assessment  # acute hypoxic respiratory failure 9/2024 - 2/2 COVID with superimposed pneumonia C/B NSTEMI, type II vs I - likely demand with acute illness COVID/AHRF but cannot rule out ischemia. Unfortunately poor candidate for PCI given chronic thrombocytopenia. Medical management with ASA monotherapy given tcp.   # Chronic combined systolic and diastolic heart failure, ischemic cardiomyopathy -- TTE with LVEF 15-20%  # Hospitalization C/B Wide complex tachycardia, secondary to SVT and short runs of NSVT, history of BI-V ICD -- s/p VT detection zone lowered to 150  #Moderate to severe aortic valve stenosis  # CAD s/p CABG LIMA-LAD, prior PCI RCA and Lcx 2015  # Severe pulmonary hypertension  # Rheumatoid arthritis - immunocompromised  # Thrombocytopenia  # Chronic anemia    Plan  # Echo completed with LVEF of 20%.  Moderate aortic valve stenosis.  Grade 3 diastolic dysfunction.  RV systolic function moderately  reduced.  Eval compromised by low output cardiac state.  # Volume status appears compensated on exam.  Has not completed blood work.  Obtain CBC, CMP today.  #May benefit from right heart cath/CardioMEMS.  #Continue losartan 25 mg daily, metoprolol succinate 50 mg twice daily, Aldactone 25 mg qd  # Follow-up with me in 6 months  # Follow-up with EP/advanced heart failure  Labs and Diagnostics ordered  1.CMP (comprehensive metabolic panel) (Today)  2.CBC (Complete Blood Count) (Today)  Future appointments  1.Follow up visit - Emerson Gonzalez DO (6 Months)  2.Referral Visit - Thea Harris ([email or web address]) : (Today)  Miscellaneous  1.Reviewed trans thoracic echocardiogram, nuclear pet with the patient.  2.Weight monitoring (regime/therapy)  Nurses documentation  Refills: none  Upcoming surgeries: none  Use of assistive device: none  EKG: no  (SONIYA MAN)  Patient instructions  Medications:   1. Continue current medications.    Testin. Complete bloodwork today (CBC and CMP)     Provider Follow Up:  1. Follow up with Dr. Gonzalez in 6 months     Call [facility] central scheduling at [phone] to schedule with pulmonology    Please bring in you medication bottles or updated medicine list to your next appointment.  Call [facility] if you have any problems or concerns at [phone]    Lab Details  CBC WITH DIFFERENTIAL WITH PLATELET  2025 05:31:22 PM  WBC 1.6 4.0-11.0 x10(3) uL L F  RED BLOOD COUNT 3.06 3.80-5.80 x10(6)uL L F  HGB 8.7 13.0-17.5 g/dL L F  HCT 28.2 39.0-53.0 % L F  PLATELETS 70.0 150.0-450.0 10(3)uL L F  IMMATURE PLATELET FRACTION 19.6 0.0-7.0 % H F  MEAN CELL VOLUME 92.2 80.0-100.0 fL  F  MEAN CORPUSCULAR HEMOGLOBIN 28.4 26.0-34.0 pg  F  MEAN CORPUSCULAR HGB CONC 30.9 31.0-37.0 g/dL L F  RED CELL DISTRIBUTION WIDTH CV 16.2 %  F  NEUTROPHIL ABS PRELIM 0.66 1.50-7.70 x10 (3) uL L F  NEUTROPHIL ABSOLUTE 0.66 1.50-7.70 x10(3) uL L F  LYMPHOCYTE ABSOLUTE 0.76 1.00-4.00 x10(3) uL L F  MONOCYTE  ABSOLUTE 0.19 0.10-1.00 x10(3) uL  F  EOSINOPHIL ABSOLUTE 0.00 0.00-0.70 x10(3) uL  F  BASOPHIL ABSOLUTE 0.00 0.00-0.20 x10(3) uL  F  IMMATURE GRANULOCYTE COUNT 0.01 0.00-1.00 x10(3) uL  F  NEUTROPHIL % 40.8 %  F  LYMPHOCYTE % 46.9 %  F  MONOCYTE % 11.7 %  F  EOSINOPHIL % 0.0 %  F  BASOPHIL % 0.0 %  F  IMMATURE GRANULOCYTE RATIO % 0.6 %  F  SCAN SLIDE  02/03/2025 05:31:12 PM  SLIDE REVIEW2 Slide reviewed,morphology review added   F  RBC MORPHOLOGY SCAN  02/03/2025 05:31:01 PM  MORPHOLOGY See morphology below Normal, Slide reviewed, see previous RBC morphology. A F  PLATELET MORPHOLOGY See morphology below Normal A F  HYPOCHROMASIA 1+   F  MICROCYTOSIS 1+   F  MACROCYTOSIS 1+   F  TEAR DROP CELLS 1+   F  OVALOCYTES 1+   F  GIANT PLATELETS Few  A F  COMP METABOLIC PANEL (14)  02/03/2025 04:44:40 PM  GLUCOSE 128 70-99 mg/dL H F  SODIUM 143 136-145 mmol/L  F  POTASSIUM 4.1 3.5-5.1 mmol/L  F  CHLORIDE 105  mmol/L  F  CO2 25.0 21.0-32.0 mmol/L  F  ANION GAP 13 0-18 mmol/L  F  BUN 33 9-23 mg/dL H F  CREATININE 2.04 0.70-1.30 mg/dL H F  CALCIUM 9.1 8.7-10.6 mg/dL  F  OSMOLALITY CALCULATED 305 275-295 mOsm/kg H F  E GFR CR 33 >=60 mL/min/1.73m2 L F  AST 16 <34 U/L  F  ALT <7 10-49 U/L L F  ALKALINE PHOSPHATASE 49  U/L  F  BILIRUBIN, TOTAL 0.4 0.2-1.1 mg/dL  F  TOTAL PROTEIN 7.3 5.7-8.2 g/dL  F  ALBUMIN 4.2 3.2-4.8 g/dL  F  GLOBULIN 3.1 2.0-3.5 g/dL  F  A/G RATIO 1.4 1.0-2.0  F  FASTING PATIENT CMP ANSWER No   F  ASSAY, THYROID STIM HORMONE  02/03/2025 04:44:40 PM  TSH 6.670 0.550-4.780 uIU/mL H F  Diagnostics Details  Trans Thoracic Echocardiogram 05/06/2025  1.The study quality is good.    2.The left ventricle is severely enlarged and wall thickness is within normal limits. Global left ventricular systolic function is severely decreased. The left ventricular ejection fraction is 20%. The left ventricle diastolic function is impaired (Grade III), restrictive with an elevated left atrial pressure. Severe global  hypokinesis noted. Lumason was used to enhance the endocardial border.    3.The right ventricle is normal in size. Right ventricular systolic function is moderately decreased. A linear echo density is noted in the right ventricle, suggestive of a ICD lead.    4.The estimated pulmonary artery systolic pressure is 48 mmHg, moderately elevated.    5.Moderate aortic valve stenosis is present (could be underestimated by gradient with possible low cardiac output state). The trans-aortic peak velocity is 2.1 m/s. The trans-aortic mean gradient is 11 mmHg. Dimensionless Index= 0.28. At least moderate calcification of the aortic valve is noted. Trace AI.    6.Mild (1+) mitral regurgitation. Calcified mitral valve.    7.No pericardial effusion.    8.As compared to prior study 01/2025 - LV diastolic function has worsened from grade II to III and LV filling pressures increased, RV systolic function has worsened from mild to moderate and pulmonary HTN increased to moderate.    Nuclear PET 03/18/2022  1.Stress EKG is normal.    2.The heart rate recovery is normal.    1.This is an abnormal perfusion study. Study is consistent with prior infarction in the anterolateral wall small area    2.This scan is suggestive of moderate risk for future cardiovascular events.    3.Small fixed perfusion abnormality of moderate intensity in the anterior lateral region.    4.The left ventricular cavity is noted to be markedly enlarged on the stress studies. The stress left ventricular ejection fraction was calculated to be 22% and left ventricular global function is severely reduced. The rest left ventricular cavity is noted to be markedly enlarged. The rest left ventricular ejection fraction was calculated to be 17% and rest left ventricular global function is severely reduced.    5.When compared to the resting ejection fraction (17%), the stress ejection fraction (22%) has increased.    6.The study quality is good.    CPOE Orders carried out by:  Kenya Grier  Care Providers: Rosalinda Pantoja, Emerson Gonzalez DO and Kenya Grier  Electronically Authenticated by  Emerson Gonzalez DO  05/19/2025 03:16:22 PM  Disclaimer: Components of this note were documented using voice recognition system and are subject to errors not corrected at proofreading. Contact the author of this note for any clarifications.

## 2025-06-13 NOTE — HISTORICAL OFFICE NOTE
Facility Logo [facility]  [address] [place] [address], [address] [place] [address]  [phone]      David Reyes  Progress Note  Demographics:  Name: David Reyes YOB: 1945  Age: 79, Male Medical Record No: [id number]  Visited Date/Time: 01/30/2025 01:40 PM    Chief Complaints  Annual follow up  History of Present Illness  Sudden-year-old male with a ischemic cardiomyopathy who had a dual-chamber ICD ([employer] (placed many years ago by Dr. Lanier.  The implant date was June 6, 2014.  His device still has 4-1/2 years of battery life left    He can walk up 1 flight of stairs in 2 blocks before getting tired and short of breath.  He was hospitalized recently with decompensated heart failure and had to be diuresed down over 20 pounds.  His weight has remained stable but does fluctuate 2 to 4 pounds at a time    Patient had an upgrade to CRT-D.  Pacemaker leads were functioning normally.    He has had some weight gain and saw Dr. Paz recently and his torsemide has been increased    Prior visit  Patient is healed well from the device.  He does not claim to report any significant improvement in his exercise capacity after CRT-D upgrade.    He had some recent cancer surgery on his face and has some bruising    Device clinic check was done today normal functioning    Visit April 23, 2024  - Patient CRT-D implant was October 2022  -EF has not really improved despite his QRS being very narrow and looks to have very good electrical resynchronization.    Visit January 30, 2025  Patient seems to be doing well.  Now follows with both Dr. Paz and Dr. Gonzalez.  He is unclear if he is taking amiodarone  A-fib burden is 0 on his device checks.  Very little NSVT 1 episode for 4 seconds.  Cardiac risk factors Hypertension, Dyslipidemia and Former smoker  Past Medical History  1.ACC/AHA stage C chronic systolic heart failure  2.Cardiomyopathy, ischemic  3.CAD (coronary artery  disease)  4.Pure hypercholesterolemia  5.Pulmonary hypertension secondary to increased PVR  6.Aortic stenosis, mild  7.S/P coronary artery stent placement  8.ICD (implantable cardioverter-defibrillator) in place  9.Carotid artery stenosis  10.Obesity  11.Rheumatoid arthritis  12.Sleep apnea, obstructive  13.Pre-op testing  14.Hx of CABG  15.Diabetes mellitus type 2 in obese  Past Surgical History  1.S/P coronary artery stent placement  2.Hx of CABG  Family History  1. Mother - S/P CABG x 3  Social History  Smoking status Former [id number] (End Date)  Tobacco usage - No (Non-smoker for personal reasons (finding))  Review of systems  Cardiovascular No history of Chest pain, CUMMINGS, Palpitations, Syncope, PND, Orthopnea, Edema and Claudication  Physical Examination  Vitals Right Arm Sitting  / 60 mmHg, Pulse rate 70 bpm, Height in 5' 9\", BMI: 32.9, Weight in 223 lbs (or) 101 kgs and BSA : 2.25 cc/m²  General Appearance No Acute Distress, Well groomed and Normal Body habitus  Cardiovascular   EKG/Other abnormalities  General - NAD  PEERLA/EOMI  JVD - normal  CTA Bilaterally  CV- RRR, S1/S2, No murmurs  GI- Soft, Nontender  Extremities normal pulses  Mood/Affect Congruent  Skin no lesions  Joint/Musculoskeletal - Normal     Device site well-healed  Allergies  1.Penicillins - Allergen(Reaction:cripling effect, Severity:Severe)  2.Statins-HMG-CoA Reductase Inhibitors - Allergen(Reaction:myalgias, Severity:Moderate)  Medications  1.amiodarone 200 mg tablet, Take 1 tablet orally 2 times a day until 10/9, followed by 1 tablet daily  2.aspirin 81 mg tablet,delayed release, Take 1 tablet orally once a day.  3.EZETIMIBE 10MG TABLETS, TAKE 1 TABLET BY MOUTH EVERY DAY  4.folic acid (FOLATE) 1 MG tablet, Take 1 mg by mouth daily.  5.gabapentin 100 mg capsule, Take 1 capsule orally 3 times a day.  6.LOSARTAN 25MG TABLETS, TAKE 1 TABLET BY MOUTH EVERY EVENING  7.lovastatin 40 mg tablet, Take 1 tablet orally once a  day.  8.metFORMIN 1,000 mg tablet, Take 1 tablet orally 2 times a day.  9.methotrexate 2.5 MG Tab, 4 tabs weekly, as dir  10.metoprolol succinate ER 50 mg tablet,extended release 24 hr, Take 1 tablet orally 2 times a day.  11.spironolactone 25 mg tablet, Take 1 tablet orally once a day.  12.TORSEMIDE 20MG TABLETS, TAKE 1 TABLET BY MOUTH TWICE DAILY  13.Toujeo SoloStar U-300 Insulin 300 unit/mL (1.5 mL) subcutaneous pen, Inject 24 units into the skin nightly  Impression  1.ACC/AHA stage C chronic systolic heart failure  2.Cardiomyopathy, ischemic  3.CAD (coronary artery disease)  4.Pure hypercholesterolemia  5.Pulmonary hypertension secondary to increased PVR  6.Aortic stenosis, mild  7.S/P coronary artery stent placement  8.ICD (implantable cardioverter-defibrillator) in place  9.Carotid artery stenosis  10.Obesity  11.Rheumatoid arthritis  12.Sleep apnea, obstructive  13.Hx of CABG  14.Diabetes mellitus type 2 in obese  Assessment & Plan  Cardiac Testing Personally Reviewed    ECG Reviewed -atrial paced narrow QRS but with a mild IVCD    Echo Results []    Cath Results []    Monitor Results []      # CRT-D [employer] device-  -Follows in our device clinic annually. recently upgraded  -No VT VF episodes of required therapy or ATP  -Unclear if he is on amiodarone.  - I would like to add a CMP instead of a BMP for his blood work on the next check that he has scheduled next week and add a TSH.  -He has been asked to bring all his medications into see if he is still taking amiodarone.    #Ischemic cardiomyopathy  -Currently on diuretics and goal-directed medical therapy.  He will be seeing Dr. Paz in the near future for more advanced heart failure therapy    -Unfortunately does not appear despite good electrical resynchronization that we have seen an improvement in his EF.  Clinically he does not seem to have any change in his [place] Heart Association class    - I will defer to Dr. Paz but 1 option in  the future could be either cardiac contractility modulation or baroreceptor [place] stim type devices.    #Vascular disease-carotid artery disease patient is scheduled for an ultrasound today    #Coronary artery disease with prior bypass surgery    #Type 2 diabetes    Follow-up in 6 months  Labs and Diagnostics ordered  1.EKG (electrocardiogram) (Today)  2.CMP (Schedule next available)  3.TSH (Thyroid Stimulating Hormone) Panel (Schedule next available)  Future appointments  1.Follow up visit - Brendan Mullen MD (6 Months)  2.Follow up visit - Brendan Mullen MD (6 Months)  Miscellaneous  1.Reviewed trans thoracic echocardiogram, nuclear pet, carotid ultrasound with the patient.  Nurses documentation  Refills: none  Upcoming surgeries: none  Use of assistive device: none  Ekg per verbal order   [id number]      Patient instructions  Medications:   1. Continue current medications.     Testin. CMP and TSH    Provider Follow Up:  1. Follow up with Dr. Mullen in 6 months     Please bring in you medication bottles or updated medicine list to your next appointment.  Call [facility] if you have any problems or concerns at [phone]     Lab Details  CBC W/ DIFFERENTIAL  2024 05:04:59 PM  WBC 4.1 4.0-11.0 x10(3) uL  F  RED BLOOD COUNT 3.68 3.80-5.80 x10(6)uL L F  HGB 11.0 13.0-17.5 g/dL L F  HCT 34.9 39.0-53.0 % L F  PLATELETS 53.0 150.0-450.0 10(3)uL L F  MEAN CELL VOLUME 94.8 80.0-100.0 fL  F  MEAN CORPUSCULAR HEMOGLOBIN 29.9 26.0-34.0 pg  F  MEAN CORPUSCULAR HGB CONC 31.5 31.0-37.0 g/dL  F  RED CELL DISTRIBUTION WIDTH CV 16.0 %  F  NEUTROPHIL ABS PRELIM 2.28 1.50-7.70 x10 (3) uL  F  NEUTROPHIL ABSOLUTE 2.28 1.50-7.70 x10(3) uL  F  LYMPHOCYTE ABSOLUTE 1.24 1.00-4.00 x10(3) uL  F  MONOCYTE ABSOLUTE 0.52 0.10-1.00 x10(3) uL  F  EOSINOPHIL ABSOLUTE 0.05 0.00-0.70 x10(3) uL  F  BASOPHIL ABSOLUTE 0.02 0.00-0.20 x10(3) uL  F  IMMATURE GRANULOCYTE COUNT 0.02 0.00-1.00 x10(3) uL  F  NEUTROPHIL % 55.2 %  F  LYMPHOCYTE  % 30.0 %  F  MONOCYTE % 12.6 %  F  EOSINOPHIL % 1.2 %  F  BASOPHIL % 0.5 %  F  IMMATURE GRANULOCYTE RATIO % 0.5 %  F  T4, FREE (S)  02/01/2024 03:57:37 PM  FREE T4 0.9 0.8-1.7 ng/dL  F  VITAMIN D, 25-HYDROXY  02/01/2024 03:53:36 PM  25-HYDROXYVITAMIN D (TOTAL) 21.4 30.0-100.0 ng/mL L F  LIPID PANEL  02/01/2024 03:28:39 PM  CHOLESTEROL 120 <200 mg/dL  F  HDL CHOL 36 40-59 mg/dL L F  TRIGLYCERIDES 121  mg/dL  F  LDL CHOLESTROL 62 <100 mg/dL  F  VLDL 18 0-30 mg/dL  F  NON HDL CHOL 84 <130 mg/dL  F  FASTING PATIENT LIPID ANSWER Yes   F  TSH W REFLEX TO FREE T4  02/01/2024 03:28:39 PM  TSH 4.240 0.358-3.740 mIU/mL H F  PRO BETA NATRIURETIC PEPTIDE  02/01/2024 03:28:39 PM  PRO-BETA NATRIURETIC PEPTIDE 4382 <450 pg/mL H F  COMP METABOLIC PANEL (14)  02/01/2024 03:28:38 PM  GLUCOSE 133 70-99 mg/dL H F  SODIUM 140 136-145 mmol/L  F  POTASSIUM 4.6 3.5-5.1 mmol/L  F  CHLORIDE 109  mmol/L  F  CO2 28.0 21.0-32.0 mmol/L  F  ANION GAP 3 0-18 mmol/L  F  BUN 24 9-23 mg/dL H F  CREATININE 1.46 0.70-1.30 mg/dL H F  CALCIUM 9.2 8.5-10.1 mg/dL  F  OSMOLALITY CALCULATED 296 275-295 mOsm/kg H F  E GFR CR 50 >=60 mL/min/1.73m2 L F  AST 33 15-37 U/L  F  ALT 32 16-61 U/L  F  ALKALINE PHOSPHATASE 58  U/L  F  BILIRUBIN, TOTAL 0.6 0.1-2.0 mg/dL  F  TOTAL PROTEIN 7.4 6.4-8.2 g/dL  F  ALBUMIN 3.8 3.4-5.0 g/dL  F  GLOBULIN 3.6 2.8-4.4 g/dL  F  A/G RATIO 1.1 1.0-2.0  F  FASTING PATIENT CMP ANSWER Yes   F  Diagnostics Details  Trans Thoracic Echocardiogram 01/23/2025  1.The study quality is average.    2.The left ventricle is severely enlarged. Global left ventricular systolic function is severely decreased. The left ventricle diastolic function is impaired (Grade I) with normal left atrial pressure. The wall thickness is within normal limits. The left ventricular ejection fraction is 15-20%. Global hypokinesis noted with regional variations. Lumason was used to enhance the endocardial border.    3.The right ventricle is normal in  size. Right ventricular systolic function is normal.    4.Mild (1+) mitral regurgitation.    5.Aortic root diameter is 4.1 cms. Dilatation of the aortic root is noted.    6.Moderate to severe aortic valve stenosis is present. Severity may be under-estimated due to severe LV dysfunction. Visually, valve looks more stenosed. Aortic valve area continuity equation is 1.3 cm². Trace aortic regurgitation. The trans-aortic peak velocity is 2.2 m/s. The trans-aortic mean gradient is 12.0 mmHg. Aortic valve VTI measures 39.7 cms. Moderate to severe calcification of the aortic valve is noted.    7.The estimated pulmonary artery systolic pressure is 20 mmHg assuming a right atrial pressure of 3 mmHg.    8.Pulmonary artery pressure not measured due to insignificant tricuspid regurgitation.    Nuclear PET 03/18/2022  1.Stress EKG is normal.    2.The heart rate recovery is normal.    1.This is an abnormal perfusion study. Study is consistent with prior infarction in the anterolateral wall small area    2.This scan is suggestive of moderate risk for future cardiovascular events.    3.Small fixed perfusion abnormality of moderate intensity in the anterior lateral region.    4.The left ventricular cavity is noted to be markedly enlarged on the stress studies. The stress left ventricular ejection fraction was calculated to be 22% and left ventricular global function is severely reduced. The rest left ventricular cavity is noted to be markedly enlarged. The rest left ventricular ejection fraction was calculated to be 17% and rest left ventricular global function is severely reduced.    5.When compared to the resting ejection fraction (17%), the stress ejection fraction (22%) has increased.    6.The study quality is good.    Carotid Ultrasound 02/10/2022  1.The study quality is good.    2.1-39% stenosis in the proximal right internal carotid artery based on Bluth Criteria.    3.1-39% stenosis in the proximal left internal carotid  artery based on Bluth Criteria.    4.Antegrade right vertebral artery flow.    5.Antegrade left vertebral artery flow.    CPOE Orders carried out by: Nery Jimenez  Care Providers: Brendan Mullen MD, Nery Duron and Loree Jimenez  Electronically Authenticated by  Brendan Mullen MD  01/30/2025 04:27:07 PM  Disclaimer: Components of this note were documented using voice recognition system and are subject to errors not corrected at proofreading. Contact the author of this note for any clarifications.

## 2025-06-13 NOTE — OCCUPATIONAL THERAPY NOTE
Attempted to see pt for skilled OT services this date. Pt with pending dopplers and known PE with Heparin initiated on 6/13 @ 0700. Will await anticoagulation for 24 hours per dept protocol. Amie Fam, 06/13/25, 7:49 AM

## 2025-06-13 NOTE — HISTORICAL OFFICE NOTE
Facility Logo [facility]  [address] [place] [address], [address] [place] [address]  [phone]      David Reyes  Progress Note  Demographics:  Name: David Reyes YOB: 1945  Age: 79, Male Medical Record No: [id number]  Visited Date/Time: 02/04/2025 02:00 PM    Chief Complaints  Overdue month follow up: Complex heart failure patient  Advanced heart failure management.    Fatigue and dyspnea on exertion  Heart failure with reduced ejection fraction, chronic  Ischemic cardiomyopathy  CAD with CABG  Obstructive sleep apnea on CPAP  Dual-chamber ICD  Pulmonary hypertension  History of Present Illness  Advanced heart failure management.    79-year-old gentleman coming for clinic for 5-month follow-up.    Complex patient.    This visit is related to heart failure management.    Patient saw EP but no change in management.  Patient also saw Dr. Sung and then patient switched to Dr. Gonzalez after hospitalizations in the second half of 2024.     Patient goes back and forth with his ejection fraction and if he tolerates heart failure meds he may go up to 25% and if he does not he may drop to 15 to 20% difference on the comorbidity and hospitalizations.    Patient was hospitalized for COVID bronchopneumonia with bronchospasm requiring intubation in September 2024.  Dr. Paul ACOSTA was consulted for wide-complex tachycardia and his ICD was adjusted.  He was hypotensive and cardioversion was attempted but unsuccessful.  Amiodarone was initiated.  Then was followed by Dr. Mullen outpatient.  He does not take any amiodarone now.  Recurrent nonsustained VT episodes.    The patient had mildly elevated troponin with COVID bronchopneumonia but it was not indication for any coronary angiography again especially with his kidney insufficiency and low platelet count with no angina and he has to be off Plavix due to chronic thrombocytopenia.    He stays off Plavix due to chronic thrombocytopenia although no  bleeding issues now.    Patient takes beta-blocker and we added losartan since Entresto was expensive.    Follow-up echo Doppler January 23, 2025 -per Dr. Gonzalez's report - LVEF 15 to 20% with global hypokinesis with regional variation consistent with ischemic cardiomyopathy.  Severely dilated left ventricle with severe LV dysfunction which is chronic.  RV function and size normal.  1+ MR and aortic root 4.1 cm with moderate aortic stenosis and mean gradient of 12 mmHg.  Aortic valve area 1.3 cm².  Doppler can be underestimated.  Mild to moderate aortic valve calcification with no significant insufficiency.  Normal pulmonary pressures.  No other valvular maladies.    Prior echo Doppler -March 1, 2024 - no significant changes as compared to February 2023.  LVEF 25% with grade 2 diastolic dysfunction and wall motion abnormalities consistent with ischemic cardiomyopathy on top of global diffuse hypokinesis.  Aortic valve area 1.6 cm score with mild aortic stenosis and mean gradient of 12 mmHg and 1+ AI but aortic stenosis may be underestimated with low EF.  RV size normal with preserved RV function.  Mildly elevated pulmonary pressure 35 mmHg and mildly dilated left atrium.  No other valvular abnormalities.    Venous ultrasound of lower extremities negative for DVT September 2024.    EKG sinus with atrial sensing ventricular pacing at 68/min on January 30, 2025.    Patient has chronic heart failure with reduced ejection fraction, stage C, class II with underlying ischemic cardiomyopathy with LVEF of 25% down to 15 to 20% due to difficulties to uptitrate heart failure medications and her current comorbidity, BiV ICD and HF meds. CAD, CABG and PCIs. In the past initially his LVEF was 10%.  He has history of severe pulmonary hypertension which normalized with medications in the past by Doppler 2016.  He underwent CRT-D upgrade with Dr. Mullen in October 2022.  Device was checked and is functioning well by EP per notes.   He is 30% atrial pacing and 98% BiV pacing with underlying sinus.    He tolerates the medications well.  We had changed Entresto to ARB back because it was expensive.    Patient saw rheumatology and rheumatoid arthritis controlled with methotrexate.  Patient takes folic acid as well.  He also has osteoarthritis of the knees.  He has morning stiffness.  He is intermittently on prednisone with tapering doses.    Occasional hemoptysis with cough with low platelets.    Patient does not use any CPAP.  He feels more comfortable without CPAP.  He could not tolerate it.    Patient has BiV ICD.  No shocks.  Device functioning well and no recent issues.  He is AV paced.    Patient has chronically low blood pressure but with reduced LV systolic function.  It stays at 100/60 mmHg.    Labs -February 3, 2025 - WBC 1.6 hemoglobin 8.7 platelet count 70 sodium 143 potassium 4.1 normal liver enzymes creatinine 2.0 BUN 33 p-ANCA and c-ANCA negative TSH 6.6 sed rate 84 complements normal.    Cholesterol profile abnormal for triglycerides 156 and HDL 34  total cholesterol 180 and A1c 5.3% in January 2025.     - We reviewed blood work with the patient ordered by other provider -February 2024 hemoglobin 11.0 and platelet count 53.  Stable.  Free T4 0.9 with normal TSH of 4.24 And vitamin D 21 cholesterol profile stable with chronically reduced HDL of 36.  LDL 62 triglycerides 121.  proBNP 4382 not far from baseline.  Glucose 133 potassium 4.2 sodium 140 creatinine 1.46 BUN 24 and normal liver enzymes.  Hemoglobin A1c 6.5%    Follow-up blood work - December 5, 2023 -hemoglobin 11.5 stable platelet dropped from 74K to 57K glucose 116 potassium 4.2 sodium 139 with creatinine 1.56 BUN 22 and normal liver enzymes borderline AST.    Echo Doppler February 2023 -  -as compared to echo May 2022 mild improvement in LV systolic function.  LVEF 25% from 15-20% post BiV and after adding Entresto.  Moderately calcified aortic valve 1.9 cm2 with  mean gradient of 8 mmHg with no significant stenosis in only trace AI.     Hospitalization - acute on chronic systolic CHF exacerbation - September 2022.  Hospitalization for viral infection with rhinovirus and bronchopneumonia in May 2022.   Patient was hospitalized for CHF exacerbation at [facility] in December 2021.    Plavix was dc in hospital in 09/2022 - due to low platelets in 50's to 70's but he stayed on baby ASA.    He has history of lisinopril intolerance with cough.  Losartan is OK.    Follow-up Lexiscan nuclear stress test -March 2022 -no new ischemia but fixed defect from prior infarct and anterolateral wall small area.  LVEF was 22% with moderately enlarged left ventricle.  Liver enzymes normal.  Stable vitals.  Paced rhythm.    Initially his LVEF was 10% in the past.    PCI of RCA with drug-coated stent February 2015.  PCI of left circumflex chronic total occlusion with drug-coated stent -February 2015.  CAD with LAD chronic total occlusion but patient has widely patent LIMA to LAD graft which also supplies retrogradely the major diagonal branch.    Patient has rheumatoid arthritis was treated with methotrexate and prednisone intermittently.  Stable now.    RHC -2013 -severe pulmonary hypertension at 60 mmHg and PVR of 4.1 Wood units.    Currently has NYHA Class 2 symptoms  Currently has NYHA Class 2 symptoms.  Cardiac risk factors Hypertension, Dyslipidemia and Former smoker  Past Medical History  1.ACC/AHA stage C chronic systolic heart failure  2.Cardiomyopathy, ischemic  3.CAD (coronary artery disease)  4.Pure hypercholesterolemia  5.Pulmonary hypertension secondary to increased PVR  6.Aortic stenosis, mild  7.S/P coronary artery stent placement  8.ICD (implantable cardioverter-defibrillator) in place  9.Carotid artery stenosis  10.Obesity  11.Rheumatoid arthritis  12.Sleep apnea, obstructive  13.Pre-op testing  14.Hx of CABG  15.Diabetes mellitus type 2 in obese  Past Surgical  History  1.S/P coronary artery stent placement  2.Hx of CABG  Family History  1. Mother - S/P CABG x 3  Social History  Smoking status Former [id number] (End Date)  Tobacco usage - No (Non-smoker (finding))  Review of systems  Constitutional Fatigue  Gastrointestinal No history of Abdominal pain  Cardiovascular CUMMINGS  No history of Chest pain, Palpitations, Syncope, PND, Orthopnea, Edema and Claudication  Musculoskeletal Arthralgias and Arthritis  No history of Myalgias  Respiratory No history of SOB  Neurological Poor balance  No history of Numbness, Limb weakness, Speech problems and Unsteady gait  Psychiatric Depression and Anxiety  Integumentary No history of Rashes and Skin changes  Physical Examination  Vitals Right Arm Sitting  / 62 mmHg, Pulse rate 62 bpm, Regular, Height in 5' 9\", BMI: 33.2, Weight in 225 lbs (or) 102 kgs and BSA : 2.26 cc/m²  General Appearance No Acute Distress and Obese  Head/Eyes/Ears/Nose/Mouth/Throat Conjunctiva pink, Sclera Clear, PERRLA, Mucous membranes Moist and No pallor  Neck Normal carotid pulsations, No carotid bruits and No JVD  Respiratory Unlabored and Lungs clear with normal breath sounds  Cardiovascular Intact distal pulses and Regular rhythm. Normal rate present. Normal and normal S1 and S2  Harsh mid murmur is present at the upper right sternal border radiating to the neck.    Gastrointestinal Abdomen soft, Non-tender, Normoactive bowel sounds and No organomegaly  Gait Unsteady gait  Strength and tone Normal muscle strength  Lower Extremities Pulses 2+ and equal bilaterally and No edema  Skin Warm and dry and No rashes or lesions  Neurologic / Psychiatric Alert and Oriented  Speech Normal speech  Allergies  1.Penicillins - Allergen(Reaction:cripling effect, Severity:Severe)  2.Statins-HMG-CoA Reductase Inhibitors - Allergen(Reaction:myalgias, Severity:Moderate)  Medications  1.aspirin 81 mg tablet,delayed release, Take 1 tablet orally once a day.  2.EZETIMIBE  10MG TABLETS, TAKE 1 TABLET BY MOUTH EVERY DAY  3.folic acid (FOLATE) 1 MG tablet, Take 1 mg by mouth daily.  4.gabapentin 100 mg capsule, Take 1 capsule orally 3 times a day.  5.LOSARTAN 25MG TABLETS, TAKE 1 TABLET BY MOUTH EVERY EVENING  6.lovastatin 40 mg tablet, Take 1 tablet orally once a day.  7.metFORMIN 1,000 mg tablet, Take 1 tablet orally 2 times a day.  8.methotrexate 2.5 MG Tab, 4 tabs weekly, as dir  9.metoprolol succinate ER 50 mg tablet,extended release 24 hr, Take 1 tablet orally 2 times a day.  10.spironolactone 25 mg tablet, Take 1 tablet orally once a day.  11.TORSEMIDE 20MG TABLETS, TAKE 1 TABLET BY MOUTH TWICE DAILY  12.Toujeo SoloStar U-300 Insulin 300 unit/mL (1.5 mL) subcutaneous pen, Inject 24 units into the skin nightly  13.Vitamin B-1 100 mg tablet, Take 1 tablet orally once a day.  Impression  1.ACC/AHA stage C chronic systolic heart failure  2.Cardiomyopathy, ischemic  3.CAD (coronary artery disease)  4.Pure hypercholesterolemia  5.Pulmonary hypertension secondary to increased PVR  6.Aortic stenosis, mild  7.S/P coronary artery stent placement  8.ICD (implantable cardioverter-defibrillator) in place  9.Carotid artery stenosis  10.Obesity  11.Rheumatoid arthritis  12.Sleep apnea, obstructive  13.Hx of CABG  14.Diabetes mellitus type 2 in obese  Assessment & Plan  1.   Continue current cardiac medications as scheduled.  Last time we increased beta-blocker and resume losartan because Entresto was too expensive.  Continue torsemide at current dose he is euvolemic.  No need to refill medications today.  -We tried Entresto before but it was too expensive and he declined.  We did assistant program but did not work.  That is why I put the patient on losartan.  2.  Reviewed multiple blood work results from 2024.  Patient has kidney insufficiency but is stable.  Creatinine is 2.0 and it bounced between 1.5 and 2.5 over last few years.  3.  Plavix was discontinued due to thrombocytopenia in  hospital September 2022.  No significant bleeding issues with occasional minor hemoptysis with cough but nothing significant.  Patient will continue baby aspirin.  Platelet count stable in 50s to 70s.  Patient was asked to see hematologist few times.  He got referral from PCP.  -I told the patient that he probably has myelodysplastic syndrome.  He needs to see hematologist.  4.  Elevated TSH per primary care physician.  No fluid overload now.  5. Good diet and staying active was emphasized.  6.  We will continue the same dose of torsemide.  He is euvolemic.  7.  Follow-up with Dr. ZHANG Gonzalez in 3 months.  8.  Follow-up with EP with device check as scheduled.  9.  I believe he does not have significant aortic stenosis yet but mild to moderate.  Will give patient more time and recheck echo Doppler in 4 months prior to visit with me.  Will reassess ejection fraction and aortic and mitral stenosis it used to be mild.  10.  Follow-up with me in 4 months.  11.  Continue metoprolol succinate with Aldactone and losartan as scheduled.  No need to refill medications today.  12.  CBC and BMP in 3 months before the visit with Dr. Gonzalez.  We have to follow on kidney function and patient was asked to see hematologist for most likely myelodysplastic syndrome.    We went through multiple medical records today with extended visit.    All discussed with patient detail.  Copy to PCP.  Medications Ordered  1.ezetimibe 10 mg tablet, Take 1 tablet orally once a day.  2.losartan 25 mg tablet, Take 1 tablet orally once a day.  3.metoprolol succinate ER 50 mg tablet,extended release 24 hr, Take 1 tablet orally 2 times a day.  4.torsemide 20 mg tablet, Take 1 tablet orally 2 times a day.  Labs and Diagnostics ordered  1.Echocardiography - Complete ([facility]) (3 Months)  Future appointments  1.Referral Visit - Thea Harris ([email or web address]) : (Today)  2.Follow up visit - John Paz MD (4 Months)  Miscellaneous  1.Weight  monitoring (regime/therapy)  Nurses documentation  Triage - Nursing Doc:  Upcoming surgeries: no   Use of assistive devices(s): no   Refills: no  Verbal order for EKG: no  Triage & medication list reviewed by: LISA   Patient instructions  Medications:  1. Continue current medications    Testin. Nonfasting BMP and CBC in 3 months    2. Echocardiogram in 3 months:  Your provider has ordered an echocardiogram. This is an ultrasound of your heart to further assess its function and valves. You may require an IV.  You will be required to remove your shirt AND BRA, no all in-in-ones, dresses or coveralls. This test takes approximately 45 to 60 minutes.    Provider follow up:  1. Follow up with Dr. Gonzalez in 3 months  2. Follow up with Dr. Pza in 4 months     Diagnostics Details  Trans Thoracic Echocardiogram 2025  1.The study quality is average.    2.The left ventricle is severely enlarged. Global left ventricular systolic function is severely decreased. The left ventricle diastolic function is impaired (Grade I) with normal left atrial pressure. The wall thickness is within normal limits. The left ventricular ejection fraction is 15-20%. Global hypokinesis noted with regional variations. Lumason was used to enhance the endocardial border.    3.The right ventricle is normal in size. Right ventricular systolic function is normal.    4.Mild (1+) mitral regurgitation.    5.Aortic root diameter is 4.1 cms. Dilatation of the aortic root is noted.    6.Moderate to severe aortic valve stenosis is present. Severity may be under-estimated due to severe LV dysfunction. Visually, valve looks more stenosed. Aortic valve area continuity equation is 1.3 cm². Trace aortic regurgitation. The trans-aortic peak velocity is 2.2 m/s. The trans-aortic mean gradient is 12.0 mmHg. Aortic valve VTI measures 39.7 cms. Moderate to severe calcification of the aortic valve is noted.    7.The estimated pulmonary artery systolic pressure is  20 mmHg assuming a right atrial pressure of 3 mmHg.    8.Pulmonary artery pressure not measured due to insignificant tricuspid regurgitation.    Nuclear PET 03/18/2022  1.Stress EKG is normal.    2.The heart rate recovery is normal.    1.This is an abnormal perfusion study. Study is consistent with prior infarction in the anterolateral wall small area    2.This scan is suggestive of moderate risk for future cardiovascular events.    3.Small fixed perfusion abnormality of moderate intensity in the anterior lateral region.    4.The left ventricular cavity is noted to be markedly enlarged on the stress studies. The stress left ventricular ejection fraction was calculated to be 22% and left ventricular global function is severely reduced. The rest left ventricular cavity is noted to be markedly enlarged. The rest left ventricular ejection fraction was calculated to be 17% and rest left ventricular global function is severely reduced.    5.When compared to the resting ejection fraction (17%), the stress ejection fraction (22%) has increased.    6.The study quality is good.    Carotid Ultrasound 02/10/2022  1.The study quality is good.    2.1-39% stenosis in the proximal right internal carotid artery based on Bluth Criteria.    3.1-39% stenosis in the proximal left internal carotid artery based on Bluth Criteria.    4.Antegrade right vertebral artery flow.    5.Antegrade left vertebral artery flow.    CPOE Orders carried out by: John Paz MD and Nery Dunbar CMA  Care Providers: John Paz MD and Nery Dunbar CMA  Electronically Authenticated by  oJhn Paz MD  02/04/2025 07:02:25 PM  Disclaimer: Components of this note were documented using voice recognition system and are subject to errors not corrected at proofreading. Contact the author of this note for any clarifications.

## 2025-06-13 NOTE — PROGRESS NOTES
[facility]   part of [facility]     Hospitalist Progress Note     David Reyes Patient Status:  Inpatient    1945 MRN [id number]   Location [facility] 8NE-A Attending Sukhdeep Bueno DO   Hosp Day # 1 PCP Thea Harris DO     Chief Complaint: Dyspnea     Subjective:     Patient  feeling short of breath, fatigued.     Objective:    Review of Systems:   A comprehensive review of systems was completed; pertinent positive and negatives stated in subjective.    Vital signs:  Temp:  [97.4 °F (36.3 °C)-98.6 °F (37 °C)] 97.6 °F (36.4 °C)  Pulse:  [] 85  Resp:  [18-26] 24  BP: (102-125)/(62-89) 115/83  SpO2:  [96 %-100 %] 100 %    Physical Exam:    General: No acute distress  Respiratory: No wheezes, no rhonchi  Cardiovascular: S1, S2, regular rate and rhythm  Abdomen: Soft, Non-tender, non-distended, positive bowel sounds  Neuro: No new focal deficits.   Extremities: pitting edema       Diagnostic Data:    Labs:  Recent Labs   Lab 25  1340 25  0351   WBC 1.2* 1.5*   HGB 7.8* 7.4*   MCV 88.3 89.4   .0* 102.0*   INR  --  1.44*       Recent Labs   Lab 25  1340 25  0351   * 127*   BUN 30* 31*   CREATSERUM 2.14* 1.95*   CA 8.9 8.8   ALB 4.1 3.9    139   K 4.1 4.0    105   CO2 20.0* 22.0   ALKPHO 57 56   AST 24 29   ALT <7* 10   BILT 0.8 0.7   TP 7.4 7.0       Estimated Glomerular Filtration Rate: 35 mL/min/1.73m2 (A) (result from lab).    Recent Labs   Lab 25  1340   TROPHS 71*       Recent Labs   Lab 25  0351   PTP 17.6*   INR 1.44*                  Microbiology    No results found for this visit on 25.      Imaging: Reviewed in Epic.    Medications: Scheduled Medications[1]    Assessment & Plan:      # Acute hypoxic resp failure due to pleural effusions, PNA and pulmonary embolus   IV Lasix STAT, BIPAP  IV Abx   ABG     # HFrEF, ICD- with acute exacerbation  As above  Cardiology on cs  I/O  Monitor Cr, e-    #H/o CAD s/p  CABG    #Acute PE-  Heparin drip  Doppler LE     #Hypertension    #HLD    #Hypothyroid  Levothyroxine     3OSA     #MDS with pancytopenia   Transfuse for hb< 7  Onc on CS       Alejandra Mehta MD    Supplementary Documentation:     Quality:  DVT Mechanical Prophylaxis:   SCDs, Early ambuation  DVT Pharmacologic Prophylaxis   Medication    heparin (Porcine) 07511 units/250mL infusion PE/DVT/THROMBUS CONTINUOUS                Code Status: Full Code  Beltre: No urinary catheter in place  Beltre Duration (in days):   Central line:    GABE:     Discharge is dependent on: clinical   At this point Mr. Reyes is expected to be discharge to: tbd    The 21st Century Cures Act makes medical notes like these available to patients in the interest of transparency. Please be advised this is a medical document. Medical documents are intended to carry relevant information, facts as evident, and the clinical opinion of the practitioner. The medical note is intended as peer to peer communication and may appear blunt or direct. It is written in medical language and may contain abbreviations or verbiage that are unfamiliar.              **Certification      PHYSICIAN Certification of Need for Inpatient Hospitalization - Initial Certification    Patient will require inpatient services that will reasonably be expected to span two midnight's based on the clinical documentation in H+P.   Based on patients current state of illness, I anticipate that, after discharge, patient will require TBD.         [1]    ezetimibe  10 mg Oral Daily    gabapentin  100 mg Oral Daily    levothyroxine  25 mcg Oral Before breakfast    [Held by provider] losartan  25 mg Oral Daily    metoprolol succinate ER  50 mg Oral 2x Daily(Beta Blocker)    spironolactone  25 mg Oral Daily with breakfast    torsemide  20 mg Oral BID (Diuretic)    insulin aspart  1-10 Units Subcutaneous TID AC and HS    [Held by provider] Lovastatin  40 mg Oral BID

## 2025-06-13 NOTE — H&P
[facility]     David Reyes Patient Status:  Emergency    1945 MRN [id number]   Location [facility] EMERGENCY DEPARTMENT Attending Natalia Aleman DO   Hosp Day # 0 PCP Thea Harris DO     Chief Complaint: SOB    History of Present Illness: David Reyes is a 79 year old male with PMHx CAD s/p CABG, HFrEF s/p ICD, DM2, HTN, Hypothyroidism, HLD, FLAKO, RA on methotrexate who presents for SOB.     Patient with last admission from 2024 to 2022 for acute hypoxic respiratory failure secondary to bacterial pneumonia and septic shock.  Patient discharged to rehab and notes that he has been progressively more short of breath for the past few months.  States that since discharge weight has been relatively stable, states her lower extremity edema has been slightly worse.  Also endorsing ongoing cough with bloody sputum production.  States has been ongoing for past few months.  Denies any fevers, chills, chest pain, abdominal pain, nausea, vomiting.  Patient went to see his PCP today who noted that he was significantly short of breath with exertion and advised presentation to ER for further workup.  VQ scan positive for PE.    Past Medical History:Past Medical History[1]     Past Surgical History: Past Surgical History[2]    Social History:  reports that he quit smoking about 39 years ago. His smoking use included cigarettes. He started smoking about 65 years ago. He has a 26 pack-year smoking history. He has never used smokeless tobacco. He reports current alcohol use of about 2.0 standard drinks of alcohol per week. He reports that he does not use drugs.    Family History: Family History[3]    Allergies: Allergies[4]    Medications:  Medications Ordered Prior to Encounter[5]    Review of Systems:   A comprehensive 14 point review of systems was completed.    Pertinent positives and negatives noted in the HPI.    Physical Exam:    /87   Pulse 117    Temp 97.4 °F (36.3 °C) (Oral)   Resp 19   Wt 215 lb (97.5 kg)   SpO2 100%   BMI 31.75 kg/m²   General: No acute distress. Alert and oriented x 3.  HEENT: Normocephalic atraumatic. Moist mucous membranes. EOM-I. PERRLA. Anicteric.  Neck: No lymphadenopathy. No JVD. No carotid bruits.  Respiratory: Clear to auscultation bilaterally. No wheezes. No rhonchi.  Cardiovascular: S1, S2. Regular rate and rhythm. No murmurs, rubs or gallops. Equal pulses.   Chest and Back: No tenderness or deformity.  Abdomen: Soft, nontender, nondistended.  Positive bowel sounds. No rebound, guarding or organomegaly.  Neurologic: No focal neurological deficits. CNII-XII grossly intact.  Musculoskeletal: Moves all extremities.  Extremities: 1+ pitting edema to bilateral lower ankles  Integument: No rashes or lesions.   Psychiatric: Appropriate mood and affect.    Diagnostic Data:      Labs:  Recent Labs   Lab 06/06/25  0748 06/12/25  1340   WBC 1.7* 1.2*   HGB 8.2* 7.8*   MCV 88.7 88.3   .0* 106.0*   INR 1.24*  --        Recent Labs   Lab 06/12/25  1340   *   BUN 30*   CREATSERUM 2.14*   CA 8.9   ALB 4.1      K 4.1      CO2 20.0*   ALKPHO 57   AST 24   ALT <7*   BILT 0.8   TP 7.4       Estimated Creatinine Clearance: 28.9 mL/min (A) (based on SCr of 2.14 mg/dL (H)).    Recent Labs   Lab 06/06/25  0748   PTP 15.8*   INR 1.24*       No results for input(s): \"TROP\", \"CK\" in the last 168 hours.    Imaging: Imaging data reviewed in [facility].  NM LUNG VQ VENT / PERFUSION SCAN  (CPT=78582)  Result Date: 6/12/2025  CONCLUSION:  Moderate sized apparent perfusion defect in the right lower lobe, concerning for pulmonary embolism.   LOCATION:  [facility]   Dictated by (CST): Omid Randall MD on 6/12/2025 at 6:57 PM     Finalized by (CST): Omid Randall MD on 6/12/2025 at 7:00 PM       XR CHEST AP PORTABLE  (CPT=71045)  Result Date: 6/12/2025  CONCLUSION:  Mild scarring/atelectasis in the lower lungs.  Probable small bilateral  pleural effusions.    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  # Hypertension - holding home oral antihypertensives, resume in AM as BP allows   # Hyperlipidemia - continue home statin, zetia   # Hypothyroidism - continue home levothyroxine   # Obstructive Sleep Apnea - CPAP at night per protocol    # Rheumatoid arthritis - holding methotrexate given pancytopenia   # CKD - Cr around baseline, CTM       Code Status: Full Code    Plan of care discussed with patient, ED physician    Husam Gonzalez MD  6/12/2025                Supplementary Documentation:      MDM : Patient's ER labs, imaging reviewed.  AM labs ordered.  ER management discussed with ED physician, decision made for patient to be admitted to the hospital for further medical management.                  [1]   Past Medical History:   Acute kidney injury    Anxiety state    Back problem    CAD (coronary artery disease)    Congestive heart disease (HCC)    Congestive heart failure (HCC)    CORONARY ARTERY DISEASE    cabg    Coronary atherosclerosis    COVID-19    Diabetes (HCC)    Disorder of thyroid    DM (diabetes mellitus) (HCC)    Heart attack (HCC)    High blood pressure    High cholesterol    Muscle weakness    USES A WALKER TO AMBULATE    Other and unspecified hyperlipidemia    Pneumonia    Rheumatoid arthritis (HCC)    Sleep apnea    NO  MACHINE/TREATMENT    Subdural hematoma (HCC)    Unspecified sleep apnea    Visual impairment    READING GLASSES   [2]   Past Surgical History:  Procedure Laterality Date    Angiogram  2/11/2015    Angioplasty (coronary)  2/23/15    RCA    Bypass surgery      2007    Cabg  2004    LAD, Diagonal    Cardiac defibrillator placement  6/7/14    [employer] dual chamber ICD    Cath drug eluting stent      Tonsillectomy     [3]   Family History  Problem Relation Age of Onset    Cancer Father     Heart Disease Mother     Other (alzheimers) Mother     Stroke Neg    [4]   Allergies  Allergen Reactions    Pcn [Penicillins]      \"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   [5]   No current facility-administered medications on file prior to encounter.     Current Outpatient Medications on File Prior to Encounter   Medication Sig Dispense Refill    TOUJEO MAX SOLOSTAR 300 UNIT/ML Subcutaneous Solution Pen-injector INJECT 24 UNITS INTO SKIN DAILY 27 mL 0    LEVOTHYROXINE 25 MCG Oral Tab TAKE 1 TABLET(25 MCG) BY MOUTH BEFORE BREAKFAST 90 tablet 0    GABAPENTIN 100 MG Oral Cap TAKE 1 CAPSULE(100 MG) BY MOUTH THREE TIMES DAILY AS NEEDED (Patient taking differently: Take 1 capsule (100 mg total) by mouth daily.) 90 capsule 0    METFORMIN  MG Oral Tablet 24 Hr TAKE 2 TABLETS(1000 MG) BY MOUTH TWICE DAILY WITH MEALS 360 tablet 0    spironolactone 25 MG Oral Tab Take 1 tablet (25 mg total) by mouth daily with breakfast. 90 tablet 0    metoprolol succinate ER 50 MG Oral Tablet 24 Hr Take 1 tablet (50 mg total) by mouth in the morning and 1 tablet (50 mg total) before bedtime.      losartan 25 MG Oral Tab Take 1 tablet (25 mg total) by mouth daily.      torsemide 20 MG Oral Tab Take 1 tablet (20 mg total) by mouth BID (Diuretic). 60 tablet 1    Vitamin B-1 100 MG Oral Tab Take 1 tablet (100 mg total) by mouth in the morning.      ezetimibe 10 MG Oral  Tab Take 1 tablet (10 mg total) by mouth in the morning.      Lovastatin 40 MG Oral Tab Take 1 tablet (40 mg total) by mouth in the morning and 1 tablet (40 mg total) before bedtime.  2    Acetaminophen (TYLENOL ARTHRITIS PAIN OR) Take by mouth.      Microlet Lancets Does not apply Misc CHECK BLOOD SUGAR THREE TIMES DAILY. Dx E11.65 insulin dependent 300 each 3

## 2025-06-13 NOTE — CONSULTS
David Reyes Patient Status:  Inpatient    1945 MRN [id number]   Location [facility] 8NE-A Attending Sukhdeep Bueno DO   Hosp Day # 1 PCP Thea Harris DO     Reason for Consultation:  CHF    History of Present Illness:  David Reyes is a a(n) 79 year old male with history of nonischemic cardiomyopathy, Congestive heart failure reduced ejection fraction, BiV ICD, MDS, coronary disease, pulmonary hypertension, history of CABG presenting with complaints of shortness of breath.  Over the last few months has been complaining of bloody sputum.  Complaining of mildly worse lower extremity swelling.  He presented his primary care physician was found to be considerably short of breath.  Presented to the emergency department.  Due to patient's renal function a VQ scan was performed that showed a perfusion defect in the right lower lobe.    History:  Past Medical History[1]  Past Surgical History[2]  Family History[3]   reports that he quit smoking about 39 years ago. His smoking use included cigarettes. He started smoking about 65 years ago. He has a 26 pack-year smoking history. He has never used smokeless tobacco. He reports current alcohol use of about 2.0 standard drinks of alcohol per week. He reports that he does not use drugs.    Allergies:  Allergies[4]    Medications:  Current Hospital Medications[5]    Review of Systems:  A comprehensive review of systems was negative if not otherwise mention in above HPI.    /83 (BP Location: Left arm)   Pulse 85   Temp 97.6 °F (36.4 °C) (Oral)   Resp 24   Wt 213 lb (96.6 kg)   SpO2 100%   BMI 31.45 kg/m²   Temp (24hrs), Av °F (36.7 °C), Min:97.4 °F (36.3 °C), Max:98.6 °F (37 °C)       Intake/Output Summary (Last 24 hours) at 2025 1200  Last data filed at 2025 0903  Gross per 24 hour   Intake 720 ml   Output 250 ml   Net 470 ml     Wt Readings from Last 3 Encounters:   25 213 lb (96.6 kg)   25 217 lb (98.4 kg)    05/22/25 219 lb 3.2 oz (99.4 kg)       Physical Exam:   General: Alert and oriented x 3. No apparent distress. No respiratory or constitutional distress.  HEENT: Normocephalic, anicteric sclera, neck supple.  Neck: No JVD, carotids 2+, no bruits.  Cardiac: Regular rate and rhythm. S1, S2 normal. No murmur, pericardial rub, S3.  Lungs: Clear without wheezes, rales, rhonchi or dullness.  Normal excursions and effort.  Abdomen: Soft, non-tender. BS-present.  Extremities: 1+ edema.  Peripheral pulses are 2+.  Neurologic: Alert and oriented, normal affect.  Skin: Warm and dry.     Laboratory Data:  Lab Results   Component Value Date    WBC 1.5 06/13/2025    HGB 7.4 06/13/2025    HCT 25.2 06/13/2025    .0 06/13/2025    CREATSERUM 1.95 06/13/2025    BUN 31 06/13/2025     06/13/2025    K 4.0 06/13/2025     06/13/2025    CO2 22.0 06/13/2025     06/13/2025    CA 8.8 06/13/2025    ALB 3.9 06/13/2025    ALKPHO 56 06/13/2025    BILT 0.7 06/13/2025    TP 7.0 06/13/2025    AST 29 06/13/2025    ALT 10 06/13/2025    .2 06/13/2025    INR 1.44 06/13/2025    PTP 17.6 06/13/2025    DDIMER 4.77 06/12/2025    MG 2.3 06/13/2025    PHOS 3.6 06/13/2025    PGLU 205 06/13/2025         Impression:  Principal Problem:    Pancytopenia (HCC)  Active Problems:    HFrEF (heart failure with reduced ejection fraction) ([facility])    PE (pulmonary thromboembolism) ([facility])    Primary hypertension    Dyspnea, unspecified type    Hemoptysis    Metabolic acidosis    Acute kidney injury    Acute pulmonary embolism, unspecified pulmonary embolism type, unspecified whether acute cor pulmonale present ([facility])      Acute hypoxic respiratory failure, multifactorial  Acute pulmonary embolism  Acute congestive heart failure reduced ejection fraction  Ischemic cardiopathy  Coronary disease status post CABG, PCI  Pulmonary hypertension  Acute kidney injury on chronic kidney disease    Recommendations:  Continue IV heparin.  Patient likely  not a candidate for any invasive procedures at this time due to history of MDS.  Continue BiPAP   Last ejection fraction was 20 to 25%.  Hematology oncology following  Continue current dose of torsemide.  Will continue to monitor if patient will need IV therapy  Continue home medication, Toprol-XL, spironolactone  Will continue to follow closely    Thank you for allowing me to participate in the care of your patient.    Luana Thomas MD  6/13/2025  12:00 PM         [1]   Past Medical History:   Acute kidney injury    Anxiety state    Back problem    CAD (coronary artery disease)    Congestive heart disease (HCC)    Congestive heart failure (HCC)    CORONARY ARTERY DISEASE    cabg    Coronary atherosclerosis    COVID-19    Diabetes (HCC)    Disorder of thyroid    DM (diabetes mellitus) (HCC)    Heart attack (HCC)    High blood pressure    High cholesterol    Muscle weakness    USES A WALKER TO AMBULATE    Other and unspecified hyperlipidemia    Pneumonia    Rheumatoid arthritis (HCC)    Sleep apnea    NO MACHINE/TREATMENT    Subdural hematoma (HCC)    Unspecified sleep apnea    Visual impairment    READING GLASSES   [2]   Past Surgical History:  Procedure Laterality Date    Angiogram  2/11/2015    Angioplasty (coronary)  2/23/15    RCA    Bypass surgery      2007    Cabg  2004    LAD, Diagonal    Cardiac defibrillator placement  6/7/14    [employer] dual chamber ICD    Cath drug eluting stent      Tonsillectomy     [3]   Family History  Problem Relation Age of Onset    Cancer Father     Heart Disease Mother     Other (alzheimers) Mother     Stroke Neg    [4]   Allergies  Allergen Reactions    Pcn [Penicillins]      \"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   [5]   Current Facility-Administered Medications:     heparin (Porcine) 52069 units/250mL infusion PE/DVT/THROMBUS CONTINUOUS, 200-3,000 Units/hr, Intravenous, Continuous     levoFLOXacin (Levaquin) tab 750 mg, 750 mg, Oral, Q48HR @ 0700    glucose (Dex4) 15 GM/59ML oral liquid 15 g, 15 g, Oral, Q15 Min PRN **OR** glucose (Glutose) 40% oral gel 15 g, 15 g, Oral, Q15 Min PRN **OR** dextrose 50% injection 50 mL, 50 mL, Intravenous, Q15 Min PRN **OR** glucose (Dex4) 15 GM/59ML oral liquid 30 g, 30 g, Oral, Q15 Min PRN **OR** glucose (Glutose) 40% oral gel 30 g, 30 g, Oral, Q15 Min PRN    ezetimibe (Zetia) tab 10 mg, 10 mg, Oral, Daily    gabapentin (Neurontin) cap 100 mg, 100 mg, Oral, Daily    levothyroxine (Synthroid) tab 25 mcg, 25 mcg, Oral, Before breakfast    [Held by provider] losartan (Cozaar) tab 25 mg, 25 mg, Oral, Daily    metoprolol succinate ER (Toprol XL) 24 hr tab 50 mg, 50 mg, Oral, 2x Daily(Beta Blocker)    spironolactone (Aldactone) tab 25 mg, 25 mg, Oral, Daily with breakfast    torsemide (Demadex) tab 20 mg, 20 mg, Oral, BID (Diuretic)    insulin aspart (NovoLOG) 100 Units/mL FlexPen 1-10 Units, 1-10 Units, Subcutaneous, TID AC and HS    acetaminophen (Tylenol Extra Strength) tab 1,000 mg, 1,000 mg, Oral, Q8H PRN    melatonin tab 3 mg, 3 mg, Oral, Nightly PRN    polyethylene glycol (PEG 3350) (Miralax) 17 g oral packet 17 g, 17 g, Oral, Daily PRN    sennosides (Senokot) tab 17.2 mg, 17.2 mg, Oral, Nightly PRN    bisacodyl (Dulcolax) 10 MG rectal suppository 10 mg, 10 mg, Rectal, Daily PRN    metoclopramide (Reglan) 5 mg/mL injection 10 mg, 10 mg, Intravenous, Q8H PRN    benzonatate (Tessalon) cap 200 mg, 200 mg, Oral, TID PRN    guaiFENesin (Robitussin) 100 MG/5 ML oral liquid 200 mg, 200 mg, Oral, Q4H PRN    glycerin-hypromellose- (Artificial Tears) 0.2-0.2-1 % ophthalmic solution 1 drop, 1 drop, Both Eyes, QID PRN    sodium chloride (Saline Mist) 0.65 % nasal solution 1 spray, 1 spray, Each Nare, Q3H PRN    [Held by provider] **PATIENT SUPPLIED** Lovastatin TABS 40 mg, 40 mg, Oral, BID

## 2025-06-13 NOTE — PLAN OF CARE
Received pt at shift change. Alert, forgetful. Room air w stats > 95% but very SOB at rest. Denies pain. Vitals stable. Vpaced on tele.   See flowsheets for additional assessments.   Pt updated on POC. Call light within reach. All needs met at this time.    Attempted to get out of bed - dislodged IV w 1 episode of urinary incontinence. Pt states he does not remember getting up or what happened. Had taken AVAPS off, very SOB. AVAPS put back on, pt had 5 beats Vtach. Hospitalist and [facility] notified.     Plan:  -Heparin gtt per PE/DVT protocol  -Echo  -DW // I&O  -IV lasix 40mg x1  -Pulm eval - continuous AVAPS & ABG  -Monitor Hgb  -PO Levaquin    Problem: Diabetes/Glucose Control  Goal: Glucose maintained within prescribed range  Description: INTERVENTIONS:  - Monitor Blood Glucose as ordered  - Assess for signs and symptoms of hyperglycemia and hypoglycemia  - Administer ordered medications to maintain glucose within target range  - Assess barriers to adequate nutritional intake and initiate nutrition consult as needed  - Instruct patient on self management of diabetes  Outcome: Progressing     Problem: Patient/Family Goals  Goal: Patient/Family Long Term Goal  Description: Patient's Long Term Goal:   To return home asap    Interventions:  - Cardiology consult  2 D echo with doppler  - See additional Care Plan goals for specific interventions  Outcome: Progressing  Goal: Patient/Family Short Term Goal  Description: Patient's Short Term Goal:  t get rid of shortness of breath    Interventions:   - medication adjustment      Supplemental oxygen as needed  - See additional Care Plan goals for specific interventions  Outcome: Progressing     Problem: PAIN - ADULT  Goal: Verbalizes/displays adequate comfort level or patient's stated pain goal  Description: INTERVENTIONS:  - Encourage pt to monitor pain and request assistance  - Assess pain using appropriate pain scale  - Administer analgesics based on type and severity of  pain and evaluate response  - Implement non-pharmacological measures as appropriate and evaluate response  - Consider cultural and social influences on pain and pain management  - Manage/alleviate anxiety  - Utilize distraction and/or relaxation techniques  - Monitor for opioid side effects  - Notify MD/LIP if interventions unsuccessful or patient reports new pain  - Anticipate increased pain with activity and pre-medicate as appropriate  Outcome: Progressing     Problem: SAFETY ADULT - FALL  Goal: Free from fall injury  Description: INTERVENTIONS:  - Assess pt frequently for physical needs  - Identify cognitive and physical deficits and behaviors that affect risk of falls.  - [place] fall precautions as indicated by assessment.  - Educate pt/family on patient safety including physical limitations  - Instruct pt to call for assistance with activity based on assessment  - Modify environment to reduce risk of injury  - Provide assistive devices as appropriate  - Consider OT/PT consult to assist with strengthening/mobility  - Encourage toileting schedule  Outcome: Progressing     Problem: CARDIOVASCULAR - ADULT  Goal: Maintains optimal cardiac output and hemodynamic stability  Description: INTERVENTIONS:  - Monitor vital signs, rhythm, and trends  - Monitor for bleeding, hypotension and signs of decreased cardiac output  - Evaluate effectiveness of vasoactive medications to optimize hemodynamic stability  - Monitor arterial and/or venous puncture sites for bleeding and/or hematoma  - Assess quality of pulses, skin color and temperature  - Assess for signs of decreased coronary artery perfusion - ex. Angina  - Evaluate fluid balance, assess for edema, trend weights  Outcome: Progressing  Goal: Absence of cardiac arrhythmias or at baseline  Description: INTERVENTIONS:  - Continuous cardiac monitoring, monitor vital signs, obtain 12 lead EKG if indicated  - Evaluate effectiveness of antiarrhythmic and heart rate control  medications as ordered  - Initiate emergency measures for life threatening arrhythmias  - Monitor electrolytes and administer replacement therapy as ordered  Outcome: Progressing     Problem: RESPIRATORY - ADULT  Goal: Achieves optimal ventilation and oxygenation  Description: INTERVENTIONS:  - Assess for changes in respiratory status  - Assess for changes in mentation and behavior  - Position to facilitate oxygenation and minimize respiratory effort  - Oxygen supplementation based on oxygen saturation or ABGs  - Provide Smoking Cessation handout, if applicable  - Encourage broncho-pulmonary hygiene including cough, deep breathe, Incentive Spirometry  - Assess the need for suctioning and perform as needed  - Assess and instruct to report SOB or any respiratory difficulty  - Respiratory Therapy support as indicated  - Manage/alleviate anxiety  - Monitor for signs/symptoms of CO2 retention  Outcome: Progressing     Problem: METABOLIC/FLUID AND ELECTROLYTES - ADULT  Goal: Glucose maintained within prescribed range  Description: INTERVENTIONS:  - Monitor Blood Glucose as ordered  - Assess for signs and symptoms of hyperglycemia and hypoglycemia  - Administer ordered medications to maintain glucose within target range  - Assess barriers to adequate nutritional intake and initiate nutrition consult as needed  - Instruct patient on self management of diabetes  Outcome: Progressing  Goal: Electrolytes maintained within normal limits  Description: INTERVENTIONS:  - Monitor labs and rhythm and assess patient for signs and symptoms of electrolyte imbalances  - Administer electrolyte replacement as ordered  - Monitor response to electrolyte replacements, including rhythm and repeat lab results as appropriate  - Fluid restriction as ordered  - Instruct patient on fluid and nutrition restrictions as appropriate  Outcome: Progressing     Problem: HEMATOLOGIC - ADULT  Goal: Free from bleeding injury  Description: (Example usage:  patient with low platelets)  INTERVENTIONS:  - Avoid intramuscular injections, enemas and rectal medication administration  - Ensure safe mobilization of patient  - Hold pressure on venipuncture sites to achieve adequate hemostasis  - Assess for signs and symptoms of internal bleeding  - Monitor lab trends  - Patient is to report abnormal signs of bleeding to staff  - Avoid use of toothpicks and dental floss  - Use electric shaver for shaving  - Use soft bristle tooth brush  - Limit straining and forceful nose blowing  Outcome: Progressing

## 2025-06-13 NOTE — PHYSICAL THERAPY NOTE
Physical Therapy    Order for PT eval received.  Pt w/ PE, will hold 24 hours s/p administration of heparin (2013 6/12) per protocol. Will re-attempt eval as pt's medical stability and schedule allows.

## 2025-06-13 NOTE — DIETARY NOTE
[facility]   [facility]    NUTRITION ASSESSMENT    Unable to diagnose malnutrition criteria at this time.      NUTRITION INTERVENTION:    Meal and Snacks - continue carbohydrate controlled diet, 1800 kcal/60g, Monitor and encourage adequate PO intake.   Medical Food Supplements - Glucerna Once. Rationale/use for oral supplements discussed.    PATIENT STATUS: 06/13/25 Pt is a 78 yo male presenting with SOB and hemoptysis. PMH CABG,  obesity, CHF, high cholesterol, rheumatoid arthritis, obstructive sleep apnea, DM. Patient was assessed for MST II with poor appetite and weight loss. Pt reports a fair appetite, and has been eating half as much for about 1 month. He has eaten 50% of meals during admission. Pt has had weight loss of of 9 lbs over 3 months which is not significant. Patient reports having nausea, vomiting, and diarrhea. No issues chewing or swallowing. Pt is on a carbohydrate controlled diet 1800 kcal 60g/meal which is appropriate. Patient was receptive to receiving Glucerna 1 x per day.       ANTHROPOMETRICS:  Ht:  5'9\"  Wt: 96.6 kg (213 lb).   BMI: Body mass index is 31.45 kg/m².  IBW: 72.7 kg      WEIGHT HISTORY:   Weight loss: Yes, Non-severe Wt loss of 9 lbs, 4.0%, over 3 months     Wt Readings from Last 10 Encounters:   06/12/25 96.6 kg (213 lb)   05/29/25 98.4 kg (217 lb)   05/22/25 99.4 kg (219 lb 3.2 oz)   04/11/25 100.8 kg (222 lb 3.2 oz)   03/17/25 100.9 kg (222 lb 8 oz)   12/09/24 105.5 kg (232 lb 8 oz)   12/02/24 104.8 kg (231 lb)   09/28/24 108.3 kg (238 lb 12.1 oz)   09/09/24 109.3 kg (241 lb)   06/18/24 106.6 kg (235 lb)        NUTRITION:  Diet:       Procedures    Carbohydrate controlled diet 1800 kcal/60 grams; Is Patient on Accuchecks? Yes      Food Allergies: No  Cultural/Ethnic/Caodaism Preferences Addressed: Yes    Percent Meals Eaten (last 3 days)       Date/Time Percent Meals Eaten (%)    06/13/25 0903 50 %            GI system review: diarrhea, emesis, and nausea  Last BM Date: 06/13/25  Skin and wounds: intact    NUTRITION RELATED PHYSICAL FINDINGS:     1. Body Fat/Muscle Mass: no wasting noted      2. Fluid Accumulation: none    NUTRITION PRESCRIPTION: 72.7 kg IBW  Calories: 8862-8565 calories/day (23-28 kcal/kg)  Protein: 109-145 grams protein/day (1.5-2.0 grams protein per kg)  Fluid: ~1 ml/kcal or per MD discretion      MONITOR AND EVALUATE/NUTRITION GOALS:  PO intake of 75% of meals TID - New  PO intake of 75% of oral nutrition supplement/s - New  Weight stable within 1 to 2 lbs during admission - New  Improved blood glucose control  - New      MEDICATIONS:  Novolog (5 units), synthroid, cozaar, spironolactone, demedex, reglan    LABS:  POC glucose 109-254,     Pt is at Low nutrition risk      Cm Aceves RD, LDN  Clinical Nutrition   Available via Epic secure chat

## 2025-06-13 NOTE — PROGRESS NOTES
Pt given sandwich chips, per ED MD.   Pt updated with plan of care and verbalized understanding.     Stretcher down and locked, side rails up call light with in reach.

## 2025-06-13 NOTE — PROGRESS NOTES
David Reyes Patient Status:  Inpatient    1945 MRN [id number]   Location [facility] 8NE-A Attending Sukhdeep Bueno,    Hosp Day # 0 PCP Thea Harris DO     Cardiology Nocturnal APN Note    Briefly: (Documentation from chart review)     David Reyes is a 78 y/o male who presented with PE and CHF and has a PMH/PSH of:     Past Medical History[1]    Primary Cardiologist Carlos/Brandi/Sebas    Vital Signs:       2025     9:00 PM 2025    10:58 PM   Vitals History   /75 108/69   BP Location  Left arm   Pulse 85 99   Resp 19 20   Temp  98.4 °F (36.9 °C)   SpO2 100 % 100 %   Weight  213 lbs   BMI  31.45 kg/m2        Labs:   Lab Results   Component Value Date    WBC 1.2 2025    HGB 7.8 2025    HCT 26.5 2025    .0 2025    CREATSERUM 2.14 2025    BUN 30 2025     2025    K 4.1 2025     2025    CO2 20.0 2025     2025    CA 8.9 2025    ALB 4.1 2025    ALKPHO 57 2025    BILT 0.8 2025    TP 7.4 2025    AST 24 2025    ALT <7 2025    PTT 30.8 2025    DDIMER 4.77 2025    TROPHS 71 2025       Diagnostics:   CT CHEST (CPT=71250)  Result Date: 2025  PROCEDURE:  CT CHEST (CPT=71250)  COMPARISON:  [facility], CT CHEST+ABDOMEN+PELVIS(CPT=71250/38846), 2024, 2:12 PM.  INDICATIONS:  hemoptysis  TECHNIQUE:  Unenhanced multislice CT scanning is performed through the chest.  Dose reduction techniques were used. Dose information is transmitted to the ACR (American College of Radiology) NRDR (National Radiology Data Registry) which includes the Dose  Index Registry.  PATIENT STATED HISTORY: (As transcribed by Technologist)  Patient with increased shortness of breath, coughing up blood. Denies current chest pain.    FINDINGS:  This scan performed without IV contrast, with limitations thereof.  LUNGS/PLLEURA:  Small bilateral pleural  effusion larger on the left measuring up to 3.6 cm thickness, no pneumothorax.  A trace amount of fluid enters the fissures without large loculation.  Moderate ground-glass opacities throughout the lungs especially  upper lobes.  Small nodule in the middle lobe image 94 measuring 4 mm.  5-6 mm nodule right lower lobe image 101.  There may be additional nodules obscured by the ground-glass changes and lower lobe atelectasis.   THORACIC AORTA:  No aneurysm or other acute process. There are atherosclerotic changes including calcification involving the aorta, but no evidence for aneurysm involving the aorta.  MEDIASTINUM/HUGO:  Unremarkable.  CARDIAC:  Coronary artery calcifications are present.  CHEST WALL:  Unremarkable.  LIMITED ABDOMEN:  No acute process.  BONES:  No acute process.  OTHER:    None.             CONCLUSION:  Noncontrast CT.  Small bilateral pleural effusion larger on the left, with moderately extensive ground-glass opacities, lower lobe atelectasis and scattered probable postinflammatory nodules.  Clinical and imaging follow-up suggested.  Coronary calcifications are seen.  No pneumothorax or pneumomediastinum identified.  LOCATION:  [facility]   Dictated by (CST): Omid Randall MD on 6/12/2025 at 7:53 PM     Finalized by (CST): Omid Randall MD on 6/12/2025 at 8:00 PM       NM LUNG VQ VENT / PERFUSION SCAN  (CPT=78582)  Result Date: 6/12/2025  PROCEDURE:  NM LUNG VQ VENT / PERFUSION SCAN  (EFU=94664)  COMPARISON:  [facility], XR, XR CHEST AP PORTABLE  (CPT=71045), 6/12/2025, 3:01 PM.  INDICATIONS:  eval for PE  TECHNIQUE:  The patient was ventilated with 113 mCi Xe-133 gas and posterior supine images of the lungs were obtained. This was followed by IV injection of 6.5 mCi Tc-99m MAA, and similar projection images were obtained.  FINDINGS:  Chest x-ray from today is reviewed. Posterior ventilation images show no defect or air trapping, and appears similar to the posterior perfusion images.  In addition to  the posterior perfusion images, 7 additional standard perfusion images obtained.  Cardiac outline and mediastinal outline demonstrated on these images, but in addition on the RPO and right lateral view there is a perfusion defect in the right lung approximately superior segment right lower lobe, not corresponding to chest x-ray abnormality, and not corresponding to expected anatomic structures or mediastinal outline.  Concern for pathologic perfusion defect in this area            CONCLUSION:  Moderate sized apparent perfusion defect in the right lower lobe, concerning for pulmonary embolism.   LOCATION:  [facility]   Dictated by (CST): Omid Randall MD on 6/12/2025 at 6:57 PM     Finalized by (CST): Omid Randall MD on 6/12/2025 at 7:00 PM       XR CHEST AP PORTABLE  (CPT=71045)  Result Date: 6/12/2025  PROCEDURE:  XR CHEST AP PORTABLE  (CPT=71045)  TECHNIQUE:  AP chest radiograph was obtained.  COMPARISON:  [facility], XR, XR CHEST PA + LAT CHEST (CPT=71046), 1/07/2025, 2:40 PM.  INDICATIONS:  SO 97% on RA  PATIENT STATED HISTORY: (As transcribed by Technologist)  Patient experiencing shortness of breath. He mentioned this happens frequently, but he hasn't gotten a diagnosis.    FINDINGS:  Left chest pacemaker and median sternotomy changes noted.  Cardiomegaly with prominence of the central pulmonary vascularity.  Mild scarring/atelectasis in the lower lungs.  Probable small bilateral pleural effusions.  No pneumothorax.  Degenerative changes the spine.            CONCLUSION:  Mild scarring/atelectasis in the lower lungs.  Probable small bilateral pleural effusions.    LOCATION:  [id number]      Dictated by (CST): Ino Bae MD on 6/12/2025 at 3:13 PM     Finalized by (CST): Ino Bae MD on 6/12/2025 at 3:13 PM         Allergies:  Allergies[2]    Medications:  Current Hospital Medications[3]    Assessment:   PE  - D-dimer 4.77  - V/Q with moderate mismatch area  - On heparin gtt     Acute  on chronic HF  - BNP 46,096  - Pleural effusion on x-ray  - Not given lasix in ED due to anemia and borderline BP    Anemia  - Hgb 7.8  - hemoptysis   - cleared by hem/pulm for heparin gtt with PE        Plan:    - Continue to monitor overnight  - Formal Cardiology consult to follow in AM.       MIKEL Live  [facility]  6/12/2025  11:56 PM         [1]   Past Medical History:   Acute kidney injury    Anxiety state    Back problem    CAD (coronary artery disease)    Congestive heart disease (HCC)    Congestive heart failure (HCC)    CORONARY ARTERY DISEASE    cabg    Coronary atherosclerosis    COVID-19    Diabetes (HCC)    Disorder of thyroid    DM (diabetes mellitus) (HCC)    Heart attack (HCC)    High blood pressure    High cholesterol    Muscle weakness    USES A WALKER TO AMBULATE    Other and unspecified hyperlipidemia    Pneumonia    Rheumatoid arthritis (HCC)    Sleep apnea    NO MACHINE/TREATMENT    Subdural hematoma (HCC)    Unspecified sleep apnea    Visual impairment    READING GLASSES   [2]   Allergies  Allergen Reactions    Pcn [Penicillins]      \"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   [3]   dextrose 5%-sodium chloride 0.45%    glucose **OR** glucose **OR** dextrose **OR** glucose **OR** glucose    ED initial dose (PE/DVT/THROMBUS) heparin    ezetimibe    [START ON 6/13/2025] gabapentin    [START ON 6/13/2025] levothyroxine    [Held by provider] losartan    metoprolol succinate ER    [START ON 6/13/2025] spironolactone    torsemide    insulin aspart    acetaminophen    melatonin    polyethylene glycol (PEG 3350)    sennosides    bisacodyl    metoclopramide    benzonatate    guaiFENesin    glycerin-hypromellose-    sodium chloride    [Held by provider] Lovastatin

## 2025-06-13 NOTE — PLAN OF CARE
Patient complains of feeling SOB, cough and Hemoptysis, states he is also feeling nauseous,  However asked for popsicle as he feels hungry.

## 2025-06-13 NOTE — PLAN OF CARE
NURSING ADMISSION NOTE      Patient admitted via Cart  Oriented to room.  Safety precautions initiated.  Bed in low position.  Call light in reach.  Accompanied by wife.  Skin assessment completed.

## 2025-06-13 NOTE — CONSULTS
[facility]  Pulmonary/Critical Care Consult note    David Reyes Patient Status:  Inpatient    1945 MRN [id number]   Location [facility] 8NE-A Attending Sukhdeep Bueno DO   Hosp Day # 1 PCP Thea Harris DO     Reason for Consultation:  Acute hypoxic respiratory failure with pulmonary embolism    History of Present Illness:  David Reyes is a a(n) 79 year old male.  With history of smoking at least 26 pack years of tobacco although stopped smoking in , with history of coronary artery disease status post CABG, congestive heart failure, W mellitus, hypothyroidism, hypertension, hyperlipidemia, rheumatoid arthritis, anxiety disorder, and diagnosis of sleep apnea syndrome but not being treated with with CPAP.  Who presented to the emergency department 2025 with complaints of shortness of breath a ventilation/perfusion scan chest performed in the emergency room showed segmental  perfusion defect in right lower lobe consistent with acute pulmonary embolism and a noncontrast CT chest showed groundglass opacities and bilateral lower lobe atelectasis pulmonary consultation was obtained.    Patient reports mild shortness of breath on exertion, he has had some hemoptysis apparently going on for a while.  Denies any cough, sputum production, chest pains, abdominal pains, diarrhea or constipation    History:  Past Medical History[1]  Past Surgical History[2]  Family History[3]   reports that he quit smoking about 39 years ago. His smoking use included cigarettes. He started smoking about 65 years ago. He has a 26 pack-year smoking history. He has never used smokeless tobacco. He reports current alcohol use of about 2.0 standard drinks of alcohol per week. He reports that he does not use drugs.    Allergies:  Allergies[4]    Medications:  Current Hospital Medications[5]    Intake/Output:    Intake/Output Summary (Last 24 hours) at 2025 1836  Last data filed at 2025 0188  Gross  per 24 hour   Intake 360 ml   Output 250 ml   Net 110 ml      Body mass index is 31.45 kg/m².    Review of Systems  Review of Systems:   A comprehensive 10 point review of systems was completed.  Pertinent positives and negatives noted in the the HPI.         Patient Vitals for the past 24 hrs:   BP Temp Temp src Pulse Resp SpO2 Weight   06/13/25 0809 115/83 97.6 °F (36.4 °C) Oral 96 24 98 % --   06/13/25 0425 111/74 98.6 °F (37 °C) Oral 95 20 100 % --   06/12/25 2258 108/69 98.4 °F (36.9 °C) Oral 99 20 100 % 213 lb (96.6 kg)   06/12/25 2100 118/75 -- -- 85 19 100 % --   06/12/25 1935 -- -- -- -- -- -- 215 lb (97.5 kg)   06/12/25 1900 125/87 -- -- 117 19 100 % --   06/12/25 1800 111/81 -- -- 94 26 100 % --   06/12/25 1700 114/68 -- -- 99 21 100 % --   06/12/25 1615 109/89 -- -- 97 21 100 % --   06/12/25 1428 111/69 -- -- 92 18 96 % --   06/12/25 1321 118/62 -- -- -- -- -- --   06/12/25 1318 -- 97.4 °F (36.3 °C) Oral 102 18 96 % --     Vitals:    06/12/25 2100 06/12/25 2258 06/13/25 0425 06/13/25 0809   BP: 118/75 108/69 111/74 115/83   BP Location:  Left arm Left arm Left arm   Pulse: 85 99 95 96   Resp: 19 20 20 24   Temp:  98.4 °F (36.9 °C) 98.6 °F (37 °C) 97.6 °F (36.4 °C)   TempSrc:  Oral Oral Oral   SpO2: 100% 100% 100% 98%   Weight:  213 lb (96.6 kg)           Physical Exam  Constitutional:       General: He is not in acute distress.     Appearance: Normal appearance. He is obese. He is not ill-appearing or diaphoretic.   HENT:      Head: Normocephalic and atraumatic.      Nose: Nose normal. No congestion or rhinorrhea.      Mouth/Throat:      Mouth: Mucous membranes are moist.      Pharynx: Oropharynx is clear. No oropharyngeal exudate or posterior oropharyngeal erythema.      Comments: Mallampati class IV palate   Eyes:      Extraocular Movements: Extraocular movements intact.      Pupils: Pupils are equal, round, and reactive to light.   Cardiovascular:      Rate and Rhythm: Normal rate.      Pulses: Normal  pulses.      Heart sounds: Normal heart sounds. No murmur heard.  Pulmonary:      Effort: Pulmonary effort is normal. No respiratory distress.      Breath sounds: Normal breath sounds. No wheezing or rhonchi.      Comments: Diminished breath sounds bilaterally with scattered rhonchi  Chest:      Chest wall: No tenderness.   Abdominal:      General: Abdomen is flat. Bowel sounds are normal.      Palpations: Abdomen is soft.   Musculoskeletal:         General: Normal range of motion.   Skin:     General: Skin is warm.   Neurological:      General: No focal deficit present.      Mental Status: He is alert and oriented to person, place, and time.   Psychiatric:         Mood and Affect: Mood normal.         Behavior: Behavior normal.         Thought Content: Thought content normal.         Judgment: Judgment normal.            Lab Data Review:  Recent Labs   Lab 06/12/25  1340 06/13/25  0351   WBC 1.2* 1.5*   HGB 7.8* 7.4*   HCT 26.5* 25.2*   .0* 102.0*       Recent Labs   Lab 06/12/25  1340 06/13/25  0351    139   K 4.1 4.0    105   CO2 20.0* 22.0   BUN 30* 31*   CREATSERUM 2.14* 1.95*   CA 8.9 8.8   ALB 4.1 3.9   ALKPHO 57 56   ALT <7* 10   AST 24 29   * 127*       Recent Labs   Lab 06/13/25  0351   MG 2.3       Lab Results   Component Value Date    PHOS 3.6 06/13/2025        Recent Labs   Lab 06/12/25  1956 06/13/25  0351   INR  --  1.44*   PTT 30.8 116.2*       No results for input(s): \"ABGPHT\", \"GBJCOV9M\", \"FFMHJ0O\", \"ABGHCO3\", \"ABGBE\", \"TEMP\", \"MIKI\", \"SITE\", \"DEV\", \"THGB\" in the last 168 hours.    No results for input(s): \"TROP\", \"CKMB\" in the last 168 hours.    Cultures: No results found for this visit on 06/12/25.        Radiology personally reviewed:  US VENOUS DOPPLER LEG BILAT - DIAG IMG (CPT=93970)  Result Date: 6/13/2025  CONCLUSION:  1. No sonographic evidence for a deep venous thrombosis involving the lower extremities bilaterally. 1. Bilateral Stevens cysts as detailed above.    LOCATION:  [facility]   Dictated by (CST): Anai Dillard MD on 6/13/2025 at 2:31 PM     Finalized by (CST): Anai Dillard MD on 6/13/2025 at 2:33 PM       CT CHEST (CPT=71250)  Result Date: 6/12/2025  CONCLUSION:  Noncontrast CT.  Small bilateral pleural effusion larger on the left, with moderately extensive ground-glass opacities, lower lobe atelectasis and scattered probable postinflammatory nodules.  Clinical and imaging follow-up suggested.  Coronary calcifications are seen.  No pneumothorax or pneumomediastinum identified.  LOCATION:  [facility]   Dictated by (CST): Omid Randall MD on 6/12/2025 at 7:53 PM     Finalized by (CST): Omid Randall MD on 6/12/2025 at 8:00 PM       NM LUNG VQ VENT / PERFUSION SCAN  (CPT=78582)  Result Date: 6/12/2025  CONCLUSION:  Moderate sized apparent perfusion defect in the right lower lobe, concerning for pulmonary embolism.   LOCATION:  [facility]   Dictated by (CST): Omid Randall MD on 6/12/2025 at 6:57 PM     Finalized by (CST): Omid Randall MD on 6/12/2025 at 7:00 PM       XR CHEST AP PORTABLE  (CPT=71045)  Result Date: 6/12/2025  CONCLUSION:  Mild scarring/atelectasis in the lower lungs.  Probable small bilateral pleural effusions.    LOCATION:  [id number]      Dictated by (CST): Ino Bae MD on 6/12/2025 at 3:13 PM     Finalized by (CST): Ino Bae MD on 6/12/2025 at 3:13 PM          Problem List[6]    Assessment:  Acute hypoxic respiratory failure: Currently requiring supplemental oxygen 2 L/min via nasal cannula  Acute pulmonary embolism as noted on right lower lobe segmental defects on ventilation/perfusion scan.  Bilateral (right small pleural effusion  Complaints of hemoptysis this could be related to bronchitis or pneumonia though cannot rule out endobronchial lesion or due to pulmonary embolism  Right lower lobe and bilateral upper lobe groundglass opacities : This could represent interstitial edema due to cardiac dysfunction (proBNP 48,096) versus underlying  interstitial lung disease patient has previously been treated with methotrexate versus atypical pneumonia although expanded flu panel with COVID-19 is negative  Small pulmonary nodules: CT chest 6/12/2025:Small nodule in the middle lobe image 94 measuring 4 mm.  5-6 mm nodule right lower lobe image 101.  There may be additional nodules obscured by the ground-glass changes and lower lobe atelectasis.     History of smoking at least 26 pack years tobacco, patient appears at risk for COPD  Coronary artery disease: Status post CABG and PCI in past  HFrEF last ejection fraction of 20 to 25%  Obstructive sleep apnea syndrome, not being treated with CPAP machine  LORENA/CKD  Myelodysplastic syndrome  Type 2 diabetes mellitus  Hypertension  Hyperlipidemia  Hypothyroidism  Rheumatoid arthritis  Anxiety disorder    Plan:  Wean FiO2 to keep oxygen saturation between 90 to 92%  Gentle diuresis  Cardiology follow-up  Check procalcitonin, if positive we may consider empiric antibiotics  Check ESR, CRP  Continue IV heparin drip  Hematology consult appreciated  DuoNebs every 6 hours  DVT prophylaxis: SCD boots  /heparin drip  GI prophylaxis: ---  Will follow for further recommendations    Thank You for allowing me to participate in this patient's care     Suleman Grossman MD      Spent 45   minutes of Critical Care time (exclusive of billable procedures) in evaluation, management and complex clinical decision making.   The patient is critically ill and high risk for deterioration . This also included (> 50% face-to-face time) extensive discussion with the patient, family, and clinical staff for care co-ordination      Suleman Grossman MD    Note to the patient: The 21st Century Cures Act makes medical notes like these available to patients in the interest of transparency. However, be advised that this is a medical document. It is intended as peer to peer communication. It is written in medical language and may contain abbreviations or  verbiage that are unfamiliar. It may appear blunt or direct. Medical documents are intended to carry relevant information, facts as evident, and clinical opinion of the practitioner.      Disclaimer: Components of this note were documented using voice recognition system and are subject to errors not corrected at proofreading. Contact the author of this note for any clarifications         [1]   Past Medical History:   Acute kidney injury    Anxiety state    Back problem    CAD (coronary artery disease)    Congestive heart disease (HCC)    Congestive heart failure (HCC)    CORONARY ARTERY DISEASE    cabg    Coronary atherosclerosis    COVID-19    Diabetes (HCC)    Disorder of thyroid    DM (diabetes mellitus) (HCC)    Heart attack (HCC)    High blood pressure    High cholesterol    Muscle weakness    USES A WALKER TO AMBULATE    Other and unspecified hyperlipidemia    Pneumonia    Rheumatoid arthritis (HCC)    Sleep apnea    NO MACHINE/TREATMENT    Subdural hematoma (HCC)    Unspecified sleep apnea    Visual impairment    READING GLASSES   [2]   Past Surgical History:  Procedure Laterality Date    Angiogram  2/11/2015    Angioplasty (coronary)  2/23/15    RCA    Bypass surgery      2007    Cabg  2004    LAD, Diagonal    Cardiac defibrillator placement  6/7/14    [employer] [place] dual chamber ICD    Cath drug eluting stent      Tonsillectomy     [3]   Family History  Problem Relation Age of Onset    Cancer Father     Heart Disease Mother     Other (alzheimers) Mother     Stroke Neg    [4]   Allergies  Allergen Reactions    Pcn [Penicillins]      \"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   [5]   Current Facility-Administered Medications:     heparin (Porcine) 83091 units/250mL infusion PE/DVT/THROMBUS CONTINUOUS, 200-3,000 Units/hr, Intravenous, Continuous    glucose (Dex4) 15 GM/59ML oral liquid 15 g, 15 g, Oral, Q15 Min PRN **OR**  glucose (Glutose) 40% oral gel 15 g, 15 g, Oral, Q15 Min PRN **OR** dextrose 50% injection 50 mL, 50 mL, Intravenous, Q15 Min PRN **OR** glucose (Dex4) 15 GM/59ML oral liquid 30 g, 30 g, Oral, Q15 Min PRN **OR** glucose (Glutose) 40% oral gel 30 g, 30 g, Oral, Q15 Min PRN    ezetimibe (Zetia) tab 10 mg, 10 mg, Oral, Daily    gabapentin (Neurontin) cap 100 mg, 100 mg, Oral, Daily    levothyroxine (Synthroid) tab 25 mcg, 25 mcg, Oral, Before breakfast    [Held by provider] losartan (Cozaar) tab 25 mg, 25 mg, Oral, Daily    metoprolol succinate ER (Toprol XL) 24 hr tab 50 mg, 50 mg, Oral, 2x Daily(Beta Blocker)    spironolactone (Aldactone) tab 25 mg, 25 mg, Oral, Daily with breakfast    torsemide (Demadex) tab 20 mg, 20 mg, Oral, BID (Diuretic)    insulin aspart (NovoLOG) 100 Units/mL FlexPen 1-10 Units, 1-10 Units, Subcutaneous, TID AC and HS    acetaminophen (Tylenol Extra Strength) tab 1,000 mg, 1,000 mg, Oral, Q8H PRN    melatonin tab 3 mg, 3 mg, Oral, Nightly PRN    polyethylene glycol (PEG 3350) (Miralax) 17 g oral packet 17 g, 17 g, Oral, Daily PRN    sennosides (Senokot) tab 17.2 mg, 17.2 mg, Oral, Nightly PRN    bisacodyl (Dulcolax) 10 MG rectal suppository 10 mg, 10 mg, Rectal, Daily PRN    metoclopramide (Reglan) 5 mg/mL injection 10 mg, 10 mg, Intravenous, Q8H PRN    benzonatate (Tessalon) cap 200 mg, 200 mg, Oral, TID PRN    guaiFENesin (Robitussin) 100 MG/5 ML oral liquid 200 mg, 200 mg, Oral, Q4H PRN    glycerin-hypromellose- (Artificial Tears) 0.2-0.2-1 % ophthalmic solution 1 drop, 1 drop, Both Eyes, QID PRN    sodium chloride (Saline Mist) 0.65 % nasal solution 1 spray, 1 spray, Each Nare, Q3H PRN    [Held by provider] **PATIENT SUPPLIED** Lovastatin TABS 40 mg, 40 mg, Oral, BID  [6]   Patient Active Problem List  Diagnosis    Nonischemic cardiomyopathy (HCC)    FLAKO (obstructive sleep apnea)    Congestive heart failure (HCC)    CAD (coronary artery disease)    Rheumatoid arthritis involving  multiple sites with positive rheumatoid factor (HCC)    Type 2 diabetes mellitus with hyperglycemia, with long-term current use of insulin (HCC)    Normocytic anemia    Thrombocytopenia    Ischemic cardiomyopathy    Dyspnea on exertion    Dry mouth    Pure hypercholesterolemia    Vitamin D deficiency    Pulmonary hypertension secondary to increased PVR (HCC)    Carotid artery stenosis    Obesity (BMI 30.0-34.9)    Chronic obstructive pulmonary disease, unspecified (HCC)    Weakness generalized    Aortic stenosis, mild    Hyponatremia    Pancytopenia (HCC)    Acute respiratory failure with hypoxia (HCC)    Immunocompromised state (HCC)    Wide-complex tachycardia    Acute on chronic anemia    Type 2 diabetes mellitus without complication, with long-term current use of insulin (HCC)    Foot pain, bilateral    Hypothyroid    Pulmonary hypertension (HCC)    HFrEF (heart failure with reduced ejection fraction) (HCC)    PE (pulmonary thromboembolism) (HCC)    Primary hypertension    Dyspnea, unspecified type    Hemoptysis    Metabolic acidosis    Acute kidney injury    Acute pulmonary embolism, unspecified pulmonary embolism type, unspecified whether acute cor pulmonale present (HCC)

## 2025-06-14 ENCOUNTER — APPOINTMENT (OUTPATIENT)
Dept: GENERAL RADIOLOGY | Facility: HOSPITAL | Age: 80
End: 2025-06-14
Attending: INTERNAL MEDICINE
Payer: MEDICARE

## 2025-06-14 PROBLEM — D46.9 MDS (MYELODYSPLASTIC SYNDROME) (HCC): Status: ACTIVE | Noted: 2025-06-14

## 2025-06-14 LAB
ANION GAP SERPL CALC-SCNC: 11 MMOL/L (ref 0–18)
APTT PPP: 145.3 SECONDS (ref 23–36)
APTT PPP: 187.9 SECONDS (ref 23–36)
APTT PPP: 33.4 SECONDS (ref 23–36)
BUN BLD-MCNC: 42 MG/DL (ref 9–23)
CALCIUM BLD-MCNC: 8.4 MG/DL (ref 8.7–10.6)
CHLORIDE SERPL-SCNC: 104 MMOL/L (ref 98–112)
CO2 SERPL-SCNC: 23 MMOL/L (ref 21–32)
CREAT BLD-MCNC: 2.51 MG/DL (ref 0.7–1.3)
CRP SERPL-MCNC: 7.5 MG/DL (ref ?–0.5)
EGFRCR SERPLBLD CKD-EPI 2021: 26 ML/MIN/1.73M2 (ref 60–?)
ERYTHROCYTE [DISTWIDTH] IN BLOOD BY AUTOMATED COUNT: 19.6 %
ERYTHROCYTE [SEDIMENTATION RATE] IN BLOOD: 42 MM/HR (ref 0–20)
GLUCOSE BLD-MCNC: 127 MG/DL (ref 70–99)
GLUCOSE BLD-MCNC: 130 MG/DL (ref 70–99)
GLUCOSE BLD-MCNC: 130 MG/DL (ref 70–99)
GLUCOSE BLD-MCNC: 139 MG/DL (ref 70–99)
GLUCOSE BLD-MCNC: 150 MG/DL (ref 70–99)
HCT VFR BLD AUTO: 22.9 % (ref 39–53)
HGB BLD-MCNC: 6.8 G/DL (ref 13–17.5)
HGB BLD-MCNC: 7.9 G/DL (ref 13–17.5)
MCH RBC QN AUTO: 25.7 PG (ref 26–34)
MCHC RBC AUTO-ENTMCNC: 29.7 G/DL (ref 31–37)
MCV RBC AUTO: 86.4 FL (ref 80–100)
OSMOLALITY SERPL CALC.SUM OF ELEC: 298 MOSM/KG (ref 275–295)
PLATELET # BLD AUTO: 97 10(3)UL (ref 150–450)
PLATELETS.RETICULATED NFR BLD AUTO: 14.3 % (ref 0–7)
POTASSIUM SERPL-SCNC: 5 MMOL/L (ref 3.5–5.1)
RBC # BLD AUTO: 2.65 X10(6)UL (ref 3.8–5.8)
SODIUM SERPL-SCNC: 138 MMOL/L (ref 136–145)
WBC # BLD AUTO: 1.6 X10(3) UL (ref 4–11)

## 2025-06-14 PROCEDURE — 71045 X-RAY EXAM CHEST 1 VIEW: CPT | Performed by: INTERNAL MEDICINE

## 2025-06-14 PROCEDURE — 99233 SBSQ HOSP IP/OBS HIGH 50: CPT | Performed by: SPECIALIST

## 2025-06-14 PROCEDURE — 30233N1 TRANSFUSION OF NONAUTOLOGOUS RED BLOOD CELLS INTO PERIPHERAL VEIN, PERCUTANEOUS APPROACH: ICD-10-PCS | Performed by: HOSPITALIST

## 2025-06-14 PROCEDURE — 99233 SBSQ HOSP IP/OBS HIGH 50: CPT | Performed by: STUDENT IN AN ORGANIZED HEALTH CARE EDUCATION/TRAINING PROGRAM

## 2025-06-14 PROCEDURE — 99233 SBSQ HOSP IP/OBS HIGH 50: CPT | Performed by: INTERNAL MEDICINE

## 2025-06-14 RX ORDER — FUROSEMIDE 10 MG/ML
40 INJECTION INTRAMUSCULAR; INTRAVENOUS ONCE
Status: COMPLETED | OUTPATIENT
Start: 2025-06-14 | End: 2025-06-14

## 2025-06-14 RX ORDER — METOCLOPRAMIDE HYDROCHLORIDE 5 MG/ML
5 INJECTION INTRAMUSCULAR; INTRAVENOUS EVERY 8 HOURS PRN
Status: DISCONTINUED | OUTPATIENT
Start: 2025-06-14 | End: 2025-06-24

## 2025-06-14 RX ORDER — SODIUM CHLORIDE 9 MG/ML
INJECTION, SOLUTION INTRAVENOUS ONCE
Status: COMPLETED | OUTPATIENT
Start: 2025-06-14 | End: 2025-06-14

## 2025-06-14 RX ORDER — FUROSEMIDE 10 MG/ML
80 INJECTION INTRAMUSCULAR; INTRAVENOUS
Status: DISCONTINUED | OUTPATIENT
Start: 2025-06-14 | End: 2025-06-15

## 2025-06-14 RX ORDER — FUROSEMIDE 10 MG/ML
40 INJECTION INTRAMUSCULAR; INTRAVENOUS
Status: DISCONTINUED | OUTPATIENT
Start: 2025-06-14 | End: 2025-06-14

## 2025-06-14 RX ORDER — HEPARIN SODIUM 1000 [USP'U]/ML
30 INJECTION, SOLUTION INTRAVENOUS; SUBCUTANEOUS ONCE
Status: COMPLETED | OUTPATIENT
Start: 2025-06-14 | End: 2025-06-14

## 2025-06-14 NOTE — CONSULTS
[facility] Dosing Service  Antibiotic Dosing    David Reyes is a 79 year old for whom pharmacy was consulted to dose levofloxacin (LEVAQUIN) for treatment of neutropenia.    CrCl: estimated creatinine clearance is 24.6 mL/min (A) (based on SCr of 2.51 mg/dL (H)).    Actual Body Weight:   Wt Readings from Last 1 Encounters:   06/14/25 98.8 kg (217 lb 13 oz)    Ideal body weight: 70.7 kg (155 lb 13.8 oz)  Adjusted ideal body weight: 81.9 kg (180 lb 10.3 oz)   Body mass index is 32.17 kg/m².    The patient will be continued on levofloxacin (Levaquin) 750 mg by mouth every 48 hours per P&T approved protocol.    Thank you,   Snow Hughes, PharmD  6/14/2025  10:05 AM

## 2025-06-14 NOTE — PLAN OF CARE
Assumed care of patient at 1930. Aox3-4, confused forgetful at times, pt wears glasses. 2L NC O2, Lungs overall diminished with crackles present. Increased RR, SoB at times, Cho. V-Paced on tele. Pt denies pain. Pt continent of bowel and bladder,. 1+ pitting edema to BLE. SBA. QID. Bed locked and in lowest position. Call light and personal items within reach.      Plan:   -Heparin gtt per PE/DVT protocol   -PO Levaquin   -DW // I&O   -Monitor Hgb (Replace if HGB below 7, or platelets if below 50 per heme)     Problem: Diabetes/Glucose Control  Goal: Glucose maintained within prescribed range  Description: INTERVENTIONS:  - Monitor Blood Glucose as ordered  - Assess for signs and symptoms of hyperglycemia and hypoglycemia  - Administer ordered medications to maintain glucose within target range  - Assess barriers to adequate nutritional intake and initiate nutrition consult as needed  - Instruct patient on self management of diabetes  Outcome: Progressing     Problem: Patient/Family Goals  Goal: Patient/Family Long Term Goal  Description: Patient's Long Term Goal:   To return home asap    Interventions:  - Cardiology consult  2 D echo with doppler  - See additional Care Plan goals for specific interventions  Outcome: Progressing  Goal: Patient/Family Short Term Goal  Description: Patient's Short Term Goal:  t get rid of shortness of breath    Interventions:   - medication adjustment      Supplemental oxygen as needed  - See additional Care Plan goals for specific interventions  Outcome: Progressing     Problem: PAIN - ADULT  Goal: Verbalizes/displays adequate comfort level or patient's stated pain goal  Description: INTERVENTIONS:  - Encourage pt to monitor pain and request assistance  - Assess pain using appropriate pain scale  - Administer analgesics based on type and severity of pain and evaluate response  - Implement non-pharmacological measures as appropriate and evaluate response  - Consider cultural and social  influences on pain and pain management  - Manage/alleviate anxiety  - Utilize distraction and/or relaxation techniques  - Monitor for opioid side effects  - Notify MD/LIP if interventions unsuccessful or patient reports new pain  - Anticipate increased pain with activity and pre-medicate as appropriate  Outcome: Progressing     Problem: SAFETY ADULT - FALL  Goal: Free from fall injury  Description: INTERVENTIONS:  - Assess pt frequently for physical needs  - Identify cognitive and physical deficits and behaviors that affect risk of falls.  - [place] fall precautions as indicated by assessment.  - Educate pt/family on patient safety including physical limitations  - Instruct pt to call for assistance with activity based on assessment  - Modify environment to reduce risk of injury  - Provide assistive devices as appropriate  - Consider OT/PT consult to assist with strengthening/mobility  - Encourage toileting schedule  Outcome: Progressing     Problem: CARDIOVASCULAR - ADULT  Goal: Maintains optimal cardiac output and hemodynamic stability  Description: INTERVENTIONS:  - Monitor vital signs, rhythm, and trends  - Monitor for bleeding, hypotension and signs of decreased cardiac output  - Evaluate effectiveness of vasoactive medications to optimize hemodynamic stability  - Monitor arterial and/or venous puncture sites for bleeding and/or hematoma  - Assess quality of pulses, skin color and temperature  - Assess for signs of decreased coronary artery perfusion - ex. Angina  - Evaluate fluid balance, assess for edema, trend weights  Outcome: Progressing  Goal: Absence of cardiac arrhythmias or at baseline  Description: INTERVENTIONS:  - Continuous cardiac monitoring, monitor vital signs, obtain 12 lead EKG if indicated  - Evaluate effectiveness of antiarrhythmic and heart rate control medications as ordered  - Initiate emergency measures for life threatening arrhythmias  - Monitor electrolytes and administer replacement  therapy as ordered  Outcome: Progressing     Problem: RESPIRATORY - ADULT  Goal: Achieves optimal ventilation and oxygenation  Description: INTERVENTIONS:  - Assess for changes in respiratory status  - Assess for changes in mentation and behavior  - Position to facilitate oxygenation and minimize respiratory effort  - Oxygen supplementation based on oxygen saturation or ABGs  - Provide Smoking Cessation handout, if applicable  - Encourage broncho-pulmonary hygiene including cough, deep breathe, Incentive Spirometry  - Assess the need for suctioning and perform as needed  - Assess and instruct to report SOB or any respiratory difficulty  - Respiratory Therapy support as indicated  - Manage/alleviate anxiety  - Monitor for signs/symptoms of CO2 retention  Outcome: Progressing     Problem: METABOLIC/FLUID AND ELECTROLYTES - ADULT  Goal: Glucose maintained within prescribed range  Description: INTERVENTIONS:  - Monitor Blood Glucose as ordered  - Assess for signs and symptoms of hyperglycemia and hypoglycemia  - Administer ordered medications to maintain glucose within target range  - Assess barriers to adequate nutritional intake and initiate nutrition consult as needed  - Instruct patient on self management of diabetes  Outcome: Progressing  Goal: Electrolytes maintained within normal limits  Description: INTERVENTIONS:  - Monitor labs and rhythm and assess patient for signs and symptoms of electrolyte imbalances  - Administer electrolyte replacement as ordered  - Monitor response to electrolyte replacements, including rhythm and repeat lab results as appropriate  - Fluid restriction as ordered  - Instruct patient on fluid and nutrition restrictions as appropriate  Outcome: Progressing     Problem: HEMATOLOGIC - ADULT  Goal: Free from bleeding injury  Description: (Example usage: patient with low platelets)  INTERVENTIONS:  - Avoid intramuscular injections, enemas and rectal medication administration  - Ensure safe  mobilization of patient  - Hold pressure on venipuncture sites to achieve adequate hemostasis  - Assess for signs and symptoms of internal bleeding  - Monitor lab trends  - Patient is to report abnormal signs of bleeding to staff  - Avoid use of toothpicks and dental floss  - Use electric shaver for shaving  - Use soft bristle tooth brush  - Limit straining and forceful nose blowing  Outcome: Progressing

## 2025-06-14 NOTE — PROGRESS NOTES
[facility]   part of [facility]     Hospitalist Progress Note     David Reyes Patient Status:  Inpatient    1945 MRN [id number]   Location [facility] 8NE-A Attending Sukhdeep Bueno DO   Hosp Day # 2 PCP Thea Harris DO     Chief Complaint: Dyspnea     Subjective:     Patient  is short of breath- feels SOB.    Objective:    Review of Systems:   A comprehensive review of systems was completed; pertinent positive and negatives stated in subjective.    Vital signs:  Temp:  [97 °F (36.1 °C)-98.3 °F (36.8 °C)] 98 °F (36.7 °C)  Pulse:  [79-93] 82  Resp:  [18-24] 18  BP: ()/(54-72) 102/62  SpO2:  [95 %-100 %] 99 %    Physical Exam:    General: No acute distress  Respiratory: No wheezes, no rhonchi  Cardiovascular: S1, S2, regular rate and rhythm  Abdomen: Soft, Non-tender, non-distended, positive bowel sounds  Neuro: No new focal deficits.   Extremities: pitting edema       Diagnostic Data:    Labs:  Recent Labs   Lab 25  1340 25  0351 25  0454   WBC 1.2* 1.5* 1.6*   HGB 7.8* 7.4* 6.8*   MCV 88.3 89.4 86.4   .0* 102.0* 97.0*   INR  --  1.44*  --        Recent Labs   Lab 25  1340 25  0351 25  0454   * 127* 130*   BUN 30* 31* 42*   CREATSERUM 2.14* 1.95* 2.51*   CA 8.9 8.8 8.4*   ALB 4.1 3.9  --     139 138   K 4.1 4.0 5.0    105 104   CO2 20.0* 22.0 23.0   ALKPHO 57 56  --    AST 24 29  --    ALT <7* 10  --    BILT 0.8 0.7  --    TP 7.4 7.0  --        Estimated Glomerular Filtration Rate: 26 mL/min/1.73m2 (A) (result from lab).    Recent Labs   Lab 25  1340   TROPHS 71*       Recent Labs   Lab 25  0351   PTP 17.6*   INR 1.44*                  Microbiology    Hospital Encounter on 25   1. Blood Culture     Status: None (Preliminary result)    Collection Time: 25  5:17 PM    Specimen: Blood,peripheral   Result Value Ref Range    Blood Culture Result No Growth 1 Day N/A         Imaging: Reviewed in  Epic.    Medications: Scheduled Medications[1]    Assessment & Plan:      # Acute hypoxic resp failure due to pleural effusions, PNA and pulmonary embolus   IV Lasix STAT, BIPAP  IV Abx   ABG     # HFrEF, ICD- with acute exacerbation  As above  Cardiology on cs  I/O  Monitor Cr, e-  IV Lasix reordered on 6/14     #H/o CAD s/p CABG    #Acute PE-  Heparin drip  Doppler LE     #Hypertension    #HLD    #Hypothyroid  Levothyroxine     3OSA     #MDS with pancytopenia   Transfuse for hb< 7-  1u PRBC on 6/14/25  Onc on CS       Alejandra Mehta MD    Supplementary Documentation:     Quality:  DVT Mechanical Prophylaxis:   SCDs, Early ambuation  DVT Pharmacologic Prophylaxis   Medication    heparin (Porcine) 08420 units/250mL infusion PE/DVT/THROMBUS CONTINUOUS                Code Status: Full Code  Beltre: No urinary catheter in place  Beltre Duration (in days):   Central line:    GABE:     Discharge is dependent on: clinical   At this point Mr. Reyes is expected to be discharge to: tbd    The 21st Century Cures Act makes medical notes like these available to patients in the interest of transparency. Please be advised this is a medical document. Medical documents are intended to carry relevant information, facts as evident, and the clinical opinion of the practitioner. The medical note is intended as peer to peer communication and may appear blunt or direct. It is written in medical language and may contain abbreviations or verbiage that are unfamiliar.              **Certification      PHYSICIAN Certification of Need for Inpatient Hospitalization - Initial Certification    Patient will require inpatient services that will reasonably be expected to span two midnight's based on the clinical documentation in H+P.   Based on patients current state of illness, I anticipate that, after discharge, patient will require TBD.         [1]    levoFLOXacin  750 mg Oral Q48HR @ 0700    ezetimibe  10 mg Oral Daily    gabapentin  100 mg Oral  Daily    levothyroxine  25 mcg Oral Before breakfast    [Held by provider] losartan  25 mg Oral Daily    metoprolol succinate ER  50 mg Oral 2x Daily(Beta Blocker)    spironolactone  25 mg Oral Daily with breakfast    torsemide  20 mg Oral BID (Diuretic)    insulin aspart  1-10 Units Subcutaneous TID AC and HS    [Held by provider] Lovastatin  40 mg Oral BID

## 2025-06-14 NOTE — PLAN OF CARE
Received pt at shift change. Alert, forgetful. 2L O2 w stats maintaining >90%, some SOB at rest. Denies pain. Vitals stable. Vpaced on tele.   See flowsheets for additional assessments.   Pt updated on POC. Call light within reach. All needs met at this time.     Plan:  -Heparin gtt per PE/DVT protocol  -DW // I&O  -Wean O2 as able  -Monitor Hgb -> 1u PRBC today    Problem: Diabetes/Glucose Control  Goal: Glucose maintained within prescribed range  Description: INTERVENTIONS:  - Monitor Blood Glucose as ordered  - Assess for signs and symptoms of hyperglycemia and hypoglycemia  - Administer ordered medications to maintain glucose within target range  - Assess barriers to adequate nutritional intake and initiate nutrition consult as needed  - Instruct patient on self management of diabetes  Outcome: Progressing     Problem: Patient/Family Goals  Goal: Patient/Family Long Term Goal  Description: Patient's Long Term Goal:   To return home asap    Interventions:  - Cardiology consult  2 D echo with doppler  - See additional Care Plan goals for specific interventions  Outcome: Progressing  Goal: Patient/Family Short Term Goal  Description: Patient's Short Term Goal:  t get rid of shortness of breath    Interventions:   - medication adjustment      Supplemental oxygen as needed  - See additional Care Plan goals for specific interventions  Outcome: Progressing     Problem: PAIN - ADULT  Goal: Verbalizes/displays adequate comfort level or patient's stated pain goal  Description: INTERVENTIONS:  - Encourage pt to monitor pain and request assistance  - Assess pain using appropriate pain scale  - Administer analgesics based on type and severity of pain and evaluate response  - Implement non-pharmacological measures as appropriate and evaluate response  - Consider cultural and social influences on pain and pain management  - Manage/alleviate anxiety  - Utilize distraction and/or relaxation techniques  - Monitor for opioid side  effects  - Notify MD/LIP if interventions unsuccessful or patient reports new pain  - Anticipate increased pain with activity and pre-medicate as appropriate  Outcome: Progressing     Problem: SAFETY ADULT - FALL  Goal: Free from fall injury  Description: INTERVENTIONS:  - Assess pt frequently for physical needs  - Identify cognitive and physical deficits and behaviors that affect risk of falls.  - [place] fall precautions as indicated by assessment.  - Educate pt/family on patient safety including physical limitations  - Instruct pt to call for assistance with activity based on assessment  - Modify environment to reduce risk of injury  - Provide assistive devices as appropriate  - Consider OT/PT consult to assist with strengthening/mobility  - Encourage toileting schedule  Outcome: Progressing     Problem: CARDIOVASCULAR - ADULT  Goal: Maintains optimal cardiac output and hemodynamic stability  Description: INTERVENTIONS:  - Monitor vital signs, rhythm, and trends  - Monitor for bleeding, hypotension and signs of decreased cardiac output  - Evaluate effectiveness of vasoactive medications to optimize hemodynamic stability  - Monitor arterial and/or venous puncture sites for bleeding and/or hematoma  - Assess quality of pulses, skin color and temperature  - Assess for signs of decreased coronary artery perfusion - ex. Angina  - Evaluate fluid balance, assess for edema, trend weights  Outcome: Progressing  Goal: Absence of cardiac arrhythmias or at baseline  Description: INTERVENTIONS:  - Continuous cardiac monitoring, monitor vital signs, obtain 12 lead EKG if indicated  - Evaluate effectiveness of antiarrhythmic and heart rate control medications as ordered  - Initiate emergency measures for life threatening arrhythmias  - Monitor electrolytes and administer replacement therapy as ordered  Outcome: Progressing     Problem: RESPIRATORY - ADULT  Goal: Achieves optimal ventilation and oxygenation  Description:  INTERVENTIONS:  - Assess for changes in respiratory status  - Assess for changes in mentation and behavior  - Position to facilitate oxygenation and minimize respiratory effort  - Oxygen supplementation based on oxygen saturation or ABGs  - Provide Smoking Cessation handout, if applicable  - Encourage broncho-pulmonary hygiene including cough, deep breathe, Incentive Spirometry  - Assess the need for suctioning and perform as needed  - Assess and instruct to report SOB or any respiratory difficulty  - Respiratory Therapy support as indicated  - Manage/alleviate anxiety  - Monitor for signs/symptoms of CO2 retention  Outcome: Progressing     Problem: METABOLIC/FLUID AND ELECTROLYTES - ADULT  Goal: Glucose maintained within prescribed range  Description: INTERVENTIONS:  - Monitor Blood Glucose as ordered  - Assess for signs and symptoms of hyperglycemia and hypoglycemia  - Administer ordered medications to maintain glucose within target range  - Assess barriers to adequate nutritional intake and initiate nutrition consult as needed  - Instruct patient on self management of diabetes  Outcome: Progressing  Goal: Electrolytes maintained within normal limits  Description: INTERVENTIONS:  - Monitor labs and rhythm and assess patient for signs and symptoms of electrolyte imbalances  - Administer electrolyte replacement as ordered  - Monitor response to electrolyte replacements, including rhythm and repeat lab results as appropriate  - Fluid restriction as ordered  - Instruct patient on fluid and nutrition restrictions as appropriate  Outcome: Progressing     Problem: HEMATOLOGIC - ADULT  Goal: Free from bleeding injury  Description: (Example usage: patient with low platelets)  INTERVENTIONS:  - Avoid intramuscular injections, enemas and rectal medication administration  - Ensure safe mobilization of patient  - Hold pressure on venipuncture sites to achieve adequate hemostasis  - Assess for signs and symptoms of internal  bleeding  - Monitor lab trends  - Patient is to report abnormal signs of bleeding to staff  - Avoid use of toothpicks and dental floss  - Use electric shaver for shaving  - Use soft bristle tooth brush  - Limit straining and forceful nose blowing  Outcome: Progressing

## 2025-06-14 NOTE — PROGRESS NOTES
[facility]  Pulmonary/Critical Care progress note    David Reyes Patient Status:  Inpatient    1945 MRN [id number]   Location [facility] 8NE-A Attending Sukhdeep Bueno DO   Hosp Day # 2 PCP Thea Harris DO         History of Present Illness:    Still short of breath.  feeling better.    History:  Past Medical History[1]  Past Surgical History[2]  Family History[3]   reports that he quit smoking about 39 years ago. His smoking use included cigarettes. He started smoking about 65 years ago. He has a 26 pack-year smoking history. He has never used smokeless tobacco. He reports current alcohol use of about 2.0 standard drinks of alcohol per week. He reports that he does not use drugs.    Allergies:  Allergies[4]    Medications:  Current Hospital Medications[5]    Intake/Output:    Intake/Output Summary (Last 24 hours) at 2025 1235  Last data filed at 2025 0937  Gross per 24 hour   Intake 1500 ml   Output 1100 ml   Net 400 ml      Body mass index is 32.17 kg/m².    Review of Systems  Review of Systems:   A comprehensive 10 point review of systems was completed.  Pertinent positives and negatives noted in the the HPI.         Patient Vitals for the past 24 hrs:   BP Temp Temp src Pulse Resp SpO2 Weight   25 0809 115/83 97.6 °F (36.4 °C) Oral 96 24 98 % --   25 0425 111/74 98.6 °F (37 °C) Oral 95 20 100 % --   25 2258 108/69 98.4 °F (36.9 °C) Oral 99 20 100 % 213 lb (96.6 kg)   25 2100 118/75 -- -- 85 19 100 % --   25 1935 -- -- -- -- -- -- 215 lb (97.5 kg)   25 1900 125/87 -- -- 117 19 100 % --   25 1800 111/81 -- -- 94 26 100 % --   25 1700 114/68 -- -- 99 21 100 % --   25 1615 109/89 -- -- 97 21 100 % --   25 1428 111/69 -- -- 92 18 96 % --   25 1321 118/62 -- -- -- -- -- --   25 1318 -- 97.4 °F (36.3 °C) Oral 102 18 96 % --     Vitals:    25 1003 25 1004 25 1005 25 1134   BP: 57  104/56 102/62   BP Location:    Left arm   Pulse: 83 84 86 82   Resp: 21   18   Temp: 97.7 °F (36.5 °C)   98 °F (36.7 °C)   TempSrc: Oral   Oral   SpO2: 99% 99%  99%   Weight:             Physical Exam  Constitutional:       General: He is not in acute distress.     Appearance: Normal appearance. He is obese. He is not ill-appearing or diaphoretic.   HENT:      Head: Normocephalic and atraumatic.      Nose: Nose normal. No congestion or rhinorrhea.      Mouth/Throat:      Mouth: Mucous membranes are moist.      Pharynx: Oropharynx is clear. No oropharyngeal exudate or posterior oropharyngeal erythema.      Comments: Mallampati class IV palate   Eyes:      Extraocular Movements: Extraocular movements intact.      Pupils: Pupils are equal, round, and reactive to light.   Cardiovascular:      Rate and Rhythm: Normal rate.      Pulses: Normal pulses.      Heart sounds: Normal heart sounds. No murmur heard.  Pulmonary:      Effort: Pulmonary effort is normal. No respiratory distress.      Breath sounds: Normal breath sounds. No wheezing or rhonchi.      Comments: Diminished breath sounds bilaterally with scattered rhonchi  Chest:      Chest wall: No tenderness.   Abdominal:      General: Abdomen is flat. Bowel sounds are normal.      Palpations: Abdomen is soft.   Musculoskeletal:         General: Normal range of motion.   Skin:     General: Skin is warm.   Neurological:      General: No focal deficit present.      Mental Status: He is alert and oriented to person, place, and time.   Psychiatric:         Mood and Affect: Mood normal.         Behavior: Behavior normal.         Thought Content: Thought content normal.         Judgment: Judgment normal.            Lab Data Review:  Recent Labs   Lab 06/12/25  1340 06/13/25  0351 06/14/25  0454   WBC 1.2* 1.5* 1.6*   HGB 7.8* 7.4* 6.8*   HCT 26.5* 25.2* 22.9*   .0* 102.0* 97.0*       Recent Labs   Lab 06/12/25  1340 06/13/25  0351 06/14/25  0454    139 138   K  4.1 4.0 5.0    105 104   CO2 20.0* 22.0 23.0   BUN 30* 31* 42*   CREATSERUM 2.14* 1.95* 2.51*   CA 8.9 8.8 8.4*   ALB 4.1 3.9  --    ALKPHO 57 56  --    ALT <7* 10  --    AST 24 29  --    * 127* 130*       Recent Labs   Lab 06/13/25  0351   MG 2.3       Lab Results   Component Value Date    PHOS 3.6 06/13/2025        Recent Labs   Lab 06/13/25  0351 06/13/25  1225 06/14/25  0454   INR 1.44*  --   --    .2* 83.2*  80.6* 187.9*       Recent Labs   Lab 06/13/25  0956   ABGPHT 7.46*   GSOKZM6L 28*   BBYBU0X 136*   ABGHCO3 22.3   ABGBE -3.4*   TEMP 98.6   MIKI Positive   SITE Right Radial   DEV Nasal cannula   THGB 7.7*       No results for input(s): \"TROP\", \"CKMB\" in the last 168 hours.    Cultures:   Hospital Encounter on 06/12/25   1. Blood Culture     Status: None (Preliminary result)    Collection Time: 06/12/25  5:17 PM    Specimen: Blood,peripheral   Result Value Ref Range    Blood Culture Result No Growth 1 Day N/A           Radiology personally reviewed:  XR CHEST AP PORTABLE  (CPT=71045)  Result Date: 6/14/2025  CONCLUSION:   Decreased vascular congestion and edema.  Residual basilar atelectasis on the left.  There may be trace bilateral effusion.  No sign of pneumothorax.  Stable pacemaker. Consider upright PA and lateral examination of the chest, when tolerated.   LOCATION:  [facility]      Dictated by (CST): Omid Randall MD on 6/14/2025 at 7:36 AM     Finalized by (CST): Omid Randall MD on 6/14/2025 at 7:36 AM       US VENOUS DOPPLER LEG BILAT - DIAG IMG (CPT=93970)  Result Date: 6/13/2025  CONCLUSION:  1. No sonographic evidence for a deep venous thrombosis involving the lower extremities bilaterally. 1. Bilateral Stevens cysts as detailed above.   LOCATION:  [facility]   Dictated by (CST): Anai Dillard MD on 6/13/2025 at 2:31 PM     Finalized by (CST): Anai Dillard MD on 6/13/2025 at 2:33 PM       CT CHEST (CPT=71250)  Result Date: 6/12/2025  CONCLUSION:  Noncontrast CT.  Small bilateral  pleural effusion larger on the left, with moderately extensive ground-glass opacities, lower lobe atelectasis and scattered probable postinflammatory nodules.  Clinical and imaging follow-up suggested.  Coronary calcifications are seen.  No pneumothorax or pneumomediastinum identified.  LOCATION:  [facility]   Dictated by (CST): Omid Randall MD on 6/12/2025 at 7:53 PM     Finalized by (CST): Omid Randall MD on 6/12/2025 at 8:00 PM       NM LUNG VQ VENT / PERFUSION SCAN  (CPT=78582)  Result Date: 6/12/2025  CONCLUSION:  Moderate sized apparent perfusion defect in the right lower lobe, concerning for pulmonary embolism.   LOCATION:  [facility]   Dictated by (CST): Omid Randall MD on 6/12/2025 at 6:57 PM     Finalized by (CST): Omid Randall MD on 6/12/2025 at 7:00 PM       XR CHEST AP PORTABLE  (CPT=71045)  Result Date: 6/12/2025  CONCLUSION:  Mild scarring/atelectasis in the lower lungs.  Probable small bilateral pleural effusions.    LOCATION:  [id number]      Dictated by (CST): Ino Bae MD on 6/12/2025 at 3:13 PM     Finalized by (CST): Ino Bae MD on 6/12/2025 at 3:13 PM          Problem List[6]  79 year old male.  With history of smoking at least 26 pack years of tobacco although stopped smoking in 1986, with history of coronary artery disease status post CABG, congestive heart failure, W mellitus, hypothyroidism, hypertension, hyperlipidemia, rheumatoid arthritis, anxiety disorder, and diagnosis of sleep apnea syndrome but not being treated with with CPAP.  Who presented to the emergency department 6/12/2025 with complaints of shortness of breath a ventilation/perfusion scan chest performed in the emergency room showed segmental  perfusion defect in right lower lobe consistent with acute pulmonary embolism and a noncontrast CT chest showed groundglass opacities and bilateral lower lobe atelectasis pulmonary consultation was obtained.    Patient reports mild shortness of breath on exertion, he has  had some hemoptysis apparently going on for a while.  Denies any cough, sputum production, chest pains, abdominal pains, diarrhea or constipation  Assessment:  Acute hypoxic respiratory failure: Currently supplemental oxygen 2 L/min via nasal cannula but oxygen saturations of 100%  Acute pulmonary embolism as noted on right lower lobe segmental defects on ventilation/perfusion scan.  Ultrasound Doppler lower extremity negative for DVT  Bilateral (right small pleural effusion  Complaints of hemoptysis this could be related to bronchitis or pneumonia though cannot rule out endobronchial lesion or due to pulmonary embolism  Right lower lobe and bilateral upper lobe groundglass opacities : This could represent interstitial edema due to cardiac dysfunction (proBNP 48,096) versus underlying interstitial lung disease patient has previously been treated with methotrexate versus atypical pneumonia although expanded flu panel with COVID-19 is negative: CRP and ESR elevated, procalcitonin mildly elevated  Small pulmonary nodules: CT chest 6/12/2025:Small nodule in the middle lobe image 94 measuring 4 mm.  5-6 mm nodule right lower lobe image 101.  There may be additional nodules obscured by the ground-glass changes and lower lobe atelectasis.    History of smoking at least 26 pack years tobacco, patient appears at risk for COPD  Coronary artery disease: Status post CABG and PCI in past  HFrEF last ejection fraction of 20 to 25%  Obstructive sleep apnea syndrome, not being treated with CPAP machine  LORENA/CKD  Myelodysplastic syndrome: With pancytopenia  Type 2 diabetes mellitus  Hypertension  Hyperlipidemia  Hypothyroidism  Rheumatoid arthritis  Anxiety disorder  Anemia  Thrombocytopenia: Worsening    Plan:  Wean FiO2 off to keep oxygen saturation between 90 to 92%  Gentle diuresis  Cardiology follow-up  Continue levofloxacin  Continue IV heparin drip  Hematology consult appreciated  DuoNebs every 6 hours  DVT prophylaxis: SCD  boots  /heparin drip  GI prophylaxis: ---  Will follow for further recommendations    Thank You for allowing me to participate in this patient's care     Suleman Grossman MD       Note to the patient: The 21st Century Cures Act makes medical notes like these available to patients in the interest of transparency. However, be advised that this is a medical document. It is intended as peer to peer communication. It is written in medical language and may contain abbreviations or verbiage that are unfamiliar. It may appear blunt or direct. Medical documents are intended to carry relevant information, facts as evident, and clinical opinion of the practitioner.      Disclaimer: Components of this note were documented using voice recognition system and are subject to errors not corrected at proofreading. Contact the author of this note for any clarifications         [1]   Past Medical History:   Acute kidney injury    Anxiety state    Back problem    CAD (coronary artery disease)    Congestive heart disease (HCC)    Congestive heart failure (HCC)    CORONARY ARTERY DISEASE    cabg    Coronary atherosclerosis    COVID-19    Diabetes (HCC)    Disorder of thyroid    DM (diabetes mellitus) (HCC)    Heart attack (HCC)    High blood pressure    High cholesterol    Muscle weakness    USES A WALKER TO AMBULATE    Other and unspecified hyperlipidemia    Pneumonia    Rheumatoid arthritis (HCC)    Sleep apnea    NO MACHINE/TREATMENT    Subdural hematoma (HCC)    Unspecified sleep apnea    Visual impairment    READING GLASSES   [2]   Past Surgical History:  Procedure Laterality Date    Angiogram  2/11/2015    Angioplasty (coronary)  2/23/15    RCA    Bypass surgery      2007    Cabg  2004    LAD, Diagonal    Cardiac defibrillator placement  6/7/14    [employer] dual chamber ICD    Cath drug eluting stent      Tonsillectomy     [3]   Family History  Problem Relation Age of Onset    Cancer Father     Heart Disease Mother     Other  (alzheimers) Mother     Stroke Neg    [4]   Allergies  Allergen Reactions    Pcn [Penicillins]      \"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   [5]   Current Facility-Administered Medications:     heparin (Porcine) 11138 units/250mL infusion PE/DVT/THROMBUS CONTINUOUS, 200-3,000 Units/hr, Intravenous, Continuous    levoFLOXacin (Levaquin) tab 750 mg, 750 mg, Oral, Q48HR @ 0700    glucose (Dex4) 15 GM/59ML oral liquid 15 g, 15 g, Oral, Q15 Min PRN **OR** glucose (Glutose) 40% oral gel 15 g, 15 g, Oral, Q15 Min PRN **OR** dextrose 50% injection 50 mL, 50 mL, Intravenous, Q15 Min PRN **OR** glucose (Dex4) 15 GM/59ML oral liquid 30 g, 30 g, Oral, Q15 Min PRN **OR** glucose (Glutose) 40% oral gel 30 g, 30 g, Oral, Q15 Min PRN    ezetimibe (Zetia) tab 10 mg, 10 mg, Oral, Daily    gabapentin (Neurontin) cap 100 mg, 100 mg, Oral, Daily    levothyroxine (Synthroid) tab 25 mcg, 25 mcg, Oral, Before breakfast    [Held by provider] losartan (Cozaar) tab 25 mg, 25 mg, Oral, Daily    metoprolol succinate ER (Toprol XL) 24 hr tab 50 mg, 50 mg, Oral, 2x Daily(Beta Blocker)    [Held by provider] spironolactone (Aldactone) tab 25 mg, 25 mg, Oral, Daily with breakfast    torsemide (Demadex) tab 20 mg, 20 mg, Oral, BID (Diuretic)    insulin aspart (NovoLOG) 100 Units/mL FlexPen 1-10 Units, 1-10 Units, Subcutaneous, TID AC and HS    acetaminophen (Tylenol Extra Strength) tab 1,000 mg, 1,000 mg, Oral, Q8H PRN    melatonin tab 3 mg, 3 mg, Oral, Nightly PRN    polyethylene glycol (PEG 3350) (Miralax) 17 g oral packet 17 g, 17 g, Oral, Daily PRN    sennosides (Senokot) tab 17.2 mg, 17.2 mg, Oral, Nightly PRN    bisacodyl (Dulcolax) 10 MG rectal suppository 10 mg, 10 mg, Rectal, Daily PRN    metoclopramide (Reglan) 5 mg/mL injection 10 mg, 10 mg, Intravenous, Q8H PRN    benzonatate (Tessalon) cap 200 mg, 200 mg, Oral, TID PRN    guaiFENesin (Robitussin) 100 MG/5  ML oral liquid 200 mg, 200 mg, Oral, Q4H PRN    glycerin-hypromellose- (Artificial Tears) 0.2-0.2-1 % ophthalmic solution 1 drop, 1 drop, Both Eyes, QID PRN    sodium chloride (Saline Mist) 0.65 % nasal solution 1 spray, 1 spray, Each Nare, Q3H PRN    [Held by provider] **PATIENT SUPPLIED** Lovastatin TABS 40 mg, 40 mg, Oral, BID  [6]   Patient Active Problem List  Diagnosis    Nonischemic cardiomyopathy (HCC)    FLAKO (obstructive sleep apnea)    Congestive heart failure (HCC)    CAD (coronary artery disease)    Rheumatoid arthritis involving multiple sites with positive rheumatoid factor (HCC)    Type 2 diabetes mellitus with hyperglycemia, with long-term current use of insulin ([facility])    Normocytic anemia    Thrombocytopenia    Ischemic cardiomyopathy    Dyspnea on exertion    Dry mouth    Pure hypercholesterolemia    Vitamin D deficiency    Pulmonary hypertension secondary to increased PVR ([facility])    Carotid artery stenosis    Obesity (BMI 30.0-34.9)    Chronic obstructive pulmonary disease, unspecified ([facility])    Weakness generalized    Aortic stenosis, mild    Hyponatremia    Pancytopenia ([facility])    Acute respiratory failure with hypoxia ([facility])    Immunocompromised state ([facility])    Wide-complex tachycardia    Acute on chronic anemia    Type 2 diabetes mellitus without complication, with long-term current use of insulin ([facility])    Foot pain, bilateral    Hypothyroid    Pulmonary hypertension ([facility])    HFrEF (heart failure with reduced ejection fraction) ([facility])    PE (pulmonary thromboembolism) ([facility])    Primary hypertension    Dyspnea, unspecified type    Hemoptysis    Metabolic acidosis    Acute kidney injury    Acute pulmonary embolism, unspecified pulmonary embolism type, unspecified whether acute cor pulmonale present ([facility])    MDS (myelodysplastic syndrome) ([facility])

## 2025-06-14 NOTE — PROGRESS NOTES
[facility] Hematology and Oncology Progress Note    Reason for Consult: Pancytopenia   Medical Record Number: [id number]     Subjective  Patient states that he doesn't feel any better this morning than yesterday. He denies any bleeding.     Medications   heparin (Porcine) 14997 units/250mL infusion PE/DVT/THROMBUS CONTINUOUS  200-3,000 Units/hr Intravenous Continuous    [COMPLETED] furosemide (Lasix) 10 mg/mL injection 40 mg  40 mg Intravenous Once    levoFLOXacin (Levaquin) tab 750 mg  750 mg Oral Q48HR @ 0700    [COMPLETED] Perflutren Lipid Microsphere (DEFINITY) 6.52 MG/ML injection 1.5 mL  1.5 mL Intravenous ONCE PRN    [] dextrose 5%-sodium chloride 0.45% infusion   Intravenous Continuous    glucose (Dex4) 15 GM/59ML oral liquid 15 g  15 g Oral Q15 Min PRN    Or    glucose (Glutose) 40% oral gel 15 g  15 g Oral Q15 Min PRN    Or    dextrose 50% injection 50 mL  50 mL Intravenous Q15 Min PRN    Or    glucose (Dex4) 15 GM/59ML oral liquid 30 g  30 g Oral Q15 Min PRN    Or    glucose (Glutose) 40% oral gel 30 g  30 g Oral Q15 Min PRN    [COMPLETED] heparin (Porcine) 95350 units/250 mL infusion ED (PE/DVT/THROMBUS) INITIAL DOSE  18 Units/kg/hr Intravenous Once    ezetimibe (Zetia) tab 10 mg  10 mg Oral Daily    gabapentin (Neurontin) cap 100 mg  100 mg Oral Daily    levothyroxine (Synthroid) tab 25 mcg  25 mcg Oral Before breakfast    [Held by provider] losartan (Cozaar) tab 25 mg  25 mg Oral Daily    metoprolol succinate ER (Toprol XL) 24 hr tab 50 mg  50 mg Oral 2x Daily(Beta Blocker)    spironolactone (Aldactone) tab 25 mg  25 mg Oral Daily with breakfast    torsemide (Demadex) tab 20 mg  20 mg Oral BID (Diuretic)    insulin aspart (NovoLOG) 100 Units/mL FlexPen 1-10 Units  1-10 Units Subcutaneous TID AC and HS    acetaminophen (Tylenol Extra Strength) tab 1,000 mg  1,000 mg Oral Q8H PRN    melatonin tab 3 mg  3 mg Oral Nightly PRN    polyethylene glycol (PEG 3350) (Miralax) 17 g oral packet 17 g  17  g Oral Daily PRN    sennosides (Senokot) tab 17.2 mg  17.2 mg Oral Nightly PRN    bisacodyl (Dulcolax) 10 MG rectal suppository 10 mg  10 mg Rectal Daily PRN    metoclopramide (Reglan) 5 mg/mL injection 10 mg  10 mg Intravenous Q8H PRN    benzonatate (Tessalon) cap 200 mg  200 mg Oral TID PRN    guaiFENesin (Robitussin) 100 MG/5 ML oral liquid 200 mg  200 mg Oral Q4H PRN    glycerin-hypromellose- (Artificial Tears) 0.2-0.2-1 % ophthalmic solution 1 drop  1 drop Both Eyes QID PRN    sodium chloride (Saline Mist) 0.65 % nasal solution 1 spray  1 spray Each Nare Q3H PRN    [Held by provider] **PATIENT SUPPLIED** Lovastatin TABS 40 mg  40 mg Oral BID      Physical Exam  /72 (BP Location: Left arm)   Pulse 85   Temp 98.2 °F (36.8 °C) (Oral)   Resp 18   Wt 98.8 kg (217 lb 13 oz)   SpO2 95%   BMI 32.17 kg/m²    General: NAD,   HEENT: anicteric sclera.   Lungs: Normal effort.   CV: RRR  Extremities: Bilateral LE edema.  Neuro: Grossly intact.  Psych: Mood and affect appropriate.     Results:  Recent Results (from the past 24 hours)   POCT Glucose    Collection Time: 06/13/25  7:16 AM   Result Value Ref Range    POC Glucose 205 (H) 70 - 99 mg/dL   ABG PANEL W ELECT AND LACTATE    Collection Time: 06/13/25  9:56 AM   Result Value Ref Range    ABG pH 7.46 (H) 7.35 - 7.45    ABG pCO2 28 (L) 35 - 45 mm Hg    ABG pO2 136 (H) 80 - 100 mm Hg    ABG HCO3 22.3 21.0 - 27.0 mEq/L    ABG Base Excess -3.4 (L) -2.0 - 2.0 mmol/L    Arterial Blood Gas O2Hb 96.1 92.0 - 100.0 %    Patient Temperature 98.6 F    Total Hemoglobin 7.7 (L) 13.0 - 17.5 g/dL    Carboxyhemoglobin 1.4 0.0 - 3.0 % SAT    Methemoglobin 1.5 0.4 - 1.5 % SAT    Ionized Calcium 1.12 0.95 - 1.32 mmol/L    Allens Test Positive     Sample Site Right Radial     L/M 3.0 L/min    ABG Device Nasal cannula     Potassium Blood Gas 4.5 3.6 - 5.1 mmol/L    Sodium Blood Gas 132 (L) 135 - 145 mmol/L    Lactic Acid (Blood Gas) 2.5 (H) 0.5 - 2.0 mmol/L   PTT, Activated     Collection Time: 06/13/25 12:25 PM   Result Value Ref Range    PTT 83.2 (H) 23.0 - 36.0 seconds   PTT, Activated    Collection Time: 06/13/25 12:25 PM   Result Value Ref Range    PTT 80.6 (H) 23.0 - 36.0 seconds   POCT Glucose    Collection Time: 06/13/25 12:34 PM   Result Value Ref Range    POC Glucose 142 (H) 70 - 99 mg/dL   POCT Glucose    Collection Time: 06/13/25  4:36 PM   Result Value Ref Range    POC Glucose 122 (H) 70 - 99 mg/dL   POCT Glucose    Collection Time: 06/13/25 11:01 PM   Result Value Ref Range    POC Glucose 203 (H) 70 - 99 mg/dL   Basic Metabolic Panel (8)    Collection Time: 06/14/25  4:54 AM   Result Value Ref Range    Glucose 130 (H) 70 - 99 mg/dL    Sodium 138 136 - 145 mmol/L    Potassium 5.0 3.5 - 5.1 mmol/L    Chloride 104 98 - 112 mmol/L    CO2 23.0 21.0 - 32.0 mmol/L    Anion Gap 11 0 - 18 mmol/L    BUN 42 (H) 9 - 23 mg/dL    Creatinine 2.51 (H) 0.70 - 1.30 mg/dL    Calcium, Total 8.4 (L) 8.7 - 10.6 mg/dL    Calculated Osmolality 298 (H) 275 - 295 mOsm/kg    eGFR-Cr 26 (L) >=60 mL/min/1.73m2   CBC, Platelet; No Differential    Collection Time: 06/14/25  4:54 AM   Result Value Ref Range    WBC 1.6 (L) 4.0 - 11.0 x10(3) uL    RBC 2.65 (L) 3.80 - 5.80 x10(6)uL    HGB 6.8 (LL) 13.0 - 17.5 g/dL    HCT 22.9 (L) 39.0 - 53.0 %    PLT 97.0 (L) 150.0 - 450.0 10(3)uL    Immature Platelet Fraction 14.3 (H) 0.0 - 7.0 %    MCV 86.4 80.0 - 100.0 fL    MCH 25.7 (L) 26.0 - 34.0 pg    MCHC 29.7 (L) 31.0 - 37.0 g/dL    RDW 19.6 %   PTT, Activated    Collection Time: 06/14/25  4:54 AM   Result Value Ref Range    .9 (H) 23.0 - 36.0 seconds   C-Reactive Protein    Collection Time: 06/14/25  4:54 AM   Result Value Ref Range    C-Reactive Protein 7.50 (H) <=0.50 mg/dL   Sed Rate, Adryren (Automated)    Collection Time: 06/14/25  4:54 AM   Result Value Ref Range    Sed Rate 42 (H) 0 - 20 mm/Hr   Scan slide    Collection Time: 06/14/25  4:54 AM   Result Value Ref Range    Slide Review Slide  reviewed, see previous RBC morphology.    POCT Glucose    Collection Time: 06/14/25  5:11 AM   Result Value Ref Range    POC Glucose 139 (H) 70 - 99 mg/dL     Assessment and Plan:  Hypoxia  -V/Q with a moderate sized perfusion defect  -History of CHF as well   -Continue heparin for now  -Dopplers negative for DVT.  -CTA chest if/when Cr allows.  -DOAC is an option if anticoagulation is continued as outpatient.    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 Chirinos. Bone marrow from 6/6/25 with 15% blasts. [facility]us, FISH, Cytogenetics pending.   -Supportive care acutely  -Transfuse for a Hb < 7  -Transfuse for a Plt < 50 given hemoptysis   -Neutropenia - start prophylactic levofloxacin. If patient spikes, panculture and start cefepime.     Patient needs PRBC transfusion today. Will give over 4 hours in light of CHF. Will ask cardiology if diuretics to be given after transfusion.     Electronically Signed by:     Pedro Bhakta M.D.

## 2025-06-14 NOTE — PHYSICAL THERAPY NOTE
Noted HGB at 6.8 this AM and is below therapeutic level for safe skilled PT eval and managemet as per protocol. Will hold and f/u tomorrow.

## 2025-06-14 NOTE — PROGRESS NOTES
Progress Note  David PRISCILLA Eric Patient Status:  Inpatient    1945 MRN [id number]   Location [facility] 8NE-A Attending Alejandra Mehta MD   Hosp Day # 2 PCP Thea Harris DO     Subjective:  Pt c/o continued SOB; slightly tachypneic on exam - on 2L NC    Objective:  /56   Pulse 86   Temp 97.7 °F (36.5 °C) (Oral)   Resp 21   Wt 217 lb 13 oz (98.8 kg)   SpO2 99%   BMI 32.17 kg/m²     Telemetry: A/V paced, occ PVC's, NSVT    Intake/Output:    Intake/Output Summary (Last 24 hours) at 2025 1035  Last data filed at 2025 0937  Gross per 24 hour   Intake 1500 ml   Output 1100 ml   Net 400 ml       Last 3 Weights   25 0434 217 lb 13 oz (98.8 kg)   25 2258 213 lb (96.6 kg)   25 1935 215 lb (97.5 kg)   25 1506 217 lb (98.4 kg)   25 1102 219 lb 3.2 oz (99.4 kg)       Labs:  Recent Labs   Lab 25  1340 25  0351 25  0454   * 127* 130*   BUN 30* 31* 42*   CREATSERUM 2.14* 1.95* 2.51*   EGFRCR 31* 35* 26*   CA 8.9 8.8 8.4*    139 138   K 4.1 4.0 5.0    105 104   CO2 20.0* 22.0 23.0     Recent Labs   Lab 25  1340 25  0351 25  0454   RBC 3.00* 2.82* 2.65*   HGB 7.8* 7.4* 6.8*   HCT 26.5* 25.2* 22.9*   MCV 88.3 89.4 86.4   MCH 26.0 26.2 25.7*   MCHC 29.4* 29.4* 29.7*   RDW 19.9 20.1 19.6   NEPRELIM 0.51* 0.34*  --    WBC 1.2* 1.5* 1.6*   .0* 102.0* 97.0*         Recent Labs   Lab 25  1340   TROPHS 71*       Diagnostics:  XR CHEST AP PORTABLE  (CPT=71045)  Result Date: 2025  CONCLUSION:   Decreased vascular congestion and edema.  Residual basilar atelectasis on the left.  There may be trace bilateral effusion.  No sign of pneumothorax.  Stable pacemaker. Consider upright PA and lateral examination of the chest, when tolerated.   LOCATION:  [facility]      Dictated by (CST): Omid Randall MD on 2025 at 7:36 AM     Finalized by (CST): Omid Randall MD on 2025 at 7:36 AM       US VENOUS  DOPPLER LEG BILAT - DIAG IMG (CPT=93970)  Result Date: 6/13/2025  CONCLUSION:  1. No sonographic evidence for a deep venous thrombosis involving the lower extremities bilaterally. 1. Bilateral Stevens cysts as detailed above.   LOCATION:  [facility]   Dictated by (CST): Anai Dillard MD on 6/13/2025 at 2:31 PM     Finalized by (CST): Anai Dillard MD on 6/13/2025 at 2:33 PM        Review of Systems   Cardiovascular:  Positive for dyspnea on exertion, leg swelling and orthopnea. Negative for chest pain.   Respiratory:  Positive for cough, hemoptysis and shortness of breath.        Physical Exam:    Gen: alert, oriented x 3, NAD  Heent: pupils equal, reactive. Mucous membranes moist.   Neck: no jvd  Cardiac: regular rate and rhythm, normal S1,S2, no murmur, clicks, rub or gallop  Lungs: diminished  Abd: soft, NT/ND +bs  Ext: BLE 1+ edema  Skin: Warm, dry  Neuro: No focal deficits      Medications:    Scheduled Medications[1]  Medication Infusions[2]      Assessment:  Acute Hypoxic Respiratory Failure - Multifactorial (PE, Poss PNA, HF)  On 2L O2 NC  IV antibx  Pulm following  Acute PE  Elev D Dimer, VQ + mod size perfusion defect RLL  Likely not a candidate for invasive procedure d/t MDS  US BLE neg for DVT   On IV heparin gtt  Pulm/Heme following  Acute on Chronic Biventricular HFrEF, ICM, Pulmonary HTN  proBNP 46,096; CT Chest w/small nagi pleural effusion, GGO  Repeat CXR w/improving pulm edema  Echo LVEF 15-20%, no RWMA, mildly reduced RV function, mild AI, mild-mod AVS, mild-mod MR, mild-mod TR, PASP 55-60   Diuresis w/IV Lasix x 2, po torsemide   I&O incomplete, wt + 4 lbs  GDMT - on toprol, torsemide 20mg po BID; losartan, aldactone on hold  Not on ARNI d/t cost  Hx [employer] CRT-D   Mildly Elevated Troponin - 71  Likely 2/2 above - supply/demand mismatch  LORENA on CKD  Cr uptrending  Hx CAD s/p CABG, Prior PCI   Not on asa, plavix d/t MDS  Hx [employer] CRT-D  Hypertension - controlled  Losartan on hold d/t  LORENA  Hyperlipidemia - LDL 90, on lovastatin, zetia  Pancytopenia, Hx MDS  Hgb 6.8 - PRBC today  WBC 1.6, Plt 97  Heme following  DMII - A1C 5.8  Former Tobacco Abuse  FLAKO - not prev treated  Rheumatoid Arthritis  Hypothyroidism    Plan:  Continue IV heparin gtt   Cr uptrending - losartan on hold; will hold spironolactone as well  Pt received IV lasix and po torsemide this am - will hold further po torsemide and schedule IV lasix 40mg BID today.   PRBC transfusing for Hgb 6.8 today  Strict I&O, daily weights (standing if possible), daily BMP    Plan of care discussed with patient, RN.    Silvia Lozano, MIKEL  6/14/2025  10:35 AM          Patient seen and examined independently.  Note reviewed and labs reviewed.  Agree to the assessment and plan with the following additions/changes.  Entire medical decision making & plan performed by myself.    General: NAD.  HEENT: Normocephalic.  Neck: Supple.  Cardiac: RRR. Normal S1, S2 . No murmur.  Lungs: Diminished.  Extremities: LE edema    A/P:  Remains hypoxic and dyspnic. Somewhat out of proportion to PE (Unilateral on V/Q)  Possible cardiorenal syndrome with Cr uptrend  Lasix dose may be inadequate. Increase lasix to 80mg IV BID  Monitor Cr, Uoutput, I/Os, O2 req  Hold ARB, MRA    LOC L3    Sveta Erickson MD  6/14/2025  Interventional Cardiology  [facility]  =======================================================           [1]    levoFLOXacin  750 mg Oral Q48HR @ 0700    ezetimibe  10 mg Oral Daily    gabapentin  100 mg Oral Daily    levothyroxine  25 mcg Oral Before breakfast    [Held by provider] losartan  25 mg Oral Daily    metoprolol succinate ER  50 mg Oral 2x Daily(Beta Blocker)    spironolactone  25 mg Oral Daily with breakfast    torsemide  20 mg Oral BID (Diuretic)    insulin aspart  1-10 Units Subcutaneous TID AC and HS    [Held by provider] Lovastatin  40 mg Oral BID   [2]    continuous dose heparin 1,400 Units/hr (06/14/25 0800)

## 2025-06-15 PROBLEM — I13.10 CARDIORENAL SYNDROME: Status: ACTIVE | Noted: 2025-06-15

## 2025-06-15 PROBLEM — N18.32 STAGE 3B CHRONIC KIDNEY DISEASE (HCC): Status: ACTIVE | Noted: 2025-06-15

## 2025-06-15 LAB
ANION GAP SERPL CALC-SCNC: 14 MMOL/L (ref 0–18)
APTT PPP: 104.5 SECONDS (ref 23–36)
BASOPHILS # BLD AUTO: 0.01 X10(3) UL (ref 0–0.2)
BASOPHILS NFR BLD AUTO: 0.5 %
BLOOD TYPE BARCODE: 6200
BUN BLD-MCNC: 54 MG/DL (ref 9–23)
CALCIUM BLD-MCNC: 8.3 MG/DL (ref 8.7–10.6)
CHLORIDE SERPL-SCNC: 102 MMOL/L (ref 98–112)
CO2 SERPL-SCNC: 20 MMOL/L (ref 21–32)
CREAT BLD-MCNC: 2.64 MG/DL (ref 0.7–1.3)
EGFRCR SERPLBLD CKD-EPI 2021: 24 ML/MIN/1.73M2 (ref 60–?)
EOSINOPHIL # BLD AUTO: 0 X10(3) UL (ref 0–0.7)
EOSINOPHIL NFR BLD AUTO: 0 %
ERYTHROCYTE [DISTWIDTH] IN BLOOD BY AUTOMATED COUNT: 19 %
GLUCOSE BLD-MCNC: 120 MG/DL (ref 70–99)
GLUCOSE BLD-MCNC: 127 MG/DL (ref 70–99)
GLUCOSE BLD-MCNC: 137 MG/DL (ref 70–99)
GLUCOSE BLD-MCNC: 143 MG/DL (ref 70–99)
GLUCOSE BLD-MCNC: 174 MG/DL (ref 70–99)
HCT VFR BLD AUTO: 27 % (ref 39–53)
HGB BLD-MCNC: 8 G/DL (ref 13–17.5)
IMM GRANULOCYTES # BLD AUTO: 0.03 X10(3) UL (ref 0–1)
IMM GRANULOCYTES NFR BLD: 1.4 %
LYMPHOCYTES # BLD AUTO: 1.2 X10(3) UL (ref 1–4)
LYMPHOCYTES NFR BLD AUTO: 58 %
MCH RBC QN AUTO: 26.3 PG (ref 26–34)
MCHC RBC AUTO-ENTMCNC: 29.6 G/DL (ref 31–37)
MCV RBC AUTO: 88.8 FL (ref 80–100)
MONOCYTES # BLD AUTO: 0.12 X10(3) UL (ref 0.1–1)
MONOCYTES NFR BLD AUTO: 5.8 %
NEUTROPHILS # BLD AUTO: 0.71 X10 (3) UL (ref 1.5–7.7)
NEUTROPHILS # BLD AUTO: 0.71 X10(3) UL (ref 1.5–7.7)
NEUTROPHILS NFR BLD AUTO: 34.3 %
OSMOLALITY SERPL CALC.SUM OF ELEC: 299 MOSM/KG (ref 275–295)
PLATELET # BLD AUTO: 95 10(3)UL (ref 150–450)
PLATELETS.RETICULATED NFR BLD AUTO: 19.7 % (ref 0–7)
POTASSIUM SERPL-SCNC: 4.5 MMOL/L (ref 3.5–5.1)
RBC # BLD AUTO: 3.04 X10(6)UL (ref 3.8–5.8)
SODIUM SERPL-SCNC: 136 MMOL/L (ref 136–145)
UNIT VOLUME: 350 ML
WBC # BLD AUTO: 2.1 X10(3) UL (ref 4–11)

## 2025-06-15 PROCEDURE — 99232 SBSQ HOSP IP/OBS MODERATE 35: CPT | Performed by: STUDENT IN AN ORGANIZED HEALTH CARE EDUCATION/TRAINING PROGRAM

## 2025-06-15 PROCEDURE — 99223 1ST HOSP IP/OBS HIGH 75: CPT | Performed by: INTERNAL MEDICINE

## 2025-06-15 PROCEDURE — 99233 SBSQ HOSP IP/OBS HIGH 50: CPT | Performed by: INTERNAL MEDICINE

## 2025-06-15 NOTE — PLAN OF CARE
Assumed care of patient at 1930. Aox3-4, lethargic, forgetful at times, pt wears glasses. 2L NC O2, Lungs overall diminished with crackles present, lung sounds improved from prior assessment. Increased RR (Improved from prior assessment, still reports feeling SoB +Cho. V-Paced on tele, frequent PVC's. Pt denies pain. Pt continent of bowel and bladder. Pt reports feeling constipated and states they have not had a BM in 3+ days. Pt is declining laxative when offered. Reports feeling intermittent abdominal cramps paired with nausea and dry heaving1+ pitting edema to BLE. SBA. QID. Bed locked and in lowest position. Call light and personal items within reach     Plan:   -Heparin gtt per PE/DVT protocol   -IV lasix 80mg BID   -Wean O2 as able   -PO Levaquin every other day   -DW // I&O   -Monitor Hgb (transfuse if Hgb <7 or Plt <50)     Problem: Diabetes/Glucose Control  Goal: Glucose maintained within prescribed range  Description: INTERVENTIONS:  - Monitor Blood Glucose as ordered  - Assess for signs and symptoms of hyperglycemia and hypoglycemia  - Administer ordered medications to maintain glucose within target range  - Assess barriers to adequate nutritional intake and initiate nutrition consult as needed  - Instruct patient on self management of diabetes  Outcome: Progressing     Problem: Patient/Family Goals  Goal: Patient/Family Long Term Goal  Description: Patient's Long Term Goal:   To return home asap    Interventions:  - Cardiology consult  2 D echo with doppler  - See additional Care Plan goals for specific interventions  Outcome: Progressing  Goal: Patient/Family Short Term Goal  Description: Patient's Short Term Goal:  t get rid of shortness of breath    Interventions:   - medication adjustment      Supplemental oxygen as needed  - See additional Care Plan goals for specific interventions  Outcome: Progressing     Problem: PAIN - ADULT  Goal: Verbalizes/displays adequate comfort level or patient's stated  pain goal  Description: INTERVENTIONS:  - Encourage pt to monitor pain and request assistance  - Assess pain using appropriate pain scale  - Administer analgesics based on type and severity of pain and evaluate response  - Implement non-pharmacological measures as appropriate and evaluate response  - Consider cultural and social influences on pain and pain management  - Manage/alleviate anxiety  - Utilize distraction and/or relaxation techniques  - Monitor for opioid side effects  - Notify MD/LIP if interventions unsuccessful or patient reports new pain  - Anticipate increased pain with activity and pre-medicate as appropriate  Outcome: Progressing     Problem: SAFETY ADULT - FALL  Goal: Free from fall injury  Description: INTERVENTIONS:  - Assess pt frequently for physical needs  - Identify cognitive and physical deficits and behaviors that affect risk of falls.  - [place] fall precautions as indicated by assessment.  - Educate pt/family on patient safety including physical limitations  - Instruct pt to call for assistance with activity based on assessment  - Modify environment to reduce risk of injury  - Provide assistive devices as appropriate  - Consider OT/PT consult to assist with strengthening/mobility  - Encourage toileting schedule  Outcome: Progressing     Problem: CARDIOVASCULAR - ADULT  Goal: Maintains optimal cardiac output and hemodynamic stability  Description: INTERVENTIONS:  - Monitor vital signs, rhythm, and trends  - Monitor for bleeding, hypotension and signs of decreased cardiac output  - Evaluate effectiveness of vasoactive medications to optimize hemodynamic stability  - Monitor arterial and/or venous puncture sites for bleeding and/or hematoma  - Assess quality of pulses, skin color and temperature  - Assess for signs of decreased coronary artery perfusion - ex. Angina  - Evaluate fluid balance, assess for edema, trend weights  Outcome: Progressing  Goal: Absence of cardiac arrhythmias or  at baseline  Description: INTERVENTIONS:  - Continuous cardiac monitoring, monitor vital signs, obtain 12 lead EKG if indicated  - Evaluate effectiveness of antiarrhythmic and heart rate control medications as ordered  - Initiate emergency measures for life threatening arrhythmias  - Monitor electrolytes and administer replacement therapy as ordered  Outcome: Progressing     Problem: RESPIRATORY - ADULT  Goal: Achieves optimal ventilation and oxygenation  Description: INTERVENTIONS:  - Assess for changes in respiratory status  - Assess for changes in mentation and behavior  - Position to facilitate oxygenation and minimize respiratory effort  - Oxygen supplementation based on oxygen saturation or ABGs  - Provide Smoking Cessation handout, if applicable  - Encourage broncho-pulmonary hygiene including cough, deep breathe, Incentive Spirometry  - Assess the need for suctioning and perform as needed  - Assess and instruct to report SOB or any respiratory difficulty  - Respiratory Therapy support as indicated  - Manage/alleviate anxiety  - Monitor for signs/symptoms of CO2 retention  Outcome: Progressing     Problem: METABOLIC/FLUID AND ELECTROLYTES - ADULT  Goal: Glucose maintained within prescribed range  Description: INTERVENTIONS:  - Monitor Blood Glucose as ordered  - Assess for signs and symptoms of hyperglycemia and hypoglycemia  - Administer ordered medications to maintain glucose within target range  - Assess barriers to adequate nutritional intake and initiate nutrition consult as needed  - Instruct patient on self management of diabetes  Outcome: Progressing  Goal: Electrolytes maintained within normal limits  Description: INTERVENTIONS:  - Monitor labs and rhythm and assess patient for signs and symptoms of electrolyte imbalances  - Administer electrolyte replacement as ordered  - Monitor response to electrolyte replacements, including rhythm and repeat lab results as appropriate  - Fluid restriction as  ordered  - Instruct patient on fluid and nutrition restrictions as appropriate  Outcome: Progressing     Problem: HEMATOLOGIC - ADULT  Goal: Free from bleeding injury  Description: (Example usage: patient with low platelets)  INTERVENTIONS:  - Avoid intramuscular injections, enemas and rectal medication administration  - Ensure safe mobilization of patient  - Hold pressure on venipuncture sites to achieve adequate hemostasis  - Assess for signs and symptoms of internal bleeding  - Monitor lab trends  - Patient is to report abnormal signs of bleeding to staff  - Avoid use of toothpicks and dental floss  - Use electric shaver for shaving  - Use soft bristle tooth brush  - Limit straining and forceful nose blowing  Outcome: Progressing

## 2025-06-15 NOTE — CONSULTS
[facility]  Report of Consultation    David Reyes Patient Status:  Inpatient    1945 MRN [id number]   Location [facility] 8NE-A Attending Alejandra Mehta MD   Hosp Day # 3 PCP Thea Harris DO     Reason for Consultation:  LORENA    History of Present Illness:  David Reyes is a 79 year old male with history of DM2, CAD, HFrEF, HTN and MDS who presented with shortness of breth. Nephrology consulted for acute kidney injury on CKD 3b.    Presented to the ER with shortness of breath, edema and cough. Had VQ scan which was positive for PE. Remained hypoxic and SOB and was started on IV diuresis. He reports that he feels that he has been urinating a lot (though about 700mL recorded from yesterday) and feels that his SOB may be slightly better. Serum creatinine up to 2.6 mg/dL this AM, baseline ~2-2.1 mg/dL.     History:  Past Medical History[1]  Past Surgical History[2]  Family History[3]  Denies family history of kidney disease.    reports that he quit smoking about 39 years ago. His smoking use included cigarettes. He started smoking about 65 years ago. He has a 26 pack-year smoking history. He has never used smokeless tobacco. He reports current alcohol use of about 2.0 standard drinks of alcohol per week. He reports that he does not use drugs.    Allergies:  Allergies[4]    Medications:  Current Hospital Medications[5]  Prior to Admission Medications[6]    Review of Systems:  Please see HPI for pertinent positives. 10 point review of systems otherwise reviewed and negative.     Physical Exam:  /81 (BP Location: Left arm)   Pulse 79   Temp 97.5 °F (36.4 °C) (Oral)   Resp 22   Wt 218 lb 8 oz (99.1 kg)   SpO2 100%   BMI 32.27 kg/m²   Temp (24hrs), Av.8 °F (36.6 °C), Min:97.5 °F (36.4 °C), Max:98.1 °F (36.7 °C)       Intake/Output Summary (Last 24 hours) at 6/15/2025 1426  Last data filed at 6/15/2025 1146  Gross per 24 hour   Intake 960 ml   Output 900 ml   Net 60 ml     Wt  Readings from Last 3 Encounters:   06/15/25 218 lb 8 oz (99.1 kg)   05/29/25 217 lb (98.4 kg)   05/22/25 219 lb 3.2 oz (99.4 kg)     General: awake, alert  HEENT: No scleral icterus, MMM  Neck: Supple, no DON or thyromegaly  Cardiac: Regular rate and rhythm, S1, S2 normal  Lungs: Decreased breath sounds at the bases bilaterally.   Abdomen: Soft, non-tender.   Extremities: Without clubbing, cyanosis; 2+ LE edema  Neurologic: Cranial nerves grossly intact, moving all extremities  Skin: Warm and dry, no rashes      Laboratory Data:        Recent Labs   Lab 06/12/25  1340 06/13/25  0351 06/14/25  0454 06/14/25  1610 06/15/25  0436   WBC 1.2* 1.5* 1.6*  --  2.1*   HGB 7.8* 7.4* 6.8* 7.9* 8.0*   MCV 88.3 89.4 86.4  --  88.8   .0* 102.0* 97.0*  --  95.0*   INR  --  1.44*  --   --   --        Recent Labs   Lab 06/12/25  1340 06/13/25  0351 06/14/25  0454 06/15/25  0436    139 138 136   K 4.1 4.0 5.0 4.5    105 104 102   CO2 20.0* 22.0 23.0 20.0*   BUN 30* 31* 42* 54*   CREATSERUM 2.14* 1.95* 2.51* 2.64*   CA 8.9 8.8 8.4* 8.3*   MG  --  2.3  --   --    PHOS  --  3.6  --   --    * 127* 130* 137*       Recent Labs   Lab 06/12/25  1340 06/13/25  0351   ALT <7* 10   AST 24 29   ALB 4.1 3.9       Recent Labs   Lab 06/14/25  1249 06/14/25  1833 06/14/25  2207 06/15/25  0510 06/15/25  1143   PGLU 127* 150* 130* 143* 174*       Imaging:  All imaging studies reviewed.    Assessment / Plan:    1) Acute kidney injury on CKD 3b: baseline serum creatinine appears to be around 1.9-2.1 mg/dL. Current LORENA likely 2/2 CRS. Continue diuresis per cardiology. Discussed with patient that will need to tolerate slightly higher creatinine in order to achieve euvolemia.     2) Acute on chronic HFrEF: cardiology following, plan to transition to bumex gtt. Agree with holding aldactone.     3) Acute PE: on heparin gtt. Per pulm/heme    4) MDS: follows with Dr. Chirinos as outpatient. S/p recent BMBx. Heme following.     Thank you  for allowing me to participate in this patient's care. Please feel free to call me with any questions or concerns.     Dayan Gonzalez MD  06/15/25       [1]   Past Medical History:   Acute kidney injury    Anxiety state    Back problem    CAD (coronary artery disease)    Congestive heart disease (HCC)    Congestive heart failure (HCC)    CORONARY ARTERY DISEASE    cabg    Coronary atherosclerosis    COVID-19    Diabetes (HCC)    Disorder of thyroid    DM (diabetes mellitus) (HCC)    Heart attack (HCC)    High blood pressure    High cholesterol    Muscle weakness    USES A WALKER TO AMBULATE    Other and unspecified hyperlipidemia    Pneumonia    Rheumatoid arthritis (HCC)    Sleep apnea    NO MACHINE/TREATMENT    Subdural hematoma (HCC)    Unspecified sleep apnea    Visual impairment    READING GLASSES   [2]   Past Surgical History:  Procedure Laterality Date    Angiogram  2/11/2015    Angioplasty (coronary)  2/23/15    RCA    Bypass surgery      2007    Cabg  2004    LAD, Diagonal    Cardiac defibrillator placement  6/7/14    [place] IQuum dual chamber ICD    Cath drug eluting stent      Tonsillectomy     [3]   Family History  Problem Relation Age of Onset    Cancer Father     Heart Disease Mother     Other (alzheimers) Mother     Stroke Neg    [4]   Allergies  Allergen Reactions    Pcn [Penicillins]      \"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   [5]   Current Facility-Administered Medications:     bumetanide (Bumex) 12.5 mg in 50 mL infusion, 0.5 mg/hr, Intravenous, Continuous    metoclopramide (Reglan) 5 mg/mL injection 5 mg, 5 mg, Intravenous, Q8H PRN    heparin (Porcine) 27903 units/250mL infusion PE/DVT/THROMBUS CONTINUOUS, 200-3,000 Units/hr, Intravenous, Continuous    levoFLOXacin (Levaquin) tab 750 mg, 750 mg, Oral, Q48HR @ 0700    glucose (Dex4) 15 GM/59ML oral liquid 15 g, 15 g, Oral, Q15 Min PRN **OR** glucose (Glutose)  40% oral gel 15 g, 15 g, Oral, Q15 Min PRN **OR** dextrose 50% injection 50 mL, 50 mL, Intravenous, Q15 Min PRN **OR** glucose (Dex4) 15 GM/59ML oral liquid 30 g, 30 g, Oral, Q15 Min PRN **OR** glucose (Glutose) 40% oral gel 30 g, 30 g, Oral, Q15 Min PRN    ezetimibe (Zetia) tab 10 mg, 10 mg, Oral, Daily    gabapentin (Neurontin) cap 100 mg, 100 mg, Oral, Daily    levothyroxine (Synthroid) tab 25 mcg, 25 mcg, Oral, Before breakfast    [Held by provider] losartan (Cozaar) tab 25 mg, 25 mg, Oral, Daily    metoprolol succinate ER (Toprol XL) 24 hr tab 50 mg, 50 mg, Oral, 2x Daily(Beta Blocker)    [Held by provider] spironolactone (Aldactone) tab 25 mg, 25 mg, Oral, Daily with breakfast    insulin aspart (NovoLOG) 100 Units/mL FlexPen 1-10 Units, 1-10 Units, Subcutaneous, TID AC and HS    acetaminophen (Tylenol Extra Strength) tab 1,000 mg, 1,000 mg, Oral, Q8H PRN    melatonin tab 3 mg, 3 mg, Oral, Nightly PRN    polyethylene glycol (PEG 3350) (Miralax) 17 g oral packet 17 g, 17 g, Oral, Daily PRN    sennosides (Senokot) tab 17.2 mg, 17.2 mg, Oral, Nightly PRN    bisacodyl (Dulcolax) 10 MG rectal suppository 10 mg, 10 mg, Rectal, Daily PRN    benzonatate (Tessalon) cap 200 mg, 200 mg, Oral, TID PRN    guaiFENesin (Robitussin) 100 MG/5 ML oral liquid 200 mg, 200 mg, Oral, Q4H PRN    glycerin-hypromellose- (Artificial Tears) 0.2-0.2-1 % ophthalmic solution 1 drop, 1 drop, Both Eyes, QID PRN    sodium chloride (Saline Mist) 0.65 % nasal solution 1 spray, 1 spray, Each Nare, Q3H PRN    [Held by provider] **PATIENT SUPPLIED** Lovastatin TABS 40 mg, 40 mg, Oral, BID  [6]   No current outpatient medications on file.

## 2025-06-15 NOTE — PLAN OF CARE
Pt alert and oriented x 4 but forgetful at times.  Continuous tele monitoring in place.  Vpaced on the monitor.  Asymptomatic NSTV x2 (9 and 5 beats).  Denies pain and dizziness. Conversational dyspnea as well as dyspnea with activity noted.   O2 at 2L per NC. Heparin drip infusing.  Bumex drip started per cardiology.  Renal contacted regarding the bumex drip and state they are in agreement with iv bumex.  LE edema noted. BP soft.  Cardiology aware.  Per cardiology okay to give evening metoprolol and recheck bp.  Torsemide and lasix dcd.  Primofit in place.  Up to chair throughout shift. Safety and comfort maintained.  Will continue to monitor.      Problem: Diabetes/Glucose Control  Goal: Glucose maintained within prescribed range  Description: INTERVENTIONS:  - Monitor Blood Glucose as ordered  - Assess for signs and symptoms of hyperglycemia and hypoglycemia  - Administer ordered medications to maintain glucose within target range  - Assess barriers to adequate nutritional intake and initiate nutrition consult as needed  - Instruct patient on self management of diabetes  Outcome: Progressing     Problem: PAIN - ADULT  Goal: Verbalizes/displays adequate comfort level or patient's stated pain goal  Description: INTERVENTIONS:  - Encourage pt to monitor pain and request assistance  - Assess pain using appropriate pain scale  - Administer analgesics based on type and severity of pain and evaluate response  - Implement non-pharmacological measures as appropriate and evaluate response  - Consider cultural and social influences on pain and pain management  - Manage/alleviate anxiety  - Utilize distraction and/or relaxation techniques  - Monitor for opioid side effects  - Notify MD/LIP if interventions unsuccessful or patient reports new pain  - Anticipate increased pain with activity and pre-medicate as appropriate  Outcome: Progressing     Problem: SAFETY ADULT - FALL  Goal: Free from fall injury  Description:  INTERVENTIONS:  - Assess pt frequently for physical needs  - Identify cognitive and physical deficits and behaviors that affect risk of falls.  - [place] fall precautions as indicated by assessment.  - Educate pt/family on patient safety including physical limitations  - Instruct pt to call for assistance with activity based on assessment  - Modify environment to reduce risk of injury  - Provide assistive devices as appropriate  - Consider OT/PT consult to assist with strengthening/mobility  - Encourage toileting schedule  Outcome: Progressing     Problem: METABOLIC/FLUID AND ELECTROLYTES - ADULT  Goal: Glucose maintained within prescribed range  Description: INTERVENTIONS:  - Monitor Blood Glucose as ordered  - Assess for signs and symptoms of hyperglycemia and hypoglycemia  - Administer ordered medications to maintain glucose within target range  - Assess barriers to adequate nutritional intake and initiate nutrition consult as needed  - Instruct patient on self management of diabetes  Outcome: Progressing  Goal: Electrolytes maintained within normal limits  Description: INTERVENTIONS:  - Monitor labs and rhythm and assess patient for signs and symptoms of electrolyte imbalances  - Administer electrolyte replacement as ordered  - Monitor response to electrolyte replacements, including rhythm and repeat lab results as appropriate  - Fluid restriction as ordered  - Instruct patient on fluid and nutrition restrictions as appropriate  Outcome: Progressing     Problem: HEMATOLOGIC - ADULT  Goal: Free from bleeding injury  Description: (Example usage: patient with low platelets)  INTERVENTIONS:  - Avoid intramuscular injections, enemas and rectal medication administration  - Ensure safe mobilization of patient  - Hold pressure on venipuncture sites to achieve adequate hemostasis  - Assess for signs and symptoms of internal bleeding  - Monitor lab trends  - Patient is to report abnormal signs of bleeding to staff  -  Avoid use of toothpicks and dental floss  - Use electric shaver for shaving  - Use soft bristle tooth brush  - Limit straining and forceful nose blowing  Outcome: Progressing

## 2025-06-15 NOTE — CM/SW NOTE
Anticipated therapy need: Gradual Rehabilitative Therapy. CLRC sent. Referrals started in [place], need PASRR.    SW/CM to remain available for dc planning, and/or additional need for support.    NACHO Fernandez  Discharge Planner

## 2025-06-15 NOTE — PROGRESS NOTES
[facility]  Pulmonary/Critical Care progress note    David Reyes Patient Status:  Inpatient    1945 MRN [id number]   Location [facility] 8NE-A Attending Sukhdeep Bueno DO   Hosp Day # 3 PCP Thea Harris DO         History of Present Illness:    Still short of breath.  feeling better.    History:  Past Medical History[1]  Past Surgical History[2]  Family History[3]   reports that he quit smoking about 39 years ago. His smoking use included cigarettes. He started smoking about 65 years ago. He has a 26 pack-year smoking history. He has never used smokeless tobacco. He reports current alcohol use of about 2.0 standard drinks of alcohol per week. He reports that he does not use drugs.    Allergies:  Allergies[4]    Medications:  Current Hospital Medications[5]    Intake/Output:    Intake/Output Summary (Last 24 hours) at 6/15/2025 1229  Last data filed at 6/15/2025 1146  Gross per 24 hour   Intake 1571.67 ml   Output 900 ml   Net 671.67 ml      Body mass index is 32.27 kg/m².    Review of Systems  Review of Systems:   A comprehensive 10 point review of systems was completed.  Pertinent positives and negatives noted in the the HPI.         Patient Vitals for the past 24 hrs:   BP Temp Temp src Pulse Resp SpO2 Weight   25 0809 115/83 97.6 °F (36.4 °C) Oral 96 24 98 % --   25 0425 111/74 98.6 °F (37 °C) Oral 95 20 100 % --   25 2258 108/69 98.4 °F (36.9 °C) Oral 99 20 100 % 213 lb (96.6 kg)   25 2100 118/75 -- -- 85 19 100 % --   25 1935 -- -- -- -- -- -- 215 lb (97.5 kg)   25 1900 125/87 -- -- 117 19 100 % --   25 1800 111/81 -- -- 94 26 100 % --   25 1700 114/68 -- -- 99 21 100 % --   25 1615 109/89 -- -- 97 21 100 % --   25 1428 111/69 -- -- 92 18 96 % --   25 1321 118/62 -- -- -- -- -- --   25 1318 -- 97.4 °F (36.3 °C) Oral 102 18 96 % --     Vitals:    06/15/25 0639 06/15/25 0757 06/15/25 0916 06/15/25 1146   BP: 106/76  98/58  105/81   BP Location: Left arm Left arm  Left arm   Pulse: 81 78 81 79   Resp: 24 23  22   Temp:  98.1 °F (36.7 °C)  97.5 °F (36.4 °C)   TempSrc:  Oral  Oral   SpO2: 98% 98% 93% 100%   Weight:             Physical Exam  Constitutional:       General: He is not in acute distress.     Appearance: Normal appearance. He is obese. He is not ill-appearing or diaphoretic.   HENT:      Head: Normocephalic and atraumatic.      Nose: Nose normal. No congestion or rhinorrhea.      Mouth/Throat:      Mouth: Mucous membranes are moist.      Pharynx: Oropharynx is clear. No oropharyngeal exudate or posterior oropharyngeal erythema.      Comments: Mallampati class IV palate   Eyes:      Extraocular Movements: Extraocular movements intact.      Pupils: Pupils are equal, round, and reactive to light.   Cardiovascular:      Rate and Rhythm: Normal rate.      Pulses: Normal pulses.      Heart sounds: Normal heart sounds. No murmur heard.  Pulmonary:      Effort: Pulmonary effort is normal. No respiratory distress.      Breath sounds: Normal breath sounds. No wheezing or rhonchi.      Comments: Diminished breath sounds bilaterally with scattered rhonchi  Chest:      Chest wall: No tenderness.   Abdominal:      General: Abdomen is flat. Bowel sounds are normal.      Palpations: Abdomen is soft.   Musculoskeletal:         General: Normal range of motion.   Skin:     General: Skin is warm.   Neurological:      General: No focal deficit present.      Mental Status: He is alert and oriented to person, place, and time.   Psychiatric:         Mood and Affect: Mood normal.         Behavior: Behavior normal.         Thought Content: Thought content normal.         Judgment: Judgment normal.            Lab Data Review:  Recent Labs   Lab 06/13/25  0351 06/14/25  0454 06/14/25  1610 06/15/25  0436   WBC 1.5* 1.6*  --  2.1*   HGB 7.4* 6.8* 7.9* 8.0*   HCT 25.2* 22.9*  --  27.0*   .0* 97.0*  --  95.0*       Recent Labs   Lab  06/12/25  1340 06/13/25  0351 06/14/25  0454 06/15/25  0436    139 138 136   K 4.1 4.0 5.0 4.5    105 104 102   CO2 20.0* 22.0 23.0 20.0*   BUN 30* 31* 42* 54*   CREATSERUM 2.14* 1.95* 2.51* 2.64*   CA 8.9 8.8 8.4* 8.3*   ALB 4.1 3.9  --   --    ALKPHO 57 56  --   --    ALT <7* 10  --   --    AST 24 29  --   --    * 127* 130* 137*       Recent Labs   Lab 06/13/25  0351   MG 2.3       Lab Results   Component Value Date    PHOS 3.6 06/13/2025        Recent Labs   Lab 06/13/25  0351 06/13/25  1225 06/14/25  1302 06/14/25  2036 06/15/25  0436   INR 1.44*  --   --   --   --    .2*   < > 33.4 145.3* 104.5*    < > = values in this interval not displayed.       Recent Labs   Lab 06/13/25  0956   ABGPHT 7.46*   EGWSTA6P 28*   QMRKM4U 136*   ABGHCO3 22.3   ABGBE -3.4*   TEMP 98.6   MIKI Positive   SITE Right Radial   DEV Nasal cannula   THGB 7.7*       No results for input(s): \"TROP\", \"CKMB\" in the last 168 hours.    Cultures:   Hospital Encounter on 06/12/25   1. Blood Culture     Status: None (Preliminary result)    Collection Time: 06/12/25  5:17 PM    Specimen: Blood,peripheral   Result Value Ref Range    Blood Culture Result No Growth 2 Days N/A           Radiology personally reviewed:  XR CHEST AP PORTABLE  (CPT=71045)  Result Date: 6/14/2025  CONCLUSION:   Decreased vascular congestion and edema.  Residual basilar atelectasis on the left.  There may be trace bilateral effusion.  No sign of pneumothorax.  Stable pacemaker. Consider upright PA and lateral examination of the chest, when tolerated.   LOCATION:  [facility]      Dictated by (CST): Omid Randall MD on 6/14/2025 at 7:36 AM     Finalized by (CST): Omid Randall MD on 6/14/2025 at 7:36 AM       US VENOUS DOPPLER LEG BILAT - DIAG IMG (CPT=93970)  Result Date: 6/13/2025  CONCLUSION:  1. No sonographic evidence for a deep venous thrombosis involving the lower extremities bilaterally. 1. Bilateral Stevens cysts as detailed above.   LOCATION:   [facility]   Dictated by (CST): Anai Dillard MD on 6/13/2025 at 2:31 PM     Finalized by (CST): Anai Dillard MD on 6/13/2025 at 2:33 PM          Problem List[6]  79 year old male.  With history of smoking at least 26 pack years of tobacco although stopped smoking in 1986, with history of coronary artery disease status post CABG, congestive heart failure, W mellitus, hypothyroidism, hypertension, hyperlipidemia, rheumatoid arthritis, anxiety disorder, and diagnosis of sleep apnea syndrome but not being treated with with CPAP.  Who presented to the emergency department 6/12/2025 with complaints of shortness of breath a ventilation/perfusion scan chest performed in the emergency room showed segmental  perfusion defect in right lower lobe consistent with acute pulmonary embolism and a noncontrast CT chest showed groundglass opacities and bilateral lower lobe atelectasis pulmonary consultation was obtained.    Patient reports mild shortness of breath on exertion, he has had some hemoptysis apparently going on for a while.  Denies any cough, sputum production, chest pains, abdominal pains, diarrhea or constipation  Assessment:  Acute hypoxic respiratory failure: Currently supplemental oxygen 2 L/min via nasal cannula but oxygen saturations of 100%  Acute pulmonary embolism as noted on right lower lobe segmental defects on ventilation/perfusion scan.  Ultrasound Doppler lower extremity negative for DVT  Bilateral (right small pleural effusion  Complaints of hemoptysis this could be related to bronchitis or pneumonia though cannot rule out endobronchial lesion or due to pulmonary embolism  Right lower lobe and bilateral upper lobe groundglass opacities : This could represent interstitial edema due to cardiac dysfunction (proBNP 48,096) versus underlying interstitial lung disease patient has previously been treated with methotrexate versus atypical pneumonia although expanded flu panel with COVID-19 is negative: CRP and ESR elevated,  procalcitonin mildly elevated  Small pulmonary nodules: CT chest 6/12/2025:Small nodule in the middle lobe image 94 measuring 4 mm.  5-6 mm nodule right lower lobe image 101.  There may be additional nodules obscured by the ground-glass changes and lower lobe atelectasis.    History of smoking at least 26 pack years tobacco, patient appears at risk for COPD  Coronary artery disease: Status post CABG and PCI in past  HFrEF last ejection fraction of 20 to 25%  Obstructive sleep apnea syndrome, not being treated with CPAP machine  LORENA/CKD  Myelodysplastic syndrome: With pancytopenia  Type 2 diabetes mellitus  Hypertension  Hyperlipidemia  Hypothyroidism  Rheumatoid arthritis  Anxiety disorder  Anemia  Thrombocytopenia: Worsening    Plan:  Wean FiO2 off to keep oxygen saturation between 90 to 92%  Gentle diuresis  Cardiology follow-up  Continue levofloxacin  Continue IV heparin drip  Switch to oral anticoagulation when okay with hematology  Hematology consult appreciated  DuoNebs every 6 hours  DVT prophylaxis: SCD boots  /heparin drip  GI prophylaxis: ---  Will follow for further recommendations    Thank You for allowing me to participate in this patient's care     Suleman Grossman MD       Note to the patient: The 21st Century Cures Act makes medical notes like these available to patients in the interest of transparency. However, be advised that this is a medical document. It is intended as peer to peer communication. It is written in medical language and may contain abbreviations or verbiage that are unfamiliar. It may appear blunt or direct. Medical documents are intended to carry relevant information, facts as evident, and clinical opinion of the practitioner.      Disclaimer: Components of this note were documented using voice recognition system and are subject to errors not corrected at proofreading. Contact the author of this note for any clarifications         [1]   Past Medical History:   Acute kidney injury     Anxiety state    Back problem    CAD (coronary artery disease)    Congestive heart disease (HCC)    Congestive heart failure (HCC)    CORONARY ARTERY DISEASE    cabg    Coronary atherosclerosis    COVID-19    Diabetes (HCC)    Disorder of thyroid    DM (diabetes mellitus) (HCC)    Heart attack (HCC)    High blood pressure    High cholesterol    Muscle weakness    USES A WALKER TO AMBULATE    Other and unspecified hyperlipidemia    Pneumonia    Rheumatoid arthritis (HCC)    Sleep apnea    NO MACHINE/TREATMENT    Subdural hematoma (HCC)    Unspecified sleep apnea    Visual impairment    READING GLASSES   [2]   Past Surgical History:  Procedure Laterality Date    Angiogram  2/11/2015    Angioplasty (coronary)  2/23/15    RCA    Bypass surgery      2007    Cabg  2004    LAD, Diagonal    Cardiac defibrillator placement  6/7/14    TagMii dual chamber ICD    Cath drug eluting stent      Tonsillectomy     [3]   Family History  Problem Relation Age of Onset    Cancer Father     Heart Disease Mother     Other (alzheimers) Mother     Stroke Neg    [4]   Allergies  Allergen Reactions    Pcn [Penicillins]      \"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   [5]   Current Facility-Administered Medications:     metoclopramide (Reglan) 5 mg/mL injection 5 mg, 5 mg, Intravenous, Q8H PRN    [Held by provider] furosemide (Lasix) 10 mg/mL injection 80 mg, 80 mg, Intravenous, BID (Diuretic)    heparin (Porcine) 91622 units/250mL infusion PE/DVT/THROMBUS CONTINUOUS, 200-3,000 Units/hr, Intravenous, Continuous    levoFLOXacin (Levaquin) tab 750 mg, 750 mg, Oral, Q48HR @ 0700    glucose (Dex4) 15 GM/59ML oral liquid 15 g, 15 g, Oral, Q15 Min PRN **OR** glucose (Glutose) 40% oral gel 15 g, 15 g, Oral, Q15 Min PRN **OR** dextrose 50% injection 50 mL, 50 mL, Intravenous, Q15 Min PRN **OR** glucose (Dex4) 15 GM/59ML oral liquid 30 g, 30 g, Oral, Q15 Min PRN  **OR** glucose (Glutose) 40% oral gel 30 g, 30 g, Oral, Q15 Min PRN    ezetimibe (Zetia) tab 10 mg, 10 mg, Oral, Daily    gabapentin (Neurontin) cap 100 mg, 100 mg, Oral, Daily    levothyroxine (Synthroid) tab 25 mcg, 25 mcg, Oral, Before breakfast    [Held by provider] losartan (Cozaar) tab 25 mg, 25 mg, Oral, Daily    metoprolol succinate ER (Toprol XL) 24 hr tab 50 mg, 50 mg, Oral, 2x Daily(Beta Blocker)    [Held by provider] spironolactone (Aldactone) tab 25 mg, 25 mg, Oral, Daily with breakfast    [Held by provider] torsemide (Demadex) tab 20 mg, 20 mg, Oral, BID (Diuretic)    insulin aspart (NovoLOG) 100 Units/mL FlexPen 1-10 Units, 1-10 Units, Subcutaneous, TID AC and HS    acetaminophen (Tylenol Extra Strength) tab 1,000 mg, 1,000 mg, Oral, Q8H PRN    melatonin tab 3 mg, 3 mg, Oral, Nightly PRN    polyethylene glycol (PEG 3350) (Miralax) 17 g oral packet 17 g, 17 g, Oral, Daily PRN    sennosides (Senokot) tab 17.2 mg, 17.2 mg, Oral, Nightly PRN    bisacodyl (Dulcolax) 10 MG rectal suppository 10 mg, 10 mg, Rectal, Daily PRN    benzonatate (Tessalon) cap 200 mg, 200 mg, Oral, TID PRN    guaiFENesin (Robitussin) 100 MG/5 ML oral liquid 200 mg, 200 mg, Oral, Q4H PRN    glycerin-hypromellose- (Artificial Tears) 0.2-0.2-1 % ophthalmic solution 1 drop, 1 drop, Both Eyes, QID PRN    sodium chloride (Saline Mist) 0.65 % nasal solution 1 spray, 1 spray, Each Nare, Q3H PRN    [Held by provider] **PATIENT SUPPLIED** Lovastatin TABS 40 mg, 40 mg, Oral, BID  [6]   Patient Active Problem List  Diagnosis    Nonischemic cardiomyopathy (HCC)    FLAKO (obstructive sleep apnea)    Congestive heart failure (HCC)    CAD (coronary artery disease)    Rheumatoid arthritis involving multiple sites with positive rheumatoid factor (HCC)    Type 2 diabetes mellitus with hyperglycemia, with long-term current use of insulin (HCC)    Normocytic anemia    Thrombocytopenia    Ischemic cardiomyopathy    Dyspnea on exertion    Dry mouth     Pure hypercholesterolemia    Vitamin D deficiency    Pulmonary hypertension secondary to increased PVR (HCC)    Carotid artery stenosis    Obesity (BMI 30.0-34.9)    Chronic obstructive pulmonary disease, unspecified (HCC)    Weakness generalized    Aortic stenosis, mild    Hyponatremia    Pancytopenia (HCC)    Acute respiratory failure with hypoxia (HCC)    Immunocompromised state (HCC)    Wide-complex tachycardia    Acute on chronic anemia    Type 2 diabetes mellitus without complication, with long-term current use of insulin (HCC)    Foot pain, bilateral    Hypothyroid    Pulmonary hypertension (HCC)    HFrEF (heart failure with reduced ejection fraction) ([facility])    PE (pulmonary thromboembolism) ([facility])    Primary hypertension    Dyspnea, unspecified type    Hemoptysis    Metabolic acidosis    Acute kidney injury    Acute pulmonary embolism, unspecified pulmonary embolism type, unspecified whether acute cor pulmonale present (HCC)    MDS (myelodysplastic syndrome) ([facility])

## 2025-06-15 NOTE — PROGRESS NOTES
Progress Note  David Reyes Patient Status:  Inpatient    1945 MRN [id number]   Location [facility] 8NE-A Attending Alejandra Mehta MD   Hosp Day # 3 PCP Thea Harris,      Subjective:  Pt states his breathing is better than yesterday; however, remains conversationally dyspneic on exam. Denies CP. Currently on 2L O2 with sats 98-99%.     Objective:  BP 98/58 (BP Location: Left arm)   Pulse 78   Temp 98.1 °F (36.7 °C) (Oral)   Resp 23   Wt 218 lb 8 oz (99.1 kg)   SpO2 98%   BMI 32.27 kg/m²     Telemetry: A sensed, V paced; freq PVC's    Intake/Output:    Intake/Output Summary (Last 24 hours) at 6/15/2025 0846  Last data filed at 6/15/2025 0757  Gross per 24 hour   Intake 1691.67 ml   Output 800 ml   Net 891.67 ml       Last 3 Weights   06/15/25 0508 218 lb 8 oz (99.1 kg)   25 0434 217 lb 13 oz (98.8 kg)   25 2258 213 lb (96.6 kg)   25 1935 215 lb (97.5 kg)   25 1506 217 lb (98.4 kg)   25 1102 219 lb 3.2 oz (99.4 kg)       Labs:  Recent Labs   Lab 25  0351 25  0454 06/15/25  0436   * 130* 137*   BUN 31* 42* 54*   CREATSERUM 1.95* 2.51* 2.64*   EGFRCR 35* 26* 24*   CA 8.8 8.4* 8.3*    138 136   K 4.0 5.0 4.5    104 102   CO2 22.0 23.0 20.0*     Recent Labs   Lab 25  1340 25  0351 25  0454 25  1610 06/15/25  0436   RBC 3.00* 2.82* 2.65*  --  3.04*   HGB 7.8* 7.4* 6.8* 7.9* 8.0*   HCT 26.5* 25.2* 22.9*  --  27.0*   MCV 88.3 89.4 86.4  --  88.8   MCH 26.0 26.2 25.7*  --  26.3   MCHC 29.4* 29.4* 29.7*  --  29.6*   RDW 19.9 20.1 19.6  --  19.0   NEPRELIM 0.51* 0.34*  --   --  0.71*   WBC 1.2* 1.5* 1.6*  --  2.1*   .0* 102.0* 97.0*  --  95.0*         Recent Labs   Lab 25  1340   TROPHS 71*       Diagnostics:  No results found.   Review of Systems   Cardiovascular:  Positive for dyspnea on exertion, leg swelling and orthopnea. Negative for chest pain.   Respiratory:  Positive for cough and shortness of  breath.        Physical Exam:    Gen: alert, oriented x 3, NAD  Heent: pupils equal, reactive. Mucous membranes moist.   Neck: no jvd  Cardiac: regular rate and rhythm, normal S1,S2, no murmur, clicks, rub or gallop  Lungs: diminished  Abd: soft, NT/ND +bs  Ext: BLE 1+ edema  Skin: Warm, dry  Neuro: No focal deficits      Medications:    Scheduled Medications[1]  Medication Infusions[2]      Assessment:  Acute Hypoxic Respiratory Failure - Multifactorial (PE, Poss PNA, HF)  On 2L O2 NC  IV antibx  Pulm following  Acute Pulmonary Embolism  Elev D Dimer, VQ + mod size perfusion defect RLL  Likely not a candidate for invasive procedure d/t MDS  US BLE neg for DVT   On IV heparin gtt  Pulm/Heme following  Acute on Chronic Biventricular HFrEF, ICM, Pulmonary HTN  proBNP 46,096; CT Chest w/small nagi pleural effusion, GGO  Repeat CXR w/improving pulm edema  Echo LVEF 15-20%, no RWMA, mildly reduced RV function, mild AI, mild-mod AVS, mild-mod MR, mild-mod TR, PASP 55-60   Diuresis w/IV Lasix; increased to 80mg BID yesterday  Cr uptrending   I&O incomplete, wt up? Bed scale  GDMT - on toprol; losartan, aldactone, torsemide on hold  Not on ARNI d/t cost  Hx [employer] CRT-D   Mildly Elevated Troponin - 71  Likely 2/2 above - supply/demand mismatch  LORENA on CKD  Cr uptrending  Hx CAD s/p CABG, Prior PCI   Not on asa, plavix d/t MDS  Hx [employer] CRT-D  Hypertension - controlled  Losartan, MRA on hold d/t LORENA  Hyperlipidemia - LDL 90, on lovastatin, zetia  Pancytopenia, Hx MDS  Hgb 6.8 - PRBC yesterday; Hgb 8.0  WBC, Plt stable  Heme following  DMII - A1C 5.8  Former Tobacco Abuse  FLAKO - not prev treated  Rheumatoid Arthritis - follows w/Dr Tillman  Hypothyroidism    Plan:  Continue IV heparin gtt   Hgb improved after PRBC yesterday  Losartan, spironolactone on hold w/LORENA/CKD  Still w/continued SOB - increased IV lasix yesterday; I&O incomplete but appears only modest urine output. Cr slightly uptrended again today.    Strict I&O, daily weights (standing if possible), daily BMP    Plan of care discussed with patient, RN.    Silvia Lozano, APRN  6/15/2025  8:46 AM          Patient seen and examined independently.  Note reviewed and labs reviewed.  Agree to the assessment and plan with the following additions/changes.  Entire medical decision making & plan performed by myself.    General: NAD.  HEENT: Normocephalic.  Neck: Supple.  Cardiac: RRR. Normal S1, S2 . No murmur.  Lungs: Diminished, crackles.  Extremities: LE edema 3+.    A/P:  Remains hypoxic and dyspnic. Somewhat out of proportion to PE (Unilateral on V/Q)  Likely cardiorenal syndrome with Cr uptrend. Responded well to higher dose lasix but still need more diuresis  Start bumex drip  Monitor Cr, Uoutput, I/Os, O2 req  Hold ARB, MRA    LOC L3    Sveta Erickson MD  6/15/2025  Interventional Cardiology  [facility]  =======================================================           [1]    [Held by provider] furosemide  80 mg Intravenous BID (Diuretic)    levoFLOXacin  750 mg Oral Q48HR @ 0700    ezetimibe  10 mg Oral Daily    gabapentin  100 mg Oral Daily    levothyroxine  25 mcg Oral Before breakfast    [Held by provider] losartan  25 mg Oral Daily    metoprolol succinate ER  50 mg Oral 2x Daily(Beta Blocker)    [Held by provider] spironolactone  25 mg Oral Daily with breakfast    [Held by provider] torsemide  20 mg Oral BID (Diuretic)    insulin aspart  1-10 Units Subcutaneous TID AC and HS    [Held by provider] Lovastatin  40 mg Oral BID   [2]    continuous dose heparin 1,300 Units/hr (06/15/25 0141)

## 2025-06-15 NOTE — PROGRESS NOTES
[facility] Progress Note      Patient Name: David Reyes   YOB: 1945  Medical Record Number: [id number]  Attending Physician: Pedro Bhakta M.D.     The 21st Century Cures Act makes medical notes like these available to patients in the interest of transparency. Please be advised this is a medical document. Medical documents are intended to carry relevant information, facts as evident, and the clinical opinion of the practitioner. The medical note is intended as peer to peer communication and may appear blunt or direct. It is written in medical language and may contain abbreviations or verbiage that are unfamiliar.      Patient transfused yesterday with adequate response. On prophylactic levofloxacin and afebrile. No transfusion needed today.    Electronically signed by:    Pedro Bhakta M.D.

## 2025-06-15 NOTE — PROGRESS NOTES
[facility]   part of [facility]     Hospitalist Progress Note     David Reyes Patient Status:  Inpatient    1945 MRN [id number]   Location [facility] 8NE-A Attending Sukhdeep Bueno DO   Hosp Day # 3 PCP Thea Harris DO     Chief Complaint: Dyspnea     Subjective:     Patient  feels better today    Objective:    Review of Systems:   A comprehensive review of systems was completed; pertinent positive and negatives stated in subjective.    Vital signs:  Temp:  [97.7 °F (36.5 °C)-98.1 °F (36.7 °C)] 98.1 °F (36.7 °C)  Pulse:  [78-86] 81  Resp:  [18-24] 23  BP: ()/(58-76) 98/58  SpO2:  [93 %-100 %] 93 %    Physical Exam:    General: No acute distress  Respiratory: No wheezes, no rhonchi  Cardiovascular: S1, S2, regular rate and rhythm  Abdomen: Soft, Non-tender, non-distended, positive bowel sounds  Neuro: No new focal deficits.   Extremities: pitting edema       Diagnostic Data:    Labs:  Recent Labs   Lab 25  1340 25  0351 25  0454 25  1610 06/15/25  0436   WBC 1.2* 1.5* 1.6*  --  2.1*   HGB 7.8* 7.4* 6.8* 7.9* 8.0*   MCV 88.3 89.4 86.4  --  88.8   .0* 102.0* 97.0*  --  95.0*   INR  --  1.44*  --   --   --        Recent Labs   Lab 25  1340 25  0351 25  0454 06/15/25  0436   * 127* 130* 137*   BUN 30* 31* 42* 54*   CREATSERUM 2.14* 1.95* 2.51* 2.64*   CA 8.9 8.8 8.4* 8.3*   ALB 4.1 3.9  --   --     139 138 136   K 4.1 4.0 5.0 4.5    105 104 102   CO2 20.0* 22.0 23.0 20.0*   ALKPHO 57 56  --   --    AST 24 29  --   --    ALT <7* 10  --   --    BILT 0.8 0.7  --   --    TP 7.4 7.0  --   --        Estimated Glomerular Filtration Rate: 24 mL/min/1.73m2 (A) (result from lab).    Recent Labs   Lab 25  1340   TROPHS 71*       Recent Labs   Lab 25  0351   PTP 17.6*   INR 1.44*                  Microbiology    Hospital Encounter on 25   1. Blood Culture     Status: None (Preliminary result)    Collection Time:  06/12/25  5:17 PM    Specimen: Blood,peripheral   Result Value Ref Range    Blood Culture Result No Growth 2 Days N/A         Imaging: Reviewed in Epic.    Medications: Scheduled Medications[1]    Assessment & Plan:      # Acute hypoxic resp failure due to pleural effusions, PNA and pulmonary embolus - improving  IV Abx   Hep drip     # HFrEF, ICD- with acute exacerbation  As above  Cardiology on cs  I/O  Monitor Cr, e-  IV Lasix reordered on 6/14 BID     #H/o CAD s/p CABG    #Acute PE-  Heparin drip  Doppler LE NEG     #Hypertension    #HLD    #Hypothyroid  Levothyroxine     #FLAKO     #MDS with pancytopenia   Transfuse for hb< 7-  1u PRBC on 6/14/25  Onc on CS       Alejandra Mehta MD    Supplementary Documentation:     Quality:  DVT Mechanical Prophylaxis:   SCDs, Early ambuation  DVT Pharmacologic Prophylaxis   Medication    heparin (Porcine) 49533 units/250mL infusion PE/DVT/THROMBUS CONTINUOUS                Code Status: Full Code  Beltre: External urinary catheter in place  Beltre Duration (in days):   Central line:    GABE:     Discharge is dependent on: clinical   At this point Mr. Reyes is expected to be discharge to: tbd    The 21st Century Cures Act makes medical notes like these available to patients in the interest of transparency. Please be advised this is a medical document. Medical documents are intended to carry relevant information, facts as evident, and the clinical opinion of the practitioner. The medical note is intended as peer to peer communication and may appear blunt or direct. It is written in medical language and may contain abbreviations or verbiage that are unfamiliar.              **Certification      PHYSICIAN Certification of Need for Inpatient Hospitalization - Initial Certification    Patient will require inpatient services that will reasonably be expected to span two midnight's based on the clinical documentation in H+P.   Based on patients current state of illness, I anticipate that,  after discharge, patient will require TBD.         [1]    [Held by provider] furosemide  80 mg Intravenous BID (Diuretic)    levoFLOXacin  750 mg Oral Q48HR @ 0700    ezetimibe  10 mg Oral Daily    gabapentin  100 mg Oral Daily    levothyroxine  25 mcg Oral Before breakfast    [Held by provider] losartan  25 mg Oral Daily    metoprolol succinate ER  50 mg Oral 2x Daily(Beta Blocker)    [Held by provider] spironolactone  25 mg Oral Daily with breakfast    [Held by provider] torsemide  20 mg Oral BID (Diuretic)    insulin aspart  1-10 Units Subcutaneous TID AC and HS    [Held by provider] Lovastatin  40 mg Oral BID

## 2025-06-15 NOTE — PHYSICAL THERAPY NOTE
PHYSICAL THERAPY EVALUATION - INPATIENT     Room Number: 8610/8610-A  Evaluation Date: 6/15/2025  Type of Evaluation: Initial  Physician Order: PT Eval and Treat    Presenting Problem: acute hypoxic respiratory failure, acute PE, HF, ischemic cardiomyopathy, pancytopenia in setting of MDS  Co-Morbidities : CAD s/p CABG, HFrEF s/p ICD, DM2, HTN, Hypothyroidism, HLD, FLAKO, RA  Reason for Therapy: Mobility Dysfunction and Discharge Planning    PHYSICAL THERAPY ASSESSMENT   Patient is a 79 year old male admitted 6/12/2025 for acute hypoxic respiratory failure, acute PE, HF, ischemic cardiomyopathy, pancytopenia in setting of MDS.  Prior to admission, patient's baseline is ambulatory with the use of a RW.  Patient is currently functioning below baseline with bed mobility, transfers, gait, and standing prolonged periods.  Patient is requiring moderate assist and maximum assist as a result of the following impairments: decreased functional strength, decreased endurance/aerobic capacity, pain, impaired sitting and standing balance, decreased muscular endurance, medical status, and increased O2 needs from baseline.  Physical therapy will continue to follow for duration of hospitalization.    Patient will benefit from continued skilled PT Services to promote return to prior level of function and safety with continuous assistance and gradual rehabilitative therapy .    PLAN DURING HOSPITALIZATION  Nursing Mobility Recommendation : 1 Assist  PT Device Recommendation: Rolling walker  PT Treatment Plan: Bed mobility, Endurance, Gait training, Body mechanics, Energy conservation, Patient education, Strengthening, Transfer training, Balance training  Rehab Potential : Good  Frequency (Obs): 3-5x/week     CURRENT GOALS    Goal #1 Patient is able to demonstrate supine - sit EOB @ level: minimum assistance     Goal #2 Patient is able to demonstrate transfers Sit to/from Stand at assistance level: moderate assistance     Goal #3 Patient  is able to ambulate 50 feet with assist device: walker - rolling at assistance level: minimum assistance     Goal #4    Goal #5    Goal #6    Goal Comments: Goals established on 6/15/2025      PHYSICAL THERAPY MEDICAL/SOCIAL HISTORY  History related to current admission: Patient is a 79 year old male who presented to the ED on 2025 from PCP office for shortness of breath, hemoptysis, and fatigue.  Pt diagnosed with acute hypoxic respiratory failure, acute PE, HF, ischemic cardiomyopathy, pancytopenia in setting of MDS.    Relevant Hospitalizations:  24-24 with respiratory failure, COVID, PNA, septic shock; discharged to SNF at [facility], pt reports he discharged home from SNF in 2024.        HOME SITUATION  Type of Home: House  Home Layout: One level  Stairs to Enter : 2   Railing: No              Lives With: Spouse    Drives: Yes   Patient Regularly Uses: Reading glasses, Rollator      Prior Level of [place]: The pt reports that typically he is able to ambulate independently, but since being discharged from SNF in November, intermittently uses a RW.  Pt states he is independent with I/ADL's.  Pt does physically assist his spouse to get in/out of shower.  Pt denies any recent falls.    SUBJECTIVE  In regards to standing, \"I think I can, but I don't really want to.\"    OBJECTIVE  Precautions: Bed/chair alarm  Fall Risk: High fall risk    WEIGHT BEARING RESTRICTION     PAIN ASSESSMENT  Ratin  Location: BLE feet and ankles  Management Techniques: Relaxation, Repositioning    COGNITION  Overall Cognitive Status:  WFL - within functional limits  Arousal/Alertness:  appropriate responses to stimuli  Memory:  appears intact  Following Commands:  follows all commands and directions without difficulty    RANGE OF MOTION AND STRENGTH ASSESSMENT  Upper extremity ROM in WFL and strength is grossly limited 3+/5    Lower extremity ROM is within functional limits     Lower extremity strength is grossly  limited 4/5    BALANCE  Static Sitting: Fair -  Dynamic Sitting: Poor +  Static Standing: Poor  Dynamic Standing: Poor        ACTIVITY TOLERANCE  Pulse: 79  Heart Rate Source: Monitor                   O2 WALK  Oxygen Therapy  SPO2% on Oxygen at Rest: 100  At rest oxygen flow (liters per minute): 2  SPO2% Ambulation on Oxygen: 93  Ambulation oxygen flow (liters per minute): 2    MODIFIED CHUY DYSPNEA SCALE - (SHORTNESS OF BREATH SCALE)= 6 sitting at rest.  Pt with conversational dyspnea.    SCORE DESCRIPTION   0 Nothing at all   1 Very slight   2 Slight   3 Moderate   4 Somewhat severe   5 Strong or hard breathing   6    7 Very hard breathing   8    9 Very, Very Severe   10 MAX - You need to stop     Patient Education provided:    Use this scale to rate the difficulty of your breathing:  - As you would rate your pain from 0-10, you can rate your SOB from 0-10  - Goal is to stay below 7/10 with activity  - If you reach beyond 7/10, it is important to rest; stop activity and if you need to sit down to recover    AM-PAC '6-Clicks' INPATIENT SHORT FORM - BASIC MOBILITY  How much difficulty does the patient currently have...  Patient Difficulty: Turning over in bed (including adjusting bedclothes, sheets and blankets)?: A Lot   Patient Difficulty: Sitting down on and standing up from a chair with arms (e.g., wheelchair, bedside commode, etc.): A Lot   Patient Difficulty: Moving from lying on back to sitting on the side of the bed?: A Lot   How much help from another person does the patient currently need...   Help from Another: Moving to and from a bed to a chair (including a wheelchair)?: A Lot   Help from Another: Need to walk in hospital room?: Total   Help from Another: Climbing 3-5 steps with a railing?: Total     AM-PAC Score:  Raw Score: 10   Approx Degree of Impairment: 76.75%   Standardized Score (AM-PAC Scale): 32.29   CMS Modifier (G-Code): CL    FUNCTIONAL ABILITY STATUS  Gait Assessment   Functional  Mobility/Gait Assessment  Gait Assistance: Moderate assistance  Distance (ft): 2  Assistive Device: Rolling walker  Pattern: Shuffle (decreased step length, clearance, and jessica)    Skilled Therapy Provided     Bed Mobility:  Rolling: NT  Supine to sit: NT   Sit to supine: NT     Transfer Mobility:  Sit to stand: Max A   Stand to sit: Mod A  Gait = Mod A    Therapist's Comments: Pt approached for therapy sitting upright in chair.  Gait belt applied.  Pt completed sit>stand to RW with Max A.  Pt requires increased time to stabilize in static standing and achieve trunk extension.  Pt completed side stepping with Mod A.  Further mobility limited by dyspnea.    Exercise/Education Provided:  Functional activity tolerated  Gait training  Neuromuscular re-educate  Posture  Strengthening  Transfer training    Patient End of Session: Up in chair, Needs met, Call light within reach, RN aware of session/findings, All patient questions and concerns addressed, Hospital anti-slip socks, Alarm set      Patient Evaluation Complexity Level:  History High - 3 or more personal factors and/or co-morbidities   Examination of body systems Moderate - addressing a total of 3 or more elements   Clinical Presentation  Moderate - Evolving   Clinical Decision Making Moderate Complexity       PT Session Time: 30 minutes  Gait Training: 3 minutes  Therapeutic Activity: 8 minutes

## 2025-06-16 ENCOUNTER — MED REC SCAN ONLY (OUTPATIENT)
Dept: FAMILY MEDICINE CLINIC | Facility: CLINIC | Age: 80
End: 2025-06-16

## 2025-06-16 PROBLEM — N18.32 CKD STAGE 3B, GFR 30-44 ML/MIN (HCC): Status: ACTIVE | Noted: 2025-06-15

## 2025-06-16 PROBLEM — N17.9 AKI (ACUTE KIDNEY INJURY): Status: ACTIVE | Noted: 2025-06-12

## 2025-06-16 LAB
ANION GAP SERPL CALC-SCNC: 16 MMOL/L (ref 0–18)
APTT PPP: 106.3 SECONDS (ref 23–36)
APTT PPP: 76.1 SECONDS (ref 23–36)
BASOPHILS # BLD AUTO: 0.01 X10(3) UL (ref 0–0.2)
BASOPHILS NFR BLD AUTO: 0.6 %
BUN BLD-MCNC: 48 MG/DL (ref 9–23)
CALCIUM BLD-MCNC: 8.3 MG/DL (ref 8.7–10.6)
CELLS ANALYZED LEUK/LYM: 18
CELLS COUNTED LEUK/LYM: 18
CELLS KARYOTYPED LEUK/LYM: 4
CHLORIDE SERPL-SCNC: 101 MMOL/L (ref 98–112)
CO2 SERPL-SCNC: 17 MMOL/L (ref 21–32)
CREAT BLD-MCNC: 2.55 MG/DL (ref 0.7–1.3)
EGFRCR SERPLBLD CKD-EPI 2021: 25 ML/MIN/1.73M2 (ref 60–?)
EOSINOPHIL # BLD AUTO: 0 X10(3) UL (ref 0–0.7)
EOSINOPHIL NFR BLD AUTO: 0 %
ERYTHROCYTE [DISTWIDTH] IN BLOOD BY AUTOMATED COUNT: 19.8 %
GLUCOSE BLD-MCNC: 116 MG/DL (ref 70–99)
GLUCOSE BLD-MCNC: 133 MG/DL (ref 70–99)
GLUCOSE BLD-MCNC: 136 MG/DL (ref 70–99)
GLUCOSE BLD-MCNC: 174 MG/DL (ref 70–99)
GLUCOSE BLD-MCNC: 185 MG/DL (ref 70–99)
GTG BAND LEUK/LYM: 400
HCT VFR BLD AUTO: 25.5 % (ref 39–53)
HGB BLD-MCNC: 7.3 G/DL (ref 13–17.5)
IMM GRANULOCYTES # BLD AUTO: 0.01 X10(3) UL (ref 0–1)
IMM GRANULOCYTES NFR BLD: 0.6 %
LYMPHOCYTES # BLD AUTO: 0.79 X10(3) UL (ref 1–4)
LYMPHOCYTES NFR BLD AUTO: 48.8 %
MCH RBC QN AUTO: 26.4 PG (ref 26–34)
MCHC RBC AUTO-ENTMCNC: 28.6 G/DL (ref 31–37)
MCV RBC AUTO: 92.1 FL (ref 80–100)
MONOCYTES # BLD AUTO: 0.2 X10(3) UL (ref 0.1–1)
MONOCYTES NFR BLD AUTO: 12.3 %
NEUTROPHILS # BLD AUTO: 0.61 X10 (3) UL (ref 1.5–7.7)
NEUTROPHILS # BLD AUTO: 0.61 X10(3) UL (ref 1.5–7.7)
NEUTROPHILS NFR BLD AUTO: 37.7 %
OSMOLALITY SERPL CALC.SUM OF ELEC: 292 MOSM/KG (ref 275–295)
PLATELET # BLD AUTO: 74 10(3)UL (ref 150–450)
PLATELETS.RETICULATED NFR BLD AUTO: 15.8 % (ref 0–7)
POTASSIUM SERPL-SCNC: 4.6 MMOL/L (ref 3.5–5.1)
RBC # BLD AUTO: 2.77 X10(6)UL (ref 3.8–5.8)
SODIUM SERPL-SCNC: 134 MMOL/L (ref 136–145)
WBC # BLD AUTO: 1.6 X10(3) UL (ref 4–11)

## 2025-06-16 PROCEDURE — 99232 SBSQ HOSP IP/OBS MODERATE 35: CPT | Performed by: STUDENT IN AN ORGANIZED HEALTH CARE EDUCATION/TRAINING PROGRAM

## 2025-06-16 PROCEDURE — 99233 SBSQ HOSP IP/OBS HIGH 50: CPT | Performed by: INTERNAL MEDICINE

## 2025-06-16 PROCEDURE — 99232 SBSQ HOSP IP/OBS MODERATE 35: CPT | Performed by: INTERNAL MEDICINE

## 2025-06-16 RX ORDER — ACYCLOVIR 200 MG/1
200 CAPSULE ORAL 2 TIMES DAILY
Status: DISCONTINUED | OUTPATIENT
Start: 2025-06-16 | End: 2025-06-24

## 2025-06-16 NOTE — PLAN OF CARE
Shift Note:  Patient alert and oriented x4. Resting in bed, quite pale, dry heaving with activity but denies nausea.   Patient on 2L nasal cannula, labored breathing, conversation dyspnea, easily short of breath with minimal activity.    Denies any cardiac symptoms, Vpaced on tele. Denies pain at this time.   Continent of bowel and incontinent of bladder due to frequency, primofit in place.   Ambulates with 2 assist and a walker, call light within reach, tolerating care well.     POC:  - IV Bumex gtts   - Hep gtts   - PT/OT to see  - ABX every other day   - SKYE at discharge   - Monitor Hgb, transfuse if < 7.0        Problem: Diabetes/Glucose Control  Goal: Glucose maintained within prescribed range  Description: INTERVENTIONS:  - Monitor Blood Glucose as ordered  - Assess for signs and symptoms of hyperglycemia and hypoglycemia  - Administer ordered medications to maintain glucose within target range  - Assess barriers to adequate nutritional intake and initiate nutrition consult as needed  - Instruct patient on self management of diabetes  Outcome: Progressing     Problem: Patient/Family Goals  Goal: Patient/Family Long Term Goal  Description: Patient's Long Term Goal:   To return home asap    Interventions:  - Cardiology consult  2 D echo with doppler  - See additional Care Plan goals for specific interventions  Outcome: Progressing  Goal: Patient/Family Short Term Goal  Description: Patient's Short Term Goal:  t get rid of shortness of breath    Interventions:   - medication adjustment      Supplemental oxygen as needed  - See additional Care Plan goals for specific interventions  Outcome: Progressing     Problem: PAIN - ADULT  Goal: Verbalizes/displays adequate comfort level or patient's stated pain goal  Description: INTERVENTIONS:  - Encourage pt to monitor pain and request assistance  - Assess pain using appropriate pain scale  - Administer analgesics based on type and severity of pain and evaluate  response  - Implement non-pharmacological measures as appropriate and evaluate response  - Consider cultural and social influences on pain and pain management  - Manage/alleviate anxiety  - Utilize distraction and/or relaxation techniques  - Monitor for opioid side effects  - Notify MD/LIP if interventions unsuccessful or patient reports new pain  - Anticipate increased pain with activity and pre-medicate as appropriate  Outcome: Progressing     Problem: SAFETY ADULT - FALL  Goal: Free from fall injury  Description: INTERVENTIONS:  - Assess pt frequently for physical needs  - Identify cognitive and physical deficits and behaviors that affect risk of falls.  - [place] fall precautions as indicated by assessment.  - Educate pt/family on patient safety including physical limitations  - Instruct pt to call for assistance with activity based on assessment  - Modify environment to reduce risk of injury  - Provide assistive devices as appropriate  - Consider OT/PT consult to assist with strengthening/mobility  - Encourage toileting schedule  Outcome: Progressing     Problem: CARDIOVASCULAR - ADULT  Goal: Maintains optimal cardiac output and hemodynamic stability  Description: INTERVENTIONS:  - Monitor vital signs, rhythm, and trends  - Monitor for bleeding, hypotension and signs of decreased cardiac output  - Evaluate effectiveness of vasoactive medications to optimize hemodynamic stability  - Monitor arterial and/or venous puncture sites for bleeding and/or hematoma  - Assess quality of pulses, skin color and temperature  - Assess for signs of decreased coronary artery perfusion - ex. Angina  - Evaluate fluid balance, assess for edema, trend weights  Outcome: Progressing  Goal: Absence of cardiac arrhythmias or at baseline  Description: INTERVENTIONS:  - Continuous cardiac monitoring, monitor vital signs, obtain 12 lead EKG if indicated  - Evaluate effectiveness of antiarrhythmic and heart rate control medications as  ordered  - Initiate emergency measures for life threatening arrhythmias  - Monitor electrolytes and administer replacement therapy as ordered  Outcome: Progressing     Problem: RESPIRATORY - ADULT  Goal: Achieves optimal ventilation and oxygenation  Description: INTERVENTIONS:  - Assess for changes in respiratory status  - Assess for changes in mentation and behavior  - Position to facilitate oxygenation and minimize respiratory effort  - Oxygen supplementation based on oxygen saturation or ABGs  - Provide Smoking Cessation handout, if applicable  - Encourage broncho-pulmonary hygiene including cough, deep breathe, Incentive Spirometry  - Assess the need for suctioning and perform as needed  - Assess and instruct to report SOB or any respiratory difficulty  - Respiratory Therapy support as indicated  - Manage/alleviate anxiety  - Monitor for signs/symptoms of CO2 retention  Outcome: Progressing     Problem: METABOLIC/FLUID AND ELECTROLYTES - ADULT  Goal: Glucose maintained within prescribed range  Description: INTERVENTIONS:  - Monitor Blood Glucose as ordered  - Assess for signs and symptoms of hyperglycemia and hypoglycemia  - Administer ordered medications to maintain glucose within target range  - Assess barriers to adequate nutritional intake and initiate nutrition consult as needed  - Instruct patient on self management of diabetes  Outcome: Progressing  Goal: Electrolytes maintained within normal limits  Description: INTERVENTIONS:  - Monitor labs and rhythm and assess patient for signs and symptoms of electrolyte imbalances  - Administer electrolyte replacement as ordered  - Monitor response to electrolyte replacements, including rhythm and repeat lab results as appropriate  - Fluid restriction as ordered  - Instruct patient on fluid and nutrition restrictions as appropriate  Outcome: Progressing     Problem: HEMATOLOGIC - ADULT  Goal: Free from bleeding injury  Description: (Example usage: patient with low  platelets)  INTERVENTIONS:  - Avoid intramuscular injections, enemas and rectal medication administration  - Ensure safe mobilization of patient  - Hold pressure on venipuncture sites to achieve adequate hemostasis  - Assess for signs and symptoms of internal bleeding  - Monitor lab trends  - Patient is to report abnormal signs of bleeding to staff  - Avoid use of toothpicks and dental floss  - Use electric shaver for shaving  - Use soft bristle tooth brush  - Limit straining and forceful nose blowing  Outcome: Progressing

## 2025-06-16 NOTE — OCCUPATIONAL THERAPY NOTE
OCCUPATIONAL THERAPY EVALUATION - INPATIENT     Room Number: 8610/8610-A  Evaluation Date: 6/16/2025  Type of Evaluation: Initial  Presenting Problem: acute hypoxic respiratory failure, acute PE, HF, ischemic cardiomyopathy, pancytopenia in setting of MDS    Physician Order: IP Consult to Occupational Therapy  Reason for Therapy: ADL/IADL Dysfunction and Discharge Planning    OCCUPATIONAL THERAPY ASSESSMENT   Patient is currently functioning below baseline with all ADL. Prior to admission, patient's baseline is independent with ADL, uses rw intermittently.   Patient is requiring moderate assistance as a result of the following impairments: decreased endurance and increased O2 needs from baseline. Occupational Therapy will continue to follow for duration of hospitalization.    Patient will benefit from continued skilled OT Services to promote return to prior level of function and safety with continuous assistance and gradual rehabilitative therapy    History Related to Current Admission: Patient is a 79 year old male admitted on 6/12/2025 with Presenting Problem: acute hypoxic respiratory failure, acute PE, HF, ischemic cardiomyopathy, pancytopenia in setting of MDS. Co-Morbidities : CAD s/p CABG, HFrEF s/p ICD, DM2, HTN, Hypothyroidism, HLD, FLAKO, RA    Relevant Hospitalizations:  9/20/24-9/28/24 with respiratory failure, COVID, PNA, septic shock; discharged to SNF at [place], pt reports he discharged home from SNF in November 2024.    EVALUATION SESSION:  Patient Start of Session: supine    FUNCTIONAL TRANSFER ASSESSMENT  Sit to Stand: Edge of Bed  Edge of Bed: Moderate Assist    BED MOBILITY  Supine to Sit : Moderate Assist    O2 SATURATIONS  Oxygen Therapy  SPO2% on Oxygen at Rest: 98  At rest oxygen flow (liters per minute): 2  SPO2% Ambulation on Oxygen: 95  Ambulation oxygen flow (liters per minute): 2    COGNITION  Overall Cognitive Status:  WFL - within functional limits    Upper Extremity   ROM: within  functional limits   Strength: within functional limits     EDUCATION PROVIDED  Patient Education : Role of Occupational Therapy; Plan of Care; Posture/Positioning; Energy Conservation  Patient's Response to Education: Requires Further Education; Verbalized Understanding    Equipment used: rw     Therapist comments: 98% using 2 liters. Increased time, supine to sit with PT's assistance. Seated, supervision, kyphotic posture, shortness of breath. Prema Dyspnea, reporting 7 to 8 out of 10.   Reporting shoulder tightness. Education provided on chest/therapeutic exercises for respiratory and shoulders.  Seated shoulder/scapula exercises. Attempted to stand once, unable.  Mod A after more seated rest break, took about 5-7 steps bed to chair.   95% using 2 liters. Dry heaving. Called RN.       Patient End of Session: Up in chair, Needs met, Call light within reach, RN aware of session/findings, All patient questions and concerns addressed    OCCUPATIONAL PROFILE    HOME SITUATION  Type of Home: House  Home Layout: One level  Lives With: Spouse    Toilet and Equipment: Standard height toilet  Shower/Tub and Equipment: Walk-in shower             Drives: Yes  Patient Regularly Uses: Reading glasses, Rollator    Prior Level of Function: independent with ADL and IADL, helps his wife with shower transfer, intermittently using RW since returning home in ,      PAIN ASSESSMENT  Ratin          OBJECTIVE  Precautions: Bed/chair alarm  Fall Risk: High fall risk    AM-PAC ‘6-Clicks’ Inpatient Daily Activity Short Form  -   Putting on and taking off regular lower body clothing?: A Lot  -   Bathing (including washing, rinsing, drying)?: A Lot  -   Toileting, which includes using toilet, bedpan or urinal? : A Lot  -   Putting on and taking off regular upper body clothing?: A Little  -   Taking care of personal grooming such as brushing teeth?: A Little  -   Eating meals?: A Little    AM-PAC Score:  Score: 15  Approx  Degree of Impairment: 56.46%  Standardized Score (AM-PAC Scale): 34.69    PLAN     OT Treatment Plan: Energy conservation/work simplification techniques, ADL training, Functional transfer training, Patient/Family education, Equipment eval/education, Compensatory technique education  Rehab Potential : Fair  Frequency: 3x/week  Number of Visits to Meet Established Goals: 3    ADL Goals   Patient will perform lower body dressing:  with min assist  Patient will perform toileting: with min assist    Functional Transfer Goals  Patient will transfer to toilet:  cga    UE Exercise Program Goal  Patient will be independent with bilateral AROM HEP (home exercise program).    Additional Goals  Pt will incorporate 2 energy conservation techniques into ADL.    Patient Evaluation Complexity Level:   Occupational Profile/Medical History LOW - Brief history including review of medical or therapy records    Specific performance deficits impacting engagement in ADL/IADL LOW  1 - 3 performance deficits    Client Assessment/Performance Deficits MODERATE - Comorbidities and min to mod modifications of tasks    Clinical Decision Making LOW - Analysis of occupational profile, problem-focused assessments, limited treatment options    Overall Complexity LOW     OT Session Time: 20 minutes  Self-Care Home Management:  minutes  Therapeutic Activity: 8 minutes

## 2025-06-16 NOTE — PLAN OF CARE
Emesis after taking am medication, white phlegm . No obvious return of pills given, metoprolol and levothyroxine. Medicated with Reglan 5 mg IV.

## 2025-06-16 NOTE — PROGRESS NOTES
Regimen: Azacitidine     Cancer: MDS-IB2    Cycle 1    Pharmacy chemotherapy order clarification:  BSA: 2.14m2  Days 1 through 7 (6/17 thru 6/23)  azaCITIDine (75mg/m2) or 150mg total dose in 6mL will be given SUBQ administered x 7 days. Solution will be split into two syringes due to maximum subcutaneous volume. Each syringe will contain azaCITIDine 75mg in 3mL.       Lab Results   Component Value Date    CREATSERUM 2.55 (H) 06/16/2025    BUN 48 (H) 06/16/2025     (L) 06/16/2025    K 4.6 06/16/2025     06/16/2025    CO2 17.0 (L) 06/16/2025     (H) 06/16/2025    CA 8.3 (L) 06/16/2025    ALB 3.9 06/13/2025    ALKPHO 56 06/13/2025    MG 2.3 06/13/2025    PHOS 3.6 06/13/2025       Lab Results   Component Value Date    WBC 1.6 (L) 06/16/2025    HGB 7.3 (L) 06/16/2025    HCT 25.5 (L) 06/16/2025    PLT 74.0 (L) 06/16/2025    MCV 92.1 06/16/2025    MCH 26.4 06/16/2025    NEPRELIM 0.61 (L) 06/16/2025    NE 0.61 (L) 06/16/2025    LYMABS 0.79 (L) 06/16/2025    MOABSO 0.20 06/16/2025    EOABSO 0.00 06/16/2025    BAABSO 0.01 06/16/2025    NEPERCENT 37.7 06/16/2025    LYPERCENT 48.8 06/16/2025    MOPERCENT 12.3 06/16/2025    EOPERCENT 0.0 06/16/2025    BAPERCENT 0.6 06/16/2025

## 2025-06-16 NOTE — PROGRESS NOTES
[facility]   part of [facility]    Advanced Heart Failure Progress Note    David Reyes Patient Status:  Inpatient    1945 MRN [id number]   Location [facility] 8NE-A Attending Alejandra Mehta MD   Hosp Day # 4 PCP Thea Harris, DO     Subjective:  Still with SOB, and conversational dyspnea. Denies CP, syncope.     Objective:  /58 (BP Location: Left arm)   Pulse 81   Temp 97.7 °F (36.5 °C) (Oral)   Resp 20   Wt 218 lb (98.9 kg)   SpO2 97%   BMI 32.19 kg/m²     Temp (24hrs), Av.7 °F (36.5 °C), Min:97.5 °F (36.4 °C), Max:98 °F (36.7 °C)      Intake/Output:    Intake/Output Summary (Last 24 hours) at 2025 0933  Last data filed at 2025 0841  Gross per 24 hour   Intake 660 ml   Output 1700 ml   Net -1040 ml       Wt Readings from Last 3 Encounters:   25 218 lb (98.9 kg)   25 217 lb (98.4 kg)   25 219 lb 3.2 oz (99.4 kg)       Allergies:  Allergies[1]    Physical Exam:  Blood pressure 106/58, pulse 81, temperature 97.7 °F (36.5 °C), temperature source Oral, resp. rate 20, weight 218 lb (98.9 kg), SpO2 97%.    General: Alert and oriented in no apparent distress.obese  HEENT: No focal deficits.  Neck: mid neck JVD Present, normal ROM.  Cardiac: Regular rate and rhythm, S1, S2 normal, no rubs or gallops noted.  Lungs: conversational dyspnea present, basilar crackles present.  Abdomen: Soft, non-tender.   Extremities: mild LE edema present.   Neurologic: Alert and oriented, normal affect.  Skin: Warm and dry.     Laboratory/Data:      Labs:  Lab Results   Component Value Date    WBC 1.6 2025    RBC 2.77 2025    HGB 7.3 2025    HCT 25.5 2025    MCV 92.1 2025    MCH 26.4 2025    MCHC 28.6 2025    RDW 19.8 2025    PLT 74.0 2025     Lab Results   Component Value Date    PT 17.1 (H) 2014    PT 16.7 (H) 2014    INR 1.44 (H) 2025    INR 1.24 (H) 2025    INR 1.16 2024     No  results for input(s): \"BNPML\" in the last 168 hours.  BUN (mg/dL)   Date Value   06/16/2025 48 (H)   06/15/2025 54 (H)   06/14/2025 42 (H)     Blood Urea Nitrogen (mg/dL)   Date Value   03/23/2018 22 (H)   07/21/2017 20   02/21/2017 15   06/17/2016 18   03/09/2016 19   12/02/2015 19     UREA NITROGEN (BUN) (mg/dL)   Date Value   12/04/2021 30 (H)     Creatinine, Serum (mg/dL)   Date Value   06/17/2016 0.96   03/09/2016 1.04   12/02/2015 0.99     CREATININE (mg/dL)   Date Value   12/04/2021 1.19 (H)     Creatinine (mg/dL)   Date Value   06/16/2025 2.55 (H)   06/15/2025 2.64 (H)   06/14/2025 2.51 (H)   03/23/2018 1.03   07/21/2017 0.96   02/21/2017 0.99       Medications:  Current Hospital Medications[2]    Assessment and Plan:  Problem List[3]    Acute on Chronic HFrEF, stage C, NYHA 3  - per history, objective and clinical signs of fluid overload present  - agree with IV diuresis infusion, with diuril, continue GDMT with BB, ARB, MRA, and eventual SGLT2 on discharge, (not on ARNI due to cost)  - interrogate device if not done already   - trend labs, monitor UOP, PT / OT    LORENA on CKD  - CRS related superimposed on longstanding CKD history  - diuresis, agree with infusion and diuril, trend daily labs, monitor UOP    HTN   - titrate oral therapy.     CAD s/p CABG and PCI   - per history, no acute angina, not on AP agents due to MDS  - monitor on tele, risk factor modification.     Acute PE   - VQ scan with moderate size perfusion defect RLL  - AC for now, transition to oral as able per hematology    Acute hypoxic respiratory failure  - combination of HF, PE, and PNA?  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     Nikolai Walker MD   Advanced Heart  Failure and Transplant Cardiology   [facility]     L3       [1]   Allergies  Allergen Reactions    Pcn [Penicillins]      \"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   [2]   Current Facility-Administered Medications   Medication Dose Route Frequency    chlorothiazide (Diuril) 500 mg in sterile water for injection (PF) IV push  500 mg Intravenous Once    bumetanide (Bumex) 12.5 mg in 50 mL infusion  0.5 mg/hr Intravenous Continuous    metoclopramide (Reglan) 5 mg/mL injection 5 mg  5 mg Intravenous Q8H PRN    heparin (Porcine) 86794 units/250mL infusion PE/DVT/THROMBUS CONTINUOUS  200-3,000 Units/hr Intravenous Continuous    levoFLOXacin (Levaquin) tab 750 mg  750 mg Oral Q48HR @ 0700    glucose (Dex4) 15 GM/59ML oral liquid 15 g  15 g Oral Q15 Min PRN    Or    glucose (Glutose) 40% oral gel 15 g  15 g Oral Q15 Min PRN    Or    dextrose 50% injection 50 mL  50 mL Intravenous Q15 Min PRN    Or    glucose (Dex4) 15 GM/59ML oral liquid 30 g  30 g Oral Q15 Min PRN    Or    glucose (Glutose) 40% oral gel 30 g  30 g Oral Q15 Min PRN    ezetimibe (Zetia) tab 10 mg  10 mg Oral Daily    gabapentin (Neurontin) cap 100 mg  100 mg Oral Daily    levothyroxine (Synthroid) tab 25 mcg  25 mcg Oral Before breakfast    [Held by provider] losartan (Cozaar) tab 25 mg  25 mg Oral Daily    metoprolol succinate ER (Toprol XL) 24 hr tab 50 mg  50 mg Oral 2x Daily(Beta Blocker)    [Held by provider] spironolactone (Aldactone) tab 25 mg  25 mg Oral Daily with breakfast    insulin aspart (NovoLOG) 100 Units/mL FlexPen 1-10 Units  1-10 Units Subcutaneous TID AC and HS    acetaminophen (Tylenol Extra Strength) tab 1,000 mg  1,000 mg Oral Q8H PRN    melatonin tab 3 mg  3 mg Oral Nightly PRN    polyethylene glycol (PEG 3350) (Miralax) 17 g oral packet 17 g  17 g Oral Daily PRN    sennosides (Senokot) tab 17.2 mg  17.2 mg Oral Nightly PRN     bisacodyl (Dulcolax) 10 MG rectal suppository 10 mg  10 mg Rectal Daily PRN    benzonatate (Tessalon) cap 200 mg  200 mg Oral TID PRN    guaiFENesin (Robitussin) 100 MG/5 ML oral liquid 200 mg  200 mg Oral Q4H PRN    glycerin-hypromellose- (Artificial Tears) 0.2-0.2-1 % ophthalmic solution 1 drop  1 drop Both Eyes QID PRN    sodium chloride (Saline Mist) 0.65 % nasal solution 1 spray  1 spray Each Nare Q3H PRN    [Held by provider] **PATIENT SUPPLIED** Lovastatin TABS 40 mg  40 mg Oral BID   [3]   Patient Active Problem List  Diagnosis    Nonischemic cardiomyopathy (HCC)    FLAKO (obstructive sleep apnea)    Congestive heart failure (HCC)    CAD (coronary artery disease)    Rheumatoid arthritis involving multiple sites with positive rheumatoid factor (HCC)    Type 2 diabetes mellitus with hyperglycemia, with long-term current use of insulin (HCC)    Normocytic anemia    Thrombocytopenia    Ischemic cardiomyopathy    Dyspnea on exertion    Dry mouth    Pure hypercholesterolemia    Vitamin D deficiency    Pulmonary hypertension secondary to increased PVR ([facility])    Carotid artery stenosis    Obesity (BMI 30.0-34.9)    Chronic obstructive pulmonary disease, unspecified (HCC)    Weakness generalized    Aortic stenosis, mild    Hyponatremia    Pancytopenia (HCC)    Acute respiratory failure with hypoxia (HCC)    Immunocompromised state (HCC)    Wide-complex tachycardia    Acute on chronic anemia    Type 2 diabetes mellitus without complication, with long-term current use of insulin (HCC)    Foot pain, bilateral    Hypothyroid    Pulmonary hypertension (HCC)    HFrEF (heart failure with reduced ejection fraction) (HCC)    PE (pulmonary thromboembolism) ([facility])    Primary hypertension    Dyspnea, unspecified type    Hemoptysis    Metabolic acidosis    LORENA (acute kidney injury)    Acute pulmonary embolism, unspecified pulmonary embolism type, unspecified whether acute cor pulmonale present (HCC)    MDS (myelodysplastic  syndrome) (HCC)    CKD stage 3b, GFR 30-44 ml/min (HCC)    Cardiorenal syndrome

## 2025-06-16 NOTE — PLAN OF CARE
Assumed patient care at 1930.   Alert and oriented x 4.  Resting in bed.   Vital signs are stable.   Denies chest pain, + CUMMINGS   Crackles at bilateral lung bases  Supplemental oxygen in use at 2 liters per nasal cannula.  Continuous pulse oximetry in progress , oxygen saturation is  % at rest.  Occasional hemoptysis ( small , viscous amt.)  Abdomen is soft , rounded. Bowel sounds are active .  Plan of care discussed with patient bumex gtt at 0.5 mg/hr           Nebs  IV antibiotic q 48 hours  Intake and output , Daily wt.  Evening medications , POC monitoring, and fall precautions.             Problem: Diabetes/Glucose Control  Goal: Glucose maintained within prescribed range  Description: INTERVENTIONS:  - Monitor Blood Glucose as ordered  - Assess for signs and symptoms of hyperglycemia and hypoglycemia  - Administer ordered medications to maintain glucose within target range  - Assess barriers to adequate nutritional intake and initiate nutrition consult as needed  - Instruct patient on self management of diabetes  Outcome: Progressing     Problem: Patient/Family Goals  Goal: Patient/Family Long Term Goal  Description: Patient's Long Term Goal:   To return home asap    Interventions:  - Cardiology consult  2 D echo with doppler  - See additional Care Plan goals for specific interventions  Outcome: Progressing  Goal: Patient/Family Short Term Goal  Description: Patient's Short Term Goal:  t get rid of shortness of breath    Interventions:   - medication adjustment      Supplemental oxygen as needed  - See additional Care Plan goals for specific interventions  Outcome: Progressing     Problem: PAIN - ADULT  Goal: Verbalizes/displays adequate comfort level or patient's stated pain goal  Description: INTERVENTIONS:  - Encourage pt to monitor pain and request assistance  - Assess pain using appropriate pain scale  - Administer analgesics based on type and severity of pain and evaluate response  - Implement  non-pharmacological measures as appropriate and evaluate response  - Consider cultural and social influences on pain and pain management  - Manage/alleviate anxiety  - Utilize distraction and/or relaxation techniques  - Monitor for opioid side effects  - Notify MD/LIP if interventions unsuccessful or patient reports new pain  - Anticipate increased pain with activity and pre-medicate as appropriate  Outcome: Progressing     Problem: SAFETY ADULT - FALL  Goal: Free from fall injury  Description: INTERVENTIONS:  - Assess pt frequently for physical needs  - Identify cognitive and physical deficits and behaviors that affect risk of falls.  - [place] fall precautions as indicated by assessment.  - Educate pt/family on patient safety including physical limitations  - Instruct pt to call for assistance with activity based on assessment  - Modify environment to reduce risk of injury  - Provide assistive devices as appropriate  - Consider OT/PT consult to assist with strengthening/mobility  - Encourage toileting schedule  Outcome: Progressing     Problem: CARDIOVASCULAR - ADULT  Goal: Maintains optimal cardiac output and hemodynamic stability  Description: INTERVENTIONS:  - Monitor vital signs, rhythm, and trends  - Monitor for bleeding, hypotension and signs of decreased cardiac output  - Evaluate effectiveness of vasoactive medications to optimize hemodynamic stability  - Monitor arterial and/or venous puncture sites for bleeding and/or hematoma  - Assess quality of pulses, skin color and temperature  - Assess for signs of decreased coronary artery perfusion - ex. Angina  - Evaluate fluid balance, assess for edema, trend weights  Outcome: Progressing  Goal: Absence of cardiac arrhythmias or at baseline  Description: INTERVENTIONS:  - Continuous cardiac monitoring, monitor vital signs, obtain 12 lead EKG if indicated  - Evaluate effectiveness of antiarrhythmic and heart rate control medications as ordered  - Initiate  emergency measures for life threatening arrhythmias  - Monitor electrolytes and administer replacement therapy as ordered  Outcome: Progressing     Problem: RESPIRATORY - ADULT  Goal: Achieves optimal ventilation and oxygenation  Description: INTERVENTIONS:  - Assess for changes in respiratory status  - Assess for changes in mentation and behavior  - Position to facilitate oxygenation and minimize respiratory effort  - Oxygen supplementation based on oxygen saturation or ABGs  - Provide Smoking Cessation handout, if applicable  - Encourage broncho-pulmonary hygiene including cough, deep breathe, Incentive Spirometry  - Assess the need for suctioning and perform as needed  - Assess and instruct to report SOB or any respiratory difficulty  - Respiratory Therapy support as indicated  - Manage/alleviate anxiety  - Monitor for signs/symptoms of CO2 retention  Outcome: Progressing     Problem: METABOLIC/FLUID AND ELECTROLYTES - ADULT  Goal: Glucose maintained within prescribed range  Description: INTERVENTIONS:  - Monitor Blood Glucose as ordered  - Assess for signs and symptoms of hyperglycemia and hypoglycemia  - Administer ordered medications to maintain glucose within target range  - Assess barriers to adequate nutritional intake and initiate nutrition consult as needed  - Instruct patient on self management of diabetes  Outcome: Progressing  Goal: Electrolytes maintained within normal limits  Description: INTERVENTIONS:  - Monitor labs and rhythm and assess patient for signs and symptoms of electrolyte imbalances  - Administer electrolyte replacement as ordered  - Monitor response to electrolyte replacements, including rhythm and repeat lab results as appropriate  - Fluid restriction as ordered  - Instruct patient on fluid and nutrition restrictions as appropriate  Outcome: Progressing     Problem: HEMATOLOGIC - ADULT  Goal: Free from bleeding injury  Description: (Example usage: patient with low  platelets)  INTERVENTIONS:  - Avoid intramuscular injections, enemas and rectal medication administration  - Ensure safe mobilization of patient  - Hold pressure on venipuncture sites to achieve adequate hemostasis  - Assess for signs and symptoms of internal bleeding  - Monitor lab trends  - Patient is to report abnormal signs of bleeding to staff  - Avoid use of toothpicks and dental floss  - Use electric shaver for shaving  - Use soft bristle tooth brush  - Limit straining and forceful nose blowing  Outcome: Progressing

## 2025-06-16 NOTE — PROGRESS NOTES
[facility]   part of [facility]     Nephrology Progress Note    David Reyes Patient Status:  Inpatient    1945 MRN [id number]   Location [facility] 8NE-A Attending Alejandra Mehta MD   Hosp Day # 4 PCP Thea Harris,        SUBJECTIVE:  Feels SOB this AM        Physical Exam:   /76 (BP Location: Left arm)   Pulse 81   Temp 97.5 °F (36.4 °C) (Oral)   Resp 20   Wt 218 lb (98.9 kg)   SpO2 100%   BMI 32.19 kg/m²   Temp (24hrs), Av.7 °F (36.5 °C), Min:97.5 °F (36.4 °C), Max:98 °F (36.7 °C)       Intake/Output Summary (Last 24 hours) at 2025 0821  Last data filed at 2025 0545  Gross per 24 hour   Intake 900 ml   Output 1400 ml   Net -500 ml     Last 3 Weights   25 0545 218 lb (98.9 kg)   06/15/25 0508 218 lb 8 oz (99.1 kg)   25 0434 217 lb 13 oz (98.8 kg)   25 2258 213 lb (96.6 kg)   25 1935 215 lb (97.5 kg)   25 1506 217 lb (98.4 kg)   25 1102 219 lb 3.2 oz (99.4 kg)   25 0955 222 lb 3.2 oz (100.8 kg)   25 1636 222 lb 8 oz (100.9 kg)     General: Alert and oriented in no apparent distress.  HEENT: No scleral icterus, MMM  Neck: Supple, no DON or thyromegaly  Cardiac: irregular, S1, S2 normal, no murmur or rub  Lungs: decr BS B    Abdomen: Soft, non-tender. + bowel sounds, no palpable organomegaly  Extremities: 1+ BLE edema.  Neurologic:  moving all extremities  Skin: Warm and dry, no rash        Labs:     Recent Labs   Lab 25  1340 25  0351 25  0454 25  1610 06/15/25  0436 25  0521   WBC 1.2* 1.5* 1.6*  --  2.1* 1.6*   HGB 7.8* 7.4* 6.8* 7.9* 8.0* 7.3*   MCV 88.3 89.4 86.4  --  88.8 92.1   .0* 102.0* 97.0*  --  95.0* 74.0*   INR  --  1.44*  --   --   --   --        Recent Labs   Lab 25  1340 25  0351 25  0454 06/15/25  0436 25  0521    139 138 136 134*   K 4.1 4.0 5.0 4.5 4.6    105 104 102 101   CO2 20.0* 22.0 23.0 20.0* 17.0*   BUN 30* 31* 42*  54* 48*   CREATSERUM 2.14* 1.95* 2.51* 2.64* 2.55*   CA 8.9 8.8 8.4* 8.3* 8.3*   MG  --  2.3  --   --   --    PHOS  --  3.6  --   --   --    * 127* 130* 137* 116*       Recent Labs   Lab 06/12/25  1340 06/13/25  0351   ALT <7* 10   AST 24 29   ALB 4.1 3.9       Recent Labs   Lab 06/15/25  0510 06/15/25  1143 06/15/25  1620 06/15/25  2058 06/16/25  0546   PGLU 143* 174* 120* 127* 133*       Meds:   Current Hospital Medications[1]      Impression/Plan:        1) Acute kidney injury on CKD 3b: baseline serum creatinine appears to be around 1.9-2.1 mg/dL. Current LORENA appears related to CRS. Continue diuresis. Discussed with patient that will need to tolerate slightly higher creatinine in order to achieve euvolemia.      2) Acute on chronic HFrEF: cardiology following, UOP better with bumex gtt.  Continue. Diuril x 1 today      3) Acute PE: on heparin gtt. Per pulm/heme     4) MDS: noted to have pancytopenia follows with Dr. Chirinos as outpatient. S/p recent BMBx. Heme following.          Questions/concerns were discussed with patient and/or family by bedside.          Jerad Oliva MD  6/16/2025  8:21 AM         [1]   Current Facility-Administered Medications   Medication Dose Route Frequency    bumetanide (Bumex) 12.5 mg in 50 mL infusion  0.5 mg/hr Intravenous Continuous    metoclopramide (Reglan) 5 mg/mL injection 5 mg  5 mg Intravenous Q8H PRN    heparin (Porcine) 35615 units/250mL infusion PE/DVT/THROMBUS CONTINUOUS  200-3,000 Units/hr Intravenous Continuous    levoFLOXacin (Levaquin) tab 750 mg  750 mg Oral Q48HR @ 0700    glucose (Dex4) 15 GM/59ML oral liquid 15 g  15 g Oral Q15 Min PRN    Or    glucose (Glutose) 40% oral gel 15 g  15 g Oral Q15 Min PRN    Or    dextrose 50% injection 50 mL  50 mL Intravenous Q15 Min PRN    Or    glucose (Dex4) 15 GM/59ML oral liquid 30 g  30 g Oral Q15 Min PRN    Or    glucose (Glutose) 40% oral gel 30 g  30 g Oral Q15 Min PRN    ezetimibe (Zetia) tab 10 mg  10 mg Oral  Daily    gabapentin (Neurontin) cap 100 mg  100 mg Oral Daily    levothyroxine (Synthroid) tab 25 mcg  25 mcg Oral Before breakfast    [Held by provider] losartan (Cozaar) tab 25 mg  25 mg Oral Daily    metoprolol succinate ER (Toprol XL) 24 hr tab 50 mg  50 mg Oral 2x Daily(Beta Blocker)    [Held by provider] spironolactone (Aldactone) tab 25 mg  25 mg Oral Daily with breakfast    insulin aspart (NovoLOG) 100 Units/mL FlexPen 1-10 Units  1-10 Units Subcutaneous TID AC and HS    acetaminophen (Tylenol Extra Strength) tab 1,000 mg  1,000 mg Oral Q8H PRN    melatonin tab 3 mg  3 mg Oral Nightly PRN    polyethylene glycol (PEG 3350) (Miralax) 17 g oral packet 17 g  17 g Oral Daily PRN    sennosides (Senokot) tab 17.2 mg  17.2 mg Oral Nightly PRN    bisacodyl (Dulcolax) 10 MG rectal suppository 10 mg  10 mg Rectal Daily PRN    benzonatate (Tessalon) cap 200 mg  200 mg Oral TID PRN    guaiFENesin (Robitussin) 100 MG/5 ML oral liquid 200 mg  200 mg Oral Q4H PRN    glycerin-hypromellose- (Artificial Tears) 0.2-0.2-1 % ophthalmic solution 1 drop  1 drop Both Eyes QID PRN    sodium chloride (Saline Mist) 0.65 % nasal solution 1 spray  1 spray Each Nare Q3H PRN    [Held by provider] **PATIENT SUPPLIED** Lovastatin TABS 40 mg  40 mg Oral BID

## 2025-06-16 NOTE — CM/SW NOTE
PASRR assessment completed and uploaded to [facility] referral. [facility] request sent.     Zofia SHEARER, LCSW  Discharge Planner

## 2025-06-16 NOTE — PROGRESS NOTES
[facility]   part of [facility]     Hospitalist Progress Note     David Reyes Patient Status:  Inpatient    1945 MRN [id number]   Location [facility] 8NE-A Attending Sukhdeep Bueno DO   Hosp Day # 4 PCP Thea Harris DO     Chief Complaint: Dyspnea     Subjective:     Patient still SOB    Objective:    Review of Systems:   A comprehensive review of systems was completed; pertinent positive and negatives stated in subjective.    Vital signs:  Temp:  [97.5 °F (36.4 °C)-98 °F (36.7 °C)] 98 °F (36.7 °C)  Pulse:  [73-81] 81  Resp:  [20-21] 20  BP: ()/(52-76) 108/71  SpO2:  [97 %-100 %] 97 %    Physical Exam:    General: No acute distress  Respiratory: No wheezes, no rhonchi  Cardiovascular: S1, S2, regular rate and rhythm  Abdomen: Soft, Non-tender, non-distended, positive bowel sounds  Neuro: No new focal deficits.   Extremities: pitting edema       Diagnostic Data:    Labs:  Recent Labs   Lab 25  1340 25  0351 25  0454 25  1610 06/15/25  0436 25  0521   WBC 1.2* 1.5* 1.6*  --  2.1* 1.6*   HGB 7.8* 7.4* 6.8* 7.9* 8.0* 7.3*   MCV 88.3 89.4 86.4  --  88.8 92.1   .0* 102.0* 97.0*  --  95.0* 74.0*   INR  --  1.44*  --   --   --   --        Recent Labs   Lab 25  1340 25  0351 25  0454 06/15/25  0436 25  0521   * 127* 130* 137* 116*   BUN 30* 31* 42* 54* 48*   CREATSERUM 2.14* 1.95* 2.51* 2.64* 2.55*   CA 8.9 8.8 8.4* 8.3* 8.3*   ALB 4.1 3.9  --   --   --     139 138 136 134*   K 4.1 4.0 5.0 4.5 4.6    105 104 102 101   CO2 20.0* 22.0 23.0 20.0* 17.0*   ALKPHO 57 56  --   --   --    AST 24 29  --   --   --    ALT <7* 10  --   --   --    BILT 0.8 0.7  --   --   --    TP 7.4 7.0  --   --   --        Estimated Glomerular Filtration Rate: 25 mL/min/1.73m2 (A) (result from lab).    Recent Labs   Lab 25  1340   TROPHS 71*       Recent Labs   Lab 25  0351   PTP 17.6*   INR 1.44*                   Microbiology    Hospital Encounter on 06/12/25   1. Blood Culture     Status: None (Preliminary result)    Collection Time: 06/12/25  5:17 PM    Specimen: Blood,peripheral   Result Value Ref Range    Blood Culture Result No Growth 3 Days N/A         Imaging: Reviewed in Epic.    Medications: Scheduled Medications[1]    Assessment & Plan:      # Acute hypoxic resp failure due to pleural effusions, PNA and pulmonary embolus - improving  IV Abx   Hep drip   Diuresis     # HFrEF, ICD- with acute exacerbation  As above  Cardiology on cs  I/O  Monitor Cr, e-  IV Lasix reordered on 6/14 BID     #H/o CAD s/p CABG    #Acute PE-  Heparin drip  Doppler LE NEG     #Hypertension    #HLD    #Hypothyroid  Levothyroxine     #FLAKO     #MDS with pancytopenia   Transfuse for hb< 7-  1u PRBC on 6/14/25  Onc on CS       Alejandra Mehta MD    Supplementary Documentation:     Quality:  DVT Mechanical Prophylaxis:   SCDs, Early ambuation  DVT Pharmacologic Prophylaxis   Medication    heparin (Porcine) 85424 units/250mL infusion PE/DVT/THROMBUS CONTINUOUS                Code Status: Full Code  Beltre: External urinary catheter in place  Beltre Duration (in days):   Central line:    GABE:     Discharge is dependent on: clinical   At this point Mr. Reyes is expected to be discharge to: tbd    The 21st Century Cures Act makes medical notes like these available to patients in the interest of transparency. Please be advised this is a medical document. Medical documents are intended to carry relevant information, facts as evident, and the clinical opinion of the practitioner. The medical note is intended as peer to peer communication and may appear blunt or direct. It is written in medical language and may contain abbreviations or verbiage that are unfamiliar.              **Certification      PHYSICIAN Certification of Need for Inpatient Hospitalization - Initial Certification    Patient will require inpatient services that will  reasonably be expected to span two midnight's based on the clinical documentation in H+P.   Based on patients current state of illness, I anticipate that, after discharge, patient will require TBD.         [1]    acyclovir  200 mg Oral BID    levoFLOXacin  750 mg Oral Q48HR @ 0700    ezetimibe  10 mg Oral Daily    gabapentin  100 mg Oral Daily    levothyroxine  25 mcg Oral Before breakfast    [Held by provider] losartan  25 mg Oral Daily    metoprolol succinate ER  50 mg Oral 2x Daily(Beta Blocker)    [Held by provider] spironolactone  25 mg Oral Daily with breakfast    insulin aspart  1-10 Units Subcutaneous TID AC and HS    [Held by provider] Lovastatin  40 mg Oral BID

## 2025-06-16 NOTE — PROGRESS NOTES
[facility]  Progress Note    David Reyes Patient Status:  Inpatient    1945 MRN [id number]   Location [facility] 8NE-A Attending Alejandra Mehta MD   Hosp Day # 4 PCP Thea Harris DO     Subjective:  David Reyes is a(n) 79 year old male remains afeb  Trying to sleep in the chair but arousable  Notes ongoing coughing productive of dark heme intermittently  Denies any chest pain  Remains on O2    Objective:  /58 (BP Location: Left arm)   Pulse 81   Temp 97.7 °F (36.5 °C) (Oral)   Resp 20   Wt 218 lb (98.9 kg)   SpO2 97%   BMI 32.19 kg/m² nasal cannula      Temp (24hrs), Av.7 °F (36.5 °C), Min:97.5 °F (36.4 °C), Max:98 °F (36.7 °C)      Intake/Output:    Intake/Output Summary (Last 24 hours) at 2025 1120  Last data filed at 2025 0841  Gross per 24 hour   Intake 1041.11 ml   Output 1700 ml   Net -658.89 ml       Physical Exam:   General: alert, cooperative, oriented.  No respiratory distress.  Trying to sleep   Head: Normocephalic, without obvious abnormality, atraumatic.   Throat: Lips, mucosa, and tongue normal.  No thrush noted.  On limited exam   Neck: trachea midline, no adenopathy, no thyromegaly. No JVD.   Lungs: Clear anteriorly   Chest wall: No tenderness or deformity.   Heart: Regular rate and rhythm murmur noted mildly protuberant   Abdomen: soft, non-distended, no masses, no guarding, no     Rebound.   Extremity: Nontender   Skin: No rashes or lesions.   Neurological: Alert, interactive, no focal deficits    Lab Data Review:  Recent Labs     25  0454 25  1610 06/15/25  0436 25  0521   WBC 1.6*  --  2.1* 1.6*   HGB 6.8* 7.9* 8.0* 7.3*   PLT 97.0*  --  95.0* 74.0*     Recent Labs     25  0454 06/15/25  0436 25  0521    136 134*   K 5.0 4.5 4.6    102 101   CO2 23.0 20.0* 17.0*   BUN 42* 54* 48*   CREATSERUM 2.51* 2.64* 2.55*     Recent Labs   Lab 25  0351 25  1225 25  2036 06/15/25  0436  06/16/25  0521   PTP 17.6*  --   --   --   --    INR 1.44*  --   --   --   --    .2*   < > 145.3* 104.5* 106.3*    < > = values in this interval not displayed.       Cultures: bc remain neg     Radiology:  No results found.  Chest x-ray is reviewed most recent with slight increased retrocardiac density  CT reviewed-slightly patchy left base greater than right infiltrates some groundglass some consolidation changes small bilateral effusions        Medications reviewed     Assessment and Plan:   Problem List[1]    Assessment:  Acute hypoxic respiratory failure remains on 2 L  Acute pulmonary embolism positive V/Q-negative Dopplers-on heparin with plans to transition to orals  Hemoptysis subacute--no obvious endobronchial lesion on CT--plan to follow on Levaquin to complete course  Right lower lobe and bilateral upper lobe groundglass opacities-possibly fluid related-with plans to follow on completion of antibiotics  Small pulmonary nodules: CT chest 6/12/2025  History of smoking at least 26 pack years tobacco though quit in 1986  Coronary artery disease: Status post CABG and PCI in past  HFrEF last ejection fraction of 20 to 25%-agree with ongoing diuresis  Obstructive sleep apnea syndrome, not being treated with CPAP machine  LORENA/CKD worsening acidosis  Myelodysplastic syndrome: With pancytopenia as new diagnosis-18% blast genetic studies pending-Dr. Chirinos's note appreciated  Rheumatoid arthritis       Plan:  Repeat chest x-ray  Ongoing diuresis as tolerated remains on Bumex drip  Check sputum culture  Continuing to follow    CC     Candi Barros MD  6/16/2025  11:20 AM         [1]   Patient Active Problem List  Diagnosis    Nonischemic cardiomyopathy (HCC)    FLAKO (obstructive sleep apnea)    Congestive heart failure (HCC)    CAD (coronary artery disease)    Rheumatoid arthritis involving multiple sites with positive rheumatoid factor (HCC)    Type 2 diabetes mellitus with hyperglycemia, with long-term current  use of insulin (HCC)    Normocytic anemia    Thrombocytopenia    Ischemic cardiomyopathy    Dyspnea on exertion    Dry mouth    Pure hypercholesterolemia    Vitamin D deficiency    Pulmonary hypertension secondary to increased PVR (HCC)    Carotid artery stenosis    Obesity (BMI 30.0-34.9)    Chronic obstructive pulmonary disease, unspecified (HCC)    Weakness generalized    Aortic stenosis, mild    Hyponatremia    Pancytopenia (HCC)    Acute respiratory failure with hypoxia (HCC)    Immunocompromised state (HCC)    Wide-complex tachycardia    Acute on chronic anemia    Type 2 diabetes mellitus without complication, with long-term current use of insulin (HCC)    Foot pain, bilateral    Hypothyroid    Pulmonary hypertension (HCC)    HFrEF (heart failure with reduced ejection fraction) ([facility])    PE (pulmonary thromboembolism) ([facility])    Primary hypertension    Dyspnea, unspecified type    Hemoptysis    Metabolic acidosis    LORENA (acute kidney injury)    Acute pulmonary embolism, unspecified pulmonary embolism type, unspecified whether acute cor pulmonale present (HCC)    MDS (myelodysplastic syndrome) (HCC)    CKD stage 3b, GFR 30-44 ml/min (HCC)    Cardiorenal syndrome

## 2025-06-16 NOTE — PHYSICAL THERAPY NOTE
PHYSICAL THERAPY TREATMENT NOTE - INPATIENT    Room Number: 8610/8610-A     Session: 1     Number of Visits to Meet Established Goals: 5  History related to current admission: Patient is a 79 year old male who presented to the ED on 6/12/2025 from PCP office for shortness of breath, hemoptysis, and fatigue.  Pt diagnosed with acute hypoxic respiratory failure, acute PE, HF, ischemic cardiomyopathy, pancytopenia in setting of MDS.     Relevant Hospitalizations:  9/20/24-9/28/24 with respiratory failure, COVID, PNA, septic shock; discharged to SNF at [place], pt reports he discharged home from SNF in November 2024.           HOME SITUATION  Type of Home: House  Home Layout: One level  Stairs to Enter : 2   Railing: No              Lives With: Spouse    Drives: Yes   Patient Regularly Uses: Reading glasses, Rollator       Prior Level of [place]: The pt reports that typically he is able to ambulate independently, but since being discharged from SNF in November, intermittently uses a RW.  Pt states he is independent with I/ADL's.  Pt does physically assist his spouse to get in/out of shower.  Pt denies any recent falls.  Presenting Problem: acute hypoxic respiratory failure, acute PE, HF, ischemic cardiomyopathy, pancytopenia in setting of MDS  Co-Morbidities : CAD s/p CABG, HFrEF s/p ICD, DM2, HTN, Hypothyroidism, HLD, FLAKO, RA    PHYSICAL THERAPY ASSESSMENT   Patient demonstrates fair progress this session, goals  remain in progress.      Patient is requiring moderate assist as a result of the following impairments: decreased functional strength, decreased endurance/aerobic capacity, pain, decreased muscular endurance, medical status, and increased O2 needs from baseline.     Patient continues to function below baseline with bed mobility, transfers, and gait.  Next session anticipate patient to progress transfers and gait.  Physical Therapy will continue to follow patient for duration of hospitalization.    Patient  continues to benefit from continued skilled PT services: to promote return to prior level of function and safety with continuous assistance and gradual rehabilitative therapy .    PLAN DURING HOSPITALIZATION  Nursing Mobility Recommendation :  (2 assist RW)  PT Device Recommendation: Rolling walker  PT Treatment Plan: Bed mobility, Endurance, Gait training, Body mechanics, Energy conservation, Patient education, Strengthening, Transfer training, Balance training  Frequency (Obs): 3-5x/week     CURRENT GOALS     Goal #1 Patient is able to demonstrate supine - sit EOB @ level: minimum assistance      Goal #2 Patient is able to demonstrate transfers Sit to/from Stand at assistance level: moderate assistance      Goal #3 Patient is able to ambulate 50 feet with assist device: walker - rolling at assistance level: minimum assistance      Goal #4     Goal #5     Goal #6     Goal Comments: Goals established on 6/15/2025  6/16/2025 all goals ongoing     SUBJECTIVE  \" 'I'm 79 years old I know what I can do\"     OBJECTIVE  Precautions: Bed/chair alarm    WEIGHT BEARING RESTRICTION     PAIN ASSESSMENT   Rating: Unable to rate  Location: R ankle and knee  Management Techniques: Activity promotion, Body mechanics, Repositioning    BALANCE                                                                                                                       Static Sitting: Fair -  Dynamic Sitting: Fair -           Static Standing: Poor  Dynamic Standing: Poor    ACTIVITY TOLERANCE           BP: 108/71  BP Location: Left arm  BP Method: Automatic  Patient Position: Sitting    O2 WALK  Oxygen Therapy  SPO2% on Oxygen at Rest: 98  At rest oxygen flow (liters per minute): 2  SPO2% Ambulation on Oxygen: 95  Ambulation oxygen flow (liters per minute): 2    AM-PAC '6-Clicks' INPATIENT SHORT FORM - BASIC MOBILITY  How much difficulty does the patient currently have...  Patient Difficulty: Turning over in bed (including adjusting  bedclothes, sheets and blankets)?: A Lot   Patient Difficulty: Sitting down on and standing up from a chair with arms (e.g., wheelchair, bedside commode, etc.): A Lot   Patient Difficulty: Moving from lying on back to sitting on the side of the bed?: A Lot   How much help from another person does the patient currently need...   Help from Another: Moving to and from a bed to a chair (including a wheelchair)?: A Lot   Help from Another: Need to walk in hospital room?: Total   Help from Another: Climbing 3-5 steps with a railing?: Total     AM-PAC Score:  Raw Score: 10   Approx Degree of Impairment: 76.75%   Standardized Score (AM-PAC Scale): 32.29   CMS Modifier (G-Code): CL    FUNCTIONAL ABILITY STATUS  Gait Assessment   Functional Mobility/Gait Assessment  Gait Assistance: Moderate assistance  Distance (ft): 2  Assistive Device: Rolling walker  Pattern: Shuffle    Skilled Therapy Provided    Bed Mobility:  Rolling: mod A    Supine<>Sit: max A    Sit<>Supine: NT     Transfer Mobility:  Sit<>Stand: mod A x 2  Stand<>Sit: mod A    Gait: mod A     Therapist's Comments: Discussed case with RN prior to session initiation. Pt agreeable to participation in therapy. Gait belt donned for out of bed mobility. Required inc time and x2 attempts for STS transfer to RW due to R knee pain and pt feeling like it would buckle. Once in standing vc provided for postural re-ed, pursed lip breathing, and assist with RW navigation for transfer to bedside chair. Limited by inc dyspnea disproportionate to activity and O2 sats and pt dry heaving at end of session > RN notified.       MODIFIED CHUY DYSPNEA SCALE - (SHORTNESS OF BREATH SCALE)  *pt reporting 7-8 and demonstrating inc dyspnea with activity however O2 sats remained > 90 throughout session on 2L    SCORE DESCRIPTION   0 Nothing at all   1 Very slight   2 Slight   3 Moderate   4 Somewhat severe   5 Strong or hard breathing   6    7 Very hard breathing   8    9 Very, Very Severe    10 MAX - You need to stop     Patient Education provided:    Use this scale to rate the difficulty of your breathing:  - As you would rate your pain from 0-10, you can rate your SOB from 0-10  - Goal is to stay below 7/10 with activity  - If you reach beyond 7/10, it is important to rest; stop activity and if you need to sit down to recove      THERAPEUTIC EXERCISES  Lower Extremity      Upper Extremity Shoulder circles, scap retraction, cervical rotation > to address reports of upper trap muscular tightness      Position Sitting     Repetitions   5-10   Sets   1     Patient End of Session: Up in chair, Needs met, Call light within reach, RN aware of session/findings, All patient questions and concerns addressed, Hospital anti-slip socks, Alarm set    PT Session Time: 23 minutes  Gait Training:  minutes  Therapeutic Activity: 23 minutes  Therapeutic Exercise:  minutes   Neuromuscular Re-education:  minutes

## 2025-06-17 ENCOUNTER — APPOINTMENT (OUTPATIENT)
Dept: GENERAL RADIOLOGY | Facility: HOSPITAL | Age: 80
End: 2025-06-17
Attending: INTERNAL MEDICINE
Payer: MEDICARE

## 2025-06-17 PROBLEM — N17.9 ACUTE KIDNEY INJURY: Status: ACTIVE | Noted: 2025-06-17

## 2025-06-17 LAB
ALBUMIN SERPL-MCNC: 3.6 G/DL (ref 3.2–4.8)
ALBUMIN/GLOB SERPL: 1.1 {RATIO} (ref 1–2)
ALP LIVER SERPL-CCNC: 123 U/L (ref 45–117)
ALT SERPL-CCNC: 730 U/L (ref 10–49)
ANION GAP SERPL CALC-SCNC: 15 MMOL/L (ref 0–18)
APTT PPP: 89 SECONDS (ref 23–36)
AST SERPL-CCNC: 604 U/L (ref ?–34)
BILIRUB SERPL-MCNC: 1.2 MG/DL (ref 0.2–1.1)
BUN BLD-MCNC: 66 MG/DL (ref 9–23)
CALCIUM BLD-MCNC: 8.8 MG/DL (ref 8.7–10.6)
CHLORIDE SERPL-SCNC: 97 MMOL/L (ref 98–112)
CO2 SERPL-SCNC: 23 MMOL/L (ref 21–32)
CREAT BLD-MCNC: 2.47 MG/DL (ref 0.7–1.3)
EGFRCR SERPLBLD CKD-EPI 2021: 26 ML/MIN/1.73M2 (ref 60–?)
GLOBULIN PLAS-MCNC: 3.3 G/DL (ref 2–3.5)
GLUCOSE BLD-MCNC: 111 MG/DL (ref 70–99)
GLUCOSE BLD-MCNC: 118 MG/DL (ref 70–99)
GLUCOSE BLD-MCNC: 165 MG/DL (ref 70–99)
GLUCOSE BLD-MCNC: 186 MG/DL (ref 70–99)
GLUCOSE BLD-MCNC: 188 MG/DL (ref 70–99)
MAGNESIUM SERPL-MCNC: 2.5 MG/DL (ref 1.6–2.6)
OSMOLALITY SERPL CALC.SUM OF ELEC: 300 MOSM/KG (ref 275–295)
POTASSIUM SERPL-SCNC: 3.5 MMOL/L (ref 3.5–5.1)
POTASSIUM SERPL-SCNC: 4 MMOL/L (ref 3.5–5.1)
PROT SERPL-MCNC: 6.9 G/DL (ref 5.7–8.2)
SODIUM SERPL-SCNC: 135 MMOL/L (ref 136–145)

## 2025-06-17 PROCEDURE — 99232 SBSQ HOSP IP/OBS MODERATE 35: CPT | Performed by: STUDENT IN AN ORGANIZED HEALTH CARE EDUCATION/TRAINING PROGRAM

## 2025-06-17 PROCEDURE — 99233 SBSQ HOSP IP/OBS HIGH 50: CPT | Performed by: INTERNAL MEDICINE

## 2025-06-17 PROCEDURE — 71045 X-RAY EXAM CHEST 1 VIEW: CPT | Performed by: INTERNAL MEDICINE

## 2025-06-17 PROCEDURE — 99232 SBSQ HOSP IP/OBS MODERATE 35: CPT | Performed by: INTERNAL MEDICINE

## 2025-06-17 RX ORDER — SENNOSIDES 8.6 MG
8.6 TABLET ORAL DAILY
Status: COMPLETED | OUTPATIENT
Start: 2025-06-17 | End: 2025-06-19

## 2025-06-17 RX ORDER — ONDANSETRON 2 MG/ML
8 INJECTION INTRAMUSCULAR; INTRAVENOUS ONCE
Status: COMPLETED | OUTPATIENT
Start: 2025-06-17 | End: 2025-06-17

## 2025-06-17 RX ORDER — SENNOSIDES 8.6 MG
8.6 TABLET ORAL DAILY
Status: DISCONTINUED | OUTPATIENT
Start: 2025-06-17 | End: 2025-06-17

## 2025-06-17 RX ORDER — ONDANSETRON 2 MG/ML
8 INJECTION INTRAMUSCULAR; INTRAVENOUS EVERY 24 HOURS
Status: COMPLETED | OUTPATIENT
Start: 2025-06-17 | End: 2025-06-22

## 2025-06-17 RX ORDER — POTASSIUM CHLORIDE 1.5 G/1.58G
40 POWDER, FOR SOLUTION ORAL ONCE
Status: COMPLETED | OUTPATIENT
Start: 2025-06-17 | End: 2025-06-17

## 2025-06-17 NOTE — PROGRESS NOTES
[facility]  Progress Note    David Reyes Patient Status:  Inpatient    1945 MRN [id number]   Location [facility] 8NE-A Attending Alejandra Mehta MD   Hosp Day # 5 PCP Thea Harris DO     Subjective:  David Reyes is a(n) 79 year old male remains afeb   Continues to feel improved less shortness of breath though remains with some conversational  Now weaned to 2 L  Remains with mild cough less hemoptysis  Remains on Bumex and heparin drips    Objective:  /38 (BP Location: Left arm)   Pulse 71   Temp 97.9 °F (36.6 °C) (Oral)   Resp 19   Wt 209 lb 10.5 oz (95.1 kg)   SpO2 100%   BMI 30.96 kg/m² 2 L      Temp (24hrs), Av.9 °F (36.6 °C), Min:97.6 °F (36.4 °C), Max:98.2 °F (36.8 °C)      Intake/Output:    Intake/Output Summary (Last 24 hours) at 2025 0843  Last data filed at 2025 0500  Gross per 24 hour   Intake 1304.34 ml   Output 4350 ml   Net -3045.66 ml       Physical Exam:   General: alert, cooperative, oriented.  No respiratory distress.  Some conversational dyspnea able to lay more supine   Head: Normocephalic, without obvious abnormality, atraumatic.   Throat: Lips, mucosa, and tongue normal.  No thrush noted.  No obvious bleeding site   Neck: trachea midline, no adenopathy, no thyromegaly. No JVD.   Lungs: Clear anteriorly   Chest wall: No tenderness or deformity.   Heart: Paced irregular murmurs noted   Abdomen: soft, non-distended, no masses, no guarding, no     Rebound.  Nontender   Extremity: Right ankle remains tender to movement though reports improved remains with right greater than left lower extremity edema   Skin: No rashes or lesions.   Neurological: Alert, interactive, no focal deficits    Lab Data Review:  Recent Labs     25  1610 06/15/25  0436 25  0521   WBC  --  2.1* 1.6*   HGB 7.9* 8.0* 7.3*   PLT  --  95.0* 74.0*     Recent Labs     06/15/25  0436 25  0521 25  0046 25  0621    134*  --  135*   K 4.5 4.6  3.5 4.0    101  --  97*   CO2 20.0* 17.0*  --  23.0   BUN 54* 48*  --  66*   CREATSERUM 2.64* 2.55*  --  2.47*     Recent Labs   Lab 06/13/25  0351 06/13/25  1225 06/16/25  0521 06/16/25  1414 06/17/25  0621   PTP 17.6*  --   --   --   --    INR 1.44*  --   --   --   --    .2*   < > 106.3* 76.1* 89.0*    < > = values in this interval not displayed.       Cultures: remains neg    Radiology:  XR CHEST AP PORTABLE  (CPT=71045)  Result Date: 6/17/2025  CONCLUSION:  Subsegmental atelectasis at the lung bases.   LOCATION:  [facility]      Dictated by (CST): Ponce Toney MD on 6/17/2025 at 8:38 AM     Finalized by (CST): Ponce Toney MD on 6/17/2025 at 8:39 AM       Cxr without significant change-- scattered infiltrates   Ct reviewed       Medications reviewed     Assessment and Plan:   Problem List[1]    Assessment:  Acute hypoxic respiratory failure remains on 2 L with plans to wean  Acute pulmonary embolism positive V/Q-negative Dopplers-on heparin with plans to transition to orals  Hemoptysis subacute reports over 18 months--no obvious endobronchial lesion on CT--plan to follow on Levaquin to complete course  Right lower lobe and bilateral upper lobe groundglass opacities-possibly fluid related-with plans to follow on completion of antibiotics  Small pulmonary nodules: CT chest 6/12/2025  Acute elevation LFTS -now off statins with plans to repeat-questionable related to acyclovir vs levaquin   History of smoking at least 26 pack years tobacco though quit in 1986  Coronary artery disease: Status post CABG and PCI in past  HFrEF last ejection fraction of 20 to 25%-agree with ongoing diuresis discussed with cardiology  Obstructive sleep apnea syndrome had been compliant for 2 years not using in the recent past  LORENA/CKD improving with improved acidosis  Myelodysplastic syndrome: With pancytopenia as new diagnosis-18% blast genetic studies pending-Dr. Chirinos's note appreciated  Rheumatoid arthritis        Plan:    following chest x-rays as he diuresis  Ongoing diuresis as tolerated remains on Bumex drip  Check sputum culture as able  Continuing to follow      CC     Candi Barros MD  6/17/2025  8:43 AM         [1]   Patient Active Problem List  Diagnosis    Nonischemic cardiomyopathy (HCC)    FLAKO (obstructive sleep apnea)    Congestive heart failure (HCC)    CAD (coronary artery disease)    Rheumatoid arthritis involving multiple sites with positive rheumatoid factor (HCC)    Type 2 diabetes mellitus with hyperglycemia, with long-term current use of insulin (HCC)    Normocytic anemia    Thrombocytopenia    Ischemic cardiomyopathy    Dyspnea on exertion    Dry mouth    Pure hypercholesterolemia    Vitamin D deficiency    Pulmonary hypertension secondary to increased PVR (HCC)    Carotid artery stenosis    Obesity (BMI 30.0-34.9)    Chronic obstructive pulmonary disease, unspecified ([facility])    Weakness generalized    Aortic stenosis, mild    Hyponatremia    Pancytopenia ([facility])    Acute respiratory failure with hypoxia ([facility])    Immunocompromised state ([facility])    Wide-complex tachycardia    Acute on chronic anemia    Type 2 diabetes mellitus without complication, with long-term current use of insulin ([facility])    Foot pain, bilateral    Hypothyroid    Pulmonary hypertension ([facility])    HFrEF (heart failure with reduced ejection fraction) ([facility])    PE (pulmonary thromboembolism) ([facility])    Primary hypertension    Dyspnea, unspecified type    Hemoptysis    Metabolic acidosis    LORENA (acute kidney injury)    Acute pulmonary embolism, unspecified pulmonary embolism type, unspecified whether acute cor pulmonale present ([facility])    MDS (myelodysplastic syndrome) ([facility])    CKD stage 3b, GFR 30-44 ml/min ([facility])    Cardiorenal syndrome

## 2025-06-17 NOTE — PROGRESS NOTES
[facility]   part of Located within [facility]     Nephrology Progress Note    David Reyes Patient Status:  Inpatient    1945 MRN [id number]   Location [facility] 8NE-A Attending Alejandra Mehta MD   Hosp Day # 5 PCP Thea Harris,        SUBJECTIVE:  Doing ok  Peeing like a \"race horse  Denies any cp; still sob w/ exertion    Current Hospital Medications[1]        Physical Exam:   /38 (BP Location: Left arm)   Pulse 74   Temp 97.9 °F (36.6 °C) (Oral)   Resp 19   Wt 209 lb 10.5 oz (95.1 kg)   SpO2 100%   BMI 30.96 kg/m²   Temp (24hrs), Av.9 °F (36.6 °C), Min:97.6 °F (36.4 °C), Max:98.2 °F (36.8 °C)       Intake/Output Summary (Last 24 hours) at 2025 1059  Last data filed at 2025 0920  Gross per 24 hour   Intake 1654.34 ml   Output 4350 ml   Net -2695.66 ml     Last 3 Weights   25 0500 209 lb 10.5 oz (95.1 kg)   25 0545 218 lb (98.9 kg)   06/15/25 0508 218 lb 8 oz (99.1 kg)   25 0434 217 lb 13 oz (98.8 kg)   25 2258 213 lb (96.6 kg)   25 1935 215 lb (97.5 kg)   25 1506 217 lb (98.4 kg)   25 1102 219 lb 3.2 oz (99.4 kg)   25 0955 222 lb 3.2 oz (100.8 kg)   25 1636 222 lb 8 oz (100.9 kg)     General: Alert and oriented in no apparent distress.  HEENT: No scleral icterus, MMM  Neck: Supple, no DON or thyromegaly  Cardiac: irregular, S1, S2 normal, no murmur or rub  Lungs: decr BS B    Abdomen: Soft, non-tender. + bowel sounds, no palpable organomegaly  Extremities: + BLE edema.  Neurologic:  moving all extremities  Skin: Warm and dry, no rash    Recent Labs     25  1610 06/15/25  0436 25  0521   WBC  --  2.1* 1.6*   HGB 7.9* 8.0* 7.3*   MCV  --  88.8 92.1   PLT  --  95.0* 74.0*       Recent Labs     06/15/25  0436 25  0521 25  0046 25  0621    134*  --  135*   K 4.5 4.6 3.5 4.0    101  --  97*   CO2 20.0* 17.0*  --  23.0   BUN 54* 48*  --  66*   CREATSERUM 2.64* 2.55*  --  2.47*   CA  8.3* 8.3*  --  8.8   MG  --   --  2.5  --        Recent Labs     06/17/25  0621   *   *   ALB 3.6       Impression/Plan:        1.Acute kidney injury on CKD 3b: baseline serum creatinine appears to be around 1.9-2.1 mg/dL. Current LORENA appears related to CRS. Continue diuresis.  .      2.Acute on chronic HFrEF: cardiology following, UOP better with bumex gtt.  Continue for next 24 hrs     3. Acute PE: on heparin gtt. Per pulm/heme     4. 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 Chirinos as outpatient. S/p recent BMBx. Heme following.          Questions/concerns were discussed with patient and/or family by bedside.      Antonio Cartwright  6/17/2025          [1]   Current Facility-Administered Medications   Medication Dose Route Frequency    sennosides (Senokot) tab 8.6 mg  8.6 mg Oral Daily    ondansetron (Zofran) 4 MG/2ML injection 8 mg  8 mg Intravenous Q24H    acyclovir (Zovirax) cap 200 mg  200 mg Oral BID    azaCITIDine (Vidaza) SUBQ syringe 150 mg  75 mg/m2 Subcutaneous Q24H    bumetanide (Bumex) 12.5 mg in 50 mL infusion  0.5 mg/hr Intravenous Continuous    metoclopramide (Reglan) 5 mg/mL injection 5 mg  5 mg Intravenous Q8H PRN    heparin (Porcine) 74658 units/250mL infusion PE/DVT/THROMBUS CONTINUOUS  200-3,000 Units/hr Intravenous Continuous    levoFLOXacin (Levaquin) tab 750 mg  750 mg Oral Q48HR @ 0700    glucose (Dex4) 15 GM/59ML oral liquid 15 g  15 g Oral Q15 Min PRN    Or    glucose (Glutose) 40% oral gel 15 g  15 g Oral Q15 Min PRN    Or    dextrose 50% injection 50 mL  50 mL Intravenous Q15 Min PRN    Or    glucose (Dex4) 15 GM/59ML oral liquid 30 g  30 g Oral Q15 Min PRN    Or    glucose (Glutose) 40% oral gel 30 g  30 g Oral Q15 Min PRN    [Held by provider] ezetimibe (Zetia) tab 10 mg  10 mg Oral Daily    gabapentin (Neurontin) cap 100 mg  100 mg Oral Daily    levothyroxine (Synthroid) tab 25 mcg  25 mcg Oral Before breakfast    [Held by provider] losartan (Cozaar) tab 25 mg  25 mg  Oral Daily    metoprolol succinate ER (Toprol XL) 24 hr tab 50 mg  50 mg Oral 2x Daily(Beta Blocker)    [Held by provider] spironolactone (Aldactone) tab 25 mg  25 mg Oral Daily with breakfast    insulin aspart (NovoLOG) 100 Units/mL FlexPen 1-10 Units  1-10 Units Subcutaneous TID AC and HS    acetaminophen (Tylenol Extra Strength) tab 1,000 mg  1,000 mg Oral Q8H PRN    melatonin tab 3 mg  3 mg Oral Nightly PRN    polyethylene glycol (PEG 3350) (Miralax) 17 g oral packet 17 g  17 g Oral Daily PRN    sennosides (Senokot) tab 17.2 mg  17.2 mg Oral Nightly PRN    bisacodyl (Dulcolax) 10 MG rectal suppository 10 mg  10 mg Rectal Daily PRN    benzonatate (Tessalon) cap 200 mg  200 mg Oral TID PRN    guaiFENesin (Robitussin) 100 MG/5 ML oral liquid 200 mg  200 mg Oral Q4H PRN    glycerin-hypromellose- (Artificial Tears) 0.2-0.2-1 % ophthalmic solution 1 drop  1 drop Both Eyes QID PRN    sodium chloride (Saline Mist) 0.65 % nasal solution 1 spray  1 spray Each Nare Q3H PRN    [Held by provider] **PATIENT SUPPLIED** Lovastatin TABS 40 mg  40 mg Oral BID

## 2025-06-17 NOTE — PROGRESS NOTES
[facility]   part of [facility]    Advanced Heart Failure Progress Note    David Reyes Patient Status:  Inpatient    1945 MRN [id number]   Location [facility] 8NE-A Attending Alejandra Mehta MD   Hosp Day # 5 PCP Thea Harris,      Subjective:    Marked improvement in conversational dyspnea and urinated well over the past 24 hours. Denies CP syncope. On therapy from oncology and LFT's elevated 600s - unclear etiology.     Tele - PVC's / NSVT present.     Objective:  /38 (BP Location: Left arm)   Pulse 71   Temp 97.9 °F (36.6 °C) (Oral)   Resp 19   Wt 209 lb 10.5 oz (95.1 kg)   SpO2 100%   BMI 30.96 kg/m²     Temp (24hrs), Av.9 °F (36.6 °C), Min:97.6 °F (36.4 °C), Max:98.2 °F (36.8 °C)      Intake/Output:    Intake/Output Summary (Last 24 hours) at 2025 0920  Last data filed at 2025 0500  Gross per 24 hour   Intake 1304.34 ml   Output 4350 ml   Net -3045.66 ml       Wt Readings from Last 3 Encounters:   25 209 lb 10.5 oz (95.1 kg)   25 217 lb (98.4 kg)   25 219 lb 3.2 oz (99.4 kg)       Allergies:  Allergies[1]    Physical Exam:  Blood pressure 113/38, pulse 71, temperature 97.9 °F (36.6 °C), temperature source Oral, resp. rate 19, weight 209 lb 10.5 oz (95.1 kg), SpO2 100%.    General: Alert and oriented in no apparent distress.obese  HEENT: No focal deficits.  Neck: above clavicle JVD present, normal ROM.  Cardiac: Regular rate and rhythm, S1, S2 normal, no rubs or gallops noted.  Lungs: conversational dyspnea present (improved from prior), basilar crackles present.  Abdomen: Soft, non-tender.   Extremities: minimal LE edema  Neurologic: Alert and oriented, normal affect.  Skin: Warm and dry.     Laboratory/Data:      Labs:       Lab Results   Component Value Date    PT 17.1 (H) 2014    PT 16.7 (H) 2014    INR 1.44 (H) 2025    INR 1.24 (H) 2025    INR 1.16 2024     No results for input(s): \"BNPML\" in the last  168 hours.  BUN (mg/dL)   Date Value   06/17/2025 66 (H)   06/16/2025 48 (H)   06/15/2025 54 (H)     Blood Urea Nitrogen (mg/dL)   Date Value   03/23/2018 22 (H)   07/21/2017 20   02/21/2017 15   06/17/2016 18   03/09/2016 19   12/02/2015 19     UREA NITROGEN (BUN) (mg/dL)   Date Value   12/04/2021 30 (H)     Creatinine, Serum (mg/dL)   Date Value   06/17/2016 0.96   03/09/2016 1.04   12/02/2015 0.99     CREATININE (mg/dL)   Date Value   12/04/2021 1.19 (H)     Creatinine (mg/dL)   Date Value   06/17/2025 2.47 (H)   06/16/2025 2.55 (H)   06/15/2025 2.64 (H)   03/23/2018 1.03   07/21/2017 0.96   02/21/2017 0.99       Medications:  Current Hospital Medications[2]    Assessment and Plan:  Problem List[3]    Acute on Chronic HFrEF, stage C, NYHA 3  - per history, objective and clinical signs of fluid overload present  - agree with IV diuresis infusion, with diuril, continue GDMT with BB, ARB, MRA, and eventual SGLT2 on discharge, (not on ARNI due to cost), optimize electrolytes.   - interrogate device if not done already   - trend labs, monitor UOP, PT / OT  - check lactate level.     LORENA on CKD  - CRS related superimposed on longstanding CKD history  - diuresis, agree with infusion, trend daily labs, monitor UOP    HTN   - titrate oral therapy.     CAD s/p CABG and PCI   - per history, no acute angina, not on AP agents due to MDS  - monitor on tele, risk factor modification.     Acute PE   - VQ scan with moderate size perfusion defect RLL  - AC for now, transition to oral as able per hematology    Acute hypoxic respiratory failure  - combination of HF, PE, and PNA?  - diuresis, wean oxygen as able, heparin for AC for PE management - evaluated by Interventional cardiology and felt not likely a major contributor for hypoxia and unlikely candidate for invasive procedure due to MDS   - transition to oral AC per hematology near discharge.     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     Transaminitis  - labs showed LFT 600s, unclear etiology, unlikely congestion related as this was not present before during admission.   - hold statin, trend, liver US, and evaluation per primary / GI   - check lactate.     Nikolai Walker MD   Advanced Heart Failure and Transplant Cardiology   [facility] ([facility])     L3         [1]   Allergies  Allergen Reactions    Pcn [Penicillins]      \"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   [2]   Current Facility-Administered Medications   Medication Dose Route Frequency    sennosides (Senokot) tab 8.6 mg  8.6 mg Oral Daily    ondansetron (Zofran) 4 MG/2ML injection 8 mg  8 mg Intravenous Q24H    acyclovir (Zovirax) cap 200 mg  200 mg Oral BID    azaCITIDine (Vidaza) SUBQ syringe 150 mg  75 mg/m2 Subcutaneous Q24H    bumetanide (Bumex) 12.5 mg in 50 mL infusion  0.5 mg/hr Intravenous Continuous    metoclopramide (Reglan) 5 mg/mL injection 5 mg  5 mg Intravenous Q8H PRN    heparin (Porcine) 24381 units/250mL infusion PE/DVT/THROMBUS CONTINUOUS  200-3,000 Units/hr Intravenous Continuous    levoFLOXacin (Levaquin) tab 750 mg  750 mg Oral Q48HR @ 0700    glucose (Dex4) 15 GM/59ML oral liquid 15 g  15 g Oral Q15 Min PRN    Or    glucose (Glutose) 40% oral gel 15 g  15 g Oral Q15 Min PRN    Or    dextrose 50% injection 50 mL  50 mL Intravenous Q15 Min PRN    Or    glucose (Dex4) 15 GM/59ML oral liquid 30 g  30 g Oral Q15 Min PRN    Or    glucose (Glutose) 40% oral gel 30 g  30 g Oral Q15 Min PRN    [Held by provider] ezetimibe (Zetia) tab 10 mg  10 mg Oral Daily    gabapentin (Neurontin) cap 100 mg  100 mg Oral Daily    levothyroxine (Synthroid) tab 25 mcg  25 mcg Oral Before breakfast    [Held by provider] losartan (Cozaar) tab 25 mg  25 mg Oral Daily    metoprolol succinate ER (Toprol XL) 24 hr tab 50 mg  50 mg Oral 2x Daily(Beta Blocker)    [Held by  provider] spironolactone (Aldactone) tab 25 mg  25 mg Oral Daily with breakfast    insulin aspart (NovoLOG) 100 Units/mL FlexPen 1-10 Units  1-10 Units Subcutaneous TID AC and HS    acetaminophen (Tylenol Extra Strength) tab 1,000 mg  1,000 mg Oral Q8H PRN    melatonin tab 3 mg  3 mg Oral Nightly PRN    polyethylene glycol (PEG 3350) (Miralax) 17 g oral packet 17 g  17 g Oral Daily PRN    sennosides (Senokot) tab 17.2 mg  17.2 mg Oral Nightly PRN    bisacodyl (Dulcolax) 10 MG rectal suppository 10 mg  10 mg Rectal Daily PRN    benzonatate (Tessalon) cap 200 mg  200 mg Oral TID PRN    guaiFENesin (Robitussin) 100 MG/5 ML oral liquid 200 mg  200 mg Oral Q4H PRN    glycerin-hypromellose- (Artificial Tears) 0.2-0.2-1 % ophthalmic solution 1 drop  1 drop Both Eyes QID PRN    sodium chloride (Saline Mist) 0.65 % nasal solution 1 spray  1 spray Each Nare Q3H PRN    [Held by provider] **PATIENT SUPPLIED** Lovastatin TABS 40 mg  40 mg Oral BID   [3]   Patient Active Problem List  Diagnosis    Nonischemic cardiomyopathy (HCC)    FLAKO (obstructive sleep apnea)    Congestive heart failure (HCC)    CAD (coronary artery disease)    Rheumatoid arthritis involving multiple sites with positive rheumatoid factor (HCC)    Type 2 diabetes mellitus with hyperglycemia, with long-term current use of insulin (HCC)    Normocytic anemia    Thrombocytopenia    Ischemic cardiomyopathy    Dyspnea on exertion    Dry mouth    Pure hypercholesterolemia    Vitamin D deficiency    Pulmonary hypertension secondary to increased PVR (HCC)    Carotid artery stenosis    Obesity (BMI 30.0-34.9)    Chronic obstructive pulmonary disease, unspecified (HCC)    Weakness generalized    Aortic stenosis, mild    Hyponatremia    Pancytopenia (HCC)    Acute respiratory failure with hypoxia (HCC)    Immunocompromised state (HCC)    Wide-complex tachycardia    Acute on chronic anemia    Type 2 diabetes mellitus without complication, with long-term current use  of insulin (HCC)    Foot pain, bilateral    Hypothyroid    Pulmonary hypertension (HCC)    HFrEF (heart failure with reduced ejection fraction) (HCC)    PE (pulmonary thromboembolism) (HCC)    Primary hypertension    Dyspnea, unspecified type    Hemoptysis    Metabolic acidosis    LORENA (acute kidney injury)    Acute pulmonary embolism, unspecified pulmonary embolism type, unspecified whether acute cor pulmonale present (HCC)    MDS (myelodysplastic syndrome) (HCC)    CKD stage 3b, GFR 30-44 ml/min (HCC)    Cardiorenal syndrome

## 2025-06-17 NOTE — PROGRESS NOTES
Hematology/Oncology Progress Note    Patient Name: David Reyes  Medical Record Number: [id number]    YOB: 1945     Reason for Consultation:  David Reyes was seen today for the diagnosis of MDS    Interval events:      - says breathing is a little better      Inpatient Meds:  Scheduled Medications[1]  Medication Infusions[2]    PRN Meds:  PRN Medications[3]    Allergies:   Allergies[4]    Vital Signs:  Height: --  Weight: 95.1 kg (209 lb 10.5 oz) (06/17 0500)  BSA (Calculated - sq m): --  Pulse: 73 (06/17 0500)  BP: 95/60 (06/17 0500)  Temp: 97.8 °F (36.6 °C) (06/17 0500)  Do Not Use - Resp Rate: --  SpO2: 100 % (06/17 0500)    Wt Readings from Last 6 Encounters:   06/17/25 95.1 kg (209 lb 10.5 oz)   05/29/25 98.4 kg (217 lb)   05/22/25 99.4 kg (219 lb 3.2 oz)   04/11/25 100.8 kg (222 lb 3.2 oz)   03/17/25 100.9 kg (222 lb 8 oz)   12/09/24 105.5 kg (232 lb 8 oz)       Physical Examination:  General: Patient is alert and oriented, not in acute distress  Psych: Mood and affect are appropriate  Eyes: EOMI, PERRL  ENT: Oropharynx is clear, no adenopathy  CV: mild LE edema  Respiratory: conversational dyspnea  GI/Abd: Soft, non-tender   Neurological: Grossly intact     Laboratory:  Recent Labs   Lab 06/13/25  0351 06/14/25  0454 06/14/25  1610 06/15/25  0436 06/16/25  0521   WBC 1.5* 1.6*  --  2.1* 1.6*   HGB 7.4* 6.8* 7.9* 8.0* 7.3*   HCT 25.2* 22.9*  --  27.0* 25.5*   .0* 97.0*  --  95.0* 74.0*   MCV 89.4 86.4  --  88.8 92.1   RDW 20.1 19.6  --  19.0 19.8   NEPRELIM 0.34*  --   --  0.71* 0.61*       Recent Labs   Lab 06/12/25  1340 06/13/25  0351 06/14/25  0454 06/15/25  0436 06/16/25  0521 06/17/25  0046 06/17/25  0621    139   < > 136 134*  --  135*   K 4.1 4.0   < > 4.5 4.6 3.5 4.0    105   < > 102 101  --  97*   CO2 20.0* 22.0   < > 20.0* 17.0*  --  23.0   BUN 30* 31*   < > 54* 48*  --  66*   CREATSERUM 2.14* 1.95*   < > 2.64* 2.55*  --  2.47*   * 127*   < >  137* 116*  --  111*   CA 8.9 8.8   < > 8.3* 8.3*  --  8.8   PHOS  --  3.6  --   --   --   --   --    TP 7.4 7.0  --   --   --   --  6.9   ALB 4.1 3.9  --   --   --   --  3.6   ALKPHO 57 56  --   --   --   --  123*   AST 24 29  --   --   --   --  604*   ALT <7* 10  --   --   --   --  730*   BILT 0.8 0.7  --   --   --   --  1.2*    < > = values in this interval not displayed.       Recent Labs   Lab 06/13/25  0351 06/13/25  1225 06/16/25  0521 06/16/25  1414 06/17/25  0621   INR 1.44*  --   --   --   --    .2*   < > 106.3* 76.1* 89.0*    < > = values in this interval not displayed.       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 Pat wants to pursue treatment. I warned that chemotherapy may be difficult with his comorbidities and he understands this  - given his pancytopenia and expected prolonged admission for heart failure exacerbation and needed diuresis, plan to start azacitidine 75mg/m2/day x 7 days today  - we discussed azacitidine will be given every 28-35 days. Pt verbally consented  - we previously discussed incorporating venetoclax but phase 3 trial MONA results published yesterday showed adding venetoclax to azacitidine did not improve overall survival in higher risk MDS, so will not incorporate venetoclax  - ppx: acyclovir 200mg BID, levaquin 750mg q48 hours. Will need to start posaconazole 300mg daily when obtained from pharmacy. Will need to hold off on posaconazole initiation until LFTs recover  - transfuse for hgb <7 g/dL, plt <10k    RLL PE  - continue heparin gtt  - near discharge, will need to transition to apixaban 2.5mg BID (due to posaconazole co-administration)  - if plts drop to <50k he will need to pause anticoagulation    Acute  on chronic systolic heart failure  - per cardiology    CKD stage 4  - per nephrology    LFT elevations  - increased from 6/13  - ezetimibe held    Will continue to follow    James Chirinos MD  [facility] Oncology             [1]    acyclovir  200 mg Oral BID    azaCITIDine  75 mg/m2 Subcutaneous Q24H    levoFLOXacin  750 mg Oral Q48HR @ 0700    ezetimibe  10 mg Oral Daily    gabapentin  100 mg Oral Daily    levothyroxine  25 mcg Oral Before breakfast    [Held by provider] losartan  25 mg Oral Daily    metoprolol succinate ER  50 mg Oral 2x Daily(Beta Blocker)    [Held by provider] spironolactone  25 mg Oral Daily with breakfast    insulin aspart  1-10 Units Subcutaneous TID AC and HS    [Held by provider] Lovastatin  40 mg Oral BID   [2]    bumetanide (Bumex) 12.5 mg in 50 mL infusion 0.5 mg/hr (06/17/25 0600)    continuous dose heparin 1,150 Units/hr (06/16/25 2110)   [3]   metoclopramide    glucose **OR** glucose **OR** dextrose **OR** glucose **OR** glucose    acetaminophen    melatonin    polyethylene glycol (PEG 3350)    sennosides    bisacodyl    benzonatate    guaiFENesin    glycerin-hypromellose-    sodium chloride  [4]   Allergies  Allergen Reactions    Pcn [Penicillins]      \"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

## 2025-06-17 NOTE — CONGREGATE LIVING REVIEW
[facility] Living Authorization    The [facility] Review Committee has reviewed this case and the patient IS APPROVED for discharge to a facility for Short Term Skilled once the following procedure is followed:     - The physician discharge instructions (contained within the SUSU note for SNF) must inlcude the below appropriate and approved COVID instructions to the facility    For questions regarding CLRC approval process, please contact the CM assigned to the case.  For questions regarding RN discharge workflow, please contact the unit Clinical Leader.

## 2025-06-17 NOTE — PLAN OF CARE
Notified of 9 beats of NSVT (in media) bp 90/69 currently vpaced on bumex gtt has had 2300 urine op since admit recheck K and mag

## 2025-06-17 NOTE — PLAN OF CARE
Assumed care of pt at 1930. A&Ox4, occasional forgetful. V paced on tele. Denies any chest pain or discomfort. Spo2 WNL on 2L o2 NC, productive cough & dyspnea present with exertion, pink tinged phlegm noted. PRN tessalon given. Heparin gtt & bumex gtt infusing per orders. Incontinent of B/B, adequate urine produced. Bed alarm on, call light within reach. Patient tolerating care & able to verbalize needs.     2330; 9 beats VT, patient asymptomatic - [facility] APN notified, K ordered -> came back at 3.5, given replacement.    POC;   - heparin gtt  - bumex gtt  - monitor labs     Problem: Patient/Family Goals  Goal: Patient/Family Long Term Goal  Description: Patient's Long Term Goal:   To return home asap    Interventions:  - Cardiology consult  2 D echo with doppler  - See additional Care Plan goals for specific interventions  Outcome: Progressing  Goal: Patient/Family Short Term Goal  Description: Patient's Short Term Goal:  t get rid of shortness of breath    Interventions:   - medication adjustment      Supplemental oxygen as needed  - See additional Care Plan goals for specific interventions  Outcome: Progressing     Problem: PAIN - ADULT  Goal: Verbalizes/displays adequate comfort level or patient's stated pain goal  Description: INTERVENTIONS:  - Encourage pt to monitor pain and request assistance  - Assess pain using appropriate pain scale  - Administer analgesics based on type and severity of pain and evaluate response  - Implement non-pharmacological measures as appropriate and evaluate response  - Consider cultural and social influences on pain and pain management  - Manage/alleviate anxiety  - Utilize distraction and/or relaxation techniques  - Monitor for opioid side effects  - Notify MD/LIP if interventions unsuccessful or patient reports new pain  - Anticipate increased pain with activity and pre-medicate as appropriate  Outcome: Progressing     Problem: CARDIOVASCULAR - ADULT  Goal: Maintains optimal  cardiac output and hemodynamic stability  Description: INTERVENTIONS:  - Monitor vital signs, rhythm, and trends  - Monitor for bleeding, hypotension and signs of decreased cardiac output  - Evaluate effectiveness of vasoactive medications to optimize hemodynamic stability  - Monitor arterial and/or venous puncture sites for bleeding and/or hematoma  - Assess quality of pulses, skin color and temperature  - Assess for signs of decreased coronary artery perfusion - ex. Angina  - Evaluate fluid balance, assess for edema, trend weights  Outcome: Progressing  Goal: Absence of cardiac arrhythmias or at baseline  Description: INTERVENTIONS:  - Continuous cardiac monitoring, monitor vital signs, obtain 12 lead EKG if indicated  - Evaluate effectiveness of antiarrhythmic and heart rate control medications as ordered  - Initiate emergency measures for life threatening arrhythmias  - Monitor electrolytes and administer replacement therapy as ordered  Outcome: Progressing     Problem: RESPIRATORY - ADULT  Goal: Achieves optimal ventilation and oxygenation  Description: INTERVENTIONS:  - Assess for changes in respiratory status  - Assess for changes in mentation and behavior  - Position to facilitate oxygenation and minimize respiratory effort  - Oxygen supplementation based on oxygen saturation or ABGs  - Provide Smoking Cessation handout, if applicable  - Encourage broncho-pulmonary hygiene including cough, deep breathe, Incentive Spirometry  - Assess the need for suctioning and perform as needed  - Assess and instruct to report SOB or any respiratory difficulty  - Respiratory Therapy support as indicated  - Manage/alleviate anxiety  - Monitor for signs/symptoms of CO2 retention  Outcome: Progressing     Problem: METABOLIC/FLUID AND ELECTROLYTES - ADULT  Goal: Glucose maintained within prescribed range  Description: INTERVENTIONS:  - Monitor Blood Glucose as ordered  - Assess for signs and symptoms of hyperglycemia and  hypoglycemia  - Administer ordered medications to maintain glucose within target range  - Assess barriers to adequate nutritional intake and initiate nutrition consult as needed  - Instruct patient on self management of diabetes  Outcome: Progressing  Goal: Electrolytes maintained within normal limits  Description: INTERVENTIONS:  - Monitor labs and rhythm and assess patient for signs and symptoms of electrolyte imbalances  - Administer electrolyte replacement as ordered  - Monitor response to electrolyte replacements, including rhythm and repeat lab results as appropriate  - Fluid restriction as ordered  - Instruct patient on fluid and nutrition restrictions as appropriate  Outcome: Progressing     Problem: HEMATOLOGIC - ADULT  Goal: Free from bleeding injury  Description: (Example usage: patient with low platelets)  INTERVENTIONS:  - Avoid intramuscular injections, enemas and rectal medication administration  - Ensure safe mobilization of patient  - Hold pressure on venipuncture sites to achieve adequate hemostasis  - Assess for signs and symptoms of internal bleeding  - Monitor lab trends  - Patient is to report abnormal signs of bleeding to staff  - Avoid use of toothpicks and dental floss  - Use electric shaver for shaving  - Use soft bristle tooth brush  - Limit straining and forceful nose blowing  Outcome: Progressing

## 2025-06-17 NOTE — PLAN OF CARE
Problem: Diabetes/Glucose Control  Goal: Glucose maintained within prescribed range  Description: INTERVENTIONS:  - Monitor Blood Glucose as ordered  - Assess for signs and symptoms of hyperglycemia and hypoglycemia  - Administer ordered medications to maintain glucose within target range  - Assess barriers to adequate nutritional intake and initiate nutrition consult as needed  - Instruct patient on self management of diabetes  Outcome: Progressing     Problem: Patient/Family Goals  Goal: Patient/Family Long Term Goal  Description: Patient's Long Term Goal:   To return home asap    Interventions:  - Cardiology consult  2 D echo with doppler  - See additional Care Plan goals for specific interventions  Outcome: Progressing  Goal: Patient/Family Short Term Goal  Description: Patient's Short Term Goal:  t get rid of shortness of breath    Interventions:   - medication adjustment      Supplemental oxygen as needed  - See additional Care Plan goals for specific interventions  Outcome: Progressing     Problem: PAIN - ADULT  Goal: Verbalizes/displays adequate comfort level or patient's stated pain goal  Description: INTERVENTIONS:  - Encourage pt to monitor pain and request assistance  - Assess pain using appropriate pain scale  - Administer analgesics based on type and severity of pain and evaluate response  - Implement non-pharmacological measures as appropriate and evaluate response  - Consider cultural and social influences on pain and pain management  - Manage/alleviate anxiety  - Utilize distraction and/or relaxation techniques  - Monitor for opioid side effects  - Notify MD/LIP if interventions unsuccessful or patient reports new pain  - Anticipate increased pain with activity and pre-medicate as appropriate  Outcome: Progressing     Problem: SAFETY ADULT - FALL  Goal: Free from fall injury  Description: INTERVENTIONS:  - Assess pt frequently for physical needs  - Identify cognitive and physical deficits and  behaviors that affect risk of falls.  - [place] fall precautions as indicated by assessment.  - Educate pt/family on patient safety including physical limitations  - Instruct pt to call for assistance with activity based on assessment  - Modify environment to reduce risk of injury  - Provide assistive devices as appropriate  - Consider OT/PT consult to assist with strengthening/mobility  - Encourage toileting schedule  Outcome: Progressing     Problem: CARDIOVASCULAR - ADULT  Goal: Maintains optimal cardiac output and hemodynamic stability  Description: INTERVENTIONS:  - Monitor vital signs, rhythm, and trends  - Monitor for bleeding, hypotension and signs of decreased cardiac output  - Evaluate effectiveness of vasoactive medications to optimize hemodynamic stability  - Monitor arterial and/or venous puncture sites for bleeding and/or hematoma  - Assess quality of pulses, skin color and temperature  - Assess for signs of decreased coronary artery perfusion - ex. Angina  - Evaluate fluid balance, assess for edema, trend weights  Outcome: Progressing  Goal: Absence of cardiac arrhythmias or at baseline  Description: INTERVENTIONS:  - Continuous cardiac monitoring, monitor vital signs, obtain 12 lead EKG if indicated  - Evaluate effectiveness of antiarrhythmic and heart rate control medications as ordered  - Initiate emergency measures for life threatening arrhythmias  - Monitor electrolytes and administer replacement therapy as ordered  Outcome: Progressing     Problem: RESPIRATORY - ADULT  Goal: Achieves optimal ventilation and oxygenation  Description: INTERVENTIONS:  - Assess for changes in respiratory status  - Assess for changes in mentation and behavior  - Position to facilitate oxygenation and minimize respiratory effort  - Oxygen supplementation based on oxygen saturation or ABGs  - Provide Smoking Cessation handout, if applicable  - Encourage broncho-pulmonary hygiene including cough, deep breathe,  Incentive Spirometry  - Assess the need for suctioning and perform as needed  - Assess and instruct to report SOB or any respiratory difficulty  - Respiratory Therapy support as indicated  - Manage/alleviate anxiety  - Monitor for signs/symptoms of CO2 retention  Outcome: Progressing     Problem: METABOLIC/FLUID AND ELECTROLYTES - ADULT  Goal: Glucose maintained within prescribed range  Description: INTERVENTIONS:  - Monitor Blood Glucose as ordered  - Assess for signs and symptoms of hyperglycemia and hypoglycemia  - Administer ordered medications to maintain glucose within target range  - Assess barriers to adequate nutritional intake and initiate nutrition consult as needed  - Instruct patient on self management of diabetes  Outcome: Progressing  Goal: Electrolytes maintained within normal limits  Description: INTERVENTIONS:  - Monitor labs and rhythm and assess patient for signs and symptoms of electrolyte imbalances  - Administer electrolyte replacement as ordered  - Monitor response to electrolyte replacements, including rhythm and repeat lab results as appropriate  - Fluid restriction as ordered  - Instruct patient on fluid and nutrition restrictions as appropriate  Outcome: Progressing

## 2025-06-17 NOTE — PROGRESS NOTES
[facility]   part of [facility]     Hospitalist Progress Note     David Reyes Patient Status:  Inpatient    1945 MRN [id number]   Location [facility] 8NE-A Attending Sukhdeep Bueno DO   Hosp Day # 5 PCP Thea Harris DO     Chief Complaint: Dyspnea     Subjective:     Patient fells markedly better today    Objective:    Review of Systems:   A comprehensive review of systems was completed; pertinent positive and negatives stated in subjective.    Vital signs:  Temp:  [97.6 °F (36.4 °C)-98.2 °F (36.8 °C)] 97.9 °F (36.6 °C)  Pulse:  [68-82] 74  Resp:  [18-20] 19  BP: ()/(38-69) 113/38  SpO2:  [98 %-100 %] 100 %    Physical Exam:    General: No acute distress  Respiratory: No wheezes, no rhonchi  Cardiovascular: S1, S2, regular rate and rhythm  Abdomen: Soft, Non-tender, non-distended, positive bowel sounds  Neuro: No new focal deficits.   Extremities: pitting edema       Diagnostic Data:    Labs:  Recent Labs   Lab 25  1340 25  0351 25  0454 25  1610 06/15/25  0436 25  0521   WBC 1.2* 1.5* 1.6*  --  2.1* 1.6*   HGB 7.8* 7.4* 6.8* 7.9* 8.0* 7.3*   MCV 88.3 89.4 86.4  --  88.8 92.1   .0* 102.0* 97.0*  --  95.0* 74.0*   INR  --  1.44*  --   --   --   --        Recent Labs   Lab 25  1340 25  0351 25  0454 06/15/25  0436 25  0521 25  0046 25  0621   * 127*   < > 137* 116*  --  111*   BUN 30* 31*   < > 54* 48*  --  66*   CREATSERUM 2.14* 1.95*   < > 2.64* 2.55*  --  2.47*   CA 8.9 8.8   < > 8.3* 8.3*  --  8.8   ALB 4.1 3.9  --   --   --   --  3.6    139   < > 136 134*  --  135*   K 4.1 4.0   < > 4.5 4.6 3.5 4.0    105   < > 102 101  --  97*   CO2 20.0* 22.0   < > 20.0* 17.0*  --  23.0   ALKPHO 57 56  --   --   --   --  123*   AST 24 29  --   --   --   --  604*   ALT <7* 10  --   --   --   --  730*   BILT 0.8 0.7  --   --   --   --  1.2*   TP 7.4 7.0  --   --   --   --  6.9    < > = values in this  interval not displayed.       Estimated Glomerular Filtration Rate: 26 mL/min/1.73m2 (A) (result from lab).    Recent Labs   Lab 06/12/25  1340   TROPHS 71*       Recent Labs   Lab 06/13/25  0351   PTP 17.6*   INR 1.44*                  Microbiology    Hospital Encounter on 06/12/25   1. Blood Culture     Status: None (Preliminary result)    Collection Time: 06/12/25  5:17 PM    Specimen: Blood,peripheral   Result Value Ref Range    Blood Culture Result No Growth 4 Days N/A         Imaging: Reviewed in Epic.    Medications: Scheduled Medications[1]    Assessment & Plan:      # Acute hypoxic resp failure due to pleural effusions, PNA and pulmonary embolus - improving  IV Abx   Hep drip   Diuresis     # HFrEF, ICD- with acute exacerbation  As above  Cardiology on cs  I/O  Monitor Cr, e-  IV Lasix reordered on 6/14 BID     #H/o CAD s/p CABG    #Acute PE-  Heparin drip  Doppler LE NEG     #Hypertension    #HLD    #Hypothyroid  Levothyroxine     #FLAKO     #MDS with pancytopenia   Transfuse for hb< 7-  1u PRBC on 6/14/25  Onc on CS       Alejandra Mehta MD    Supplementary Documentation:     Quality:  DVT Mechanical Prophylaxis:   SCDs, Early ambuation  DVT Pharmacologic Prophylaxis   Medication    heparin (Porcine) 27099 units/250mL infusion PE/DVT/THROMBUS CONTINUOUS                Code Status: Full Code  Beltre: External urinary catheter in place  Beltre Duration (in days):   Central line:    GABE:     Discharge is dependent on: clinical   At this point Mr. Reyes is expected to be discharge to: tbd    The 21st Century Cures Act makes medical notes like these available to patients in the interest of transparency. Please be advised this is a medical document. Medical documents are intended to carry relevant information, facts as evident, and the clinical opinion of the practitioner. The medical note is intended as peer to peer communication and may appear blunt or direct. It is written in medical language and may contain  abbreviations or verbiage that are unfamiliar.              **Certification      PHYSICIAN Certification of Need for Inpatient Hospitalization - Initial Certification    Patient will require inpatient services that will reasonably be expected to span two midnight's based on the clinical documentation in H+P.   Based on patients current state of illness, I anticipate that, after discharge, patient will require TBD.         [1]    sennosides  8.6 mg Oral Daily    ondansetron  8 mg Intravenous Q24H    acyclovir  200 mg Oral BID    azaCITIDine  75 mg/m2 Subcutaneous Q24H    levoFLOXacin  750 mg Oral Q48HR @ 0700    [Held by provider] ezetimibe  10 mg Oral Daily    gabapentin  100 mg Oral Daily    levothyroxine  25 mcg Oral Before breakfast    [Held by provider] losartan  25 mg Oral Daily    metoprolol succinate ER  50 mg Oral 2x Daily(Beta Blocker)    [Held by provider] spironolactone  25 mg Oral Daily with breakfast    insulin aspart  1-10 Units Subcutaneous TID AC and HS    [Held by provider] Lovastatin  40 mg Oral BID

## 2025-06-17 NOTE — PLAN OF CARE
Patient is alert and oriented times four. His lungs are clear on auscultation. Bilateral lower extremities with 2 plus edema noted. He is v paced on the monitor. He is very pale with multiple purple bruises on his arms. He has some pain from neuropathy in his feet.

## 2025-06-18 ENCOUNTER — APPOINTMENT (OUTPATIENT)
Age: 80
End: 2025-06-18
Attending: INTERNAL MEDICINE
Payer: MEDICARE

## 2025-06-18 LAB
AFP-TM SERPL-MCNC: <2.2 NG/ML (ref ?–8)
ALBUMIN SERPL-MCNC: 3.5 G/DL (ref 3.2–4.8)
ALBUMIN/GLOB SERPL: 1.2 {RATIO} (ref 1–2)
ALP LIVER SERPL-CCNC: 111 U/L (ref 45–117)
ALT SERPL-CCNC: 540 U/L (ref 10–49)
ANION GAP SERPL CALC-SCNC: 11 MMOL/L (ref 0–18)
APTT PPP: 92.7 SECONDS (ref 23–36)
AST SERPL-CCNC: 307 U/L (ref ?–34)
BASOPHILS # BLD AUTO: 0.01 X10(3) UL (ref 0–0.2)
BASOPHILS # BLD: 0.03 X10(3) UL (ref 0–0.2)
BASOPHILS NFR BLD AUTO: 0.7 %
BASOPHILS NFR BLD: 2 %
BILIRUB SERPL-MCNC: 1 MG/DL (ref 0.2–1.1)
BUN BLD-MCNC: 66 MG/DL (ref 9–23)
CALCIUM BLD-MCNC: 8.5 MG/DL (ref 8.7–10.6)
CHLORIDE SERPL-SCNC: 98 MMOL/L (ref 98–112)
CO2 SERPL-SCNC: 28 MMOL/L (ref 21–32)
CREAT BLD-MCNC: 2.28 MG/DL (ref 0.7–1.3)
EGFRCR SERPLBLD CKD-EPI 2021: 29 ML/MIN/1.73M2 (ref 60–?)
EOSINOPHIL # BLD AUTO: 0 X10(3) UL (ref 0–0.7)
EOSINOPHIL # BLD: 0 X10(3) UL (ref 0–0.7)
EOSINOPHIL NFR BLD AUTO: 0 %
EOSINOPHIL NFR BLD: 0 %
ERYTHROCYTE [DISTWIDTH] IN BLOOD BY AUTOMATED COUNT: 19.4 %
ERYTHROCYTE [DISTWIDTH] IN BLOOD BY AUTOMATED COUNT: 19.5 %
GLOBULIN PLAS-MCNC: 3 G/DL (ref 2–3.5)
GLUCOSE BLD-MCNC: 121 MG/DL (ref 70–99)
GLUCOSE BLD-MCNC: 132 MG/DL (ref 70–99)
GLUCOSE BLD-MCNC: 142 MG/DL (ref 70–99)
GLUCOSE BLD-MCNC: 146 MG/DL (ref 70–99)
GLUCOSE BLD-MCNC: 184 MG/DL (ref 70–99)
HAV IGM SER QL: NONREACTIVE
HBV SURFACE AB SER QL: NONREACTIVE
HBV SURFACE AB SERPL IA-ACNC: 3.12 MIU/ML
HBV SURFACE AG SER-ACNC: <0.1 [IU]/L
HBV SURFACE AG SERPL QL IA: NONREACTIVE
HCT VFR BLD AUTO: 25.3 % (ref 39–53)
HCT VFR BLD AUTO: 26.6 % (ref 39–53)
HGB BLD-MCNC: 7.6 G/DL (ref 13–17.5)
HGB BLD-MCNC: 8 G/DL (ref 13–17.5)
IMM GRANULOCYTES # BLD AUTO: 0.03 X10(3) UL (ref 0–1)
IMM GRANULOCYTES NFR BLD: 2.1 %
IMMUNOGLOBULIN PNL SER-MCNC: 1199 MG/DL (ref 650–1600)
INR BLD: 1.46 (ref 0.8–1.2)
LYMPHOCYTES # BLD AUTO: 0.88 X10(3) UL (ref 1–4)
LYMPHOCYTES NFR BLD AUTO: 60.7 %
LYMPHOCYTES NFR BLD: 0.86 X10(3) UL (ref 1–4)
LYMPHOCYTES NFR BLD: 54 %
MAGNESIUM SERPL-MCNC: 2.4 MG/DL (ref 1.6–2.6)
MCH RBC QN AUTO: 26.3 PG (ref 26–34)
MCH RBC QN AUTO: 26.5 PG (ref 26–34)
MCHC RBC AUTO-ENTMCNC: 30 G/DL (ref 31–37)
MCHC RBC AUTO-ENTMCNC: 30.1 G/DL (ref 31–37)
MCV RBC AUTO: 87.5 FL (ref 80–100)
MCV RBC AUTO: 88.1 FL (ref 80–100)
METAMYELOCYTES # BLD: 0.02 X10(3) UL (ref ?–0.01)
METAMYELOCYTES NFR BLD: 1 %
MONOCYTES # BLD AUTO: 0.12 X10(3) UL (ref 0.1–1)
MONOCYTES # BLD: 0.11 X10(3) UL (ref 0.1–1)
MONOCYTES NFR BLD AUTO: 8.3 %
MONOCYTES NFR BLD: 7 %
MYELOCYTES # BLD: 0.02 X10(3) UL (ref ?–0.01)
MYELOCYTES NFR BLD: 1 %
NEUTROPHILS # BLD AUTO: 0.41 X10 (3) UL (ref 1.5–7.7)
NEUTROPHILS # BLD AUTO: 0.41 X10(3) UL (ref 1.5–7.7)
NEUTROPHILS # BLD AUTO: 0.59 X10 (3) UL (ref 1.5–7.7)
NEUTROPHILS NFR BLD AUTO: 28.2 %
NEUTROPHILS NFR BLD: 35 %
NEUTS HYPERSEG # BLD: 0.56 X10(3) UL (ref 1.5–7.7)
NRBC BLD MANUAL-RTO: 28 % (ref ?–1)
OSMOLALITY SERPL CALC.SUM OF ELEC: 304 MOSM/KG (ref 275–295)
PLATELET # BLD AUTO: 52 10(3)UL (ref 150–450)
PLATELET # BLD AUTO: 53 10(3)UL (ref 150–450)
PLATELET MORPHOLOGY: NORMAL
PLATELET MORPHOLOGY: NORMAL
PLATELETS.RETICULATED NFR BLD AUTO: 11.4 % (ref 0–7)
PLATELETS.RETICULATED NFR BLD AUTO: 12.9 % (ref 0–7)
POTASSIUM SERPL-SCNC: 3.5 MMOL/L (ref 3.5–5.1)
POTASSIUM SERPL-SCNC: 3.5 MMOL/L (ref 3.5–5.1)
PROT SERPL-MCNC: 6.5 G/DL (ref 5.7–8.2)
PROTHROMBIN TIME: 17.9 SECONDS (ref 11.6–14.8)
RBC # BLD AUTO: 2.89 X10(6)UL (ref 3.8–5.8)
RBC # BLD AUTO: 3.02 X10(6)UL (ref 3.8–5.8)
SODIUM SERPL-SCNC: 137 MMOL/L (ref 136–145)
TOTAL CELLS COUNTED BLD: 100
WBC # BLD AUTO: 1.5 X10(3) UL (ref 4–11)
WBC # BLD AUTO: 1.6 X10(3) UL (ref 4–11)

## 2025-06-18 PROCEDURE — 99232 SBSQ HOSP IP/OBS MODERATE 35: CPT | Performed by: INTERNAL MEDICINE

## 2025-06-18 PROCEDURE — 99233 SBSQ HOSP IP/OBS HIGH 50: CPT | Performed by: INTERNAL MEDICINE

## 2025-06-18 PROCEDURE — 99233 SBSQ HOSP IP/OBS HIGH 50: CPT | Performed by: HOSPITALIST

## 2025-06-18 RX ORDER — POTASSIUM CHLORIDE 1.5 G/1.58G
40 POWDER, FOR SOLUTION ORAL ONCE
Status: COMPLETED | OUTPATIENT
Start: 2025-06-18 | End: 2025-06-18

## 2025-06-18 NOTE — PROGRESS NOTES
[facility]   part of [facility]     Hospitalist Progress Note     David Reyes Patient Status:  Inpatient    1945 MRN [id number]   Location [facility] 8NE-A Attending Sukhdeep Bueno DO   Hosp Day # 6 PCP Thea Harris DO     Chief Complaint: Dyspnea     Subjective:     Doesn't feel great today. Some gi upset/nausea    Objective:    Review of Systems:   A comprehensive review of systems was completed; pertinent positive and negatives stated in subjective.    Vital signs:  Temp:  [97.5 °F (36.4 °C)-98.6 °F (37 °C)] 97.8 °F (36.6 °C)  Pulse:  [68-79] 79  Resp:  [18-21] 21  BP: ()/(51-71) 108/68  SpO2:  [95 %-100 %] 95 %    Physical Exam:    General: No acute distress  Respiratory: No wheezes, no rhonchi  Cardiovascular: S1, S2, regular rate and rhythm  Abdomen: Soft, Non-tender, non-distended, positive bowel sounds  Neuro: No new focal deficits.   Extremities: pitting edema       Diagnostic Data:    Labs:  Recent Labs   Lab 25  0351 25  0454 25  1610 06/15/25  0436 25  0521 25  0620 25  0621   WBC 1.5* 1.6*  --  2.1* 1.6* 1.5*  --    HGB 7.4* 6.8* 7.9* 8.0* 7.3* 7.6*  --    MCV 89.4 86.4  --  88.8 92.1 87.5  --    .0* 97.0*  --  95.0* 74.0* 53.0*  --    INR 1.44*  --   --   --   --   --  1.46*       Recent Labs   Lab 25  0351 25  0454 25  0521 25  0046 25  0621 25  0620   *   < > 116*  --  111* 121*   BUN 31*   < > 48*  --  66* 66*   CREATSERUM 1.95*   < > 2.55*  --  2.47* 2.28*   CA 8.8   < > 8.3*  --  8.8 8.5*   ALB 3.9  --   --   --  3.6 3.5      < > 134*  --  135* 137   K 4.0   < > 4.6 3.5 4.0 3.5  3.5      < > 101  --  97* 98   CO2 22.0   < > 17.0*  --  23.0 28.0   ALKPHO 56  --   --   --  123* 111   AST 29  --   --   --  604* 307*   ALT 10  --   --   --  730* 540*   BILT 0.7  --   --   --  1.2* 1.0   TP 7.0  --   --   --  6.9 6.5    < > = values in this interval not displayed.        Estimated Glomerular Filtration Rate: 29 mL/min/1.73m2 (A) (result from lab).    Recent Labs   Lab 06/12/25  1340   TROPHS 71*       Recent Labs   Lab 06/13/25  0351 06/18/25  0621   PTP 17.6* 17.9*   INR 1.44* 1.46*                  Microbiology    Hospital Encounter on 06/12/25   1. Blood Culture     Status: None    Collection Time: 06/12/25  5:17 PM    Specimen: Blood,peripheral   Result Value Ref Range    Blood Culture Result No Growth 5 Days N/A         Imaging: Reviewed in Epic.    Medications: Scheduled Medications[1]    Assessment & Plan:      #MDS with pancytopenia   Transfuse for hb< 7-  1u PRBC on 6/14/25  Onc on CS   Started on palliative chemo  Counts dropping  Hold hep gtt if plts<50  Transfuse hgb <7    # Acute hypoxic resp failure due to pleural effusions, PNA and pulmonary embolus - improving  IV Abx   Hep drip   Diuresis     # HFrEF, ICD- with acute exacerbation  As above  Cardiology on cs  I/O  Monitor Cr, e-  IV Lasix reordered on 6/14 BID     #atelectasis  CXR reviewed indep.  IS    #transaminitis  -GI consult  -US  -improving, may be able to start posaconazole once recovers    #H/o CAD s/p CABG    #Acute PE-  Heparin drip - hold if plts drop <50, possibly tomorrow  Doppler LE NEG     #Hypertension    #HLD    #Hypothyroid  Levothyroxine     #FLAKO     Delta Washington MD    Supplementary Documentation:     Quality:  DVT Mechanical Prophylaxis:   SCDs, Early ambuation  DVT Pharmacologic Prophylaxis   Medication    heparin (Porcine) 77713 units/250mL infusion PE/DVT/THROMBUS CONTINUOUS                Code Status: Full Code  Beltre: External urinary catheter in place  Beltre Duration (in days):   Central line:    GABE:     Discharge is dependent on: clinical   At this point Mr. Reyes is expected to be discharge to: tbd    The 21st Century Cures Act makes medical notes like these available to patients in the interest of transparency. Please be advised this is a medical document. Medical documents  are intended to carry relevant information, facts as evident, and the clinical opinion of the practitioner. The medical note is intended as peer to peer communication and may appear blunt or direct. It is written in medical language and may contain abbreviations or verbiage that are unfamiliar.              **Certification      PHYSICIAN Certification of Need for Inpatient Hospitalization - Initial Certification    Patient will require inpatient services that will reasonably be expected to span two midnight's based on the clinical documentation in H+P.   Based on patients current state of illness, I anticipate that, after discharge, patient will require TBD.         [1]    potassium chloride  40 mEq Oral Once    sennosides  8.6 mg Oral Daily    ondansetron  8 mg Intravenous Q24H    acyclovir  200 mg Oral BID    azaCITIDine  75 mg/m2 Subcutaneous Q24H    levoFLOXacin  750 mg Oral Q48HR @ 0700    [Held by provider] ezetimibe  10 mg Oral Daily    gabapentin  100 mg Oral Daily    levothyroxine  25 mcg Oral Before breakfast    [Held by provider] losartan  25 mg Oral Daily    metoprolol succinate ER  50 mg Oral 2x Daily(Beta Blocker)    spironolactone  25 mg Oral Daily with breakfast    insulin aspart  1-10 Units Subcutaneous TID AC and HS    [Held by provider] Lovastatin  40 mg Oral BID

## 2025-06-18 NOTE — CONSULTS
[facility]                       Gastroenterology Consultation-[facility]    David Reyes Patient Status:  Inpatient    1945 MRN [id number]   Location [facility] 8NE-A Attending Alejandra Mehta MD   Hosp Day # 6 PCP Thea Harris DO     Reason for consultation: Elevated LFTs  HPI: This is a 79 yr-old male with PMhx that includes CM s/p ICD, CAD s/p CABG, pulmonary HTN, dyslipidemia, CKD, DM, FLAKO, RA, SDH, and MDS who was admitted  with dyspnea in setting of hemoptysis and worsening leg edema-->work-up revealed VQ c/w right sided PE, LORENA on CKD, pancytopenia, acute on chronic HF, and GI consulted for elevated LFTs (  Alk Phos 123 Bili 1.2) from normal levels on admission. Pt denies hx of liver or biliary disease. He reports no herbal supplements, no acetaminophen prior to admission, no hx of IV drug use. He reports drinks EtOH about 2x weekly; no recent travel or known sick contacts.   No new abd imaging; 2-D echo on arrival revealed EF 15-20% with mild/mod MR + TR and dilated vena cava.   PMHx: Past Medical History[1]             PSHx: Past Surgical History[2]  Medications: Current Medications[3]  Allergies: Allergies[4]  Social HX: Short Social Hx on File[5]   FamHx: The patient has no family history of colon cancer or other gastrointestinal malignancies;  No family history of ulcer disease, or inflammatory bowel disease  ROS:  In addition to the pertinent positives described above:            Infectious Disease:  No chronic infections or recent fevers prior to the acute illness            Cardiovascular: + CM s/p ICD, CAD s/p CABG, pulmonary HTN, dyslipidemia             Respiratory: + FLAKO            Hematologic: + PE;  + pancytopenia             Dermatologic: The patient reports no recent rashes or chronic skin disorders            Rheumatologic: + RA            Genitourinary:  + CKD           Psychiatric: + anxiety           Oncologic: + MDS           ENT:  The patient reports no hoarseness of voice, hearing loss, sinus congestion, tinnitus           Neurologic: + SDH  PE: BP 97/54 (BP Location: Left arm)   Pulse 73   Temp 98.1 °F (36.7 °C) (Oral)   Resp 21   Wt 205 lb (93 kg)   SpO2 100%   BMI 30.27 kg/m²   Gen: Drowsy--opens eyes to verbal prompt   HENT: EOMI, PERRL, oropharynx is clear with moist mucosal membranes  Eyes: Sclerae are anicteric  Neck:  + JVD  CV: Regular rate and rhythm; + murmur   Resp: Diminished   Abdomen: Soft, non-tender, non-distended with the presence of bowel sounds; No hepatosplenomegaly; no rebound or guarding; No ascites is clinically apparent; no tympany to percussion  Ext: 2+ peripheral edema   Skin: Warm and dry  Psychiatric: Appropriate mood and congruent affect without obvious depression or anxiety  Labs:   Lab Results   Component Value Date    WBC 1.5 06/18/2025    HGB 7.6 06/18/2025    HCT 25.3 06/18/2025    PLT 53.0 06/18/2025    CREATSERUM 2.28 06/18/2025    BUN 66 06/18/2025     06/18/2025    K 3.5 06/18/2025    K 3.5 06/18/2025    CL 98 06/18/2025    CO2 28.0 06/18/2025     06/18/2025    CA 8.5 06/18/2025    ALB 3.5 06/18/2025    ALKPHO 111 06/18/2025    BILT 1.0 06/18/2025     06/18/2025     06/18/2025    PTT 92.7 06/18/2025    INR 1.46 06/18/2025    PTP 17.9 06/18/2025    MG 2.4 06/18/2025     Recent Labs   Lab 06/16/25  0521 06/17/25  0046 06/17/25  0621 06/18/25  0620   *  --  111* 121*   BUN 48*  --  66* 66*   CREATSERUM 2.55*  --  2.47* 2.28*   CA 8.3*  --  8.8 8.5*   *  --  135* 137   K 4.6 3.5 4.0 3.5  3.5     --  97* 98   CO2 17.0*  --  23.0 28.0     Recent Labs   Lab 06/18/25  0620   RBC 2.89*   HGB 7.6*   HCT 25.3*   MCV 87.5   MCH 26.3   MCHC 30.0*   RDW 19.4   NEPRELIM 0.41*   WBC 1.5*   PLT 53.0*       Recent Labs   Lab 06/13/25  0351 06/17/25  0621 06/18/25  0620   ALT 10 730* 540*   AST 29 604* 307*       Impression: 79 yr-old male with PMhx that includes CM s/p  ICD, CAD s/p CABG, pulmonary HTN, dyslipidemia, CKD, DM, FLAKO, RA, SDH, and MDS admitted 6/12 with dyspnea in setting of hemoptysis and worsening leg edema-->work-up revealed VQ c/w right sided PE, LORENA on CKD, pancytopenia, acute on chronic HF, and GI consulted for elevated LFTs (  Alk Phos 123 Bili 1.2) from normal levels on admission. No new abd imaging; 2-D echo on arrival revealed EF 15-20% with mild/mod MR + TR and dilated vena cava. Elevation most likely multifactorial from ischemia with hepatic congestion given decreased EF however will r/o autoimmune, viral process and obtain imaging to r/o thrombus, mass. Pt without abd pain and tolerated PO intake does not appear to be d/t biliary obstruction   Recommendations:     US abd with doppler  Chronic liver disease serology   Continue to optimize cardiovascular status  OK to resume statin from a GI perspective  CBC, CMP, INR in AM    Thank you for the consultation, we will follow the patient with you.  Attending addendum (Dr AMY Nevarez) to follow later today and provide formal, final recommendations at that time   MIKEL Stanley  11:22 AM  6/18/2025  [facility]  [phone]    GI attending addendum:    I have personally seen and examined this patient and agree with above nurse practitioner's assessment and recommendations.     Briefly, patient is a 79-year-old male with chronic medical excluding CAD s/p CABG, heart failure, CKD, diabetes, FLAKO, RA, and myelodysplastic syndrome in addition to as above that presented initially on June 12, 2025 with dyspnea.  We were consulted for evaluation of elevated liver enzymes.  These were normal on admission and were then rechecked yesterday and were noted to be significantly elevated as above.  Patient has been on Bumex drip for heart failure with a EF of 15 to 20%.  LFTs this morning are improving.  On physical exam, patient was resting comfortably in bed.  Abdomen soft, nontender,  nondistended.  Patient with elevated liver enzymes.  I suspect this is likely due to occasional hypotensive episodes and his heart failure possible congestion.  His LFTs are improving today.  It is unsure what they were in the 3 to 4 days that they were not checked.  Will also rule out additional etiologies as above.  The workup thus far has been unremarkable but we are still awaiting additional metabolic and autoimmune markers.  Continue to monitor LFTs and INR daily.  Continue diuresis per cardiology.  Additional recommendations as above. Thank you for the consultation.  Will continue to follow along with you.      Isai Nevarez,   6:55 PM  6/18/2025  [facility]  [phone]         [1]   Past Medical History:   Acute kidney injury    Anxiety state    Back problem    CAD (coronary artery disease)    Congestive heart disease (HCC)    Congestive heart failure (HCC)    CORONARY ARTERY DISEASE    cabg    Coronary atherosclerosis    COVID-19    Diabetes (HCC)    Disorder of thyroid    DM (diabetes mellitus) (HCC)    Heart attack (HCC)    High blood pressure    High cholesterol    Muscle weakness    USES A WALKER TO AMBULATE    Other and unspecified hyperlipidemia    Pneumonia    Rheumatoid arthritis (HCC)    Sleep apnea    NO MACHINE/TREATMENT    Subdural hematoma (HCC)    Unspecified sleep apnea    Visual impairment    READING GLASSES   [2]   Past Surgical History:  Procedure Laterality Date    Angiogram  2/11/2015    Angioplasty (coronary)  2/23/15    RCA    Bypass surgery      2007    Cabg  2004    LAD, Diagonal    Cardiac defibrillator placement  6/7/14    [employer] dual chamber ICD    Cath drug eluting stent      Tonsillectomy     [3]    [COMPLETED] potassium chloride (Klor-Con) 20 MEQ oral powder 40 mEq  40 mEq Oral Once    sennosides (Senokot) tab 8.6 mg  8.6 mg Oral Daily    ondansetron (Zofran) 4 MG/2ML injection 8 mg  8 mg Intravenous Q24H    [COMPLETED] ondansetron (Zofran) 4  MG/2ML injection 8 mg  8 mg Intravenous Once    [COMPLETED] chlorothiazide (Diuril) 500 mg in sterile water for injection (PF) IV push  500 mg Intravenous Once    acyclovir (Zovirax) cap 200 mg  200 mg Oral BID    azaCITIDine (Vidaza) SUBQ syringe 150 mg  75 mg/m2 Subcutaneous Q24H    bumetanide (Bumex) 12.5 mg in 50 mL infusion  0.5 mg/hr Intravenous Continuous    [COMPLETED] sodium chloride 0.9% infusion   Intravenous Once    [COMPLETED] furosemide (Lasix) 10 mg/mL injection 40 mg  40 mg Intravenous Once    metoclopramide (Reglan) 5 mg/mL injection 5 mg  5 mg Intravenous Q8H PRN    [COMPLETED] heparin (Porcine) 1000 UNIT/ML injection - BOLUS IV 3,000 Units  30 Units/kg Intravenous Once    heparin (Porcine) 93009 units/250mL infusion PE/DVT/THROMBUS CONTINUOUS  200-3,000 Units/hr Intravenous Continuous    [COMPLETED] furosemide (Lasix) 10 mg/mL injection 40 mg  40 mg Intravenous Once    levoFLOXacin (Levaquin) tab 750 mg  750 mg Oral Q48HR @ 0700    [COMPLETED] Perflutren Lipid Microsphere (DEFINITY) 6.52 MG/ML injection 1.5 mL  1.5 mL Intravenous ONCE PRN    [] dextrose 5%-sodium chloride 0.45% infusion   Intravenous Continuous    glucose (Dex4) 15 GM/59ML oral liquid 15 g  15 g Oral Q15 Min PRN    Or    glucose (Glutose) 40% oral gel 15 g  15 g Oral Q15 Min PRN    Or    dextrose 50% injection 50 mL  50 mL Intravenous Q15 Min PRN    Or    glucose (Dex4) 15 GM/59ML oral liquid 30 g  30 g Oral Q15 Min PRN    Or    glucose (Glutose) 40% oral gel 30 g  30 g Oral Q15 Min PRN    [COMPLETED] heparin (Porcine) 35739 units/250 mL infusion ED (PE/DVT/THROMBUS) INITIAL DOSE  18 Units/kg/hr Intravenous Once    [Held by provider] ezetimibe (Zetia) tab 10 mg  10 mg Oral Daily    gabapentin (Neurontin) cap 100 mg  100 mg Oral Daily    levothyroxine (Synthroid) tab 25 mcg  25 mcg Oral Before breakfast    [Held by provider] losartan (Cozaar) tab 25 mg  25 mg Oral Daily    metoprolol succinate ER (Toprol XL) 24 hr tab 50 mg   50 mg Oral 2x Daily(Beta Blocker)    [Held by provider] spironolactone (Aldactone) tab 25 mg  25 mg Oral Daily with breakfast    insulin aspart (NovoLOG) 100 Units/mL FlexPen 1-10 Units  1-10 Units Subcutaneous TID AC and HS    acetaminophen (Tylenol Extra Strength) tab 1,000 mg  1,000 mg Oral Q8H PRN    melatonin tab 3 mg  3 mg Oral Nightly PRN    polyethylene glycol (PEG 3350) (Miralax) 17 g oral packet 17 g  17 g Oral Daily PRN    sennosides (Senokot) tab 17.2 mg  17.2 mg Oral Nightly PRN    bisacodyl (Dulcolax) 10 MG rectal suppository 10 mg  10 mg Rectal Daily PRN    benzonatate (Tessalon) cap 200 mg  200 mg Oral TID PRN    guaiFENesin (Robitussin) 100 MG/5 ML oral liquid 200 mg  200 mg Oral Q4H PRN    glycerin-hypromellose- (Artificial Tears) 0.2-0.2-1 % ophthalmic solution 1 drop  1 drop Both Eyes QID PRN    sodium chloride (Saline Mist) 0.65 % nasal solution 1 spray  1 spray Each Nare Q3H PRN    [Held by provider] **PATIENT SUPPLIED** Lovastatin TABS 40 mg  40 mg Oral BID   [4]   Allergies  Allergen Reactions    Pcn [Penicillins]      \"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   [5]   Social History  Socioeconomic History    Marital status:    Tobacco Use    Smoking status: Former     Current packs/day: 0.00     Average packs/day: 1 pack/day for 26.0 years (26.0 ttl pk-yrs)     Types: Cigarettes     Start date: 1960     Quit date: 1986     Years since quittin.0    Smokeless tobacco: Never    Tobacco comments:     NON-SMOKER   Vaping Use    Vaping status: Never Used   Substance and Sexual Activity    Alcohol use: Yes     Alcohol/week: 2.0 standard drinks of alcohol     Types: 2 Glasses of wine per week     Comment: OCCASIONAL    Drug use: No   Other Topics Concern    Caffeine Concern No    Exercise No     Social Drivers of Health     Food Insecurity: No Food Insecurity (2025)    [employer] - Food  Insecurity     Worried About Running Out of Food in the Last Year: No     Ran Out of Food in the Last Year: No   Transportation Needs: No Transportation Needs (6/12/2025)    NCSS - Transportation     Lack of Transportation: No   Housing Stability: Not At Risk (6/12/2025)    NCSS - Housing/Utilities     Has Housing: Yes     Worried About Losing Housing: No     Unable to Get Utilities: No   Recent Concern: Housing Stability - At Risk (4/11/2025)    NCSS - Housing/Utilities     Has Housing: Yes     Worried About Losing Housing: Yes     Unable to Get Utilities: No

## 2025-06-18 NOTE — PROGRESS NOTES
Progress Note  David Reyes Patient Status:  Inpatient    1945 MRN [id number]   Location [facility] 8NE-A Attending Alejandra Mehta MD   Hosp Day # 6 PCP Thea Harris,      Subjective:  He is sitting in chair. His breathing is feeling better, now on room air. Leg swelling improving. Feels a little tired, did not sleep well last night. Denies chest pain. Mild blood streaked sputum on heparin gtt.    Objective:  /71 (BP Location: Left arm)   Pulse 73   Temp 98.1 °F (36.7 °C) (Oral)   Resp 21   Wt 205 lb (93 kg)   SpO2 100%   BMI 30.27 kg/m²     Intake/Output:    Intake/Output Summary (Last 24 hours) at 2025 1152  Last data filed at 2025 0910  Gross per 24 hour   Intake 2252.8 ml   Output 3100 ml   Net -847.2 ml       Last 3 Weights   25 0500 205 lb (93 kg)   25 0500 209 lb 10.5 oz (95.1 kg)   25 0545 218 lb (98.9 kg)   06/15/25 0508 218 lb 8 oz (99.1 kg)   25 0434 217 lb 13 oz (98.8 kg)   25 2258 213 lb (96.6 kg)   25 1935 215 lb (97.5 kg)   25 1506 217 lb (98.4 kg)   25 1102 219 lb 3.2 oz (99.4 kg)       Labs:  Recent Labs   Lab 25  0521 25  0046 25  0621 25  0620   *  --  111* 121*   BUN 48*  --  66* 66*   CREATSERUM 2.55*  --  2.47* 2.28*   EGFRCR 25*  --  26* 29*   CA 8.3*  --  8.8 8.5*   *  --  135* 137   K 4.6 3.5 4.0 3.5  3.5     --  97* 98   CO2 17.0*  --  23.0 28.0     Recent Labs   Lab 06/15/25  0436 25  0521 25  0620   RBC 3.04* 2.77* 2.89*   HGB 8.0* 7.3* 7.6*   HCT 27.0* 25.5* 25.3*   MCV 88.8 92.1 87.5   MCH 26.3 26.4 26.3   MCHC 29.6* 28.6* 30.0*   RDW 19.0 19.8 19.4   NEPRELIM 0.71* 0.61* 0.41*   WBC 2.1* 1.6* 1.5*   PLT 95.0* 74.0* 53.0*         Recent Labs   Lab 25  1340   TROPHS 71*       Diagnostics:   Telemetry: AV paced    Review of Systems   Cardiovascular:  Positive for leg swelling. Negative for chest pain.   Respiratory:  Negative for  shortness of breath.        Physical Exam:  General: Alert and oriented. Appears tired but mentation is normal and responds appropriately to questions.  HEENT: Pupils equal. Mucous membranes moist.   Neck: JVD+  Cardiac:  Normal S1 S2, Regular. 2/6 systolic  Lungs: Slightly diminished in bases. On room air.   Abdomen: Soft, non-tender, ND  Extremities: 1+ bilateral LE edema, non-pitting   Neurologic: No focal deficits. Normal affect.  Skin: Warm and dry,    Medications:  Scheduled Medications[1]  Medication Infusions[2]    Assessment:    Acute hypoxic respiratory failure, resolved - Weaned to room air. Multifactorial with CHF, pneumonia, acute PE  Acute on chronic HFrEF, ischemic cardiomyopathy  Decompensated/hypervolemic on arrival. proBNP [id number]  TTE 6/13 with LVEF 15-20%, mildly reduced RV function, mild-mod aortic stenosis, mild-mod MR, mild-mod TR  GDMT: Toprol-XL 50 mg BID. Home MRA/ARB held with LORENA. No SGLT2 with LORENA.  Continue bumex gtt 0.5 mg/hr. Volume status improving, suspect can transition to PO diuretics next 1-2 days  Acute PE - Heparin gtt. Heme/onc following recommend apixaban 2.5 mg BID at time of discharge with recommendation to pause AC if plt drop below 50k. Interventional cardiology evaluated recommending medical management, poor candidate for invasive procedure with MDS  MDS with pancytopenia- Started on azacitidine chemotherapy. Required PRBC this admission.   LORENA on CKD3 - Nephrology following, creatinine baseline 1.9-2.1. Creatinine improving with diuresis. Cardiorenal syndrome.   Hemoptysis - Mild, intermittent in sputum. Monitor closely on heparin gtt.   Pneumonia - Pulm following, antibiotics  [employer] CRT-D- Continue Toprol-XL. Will check device interrogation for Bi-V pacing %.  CAD s/p CABG LIMA-LAD, prior PCI RCA and Lcx 2015- Denies angina. Continue BB. Statin held with transaminitis.  Moderate pulmonary hypertension  Transaminitis - Unlikely congestive related as LFT  elevation not present on admission. Down-trending compared to yesterday. Liver US, primary/GI evaluating. Statin held  Rheumatoid arthritis     Plan:    Volume status improving, appears still hypervolemic clinically. Diuresing on bumex gtt. Likely can transition to PO diuretics in 1-2 days. Renal function improving with diuresis in setting of cardiorenal syndrome and nearing baseline.   He is now back on room air with resolved hypoxia  Continue heparin gtt with acute PE for now. Heme/onc following. Daily CBC, plt 53 and will need to hold if <50k with MDS.   Will interrogate [employer] CRT-D device for BIV pacing %    Plan of care discussed with patient, RN.    SANTA Tanner  6/18/2025  11:52 AM    I agree with above excellent note and plan. I seen and examined the patient. The chart was reviewed and case was d/w rounding APN. I made clinical decisions independently.  I reviewed the MDM myself.  Entire medical decision making and plan performed by myself.    Very complex patient.    Patient is improving with breathing and kidney function is getting better with diuresis.  No acute chest symptoms.  CHF is being tuned up with IV Bumex.    Multifactorial respiratory insufficiency with hypoxia.  Improving with treatment.  On room air.    Acute PE treated with IV heparin drip.    The patient admitted with worsening shortness of breath and mild leg edema and clinical picture consistent with acute on chronic systolic heart failure exacerbation.  He has underlying ischemic cardiomyopathy with CAD and history of CABG and PCI's and BiV ICD.  LVEF 20%.  Moderate aortic valve stenosis.  Systolic and diastolic heart failure.    Mild dysplastic syndrome with anemia is not helpful here and anemia also contributed to CHF flareup.  He is chronically brief nonsustained VT being on amiodarone by EP previously.    Interrogation of EP device showed more nonsustained VT which contributes to less BiV pacing needed for his chronic heart  failure.    He is to be on all blood thinners in the past but due to recurrence of anemia and thrombocytopenia it was lowered to baby aspirin over time.    Other medical issues: Obstructive sleep apnea.  Rheumatoid arthritis.  Diabetes mellitus type 2.    Physical Exam:    Constitutional: Generalized weakness.  Head and Neck: Mildly elevated  Cardiovascular: Regular/pacing with 2 out of 6 systolic ejection murmur and intermittent gallop.  Respiratory: Decreased air entry with few basal crackles.  Gastrointestinal: Soft, non-tender abdomen.   Extremities: Without clubbing, cyanosis or edema.  Peripheral pulses are 2+.  Neurologic: Alert and oriented x3.  Cranial nerves intact. Normal sensation and motor function. No focal deficits.  Musculoskeletal: Generalized weakness.  Integumentary: Warm and dry skin. No rashes.  Psychiatric: Depression.    Laboratory data: Creatinine 2.2 BUN 66 potassium 3.5 sodium 137   INR 1.46 hemoglobin 8.0 platelet count 52 WBC 1.6      Assessment: Acute on chronic systolic CHF exacerbation with MDS and anemia also contributing to worsening heart failure with low hemoglobin.  Hypoxic respiratory insufficiency on admission improving.  No angina with history of CABG or PCI.  Obstructive sleep apnea on BiPAP.  Cardiorenal syndrome.  Defect on VQ scan suspicious for acute RLL PE peripheral only.  CT not performed due to kidney insufficiency.      Plan: Continue telemetry monitoring, continue diuresis with IV Bumex drip and follow metabolic panel closely.  Adjust heart failure medications along the course -resume beta-blocker and spironolactone and holding RAAS inhibitor.  Entresto was too expensive.  Adjust antiarrhythmic agents for more nonsustained VT triggered by CHF and low hemoglobin which will help with more BiV pacing needed for biventricular heart failure.  Good candidate for amiodarone for VT due to liver enzymes increased to 500.  Follow metabolic panel with longstanding  chronic kidney disease.  Hematology consult and treatment plan appreciated regarding myelodysplastic syndrome.  MDS is not helpful here with CHF and chemotherapy is a difficult decision with his comorbidity.  Complex patient.  Continue IV heparin with transition to low-dose of Eliquis for PE.  Transfuse platelets as needed.  Holding statin and Zetia with high liver enzymes.  Continue spironolactone besides Bumex drip.  Holding ARB with low BP and diuresis.  PT and ambulate as able.    Discussed with the patient and the nursing staff  Complex case    Brain Paz M.D.  [facility]    6/18/2025  5:35 PM  F/u3  HF rounds         [1]    sennosides  8.6 mg Oral Daily    ondansetron  8 mg Intravenous Q24H    acyclovir  200 mg Oral BID    azaCITIDine  75 mg/m2 Subcutaneous Q24H    levoFLOXacin  750 mg Oral Q48HR @ 0700    [Held by provider] ezetimibe  10 mg Oral Daily    gabapentin  100 mg Oral Daily    levothyroxine  25 mcg Oral Before breakfast    [Held by provider] losartan  25 mg Oral Daily    metoprolol succinate ER  50 mg Oral 2x Daily(Beta Blocker)    [Held by provider] spironolactone  25 mg Oral Daily with breakfast    insulin aspart  1-10 Units Subcutaneous TID AC and HS    [Held by provider] Lovastatin  40 mg Oral BID   [2]    bumetanide (Bumex) 12.5 mg in 50 mL infusion 0.5 mg/hr (06/18/25 0910)    continuous dose heparin 1,150 Units/hr (06/18/25 0910)

## 2025-06-18 NOTE — PLAN OF CARE
[employer] device interrogated per request. Pt tolerated. Report placed in chart, [facility] notified.

## 2025-06-18 NOTE — PLAN OF CARE
Assumed care of pt at 1930. Alert & oriented x4. Spo2 WNL on 2L NC overnight , dyspneic with exertion. V paced on tele, no C/O cardiac symptoms. Heparin gtt & bumex gtt infusing per orders. Incontinent of bladder, primofit & brief in place. Up x2 with walker. Bed alarm on & call light within reach. Patient tolerating care & able to make needs known    0624; BP 86/54, MAP 62. Patient responsive. APRN notified, no new orders at this time.   0654; Critical value received - ANC 0.41. Hospitalist aware    POC;   - heparin gtt  - bumex gtt  - chemo       Problem: Patient/Family Goals  Goal: Patient/Family Long Term Goal  Description: Patient's Long Term Goal:   To return home asap    Interventions:  - Cardiology consult  2 D echo with doppler  - See additional Care Plan goals for specific interventions  Outcome: Progressing  Goal: Patient/Family Short Term Goal  Description: Patient's Short Term Goal:  t get rid of shortness of breath    Interventions:   - medication adjustment      Supplemental oxygen as needed  - See additional Care Plan goals for specific interventions  Outcome: Progressing     Problem: PAIN - ADULT  Goal: Verbalizes/displays adequate comfort level or patient's stated pain goal  Description: INTERVENTIONS:  - Encourage pt to monitor pain and request assistance  - Assess pain using appropriate pain scale  - Administer analgesics based on type and severity of pain and evaluate response  - Implement non-pharmacological measures as appropriate and evaluate response  - Consider cultural and social influences on pain and pain management  - Manage/alleviate anxiety  - Utilize distraction and/or relaxation techniques  - Monitor for opioid side effects  - Notify MD/LIP if interventions unsuccessful or patient reports new pain  - Anticipate increased pain with activity and pre-medicate as appropriate  Outcome: Progressing     Problem: SAFETY ADULT - FALL  Goal: Free from fall injury  Description:  INTERVENTIONS:  - Assess pt frequently for physical needs  - Identify cognitive and physical deficits and behaviors that affect risk of falls.  - [place] fall precautions as indicated by assessment.  - Educate pt/family on patient safety including physical limitations  - Instruct pt to call for assistance with activity based on assessment  - Modify environment to reduce risk of injury  - Provide assistive devices as appropriate  - Consider OT/PT consult to assist with strengthening/mobility  - Encourage toileting schedule  Outcome: Progressing     Problem: CARDIOVASCULAR - ADULT  Goal: Maintains optimal cardiac output and hemodynamic stability  Description: INTERVENTIONS:  - Monitor vital signs, rhythm, and trends  - Monitor for bleeding, hypotension and signs of decreased cardiac output  - Evaluate effectiveness of vasoactive medications to optimize hemodynamic stability  - Monitor arterial and/or venous puncture sites for bleeding and/or hematoma  - Assess quality of pulses, skin color and temperature  - Assess for signs of decreased coronary artery perfusion - ex. Angina  - Evaluate fluid balance, assess for edema, trend weights  Outcome: Progressing  Goal: Absence of cardiac arrhythmias or at baseline  Description: INTERVENTIONS:  - Continuous cardiac monitoring, monitor vital signs, obtain 12 lead EKG if indicated  - Evaluate effectiveness of antiarrhythmic and heart rate control medications as ordered  - Initiate emergency measures for life threatening arrhythmias  - Monitor electrolytes and administer replacement therapy as ordered  Outcome: Progressing

## 2025-06-18 NOTE — PHYSICAL THERAPY NOTE
PHYSICAL THERAPY TREATMENT NOTE - INPATIENT    Room Number: 8610/8610-A     Session: 2    Number of Visits to Meet Established Goals: 5  History related to current admission: Patient is a 79 year old male who presented to the ED on 6/12/2025 from PCP office for shortness of breath, hemoptysis, and fatigue.  Pt diagnosed with acute hypoxic respiratory failure, acute PE, HF, ischemic cardiomyopathy, pancytopenia in setting of MDS.     Relevant Hospitalizations:  9/20/24-9/28/24 with respiratory failure, COVID, PNA, septic shock; discharged to SNF at [facility], pt reports he discharged home from SNF in November 2024.           HOME SITUATION  Type of Home: House  Home Layout: One level  Stairs to Enter : 2   Railing: No              Lives With: Spouse    Drives: Yes   Patient Regularly Uses: Reading glasses, Rollator       Prior Level of [place]: The pt reports that typically he is able to ambulate independently, but since being discharged from SNF in November, intermittently uses a RW.  Pt states he is independent with I/ADL's.  Pt does physically assist his spouse to get in/out of shower.  Pt denies any recent falls.  Presenting Problem: acute hypoxic respiratory failure, acute PE, HF, ischemic cardiomyopathy, pancytopenia in setting of MDS  Co-Morbidities : CAD s/p CABG, HFrEF s/p ICD, DM2, HTN, Hypothyroidism, HLD, FLAKO, RA    PHYSICAL THERAPY ASSESSMENT   Patient demonstrates limited progress this session, goals  remain in progress.  Limited by R knee, ankle, and LBP.     Patient is requiring moderate assist and maximum assist as a result of the following impairments: decreased functional strength, decreased endurance/aerobic capacity, pain, decreased muscular endurance, and medical status.     Patient continues to function below baseline with bed mobility, transfers, and gait.  Next session anticipate patient to progress transfers and gait.  Physical Therapy will continue to follow patient for duration of  hospitalization.    Patient continues to benefit from continued skilled PT services: to promote return to prior level of function and safety with continuous assistance and gradual rehabilitative therapy .    PLAN DURING HOSPITALIZATION  Nursing Mobility Recommendation :  (2 assist RW)  PT Device Recommendation: Rolling walker  PT Treatment Plan: Bed mobility, Endurance, Gait training, Body mechanics, Energy conservation, Patient education, Strengthening, Transfer training, Balance training  Frequency (Obs): 3-5x/week     CURRENT GOALS     Goal #1 Patient is able to demonstrate supine - sit EOB @ level: minimum assistance      Goal #2 Patient is able to demonstrate transfers Sit to/from Stand at assistance level: moderate assistance      Goal #3 Patient is able to ambulate 50 feet with assist device: walker - rolling at assistance level: minimum assistance      Goal #4     Goal #5     Goal #6     Goal Comments: Goals established on 6/15/2025  6/18/2025 all goals ongoing     SUBJECTIVE  \" 'I was going to try to walk after lunch\"    OBJECTIVE  Precautions: Bed/chair alarm    WEIGHT BEARING RESTRICTION     PAIN ASSESSMENT   Rating: Unable to rate  Location: R knee, ankle, back  Management Techniques: Activity promotion, Body mechanics, Repositioning    BALANCE                                                                                                                       Static Sitting: Fair -  Dynamic Sitting: Fair -           Static Standing: Poor  Dynamic Standing: Poor    ACTIVITY TOLERANCE           BP: 103/71  BP Location: Left arm  BP Method: Automatic  Patient Position: Sitting    O2 WALK  Oxygen Therapy  SPO2% on Room Air at Rest: 95  SPO2% Ambulation on Room Air: 91    AM-PAC '6-Clicks' INPATIENT SHORT FORM - BASIC MOBILITY  How much difficulty does the patient currently have...  Patient Difficulty: Turning over in bed (including adjusting bedclothes, sheets and blankets)?: A Lot   Patient Difficulty:  Sitting down on and standing up from a chair with arms (e.g., wheelchair, bedside commode, etc.): A Lot   Patient Difficulty: Moving from lying on back to sitting on the side of the bed?: A Lot   How much help from another person does the patient currently need...   Help from Another: Moving to and from a bed to a chair (including a wheelchair)?: A Lot   Help from Another: Need to walk in hospital room?: Total   Help from Another: Climbing 3-5 steps with a railing?: Total     AM-PAC Score:  Raw Score: 10   Approx Degree of Impairment: 76.75%   Standardized Score (AM-PAC Scale): 32.29   CMS Modifier (G-Code): CL    FUNCTIONAL ABILITY STATUS  Gait Assessment   Functional Mobility/Gait Assessment  Gait Assistance: Moderate assistance  Distance (ft): 2  Assistive Device: Rolling walker  Pattern: Shuffle    Skilled Therapy Provided    Bed Mobility:  Rolling: mod A    Supine<>Sit: max A    Sit<>Supine: NT     Transfer Mobility:  Sit<>Stand: max A x 2  Stand<>Sit: mod A    Gait: mod A     Therapist's Comments: Discussed case with RN prior to session initiation. Pt agreeable to participation in therapy. Gait belt donned for out of bed mobility. Reporting cont RLE pain - encouraged cont mobility pt reports has been performing flexion/extension but its not helping. Required inc time and x3 attempts for STS transfer to RW  requiring inc assist compared to previous session. On RA today with sats > 90% as assessed, a few brief drops into the upper 80s with quick recovery within 15 seconds.     MODIFIED CHUY DYSPNEA SCALE - (SHORTNESS OF BREATH SCALE)    5    SCORE DESCRIPTION   0 Nothing at all   1 Very slight   2 Slight   3 Moderate   4 Somewhat severe   5 Strong or hard breathing   6    7 Very hard breathing   8    9 Very, Very Severe   10 MAX - You need to stop     Patient Education provided:    Use this scale to rate the difficulty of your breathing:  - As you would rate your pain from 0-10, you can rate your SOB from  0-10  - Goal is to stay below 7/10 with activity  - If you reach beyond 7/10, it is important to rest; stop activity and if you need to sit down to recove        Patient End of Session: Up in chair, Needs met, Call light within reach, RN aware of session/findings, All patient questions and concerns addressed, Hospital anti-slip socks, Ice applied    PT Session Time: 23 minutes  Gait Training:  minutes  Therapeutic Activity: 23 minutes  Therapeutic Exercise:  minutes   Neuromuscular Re-education:  minutes

## 2025-06-18 NOTE — PROGRESS NOTES
Hematology/Oncology Progress Note    Patient Name: David Reyes  Medical Record Number: [id number]    YOB: 1945     Reason for Consultation:  David Reyes was seen today for the diagnosis of MDS    Interval events:      - says breathing continues to get better  - started C1 aza 6/17, denies any nausea  - was coughing up a bit of blood this AM  - plts 53k, hgb stable at 7.6      Inpatient Meds:  Scheduled Medications[1]  Medication Infusions[2]    PRN Meds:  PRN Medications[3]    Allergies:   Allergies[4]    Vital Signs:  Height: --  Weight: 93 kg (205 lb) (06/18 0500)  BSA (Calculated - sq m): --  Pulse: 73 (06/18 0831)  BP: 97/54 (06/18 0831)  Temp: 98.1 °F (36.7 °C) (06/18 0831)  Do Not Use - Resp Rate: --  SpO2: 100 % (06/18 0831)    Wt Readings from Last 6 Encounters:   06/18/25 93 kg (205 lb)   05/29/25 98.4 kg (217 lb)   05/22/25 99.4 kg (219 lb 3.2 oz)   04/11/25 100.8 kg (222 lb 3.2 oz)   03/17/25 100.9 kg (222 lb 8 oz)   12/09/24 105.5 kg (232 lb 8 oz)       Physical Examination:  General: Patient is alert and oriented, not in acute distress  Psych: Mood and affect are appropriate  Eyes: EOMI, PERRL  ENT: Oropharynx is clear, no adenopathy  CV: mild LE edema  Respiratory: conversational dyspnea  GI/Abd: Soft, non-tender   Neurological: Grossly intact     Laboratory:  Recent Labs   Lab 06/15/25  0436 06/16/25  0521 06/18/25  0620   WBC 2.1* 1.6* 1.5*   HGB 8.0* 7.3* 7.6*   HCT 27.0* 25.5* 25.3*   PLT 95.0* 74.0* 53.0*   MCV 88.8 92.1 87.5   RDW 19.0 19.8 19.4   NEPRELIM 0.71* 0.61* 0.41*       Recent Labs   Lab 06/13/25  0351 06/14/25  0454 06/16/25  0521 06/17/25  0046 06/17/25  0621 06/18/25  0620      < > 134*  --  135* 137   K 4.0   < > 4.6 3.5 4.0 3.5  3.5      < > 101  --  97* 98   CO2 22.0   < > 17.0*  --  23.0 28.0   BUN 31*   < > 48*  --  66* 66*   CREATSERUM 1.95*   < > 2.55*  --  2.47* 2.28*   *   < > 116*  --  111* 121*   CA 8.8   < > 8.3*  --  8.8  8.5*   PHOS 3.6  --   --   --   --   --    TP 7.0  --   --   --  6.9 6.5   ALB 3.9  --   --   --  3.6 3.5   ALKPHO 56  --   --   --  123* 111   AST 29  --   --   --  604* 307*   ALT 10  --   --   --  730* 540*   BILT 0.7  --   --   --  1.2* 1.0    < > = values in this interval not displayed.       Recent Labs   Lab 06/13/25  0351 06/13/25  1225 06/16/25  1414 06/17/25  0621 06/18/25  0621   INR 1.44*  --   --   --  1.46*   .2*   < > 76.1* 89.0* 92.7*    < > = values in this interval not displayed.       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 Pat wants to pursue treatment. I warned that chemotherapy may be difficult with his comorbidities and he understands this  - given his pancytopenia and expected prolonged admission for heart failure exacerbation and needed diuresis, plan to start azacitidine 75mg/m2/day x 7 days today  - we discussed azacitidine will be given every 28-35 days. Pt verbally consented  - we previously discussed incorporating venetoclax but phase 3 trial MONA results published yesterday showed adding venetoclax to azacitidine did not improve overall survival in higher risk MDS, so will not incorporate venetoclax  - ppx: acyclovir 200mg BID, levaquin 750mg q48 hours. Will need to start posaconazole 300mg daily when obtained from pharmacy. Will need to hold off on posaconazole initiation until LFTs recover  - transfuse for hgb <7 g/dL, plt <10k  - continue azacitidine; C1D1 6/17    RLL PE  - continue heparin gtt while plts >50k   - BLE dopplers negative for DVT  - near discharge, will need to transition to apixaban 2.5mg BID (due to posaconazole co-administration)  - if plts drop to <50k he will need to pause  anticoagulation    Acute on chronic systolic heart failure  - per cardiology    CKD stage 4  - per nephrology    LFT elevations  - increased from 6/13  - ezetimibe held  - GI consulted    Will continue to follow    James Chirinos MD  [facility] Hematology Oncology             [1]    sennosides  8.6 mg Oral Daily    ondansetron  8 mg Intravenous Q24H    acyclovir  200 mg Oral BID    azaCITIDine  75 mg/m2 Subcutaneous Q24H    levoFLOXacin  750 mg Oral Q48HR @ 0700    [Held by provider] ezetimibe  10 mg Oral Daily    gabapentin  100 mg Oral Daily    levothyroxine  25 mcg Oral Before breakfast    [Held by provider] losartan  25 mg Oral Daily    metoprolol succinate ER  50 mg Oral 2x Daily(Beta Blocker)    [Held by provider] spironolactone  25 mg Oral Daily with breakfast    insulin aspart  1-10 Units Subcutaneous TID AC and HS    [Held by provider] Lovastatin  40 mg Oral BID   [2]    bumetanide (Bumex) 12.5 mg in 50 mL infusion 0.5 mg/hr (06/18/25 0910)    continuous dose heparin 1,150 Units/hr (06/18/25 0910)   [3]   metoclopramide    glucose **OR** glucose **OR** dextrose **OR** glucose **OR** glucose    acetaminophen    melatonin    polyethylene glycol (PEG 3350)    sennosides    bisacodyl    benzonatate    guaiFENesin    glycerin-hypromellose-    sodium chloride  [4]   Allergies  Allergen Reactions    Pcn [Penicillins]      \"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

## 2025-06-18 NOTE — CM/SW NOTE
CM attached clinical updates including chemotherapy plan to aidin SKYE referral and messaged available/pending facilities for review request.  CM/SW to follow up with patient for SKYE choice list when responses received.     Haydee Griffith RN Case Manager [phone]

## 2025-06-18 NOTE — PROGRESS NOTES
[facility]   part of [facility]     Nephrology Progress Note    David Reyes Patient Status:  Inpatient    1945 MRN [id number]   Location [facility] 8NE-A Attending Alejandra Mehta MD   Hosp Day # 6 PCP Thea Harris DO       SUBJECTIVE:  No acute events    Current Hospital Medications[1]        Physical Exam:   BP 97/54 (BP Location: Left arm)   Pulse 73   Temp 98.1 °F (36.7 °C) (Oral)   Resp 21   Wt 205 lb (93 kg)   SpO2 100%   BMI 30.27 kg/m²   Temp (24hrs), Av.1 °F (36.7 °C), Min:97.5 °F (36.4 °C), Max:98.6 °F (37 °C)       Intake/Output Summary (Last 24 hours) at 2025 1017  Last data filed at 2025 0910  Gross per 24 hour   Intake 2252.8 ml   Output 3100 ml   Net -847.2 ml     Last 3 Weights   25 0500 205 lb (93 kg)   25 0500 209 lb 10.5 oz (95.1 kg)   25 0545 218 lb (98.9 kg)   06/15/25 0508 218 lb 8 oz (99.1 kg)   25 0434 217 lb 13 oz (98.8 kg)   25 2258 213 lb (96.6 kg)   25 1935 215 lb (97.5 kg)   25 1506 217 lb (98.4 kg)   25 1102 219 lb 3.2 oz (99.4 kg)   25 0955 222 lb 3.2 oz (100.8 kg)   25 1636 222 lb 8 oz (100.9 kg)     General: Alert and oriented in no apparent distress.  HEENT: No scleral icterus, MMM  Neck: Supple, no DON or thyromegaly  Cardiac: irregular, S1, S2 normal, no murmur or rub  Lungs: decr BS B    Abdomen: Soft, non-tender. + bowel sounds, no palpable organomegaly  Extremities: + BLE edema.  Neurologic:  moving all extremities  Skin: Warm and dry, no rash  Recent Labs     25   WBC 1.6* 1.5*  --    HGB 7.3* 7.6*  --    MCV 92.1 87.5  --    PLT 74.0* 53.0*  --    INR  --   --  1.46*       Recent Labs     25  0046 25   *  --  135* 137   K 4.6 3.5 4.0 3.5  3.5     --  97* 98   CO2 17.0*  --  23.0 28.0   BUN 48*  --  66* 66*   CREATSERUM 2.55*  --  2.47* 2.28*   CA 8.3*  --  8.8 8.5*    MG  --  2.5  --  2.4       Recent Labs     06/17/25  0621 06/18/25  0620   * 540*   * 307*   ALB 3.6 3.5           Impression/Plan:        1.Acute kidney injury on CKD 3b: baseline serum creatinine appears to be around 1.9-2.1 mg/dL. Current LORENA appears related to CRS. Continue diuresis.  .      2.Acute on chronic HFrEF: cardiology following, UOP better with bumex gtt.  Continue for next 24 hrs     3. Acute PE: on heparin gtt. Per pulm/heme     4. 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 Chirinos as outpatient. S/p recent BMBx. Heme following.        Questions/concerns were discussed with patient and/or family by bedside.      Antonio Cartwright  6/18/2025         [1]   Current Facility-Administered Medications   Medication Dose Route Frequency    sennosides (Senokot) tab 8.6 mg  8.6 mg Oral Daily    ondansetron (Zofran) 4 MG/2ML injection 8 mg  8 mg Intravenous Q24H    acyclovir (Zovirax) cap 200 mg  200 mg Oral BID    azaCITIDine (Vidaza) SUBQ syringe 150 mg  75 mg/m2 Subcutaneous Q24H    bumetanide (Bumex) 12.5 mg in 50 mL infusion  0.5 mg/hr Intravenous Continuous    metoclopramide (Reglan) 5 mg/mL injection 5 mg  5 mg Intravenous Q8H PRN    heparin (Porcine) 76947 units/250mL infusion PE/DVT/THROMBUS CONTINUOUS  200-3,000 Units/hr Intravenous Continuous    levoFLOXacin (Levaquin) tab 750 mg  750 mg Oral Q48HR @ 0700    glucose (Dex4) 15 GM/59ML oral liquid 15 g  15 g Oral Q15 Min PRN    Or    glucose (Glutose) 40% oral gel 15 g  15 g Oral Q15 Min PRN    Or    dextrose 50% injection 50 mL  50 mL Intravenous Q15 Min PRN    Or    glucose (Dex4) 15 GM/59ML oral liquid 30 g  30 g Oral Q15 Min PRN    Or    glucose (Glutose) 40% oral gel 30 g  30 g Oral Q15 Min PRN    [Held by provider] ezetimibe (Zetia) tab 10 mg  10 mg Oral Daily    gabapentin (Neurontin) cap 100 mg  100 mg Oral Daily    levothyroxine (Synthroid) tab 25 mcg  25 mcg Oral Before breakfast    [Held by provider] losartan (Cozaar) tab 25 mg   25 mg Oral Daily    metoprolol succinate ER (Toprol XL) 24 hr tab 50 mg  50 mg Oral 2x Daily(Beta Blocker)    [Held by provider] spironolactone (Aldactone) tab 25 mg  25 mg Oral Daily with breakfast    insulin aspart (NovoLOG) 100 Units/mL FlexPen 1-10 Units  1-10 Units Subcutaneous TID AC and HS    acetaminophen (Tylenol Extra Strength) tab 1,000 mg  1,000 mg Oral Q8H PRN    melatonin tab 3 mg  3 mg Oral Nightly PRN    polyethylene glycol (PEG 3350) (Miralax) 17 g oral packet 17 g  17 g Oral Daily PRN    sennosides (Senokot) tab 17.2 mg  17.2 mg Oral Nightly PRN    bisacodyl (Dulcolax) 10 MG rectal suppository 10 mg  10 mg Rectal Daily PRN    benzonatate (Tessalon) cap 200 mg  200 mg Oral TID PRN    guaiFENesin (Robitussin) 100 MG/5 ML oral liquid 200 mg  200 mg Oral Q4H PRN    glycerin-hypromellose- (Artificial Tears) 0.2-0.2-1 % ophthalmic solution 1 drop  1 drop Both Eyes QID PRN    sodium chloride (Saline Mist) 0.65 % nasal solution 1 spray  1 spray Each Nare Q3H PRN    [Held by provider] Lovastatin TABS 40 mg **PATIENT SUPPLIED**  40 mg Oral BID

## 2025-06-18 NOTE — PLAN OF CARE
Shift Note:  Patient alert and oriented x4.   Patient on 1L O2 nasal cannula, denies difficulty breathing, feels breathing has improved significantly since admission, lung sounds diminished. Still with hemoptysis, stated he does not feel it has gotten better.   Denies any cardiac symptoms, Vpaced on tele. Denies pain at this time.   Continent of bowel and bladder, last BM 6/17, increased frequency with diuretic, purewick in place.   Ambulates 2 assist and a walker, call light within reach, tolerating care well.     POC:  - Diruese with Bumex gtts   - Heparin gtts per PE  - Chemo therapy   - SKYE at DC?      Problem: Diabetes/Glucose Control  Goal: Glucose maintained within prescribed range  Description: INTERVENTIONS:  - Monitor Blood Glucose as ordered  - Assess for signs and symptoms of hyperglycemia and hypoglycemia  - Administer ordered medications to maintain glucose within target range  - Assess barriers to adequate nutritional intake and initiate nutrition consult as needed  - Instruct patient on self management of diabetes  Outcome: Progressing     Problem: Patient/Family Goals  Goal: Patient/Family Long Term Goal  Description: Patient's Long Term Goal:   To return home asap    Interventions:  - Cardiology consult  2 D echo with doppler  - See additional Care Plan goals for specific interventions  Outcome: Progressing  Goal: Patient/Family Short Term Goal  Description: Patient's Short Term Goal:  t get rid of shortness of breath    Interventions:   - medication adjustment      Supplemental oxygen as needed  - See additional Care Plan goals for specific interventions  Outcome: Progressing     Problem: PAIN - ADULT  Goal: Verbalizes/displays adequate comfort level or patient's stated pain goal  Description: INTERVENTIONS:  - Encourage pt to monitor pain and request assistance  - Assess pain using appropriate pain scale  - Administer analgesics based on type and severity of pain and evaluate response  -  Implement non-pharmacological measures as appropriate and evaluate response  - Consider cultural and social influences on pain and pain management  - Manage/alleviate anxiety  - Utilize distraction and/or relaxation techniques  - Monitor for opioid side effects  - Notify MD/LIP if interventions unsuccessful or patient reports new pain  - Anticipate increased pain with activity and pre-medicate as appropriate  Outcome: Progressing     Problem: SAFETY ADULT - FALL  Goal: Free from fall injury  Description: INTERVENTIONS:  - Assess pt frequently for physical needs  - Identify cognitive and physical deficits and behaviors that affect risk of falls.  - [place] fall precautions as indicated by assessment.  - Educate pt/family on patient safety including physical limitations  - Instruct pt to call for assistance with activity based on assessment  - Modify environment to reduce risk of injury  - Provide assistive devices as appropriate  - Consider OT/PT consult to assist with strengthening/mobility  - Encourage toileting schedule  Outcome: Progressing     Problem: CARDIOVASCULAR - ADULT  Goal: Maintains optimal cardiac output and hemodynamic stability  Description: INTERVENTIONS:  - Monitor vital signs, rhythm, and trends  - Monitor for bleeding, hypotension and signs of decreased cardiac output  - Evaluate effectiveness of vasoactive medications to optimize hemodynamic stability  - Monitor arterial and/or venous puncture sites for bleeding and/or hematoma  - Assess quality of pulses, skin color and temperature  - Assess for signs of decreased coronary artery perfusion - ex. Angina  - Evaluate fluid balance, assess for edema, trend weights  Outcome: Progressing  Goal: Absence of cardiac arrhythmias or at baseline  Description: INTERVENTIONS:  - Continuous cardiac monitoring, monitor vital signs, obtain 12 lead EKG if indicated  - Evaluate effectiveness of antiarrhythmic and heart rate control medications as ordered  -  Initiate emergency measures for life threatening arrhythmias  - Monitor electrolytes and administer replacement therapy as ordered  Outcome: Progressing     Problem: RESPIRATORY - ADULT  Goal: Achieves optimal ventilation and oxygenation  Description: INTERVENTIONS:  - Assess for changes in respiratory status  - Assess for changes in mentation and behavior  - Position to facilitate oxygenation and minimize respiratory effort  - Oxygen supplementation based on oxygen saturation or ABGs  - Provide Smoking Cessation handout, if applicable  - Encourage broncho-pulmonary hygiene including cough, deep breathe, Incentive Spirometry  - Assess the need for suctioning and perform as needed  - Assess and instruct to report SOB or any respiratory difficulty  - Respiratory Therapy support as indicated  - Manage/alleviate anxiety  - Monitor for signs/symptoms of CO2 retention  Outcome: Progressing     Problem: METABOLIC/FLUID AND ELECTROLYTES - ADULT  Goal: Glucose maintained within prescribed range  Description: INTERVENTIONS:  - Monitor Blood Glucose as ordered  - Assess for signs and symptoms of hyperglycemia and hypoglycemia  - Administer ordered medications to maintain glucose within target range  - Assess barriers to adequate nutritional intake and initiate nutrition consult as needed  - Instruct patient on self management of diabetes  Outcome: Progressing  Goal: Electrolytes maintained within normal limits  Description: INTERVENTIONS:  - Monitor labs and rhythm and assess patient for signs and symptoms of electrolyte imbalances  - Administer electrolyte replacement as ordered  - Monitor response to electrolyte replacements, including rhythm and repeat lab results as appropriate  - Fluid restriction as ordered  - Instruct patient on fluid and nutrition restrictions as appropriate  Outcome: Progressing     Problem: HEMATOLOGIC - ADULT  Goal: Free from bleeding injury  Description: (Example usage: patient with low  platelets)  INTERVENTIONS:  - Avoid intramuscular injections, enemas and rectal medication administration  - Ensure safe mobilization of patient  - Hold pressure on venipuncture sites to achieve adequate hemostasis  - Assess for signs and symptoms of internal bleeding  - Monitor lab trends  - Patient is to report abnormal signs of bleeding to staff  - Avoid use of toothpicks and dental floss  - Use electric shaver for shaving  - Use soft bristle tooth brush  - Limit straining and forceful nose blowing  Outcome: Progressing

## 2025-06-18 NOTE — PROGRESS NOTES
[facility]  Progress Note    David Reyes Patient Status:  Inpatient    1945 MRN [id number]   Location [facility] 8NE-A Attending Alejandra Mehta MD   Hosp Day # 6 PCP Thea Harris DO     Subjective:  David Reyes is a(n) 79 year old male remains afeb   Breathing continues to feel better  Able to complete full sentences  Currently on room air sats 95% room air  Remains however with coughing up thin bloody secretions    Objective:  BP 97/54 (BP Location: Left arm)   Pulse 73   Temp 98.1 °F (36.7 °C) (Oral)   Resp 21   Wt 205 lb (93 kg)   SpO2 100%   BMI 30.27 kg/m² room air remains without obvious bleeding site bilateral rales      Temp (24hrs), Av.1 °F (36.7 °C), Min:97.5 °F (36.4 °C), Max:98.6 °F (37 °C)      Intake/Output:    Intake/Output Summary (Last 24 hours) at 2025 1001  Last data filed at 2025 0910  Gross per 24 hour   Intake 2252.8 ml   Output 3100 ml   Net -847.2 ml       Physical Exam:   General: alert, cooperative, oriented.  No respiratory distress.   Head: Normocephalic, without obvious abnormality, atraumatic.   Throat: Lips, mucosa, and tongue normal.  No thrush noted.   Neck: trachea midline, no adenopathy, no thyromegaly. No JVD.   Lungs: Seem clear bilateral rales   Chest wall: No tenderness or deformity.   Heart: Irregular with murmurs   Abdomen: soft, non-distended, no masses, no guarding, no     Rebound.  Nontender   Extremity: Decreasing edema bilaterally right ankle remains painful   Skin: No rashes or lesions.   Neurological: Alert, interactive, no focal deficits    Lab Data Review:  Recent Labs     25  0521 25  0620   WBC 1.6* 1.5*   HGB 7.3* 7.6*   PLT 74.0* 53.0*     Recent Labs     25  0521 25  0046 25  0621 25  0620   *  --  135* 137   K 4.6 3.5 4.0 3.5  3.5     --  97* 98   CO2 17.0*  --  23.0 28.0   BUN 48*  --  66* 66*   CREATSERUM 2.55*  --  2.47* 2.28*     Recent Labs   Lab  06/13/25  0351 06/13/25  1225 06/16/25  1414 06/17/25  0621 06/18/25  0621   PTP 17.6*  --   --   --  17.9*   INR 1.44*  --   --   --  1.46*   .2*   < > 76.1* 89.0* 92.7*    < > = values in this interval not displayed.       Cultures: Blood cultures remain negative    Radiology:  No results found.  Images reviewed/pending      Medications reviewed     Assessment and Plan:   Problem List[1]    Assessment:  Acute hypoxic respiratory failure-continues to improve on room air  Acute pulmonary embolism positive V/Q-negative Dopplers-on heparin with plans to transition to orals after bronchoscopy  Hemoptysis subacute reports over 18 months--no obvious endobronchial lesion on CT----remains with hemoptysis despite Levaquin  Right lower lobe and bilateral upper lobe groundglass opacities-possibly fluid related-with plans to follow on completion of antibiotics  Small pulmonary nodules: CT chest 6/12/2025  Acute elevation LFTS -now off statins with plans to repeat-questionable related to acyclovir vs levaquin -improved off statin  History of smoking at least 26 pack years tobacco though quit in 1986  Coronary artery disease: Status post CABG and PCI in past  HFrEF last ejection fraction of 20 to 25%-agree with ongoing diuresis discussed with cardiology  Obstructive sleep apnea syndrome had been compliant for 2 years not using in the recent past  LORENA/CKD improving with improved acidosis  Myelodysplastic syndrome: With pancytopenia as new diagnosis-18% blast genetic studies pending-Dr. Chirinos's note appreciated  Rheumatoid arthritis        Plan:   following chest x-rays as he diuresis  Ongoing diuresis as tolerated remains on Bumex drip  Check sputum culture as able  Discussed at length with patient will consider possible bronchoscopy with BAL  Plan to continue heparin hold on oral AC for now   Continuing to follow        CC     Candi Barros MD  6/18/2025  10:01 AM         [1]   Patient Active Problem List  Diagnosis     Nonischemic cardiomyopathy (HCC)    FLAKO (obstructive sleep apnea)    Congestive heart failure (HCC)    CAD (coronary artery disease)    Rheumatoid arthritis involving multiple sites with positive rheumatoid factor (HCC)    Type 2 diabetes mellitus with hyperglycemia, with long-term current use of insulin (HCC)    Normocytic anemia    Thrombocytopenia    Ischemic cardiomyopathy    Dyspnea on exertion    Dry mouth    Pure hypercholesterolemia    Vitamin D deficiency    Pulmonary hypertension secondary to increased PVR (HCC)    Carotid artery stenosis    Obesity (BMI 30.0-34.9)    Chronic obstructive pulmonary disease, unspecified (HCC)    Weakness generalized    Aortic stenosis, mild    Hyponatremia    Pancytopenia (HCC)    Acute respiratory failure with hypoxia (HCC)    Immunocompromised state (HCC)    Wide-complex tachycardia    Acute on chronic anemia    Type 2 diabetes mellitus without complication, with long-term current use of insulin (HCC)    Foot pain, bilateral    Hypothyroid    Pulmonary hypertension (HCC)    HFrEF (heart failure with reduced ejection fraction) ([facility])    PE (pulmonary thromboembolism) ([facility])    Primary hypertension    Dyspnea, unspecified type    Hemoptysis    Metabolic acidosis    LORENA (acute kidney injury)    Acute pulmonary embolism, unspecified pulmonary embolism type, unspecified whether acute cor pulmonale present (HCC)    MDS (myelodysplastic syndrome) ([facility])    CKD stage 3b, GFR 30-44 ml/min (HCC)    Cardiorenal syndrome    Acute kidney injury

## 2025-06-18 NOTE — PLAN OF CARE
[facility] PetroDE CRT-D device interrogation appears to have new drop in LV pacing percentage to 76% over the past couple weeks, previously upper 90s-100. Discussed with Dr Gilmore who will review interrogation to verify patient is optimized for CRT effectiveness. No AF/AT on interrogation. Six episodes of nonsustained runs of NSVT without need for VT/VF therapies.     Fartun Santana PA-C

## 2025-06-19 ENCOUNTER — APPOINTMENT (OUTPATIENT)
Dept: GENERAL RADIOLOGY | Facility: HOSPITAL | Age: 80
End: 2025-06-19
Attending: INTERNAL MEDICINE
Payer: MEDICARE

## 2025-06-19 ENCOUNTER — ANESTHESIA EVENT (OUTPATIENT)
Dept: ENDOSCOPY | Facility: HOSPITAL | Age: 80
End: 2025-06-19
Payer: MEDICARE

## 2025-06-19 ENCOUNTER — APPOINTMENT (OUTPATIENT)
Dept: ULTRASOUND IMAGING | Facility: HOSPITAL | Age: 80
End: 2025-06-19
Attending: NURSE PRACTITIONER
Payer: MEDICARE

## 2025-06-19 PROBLEM — R91.8 PULMONARY INFILTRATES: Status: ACTIVE | Noted: 2025-06-19

## 2025-06-19 LAB
ACTIN SMOOTH MUSCLE AB: 6 UNITS
ALBUMIN SERPL-MCNC: 3.6 G/DL (ref 3.2–4.8)
ALBUMIN/GLOB SERPL: 1.1 {RATIO} (ref 1–2)
ALP LIVER SERPL-CCNC: 101 U/L (ref 45–117)
ALT SERPL-CCNC: 436 U/L (ref 10–49)
ANION GAP SERPL CALC-SCNC: 10 MMOL/L (ref 0–18)
APTT PPP: 84 SECONDS (ref 23–36)
AST SERPL-CCNC: 211 U/L (ref ?–34)
BASOPHILS # BLD: 0 X10(3) UL (ref 0–0.2)
BASOPHILS NFR BLD: 0 %
BILIRUB SERPL-MCNC: 1 MG/DL (ref 0.2–1.1)
BUN BLD-MCNC: 60 MG/DL (ref 9–23)
CALCIUM BLD-MCNC: 8.7 MG/DL (ref 8.7–10.6)
CHLORIDE SERPL-SCNC: 100 MMOL/L (ref 98–112)
CO2 SERPL-SCNC: 27 MMOL/L (ref 21–32)
CREAT BLD-MCNC: 2.29 MG/DL (ref 0.7–1.3)
DSDNA IGG SERPL IA-ACNC: 0.6 IU/ML (ref ?–10)
EGFRCR SERPLBLD CKD-EPI 2021: 29 ML/MIN/1.73M2 (ref 60–?)
ENA AB SER QL IA: 0.3 UG/L (ref ?–0.7)
ENA AB SER QL IA: NEGATIVE
EOSINOPHIL # BLD: 0 X10(3) UL (ref 0–0.7)
EOSINOPHIL NFR BLD: 0 %
ERYTHROCYTE [DISTWIDTH] IN BLOOD BY AUTOMATED COUNT: 19.9 %
GLOBULIN PLAS-MCNC: 3.2 G/DL (ref 2–3.5)
GLUCOSE BLD-MCNC: 117 MG/DL (ref 70–99)
GLUCOSE BLD-MCNC: 130 MG/DL (ref 70–99)
GLUCOSE BLD-MCNC: 144 MG/DL (ref 70–99)
GLUCOSE BLD-MCNC: 174 MG/DL (ref 70–99)
GLUCOSE BLD-MCNC: 190 MG/DL (ref 70–99)
HCT VFR BLD AUTO: 26.6 % (ref 39–53)
HGB BLD-MCNC: 7.9 G/DL (ref 13–17.5)
LYMPHOCYTES NFR BLD: 1.08 X10(3) UL (ref 1–4)
LYMPHOCYTES NFR BLD: 63 %
M2 MITOCHONDRIAL AB: <20 UNITS
MAGNESIUM SERPL-MCNC: 2.3 MG/DL (ref 1.6–2.6)
MCH RBC QN AUTO: 26.3 PG (ref 26–34)
MCHC RBC AUTO-ENTMCNC: 29.7 G/DL (ref 31–37)
MCV RBC AUTO: 88.7 FL (ref 80–100)
METAMYELOCYTES # BLD: 0.04 X10(3) UL (ref ?–0.01)
METAMYELOCYTES NFR BLD: 3 %
MONOCYTES # BLD: 0.02 X10(3) UL (ref 0.1–1)
MONOCYTES NFR BLD: 1 %
NEUTROPHILS # BLD AUTO: 0.59 X10 (3) UL (ref 1.5–7.7)
NEUTROPHILS NFR BLD: 30 %
NEUTS BAND NFR BLD: 3 %
NEUTS HYPERSEG # BLD: 0.56 X10(3) UL (ref 1.5–7.7)
NRBC BLD MANUAL-RTO: 14 % (ref ?–1)
OSMOLALITY SERPL CALC.SUM OF ELEC: 303 MOSM/KG (ref 275–295)
PLATELET # BLD AUTO: 47 10(3)UL (ref 150–450)
PLATELET MORPHOLOGY: NORMAL
PLATELETS.RETICULATED NFR BLD AUTO: 14.9 % (ref 0–7)
POTASSIUM SERPL-SCNC: 3.9 MMOL/L (ref 3.5–5.1)
PROT SERPL-MCNC: 6.8 G/DL (ref 5.7–8.2)
RBC # BLD AUTO: 3 X10(6)UL (ref 3.8–5.8)
SODIUM SERPL-SCNC: 137 MMOL/L (ref 136–145)
TOTAL CELLS COUNTED BLD: 79
WBC # BLD AUTO: 1.7 X10(3) UL (ref 4–11)

## 2025-06-19 PROCEDURE — 99233 SBSQ HOSP IP/OBS HIGH 50: CPT | Performed by: INTERNAL MEDICINE

## 2025-06-19 PROCEDURE — 99232 SBSQ HOSP IP/OBS MODERATE 35: CPT | Performed by: INTERNAL MEDICINE

## 2025-06-19 PROCEDURE — 93975 VASCULAR STUDY: CPT | Performed by: NURSE PRACTITIONER

## 2025-06-19 PROCEDURE — 76700 US EXAM ABDOM COMPLETE: CPT | Performed by: NURSE PRACTITIONER

## 2025-06-19 PROCEDURE — 99233 SBSQ HOSP IP/OBS HIGH 50: CPT | Performed by: HOSPITALIST

## 2025-06-19 PROCEDURE — 71045 X-RAY EXAM CHEST 1 VIEW: CPT | Performed by: INTERNAL MEDICINE

## 2025-06-19 RX ORDER — TORSEMIDE 20 MG/1
40 TABLET ORAL
Status: DISCONTINUED | OUTPATIENT
Start: 2025-06-19 | End: 2025-06-24

## 2025-06-19 NOTE — PROGRESS NOTES
Patient platelet count down to 47.0  Notified Hematology, Dr Chirinos.   Orders to stop Heparin gtts now. Reassess platelet count tomorrow.   No need for transfusion at this time.

## 2025-06-19 NOTE — CM/SW NOTE
Message left for spouse to discus discharge plan. Pt down at [facility] right now. Will follow up with pt. Await call back from spouse.       Zofia SHEARER, LCSW  Discharge Planner

## 2025-06-19 NOTE — ANESTHESIA PREPROCEDURE EVALUATION
PRE-OP EVALUATION    Patient Name: David Reyes    Admit Diagnosis: Metabolic acidosis [E87.20]  Hemoptysis [R04.2]  Acute kidney injury [N17.9]  Pancytopenia (HCC) [D61.818]  Dyspnea, unspecified type [R06.00]  Acute pulmonary embolism, unspecified pulmonary embolism type, unspecified whether acute cor pulmonale present (HCC) [I26.99]    Pre-op Diagnosis: INPT    BRONCHOSCOPY WITH BRONCHOALVEOLAR LAVAGE, POSSIBLE ARGON PLASMA COAGULATION    Anesthesia Procedure: BRONCHOSCOPY WITH BRONCHOALVEOLAR LAVAGE, POSSIBLE ARGON PLASMA COAGULATION    Surgeons and Role:     * Justice Raymundo MD - Primary    Pre-op vitals reviewed.  Temp: 97.8 °F (36.6 °C)  Pulse: 76  Resp: 18  BP: 92/67  SpO2: 95 %  Body mass index is 29.69 kg/m².    Current medications reviewed.  Hospital Medications:  Current Medications[1]    Outpatient Medications:   Prescriptions Prior to Admission[2]    Allergies: Pcn [penicillins] and Statins      Anesthesia Evaluation    Patient summary reviewed.    Anesthetic Complications  (-) history of anesthetic complications         GI/Hepatic/Renal      (-) GERD                           Cardiovascular        Exercise tolerance: good     MET: >4    (+) obesity  (+) hypertension     (+) CAD                (+) CHF  (-) angina     (-) CUMMINGS         Endo/Other      (+) diabetes  type 2,                   (+) arthritis  (+) rheumatoid arthritis     Pulmonary      (-) asthma  (+) COPD       (+) shortness of breath     (+) sleep apnea       Neuro/Psych                              Patient Active Problem List:     Nonischemic cardiomyopathy (HCC)     FLAKO (obstructive sleep apnea)     Congestive heart failure (HCC)     CAD (coronary artery disease)     Rheumatoid arthritis involving multiple sites with positive rheumatoid factor (HCC)     Type 2 diabetes mellitus with hyperglycemia, with long-term current use of insulin (HCC)     Normocytic anemia     Thrombocytopenia     Ischemic cardiomyopathy     Dyspnea on  exertion     Dry mouth     Pure hypercholesterolemia     Vitamin D deficiency     Pulmonary hypertension secondary to increased PVR (HCC)     Carotid artery stenosis     Obesity (BMI 30.0-34.9)     Chronic obstructive pulmonary disease, unspecified (HCC)     Weakness generalized     Aortic stenosis, mild     Hyponatremia     Pancytopenia (HCC)     Acute respiratory failure with hypoxia (HCC)     Immunocompromised state (HCC)     Wide-complex tachycardia     Acute on chronic anemia     Type 2 diabetes mellitus without complication, with long-term current use of insulin (HCC)     Foot pain, bilateral     Hypothyroid     Pulmonary hypertension (HCC)     HFrEF (heart failure with reduced ejection fraction) (HCC)     PE (pulmonary thromboembolism) (HCC)     Primary hypertension     Dyspnea, unspecified type     Hemoptysis     Metabolic acidosis     LORENA (acute kidney injury)     Acute pulmonary embolism, unspecified pulmonary embolism type, unspecified whether acute cor pulmonale present (HCC)     MDS (myelodysplastic syndrome) ([facility])     CKD stage 3b, GFR 30-44 ml/min (HCC)     Cardiorenal syndrome     Acute kidney injury            Past Surgical History[3]  Social Hx on file[4]  History   Drug Use No     Available pre-op labs reviewed.  Lab Results   Component Value Date    WBC 1.7 (L) 06/19/2025    RBC 3.00 (L) 06/19/2025    HGB 7.9 (L) 06/19/2025    HCT 26.6 (L) 06/19/2025    MCV 88.7 06/19/2025    MCH 26.3 06/19/2025    MCHC 29.7 (L) 06/19/2025    RDW 19.9 06/19/2025    PLT 47.0 (L) 06/19/2025     Lab Results   Component Value Date     06/19/2025    K 3.9 06/19/2025     06/19/2025    CO2 27.0 06/19/2025    BUN 60 (H) 06/19/2025    CREATSERUM 2.29 (H) 06/19/2025     (H) 06/19/2025    CA 8.7 06/19/2025     Lab Results   Component Value Date    INR 1.46 (H) 06/18/2025         Airway      Mallampati: III  Mouth opening: <3 FB  TM distance: < 4 cm  Neck ROM: limited Cardiovascular    Cardiovascular exam  normal.  Rhythm: regular  Rate: normal     Dental             Pulmonary    Pulmonary exam normal.                 Other findings              ASA: 3   Plan: general           Comment:   Chart review prior to anesthesia encounter.    Plan/risks discussed with: patient and significant other                Present on Admission:  **None**             [1]    torsemide (Demadex) tab 40 mg  40 mg Oral BID (Diuretic)    [COMPLETED] potassium chloride (Klor-Con) 20 MEQ oral powder 40 mEq  40 mEq Oral Once    [COMPLETED] potassium chloride (Klor-Con) 20 MEQ oral powder 40 mEq  40 mEq Oral Once    [COMPLETED] sennosides (Senokot) tab 8.6 mg  8.6 mg Oral Daily    ondansetron (Zofran) 4 MG/2ML injection 8 mg  8 mg Intravenous Q24H    [COMPLETED] ondansetron (Zofran) 4 MG/2ML injection 8 mg  8 mg Intravenous Once    [COMPLETED] chlorothiazide (Diuril) 500 mg in sterile water for injection (PF) IV push  500 mg Intravenous Once    acyclovir (Zovirax) cap 200 mg  200 mg Oral BID    azaCITIDine (Vidaza) SUBQ syringe 150 mg  75 mg/m2 Subcutaneous Q24H    [COMPLETED] sodium chloride 0.9% infusion   Intravenous Once    [COMPLETED] furosemide (Lasix) 10 mg/mL injection 40 mg  40 mg Intravenous Once    metoclopramide (Reglan) 5 mg/mL injection 5 mg  5 mg Intravenous Q8H PRN    [COMPLETED] heparin (Porcine) 1000 UNIT/ML injection - BOLUS IV 3,000 Units  30 Units/kg Intravenous Once    [Held by provider] heparin (Porcine) 27852 units/250mL infusion PE/DVT/THROMBUS CONTINUOUS  200-3,000 Units/hr Intravenous Continuous    [COMPLETED] furosemide (Lasix) 10 mg/mL injection 40 mg  40 mg Intravenous Once    levoFLOXacin (Levaquin) tab 750 mg  750 mg Oral Q48HR @ 0700    [COMPLETED] Perflutren Lipid Microsphere (DEFINITY) 6.52 MG/ML injection 1.5 mL  1.5 mL Intravenous ONCE PRN    [] dextrose 5%-sodium chloride 0.45% infusion   Intravenous Continuous    glucose (Dex4) 15 GM/59ML oral liquid 15 g  15 g Oral Q15 Min PRN    Or    glucose  (Glutose) 40% oral gel 15 g  15 g Oral Q15 Min PRN    Or    dextrose 50% injection 50 mL  50 mL Intravenous Q15 Min PRN    Or    glucose (Dex4) 15 GM/59ML oral liquid 30 g  30 g Oral Q15 Min PRN    Or    glucose (Glutose) 40% oral gel 30 g  30 g Oral Q15 Min PRN    [COMPLETED] heparin (Porcine) 98508 units/250 mL infusion ED (PE/DVT/THROMBUS) INITIAL DOSE  18 Units/kg/hr Intravenous Once    [Held by provider] ezetimibe (Zetia) tab 10 mg  10 mg Oral Daily    gabapentin (Neurontin) cap 100 mg  100 mg Oral Daily    levothyroxine (Synthroid) tab 25 mcg  25 mcg Oral Before breakfast    [Held by provider] losartan (Cozaar) tab 25 mg  25 mg Oral Daily    metoprolol succinate ER (Toprol XL) 24 hr tab 50 mg  50 mg Oral 2x Daily(Beta Blocker)    [Held by provider] spironolactone (Aldactone) tab 25 mg  25 mg Oral Daily with breakfast    insulin aspart (NovoLOG) 100 Units/mL FlexPen 1-10 Units  1-10 Units Subcutaneous TID AC and HS    acetaminophen (Tylenol Extra Strength) tab 1,000 mg  1,000 mg Oral Q8H PRN    melatonin tab 3 mg  3 mg Oral Nightly PRN    polyethylene glycol (PEG 3350) (Miralax) 17 g oral packet 17 g  17 g Oral Daily PRN    sennosides (Senokot) tab 17.2 mg  17.2 mg Oral Nightly PRN    bisacodyl (Dulcolax) 10 MG rectal suppository 10 mg  10 mg Rectal Daily PRN    benzonatate (Tessalon) cap 200 mg  200 mg Oral TID PRN    guaiFENesin (Robitussin) 100 MG/5 ML oral liquid 200 mg  200 mg Oral Q4H PRN    glycerin-hypromellose- (Artificial Tears) 0.2-0.2-1 % ophthalmic solution 1 drop  1 drop Both Eyes QID PRN    sodium chloride (Saline Mist) 0.65 % nasal solution 1 spray  1 spray Each Nare Q3H PRN    [Held by provider] Lovastatin TABS 40 mg **PATIENT SUPPLIED**  40 mg Oral BID   [2]   Medications Prior to Admission   Medication Sig Dispense Refill Last Dose/Taking    TOUJEO MAX SOLOSTAR 300 UNIT/ML Subcutaneous Solution Pen-injector INJECT 24 UNITS INTO SKIN DAILY 27 mL 0 6/11/2025 Evening    LEVOTHYROXINE 25  MCG Oral Tab TAKE 1 TABLET(25 MCG) BY MOUTH BEFORE BREAKFAST 90 tablet 0 6/12/2025 Morning    GABAPENTIN 100 MG Oral Cap TAKE 1 CAPSULE(100 MG) BY MOUTH THREE TIMES DAILY AS NEEDED (Patient taking differently: Take 1 capsule (100 mg total) by mouth daily.) 90 capsule 0 6/11/2025 Morning    METFORMIN  MG Oral Tablet 24 Hr TAKE 2 TABLETS(1000 MG) BY MOUTH TWICE DAILY WITH MEALS 360 tablet 0 6/11/2025 Evening    spironolactone 25 MG Oral Tab Take 1 tablet (25 mg total) by mouth daily with breakfast. 90 tablet 0 6/11/2025 Morning    metoprolol succinate ER 50 MG Oral Tablet 24 Hr Take 1 tablet (50 mg total) by mouth in the morning and 1 tablet (50 mg total) before bedtime.   6/11/2025 Evening    losartan 25 MG Oral Tab Take 1 tablet (25 mg total) by mouth daily.   6/11/2025 Morning    torsemide 20 MG Oral Tab Take 1 tablet (20 mg total) by mouth BID (Diuretic). 60 tablet 1 6/11/2025 Evening    Vitamin B-1 100 MG Oral Tab Take 1 tablet (100 mg total) by mouth in the morning.   6/11/2025 Morning    ezetimibe 10 MG Oral Tab Take 1 tablet (10 mg total) by mouth in the morning.   6/11/2025 Morning    Lovastatin 40 MG Oral Tab Take 1 tablet (40 mg total) by mouth in the morning and 1 tablet (40 mg total) before bedtime.  2 6/11/2025 Evening    Posaconazole 100 MG Oral Tab EC Take 300 mg by mouth daily. 90 tablet 0     Acetaminophen (TYLENOL ARTHRITIS PAIN OR) Take by mouth.       Microlet Lancets Does not apply Misc CHECK BLOOD SUGAR THREE TIMES DAILY. Dx E11.65 insulin dependent 300 each 3    [3]   Past Surgical History:  Procedure Laterality Date    Angiogram  2/11/2015    Angioplasty (coronary)  2/23/15    RCA    Bypass surgery      2007    Cabg  2004    LAD, Diagonal    Cardiac defibrillator placement  6/7/14    Celly dual chamber ICD    Cath drug eluting stent      Tonsillectomy     [4]   Social History  Socioeconomic History    Marital status:    Tobacco Use    Smoking status: Former     Current  packs/day: 0.00     Average packs/day: 1 pack/day for 26.0 years (26.0 ttl pk-yrs)     Types: Cigarettes     Start date: 1960     Quit date: 1986     Years since quittin.0    Smokeless tobacco: Never    Tobacco comments:     NON-SMOKER   Vaping Use    Vaping status: Never Used   Substance and Sexual Activity    Alcohol use: Yes     Alcohol/week: 2.0 standard drinks of alcohol     Types: 2 Glasses of wine per week     Comment: OCCASIONAL    Drug use: No   Other Topics Concern    Caffeine Concern No    Exercise No

## 2025-06-19 NOTE — PROGRESS NOTES
[facility]                       Gastroenterology Follow up Note - [facility]    David Reyes Patient Status:  Inpatient    1945 MRN [id number]   Location [facility] 8NE-A Attending Delta Washington MD   Hosp Day # 7 PCP Thea Harris DO     Reason for consultation: Elevated liver enzymes  Subjective: Patient seen and examined.  He was  sleepy this morning.  Denies abdominal pain, nausea, emesis.  LFTs downtrending.  Review of Systems:   10 point ROS completed and was negative, except for pertinent positive and negatives stated in subjective.    For PMH, PSH, FHx and SHx- please see initial consult note.     Objective: BP 92/67 (BP Location: Left arm)   Pulse 76   Temp 97.8 °F (36.6 °C) (Oral)   Resp 18   Wt 201 lb 1 oz (91.2 kg)   SpO2 95%   BMI 29.69 kg/m²   Gen: No acute distress  Resp: no respiratory distress  Abd: Soft, non-tender, non-distended. No rebound tenderness, no guarding.   Neuro: Sleepy.    Labs:   Lab Results   Component Value Date    WBC 1.7 2025    HGB 7.9 2025    HCT 26.6 2025    PLT 47.0 2025    CREATSERUM 2.29 2025    BUN 60 2025     2025    K 3.9 2025     2025    CO2 27.0 2025     2025    CA 8.7 2025    ALB 3.6 2025    ALKPHO 101 2025    BILT 1.0 2025     2025     2025    PTT 84.0 2025    MG 2.3 2025     Recent Labs   Lab 25  0621 25  0620 25  0548   * 121* 144*   BUN 66* 66* 60*   CREATSERUM 2.47* 2.28* 2.29*   CA 8.8 8.5* 8.7   * 137 137   K 4.0 3.5  3.5 3.9   CL 97* 98 100   CO2 23.0 28.0 27.0     Recent Labs   Lab 25  0549   RBC 3.00*   HGB 7.9*   HCT 26.6*   MCV 88.7   MCH 26.3   MCHC 29.7*   RDW 19.9   NEPRELIM 0.59*   WBC 1.7*   PLT 47.0*       Recent Labs   Lab 25  0621 25  0620 25  0548   * 540* 436*   * 307* 211*        Assessment:  Impression: 79 yr-old male with PMhx that includes CM s/p ICD, CAD s/p CABG, pulmonary HTN, dyslipidemia, CKD, DM, FLAKO, RA, SDH, and MDS admitted 6/12 with dyspnea in setting of hemoptysis and worsening leg edema-->work-up revealed VQ c/w right sided PE, LORENA on CKD, pancytopenia, acute on chronic HF, and GI consulted for elevated LFTs (  Alk Phos 123 Bili 1.2) from normal levels on admission. No new abd imaging; 2-D echo on arrival revealed EF 15-20% with mild/mod MR + TR and dilated vena cava. Elevation most likely multifactorial from ischemia with hepatic congestion given decreased EF however will r/o autoimmune, viral process and obtain imaging to r/o thrombus, mass. Pt without abd pain and tolerated PO intake does not appear to be d/t biliary obstruction   Recommendations:      Follow-up US abd with doppler  Follow-up chronic liver disease serology   Cardiology following.  Continue to optimize cardiovascular status  .  Hematology following.  Appreciate recommendations.  OK to resume statin from a GI perspective  CBC, CMP, INR in AM       Thank you for allowing us to participate in patient's care. Please call us with any questions or concerns.  The GI consult service.  Will continue to follow.     Isai Nevarez DO  [facility] Gastroenterology  [phone]

## 2025-06-19 NOTE — PLAN OF CARE
Assumed care of patient at 1930. Aox4, forgetful. V-paced w/1st deg AVB. Pt denies pain. RA, Lungs diminished at bases, SoB at times, Cho. Pt producing small amount of bloody sputum. Intermittent nose bleed. Pt incontinent x2, purewick and brief in place. X2 w/walker. Non-pitting edema to BLE. Diffuse bruising to BUE from IV sticks. QID.  Bed locked and in lowest position. Call light and personal items within reach.      POC:  - US Abdomen, NPO overnight, sips with meds okay.  - Heparin gtt @ 11.5ml (PE/DVT)   - Bumex gtt @2ml // DW // I&O  - Prophylaxis Acyclovir/Levaquin   - Monitor Hgb (transfuse if Hgb <7 or Plt <50)  - SubQ chemo daily for 7 days - medonc RN to come administer, Zofran 1 hr prior  - Needs posatonazol but costs $346.83 per month  -Sputum sample pending    Problem: Diabetes/Glucose Control  Goal: Glucose maintained within prescribed range  Description: INTERVENTIONS:  - Monitor Blood Glucose as ordered  - Assess for signs and symptoms of hyperglycemia and hypoglycemia  - Administer ordered medications to maintain glucose within target range  - Assess barriers to adequate nutritional intake and initiate nutrition consult as needed  - Instruct patient on self management of diabetes  Outcome: Progressing     Problem: Patient/Family Goals  Goal: Patient/Family Long Term Goal  Description: Patient's Long Term Goal:   To return home asap    Interventions:  - Cardiology consult  2 D echo with doppler  - See additional Care Plan goals for specific interventions  Outcome: Progressing  Goal: Patient/Family Short Term Goal  Description: Patient's Short Term Goal:  t get rid of shortness of breath    Interventions:   - medication adjustment      Supplemental oxygen as needed  - See additional Care Plan goals for specific interventions  Outcome: Progressing     Problem: PAIN - ADULT  Goal: Verbalizes/displays adequate comfort level or patient's stated pain goal  Description: INTERVENTIONS:  - Encourage pt to  monitor pain and request assistance  - Assess pain using appropriate pain scale  - Administer analgesics based on type and severity of pain and evaluate response  - Implement non-pharmacological measures as appropriate and evaluate response  - Consider cultural and social influences on pain and pain management  - Manage/alleviate anxiety  - Utilize distraction and/or relaxation techniques  - Monitor for opioid side effects  - Notify MD/LIP if interventions unsuccessful or patient reports new pain  - Anticipate increased pain with activity and pre-medicate as appropriate  Outcome: Progressing     Problem: SAFETY ADULT - FALL  Goal: Free from fall injury  Description: INTERVENTIONS:  - Assess pt frequently for physical needs  - Identify cognitive and physical deficits and behaviors that affect risk of falls.  - [place] fall precautions as indicated by assessment.  - Educate pt/family on patient safety including physical limitations  - Instruct pt to call for assistance with activity based on assessment  - Modify environment to reduce risk of injury  - Provide assistive devices as appropriate  - Consider OT/PT consult to assist with strengthening/mobility  - Encourage toileting schedule  Outcome: Progressing     Problem: CARDIOVASCULAR - ADULT  Goal: Maintains optimal cardiac output and hemodynamic stability  Description: INTERVENTIONS:  - Monitor vital signs, rhythm, and trends  - Monitor for bleeding, hypotension and signs of decreased cardiac output  - Evaluate effectiveness of vasoactive medications to optimize hemodynamic stability  - Monitor arterial and/or venous puncture sites for bleeding and/or hematoma  - Assess quality of pulses, skin color and temperature  - Assess for signs of decreased coronary artery perfusion - ex. Angina  - Evaluate fluid balance, assess for edema, trend weights  Outcome: Progressing  Goal: Absence of cardiac arrhythmias or at baseline  Description: INTERVENTIONS:  - Continuous  cardiac monitoring, monitor vital signs, obtain 12 lead EKG if indicated  - Evaluate effectiveness of antiarrhythmic and heart rate control medications as ordered  - Initiate emergency measures for life threatening arrhythmias  - Monitor electrolytes and administer replacement therapy as ordered  Outcome: Progressing     Problem: RESPIRATORY - ADULT  Goal: Achieves optimal ventilation and oxygenation  Description: INTERVENTIONS:  - Assess for changes in respiratory status  - Assess for changes in mentation and behavior  - Position to facilitate oxygenation and minimize respiratory effort  - Oxygen supplementation based on oxygen saturation or ABGs  - Provide Smoking Cessation handout, if applicable  - Encourage broncho-pulmonary hygiene including cough, deep breathe, Incentive Spirometry  - Assess the need for suctioning and perform as needed  - Assess and instruct to report SOB or any respiratory difficulty  - Respiratory Therapy support as indicated  - Manage/alleviate anxiety  - Monitor for signs/symptoms of CO2 retention  Outcome: Progressing     Problem: METABOLIC/FLUID AND ELECTROLYTES - ADULT  Goal: Glucose maintained within prescribed range  Description: INTERVENTIONS:  - Monitor Blood Glucose as ordered  - Assess for signs and symptoms of hyperglycemia and hypoglycemia  - Administer ordered medications to maintain glucose within target range  - Assess barriers to adequate nutritional intake and initiate nutrition consult as needed  - Instruct patient on self management of diabetes  Outcome: Progressing  Goal: Electrolytes maintained within normal limits  Description: INTERVENTIONS:  - Monitor labs and rhythm and assess patient for signs and symptoms of electrolyte imbalances  - Administer electrolyte replacement as ordered  - Monitor response to electrolyte replacements, including rhythm and repeat lab results as appropriate  - Fluid restriction as ordered  - Instruct patient on fluid and nutrition  restrictions as appropriate  Outcome: Progressing     Problem: HEMATOLOGIC - ADULT  Goal: Free from bleeding injury  Description: (Example usage: patient with low platelets)  INTERVENTIONS:  - Avoid intramuscular injections, enemas and rectal medication administration  - Ensure safe mobilization of patient  - Hold pressure on venipuncture sites to achieve adequate hemostasis  - Assess for signs and symptoms of internal bleeding  - Monitor lab trends  - Patient is to report abnormal signs of bleeding to staff  - Avoid use of toothpicks and dental floss  - Use electric shaver for shaving  - Use soft bristle tooth brush  - Limit straining and forceful nose blowing  Outcome: Progressing

## 2025-06-19 NOTE — PROGRESS NOTES
Hematology/Oncology Progress Note    Patient Name: David Reyes  Medical Record Number: [id number]    YOB: 1945     Reason for Consultation:  David Reyes was seen today for the diagnosis of MDS    Interval events:      - breathing better, now off O2   - tolerating aza without issues  - plt 47k. Hgb stable at 7.9k    Inpatient Meds:  Scheduled Medications[1]  Medication Infusions[2]    PRN Meds:  PRN Medications[3]    Allergies:   Allergies[4]    Vital Signs:  Height: --  Weight: 91.2 kg (201 lb 1 oz) (06/19 0449)  BSA (Calculated - sq m): --  Pulse: 77 (06/19 0449)  BP: 93/61 (06/19 0449)  Temp: 97.4 °F (36.3 °C) (06/19 0449)  Do Not Use - Resp Rate: --  SpO2: 95 % (06/19 0449)    Wt Readings from Last 6 Encounters:   06/19/25 91.2 kg (201 lb 1 oz)   05/29/25 98.4 kg (217 lb)   05/22/25 99.4 kg (219 lb 3.2 oz)   04/11/25 100.8 kg (222 lb 3.2 oz)   03/17/25 100.9 kg (222 lb 8 oz)   12/09/24 105.5 kg (232 lb 8 oz)       Physical Examination:  General: Patient is alert and oriented, not in acute distress  Psych: Mood and affect are appropriate  Eyes: EOMI, PERRL  ENT: Oropharynx is clear, no adenopathy  CV: mild LE edema  Respiratory: non-labored respirations  GI/Abd: Soft, non-tender   Neurological: Grossly intact     Laboratory:  Recent Labs   Lab 06/18/25  0620 06/18/25  1656 06/19/25  0549   WBC 1.5* 1.6* 1.7*   HGB 7.6* 8.0* 7.9*   HCT 25.3* 26.6* 26.6*   PLT 53.0* 52.0* 47.0*   MCV 87.5 88.1 88.7   RDW 19.4 19.5 19.9   NEPRELIM 0.41* 0.59* 0.59*       Recent Labs   Lab 06/13/25  0351 06/14/25  0454 06/17/25  0621 06/18/25  0620 06/19/25  0548      < > 135* 137 137   K 4.0   < > 4.0 3.5  3.5 3.9      < > 97* 98 100   CO2 22.0   < > 23.0 28.0 27.0   BUN 31*   < > 66* 66* 60*   CREATSERUM 1.95*   < > 2.47* 2.28* 2.29*   *   < > 111* 121* 144*   CA 8.8   < > 8.8 8.5* 8.7   PHOS 3.6  --   --   --   --    TP 7.0  --  6.9 6.5 6.8   ALB 3.9  --  3.6 3.5 3.6   ALKPHO 56  --   123* 111 101   AST 29  --  604* 307* 211*   ALT 10  --  730* 540* 436*   BILT 0.7  --  1.2* 1.0 1.0    < > = values in this interval not displayed.       Recent Labs   Lab 06/13/25  0351 06/13/25  1225 06/17/25  0621 06/18/25  0621 06/19/25  0548   INR 1.44*  --   --  1.46*  --    .2*   < > 89.0* 92.7* 84.0*    < > = values in this interval not displayed.       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 Pat wants to pursue treatment. I warned that chemotherapy may be difficult with his comorbidities and he understands this  - given his pancytopenia and expected prolonged admission for heart failure exacerbation and needed diuresis, plan to start azacitidine 75mg/m2/day x 7 days  - we discussed azacitidine will be given every 28-35 days. Pt verbally consented  - we previously discussed incorporating venetoclax but phase 3 trial MONA results published yesterday showed adding venetoclax to azacitidine did not improve overall survival in higher risk MDS, so will not incorporate venetoclax  - ppx: acyclovir 200mg BID, levaquin 750mg q48 hours. Will need to start posaconazole 300mg daily when obtained from pharmacy. Will need to hold off on posaconazole initiation until LFTs recover  - transfuse for hgb <7 g/dL, plt <10k  - continue azacitidine; C1D1 6/17    RLL PE  - continue heparin gtt while plts >50k   - BLE dopplers negative for DVT  - near discharge, will need to transition to apixaban 2.5mg BID (due to posaconazole co-administration)  - hold heparin gtt for plt <50k. Ivc filter not indicated given dopplers negative.    Acute on chronic systolic heart failure  - per cardiology    CKD stage 4  - per nephrology    LFT elevations  - increased from  6/13  - ezetimibe held  - GI consulted  - LFTs improving; unclear etiology    Will continue to follow    James Chirinos MD  [facility] Hematology Oncology             [1]    sennosides  8.6 mg Oral Daily    ondansetron  8 mg Intravenous Q24H    acyclovir  200 mg Oral BID    azaCITIDine  75 mg/m2 Subcutaneous Q24H    levoFLOXacin  750 mg Oral Q48HR @ 0700    [Held by provider] ezetimibe  10 mg Oral Daily    gabapentin  100 mg Oral Daily    levothyroxine  25 mcg Oral Before breakfast    [Held by provider] losartan  25 mg Oral Daily    metoprolol succinate ER  50 mg Oral 2x Daily(Beta Blocker)    spironolactone  25 mg Oral Daily with breakfast    insulin aspart  1-10 Units Subcutaneous TID AC and HS    [Held by provider] Lovastatin  40 mg Oral BID   [2]    bumetanide (Bumex) 12.5 mg in 50 mL infusion 0.5 mg/hr (06/19/25 0645)    [Held by provider] continuous dose heparin 1,150 Units/hr (06/18/25 1808)   [3]   metoclopramide    glucose **OR** glucose **OR** dextrose **OR** glucose **OR** glucose    acetaminophen    melatonin    polyethylene glycol (PEG 3350)    sennosides    bisacodyl    benzonatate    guaiFENesin    glycerin-hypromellose-    sodium chloride  [4]   Allergies  Allergen Reactions    Pcn [Penicillins]      \"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

## 2025-06-19 NOTE — PROGRESS NOTES
[facility]   part of [facility]     Nephrology Progress Note    David Reyes Patient Status:  Inpatient    1945 MRN [id number]   Location [facility] 8NE-A Attending Alejandra Mehta MD   Hosp Day # 7 PCP Thea Harris DO       SUBJECTIVE:  No acute events    Current Hospital Medications[1]        Physical Exam:   BP 91/62 (BP Location: Left arm)   Pulse 77   Temp 98.3 °F (36.8 °C) (Oral)   Resp 20   Wt 201 lb 1 oz (91.2 kg)   SpO2 93%   BMI 29.69 kg/m²   Temp (24hrs), Av.7 °F (36.5 °C), Min:97.3 °F (36.3 °C), Max:98.3 °F (36.8 °C)       Intake/Output Summary (Last 24 hours) at 2025 0935  Last data filed at 2025 0747  Gross per 24 hour   Intake 783.06 ml   Output 3100 ml   Net -2316.94 ml     Last 3 Weights   25 0449 201 lb 1 oz (91.2 kg)   25 0500 205 lb (93 kg)   25 0500 209 lb 10.5 oz (95.1 kg)   25 0545 218 lb (98.9 kg)   06/15/25 0508 218 lb 8 oz (99.1 kg)   25 0434 217 lb 13 oz (98.8 kg)   25 2258 213 lb (96.6 kg)   25 1935 215 lb (97.5 kg)   25 1506 217 lb (98.4 kg)   25 1102 219 lb 3.2 oz (99.4 kg)   25 0955 222 lb 3.2 oz (100.8 kg)   25 1636 222 lb 8 oz (100.9 kg)     General: Alert and oriented in no apparent distress.  HEENT: No scleral icterus, MMM  Neck: Supple, no DON or thyromegaly  Cardiac: irregular, S1, S2 normal, no murmur or rub  Lungs: decr BS B    Abdomen: Soft, non-tender. + bowel sounds, no palpable organomegaly  Extremities: + BLE edema.  Neurologic:  moving all extremities  Skin: Warm and dry, no rash  Recent Labs     25  0620 25  0621 25  1656 25  0549   WBC 1.5*  --  1.6* 1.7*   HGB 7.6*  --  8.0* 7.9*   MCV 87.5  --  88.1 88.7   PLT 53.0*  --  52.0* 47.0*   BAND  --   --   --  3   INR  --  1.46*  --   --        Recent Labs     25  0046 25  0621 25  0620 25  0548   NA  --  135* 137 137   K 3.5 4.0 3.5  3.5 3.9   CL  --  97* 98 100    CO2  --  23.0 28.0 27.0   BUN  --  66* 66* 60*   CREATSERUM  --  2.47* 2.28* 2.29*   CA  --  8.8 8.5* 8.7   MG 2.5  --  2.4 2.3       Recent Labs     06/17/25  0621 06/18/25  0620 06/19/25  0548   * 540* 436*   * 307* 211*   ALB 3.6 3.5 3.6           Impression/Plan:        1.Acute kidney injury on CKD 3b: baseline serum creatinine appears to be around 1.9-2.1 mg/dL. Current LORENA appears related to CRS.   Stable labs       2.Acute on chronic HFrEF: cardiology following, Agree w/ transition to oral diuretics      3. Acute PE: on heparin gtt. Per pulm/heme     4. 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 Chirinos as outpatient. S/p recent BMBx. Heme following.        Questions/concerns were discussed with patient and/or family by bedside.      Antonio Cartwright  6/19/2025           [1]   Current Facility-Administered Medications   Medication Dose Route Frequency    ondansetron (Zofran) 4 MG/2ML injection 8 mg  8 mg Intravenous Q24H    acyclovir (Zovirax) cap 200 mg  200 mg Oral BID    azaCITIDine (Vidaza) SUBQ syringe 150 mg  75 mg/m2 Subcutaneous Q24H    bumetanide (Bumex) 12.5 mg in 50 mL infusion  0.5 mg/hr Intravenous Continuous    metoclopramide (Reglan) 5 mg/mL injection 5 mg  5 mg Intravenous Q8H PRN    [Held by provider] heparin (Porcine) 03264 units/250mL infusion PE/DVT/THROMBUS CONTINUOUS  200-3,000 Units/hr Intravenous Continuous    levoFLOXacin (Levaquin) tab 750 mg  750 mg Oral Q48HR @ 0700    glucose (Dex4) 15 GM/59ML oral liquid 15 g  15 g Oral Q15 Min PRN    Or    glucose (Glutose) 40% oral gel 15 g  15 g Oral Q15 Min PRN    Or    dextrose 50% injection 50 mL  50 mL Intravenous Q15 Min PRN    Or    glucose (Dex4) 15 GM/59ML oral liquid 30 g  30 g Oral Q15 Min PRN    Or    glucose (Glutose) 40% oral gel 30 g  30 g Oral Q15 Min PRN    [Held by provider] ezetimibe (Zetia) tab 10 mg  10 mg Oral Daily    gabapentin (Neurontin) cap 100 mg  100 mg Oral Daily    levothyroxine (Synthroid) tab 25  mcg  25 mcg Oral Before breakfast    [Held by provider] losartan (Cozaar) tab 25 mg  25 mg Oral Daily    metoprolol succinate ER (Toprol XL) 24 hr tab 50 mg  50 mg Oral 2x Daily(Beta Blocker)    [Held by provider] spironolactone (Aldactone) tab 25 mg  25 mg Oral Daily with breakfast    insulin aspart (NovoLOG) 100 Units/mL FlexPen 1-10 Units  1-10 Units Subcutaneous TID AC and HS    acetaminophen (Tylenol Extra Strength) tab 1,000 mg  1,000 mg Oral Q8H PRN    melatonin tab 3 mg  3 mg Oral Nightly PRN    polyethylene glycol (PEG 3350) (Miralax) 17 g oral packet 17 g  17 g Oral Daily PRN    sennosides (Senokot) tab 17.2 mg  17.2 mg Oral Nightly PRN    bisacodyl (Dulcolax) 10 MG rectal suppository 10 mg  10 mg Rectal Daily PRN    benzonatate (Tessalon) cap 200 mg  200 mg Oral TID PRN    guaiFENesin (Robitussin) 100 MG/5 ML oral liquid 200 mg  200 mg Oral Q4H PRN    glycerin-hypromellose- (Artificial Tears) 0.2-0.2-1 % ophthalmic solution 1 drop  1 drop Both Eyes QID PRN    sodium chloride (Saline Mist) 0.65 % nasal solution 1 spray  1 spray Each Nare Q3H PRN    [Held by provider] Lovastatin TABS 40 mg **PATIENT SUPPLIED**  40 mg Oral BID

## 2025-06-19 NOTE — PROGRESS NOTES
[facility]  Progress Note    David Reyes Patient Status:  Inpatient    1945 MRN [id number]   Location [facility] 8NE-A Attending Delta Washington MD   Hosp Day # 7 PCP Thea Harris DO     Subjective:  David Reyes is a(n) 79 year old male remains afeb   Remains fatigued  Coughing perhaps less but remains with some blood  Denies any chest pain  Remains on room air    Objective:  BP 91/62 (BP Location: Left arm)   Pulse 77   Temp 98.3 °F (36.8 °C) (Oral)   Resp 20   Wt 201 lb 1 oz (91.2 kg)   SpO2 93%   BMI 29.69 kg/m² room air      Temp (24hrs), Av.7 °F (36.5 °C), Min:97.3 °F (36.3 °C), Max:98.3 °F (36.8 °C)      Intake/Output:    Intake/Output Summary (Last 24 hours) at 2025 1144  Last data filed at 2025 0800  Gross per 24 hour   Intake 785.56 ml   Output 3100 ml   Net -2314.44 ml       Physical Exam:   General: alert, cooperative, oriented.  No respiratory distress.  Able to lay supine without difficulty no conversational dyspnea   Head: Normocephalic, without obvious abnormality, atraumatic.   Throat: Lips, mucosa, and tongue normal.  No thrush noted.  Remains without obvious bleeding site   Neck: trachea midline, no adenopathy, no thyromegaly. No JVD.   Lungs: Bilateral rales at the bases clear some with deep inspiration   Chest wall: No tenderness or deformity.   Heart: Irregular with murmurs   Abdomen: soft, non-distended, no masses, no guarding, no     Rebound.  Nontender   Extremity: Decreasing edema bilaterally right ankle seems less tender   Skin: No rashes or lesions.   Neurological: Alert, interactive, no focal deficits    Lab Data Review:  Recent Labs     25  0620 25  1656 25  0549   WBC 1.5* 1.6* 1.7*   HGB 7.6* 8.0* 7.9*   PLT 53.0* 52.0* 47.0*     Recent Labs     25  0046 25  0621 25  0620 25  0548   NA  --  135* 137 137   K 3.5 4.0 3.5  3.5 3.9   CL  --  97* 98 100   CO2  --  23.0 28.0 27.0   BUN  --  66*  66* 60*   CREATSERUM  --  2.47* 2.28* 2.29*     Recent Labs   Lab 06/13/25  0351 06/13/25  1225 06/17/25  0621 06/18/25  0621 06/19/25  0548   PTP 17.6*  --   --  17.9*  --    INR 1.44*  --   --  1.46*  --    .2*   < > 89.0* 92.7* 84.0*    < > = values in this interval not displayed.       Cultures: sputum thus far no growth     Radiology:  XR CHEST AP PORTABLE  (CPT=71045)  Result Date: 6/19/2025  CONCLUSION:  No significant change since prior exam.   LOCATION:  [facility]      Dictated by (CST): Migeul Saavedra MD on 6/19/2025 at 6:27 AM     Finalized by (CST): Miguel Saavedra MD on 6/19/2025 at 6:28 AM       Remains with infiltrates       Medications reviewed     Assessment and Plan:   Problem List[1]    Assessment:  Acute hypoxic respiratory failure-continues to improve on room air  Acute pulmonary embolism positive V/Q-negative Dopplers-on heparin with plans to transition to orals after bronchoscopy  Hemoptysis subacute reports over 18 months--no obvious endobronchial lesion on CT----remains with hemoptysis despite Levaquin  Right lower lobe and bilateral upper lobe groundglass opacities-possibly fluid related-with plans to follow on completion of antibiotics  Small pulmonary nodules: CT chest 6/12/2025  Acute elevation LFTS -now off statins with plans to repeat-questionable related to acyclovir vs levaquin -improved off statin  History of smoking at least 26 pack years tobacco though quit in 1986  Coronary artery disease: Status post CABG and PCI in past  HFrEF last ejection fraction of 20 to 25%-agree with ongoing diuresis discussed with cardiology  Obstructive sleep apnea syndrome had been compliant for 2 years not using in the recent past  LORENA/CKD improving with improved acidosis  Myelodysplastic syndrome: With pancytopenia as new diagnosis-18% blast genetic studies pending-Dr. Chirinos's note appreciated  Rheumatoid arthritis        Plan:   following chest x-rays as he diuresis  Ongoing diuresis as  tolerated now to Demadex  Check sputum culture pending  Discussed at length with patient will consider possible bronchoscopy with BAL  Plan to continue heparin- hold on oral AC for now   Continuing to follow       Reviewed with Dr. Raymundo plan for bronchoscopy in a.m. may need platelet transfusion prior  Discussed with Dr. Taran Barros MD  6/19/2025  11:44 AM         [1]   Patient Active Problem List  Diagnosis    Nonischemic cardiomyopathy (HCC)    FLAKO (obstructive sleep apnea)    Congestive heart failure (HCC)    CAD (coronary artery disease)    Rheumatoid arthritis involving multiple sites with positive rheumatoid factor (HCC)    Type 2 diabetes mellitus with hyperglycemia, with long-term current use of insulin (HCC)    Normocytic anemia    Thrombocytopenia    Ischemic cardiomyopathy    Dyspnea on exertion    Dry mouth    Pure hypercholesterolemia    Vitamin D deficiency    Pulmonary hypertension secondary to increased PVR (HCC)    Carotid artery stenosis    Obesity (BMI 30.0-34.9)    Chronic obstructive pulmonary disease, unspecified (HCC)    Weakness generalized    Aortic stenosis, mild    Hyponatremia    Pancytopenia (HCC)    Acute respiratory failure with hypoxia (HCC)    Immunocompromised state (HCC)    Wide-complex tachycardia    Acute on chronic anemia    Type 2 diabetes mellitus without complication, with long-term current use of insulin (HCC)    Foot pain, bilateral    Hypothyroid    Pulmonary hypertension (HCC)    HFrEF (heart failure with reduced ejection fraction) (HCC)    PE (pulmonary thromboembolism) ([facility])    Primary hypertension    Dyspnea, unspecified type    Hemoptysis    Metabolic acidosis    LORENA (acute kidney injury)    Acute pulmonary embolism, unspecified pulmonary embolism type, unspecified whether acute cor pulmonale present (HCC)    MDS (myelodysplastic syndrome) (HCC)    CKD stage 3b, GFR 30-44 ml/min (HCC)    Cardiorenal syndrome    Acute kidney injury

## 2025-06-19 NOTE — PROGRESS NOTES
[facility]  Progress Note    David Reyes Patient Status:  Inpatient    1945 MRN [id number]   Location [facility] 8NE-A Attending Alejandra Mehta MD   Hosp Day # 7 PCP Thea Harris DO     Subjective:  No acute events overnight, he feels 'okay'. Cough is better today. No hemoptysis. No pain. No fevers. Breathing is better    Objective:  BP 92/67 (BP Location: Left arm)   Pulse 76   Temp 97.8 °F (36.6 °C) (Oral)   Resp 18   Wt 201 lb 1 oz (91.2 kg)   SpO2 95%   BMI 29.69 kg/m² room air        Temp (24hrs), Av.6 °F (36.4 °C), Min:97.3 °F (36.3 °C), Max:98.3 °F (36.8 °C)      Intake/Output:    Intake/Output Summary (Last 24 hours) at 2025 1622  Last data filed at 2025 1221  Gross per 24 hour   Intake 545.56 ml   Output 3050 ml   Net -2504.44 ml       Physical Exam:   General: alert, cooperative, oriented.  No respiratory distress.   Head: Normocephalic, without obvious abnormality, atraumatic.   Throat: Lips, mucosa, and tongue normal.  No thrush noted.   Neck: trachea midline, no adenopathy, no thyromegaly. No JVD.   Lungs: clear bilaterally. No wheeze   Chest wall: No tenderness or deformity.   Heart: Irregular with murmurs   Abdomen: soft, non-distended, no masses, no guarding, no     Rebound.  Nontender   Extremity: Decreasing edema bilaterally right ankle remains painful   Skin: No rashes or lesions.   Neurological: Alert, interactive, no focal deficits    Lab Data Review:  Recent Labs   Lab 25  0620 25  1656 25  0549   RBC 2.89* 3.02* 3.00*   HGB 7.6* 8.0* 7.9*   HCT 25.3* 26.6* 26.6*   MCV 87.5 88.1 88.7   MCH 26.3 26.5 26.3   MCHC 30.0* 30.1* 29.7*   RDW 19.4 19.5 19.9   NEPRELIM 0.41* 0.59* 0.59*   WBC 1.5* 1.6* 1.7*   PLT 53.0* 52.0* 47.0*       Recent Labs   Lab 25  0621 25  0620 25  0548   * 121* 144*   BUN 66* 66* 60*   CREATSERUM 2.47* 2.28* 2.29*   EGFRCR 26* 29* 29*   CA 8.8 8.5* 8.7   * 137 137   K 4.0 3.5  3.5  3.9   CL 97* 98 100   CO2 23.0 28.0 27.0         Recent Labs   Lab 06/13/25  0351 06/13/25  1225 06/17/25  0621 06/18/25  0621 06/19/25  0548   PTP 17.6*  --   --  17.9*  --    INR 1.44*  --   --  1.46*  --    .2*   < > 89.0* 92.7* 84.0*    < > = values in this interval not displayed.       Cultures: Blood cultures remain negative    Radiology:  US ABDOMEN COMPLETE WITH DOPPLER(CPT=76700/05488)  Result Date: 6/19/2025  CONCLUSION:  No evidence of significant liver pathology to explain patient's elevated liver enzymes.   LOCATION:  [facility]    Dictated by (CST): Ponce Toney MD on 6/19/2025 at 3:06 PM     Finalized by (CST): Ponce Toney MD on 6/19/2025 at 3:09 PM       XR CHEST AP PORTABLE  (CPT=71045)  Result Date: 6/19/2025  CONCLUSION:  No significant change since prior exam.   LOCATION:  [facility]      Dictated by (CST): Miguel Saavedra MD on 6/19/2025 at 6:27 AM     Finalized by (CST): Miguel Saavedra MD on 6/19/2025 at 6:28 AM       Images reviewed/pending      Medications reviewed        Assessment:  Acute hypoxic respiratory failure, improved now on RA  Acute pulmonary embolism positive V/Q-negative Dopplers-on heparin with plans to transition to orals after bronchoscopy  Hemoptysis subacute reports over 18 months--no obvious endobronchial lesion on CT----remains with hemoptysis despite Levaquin  Right lower lobe and bilateral upper lobe groundglass opacities-possibly fluid related-with plans to follow on completion of antibiotics  Small pulmonary nodules: CT chest 6/12/2025  Acute elevation LFTS -now off statins with plans to repeat-questionable related to acyclovir vs levaquin -improved off statin  History of smoking at least 26 pack years tobacco though quit in 1986  Coronary artery disease: Status post CABG and PCI in past  HFrEF last ejection fraction of 20 to 25%-agree with ongoing diuresis discussed with cardiology  Obstructive sleep apnea syndrome had been compliant for 2 years not using in  the recent past  LORENA/CKD improving with improved acidosis  Myelodysplastic syndrome: With pancytopenia as new diagnosis-18% blast genetic studies pending-Dr. Chirinos's note appreciated  Rheumatoid arthritis    Plan:  Monitor respiratory status, maintain sats >89%  Diuresis as per cardiology  Given persistent hemoptysis, will plan on bronchoscopy for airway evaluation of source of bleeding. Reviewed imaging findings and differential with the patient. We discussed next steps including bronchoscopy with airway evaluation, possible biopsies and possible APC. We discussed the procedure in detail as well as benefits and risks including but not limited to infection, bleeding, lung injury, respiratory failure and death. He is agreeable to proceed with the planned procedure.   Continue to hold heparin gtt  Await morning labs, if PLT <50k would tranfuse unit prior to bronchoscopy

## 2025-06-19 NOTE — PROGRESS NOTES
[facility]   part of [facility] Hospitalist Progress Note     David Reyes Patient Status:  Inpatient    1945 MRN [id number]   Location [facility] 8NE-A Attending Sukhdeep Bueno DO   Hosp Day # 7 PCP Thea Harris DO     Chief Complaint: Dyspnea     Subjective:     Nose bleed yesterday resolved. Fatigued. Hypotensive earlier    Objective:    Review of Systems:   A comprehensive review of systems was completed; pertinent positive and negatives stated in subjective.    Vital signs:  Temp:  [97.3 °F (36.3 °C)-98.3 °F (36.8 °C)] 97.7 °F (36.5 °C)  Pulse:  [70-87] 87  Resp:  [18-20] 20  BP: (81-96)/(45-77) 96/58  SpO2:  [93 %-100 %] 95 %    Physical Exam:    General: No acute distress  Respiratory: No wheezes, no rhonchi  Cardiovascular: S1, S2, regular rate and rhythm  Abdomen: Soft, Non-tender, non-distended, positive bowel sounds  Neuro: No new focal deficits.   Extremities: pitting edema       Diagnostic Data:    Labs:  Recent Labs   Lab 25  0351 25  0454 06/15/25  0436 25  0521 25  0620 25  0621 25  1656 25  0549   WBC 1.5*   < > 2.1* 1.6* 1.5*  --  1.6* 1.7*   HGB 7.4*   < > 8.0* 7.3* 7.6*  --  8.0* 7.9*   MCV 89.4   < > 88.8 92.1 87.5  --  88.1 88.7   .0*   < > 95.0* 74.0* 53.0*  --  52.0* 47.0*   BAND  --   --   --   --   --   --   --  3   INR 1.44*  --   --   --   --  1.46*  --   --     < > = values in this interval not displayed.       Recent Labs   Lab 25  0621 25  0620 25  0548   * 121* 144*   BUN 66* 66* 60*   CREATSERUM 2.47* 2.28* 2.29*   CA 8.8 8.5* 8.7   ALB 3.6 3.5 3.6   * 137 137   K 4.0 3.5  3.5 3.9   CL 97* 98 100   CO2 23.0 28.0 27.0   ALKPHO 123* 111 101   * 307* 211*   * 540* 436*   BILT 1.2* 1.0 1.0   TP 6.9 6.5 6.8       Estimated Glomerular Filtration Rate: 29 mL/min/1.73m2 (A) (result from lab).    No results for input(s): \"TROP\", \"TROPHS\", \"CK\" in the last 168  hours.      Recent Labs   Lab 06/13/25  0351 06/18/25  0621   PTP 17.6* 17.9*   INR 1.44* 1.46*                  Microbiology    Hospital Encounter on 06/12/25   1. Blood Culture     Status: None    Collection Time: 06/12/25  5:17 PM    Specimen: Blood,peripheral   Result Value Ref Range    Blood Culture Result No Growth 5 Days N/A         Imaging: Reviewed in Epic.    Medications: Scheduled Medications[1]    Assessment & Plan:      #MDS with pancytopenia   Transfuse for hb< 7-  1u PRBC on 6/14/25  Onc on CS   Started on palliative chemo  Counts dropping  Hold hep gtt 2/2 plts<50  Transfuse hgb <7    # Acute hypoxic resp failure due to pleural effusions, PNA and pulmonary embolus - improving  # hemoptysis  IV Abx   Hep drip on hold  Diuresis   Pulm - bronch tomorrow for hemoptysis  CXR reviewed indep. No change    # HFrEF, ICD- with acute exacerbation  As above  Cardiology on cs  I/O  Monitor Cr, e-  IV Lasix held for low BP    #hypotension 2/2 diuresis  -improving    #atelectasis  CXR reviewed indep.  IS    #transaminitis  -GI consult  -US neg  -improving, may be able to start posaconazole once recovers    #H/o CAD s/p CABG    #Acute PE-  Heparin drip - hold for plts <50,   Doppler LE NEG  - no IVC filter    #Hypertension    #HLD    #Hypothyroid  Levothyroxine     #FLAKO     Delta Washington MD    Supplementary Documentation:     Quality:  DVT Mechanical Prophylaxis:   SCDs, Early ambuation  DVT Pharmacologic Prophylaxis   Medication    [Held by provider] heparin (Porcine) 38341 units/250mL infusion PE/DVT/THROMBUS CONTINUOUS                Code Status: Full Code  Beltre: External urinary catheter in place  Beltre Duration (in days):   Central line:    GABE:     Discharge is dependent on: clinical   At this point Mr. Reyes is expected to be discharge to: tbd    The 21st Century Cures Act makes medical notes like these available to patients in the interest of transparency. Please be advised this is a medical document.  Medical documents are intended to carry relevant information, facts as evident, and the clinical opinion of the practitioner. The medical note is intended as peer to peer communication and may appear blunt or direct. It is written in medical language and may contain abbreviations or verbiage that are unfamiliar.              **Certification      PHYSICIAN Certification of Need for Inpatient Hospitalization - Initial Certification    Patient will require inpatient services that will reasonably be expected to span two midnight's based on the clinical documentation in H+P.   Based on patients current state of illness, I anticipate that, after discharge, patient will require TBD.         [1]    torsemide  40 mg Oral BID (Diuretic)    ondansetron  8 mg Intravenous Q24H    acyclovir  200 mg Oral BID    azaCITIDine  75 mg/m2 Subcutaneous Q24H    levoFLOXacin  750 mg Oral Q48HR @ 0700    [Held by provider] ezetimibe  10 mg Oral Daily    gabapentin  100 mg Oral Daily    levothyroxine  25 mcg Oral Before breakfast    [Held by provider] losartan  25 mg Oral Daily    metoprolol succinate ER  50 mg Oral 2x Daily(Beta Blocker)    [Held by provider] spironolactone  25 mg Oral Daily with breakfast    insulin aspart  1-10 Units Subcutaneous TID AC and HS    [Held by provider] Lovastatin  40 mg Oral BID

## 2025-06-19 NOTE — PROGRESS NOTES
Progress Note  David Reyes Patient Status:  Inpatient    1945 MRN [id number]   Location [facility] 8NE-A Attending Alejandra Mehta MD   Hosp Day # 7 PCP Thea Harris DO     Subjective:  He appears tired and falls asleep during our conversation. Hypotensive this morning. Warm distally. Leg swelling is much improved. He is breathing comfortably on room air.     Objective:  BP 91/62 (BP Location: Left arm)   Pulse 77   Temp 98.3 °F (36.8 °C) (Oral)   Resp 20   Wt 201 lb 1 oz (91.2 kg)   SpO2 93%   BMI 29.69 kg/m²     Intake/Output:    Intake/Output Summary (Last 24 hours) at 2025 0845  Last data filed at 2025 0747  Gross per 24 hour   Intake 1179.06 ml   Output 3700 ml   Net -2520.94 ml       Last 3 Weights   25 0449 201 lb 1 oz (91.2 kg)   25 0500 205 lb (93 kg)   25 0500 209 lb 10.5 oz (95.1 kg)   25 0545 218 lb (98.9 kg)   06/15/25 0508 218 lb 8 oz (99.1 kg)   25 0434 217 lb 13 oz (98.8 kg)   25 2258 213 lb (96.6 kg)   25 1935 215 lb (97.5 kg)   25 1506 217 lb (98.4 kg)   25 1102 219 lb 3.2 oz (99.4 kg)       Labs:  Recent Labs   Lab 25  0621 25  0620 25  0548   * 121* 144*   BUN 66* 66* 60*   CREATSERUM 2.47* 2.28* 2.29*   EGFRCR 26* 29* 29*   CA 8.8 8.5* 8.7   * 137 137   K 4.0 3.5  3.5 3.9   CL 97* 98 100   CO2 23.0 28.0 27.0     Recent Labs   Lab 25  0620 25  1656 25  0549   RBC 2.89* 3.02* 3.00*   HGB 7.6* 8.0* 7.9*   HCT 25.3* 26.6* 26.6*   MCV 87.5 88.1 88.7   MCH 26.3 26.5 26.3   MCHC 30.0* 30.1* 29.7*   RDW 19.4 19.5 19.9   NEPRELIM 0.41* 0.59* 0.59*   WBC 1.5* 1.6* 1.7*   PLT 53.0* 52.0* 47.0*         Recent Labs   Lab 25  1340   TROPHS 71*       Diagnostics:   Telemetry: AV paced. Frequent PVCs    Review of Systems   Cardiovascular:  Positive for leg swelling. Negative for chest pain.   Respiratory:  Negative for shortness of breath.        Physical  Exam:  General: Alert and oriented. Appears tired, falling asleep during exam.   HEENT: Pupils equal. Mucous membranes moist.   Neck: no JVD  Cardiac:  Normal S1 S2, Regular. 2/6 systolic  Lungs: Slightly diminished in bases. On room air.   Abdomen: Soft, non-tender, ND  Extremities: 1+ bilateral LE edema, non-pitting. Warm distal extremities  Neurologic: No focal deficits. Normal affect.  Skin: Warm and dry,    Medications:  Scheduled Medications[1]  Medication Infusions[2]    Assessment:    Acute hypoxic respiratory failure, resolved - Weaned to room air. Multifactorial with CHF, pneumonia, acute PE  Acute on chronic HFrEF, ischemic cardiomyopathy  Decompensated/hypervolemic on arrival. proBNP 64757  TTE 6/13 with LVEF 15-20%, mildly reduced RV function, mild-mod aortic stenosis, mild-mod MR, mild-mod TR  GDMT: Toprol-XL 50 mg BID. He has not been able to receive his home MRA/ARB due to relative hypotension. Not on ARNI due to cost. Check pricing of SGLT2.  Improved volume status with IV diuresis. Discontinue bumex gtt and transition to PO diuretics today.  Acute PE - Heparin gtt held due to plt< 50 k. Heme/onc following recommend apixaban 2.5 mg BID at time of discharge with recommendation to pause AC if plt drop below 50k. Interventional cardiology evaluated recommending medical management, poor candidate for invasive procedure with MDS  MDS with pancytopenia- Started on azacitidine chemotherapy. Required PRBC this admission.   LORENA on CKD3 - Nephrology following, creatinine baseline 1.9-2.1. Creatinine improving with diuresis. Cardiorenal syndrome.   Pneumonia - Pulm following, antibiotics  [employer] CRT-D- Device interrogated with new drop in LV pacing percentage from upper 90s-100 to 76%. Frequent PVCs. No AT/AF. Continue Toprol-XL. Consider amiodarone once LFTs normalize, EP to review.  CAD s/p CABG LIMA-LAD, prior PCI RCA and Lcx 2015- Denies angina. Continue BB. Statin held with  transaminitis.  Moderate pulmonary hypertension  Transaminitis - Unlikely congestive related as LFT elevation not present on admission. Down-trending. Liver US, primary/GI evaluating. Statin held  Rheumatoid arthritis     Plan:    Relative hypotension this morning. Holding BB until this improves, closely monitor.   Volume status improved after IV diuresis. Appears relatively euvolemic at this time. He is now back on room air with resolved hypoxia. Discontinue bumex gtt. Renal function improving with diuresis in setting of cardiorenal syndrome and nearing baseline. If blood pressure remains stable this morning will then start PO torsemide 40 mg BID if ok with nephrology.  Check pricing of SGLT2 and consider starting medication if ok with nephrology. Resume home ARB/MRA as BP tolerates.  Heparin gtt is paused with plt<50k with MDS. Heme/onc following.   Interrogate [employer] CRT-D device with drop in LV pacing percentage, suspect due to frequent PVCs in setting of reduced LVEF exacerbated by anemia. BB as BP tolerates. Consider amiodarone once LFTs normalize. EP to review interrogation today.    Plan of care discussed with patient, RN.    SANTA Tanner  6/19/2025  7:52 AM      I have personally performed the medical decision making in its entirety. My additions include:    - LFT's and renal function improving with diuresis. Transition to oral today  - Decrease in BIV pacing over the last month, likely due to PVC's. Holding BB due to hypotension. No antiarrhythmics due to liver and kidney dysfunction  - Will increase pacing rate to 80 to suppress PVC's  - GDMT as above    Aram Gilmore MD  Cardiac Electrophysiology  [facility]         [1]    ondansetron  8 mg Intravenous Q24H    acyclovir  200 mg Oral BID    azaCITIDine  75 mg/m2 Subcutaneous Q24H    levoFLOXacin  750 mg Oral Q48HR @ 0700    [Held by provider] ezetimibe  10 mg Oral Daily    gabapentin  100 mg Oral Daily    levothyroxine  25  mcg Oral Before breakfast    [Held by provider] losartan  25 mg Oral Daily    metoprolol succinate ER  50 mg Oral 2x Daily(Beta Blocker)    [Held by provider] spironolactone  25 mg Oral Daily with breakfast    insulin aspart  1-10 Units Subcutaneous TID AC and HS    [Held by provider] Lovastatin  40 mg Oral BID   [2]    bumetanide (Bumex) 12.5 mg in 50 mL infusion 0.5 mg/hr (06/19/25 0645)    [Held by provider] continuous dose heparin Stopped (06/19/25 0746)

## 2025-06-19 NOTE — PLAN OF CARE
Shift Note:  Patient lethargic today, drifts off during conversation, oriented x3.   Patient on room air, denies difficulty breathing, lung sounds diminished.   Denies any cardiac symptoms, Vpaced on tele. Pain to lower legs, tylenol for relief.   Continent of bowel and bladder, last BM 6/17.   Ambulates with 2 assist and a walker, call light within reach, tolerating care well.     POC:  - Hep gtts and Bumex gtts stopped today, reassess labs and BP for resumption   - Daily weight   - Daily Chemo   - US Abdomen, NPO today   - Pending sputum sample         Problem: Diabetes/Glucose Control  Goal: Glucose maintained within prescribed range  Description: INTERVENTIONS:  - Monitor Blood Glucose as ordered  - Assess for signs and symptoms of hyperglycemia and hypoglycemia  - Administer ordered medications to maintain glucose within target range  - Assess barriers to adequate nutritional intake and initiate nutrition consult as needed  - Instruct patient on self management of diabetes  Outcome: Progressing     Problem: Patient/Family Goals  Goal: Patient/Family Long Term Goal  Description: Patient's Long Term Goal:   To return home asap    Interventions:  - Cardiology consult  2 D echo with doppler  - See additional Care Plan goals for specific interventions  Outcome: Progressing  Goal: Patient/Family Short Term Goal  Description: Patient's Short Term Goal:  t get rid of shortness of breath    Interventions:   - medication adjustment      Supplemental oxygen as needed  - See additional Care Plan goals for specific interventions  Outcome: Progressing     Problem: PAIN - ADULT  Goal: Verbalizes/displays adequate comfort level or patient's stated pain goal  Description: INTERVENTIONS:  - Encourage pt to monitor pain and request assistance  - Assess pain using appropriate pain scale  - Administer analgesics based on type and severity of pain and evaluate response  - Implement non-pharmacological measures as appropriate and  evaluate response  - Consider cultural and social influences on pain and pain management  - Manage/alleviate anxiety  - Utilize distraction and/or relaxation techniques  - Monitor for opioid side effects  - Notify MD/LIP if interventions unsuccessful or patient reports new pain  - Anticipate increased pain with activity and pre-medicate as appropriate  Outcome: Progressing     Problem: SAFETY ADULT - FALL  Goal: Free from fall injury  Description: INTERVENTIONS:  - Assess pt frequently for physical needs  - Identify cognitive and physical deficits and behaviors that affect risk of falls.  - [place] fall precautions as indicated by assessment.  - Educate pt/family on patient safety including physical limitations  - Instruct pt to call for assistance with activity based on assessment  - Modify environment to reduce risk of injury  - Provide assistive devices as appropriate  - Consider OT/PT consult to assist with strengthening/mobility  - Encourage toileting schedule  Outcome: Progressing     Problem: CARDIOVASCULAR - ADULT  Goal: Maintains optimal cardiac output and hemodynamic stability  Description: INTERVENTIONS:  - Monitor vital signs, rhythm, and trends  - Monitor for bleeding, hypotension and signs of decreased cardiac output  - Evaluate effectiveness of vasoactive medications to optimize hemodynamic stability  - Monitor arterial and/or venous puncture sites for bleeding and/or hematoma  - Assess quality of pulses, skin color and temperature  - Assess for signs of decreased coronary artery perfusion - ex. Angina  - Evaluate fluid balance, assess for edema, trend weights  Outcome: Progressing  Goal: Absence of cardiac arrhythmias or at baseline  Description: INTERVENTIONS:  - Continuous cardiac monitoring, monitor vital signs, obtain 12 lead EKG if indicated  - Evaluate effectiveness of antiarrhythmic and heart rate control medications as ordered  - Initiate emergency measures for life threatening  arrhythmias  - Monitor electrolytes and administer replacement therapy as ordered  Outcome: Progressing     Problem: RESPIRATORY - ADULT  Goal: Achieves optimal ventilation and oxygenation  Description: INTERVENTIONS:  - Assess for changes in respiratory status  - Assess for changes in mentation and behavior  - Position to facilitate oxygenation and minimize respiratory effort  - Oxygen supplementation based on oxygen saturation or ABGs  - Provide Smoking Cessation handout, if applicable  - Encourage broncho-pulmonary hygiene including cough, deep breathe, Incentive Spirometry  - Assess the need for suctioning and perform as needed  - Assess and instruct to report SOB or any respiratory difficulty  - Respiratory Therapy support as indicated  - Manage/alleviate anxiety  - Monitor for signs/symptoms of CO2 retention  Outcome: Progressing     Problem: METABOLIC/FLUID AND ELECTROLYTES - ADULT  Goal: Glucose maintained within prescribed range  Description: INTERVENTIONS:  - Monitor Blood Glucose as ordered  - Assess for signs and symptoms of hyperglycemia and hypoglycemia  - Administer ordered medications to maintain glucose within target range  - Assess barriers to adequate nutritional intake and initiate nutrition consult as needed  - Instruct patient on self management of diabetes  Outcome: Progressing  Goal: Electrolytes maintained within normal limits  Description: INTERVENTIONS:  - Monitor labs and rhythm and assess patient for signs and symptoms of electrolyte imbalances  - Administer electrolyte replacement as ordered  - Monitor response to electrolyte replacements, including rhythm and repeat lab results as appropriate  - Fluid restriction as ordered  - Instruct patient on fluid and nutrition restrictions as appropriate  Outcome: Progressing     Problem: HEMATOLOGIC - ADULT  Goal: Free from bleeding injury  Description: (Example usage: patient with low platelets)  INTERVENTIONS:  - Avoid intramuscular  injections, enemas and rectal medication administration  - Ensure safe mobilization of patient  - Hold pressure on venipuncture sites to achieve adequate hemostasis  - Assess for signs and symptoms of internal bleeding  - Monitor lab trends  - Patient is to report abnormal signs of bleeding to staff  - Avoid use of toothpicks and dental floss  - Use electric shaver for shaving  - Use soft bristle tooth brush  - Limit straining and forceful nose blowing  Outcome: Progressing

## 2025-06-20 ENCOUNTER — ANESTHESIA (OUTPATIENT)
Dept: ENDOSCOPY | Facility: HOSPITAL | Age: 80
End: 2025-06-20
Payer: MEDICARE

## 2025-06-20 LAB
ALBUMIN SERPL-MCNC: 3.6 G/DL (ref 3.2–4.8)
ALP LIVER SERPL-CCNC: 96 U/L (ref 45–117)
ALT SERPL-CCNC: 332 U/L (ref 10–49)
ANION GAP SERPL CALC-SCNC: 9 MMOL/L (ref 0–18)
ANTIBODY SCREEN: NEGATIVE
APTT PPP: 93.3 SECONDS (ref 23–36)
AST SERPL-CCNC: 153 U/L (ref ?–34)
BASOPHILS # BLD AUTO: 0.01 X10(3) UL (ref 0–0.2)
BASOPHILS # BLD: 0 X10(3) UL (ref 0–0.2)
BASOPHILS NFR BLD AUTO: 0.6 %
BASOPHILS NFR BLD: 0 %
BASOPHILS NFR BRONCH: 0 %
BILIRUB DIRECT SERPL-MCNC: 0.4 MG/DL (ref ?–0.3)
BILIRUB SERPL-MCNC: 1 MG/DL (ref 0.2–1.1)
BUN BLD-MCNC: 57 MG/DL (ref 9–23)
CALCIUM BLD-MCNC: 9 MG/DL (ref 8.7–10.6)
CHLORIDE SERPL-SCNC: 100 MMOL/L (ref 98–112)
CO2 SERPL-SCNC: 29 MMOL/L (ref 21–32)
CREAT BLD-MCNC: 2.11 MG/DL (ref 0.7–1.3)
EGFRCR SERPLBLD CKD-EPI 2021: 32 ML/MIN/1.73M2 (ref 60–?)
EOSINOPHIL # BLD AUTO: 0 X10(3) UL (ref 0–0.7)
EOSINOPHIL # BLD: 0 X10(3) UL (ref 0–0.7)
EOSINOPHIL NFR BLD AUTO: 0 %
EOSINOPHIL NFR BLD: 0 %
EOSINOPHIL NFR BRONCH: 0 %
ERYTHROCYTE [DISTWIDTH] IN BLOOD BY AUTOMATED COUNT: 20.2 %
ERYTHROCYTE [DISTWIDTH] IN BLOOD BY AUTOMATED COUNT: 20.3 %
GLUCOSE BLD-MCNC: 138 MG/DL (ref 70–99)
GLUCOSE BLD-MCNC: 157 MG/DL (ref 70–99)
GLUCOSE BLD-MCNC: 168 MG/DL (ref 70–99)
GLUCOSE BLD-MCNC: 170 MG/DL (ref 70–99)
GLUCOSE BLD-MCNC: 170 MG/DL (ref 70–99)
GLUCOSE BLD-MCNC: 179 MG/DL (ref 70–99)
HCT VFR BLD AUTO: 25.2 % (ref 39–53)
HCT VFR BLD AUTO: 28.3 % (ref 39–53)
HGB BLD-MCNC: 7.5 G/DL (ref 13–17.5)
HGB BLD-MCNC: 8.3 G/DL (ref 13–17.5)
IMM GRANULOCYTES # BLD AUTO: 0.03 X10(3) UL (ref 0–1)
IMM GRANULOCYTES NFR BLD: 1.9 %
INR BLD: 1.25 (ref 0.8–1.2)
LYMPHOCYTES # BLD AUTO: 0.9 X10(3) UL (ref 1–4)
LYMPHOCYTES NFR BLD AUTO: 57.3 %
LYMPHOCYTES NFR BLD: 0.74 X10(3) UL (ref 1–4)
LYMPHOCYTES NFR BLD: 62 %
LYMPHOCYTES NFR BRONCH: 57 %
MAGNESIUM SERPL-MCNC: 2.5 MG/DL (ref 1.6–2.6)
MCH RBC QN AUTO: 26.3 PG (ref 26–34)
MCH RBC QN AUTO: 26.6 PG (ref 26–34)
MCHC RBC AUTO-ENTMCNC: 29.3 G/DL (ref 31–37)
MCHC RBC AUTO-ENTMCNC: 29.8 G/DL (ref 31–37)
MCV RBC AUTO: 89.4 FL (ref 80–100)
MCV RBC AUTO: 89.8 FL (ref 80–100)
MONOCYTES # BLD AUTO: 0.09 X10(3) UL (ref 0.1–1)
MONOCYTES # BLD: 0.01 X10(3) UL (ref 0.1–1)
MONOCYTES NFR BLD AUTO: 5.7 %
MONOCYTES NFR BLD: 1 %
MONOS+MACROS NFR BRONCH: 21 %
NEUTROPHILS # BLD AUTO: 0.44 X10 (3) UL (ref 1.5–7.7)
NEUTROPHILS # BLD AUTO: 0.54 X10 (3) UL (ref 1.5–7.7)
NEUTROPHILS # BLD AUTO: 0.54 X10(3) UL (ref 1.5–7.7)
NEUTROPHILS NFR BLD AUTO: 34.5 %
NEUTROPHILS NFR BLD: 37 %
NEUTROPHILS NFR BRONCH: 22 %
NEUTS HYPERSEG # BLD: 0.44 X10(3) UL (ref 1.5–7.7)
NRBC BLD MANUAL-RTO: 13 % (ref ?–1)
OSMOLALITY SERPL CALC.SUM OF ELEC: 305 MOSM/KG (ref 275–295)
PLATELET # BLD AUTO: 47 10(3)UL (ref 150–450)
PLATELET # BLD AUTO: 63 10(3)UL (ref 150–450)
PLATELET MORPHOLOGY: NORMAL
PLATELETS.RETICULATED NFR BLD AUTO: 10.3 % (ref 0–7)
PLATELETS.RETICULATED NFR BLD AUTO: 8.1 % (ref 0–7)
POTASSIUM SERPL-SCNC: 4.2 MMOL/L (ref 3.5–5.1)
PROT SERPL-MCNC: 6.9 G/DL (ref 5.7–8.2)
PROTHROMBIN TIME: 15.8 SECONDS (ref 11.6–14.8)
RBC # BLD AUTO: 2.82 X10(6)UL (ref 3.8–5.8)
RBC # BLD AUTO: 3.15 X10(6)UL (ref 3.8–5.8)
RH BLOOD TYPE: POSITIVE
SODIUM SERPL-SCNC: 138 MMOL/L (ref 136–145)
TOTAL CELLS COUNTED BLD: 100
TOTAL CELLS COUNTED FLD: 100
WBC # BLD AUTO: 1.2 X10(3) UL (ref 4–11)
WBC # BLD AUTO: 1.6 X10(3) UL (ref 4–11)

## 2025-06-20 PROCEDURE — 0B958ZZ DRAINAGE OF RIGHT MIDDLE LOBE BRONCHUS, VIA NATURAL OR ARTIFICIAL OPENING ENDOSCOPIC: ICD-10-PCS | Performed by: INTERNAL MEDICINE

## 2025-06-20 PROCEDURE — 0B988ZZ DRAINAGE OF LEFT UPPER LOBE BRONCHUS, VIA NATURAL OR ARTIFICIAL OPENING ENDOSCOPIC: ICD-10-PCS | Performed by: INTERNAL MEDICINE

## 2025-06-20 PROCEDURE — 30233R1 TRANSFUSION OF NONAUTOLOGOUS PLATELETS INTO PERIPHERAL VEIN, PERCUTANEOUS APPROACH: ICD-10-PCS | Performed by: HOSPITALIST

## 2025-06-20 PROCEDURE — 0B918ZZ DRAINAGE OF TRACHEA, VIA NATURAL OR ARTIFICIAL OPENING ENDOSCOPIC: ICD-10-PCS | Performed by: INTERNAL MEDICINE

## 2025-06-20 PROCEDURE — 0B968ZZ DRAINAGE OF RIGHT LOWER LOBE BRONCHUS, VIA NATURAL OR ARTIFICIAL OPENING ENDOSCOPIC: ICD-10-PCS | Performed by: INTERNAL MEDICINE

## 2025-06-20 PROCEDURE — 31645 BRNCHSC W/THER ASPIR 1ST: CPT | Performed by: INTERNAL MEDICINE

## 2025-06-20 PROCEDURE — 36430 TRANSFUSION BLD/BLD COMPNT: CPT | Performed by: ANESTHESIOLOGY

## 2025-06-20 PROCEDURE — 0B978ZZ DRAINAGE OF LEFT MAIN BRONCHUS, VIA NATURAL OR ARTIFICIAL OPENING ENDOSCOPIC: ICD-10-PCS | Performed by: INTERNAL MEDICINE

## 2025-06-20 PROCEDURE — 99233 SBSQ HOSP IP/OBS HIGH 50: CPT | Performed by: INTERNAL MEDICINE

## 2025-06-20 PROCEDURE — 99233 SBSQ HOSP IP/OBS HIGH 50: CPT | Performed by: HOSPITALIST

## 2025-06-20 PROCEDURE — 0B938ZZ DRAINAGE OF RIGHT MAIN BRONCHUS, VIA NATURAL OR ARTIFICIAL OPENING ENDOSCOPIC: ICD-10-PCS | Performed by: INTERNAL MEDICINE

## 2025-06-20 PROCEDURE — 31624 DX BRONCHOSCOPE/LAVAGE: CPT | Performed by: INTERNAL MEDICINE

## 2025-06-20 PROCEDURE — 0B9B8ZZ DRAINAGE OF LEFT LOWER LOBE BRONCHUS, VIA NATURAL OR ARTIFICIAL OPENING ENDOSCOPIC: ICD-10-PCS | Performed by: INTERNAL MEDICINE

## 2025-06-20 PROCEDURE — 0B9G8ZX DRAINAGE OF LEFT UPPER LUNG LOBE, VIA NATURAL OR ARTIFICIAL OPENING ENDOSCOPIC, DIAGNOSTIC: ICD-10-PCS | Performed by: INTERNAL MEDICINE

## 2025-06-20 PROCEDURE — 99232 SBSQ HOSP IP/OBS MODERATE 35: CPT | Performed by: INTERNAL MEDICINE

## 2025-06-20 RX ORDER — ALBUTEROL SULFATE 0.83 MG/ML
2.5 SOLUTION RESPIRATORY (INHALATION) AS NEEDED
Status: DISCONTINUED | OUTPATIENT
Start: 2025-06-20 | End: 2025-06-20 | Stop reason: HOSPADM

## 2025-06-20 RX ORDER — HYDROMORPHONE HYDROCHLORIDE 1 MG/ML
0.4 INJECTION, SOLUTION INTRAMUSCULAR; INTRAVENOUS; SUBCUTANEOUS EVERY 5 MIN PRN
Status: DISCONTINUED | OUTPATIENT
Start: 2025-06-20 | End: 2025-06-20 | Stop reason: HOSPADM

## 2025-06-20 RX ORDER — PHENYLEPHRINE HCL 10 MG/ML
VIAL (ML) INJECTION AS NEEDED
Status: DISCONTINUED | OUTPATIENT
Start: 2025-06-20 | End: 2025-06-20 | Stop reason: SURG

## 2025-06-20 RX ORDER — ONDANSETRON 2 MG/ML
4 INJECTION INTRAMUSCULAR; INTRAVENOUS EVERY 6 HOURS PRN
Status: DISCONTINUED | OUTPATIENT
Start: 2025-06-20 | End: 2025-06-20 | Stop reason: HOSPADM

## 2025-06-20 RX ORDER — NICOTINE POLACRILEX 4 MG
15 LOZENGE BUCCAL
Status: DISCONTINUED | OUTPATIENT
Start: 2025-06-20 | End: 2025-06-20 | Stop reason: HOSPADM

## 2025-06-20 RX ORDER — MAGNESIUM HYDROXIDE/ALUMINUM HYDROXICE/SIMETHICONE 120; 1200; 1200 MG/30ML; MG/30ML; MG/30ML
30 SUSPENSION ORAL 4 TIMES DAILY PRN
Status: DISCONTINUED | OUTPATIENT
Start: 2025-06-20 | End: 2025-06-24

## 2025-06-20 RX ORDER — NICOTINE POLACRILEX 4 MG
30 LOZENGE BUCCAL
Status: DISCONTINUED | OUTPATIENT
Start: 2025-06-20 | End: 2025-06-20 | Stop reason: HOSPADM

## 2025-06-20 RX ORDER — ACETAMINOPHEN 500 MG
1000 TABLET ORAL ONCE AS NEEDED
Status: DISCONTINUED | OUTPATIENT
Start: 2025-06-20 | End: 2025-06-20 | Stop reason: HOSPADM

## 2025-06-20 RX ORDER — HYDROCODONE BITARTRATE AND ACETAMINOPHEN 5; 325 MG/1; MG/1
2 TABLET ORAL ONCE AS NEEDED
Status: DISCONTINUED | OUTPATIENT
Start: 2025-06-20 | End: 2025-06-20 | Stop reason: HOSPADM

## 2025-06-20 RX ORDER — SODIUM CHLORIDE, SODIUM LACTATE, POTASSIUM CHLORIDE, CALCIUM CHLORIDE 600; 310; 30; 20 MG/100ML; MG/100ML; MG/100ML; MG/100ML
INJECTION, SOLUTION INTRAVENOUS CONTINUOUS PRN
Status: DISCONTINUED | OUTPATIENT
Start: 2025-06-20 | End: 2025-06-20 | Stop reason: SURG

## 2025-06-20 RX ORDER — METOCLOPRAMIDE HYDROCHLORIDE 5 MG/ML
5 INJECTION INTRAMUSCULAR; INTRAVENOUS EVERY 8 HOURS PRN
Status: DISCONTINUED | OUTPATIENT
Start: 2025-06-20 | End: 2025-06-20 | Stop reason: HOSPADM

## 2025-06-20 RX ORDER — ROCURONIUM BROMIDE 10 MG/ML
INJECTION, SOLUTION INTRAVENOUS AS NEEDED
Status: DISCONTINUED | OUTPATIENT
Start: 2025-06-20 | End: 2025-06-20 | Stop reason: SURG

## 2025-06-20 RX ORDER — SODIUM CHLORIDE 9 MG/ML
INJECTION, SOLUTION INTRAVENOUS ONCE
Status: COMPLETED | OUTPATIENT
Start: 2025-06-20 | End: 2025-06-20

## 2025-06-20 RX ORDER — HYDROMORPHONE HYDROCHLORIDE 1 MG/ML
0.2 INJECTION, SOLUTION INTRAMUSCULAR; INTRAVENOUS; SUBCUTANEOUS EVERY 5 MIN PRN
Status: DISCONTINUED | OUTPATIENT
Start: 2025-06-20 | End: 2025-06-20 | Stop reason: HOSPADM

## 2025-06-20 RX ORDER — SODIUM CHLORIDE, SODIUM LACTATE, POTASSIUM CHLORIDE, CALCIUM CHLORIDE 600; 310; 30; 20 MG/100ML; MG/100ML; MG/100ML; MG/100ML
INJECTION, SOLUTION INTRAVENOUS CONTINUOUS
Status: DISCONTINUED | OUTPATIENT
Start: 2025-06-20 | End: 2025-06-20 | Stop reason: HOSPADM

## 2025-06-20 RX ORDER — HYDROMORPHONE HYDROCHLORIDE 1 MG/ML
0.6 INJECTION, SOLUTION INTRAMUSCULAR; INTRAVENOUS; SUBCUTANEOUS EVERY 5 MIN PRN
Status: DISCONTINUED | OUTPATIENT
Start: 2025-06-20 | End: 2025-06-20 | Stop reason: HOSPADM

## 2025-06-20 RX ORDER — NALOXONE HYDROCHLORIDE 0.4 MG/ML
0.08 INJECTION, SOLUTION INTRAMUSCULAR; INTRAVENOUS; SUBCUTANEOUS AS NEEDED
Status: DISCONTINUED | OUTPATIENT
Start: 2025-06-20 | End: 2025-06-20 | Stop reason: HOSPADM

## 2025-06-20 RX ORDER — HYDROCODONE BITARTRATE AND ACETAMINOPHEN 5; 325 MG/1; MG/1
1 TABLET ORAL ONCE AS NEEDED
Status: DISCONTINUED | OUTPATIENT
Start: 2025-06-20 | End: 2025-06-20 | Stop reason: HOSPADM

## 2025-06-20 RX ORDER — DEXTROSE MONOHYDRATE 25 G/50ML
50 INJECTION, SOLUTION INTRAVENOUS
Status: DISCONTINUED | OUTPATIENT
Start: 2025-06-20 | End: 2025-06-20 | Stop reason: HOSPADM

## 2025-06-20 RX ORDER — LIDOCAINE HYDROCHLORIDE 10 MG/ML
INJECTION, SOLUTION EPIDURAL; INFILTRATION; INTRACAUDAL; PERINEURAL AS NEEDED
Status: DISCONTINUED | OUTPATIENT
Start: 2025-06-20 | End: 2025-06-20 | Stop reason: SURG

## 2025-06-20 RX ADMIN — LIDOCAINE HYDROCHLORIDE 25 MG: 10 INJECTION, SOLUTION EPIDURAL; INFILTRATION; INTRACAUDAL; PERINEURAL at 10:45:00

## 2025-06-20 RX ADMIN — PHENYLEPHRINE HCL 100 MCG: 10 MG/ML VIAL (ML) INJECTION at 10:50:00

## 2025-06-20 RX ADMIN — ROCURONIUM BROMIDE 50 MG: 10 INJECTION, SOLUTION INTRAVENOUS at 10:46:00

## 2025-06-20 RX ADMIN — SODIUM CHLORIDE, SODIUM LACTATE, POTASSIUM CHLORIDE, CALCIUM CHLORIDE: 600; 310; 30; 20 INJECTION, SOLUTION INTRAVENOUS at 10:50:00

## 2025-06-20 RX ADMIN — ONDANSETRON 4 MG: 2 INJECTION INTRAMUSCULAR; INTRAVENOUS at 10:56:00

## 2025-06-20 NOTE — PROGRESS NOTES
[place] PROGRESS NOTE      David Reyes Patient Status:  Inpatient    1945 MRN [id number]   Location [facility] Attending Delta Washington MD   Hosp Day # 8 PCP Thea Harris DO       Subjective:  Drowsy/tired today    ROS:  No abdominal pain, no cough, no fevers, chills, denies musculoskeletal pain.     Objective:  /61   Pulse 73   Temp 97.3 °F (36.3 °C) (Oral)   Resp 18   Wt 204 lb 9.6 oz (92.8 kg)   SpO2 100%   BMI 30.21 kg/m²     Temp (24hrs), Av.8 °F (36.6 °C), Min:97.3 °F (36.3 °C), Max:98.3 °F (36.8 °C)      Intake/Output:    Intake/Output Summary (Last 24 hours) at 2025 1057  Last data filed at 2025 0757  Gross per 24 hour   Intake 540 ml   Output 2000 ml   Net -1460 ml       Wt Readings from Last 2 Encounters:   25 204 lb 9.6 oz (92.8 kg)   25 217 lb (98.4 kg)       Physical Exam:    Gen: Drowsy  HEENT: JVP not distended  Lungs: CTAB. No wheezes or crackles  Cardiac: RRR, normal S1/S2,  Abd: soft, NT, ND  Ext: Extremities warm and well perfused. 1+ LE edema  Neuro: No focal deficits    Labs:  Lab Results   Component Value Date    WBC 1.6 2025    HGB 8.3 2025    HCT 28.3 2025    PLT 47.0 2025     Lab Results   Component Value Date    PT 17.1 (H) 2014    INR 1.25 (H) 2025     Lab Results   Component Value Date     2025    K 4.2 2025     2025    CO2 29.0 2025    BUN 57 2025    CREATSERUM 2.11 2025     2025    MG 2.5 2025    CA 9.0 2025     2025     2025    ALB 3.6 2025        Lab Results   Component Value Date    TROP <0.046 2014        Medications:    Scheduled Medications[1]  Medication Infusions[2]      Assessment/Plan   Acute hypoxic respiratory failure, resolved - Weaned to room air. Multifactorial with CHF, pneumonia, acute PE  Acute on chronic HFrEF,  ischemic cardiomyopathy  Decompensated/hypervolemic on arrival. proBNP 68127  TTE 6/13 with LVEF 15-20%, mildly reduced RV function, mild-mod aortic stenosis, mild-mod MR, mild-mod TR  GDMT: Toprol-XL 50 mg BID. He has not been able to receive his home MRA/ARB due to relative hypotension. Not on ARNI due to cost. Check pricing of SGLT2.  Improved volume status with diuresis. Continue torsemide  Acute PE - Heparin gtt held due to plt< 50 k. Heme/onc following recommend apixaban 2.5 mg BID at time of discharge with recommendation to pause AC if plt drop below 50k. Interventional cardiology evaluated recommending medical management, poor candidate for invasive procedure with MDS  MDS with pancytopenia- Started on azacitidine chemotherapy. Required PRBC this admission.   LORENA on CKD3 - Nephrology following, creatinine baseline 1.9-2.1. Creatinine improving with diuresis. Cardiorenal syndrome.   Pneumonia - Pulm following, antibiotics  [employer] CRT-D- Device interrogated with new drop in LV pacing percentage from upper 90s-100 to 76%. Frequent PVCs. No AT/AF. Continue Toprol-XL. Consider amiodarone once LFTs normalize, EP to review.  CAD s/p CABG LIMA-LAD, prior PCI RCA and Lcx 2015- Denies angina. Continue BB. Statin held with transaminitis.  Moderate pulmonary hypertension  Transaminitis - Unlikely congestive related as LFT elevation not present on admission. Down-trending. Liver US, primary/GI evaluating. Statin held  Rheumatoid arthritis     Plan:  - Considering increasing LRL on device to suppress PVC's. However LFT's nearly normalized and can likely start amio in the next 1-2 days.   - Bronchoscopy today for anemia with hemoptysis         [1]    torsemide  40 mg Oral BID (Diuretic)    ondansetron  8 mg Intravenous Q24H    acyclovir  200 mg Oral BID    azaCITIDine  75 mg/m2 Subcutaneous Q24H    levoFLOXacin  750 mg Oral Q48HR @ 0700    [Held by provider] ezetimibe  10 mg Oral Daily    gabapentin  100 mg Oral  Daily    levothyroxine  25 mcg Oral Before breakfast    [Held by provider] losartan  25 mg Oral Daily    metoprolol succinate ER  50 mg Oral 2x Daily(Beta Blocker)    [Held by provider] spironolactone  25 mg Oral Daily with breakfast    insulin aspart  1-10 Units Subcutaneous TID AC and HS    [Held by provider] Lovastatin  40 mg Oral BID   [2]    [Held by provider] continuous dose heparin Stopped (06/19/25 0746)

## 2025-06-20 NOTE — CM/SW NOTE
LOIS met with pt at bedside to discuss discharge planning. Discussed rehab. Pt interested in rehab, states that he wants to be able to walk again. Pt has been to [facility] and [facility] in the past. Pt does not want to return to [facility]. Pt interested in possibly [facility] for location purposes and [facility]. SW sent referral to [facility]. SW also provided pt with the initial available [facility] choices. Await response from [facility].     Per Oncology, pt's chemo injection will end on 6/23 and then next cycle will not be due until 7/15. Pt can admit to [facility] until then.     Zofia HOLCOMB MSW, LCSW  Discharge Planner

## 2025-06-20 NOTE — PROGRESS NOTES
[facility]   part of [facility]     Hospitalist Progress Note     David Reyes Patient Status:  Inpatient    1945 MRN [id number]   Location [facility] 8NE-A Attending Sukhdeep Bueno DO   Hosp Day # 8 PCP Thea Harris DO     Chief Complaint: Dyspnea     Subjective:     Fatigued. No bleeding now    Objective:    Review of Systems:   A comprehensive review of systems was completed; pertinent positive and negatives stated in subjective.    Vital signs:  Temp:  [97.3 °F (36.3 °C)-98.9 °F (37.2 °C)] 97.5 °F (36.4 °C)  Pulse:  [73-89] 81  Resp:  [12-25] 19  BP: ()/(50-83) 103/65  SpO2:  [92 %-100 %] 97 %    Physical Exam:    General: No acute distress  Respiratory: No wheezes, no rhonchi  Cardiovascular: S1, S2, regular rate and rhythm  Abdomen: Soft, Non-tender, non-distended, positive bowel sounds  Neuro: No new focal deficits.   Extremities: pitting edema       Diagnostic Data:    Labs:  Recent Labs   Lab 25  0620 25  0621 25  1656 25  0549 25  0442 25  1432   WBC 1.5*  --  1.6* 1.7* 1.6* 1.2*   HGB 7.6*  --  8.0* 7.9* 8.3* 7.5*   MCV 87.5  --  88.1 88.7 89.8 89.4   PLT 53.0*  --  52.0* 47.0* 47.0* 63.0*   BAND  --   --   --  3  --   --    INR  --  1.46*  --   --  1.25*  --        Recent Labs   Lab 25  0620 25  0548 25  0442   * 144* 157*   BUN 66* 60* 57*   CREATSERUM 2.28* 2.29* 2.11*   CA 8.5* 8.7 9.0   ALB 3.5 3.6 3.6    137 138   K 3.5  3.5 3.9 4.2   CL 98 100 100   CO2 28.0 27.0 29.0   ALKPHO 111 101 96   * 211* 153*   * 436* 332*   BILT 1.0 1.0 1.0   TP 6.5 6.8 6.9       Estimated Glomerular Filtration Rate: 32 mL/min/1.73m2 (A) (result from lab).    No results for input(s): \"TROP\", \"TROPHS\", \"CK\" in the last 168 hours.      Recent Labs   Lab 25  0621 25  0442   PTP 17.9* 15.8*   INR 1.46* 1.25*                  Microbiology    Hospital Encounter on 25   1. Blood Culture      Status: None    Collection Time: 06/12/25  5:17 PM    Specimen: Blood,peripheral   Result Value Ref Range    Blood Culture Result No Growth 5 Days N/A         Imaging: Reviewed in Epic.    Medications: Scheduled Medications[1]    Assessment & Plan:      #MDS with pancytopenia   Transfuse for hb< 7-  1u PRBC on 6/14/25  Onc on CS   cont palliative chemo  Hold hep gtt 2/2 plts<50. Given 1 U plt and resume hep gtt after  Transfuse hgb <7    # Acute hypoxic resp failure due to pleural effusions, PNA and pulmonary embolus - improving  # hemoptysis 2/2 thrombocytopenia  IV Abx   Hep drip as above  Diuresis   Pulm - sp bronch for hemoptysis - old blood, nothing active. D/w pulm   CXR reviewed indep. No change    # HFrEF, ICD- with acute exacerbation  As above  Cardiology on cs  I/O  Monitor Cr, e-  IV Lasix held for low BP    #hypotension 2/2 diuresis  -improving    #atelectasis  CXR reviewed indep.  IS    #transaminitis  -GI consult  -US neg  -improving, may be able to start posaconazole once recovers    #H/o CAD s/p CABG    #Acute PE-  Heparin drip - as above  Doppler LE NEG  - no IVC filter    #Hypertension    #HLD    #Hypothyroid  Levothyroxine     #FLAKO     Delta Washington MD    Supplementary Documentation:     Quality:  DVT Mechanical Prophylaxis:   SCDs, Early ambuation  DVT Pharmacologic Prophylaxis   Medication    heparin (Porcine) 66247 units/250mL infusion PE/DVT/THROMBUS CONTINUOUS                Code Status: Full Code  Beltre: External urinary catheter in place  Beltre Duration (in days):   Central line:    GABE: 6/23/2025    Discharge is dependent on: clinical   At this point Mr. Reyes is expected to be discharge to: tbd    The 21st Century Cures Act makes medical notes like these available to patients in the interest of transparency. Please be advised this is a medical document. Medical documents are intended to carry relevant information, facts as evident, and the clinical opinion of the practitioner. The  medical note is intended as peer to peer communication and may appear blunt or direct. It is written in medical language and may contain abbreviations or verbiage that are unfamiliar.              **Certification      PHYSICIAN Certification of Need for Inpatient Hospitalization - Initial Certification    Patient will require inpatient services that will reasonably be expected to span two midnight's based on the clinical documentation in H+P.   Based on patients current state of illness, I anticipate that, after discharge, patient will require TBD.         [1]    torsemide  40 mg Oral BID (Diuretic)    ondansetron  8 mg Intravenous Q24H    acyclovir  200 mg Oral BID    azaCITIDine  75 mg/m2 Subcutaneous Q24H    levoFLOXacin  750 mg Oral Q48HR @ 0700    [Held by provider] ezetimibe  10 mg Oral Daily    gabapentin  100 mg Oral Daily    levothyroxine  25 mcg Oral Before breakfast    [Held by provider] losartan  25 mg Oral Daily    metoprolol succinate ER  50 mg Oral 2x Daily(Beta Blocker)    [Held by provider] spironolactone  25 mg Oral Daily with breakfast    insulin aspart  1-10 Units Subcutaneous TID AC and HS    [Held by provider] Lovastatin  40 mg Oral BID

## 2025-06-20 NOTE — CM/SW NOTE
SW left spouse another voicemail to discuss discharge plan. Await call back.     Zofia SHEARER, \[facility]\""W  Discharge Planner

## 2025-06-20 NOTE — PLAN OF CARE
Assumed care of patient at 1930. Aox4, forgetful. V-paced w/1st deg AVB. Pt reports bilateral knee pain. RA, Lungs diminished at bases, SoB at times, Cho. Pt producing small amount of bloody sputum. Pt incontinent x2, purewick and brief in place. X2 w/walker. Non-pitting edema to BLE. Diffuse bruising to BUE from IV sticks. Blood blisters to L-ABD. QID.  Bed locked and in lowest position. Call light and personal items within reach.      POC:  - PO Torsemide BID // Daily weight  - Bronch 10:30am, If plt <50, plt transfusion needed  - Reassess plt to resume Hep gtts, currently on hold  - Prophylaxis Acyclovir/Levaquin  - Monitor Hgb (transfuse if Hgb <7 or Plt <50)  - [facility], Zofran 1 hr prior  - Needs posatonazol but costs $346.83/mo. [facility] $147.41/mo        Problem: Diabetes/Glucose Control  Goal: Glucose maintained within prescribed range  Description: INTERVENTIONS:  - Monitor Blood Glucose as ordered  - Assess for signs and symptoms of hyperglycemia and hypoglycemia  - Administer ordered medications to maintain glucose within target range  - Assess barriers to adequate nutritional intake and initiate nutrition consult as needed  - Instruct patient on self management of diabetes  Outcome: Progressing     Problem: Patient/Family Goals  Goal: Patient/Family Long Term Goal  Description: Patient's Long Term Goal:   To return home asap    Interventions:  - Cardiology consult  2 D echo with doppler  - See additional Care Plan goals for specific interventions  Outcome: Progressing  Goal: Patient/Family Short Term Goal  Description: Patient's Short Term Goal:  t get rid of shortness of breath    Interventions:   - medication adjustment      Supplemental oxygen as needed  - See additional Care Plan goals for specific interventions  Outcome: Progressing     Problem: PAIN - ADULT  Goal: Verbalizes/displays adequate comfort level or patient's stated pain  goal  Description: INTERVENTIONS:  - Encourage pt to monitor pain and request assistance  - Assess pain using appropriate pain scale  - Administer analgesics based on type and severity of pain and evaluate response  - Implement non-pharmacological measures as appropriate and evaluate response  - Consider cultural and social influences on pain and pain management  - Manage/alleviate anxiety  - Utilize distraction and/or relaxation techniques  - Monitor for opioid side effects  - Notify MD/LIP if interventions unsuccessful or patient reports new pain  - Anticipate increased pain with activity and pre-medicate as appropriate  Outcome: Progressing     Problem: SAFETY ADULT - FALL  Goal: Free from fall injury  Description: INTERVENTIONS:  - Assess pt frequently for physical needs  - Identify cognitive and physical deficits and behaviors that affect risk of falls.  - [place] fall precautions as indicated by assessment.  - Educate pt/family on patient safety including physical limitations  - Instruct pt to call for assistance with activity based on assessment  - Modify environment to reduce risk of injury  - Provide assistive devices as appropriate  - Consider OT/PT consult to assist with strengthening/mobility  - Encourage toileting schedule  Outcome: Progressing     Problem: CARDIOVASCULAR - ADULT  Goal: Maintains optimal cardiac output and hemodynamic stability  Description: INTERVENTIONS:  - Monitor vital signs, rhythm, and trends  - Monitor for bleeding, hypotension and signs of decreased cardiac output  - Evaluate effectiveness of vasoactive medications to optimize hemodynamic stability  - Monitor arterial and/or venous puncture sites for bleeding and/or hematoma  - Assess quality of pulses, skin color and temperature  - Assess for signs of decreased coronary artery perfusion - ex. Angina  - Evaluate fluid balance, assess for edema, trend weights  Outcome: Progressing  Goal: Absence of cardiac arrhythmias or at  baseline  Description: INTERVENTIONS:  - Continuous cardiac monitoring, monitor vital signs, obtain 12 lead EKG if indicated  - Evaluate effectiveness of antiarrhythmic and heart rate control medications as ordered  - Initiate emergency measures for life threatening arrhythmias  - Monitor electrolytes and administer replacement therapy as ordered  Outcome: Progressing     Problem: RESPIRATORY - ADULT  Goal: Achieves optimal ventilation and oxygenation  Description: INTERVENTIONS:  - Assess for changes in respiratory status  - Assess for changes in mentation and behavior  - Position to facilitate oxygenation and minimize respiratory effort  - Oxygen supplementation based on oxygen saturation or ABGs  - Provide Smoking Cessation handout, if applicable  - Encourage broncho-pulmonary hygiene including cough, deep breathe, Incentive Spirometry  - Assess the need for suctioning and perform as needed  - Assess and instruct to report SOB or any respiratory difficulty  - Respiratory Therapy support as indicated  - Manage/alleviate anxiety  - Monitor for signs/symptoms of CO2 retention  Outcome: Progressing     Problem: METABOLIC/FLUID AND ELECTROLYTES - ADULT  Goal: Glucose maintained within prescribed range  Description: INTERVENTIONS:  - Monitor Blood Glucose as ordered  - Assess for signs and symptoms of hyperglycemia and hypoglycemia  - Administer ordered medications to maintain glucose within target range  - Assess barriers to adequate nutritional intake and initiate nutrition consult as needed  - Instruct patient on self management of diabetes  Outcome: Progressing  Goal: Electrolytes maintained within normal limits  Description: INTERVENTIONS:  - Monitor labs and rhythm and assess patient for signs and symptoms of electrolyte imbalances  - Administer electrolyte replacement as ordered  - Monitor response to electrolyte replacements, including rhythm and repeat lab results as appropriate  - Fluid restriction as  ordered  - Instruct patient on fluid and nutrition restrictions as appropriate  Outcome: Progressing     Problem: HEMATOLOGIC - ADULT  Goal: Free from bleeding injury  Description: (Example usage: patient with low platelets)  INTERVENTIONS:  - Avoid intramuscular injections, enemas and rectal medication administration  - Ensure safe mobilization of patient  - Hold pressure on venipuncture sites to achieve adequate hemostasis  - Assess for signs and symptoms of internal bleeding  - Monitor lab trends  - Patient is to report abnormal signs of bleeding to staff  - Avoid use of toothpicks and dental floss  - Use electric shaver for shaving  - Use soft bristle tooth brush  - Limit straining and forceful nose blowing  Outcome: Progressing

## 2025-06-20 NOTE — PLAN OF CARE
Both Dr. Washington and Dr. Barros notified that patient fired sepsis and for need for TB isolation

## 2025-06-20 NOTE — PROGRESS NOTES
Hematology/Oncology Progress Note    Patient Name: David Reyes  Medical Record Number: [id number]    YOB: 1945     Reason for Consultation:  David Reyes was seen today for the diagnosis of MDS    Interval events:      - planning for bronch today to evaluate hemoptysis which has apparently been going on for 1.5 years  - plt 47k, pt to get a unit of platelets  - hgb stable at 8.3    Inpatient Meds:  Scheduled Medications[1]  Medication Infusions[2]    PRN Meds:  PRN Medications[3]    Allergies:   Allergies[4]    Vital Signs:  Height: --  Weight: 92.8 kg (204 lb 9.6 oz) (06/20 0620)  BSA (Calculated - sq m): --  Pulse: 86 (06/20 0620)  BP: 101/60 (06/20 0500)  Temp: 97.6 °F (36.4 °C) (06/20 0500)  Do Not Use - Resp Rate: --  SpO2: 93 % (06/20 0620)    Wt Readings from Last 6 Encounters:   06/20/25 92.8 kg (204 lb 9.6 oz)   05/29/25 98.4 kg (217 lb)   05/22/25 99.4 kg (219 lb 3.2 oz)   04/11/25 100.8 kg (222 lb 3.2 oz)   03/17/25 100.9 kg (222 lb 8 oz)   12/09/24 105.5 kg (232 lb 8 oz)       Physical Examination:  General: Patient is alert and oriented, not in acute distress  Psych: Mood and affect are appropriate  Eyes: EOMI, PERRL  ENT: Oropharynx is clear, no adenopathy  CV: mild LE edema  Respiratory: non-labored respirations  GI/Abd: Soft, non-tender   Neurological: Grossly intact     Laboratory:  Recent Labs   Lab 06/18/25  1656 06/19/25  0549 06/20/25  0442   WBC 1.6* 1.7* 1.6*   HGB 8.0* 7.9* 8.3*   HCT 26.6* 26.6* 28.3*   PLT 52.0* 47.0* 47.0*   MCV 88.1 88.7 89.8   RDW 19.5 19.9 20.3   NEPRELIM 0.59* 0.59* 0.54*       Recent Labs   Lab 06/17/25  0621 06/18/25  0620 06/19/25  0548 06/20/25  0442   * 137 137 138   K 4.0 3.5  3.5 3.9 4.2   CL 97* 98 100 100   CO2 23.0 28.0 27.0 29.0   BUN 66* 66* 60* 57*   CREATSERUM 2.47* 2.28* 2.29* 2.11*   * 121* 144* 157*   CA 8.8 8.5* 8.7 9.0   TP 6.9 6.5 6.8  --    ALB 3.6 3.5 3.6  --    ALKPHO 123* 111 101  --    * 307*  211*  --    * 540* 436*  --    BILT 1.2* 1.0 1.0  --        Recent Labs   Lab 06/17/25  0621 06/18/25  0621 06/19/25  0548 06/20/25  0442   INR  --  1.46*  --  1.25*   PTT 89.0* 92.7* 84.0*  --        Impression & Plan:     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 Pat wants to pursue treatment. I warned that chemotherapy may be difficult with his comorbidities and he understands this  - given his pancytopenia and expected prolonged admission for heart failure exacerbation and needed diuresis, plan to start azacitidine 75mg/m2/day x 7 days  - we discussed azacitidine will be given every 28-35 days. Pt verbally consented  - we previously discussed incorporating venetoclax but phase 3 trial MONA results published yesterday showed adding venetoclax to azacitidine did not improve overall survival in higher risk MDS, so will not incorporate venetoclax  - ppx: acyclovir 200mg BID, levaquin 750mg q48 hours. Will need to start posaconazole 300mg daily when obtained from pharmacy. Will need to hold off on posaconazole initiation until LFTs recover  - transfuse for hgb <7 g/dL, plt <10k  - continue azacitidine; C1D1 6/17. Last dose planned 6/23  - after discharge, will need labs checked 2x/weekly and if he needs transfusions, he can get it at our cancer center. Next cycle of chemo will not be planned until 7/15    RLL PE  - continue heparin gtt while plts >50k   - BLE dopplers negative for DVT  - near discharge, will need to transition to apixaban 2.5mg BID (due to posaconazole co-administration)  - hold heparin gtt for plt <50k. Ivc filter not indicated given dopplers negative. Restart heparin gtt if plts remain >50k    Acute on chronic systolic heart failure  - per  cardiology    CKD stage 4  - per nephrology    LFT elevations  - increased from 6/13  - ezetimibe held  - GI consulted  - LFTs improving; unclear etiology    Dr Bloom will follow over weekend    James Chirinos MD  [facility] On[facility]             [1]    sodium chloride   Intravenous Once    torsemide  40 mg Oral BID (Diuretic)    ondansetron  8 mg Intravenous Q24H    acyclovir  200 mg Oral BID    azaCITIDine  75 mg/m2 Subcutaneous Q24H    levoFLOXacin  750 mg Oral Q48HR @ 0700    [Held by provider] ezetimibe  10 mg Oral Daily    gabapentin  100 mg Oral Daily    levothyroxine  25 mcg Oral Before breakfast    [Held by provider] losartan  25 mg Oral Daily    metoprolol succinate ER  50 mg Oral 2x Daily(Beta Blocker)    [Held by provider] spironolactone  25 mg Oral Daily with breakfast    insulin aspart  1-10 Units Subcutaneous TID AC and HS    [Held by provider] Lovastatin  40 mg Oral BID   [2]    [Held by provider] continuous dose heparin Stopped (06/19/25 0746)   [3]   lidocaine-menthol    metoclopramide    glucose **OR** glucose **OR** dextrose **OR** glucose **OR** glucose    acetaminophen    melatonin    polyethylene glycol (PEG 3350)    sennosides    bisacodyl    benzonatate    guaiFENesin    glycerin-hypromellose-    sodium chloride  [4]   Allergies  Allergen Reactions    Pcn [Penicillins]      \"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

## 2025-06-20 NOTE — PHYSICAL THERAPY NOTE
PHYSICAL THERAPY TREATMENT NOTE - INPATIENT    Room Number: 8610/8610-A     Session: 3    Number of Visits to Meet Established Goals: 5  History related to current admission: Patient is a 79 year old male who presented to the ED on 6/12/2025 from PCP office for shortness of breath, hemoptysis, and fatigue.  Pt diagnosed with acute hypoxic respiratory failure, acute PE, HF, ischemic cardiomyopathy, pancytopenia in setting of MDS.     Relevant Hospitalizations:  9/20/24-9/28/24 with respiratory failure, COVID, PNA, septic shock; discharged to SNF at [place], pt reports he discharged home from SNF in November 2024.           HOME SITUATION  Type of Home: House  Home Layout: One level  Stairs to Enter : 2   Railing: No              Lives With: Spouse    Drives: Yes   Patient Regularly Uses: Reading glasses, Rollator       Prior Level of [place]: The pt reports that typically he is able to ambulate independently, but since being discharged from SNF in November, intermittently uses a RW.  Pt states he is independent with I/ADL's.  Pt does physically assist his spouse to get in/out of shower.  Pt denies any recent falls.  Presenting Problem: acute hypoxic respiratory failure, acute PE, HF, ischemic cardiomyopathy, pancytopenia in setting of MDS  Co-Morbidities : CAD s/p CABG, HFrEF s/p ICD, DM2, HTN, Hypothyroidism, HLD, FLAKO, RA    PHYSICAL THERAPY ASSESSMENT     Patient demonstrates good  progress this session, goals  remain in progress.      Patient is requiring contact guard assist and maximum assist as a result of the following impairments: decreased functional strength, decreased endurance/aerobic capacity, pain, decreased muscular endurance, and medical status.     Patient continues to function below baseline with bed mobility, transfers, and gait.  Next session anticipate patient to progress transfers and gait.  Physical Therapy will continue to follow patient for duration of hospitalization.    Patient  continues to benefit from continued skilled PT services: to promote return to prior level of function and safety with continuous assistance and gradual rehabilitative therapy .    PLAN DURING HOSPITALIZATION  Nursing Mobility Recommendation :  (2 assist RW)  PT Device Recommendation: Rolling walker  PT Treatment Plan: Bed mobility, Endurance, Gait training, Body mechanics, Energy conservation, Patient education, Strengthening, Transfer training, Balance training  Frequency (Obs): 3-5x/week     CURRENT GOALS     Goal #1 Patient is able to demonstrate supine - sit EOB @ level: minimum assistance      Goal #2 Patient is able to demonstrate transfers Sit to/from Stand at assistance level: moderate assistance      Goal #3 Patient is able to ambulate 50 feet with assist device: walker - rolling at assistance level: minimum assistance      Goal #4     Goal #5     Goal #6     Goal Comments: Goals established on 6/15/2025  6/20/2025 all goals ongoing     SUBJECTIVE  \"I should walk more\"    OBJECTIVE  Precautions: Bed/chair alarm    WEIGHT BEARING RESTRICTION     PAIN ASSESSMENT   Rating: Unable to rate  Location: R knee, ankle, back  Management Techniques: Activity promotion, Body mechanics, Repositioning    BALANCE                                                                                                                       Static Sitting: Fair -  Dynamic Sitting: Fair -           Static Standing: Poor  Dynamic Standing: Poor    ACTIVITY TOLERANCE                         O2 WALK       AM-PAC '6-Clicks' INPATIENT SHORT FORM - BASIC MOBILITY  How much difficulty does the patient currently have...  Patient Difficulty: Turning over in bed (including adjusting bedclothes, sheets and blankets)?: A Lot   Patient Difficulty: Sitting down on and standing up from a chair with arms (e.g., wheelchair, bedside commode, etc.): A Lot   Patient Difficulty: Moving from lying on back to sitting on the side of the bed?: A Lot   How  much help from another person does the patient currently need...   Help from Another: Moving to and from a bed to a chair (including a wheelchair)?: A Lot   Help from Another: Need to walk in hospital room?: Total   Help from Another: Climbing 3-5 steps with a railing?: Total     AM-PAC Score:  Raw Score: 10   Approx Degree of Impairment: 76.75%   Standardized Score (AM-PAC Scale): 32.29   CMS Modifier (G-Code): CL    FUNCTIONAL ABILITY STATUS  Gait Assessment   Functional Mobility/Gait Assessment  Gait Assistance: Moderate assistance  Distance (ft): 5  Assistive Device: Rolling walker  Pattern: Shuffle    Skilled Therapy Provided    Bed Mobility:  Rolling: mod A    Supine<>Sit: max A    Sit<>Supine: NT     Transfer Mobility:  Sit<>Stand: Min A  Stand<>Sit: mod A    Gait: mod A - Max A (posterior lean)    Therapist's Comments:     Cleared for OOB mobility    MODIFIED CHUY DYSPNEA SCALE - (SHORTNESS OF BREATH SCALE)    SCORE DESCRIPTION   0 Nothing at all   1 Very slight   2 Slight   3 Moderate   4 Somewhat severe   5 Strong or hard breathing   6    7 Very hard breathing   8    9 Very, Very Severe   10 MAX - You need to stop     Patient Education provided:    Use this scale to rate the difficulty of your breathing:  - As you would rate your pain from 0-10, you can rate your SOB from 0-10  - Goal is to stay below 7/10 with activity  - If you reach beyond 7/10, it is important to rest; stop activity and if you need to sit down to recove        Patient End of Session: Up in chair, Needs met, Call light within reach, RN aware of session/findings, All patient questions and concerns addressed, Hospital anti-slip socks    PT Session Time: 15 minutes  Gait Training:  minutes  Therapeutic Activity: 15 minutes  Therapeutic Exercise:  minutes   Neuromuscular Re-education:  minutes

## 2025-06-20 NOTE — PROGRESS NOTES
[facility]   part of [facility]     Nephrology Progress Note    David Reyes Patient Status:  Inpatient    1945 MRN [id number]   Location [facility] 8NE-A Attending Alejandra Mehta MD   Hosp Day # 8 PCP Thea Harris DO       SUBJECTIVE:  No acute events    Current Hospital Medications[1]        Physical Exam:   /68 (BP Location: Left arm)   Pulse 85   Temp 97.3 °F (36.3 °C) (Oral)   Resp 18   Wt 204 lb 9.6 oz (92.8 kg)   SpO2 95%   BMI 30.21 kg/m²   Temp (24hrs), Av.8 °F (36.6 °C), Min:97.3 °F (36.3 °C), Max:98.3 °F (36.8 °C)       Intake/Output Summary (Last 24 hours) at 2025 0927  Last data filed at 2025 0757  Gross per 24 hour   Intake 540 ml   Output 2000 ml   Net -1460 ml     Last 3 Weights   25 0620 204 lb 9.6 oz (92.8 kg)   25 0449 201 lb 1 oz (91.2 kg)   25 0500 205 lb (93 kg)   25 0500 209 lb 10.5 oz (95.1 kg)   25 0545 218 lb (98.9 kg)   06/15/25 0508 218 lb 8 oz (99.1 kg)   25 0434 217 lb 13 oz (98.8 kg)   25 2258 213 lb (96.6 kg)   25 1935 215 lb (97.5 kg)   25 1506 217 lb (98.4 kg)   25 1102 219 lb 3.2 oz (99.4 kg)   25 0955 222 lb 3.2 oz (100.8 kg)   25 1636 222 lb 8 oz (100.9 kg)     General: Alert and oriented in no apparent distress.  HEENT: No scleral icterus, MMM  Neck: Supple, no DON or thyromegaly  Cardiac: irregular, S1, S2 normal, no murmur or rub  Lungs: decr BS B    Abdomen: Soft, non-tender. + bowel sounds, no palpable organomegaly  Extremities: trace edema   Neurologic:  moving all extremities  Skin: Warm and dry, no rash  Recent Labs     25  0620 25  0621 25  1656 25  0549 25  0442   WBC 1.5*  --  1.6* 1.7* 1.6*   HGB 7.6*  --  8.0* 7.9* 8.3*   MCV 87.5  --  88.1 88.7 89.8   PLT 53.0*  --  52.0* 47.0* 47.0*   BAND  --   --   --  3  --    INR  --  1.46*  --   --  1.25*       Recent Labs     25  0625  0548 25  0442     137 138   K 3.5  3.5 3.9 4.2   CL 98 100 100   CO2 28.0 27.0 29.0   BUN 66* 60* 57*   CREATSERUM 2.28* 2.29* 2.11*   CA 8.5* 8.7 9.0   MG 2.4 2.3 2.5       Recent Labs     06/18/25  0620 06/19/25  0548 06/20/25  0442   * 436* 332*   * 211* 153*   ALB 3.5 3.6 3.6           Impression/Plan:        1.Acute kidney injury on CKD 3b: baseline serum creatinine appears to be around 1.9-2.1 mg/dL. Current LORENA appears related to CRS.   Stable labs     2.Acute on chronic HFrEF: cardiology following, transitioned to oral diuretics      3. Acute PE: on heparin gtt. Per pulm/heme     4. 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 Chirinos as outpatient. S/p recent BMBx. Heme following.        Questions/concerns were discussed with patient and/or family by bedside.      Antonio Cartwright  6/20/2025           [1]   Current Facility-Administered Medications   Medication Dose Route Frequency    lidocaine-menthol 4-1 % patch 1 patch  1 patch Transdermal Daily PRN    torsemide (Demadex) tab 40 mg  40 mg Oral BID (Diuretic)    ondansetron (Zofran) 4 MG/2ML injection 8 mg  8 mg Intravenous Q24H    acyclovir (Zovirax) cap 200 mg  200 mg Oral BID    azaCITIDine (Vidaza) SUBQ syringe 150 mg  75 mg/m2 Subcutaneous Q24H    metoclopramide (Reglan) 5 mg/mL injection 5 mg  5 mg Intravenous Q8H PRN    [Held by provider] heparin (Porcine) 96670 units/250mL infusion PE/DVT/THROMBUS CONTINUOUS  200-3,000 Units/hr Intravenous Continuous    levoFLOXacin (Levaquin) tab 750 mg  750 mg Oral Q48HR @ 0700    glucose (Dex4) 15 GM/59ML oral liquid 15 g  15 g Oral Q15 Min PRN    Or    glucose (Glutose) 40% oral gel 15 g  15 g Oral Q15 Min PRN    Or    dextrose 50% injection 50 mL  50 mL Intravenous Q15 Min PRN    Or    glucose (Dex4) 15 GM/59ML oral liquid 30 g  30 g Oral Q15 Min PRN    Or    glucose (Glutose) 40% oral gel 30 g  30 g Oral Q15 Min PRN    [Held by provider] ezetimibe (Zetia) tab 10 mg  10 mg Oral Daily    gabapentin  (Neurontin) cap 100 mg  100 mg Oral Daily    levothyroxine (Synthroid) tab 25 mcg  25 mcg Oral Before breakfast    [Held by provider] losartan (Cozaar) tab 25 mg  25 mg Oral Daily    metoprolol succinate ER (Toprol XL) 24 hr tab 50 mg  50 mg Oral 2x Daily(Beta Blocker)    [Held by provider] spironolactone (Aldactone) tab 25 mg  25 mg Oral Daily with breakfast    insulin aspart (NovoLOG) 100 Units/mL FlexPen 1-10 Units  1-10 Units Subcutaneous TID AC and HS    acetaminophen (Tylenol Extra Strength) tab 1,000 mg  1,000 mg Oral Q8H PRN    melatonin tab 3 mg  3 mg Oral Nightly PRN    polyethylene glycol (PEG 3350) (Miralax) 17 g oral packet 17 g  17 g Oral Daily PRN    sennosides (Senokot) tab 17.2 mg  17.2 mg Oral Nightly PRN    bisacodyl (Dulcolax) 10 MG rectal suppository 10 mg  10 mg Rectal Daily PRN    benzonatate (Tessalon) cap 200 mg  200 mg Oral TID PRN    guaiFENesin (Robitussin) 100 MG/5 ML oral liquid 200 mg  200 mg Oral Q4H PRN    glycerin-hypromellose- (Artificial Tears) 0.2-0.2-1 % ophthalmic solution 1 drop  1 drop Both Eyes QID PRN    sodium chloride (Saline Mist) 0.65 % nasal solution 1 spray  1 spray Each Nare Q3H PRN    [Held by provider] Lovastatin TABS 40 mg **PATIENT SUPPLIED**  40 mg Oral BID

## 2025-06-20 NOTE — PROGRESS NOTES
[facility]  Progress Note    David Reyes Patient Status:  Inpatient    1945 MRN [id number]   Location [facility] 8NE-A Attending Alejandra Mehta MD   Hosp Day # 8 PCP Thea Harris,      Subjective:  No acute events overnight, he denies new concerns. Still with some bloody mucus yesterday. No dyspnea. No pain. No fevers. Breathing is stable    Objective:  /61   Pulse 73   Temp 97.3 °F (36.3 °C) (Oral)   Resp 18   Wt 204 lb 9.6 oz (92.8 kg)   SpO2 100%   BMI 30.21 kg/m² room air        Temp (24hrs), Av.8 °F (36.6 °C), Min:97.3 °F (36.3 °C), Max:98.3 °F (36.8 °C)      Intake/Output:    Intake/Output Summary (Last 24 hours) at 2025 1037  Last data filed at 2025 0757  Gross per 24 hour   Intake 540 ml   Output 2000 ml   Net -1460 ml       Physical Exam:   General: alert, cooperative, oriented.  No respiratory distress.   Head: Normocephalic, without obvious abnormality, atraumatic.   Throat: Lips, mucosa, and tongue normal.  No thrush noted.   Neck: trachea midline, no adenopathy, no thyromegaly. No JVD.   Lungs: clear bilaterally. No wheeze   Chest wall: No tenderness or deformity.   Heart: Irregular with murmurs   Abdomen: soft, non-distended, no masses, no guarding, no     Rebound.  Nontender   Extremity: Decreasing edema bilaterally right ankle remains painful   Skin: No rashes or lesions.   Neurological: Alert, interactive, no focal deficits    Lab Data Review:  Recent Labs   Lab 25  1656 25  0549 25  0442   RBC 3.02* 3.00* 3.15*   HGB 8.0* 7.9* 8.3*   HCT 26.6* 26.6* 28.3*   MCV 88.1 88.7 89.8   MCH 26.5 26.3 26.3   MCHC 30.1* 29.7* 29.3*   RDW 19.5 19.9 20.3   NEPRELIM 0.59* 0.59* 0.54*   WBC 1.6* 1.7* 1.6*   PLT 52.0* 47.0* 47.0*       Recent Labs   Lab 25  0620 25  0548 25  0442   * 144* 157*   BUN 66* 60* 57*   CREATSERUM 2.28* 2.29* 2.11*   EGFRCR 29* 29* 32*   CA 8.5* 8.7 9.0    137 138   K 3.5  3.5 3.9 4.2    CL 98 100 100   CO2 28.0 27.0 29.0         Recent Labs   Lab 06/17/25  0621 06/18/25  0621 06/19/25  0548 06/20/25  0442   PTP  --  17.9*  --  15.8*   INR  --  1.46*  --  1.25*   PTT 89.0* 92.7* 84.0*  --        Cultures: Blood cultures remain negative    Radiology:  US ABDOMEN COMPLETE WITH DOPPLER(CPT=76700/78954)  Result Date: 6/19/2025  CONCLUSION:  No evidence of significant liver pathology to explain patient's elevated liver enzymes.   LOCATION:  [facility]    Dictated by (CST): Ponce Toney MD on 6/19/2025 at 3:06 PM     Finalized by (CST): Ponce Toney MD on 6/19/2025 at 3:09 PM       Images reviewed/pending      Medications reviewed        Assessment:  Acute hypoxic respiratory failure, improved now on RA  Acute pulmonary embolism positive V/Q-negative Dopplers-on heparin with plans to transition to orals after bronchoscopy  Hemoptysis subacute reports over 18 months--no obvious endobronchial lesion on CT----remains with hemoptysis despite Levaquin  Right lower lobe and bilateral upper lobe groundglass opacities-possibly fluid related-with plans to follow on completion of antibiotics  Small pulmonary nodules: CT chest 6/12/2025  Acute elevation LFTS -now off statins with plans to repeat-questionable related to acyclovir vs levaquin -improved off statin  History of smoking at least 26 pack years tobacco though quit in 1986  Coronary artery disease: Status post CABG and PCI in past  HFrEF last ejection fraction of 20 to 25%-agree with ongoing diuresis discussed with cardiology  Obstructive sleep apnea syndrome had been compliant for 2 years not using in the recent past  LORENA/CKD improving with improved acidosis  Myelodysplastic syndrome: With pancytopenia as new diagnosis-18% blast genetic studies pending-Dr. Chirinos's note appreciated  Rheumatoid arthritis    Plan:  Monitor respiratory status, maintain sats >89%  Diuresis as per cardiology  Plan on bronchoscopy today  Continue to hold heparin gtt

## 2025-06-20 NOTE — OCCUPATIONAL THERAPY NOTE
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    Patient continues to function below baseline with ADLs and functional transfers.   Contributing factors to remaining limitations include decreased functional strength, decreased functional reach, decreased endurance, impaired standing balance, and decreased muscular endurance.  Next session anticipate patient to progress toileting, lower body dressing, bed mobility, transfers, static standing balance, dynamic standing balance, functional standing tolerance, and energy conservation strategies.  Occupational Therapy will continue to follow patient for duration of hospitalization.    Patient continues to benefit from continued skilled OT services: to promote return to prior level of function and safety with continuous assistance and gradual rehabilitative therapy.     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 Co-Morbidities : CAD s/p CABG, HFrEF s/p ICD, DM2, HTN, Hypothyroidism, HLD, FLAKO, RA     Relevant Hospitalizations:  9/20/24-9/28/24 with respiratory failure, COVID, PNA, septic shock; discharged to SNF at [facility], pt  reports he discharged home from SNF in November 2024.    WEIGHT BEARING RESTRICTION       Recommendations for nursing staff:   Transfers: 2 person RW  Toileting location: bedside commode    TREATMENT SESSION:  Patient Start of Session: semi supine in bed    FUNCTIONAL TRANSFER ASSESSMENT  Sit to Stand: Edge of Bed; Chair  Edge of Bed: Minimal Assist  Chair: Minimal Assist    BED MOBILITY  Supine to Sit : Maximum Assist  Scooting: Min A    BALANCE ASSESSMENT     FUNCTIONAL ADL ASSESSMENT     ACTIVITY TOLERANCE: fair-WFL                         O2 SATURATIONS       EDUCATION PROVIDED  Patient Education : Role of Occupational Therapy; Plan of Care; Discharge Recommendations; Functional Transfer Techniques; Fall Prevention; Posture/Positioning; Energy Conservation; Proper Body Mechanics  Patient's Response to Education: Verbalized Understanding; Requires Further Education    Equipment used: RW  Demonstrates functional use, Would benefit from additional trial      Therapist comments: Pt received semi supine in bed, pleasant and cooperative for OT session. Pt agreeable for OOB activity in preparation for ADLs and functional transfers. Bed mobility performed at max A to sit EOB with cues provided for hand placement. Sit to stand transfer performed at min A and pt demos side step transfer to bedside chair with RW requiring mod A. Pt then stands from chair at min A and engages in short distance functional mobility with RW requiring max A due to posterior lean.     Patient End of Session: Up in chair, Needs met, Call light within reach, RN aware of session/findings, All patient questions and concerns addressed, Hospital anti-slip socks, Alarm set    SUBJECTIVE  \"I'll give it a try.\" - standing and transferring    PAIN ASSESSMENT  Rating: Unable to rate  Location: R knee/ankle        OBJECTIVE  Precautions: Bed/chair alarm    AM-PAC ‘6-Clicks’ Inpatient Daily Activity Short Form  -   Putting on and taking off regular lower  body clothing?: A Lot  -   Bathing (including washing, rinsing, drying)?: A Lot  -   Toileting, which includes using toilet, bedpan or urinal? : A Lot  -   Putting on and taking off regular upper body clothing?: A Little  -   Taking care of personal grooming such as brushing teeth?: A Little  -   Eating meals?: A Little    AM-PAC Score:  Score: 15  Approx Degree of Impairment: 56.46%  Standardized Score (AM-PAC Scale): 34.69    PLAN     OT Treatment Plan: Energy conservation/work simplification techniques, ADL training, Functional transfer training, Patient/Family education, Equipment eval/education, Compensatory technique education  Rehab Potential : Fair  Frequency: 3x/week    OT Goals:     All goals ongoing 06/20    ADL Goals   Patient will perform lower body dressing:  with min assist  Patient will perform toileting: with min assist     Functional Transfer Goals  Patient will transfer to toilet:  cga     UE Exercise Program Goal  Patient will be independent with bilateral AROM HEP (home exercise program).     Additional Goals  Pt will incorporate 2 energy conservation techniques into ADL.    OT Session Time: 15 minutes  Therapeutic Activity: 15 minutes

## 2025-06-20 NOTE — ANESTHESIA POSTPROCEDURE EVALUATION
[facility] Patient Status:  Inpatient   Age/Gender 79 year old male MRN [id number]   Location [facility] ENDOSCOPY PAIN CENTER Attending Delta Washington MD   Hosp Day # 8 PCP Thea Harris DO       Anesthesia Post-op Note    BRONCHOSCOPY WITH BRONCHOALVEOLAR LAVAGE    Procedure Summary       Date: 06/20/25 Room / Location:  ENDOSCOPY 02 /  ENDOSCOPY    Anesthesia Start: 1042 Anesthesia Stop: 1110    Procedure: BRONCHOSCOPY WITH BRONCHOALVEOLAR LAVAGE Diagnosis: (hemoptysis)    Surgeons: Justice Raymundo MD Anesthesiologist: Malcolm Oglesby MD    Anesthesia Type: general ASA Status: 3            Anesthesia Type: general    Vitals Value Taken Time   /62 06/20/25 11:10   Temp 98 °F (36.7 °C) 06/20/25 11:10   Pulse 84 06/20/25 11:10   Resp 16 06/20/25 11:10   SpO2 94 % 06/20/25 11:10           Patient Location: PACU    Anesthesia Type: general    Airway Patency: patent    Postop Pain Control: adequate    Mental Status: mildly sedated but able to meaningfully participate in the post-anesthesia evaluation    Nausea/Vomiting: none    Cardiopulmonary/Hydration status: stable euvolemic    Complications: no apparent anesthesia related complications    Postop vital signs: stable    Dental Exam: Unchanged from Preop

## 2025-06-20 NOTE — OPERATIVE REPORT
[facility] Patient Status:  Hospital Outpatient Surgery    1945   MRN [id number]     Location [facility] ENDOSCOPY Attending Justice Raymundo MD   Hosp Day # 8 PCP Thea Harris DO     OPERATIVE REPORT:     DATE OF OPERATION: 25     PREOPERATIVE DIAGNOSIS(ES): hemoptysis     POSTOPERATIVE DIAGNOSIS(ES):   Old blood found from the right middle, right lower, left upper and lower lobes  No active bleeding  No endobronchial disease     OPERATION(S) PERFORMED:   Bronchoscopy with therapeutic aspiration of bloody secretions from the bilateral tracheobronchial tree.  Bronchoscopy with bronchoalveolar lavage of the left upper lobe apicoposterior segment     SURGEON: Justice Raymundo MD    ANESTHESIA: General sedation; please see separate flow sheet.      EQUIPMENT:   Olympus adult videobronchoscope.     INDICATION: The patient is a 79 year old male with history of hear failure and MDS with ongoing hemoptysis of unclear etiology    CONSENT:  Risks and benefits were reviewed with the patient in detail regarding the procedure as well as anesthesia prior to the procedure. All questions were answered, and the patient was agreeable.     PROCEDURE:  After informed consent was obtained, the patient was brought to the bronchoscopy suite.  Time out performed.  Patient was placed in a supine position, anesthesia administered and he was intubated.    First, the videobronchoscope was used and inserted via endotracheal tube. There was scattered old blood noted in the distal trachea, right main stem, bronchus intermedius, right middle, right lower, left main stem, left upper and left lower lobe bronchi.   The bloody secretions were cleared with suction aspiration revealing normal endobronchial examination bilaterally.  There was no evidence of active bleeding.  A sequential bronchoalveolar lavage was then performed in the left upper lobe apicoposterior segment with instillation of three  aliquots of 50 mL sterile saline (total 150ml) and return of 55mL which was sent for microbiological studies including cell count/diffferential, aerobic smear/culture, AFB smear/culture, fungal smear/culture, aspergillus galactomannan, mycobacterial TB PCR and cytology. There was no progression of hemorrhage with the serial lavage.   Again there was no evidence of bleeding or endobronchial lesions.  The patient tolerated the procedure well without immediate complications.     EBL:  none     IMPRESSION:   Old blood found from the right middle, right lower, left upper and lower lobes  No active bleeding  No endobronchial disease     PLAN:  await results of bronchoscopy for further delineation of care.      Justice Raymundo MD

## 2025-06-20 NOTE — ANESTHESIA PROCEDURE NOTES
Airway  Date/Time: 6/20/2025 10:47 AM  Reason: elective      General Information and Staff   Patient location during procedure: OR  Anesthesiologist: Malcolm Oglesby MD  Performed: anesthesiologist   Performed by: Malcolm Oglesby MD  Authorized by: Malcolm Oglesby MD        Indications and Patient Condition  Indications for airway management: anesthesia  Sedation level: deep      Preoxygenated: yesPatient position: sniffing    Mask difficulty assessment: 1 - vent by mask    Final Airway Details    Final airway type: endotracheal airway    Successful airway: ETT  Cuffed: yes   Successful intubation technique: Video laryngoscopy  Endotracheal tube insertion site: oral  Blade: GlideScope  Blade size: #4  ETT size (mm): 8.5    Cormack-Lehane Classification: grade IIA - partial view of glottis  Placement verified by: capnometry   Measured from: lips  ETT to lips (cm): 19  Number of attempts at approach: 1

## 2025-06-20 NOTE — PLAN OF CARE
Patient is very drowsy. He is NPO for bronchoscopy this AM. Order received for platelets; request sent for this. He is v paced on monitor. He has no c/o pain (except occasional in his ankle relieved with tylenol). His lungs are clear/ diminished on auscultation.

## 2025-06-21 ENCOUNTER — APPOINTMENT (OUTPATIENT)
Dept: GENERAL RADIOLOGY | Facility: HOSPITAL | Age: 80
End: 2025-06-21
Attending: HOSPITALIST
Payer: MEDICARE

## 2025-06-21 LAB
ANION GAP SERPL CALC-SCNC: 9 MMOL/L (ref 0–18)
APTT PPP: 93.6 SECONDS (ref 23–36)
BASOPHILS # BLD AUTO: 0.01 X10(3) UL (ref 0–0.2)
BASOPHILS NFR BLD AUTO: 0.7 %
BLOOD TYPE BARCODE: 6200
BUN BLD-MCNC: 52 MG/DL (ref 9–23)
CALCIUM BLD-MCNC: 8.8 MG/DL (ref 8.7–10.6)
CHLORIDE SERPL-SCNC: 99 MMOL/L (ref 98–112)
CO2 SERPL-SCNC: 28 MMOL/L (ref 21–32)
CREAT BLD-MCNC: 2 MG/DL (ref 0.7–1.3)
EGFRCR SERPLBLD CKD-EPI 2021: 34 ML/MIN/1.73M2 (ref 60–?)
EOSINOPHIL # BLD AUTO: 0 X10(3) UL (ref 0–0.7)
EOSINOPHIL NFR BLD AUTO: 0 %
ERYTHROCYTE [DISTWIDTH] IN BLOOD BY AUTOMATED COUNT: 20.2 %
GLUCOSE BLD-MCNC: 129 MG/DL (ref 70–99)
GLUCOSE BLD-MCNC: 135 MG/DL (ref 70–99)
GLUCOSE BLD-MCNC: 148 MG/DL (ref 70–99)
GLUCOSE BLD-MCNC: 153 MG/DL (ref 70–99)
GLUCOSE BLD-MCNC: 154 MG/DL (ref 70–99)
HCT VFR BLD AUTO: 24.5 % (ref 39–53)
HGB BLD-MCNC: 7.5 G/DL (ref 13–17.5)
IMM GRANULOCYTES # BLD AUTO: 0.03 X10(3) UL (ref 0–1)
IMM GRANULOCYTES NFR BLD: 2.1 %
LYMPHOCYTES # BLD AUTO: 0.73 X10(3) UL (ref 1–4)
LYMPHOCYTES NFR BLD AUTO: 51 %
MAGNESIUM SERPL-MCNC: 2.6 MG/DL (ref 1.6–2.6)
MCH RBC QN AUTO: 27.2 PG (ref 26–34)
MCHC RBC AUTO-ENTMCNC: 30.6 G/DL (ref 31–37)
MCV RBC AUTO: 88.8 FL (ref 80–100)
MONOCYTES # BLD AUTO: 0.1 X10(3) UL (ref 0.1–1)
MONOCYTES NFR BLD AUTO: 7 %
NEUTROPHILS # BLD AUTO: 0.56 X10 (3) UL (ref 1.5–7.7)
NEUTROPHILS # BLD AUTO: 0.56 X10(3) UL (ref 1.5–7.7)
NEUTROPHILS NFR BLD AUTO: 39.2 %
OSMOLALITY SERPL CALC.SUM OF ELEC: 298 MOSM/KG (ref 275–295)
PLATELET # BLD AUTO: 61 10(3)UL (ref 150–450)
PLATELET MORPHOLOGY: NORMAL
PLATELETS.RETICULATED NFR BLD AUTO: 7.8 % (ref 0–7)
POTASSIUM SERPL-SCNC: 3.9 MMOL/L (ref 3.5–5.1)
RBC # BLD AUTO: 2.76 X10(6)UL (ref 3.8–5.8)
SODIUM SERPL-SCNC: 136 MMOL/L (ref 136–145)
UNIT VOLUME: 198 ML
WBC # BLD AUTO: 1.4 X10(3) UL (ref 4–11)

## 2025-06-21 PROCEDURE — 99233 SBSQ HOSP IP/OBS HIGH 50: CPT | Performed by: HOSPITALIST

## 2025-06-21 PROCEDURE — 99233 SBSQ HOSP IP/OBS HIGH 50: CPT | Performed by: INTERNAL MEDICINE

## 2025-06-21 PROCEDURE — 99232 SBSQ HOSP IP/OBS MODERATE 35: CPT | Performed by: INTERNAL MEDICINE

## 2025-06-21 PROCEDURE — 71045 X-RAY EXAM CHEST 1 VIEW: CPT | Performed by: HOSPITALIST

## 2025-06-21 RX ORDER — POLYETHYLENE GLYCOL 3350 17 G/17G
17 POWDER, FOR SOLUTION ORAL DAILY
Status: DISCONTINUED | OUTPATIENT
Start: 2025-06-21 | End: 2025-06-24

## 2025-06-21 NOTE — PROGRESS NOTES
[facility]   part of [facility]     Nephrology Progress Note    David Reyes Patient Status:  Inpatient    1945 MRN [id number]   Location [facility] 8NE-A Attending Delta Washington MD   Hosp Day # 9 PCP Thea Harris,        SUBJECTIVE:  Up in chair, denies c/o        Physical Exam:   BP 91/55 (BP Location: Left arm)   Pulse 85   Temp 97.8 °F (36.6 °C) (Oral)   Resp 20   Wt 198 lb 13.7 oz (90.2 kg)   SpO2 94%   BMI 29.37 kg/m²   Temp (24hrs), Av.1 °F (36.7 °C), Min:97.5 °F (36.4 °C), Max:98.9 °F (37.2 °C)       Intake/Output Summary (Last 24 hours) at 2025 1043  Last data filed at 2025 0812  Gross per 24 hour   Intake 997.83 ml   Output 2500 ml   Net -1502.17 ml     Last 3 Weights   25 0500 198 lb 13.7 oz (90.2 kg)   25 0620 204 lb 9.6 oz (92.8 kg)   25 0449 201 lb 1 oz (91.2 kg)   25 0500 205 lb (93 kg)   25 0500 209 lb 10.5 oz (95.1 kg)   25 0545 218 lb (98.9 kg)   06/15/25 0508 218 lb 8 oz (99.1 kg)   25 0434 217 lb 13 oz (98.8 kg)   25 2258 213 lb (96.6 kg)   25 1935 215 lb (97.5 kg)   25 1506 217 lb (98.4 kg)   25 1102 219 lb 3.2 oz (99.4 kg)   25 0955 222 lb 3.2 oz (100.8 kg)   25 1636 222 lb 8 oz (100.9 kg)     General: Alert and in no apparent distress.  HEENT: No scleral icterus, MMM  Neck: Supple, no DON or thyromegaly  Cardiac: irreg/irreg, S1, S2 normal, no murmur or rub  Lungs: Clear without wheezes, rales, rhonchi.    Abdomen: Soft, non-tender. + bowel sounds, no palpable organomegaly  Extremities: no sig pitting edema.  Neurologic: moving all extremities  Skin: Warm and dry, no rash        Labs:     Recent Labs   Lab 25  0621 25  1656 25  0549 25  0442 25  1432 25  0857   WBC  --  1.6* 1.7* 1.6* 1.2* 1.4*   HGB  --  8.0* 7.9* 8.3* 7.5* 7.5*   MCV  --  88.1 88.7 89.8 89.4 88.8   PLT  --  52.0* 47.0* 47.0* 63.0* 61.0*   BAND  --   --  3  --    --   --    INR 1.46*  --   --  1.25*  --   --        Recent Labs   Lab 06/17/25  0046 06/17/25  0621 06/18/25  0620 06/19/25  0548 06/20/25  0442 06/21/25  0537   NA  --  135* 137 137 138 136   K 3.5 4.0 3.5  3.5 3.9 4.2 3.9   CL  --  97* 98 100 100 99   CO2  --  23.0 28.0 27.0 29.0 28.0   BUN  --  66* 66* 60* 57* 52*   CREATSERUM  --  2.47* 2.28* 2.29* 2.11* 2.00*   CA  --  8.8 8.5* 8.7 9.0 8.8   MG 2.5  --  2.4 2.3 2.5 2.6   GLU  --  111* 121* 144* 157* 129*       Recent Labs   Lab 06/17/25  0621 06/18/25  0620 06/19/25  0548 06/20/25  0442   * 540* 436* 332*   * 307* 211* 153*   ALB 3.6 3.5 3.6 3.6       Recent Labs   Lab 06/20/25  1125 06/20/25  1811 06/20/25  1830 06/20/25  2057 06/21/25  0559   PGLU 138* 170* 168* 179* 135*       Meds:   Current Hospital Medications[1]      Impression/Plan:      1.Acute kidney injury on CKD 3b: baseline serum creatinine appears to be around 1.9-2.1 mg/dL. LORENA suggestive of CRS on admit and has resolved with renal fxn again at b/l     2.Acute on chronic HFrEF: cardiology following, transitioned to oral diuretics      3. Acute PE: on heparin gtt. Per pulm/heme     4. 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 Chirinos as outpatient. S/p recent BMBx. Heme following.           Questions/concerns were discussed with patient and/or family by bedside.          Jerad Oliva MD  6/21/2025  10:43 AM         [1]   Current Facility-Administered Medications   Medication Dose Route Frequency    lidocaine-menthol 4-1 % patch 1 patch  1 patch Transdermal Daily PRN    benzocaine-menthol (Cepacol) lozenge 1 lozenge  1 lozenge Oral PRN    alum-mag hydroxide-simethicone (Maalox) 200-200-20 MG/5ML oral suspension 30 mL  30 mL Oral QID PRN    torsemide (Demadex) tab 40 mg  40 mg Oral BID (Diuretic)    ondansetron (Zofran) 4 MG/2ML injection 8 mg  8 mg Intravenous Q24H    acyclovir (Zovirax) cap 200 mg  200 mg Oral BID    azaCITIDine (Vidaza) SUBQ syringe 150 mg  75 mg/m2  Subcutaneous Q24H    metoclopramide (Reglan) 5 mg/mL injection 5 mg  5 mg Intravenous Q8H PRN    heparin (Porcine) 68210 units/250mL infusion PE/DVT/THROMBUS CONTINUOUS  200-3,000 Units/hr Intravenous Continuous    levoFLOXacin (Levaquin) tab 750 mg  750 mg Oral Q48HR @ 0700    glucose (Dex4) 15 GM/59ML oral liquid 15 g  15 g Oral Q15 Min PRN    Or    glucose (Glutose) 40% oral gel 15 g  15 g Oral Q15 Min PRN    Or    dextrose 50% injection 50 mL  50 mL Intravenous Q15 Min PRN    Or    glucose (Dex4) 15 GM/59ML oral liquid 30 g  30 g Oral Q15 Min PRN    Or    glucose (Glutose) 40% oral gel 30 g  30 g Oral Q15 Min PRN    [Held by provider] ezetimibe (Zetia) tab 10 mg  10 mg Oral Daily    gabapentin (Neurontin) cap 100 mg  100 mg Oral Daily    levothyroxine (Synthroid) tab 25 mcg  25 mcg Oral Before breakfast    [Held by provider] losartan (Cozaar) tab 25 mg  25 mg Oral Daily    metoprolol succinate ER (Toprol XL) 24 hr tab 50 mg  50 mg Oral 2x Daily(Beta Blocker)    [Held by provider] spironolactone (Aldactone) tab 25 mg  25 mg Oral Daily with breakfast    insulin aspart (NovoLOG) 100 Units/mL FlexPen 1-10 Units  1-10 Units Subcutaneous TID AC and HS    acetaminophen (Tylenol Extra Strength) tab 1,000 mg  1,000 mg Oral Q8H PRN    melatonin tab 3 mg  3 mg Oral Nightly PRN    polyethylene glycol (PEG 3350) (Miralax) 17 g oral packet 17 g  17 g Oral Daily PRN    sennosides (Senokot) tab 17.2 mg  17.2 mg Oral Nightly PRN    bisacodyl (Dulcolax) 10 MG rectal suppository 10 mg  10 mg Rectal Daily PRN    benzonatate (Tessalon) cap 200 mg  200 mg Oral TID PRN    guaiFENesin (Robitussin) 100 MG/5 ML oral liquid 200 mg  200 mg Oral Q4H PRN    glycerin-hypromellose- (Artificial Tears) 0.2-0.2-1 % ophthalmic solution 1 drop  1 drop Both Eyes QID PRN    sodium chloride (Saline Mist) 0.65 % nasal solution 1 spray  1 spray Each Nare Q3H PRN    [Held by provider] Lovastatin TABS 40 mg **PATIENT SUPPLIED**  40 mg Oral BID

## 2025-06-21 NOTE — PLAN OF CARE
Notified by pharmacist -  pt is going to start posaconazole once LFTs normalize. Since amiodarone and posaconazole are contraindicated; both can increase each others concentrations and also increase risk of QTc prolongation.  Discussed with Dr Gilmore, plan not to start on amiodarone rather pace faster, lower rate limit to 80  Contacted device rep and notified

## 2025-06-21 NOTE — PLAN OF CARE
Patient is alert and oriented times four. Lungs clear on auscultation. Patient stated that he has pain when he moves: tylenol given. He is v paced on monitor.    Plan: up in chair and try to increase activity level

## 2025-06-21 NOTE — PLAN OF CARE
Problem: Diabetes/Glucose Control  Goal: Glucose maintained within prescribed range  Description: INTERVENTIONS:  - Monitor Blood Glucose as ordered  - Assess for signs and symptoms of hyperglycemia and hypoglycemia  - Administer ordered medications to maintain glucose within target range  - Assess barriers to adequate nutritional intake and initiate nutrition consult as needed  - Instruct patient on self management of diabetes  Outcome: Progressing     Problem: Patient/Family Goals  Goal: Patient/Family Long Term Goal  Description: Patient's Long Term Goal:   To return home asap    Interventions:  - Cardiology consult  2 D echo with doppler  - See additional Care Plan goals for specific interventions  Outcome: Progressing  Goal: Patient/Family Short Term Goal  Description: Patient's Short Term Goal:  t get rid of shortness of breath    Interventions:   - medication adjustment      Supplemental oxygen as needed  - See additional Care Plan goals for specific interventions  Outcome: Progressing     Problem: PAIN - ADULT  Goal: Verbalizes/displays adequate comfort level or patient's stated pain goal  Description: INTERVENTIONS:  - Encourage pt to monitor pain and request assistance  - Assess pain using appropriate pain scale  - Administer analgesics based on type and severity of pain and evaluate response  - Implement non-pharmacological measures as appropriate and evaluate response  - Consider cultural and social influences on pain and pain management  - Manage/alleviate anxiety  - Utilize distraction and/or relaxation techniques  - Monitor for opioid side effects  - Notify MD/LIP if interventions unsuccessful or patient reports new pain  - Anticipate increased pain with activity and pre-medicate as appropriate  Outcome: Progressing     Problem: SAFETY ADULT - FALL  Goal: Free from fall injury  Description: INTERVENTIONS:  - Assess pt frequently for physical needs  - Identify cognitive and physical deficits and  behaviors that affect risk of falls.  - [place] fall precautions as indicated by assessment.  - Educate pt/family on patient safety including physical limitations  - Instruct pt to call for assistance with activity based on assessment  - Modify environment to reduce risk of injury  - Provide assistive devices as appropriate  - Consider OT/PT consult to assist with strengthening/mobility  - Encourage toileting schedule  Outcome: Progressing     Problem: CARDIOVASCULAR - ADULT  Goal: Maintains optimal cardiac output and hemodynamic stability  Description: INTERVENTIONS:  - Monitor vital signs, rhythm, and trends  - Monitor for bleeding, hypotension and signs of decreased cardiac output  - Evaluate effectiveness of vasoactive medications to optimize hemodynamic stability  - Monitor arterial and/or venous puncture sites for bleeding and/or hematoma  - Assess quality of pulses, skin color and temperature  - Assess for signs of decreased coronary artery perfusion - ex. Angina  - Evaluate fluid balance, assess for edema, trend weights  Outcome: Progressing  Goal: Absence of cardiac arrhythmias or at baseline  Description: INTERVENTIONS:  - Continuous cardiac monitoring, monitor vital signs, obtain 12 lead EKG if indicated  - Evaluate effectiveness of antiarrhythmic and heart rate control medications as ordered  - Initiate emergency measures for life threatening arrhythmias  - Monitor electrolytes and administer replacement therapy as ordered  Outcome: Progressing     Problem: RESPIRATORY - ADULT  Goal: Achieves optimal ventilation and oxygenation  Description: INTERVENTIONS:  - Assess for changes in respiratory status  - Assess for changes in mentation and behavior  - Position to facilitate oxygenation and minimize respiratory effort  - Oxygen supplementation based on oxygen saturation or ABGs  - Provide Smoking Cessation handout, if applicable  - Encourage broncho-pulmonary hygiene including cough, deep breathe,  Incentive Spirometry  - Assess the need for suctioning and perform as needed  - Assess and instruct to report SOB or any respiratory difficulty  - Respiratory Therapy support as indicated  - Manage/alleviate anxiety  - Monitor for signs/symptoms of CO2 retention  Outcome: Progressing     Problem: METABOLIC/FLUID AND ELECTROLYTES - ADULT  Goal: Glucose maintained within prescribed range  Description: INTERVENTIONS:  - Monitor Blood Glucose as ordered  - Assess for signs and symptoms of hyperglycemia and hypoglycemia  - Administer ordered medications to maintain glucose within target range  - Assess barriers to adequate nutritional intake and initiate nutrition consult as needed  - Instruct patient on self management of diabetes  Outcome: Progressing  Goal: Electrolytes maintained within normal limits  Description: INTERVENTIONS:  - Monitor labs and rhythm and assess patient for signs and symptoms of electrolyte imbalances  - Administer electrolyte replacement as ordered  - Monitor response to electrolyte replacements, including rhythm and repeat lab results as appropriate  - Fluid restriction as ordered  - Instruct patient on fluid and nutrition restrictions as appropriate  Outcome: Progressing     Problem: HEMATOLOGIC - ADULT  Goal: Free from bleeding injury  Description: (Example usage: patient with low platelets)  INTERVENTIONS:  - Avoid intramuscular injections, enemas and rectal medication administration  - Ensure safe mobilization of patient  - Hold pressure on venipuncture sites to achieve adequate hemostasis  - Assess for signs and symptoms of internal bleeding  - Monitor lab trends  - Patient is to report abnormal signs of bleeding to staff  - Avoid use of toothpicks and dental floss  - Use electric shaver for shaving  - Use soft bristle tooth brush  - Limit straining and forceful nose blowing  Outcome: Progressing

## 2025-06-21 NOTE — PROGRESS NOTES
Hematology/Oncology Progress Note    Patient Name: David Reyes  Medical Record Number: [id number]    YOB: 1945   Date of Service: 6/21/25    Reason for Consultation:  David Reyes was seen today for the diagnosis of MDS    Interval events:      - bronch yesterday with old blood in RML, RLL, REINA, and LLL. No active bleeding. Cultures pending   - hgb stable at 7.5. plt stable at 63  - resumed heparin gtt   - feels constipated    Inpatient Meds:  Scheduled Medications[1]  Medication Infusions[2]    PRN Meds:  PRN Medications[3]    Allergies:   Allergies[4]    Vital Signs:  Height: --  Weight: 90.2 kg (198 lb 13.7 oz) (06/21 0500)  BSA (Calculated - sq m): --  Pulse: 85 (06/21 0812)  BP: 91/55 (06/21 0812)  Temp: 97.8 °F (36.6 °C) (06/21 0812)  Do Not Use - Resp Rate: --  SpO2: 94 % (06/21 0823)    Wt Readings from Last 6 Encounters:   06/21/25 90.2 kg (198 lb 13.7 oz)   05/29/25 98.4 kg (217 lb)   05/22/25 99.4 kg (219 lb 3.2 oz)   04/11/25 100.8 kg (222 lb 3.2 oz)   03/17/25 100.9 kg (222 lb 8 oz)   12/09/24 105.5 kg (232 lb 8 oz)       Physical Examination:  General: Patient is alert and oriented, not in acute distress  Psych: Mood and affect are appropriate  Eyes: EOMI, PERRL  ENT: Oropharynx is clear, no adenopathy  CV: mild LE edema  Respiratory: non-labored respirations  GI/Abd: Soft, non-tender   Neurological: Grossly intact     Laboratory:  Recent Labs   Lab 06/20/25  0442 06/20/25  1432 06/21/25  0857   WBC 1.6* 1.2* 1.4*   HGB 8.3* 7.5* 7.5*   HCT 28.3* 25.2* 24.5*   PLT 47.0* 63.0* 61.0*   MCV 89.8 89.4 88.8   RDW 20.3 20.2 20.2   NEPRELIM 0.54* 0.44* 0.56*       Recent Labs   Lab 06/18/25  0620 06/19/25  0548 06/20/25  0442 06/21/25  0537    137 138 136   K 3.5  3.5 3.9 4.2 3.9   CL 98 100 100 99   CO2 28.0 27.0 29.0 28.0   BUN 66* 60* 57* 52*   CREATSERUM 2.28* 2.29* 2.11* 2.00*   * 144* 157* 129*   CA 8.5* 8.7 9.0 8.8   TP 6.5 6.8 6.9  --    ALB 3.5 3.6 3.6  --     ALKPHO 111 101 96  --    * 211* 153*  --    * 436* 332*  --    BILT 1.0 1.0 1.0  --        Recent Labs   Lab 06/18/25  0621 06/19/25  0548 06/20/25  0442 06/20/25  2237 06/21/25  0537   INR 1.46*  --  1.25*  --   --    PTT 92.7* 84.0*  --  93.3* 93.6*       Impression & Plan:     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 Pat wants to pursue treatment. I warned that chemotherapy may be difficult with his comorbidities and he understands this  - given his pancytopenia and expected prolonged admission for heart failure exacerbation and needed diuresis, plan to start azacitidine 75mg/m2/day x 7 days  - we discussed azacitidine will be given every 28-35 days. Pt verbally consented  - we previously discussed incorporating venetoclax but phase 3 trial MONA results published yesterday showed adding venetoclax to azacitidine did not improve overall survival in higher risk MDS, so will not incorporate venetoclax  - ppx: acyclovir 200mg BID, levaquin 750mg q48 hours. Will need to start posaconazole 300mg daily when obtained from pharmacy. Will need to hold off on posaconazole initiation until LFTs recover  - transfuse for hgb <7 g/dL, plt <10k  - continue azacitidine; C1D1 6/17. Last dose planned 6/23  - after discharge, will need labs checked 2x/weekly and if he needs transfusions, he can get it at our cancer center. Next cycle of chemo will not be planned until 7/15    RLL PE  - continue heparin gtt while plts >50k   - BLE dopplers negative for DVT  - near discharge, will need to transition to apixaban 2.5mg BID (due to posaconazole co-administration)  - 6/20/25 evening: heparin gtt resumed   hold heparin gtt for plt <50k. Ivc filter not indicated given dopplers negative.      Acute on chronic systolic heart failure  - per cardiology    CKD stage 4  - per nephrology    LFT elevations  - increased from 6/13  - ezetimibe held  - GI consulted  - LFTs improving; unclear etiology    Constipation  - miralax scheduled once daily    Isabelle Bloom D.O.  [facility]             [1]    torsemide  40 mg Oral BID (Diuretic)    ondansetron  8 mg Intravenous Q24H    acyclovir  200 mg Oral BID    azaCITIDine  75 mg/m2 Subcutaneous Q24H    levoFLOXacin  750 mg Oral Q48HR @ 0700    [Held by provider] ezetimibe  10 mg Oral Daily    gabapentin  100 mg Oral Daily    levothyroxine  25 mcg Oral Before breakfast    [Held by provider] losartan  25 mg Oral Daily    metoprolol succinate ER  50 mg Oral 2x Daily(Beta Blocker)    [Held by provider] spironolactone  25 mg Oral Daily with breakfast    insulin aspart  1-10 Units Subcutaneous TID AC and HS    [Held by provider] Lovastatin  40 mg Oral BID   [2]    continuous dose heparin 1,150 Units/hr (06/20/25 1631)   [3]   lidocaine-menthol    benzocaine-menthol    alum-mag hydroxide-simethicone    metoclopramide    glucose **OR** glucose **OR** dextrose **OR** glucose **OR** glucose    acetaminophen    melatonin    polyethylene glycol (PEG 3350)    sennosides    bisacodyl    benzonatate    guaiFENesin    glycerin-hypromellose-    sodium chloride  [4]   Allergies  Allergen Reactions    Pcn [Penicillins]      \"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

## 2025-06-21 NOTE — PROGRESS NOTES
[facility]  Progress Note    David Reyes Patient Status:  Inpatient    1945 MRN [id number]   Location [facility] 8NE-A Attending Delta Washington MD   Hosp Day # 9 PCP Thea Harris DO     Subjective:  David Reyes is a(n) 79 year old male remains afebrile  Sleeping but arousable walked a bit in the room  Sat up in the chair for a long time  Objective:  BP 91/73 (BP Location: Left arm)   Pulse 85   Temp 97.8 °F (36.6 °C) (Oral)   Resp 17   Wt 198 lb 13.7 oz (90.2 kg)   SpO2 96%   BMI 29.37 kg/m² remains on room air      Temp (24hrs), Av.8 °F (36.6 °C), Min:97.1 °F (36.2 °C), Max:98.4 °F (36.9 °C)      Intake/Output:    Intake/Output Summary (Last 24 hours) at 2025 1607  Last data filed at 2025 1454  Gross per 24 hour   Intake 664.5 ml   Output 2550 ml   Net -1885.5 ml       Physical Exam:   General:  sleeping but arousable no respiratory distress.   Head: Normocephalic, without obvious abnormality, atraumatic.   Throat: Lips, mucosa, and tongue normal.  No thrush noted.  No obvious bleeding   Neck: trachea midline, no adenopathy, no thyromegaly.    Lungs: Mild bibasilar rales that clears no auscultated wheeze   Chest wall: No tenderness or deformity.   Heart: Irregular murmurs noted   Abdomen: soft, non-distended, no masses, no guarding, no     Rebound.  Nontender   Extremity: Ongoing changes   Skin: No rashes or lesions.   Neurological: Alert, interactive, no focal deficits ongoing generalized weakness    Lab Data Review:  Recent Labs     25  1656 25  0549 25  0442 25  1432 25  0857   WBC 1.6* 1.7* 1.6* 1.2* 1.4*   HGB 8.0* 7.9* 8.3* 7.5* 7.5*   PLT 52.0* 47.0* 47.0* 63.0* 61.0*     Recent Labs     25  0548 25  0442 25  0537    138 136   K 3.9 4.2 3.9    100 99   CO2 27.0 29.0 28.0   BUN 60* 57* 52*   CREATSERUM 2.29* 2.11* 2.00*     Recent Labs   Lab 25  0621 25  0548 25  044  06/20/25  2237 06/21/25  0537   PTP 17.9*  --  15.8*  --   --    INR 1.46*  --  1.25*  --   --    PTT 92.7* 84.0*  --  93.3* 93.6*       Cultures: Bronchoscopy cultures no growth x 1 day AFB and fungal are pending sputum culture returned normal respiratory kimberlyn    Radiology:  No results found.  Chest x-ray is reviewed      Medications reviewed     Assessment and Plan:   Problem List[1]    Assessment:  Acute hypoxic respiratory failure-continues to improve on room air  Acute pulmonary embolism positive V/Q-negative Dopplers-on heparin with plans to transition to orals after bronchoscopy  Hemoptysis subacute reports over 18 months--diffuse mild bleeding throughout airway with no focal endobronchial lesion--bronchoscopy results pending  Right lower lobe and bilateral upper lobe groundglass opacities-possibly fluid related-with plans to follow on completion of antibiotics  Acute elevation LFTS --improved off statin  Coronary artery disease: Status post CABG and PCI in past  HFrEF last ejection fraction of 20 to 25%-agree with ongoing diuresis discussed with cardiology  Obstructive sleep apnea syndrome had been compliant for 2 years not using in the recent past  LORENA/CKD improving with improved acidosis  Myelodysplastic syndrome: With pancytopenia as new diagnosis-18% blast genetic studies pending-Dr. Chirinos's note appreciated  Rheumatoid arthritis        Plan:   following chest x-rays as he diuresis  Ongoing diuresis as tolerated now to Demadex  Bron results pending  Okay to oral anticoagulants from pulmonary standpoint  Continuing to follow           CC     Candi Barros MD  6/21/2025  4:07 PM         [1]   Patient Active Problem List  Diagnosis    Nonischemic cardiomyopathy (HCC)    FLAKO (obstructive sleep apnea)    Congestive heart failure (HCC)    CAD (coronary artery disease)    Rheumatoid arthritis involving multiple sites with positive rheumatoid factor (HCC)    Type 2 diabetes mellitus with hyperglycemia, with  long-term current use of insulin (HCC)    Normocytic anemia    Thrombocytopenia    Ischemic cardiomyopathy    Dyspnea on exertion    Dry mouth    Pure hypercholesterolemia    Vitamin D deficiency    Pulmonary hypertension secondary to increased PVR (HCC)    Carotid artery stenosis    Obesity (BMI 30.0-34.9)    Chronic obstructive pulmonary disease, unspecified (HCC)    Weakness generalized    Aortic stenosis, mild    Hyponatremia    Pancytopenia (HCC)    Acute respiratory failure with hypoxia (HCC)    Immunocompromised state (HCC)    Wide-complex tachycardia    Acute on chronic anemia    Type 2 diabetes mellitus without complication, with long-term current use of insulin (HCC)    Foot pain, bilateral    Hypothyroid    Pulmonary hypertension (HCC)    HFrEF (heart failure with reduced ejection fraction) ([facility])    PE (pulmonary thromboembolism) (HCC)    Primary hypertension    Dyspnea, unspecified type    Hemoptysis    Metabolic acidosis    LORENA (acute kidney injury)    Acute pulmonary embolism, unspecified pulmonary embolism type, unspecified whether acute cor pulmonale present (HCC)    MDS (myelodysplastic syndrome) (HCC)    CKD stage 3b, GFR 30-44 ml/min (HCC)    Cardiorenal syndrome    Acute kidney injury    Pulmonary infiltrates

## 2025-06-21 NOTE — PROGRESS NOTES
[facility]                       Gastroenterology Follow up Note - [facility]    David Reyes Patient Status:  Inpatient    1945 MRN [id number]   Location [facility] 8NE-A Attending Delta Washington MD   Hosp Day # 8 PCP Thea Harris DO     Reason for consultation: Elevated liver enzymes  Subjective: Patient seen and examined.  Denies abdominal pain, nausea, emesis.  LFTs continue to downtrend.  Review of Systems:   10 point ROS completed and was negative, except for pertinent positive and negatives stated in subjective.    For PMH, PSH, FHx and SHx- please see initial consult note.     Objective: BP 96/65 (BP Location: Left arm)   Pulse 85   Temp 98 °F (36.7 °C) (Oral)   Resp 17   Wt 204 lb 9.6 oz (92.8 kg)   SpO2 97%   BMI 30.21 kg/m²   Gen: No acute distress  Resp: no respiratory distress  Abd: Soft, non-tender, non-distended. No rebound tenderness, no guarding.   Neuro: Alert    Labs:   Lab Results   Component Value Date    WBC 1.2 2025    HGB 7.5 2025    HCT 25.2 2025    PLT 63.0 2025    CREATSERUM 2.11 2025    BUN 57 2025     2025    K 4.2 2025     2025    CO2 29.0 2025     2025    CA 9.0 2025    ALB 3.6 2025    ALKPHO 96 2025    BILT 1.0 2025     2025     2025    INR 1.25 2025    PTP 15.8 2025    MG 2.5 2025     Recent Labs   Lab 25  0620 25  0548 25  0442   * 144* 157*   BUN 66* 60* 57*   CREATSERUM 2.28* 2.29* 2.11*   CA 8.5* 8.7 9.0    137 138   K 3.5  3.5 3.9 4.2   CL 98 100 100   CO2 28.0 27.0 29.0     Recent Labs   Lab 25  1432   RBC 2.82*   HGB 7.5*   HCT 25.2*   MCV 89.4   MCH 26.6   MCHC 29.8*   RDW 20.2   NEPRELIM 0.44*   WBC 1.2*   PLT 63.0*       Recent Labs   Lab 25  0620 25  0548 25  0442   * 436* 332*   * 211* 153*        Assessment:  Impression: 79 yr-old male with PMhx that includes CM s/p ICD, CAD s/p CABG, pulmonary HTN, dyslipidemia, CKD, DM, FLAKO, RA, SDH, and MDS admitted 6/12 with dyspnea in setting of hemoptysis and worsening leg edema-->work-up revealed VQ c/w right sided PE, LORENA on CKD, pancytopenia, acute on chronic HF, and GI consulted for elevated LFTs (  Alk Phos 123 Bili 1.2) from normal levels on admission. No new abd imaging; 2-D echo on arrival revealed EF 15-20% with mild/mod MR + TR and dilated vena cava. Elevation most likely multifactorial from ischemia with hepatic congestion given decreased EF.  Autoimmune and viral processes negative..  Ultrasound abdomen Doppler unremarkable. LFTs continue to downtrend.  Recommendations:      Follow-up alpha-1 antitrypsin  Cardiology following.  Continue to optimize cardiovascular status  Hematology following.  Appreciate recommendations.  OK to resume statin from a GI perspective  CBC, CMP, INR in AM  Given continued downtrend of liver enzymes and otherwise unremarkable workup.  GI service at this time will sign off.  Please call us back with any additional questions, concerns, or if liver enzymes again start to elevate.  Upon discharge, will need to follow-up with nurse practitioner or myself in 3 to 4 weeks.  Please call with any additional questions or concerns.     Thank you for allowing us to participate in patient's care. Please call us with any questions or concerns.     Isai Nevarez DO  [facility]  [phone]

## 2025-06-21 NOTE — PLAN OF CARE
Assumed care of pt at 1930. Pt A&Ox 4; can be forgetful at times, drowsy. On RA; 2L NC NOC; pink tinged sputum. Throat sore had a bronch today. Platelets replaced. V-paced on tele; no c/o cardiac symptoms. Pt incontinent of B&B at times; external cath in place. Skin blisters noted to abdomen and right arm and wrist. Pt denies chest pain but c/o discomfort; PRN pain medication given as ordered, seems to be resting comfortably at this time. Pt up with two assist and a walker or stand pivot to the chair.     POC Cont Heparin gtt per PE protocol. Monitor Hgb and Plts. Cont SubQ chemo. SKYE placement. LFTs downtrend restart Amio in 1-2 days. Next round chemo 7/15.     Plan of care reviewed with pt and spouse whom was at the bedside; all questions answered at this time. Bed in lowest position; call light within reach. High fall risk and chemo precautions are in place per unit protocol.     Problem: Diabetes/Glucose Control  Goal: Glucose maintained within prescribed range  Description: INTERVENTIONS:  - Monitor Blood Glucose as ordered  - Assess for signs and symptoms of hyperglycemia and hypoglycemia  - Administer ordered medications to maintain glucose within target range  - Assess barriers to adequate nutritional intake and initiate nutrition consult as needed  - Instruct patient on self management of diabetes  Outcome: Progressing     Problem: Patient/Family Goals  Goal: Patient/Family Long Term Goal  Description: Patient's Long Term Goal:   To return home asap    Interventions:  - Cardiology consult  2 D echo with doppler  - See additional Care Plan goals for specific interventions  Outcome: Progressing  Goal: Patient/Family Short Term Goal  Description: Patient's Short Term Goal:  t get rid of shortness of breath    Interventions:   - medication adjustment      Supplemental oxygen as needed  - See additional Care Plan goals for specific interventions  Outcome: Progressing     Problem: PAIN - ADULT  Goal:  Verbalizes/displays adequate comfort level or patient's stated pain goal  Description: INTERVENTIONS:  - Encourage pt to monitor pain and request assistance  - Assess pain using appropriate pain scale  - Administer analgesics based on type and severity of pain and evaluate response  - Implement non-pharmacological measures as appropriate and evaluate response  - Consider cultural and social influences on pain and pain management  - Manage/alleviate anxiety  - Utilize distraction and/or relaxation techniques  - Monitor for opioid side effects  - Notify MD/LIP if interventions unsuccessful or patient reports new pain  - Anticipate increased pain with activity and pre-medicate as appropriate  Outcome: Progressing     Problem: SAFETY ADULT - FALL  Goal: Free from fall injury  Description: INTERVENTIONS:  - Assess pt frequently for physical needs  - Identify cognitive and physical deficits and behaviors that affect risk of falls.  - [place] fall precautions as indicated by assessment.  - Educate pt/family on patient safety including physical limitations  - Instruct pt to call for assistance with activity based on assessment  - Modify environment to reduce risk of injury  - Provide assistive devices as appropriate  - Consider OT/PT consult to assist with strengthening/mobility  - Encourage toileting schedule  Outcome: Progressing     Problem: CARDIOVASCULAR - ADULT  Goal: Maintains optimal cardiac output and hemodynamic stability  Description: INTERVENTIONS:  - Monitor vital signs, rhythm, and trends  - Monitor for bleeding, hypotension and signs of decreased cardiac output  - Evaluate effectiveness of vasoactive medications to optimize hemodynamic stability  - Monitor arterial and/or venous puncture sites for bleeding and/or hematoma  - Assess quality of pulses, skin color and temperature  - Assess for signs of decreased coronary artery perfusion - ex. Angina  - Evaluate fluid balance, assess for edema, trend  weights  Outcome: Progressing  Goal: Absence of cardiac arrhythmias or at baseline  Description: INTERVENTIONS:  - Continuous cardiac monitoring, monitor vital signs, obtain 12 lead EKG if indicated  - Evaluate effectiveness of antiarrhythmic and heart rate control medications as ordered  - Initiate emergency measures for life threatening arrhythmias  - Monitor electrolytes and administer replacement therapy as ordered  Outcome: Progressing     Problem: RESPIRATORY - ADULT  Goal: Achieves optimal ventilation and oxygenation  Description: INTERVENTIONS:  - Assess for changes in respiratory status  - Assess for changes in mentation and behavior  - Position to facilitate oxygenation and minimize respiratory effort  - Oxygen supplementation based on oxygen saturation or ABGs  - Provide Smoking Cessation handout, if applicable  - Encourage broncho-pulmonary hygiene including cough, deep breathe, Incentive Spirometry  - Assess the need for suctioning and perform as needed  - Assess and instruct to report SOB or any respiratory difficulty  - Respiratory Therapy support as indicated  - Manage/alleviate anxiety  - Monitor for signs/symptoms of CO2 retention  Outcome: Progressing     Problem: METABOLIC/FLUID AND ELECTROLYTES - ADULT  Goal: Glucose maintained within prescribed range  Description: INTERVENTIONS:  - Monitor Blood Glucose as ordered  - Assess for signs and symptoms of hyperglycemia and hypoglycemia  - Administer ordered medications to maintain glucose within target range  - Assess barriers to adequate nutritional intake and initiate nutrition consult as needed  - Instruct patient on self management of diabetes  Outcome: Progressing  Goal: Electrolytes maintained within normal limits  Description: INTERVENTIONS:  - Monitor labs and rhythm and assess patient for signs and symptoms of electrolyte imbalances  - Administer electrolyte replacement as ordered  - Monitor response to electrolyte replacements, including  rhythm and repeat lab results as appropriate  - Fluid restriction as ordered  - Instruct patient on fluid and nutrition restrictions as appropriate  Outcome: Progressing     Problem: HEMATOLOGIC - ADULT  Goal: Free from bleeding injury  Description: (Example usage: patient with low platelets)  INTERVENTIONS:  - Avoid intramuscular injections, enemas and rectal medication administration  - Ensure safe mobilization of patient  - Hold pressure on venipuncture sites to achieve adequate hemostasis  - Assess for signs and symptoms of internal bleeding  - Monitor lab trends  - Patient is to report abnormal signs of bleeding to staff  - Avoid use of toothpicks and dental floss  - Use electric shaver for shaving  - Use soft bristle tooth brush  - Limit straining and forceful nose blowing  Outcome: Progressing

## 2025-06-21 NOTE — PLAN OF CARE
Noted pink discoloration sacral area. See photo. Precautions in place with waffle cushion provided when in chair and under him in the bed. Turning every two hours and mepilex placed over the area.

## 2025-06-21 NOTE — PROGRESS NOTES
[facility]   [facility]     David Reyes Patient Status:  Inpatient    1945 MRN [id number]   Location [facility] 8NE-A Attending Sukhdeep Bueno DO   Hosp Day # 9 PCP Thea Harris DO     Chief Complaint: Dyspnea     Subjective:     No bleeding now. More sob and fatigued today    Objective:    Review of Systems:   A comprehensive review of systems was completed; pertinent positive and negatives stated in subjective.    Vital signs:  Temp:  [97.1 °F (36.2 °C)-98.4 °F (36.9 °C)] 97.8 °F (36.6 °C)  Pulse:  [81-90] 85  Resp:  [17-23] 17  BP: ()/(53-73) 91/73  SpO2:  [91 %-98 %] 96 %    Physical Exam:    General: No acute distress  Respiratory: No wheezes, no rhonchi  Cardiovascular: S1, S2, regular rate and rhythm  Abdomen: Soft, Non-tender, non-distended, positive bowel sounds  Neuro: No new focal deficits.   Extremities: pitting edema       Diagnostic Data:    Labs:  Recent Labs   Lab 25  0621 25  1656 25  0549 25  0442 25  1432 25  0857   WBC  --  1.6* 1.7* 1.6* 1.2* 1.4*   HGB  --  8.0* 7.9* 8.3* 7.5* 7.5*   MCV  --  88.1 88.7 89.8 89.4 88.8   PLT  --  52.0* 47.0* 47.0* 63.0* 61.0*   BAND  --   --  3  --   --   --    INR 1.46*  --   --  1.25*  --   --        Recent Labs   Lab 25  0620 25  0548 25  0442 25  0537   * 144* 157* 129*   BUN 66* 60* 57* 52*   CREATSERUM 2.28* 2.29* 2.11* 2.00*   CA 8.5* 8.7 9.0 8.8   ALB 3.5 3.6 3.6  --     137 138 136   K 3.5  3.5 3.9 4.2 3.9   CL 98 100 100 99   CO2 28.0 27.0 29.0 28.0   ALKPHO 111 101 96  --    * 211* 153*  --    * 436* 332*  --    BILT 1.0 1.0 1.0  --    TP 6.5 6.8 6.9  --        Estimated Glomerular Filtration Rate: 34 mL/min/1.73m2 (A) (result from lab).    No results for input(s): \"TROP\", \"TROPHS\", \"CK\" in the last 168 hours.      Recent Labs   Lab 25  0621 25  0442   PTP 17.9* 15.8*   INR 1.46*  1.25*                  Microbiology    Hospital Encounter on 06/12/25   1. Blood Culture     Status: None    Collection Time: 06/12/25  5:17 PM    Specimen: Blood,peripheral   Result Value Ref Range    Blood Culture Result No Growth 5 Days N/A         Imaging: Reviewed in Epic.    Medications: Scheduled Medications[1]    Assessment & Plan:      #MDS with pancytopenia   Transfuse for hb< 7-  1u PRBC on 6/14/25  Onc on CS   cont palliative chemo  S/p 1 U plt   Back on hep gtt   Transfuse hgb <7    # Acute hypoxic resp failure due to pleural effusions, PNA and pulmonary embolus - improving  # hemoptysis 2/2 thrombocytopenia  IV Abx   Hep drip as above  Diuresis   Pulm - sp bronch for hemoptysis - old blood, nothing active. D/w pulm   More sob today. Repeat CXR now, will review independently     # HFrEF, ICD- with acute exacerbation  As above  Cardiology on cs  I/O  Monitor Cr, e-  PO torsemide    #hypotension 2/2 diuresis  -improving    #atelectasis  CXR reviewed indep.  IS    #transaminitis  -GI consult  -US neg  -improving, may be able to start posaconazole once recovers    #H/o CAD s/p CABG    #Acute PE-  Heparin drip - as above  Doppler LE NEG  - no IVC filter    #Hypertension    #HLD    #Hypothyroid  Levothyroxine     #FLAKO     Delta Washington MD    Supplementary Documentation:     Quality:  DVT Mechanical Prophylaxis:   SCDs, Early ambuation  DVT Pharmacologic Prophylaxis   Medication    heparin (Porcine) 13344 units/250mL infusion PE/DVT/THROMBUS CONTINUOUS                Code Status: Full Code  Beltre: External urinary catheter in place  Beltre Duration (in days):   Central line:    GABE: 6/23/2025    Discharge is dependent on: clinical   At this point Mr. Reyes is expected to be discharge to: tbd    The 21st Century Cures Act makes medical notes like these available to patients in the interest of transparency. Please be advised this is a medical document. Medical documents are intended to carry relevant  information, facts as evident, and the clinical opinion of the practitioner. The medical note is intended as peer to peer communication and may appear blunt or direct. It is written in medical language and may contain abbreviations or verbiage that are unfamiliar.              **Certification      PHYSICIAN Certification of Need for Inpatient Hospitalization - Initial Certification    Patient will require inpatient services that will reasonably be expected to span two midnight's based on the clinical documentation in H+P.   Based on patients current state of illness, I anticipate that, after discharge, patient will require TBD.         [1]    polyethylene glycol (PEG 3350)  17 g Oral Daily    torsemide  40 mg Oral BID (Diuretic)    ondansetron  8 mg Intravenous Q24H    acyclovir  200 mg Oral BID    azaCITIDine  75 mg/m2 Subcutaneous Q24H    levoFLOXacin  750 mg Oral Q48HR @ 0700    [Held by provider] ezetimibe  10 mg Oral Daily    gabapentin  100 mg Oral Daily    levothyroxine  25 mcg Oral Before breakfast    [Held by provider] losartan  25 mg Oral Daily    metoprolol succinate ER  50 mg Oral 2x Daily(Beta Blocker)    [Held by provider] spironolactone  25 mg Oral Daily with breakfast    insulin aspart  1-10 Units Subcutaneous TID AC and HS    [Held by provider] Lovastatin  40 mg Oral BID

## 2025-06-21 NOTE — PLAN OF CARE
BRIAN Spangler notified that patient's blood pressure when in chair was 84/53 at 1230, then 87/53 at 1330. Patient was asymptomatic. Also, notified of 8 beats and 10 beats v tach today (Mg and K WNL). Blood pressure improved with bedrest to 100/71.    Orders received to give torsemide and metoprolol as ordered

## 2025-06-21 NOTE — PROGRESS NOTES
[facility] Cardiology Progress Note    Patient seen and examined.  Chart reviewed.      No chest pain today.  No palpitations.    BP 91/55 (BP Location: Left arm)   Pulse 85   Temp 97.8 °F (36.6 °C) (Oral)   Resp 20   Wt 198 lb 13.7 oz   SpO2 94%   BMI 29.37 kg/m²   Gen: alert and oriented  HEENT: moist mucous membranes  Neck: No JVD  CV: regular, s1, s2  Lungs: CTAB anteriorly  Ext: b/l chronic LE edema  Skin: warm and dry      Intake/Output Summary (Last 24 hours) at 6/21/2025 1146  Last data filed at 6/21/2025 0812  Gross per 24 hour   Intake 547.83 ml   Output 2050 ml   Net -1502.17 ml       Lab Results   Component Value Date    WBC 1.4 06/21/2025    HGB 7.5 06/21/2025    HCT 24.5 06/21/2025    PLT 61.0 06/21/2025    CREATSERUM 2.00 06/21/2025    BUN 52 06/21/2025     06/21/2025    K 3.9 06/21/2025    CL 99 06/21/2025    CO2 28.0 06/21/2025     06/21/2025    CA 8.8 06/21/2025    PTT 93.6 06/21/2025    MG 2.6 06/21/2025     Current Medications[1]  Imp/Recs:  Acute hypoxic respiratory failure - resolved  Acute on chronic CHF in setting of ischemic CM/biventricular failure (LVEF 20%)   - transition to oral diuretics   - Toprol   - MRA/ARB limited by hypotension    Acute PE - low dose apixaban due to low plt count  PPM with new drop in LV pacing and frequent PVCs - will consider oral amiodarone once LFTs improved per EP service - trending down but still elevated for now  CAD with prior CABG in 2015   HLP - statins on hold due to elevated liver enzyems  Hali on CKD3 - creatinine at baseline  Chronic anemia - consider blood transfusion with Hg 7.5.  Hematology following  Will follow,    Ismael Kennedy MD         [1]    lidocaine-menthol 4-1 % patch 1 patch  1 patch Transdermal Daily PRN    [COMPLETED] sodium chloride 0.9% infusion   Intravenous Once    benzocaine-menthol (Cepacol) lozenge 1 lozenge  1 lozenge Oral PRN    alum-mag hydroxide-simethicone (Maalox) 200-200-20 MG/5ML oral suspension 30 mL  30  mL Oral QID PRN    torsemide (Demadex) tab 40 mg  40 mg Oral BID (Diuretic)    [COMPLETED] potassium chloride (Klor-Con) 20 MEQ oral powder 40 mEq  40 mEq Oral Once    [COMPLETED] potassium chloride (Klor-Con) 20 MEQ oral powder 40 mEq  40 mEq Oral Once    [COMPLETED] sennosides (Senokot) tab 8.6 mg  8.6 mg Oral Daily    ondansetron (Zofran) 4 MG/2ML injection 8 mg  8 mg Intravenous Q24H    [COMPLETED] ondansetron (Zofran) 4 MG/2ML injection 8 mg  8 mg Intravenous Once    [COMPLETED] chlorothiazide (Diuril) 500 mg in sterile water for injection (PF) IV push  500 mg Intravenous Once    acyclovir (Zovirax) cap 200 mg  200 mg Oral BID    azaCITIDine (Vidaza) SUBQ syringe 150 mg  75 mg/m2 Subcutaneous Q24H    [COMPLETED] sodium chloride 0.9% infusion   Intravenous Once    [COMPLETED] furosemide (Lasix) 10 mg/mL injection 40 mg  40 mg Intravenous Once    metoclopramide (Reglan) 5 mg/mL injection 5 mg  5 mg Intravenous Q8H PRN    [COMPLETED] heparin (Porcine) 1000 UNIT/ML injection - BOLUS IV 3,000 Units  30 Units/kg Intravenous Once    heparin (Porcine) 65068 units/250mL infusion PE/DVT/THROMBUS CONTINUOUS  200-3,000 Units/hr Intravenous Continuous    [COMPLETED] furosemide (Lasix) 10 mg/mL injection 40 mg  40 mg Intravenous Once    levoFLOXacin (Levaquin) tab 750 mg  750 mg Oral Q48HR @ 0700    [COMPLETED] Perflutren Lipid Microsphere (DEFINITY) 6.52 MG/ML injection 1.5 mL  1.5 mL Intravenous ONCE PRN    [] dextrose 5%-sodium chloride 0.45% infusion   Intravenous Continuous    glucose (Dex4) 15 GM/59ML oral liquid 15 g  15 g Oral Q15 Min PRN    Or    glucose (Glutose) 40% oral gel 15 g  15 g Oral Q15 Min PRN    Or    dextrose 50% injection 50 mL  50 mL Intravenous Q15 Min PRN    Or    glucose (Dex4) 15 GM/59ML oral liquid 30 g  30 g Oral Q15 Min PRN    Or    glucose (Glutose) 40% oral gel 30 g  30 g Oral Q15 Min PRN    [COMPLETED] heparin (Porcine) 79687 units/250 mL infusion ED (PE/DVT/THROMBUS) INITIAL DOSE  18  Units/kg/hr Intravenous Once    [Held by provider] ezetimibe (Zetia) tab 10 mg  10 mg Oral Daily    gabapentin (Neurontin) cap 100 mg  100 mg Oral Daily    levothyroxine (Synthroid) tab 25 mcg  25 mcg Oral Before breakfast    [Held by provider] losartan (Cozaar) tab 25 mg  25 mg Oral Daily    metoprolol succinate ER (Toprol XL) 24 hr tab 50 mg  50 mg Oral 2x Daily(Beta Blocker)    [Held by provider] spironolactone (Aldactone) tab 25 mg  25 mg Oral Daily with breakfast    insulin aspart (NovoLOG) 100 Units/mL FlexPen 1-10 Units  1-10 Units Subcutaneous TID AC and HS    acetaminophen (Tylenol Extra Strength) tab 1,000 mg  1,000 mg Oral Q8H PRN    melatonin tab 3 mg  3 mg Oral Nightly PRN    polyethylene glycol (PEG 3350) (Miralax) 17 g oral packet 17 g  17 g Oral Daily PRN    sennosides (Senokot) tab 17.2 mg  17.2 mg Oral Nightly PRN    bisacodyl (Dulcolax) 10 MG rectal suppository 10 mg  10 mg Rectal Daily PRN    benzonatate (Tessalon) cap 200 mg  200 mg Oral TID PRN    guaiFENesin (Robitussin) 100 MG/5 ML oral liquid 200 mg  200 mg Oral Q4H PRN    glycerin-hypromellose- (Artificial Tears) 0.2-0.2-1 % ophthalmic solution 1 drop  1 drop Both Eyes QID PRN    sodium chloride (Saline Mist) 0.65 % nasal solution 1 spray  1 spray Each Nare Q3H PRN    [Held by provider] Lovastatin TABS 40 mg **PATIENT SUPPLIED**  40 mg Oral BID

## 2025-06-21 NOTE — TELEPHONE ENCOUNTER
Diabetes Medication Protocol Hqenzy5606/21/2025 09:32 AM   Protocol Details EGFRCR or GFRNAA > 50    Last A1C < 7.5 and within past 6 months    In person appointment or virtual visit in the past 6 mos or appointment in next 3 mos    Microalbumin procedure in past 12 months or taking ACE/ARB    GFR in the past 12 months    Medication is active on med list     Hypertension Medications Protocol Gthbge4906/21/2025 09:32 AM   Protocol Details EGFRCR or GFRNAA > 50    CMP or BMP in past 12 months    Last BP reading less than 140/90    In person appointment or virtual visit in the past 12 mos or appointment in next 3 mos    Medication is active on med list     Routing to provider per protocol.   METFORMIN  MG Oral Tablet 24 Hr   Last refilled on 12/26/24 for #360  with 0 rf.     spironolactone 25 MG Oral Tab   Last refilled on 12/20/24 for #90  with 0 rf.     Last labs 6/19/25.   Last seen on 6/12/25.     No future appointments.       Thank you.

## 2025-06-22 LAB
A-1-ANTITRYPSIN: 270 MG/DL
ANION GAP SERPL CALC-SCNC: 10 MMOL/L (ref 0–18)
APTT PPP: 93 SECONDS (ref 23–36)
BUN BLD-MCNC: 51 MG/DL (ref 9–23)
CALCIUM BLD-MCNC: 9 MG/DL (ref 8.7–10.6)
CHLORIDE SERPL-SCNC: 99 MMOL/L (ref 98–112)
CO2 SERPL-SCNC: 28 MMOL/L (ref 21–32)
CREAT BLD-MCNC: 2 MG/DL (ref 0.7–1.3)
EGFRCR SERPLBLD CKD-EPI 2021: 34 ML/MIN/1.73M2 (ref 60–?)
ERYTHROCYTE [DISTWIDTH] IN BLOOD BY AUTOMATED COUNT: 20.2 %
GLUCOSE BLD-MCNC: 131 MG/DL (ref 70–99)
GLUCOSE BLD-MCNC: 162 MG/DL (ref 70–99)
GLUCOSE BLD-MCNC: 175 MG/DL (ref 70–99)
GLUCOSE BLD-MCNC: 176 MG/DL (ref 70–99)
GLUCOSE BLD-MCNC: 231 MG/DL (ref 70–99)
HCT VFR BLD AUTO: 25.1 % (ref 39–53)
HGB BLD-MCNC: 7.5 G/DL (ref 13–17.5)
MCH RBC QN AUTO: 26.7 PG (ref 26–34)
MCHC RBC AUTO-ENTMCNC: 29.9 G/DL (ref 31–37)
MCV RBC AUTO: 89.3 FL (ref 80–100)
NEUTROPHILS # BLD AUTO: 0.59 X10 (3) UL (ref 1.5–7.7)
OSMOLALITY SERPL CALC.SUM OF ELEC: 299 MOSM/KG (ref 275–295)
PLATELET # BLD AUTO: 61 10(3)UL (ref 150–450)
PLATELETS.RETICULATED NFR BLD AUTO: 9.2 % (ref 0–7)
POTASSIUM SERPL-SCNC: 4.2 MMOL/L (ref 3.5–5.1)
RBC # BLD AUTO: 2.81 X10(6)UL (ref 3.8–5.8)
SODIUM SERPL-SCNC: 137 MMOL/L (ref 136–145)
WBC # BLD AUTO: 1.5 X10(3) UL (ref 4–11)

## 2025-06-22 PROCEDURE — 99233 SBSQ HOSP IP/OBS HIGH 50: CPT | Performed by: HOSPITALIST

## 2025-06-22 PROCEDURE — 99233 SBSQ HOSP IP/OBS HIGH 50: CPT | Performed by: INTERNAL MEDICINE

## 2025-06-22 PROCEDURE — 99232 SBSQ HOSP IP/OBS MODERATE 35: CPT | Performed by: INTERNAL MEDICINE

## 2025-06-22 NOTE — PROGRESS NOTES
[facility]  Progress Note    David Reyes Patient Status:  Inpatient    1945 MRN [id number]   Location [facility] 8NE-A Attending Delta Washington MD   Hosp Day # 10 PCP Thea Harris,      Subjective:  David Reyes is a(n) 79 year old male remains afeb   Had been awake earlier but remains very sleepy  Currently states that he is fine denies any chest pain or shortness of breath  No further bleeding seen this morning  Remains on room air    Objective:  BP 96/63 (BP Location: Left arm)   Pulse 86   Temp 97.7 °F (36.5 °C) (Oral)   Resp 16   Wt 201 lb 1 oz (91.2 kg)   SpO2 96%   BMI 29.69 kg/m² room air      Temp (24hrs), Av.7 °F (36.5 °C), Min:97.1 °F (36.2 °C), Max:98 °F (36.7 °C)      Intake/Output:    Intake/Output Summary (Last 24 hours) at 2025 1111  Last data filed at 2025 0900  Gross per 24 hour   Intake 1078.98 ml   Output 1950 ml   Net -871.02 ml       Physical Exam:   General: Sleepy but arousable-wants to stay in bed today no respiratory distress.   Head: Normocephalic, without obvious abnormality, atraumatic.   Throat: Lips, mucosa, and tongue normal.  No thrush noted.   Neck: trachea midline, no adenopathy, no thyromegaly.  Difficult to assess for JVD   Lungs: Bibasilar rales overall better air entry anteriorly   Chest wall: No tenderness or deformity.   Heart: Irregularly irregular paced   Abdomen: soft, non-distended, no masses, no guarding, no     Rebound.  Mildly protuberant no diarrhea   Extremity: Decreasing edema bilaterally seem much less tender   Skin: No rashes or lesions.   Neurological: Sleeping but arousable    Lab Data Review:  Recent Labs     25  0442 25  1432 25  0857 25  0500   WBC 1.6* 1.2* 1.4* 1.5*   HGB 8.3* 7.5* 7.5* 7.5*   PLT 47.0* 63.0* 61.0* 61.0*     Recent Labs     25  0442 25  0537 25  0500    136 137   K 4.2 3.9 4.2    99 99   CO2 29.0 28.0 28.0   BUN 57* 52* 51*    CREATSERUM 2.11* 2.00* 2.00*     Recent Labs   Lab 06/18/25  0621 06/19/25  0548 06/20/25  0442 06/20/25  2237 06/21/25  0537 06/22/25  0500   PTP 17.9*  --  15.8*  --   --   --    INR 1.46*  --  1.25*  --   --   --    PTT 92.7*   < >  --  93.3* 93.6* 93.0*    < > = values in this interval not displayed.       Cultures: AFB remains pending  Blood cultures remain negative  Bronchial bacterial cultures without growth  Fungus from BAL is pending cytology pending      Radiology:  XR CHEST AP PORTABLE  (CPT=71045)  Result Date: 6/21/2025  CONCLUSION:  Persistent patchy infiltrates are seen in the right mid lower lung field and left perihilar mid lung field.  Differential considerations include pneumonia or asymmetric pulmonary edema.  No significant change from previous exam.   LOCATION:  [facility]      Dictated by (CST): Peter Crum MD on 6/21/2025 at 9:03 PM     Finalized by (CST): Peter Crum MD on 6/21/2025 at 9:04 PM       Infiltrates had improved-now with slight increase on chest x-ray        Medications reviewed     Assessment and Plan:   Problem List[1]    Assessment:  Acute hypoxic respiratory failure-continues to improve-- on room air  Acute pulmonary embolism positive V/Q-negative Dopplers-on heparin with plans to transition to orals after bronchoscopy  Hemoptysis subacute reports over 18 months--diffuse mild bleeding throughout airway with no focal endobronchial lesion--bronchoscopy results pending/negative  Pulmonary infiltrates-some waxing and waning suspected related to fluid/pulmonary edema/pulmonary vascular redistribution-clinically improving  Acute elevation LFTS --improved off statin  Coronary artery disease: Status post CABG and PCI in past  HFrEF last ejection fraction of 20 to 25%-tolerating oral diuretics  Obstructive sleep apnea syndrome now refuses to resume  LORENA/CKD improving with improved acidosis  Myelodysplastic syndrome: With pancytopenia as new diagnosis-18% blast genetic  studies pending-Dr. Chirinos's note appreciated  Rheumatoid arthritis        Plan:   following chest x-rays as he diuresis  Ongoing diuresis as tolerated now to Demadex  [facility] results pending  Okay to oral anticoagulants from pulmonary standpoint  Continuing to follow          CC     Candi Barros MD  6/22/2025  11:11 AM         [1]   Patient Active Problem List  Diagnosis    Nonischemic cardiomyopathy (HCC)    FLAKO (obstructive sleep apnea)    Congestive heart failure (HCC)    CAD (coronary artery disease)    Rheumatoid arthritis involving multiple sites with positive rheumatoid factor (HCC)    Type 2 diabetes mellitus with hyperglycemia, with long-term current use of insulin ([facility])    Normocytic anemia    Thrombocytopenia    Ischemic cardiomyopathy    Dyspnea on exertion    Dry mouth    Pure hypercholesterolemia    Vitamin D deficiency    Pulmonary hypertension secondary to increased PVR ([facility])    Carotid artery stenosis    Obesity (BMI 30.0-34.9)    Chronic obstructive pulmonary disease, unspecified ([facility])    Weakness generalized    Aortic stenosis, mild    Hyponatremia    Pancytopenia ([facility])    Acute respiratory failure with hypoxia ([facility])    Immunocompromised state ([facility])    Wide-complex tachycardia    Acute on chronic anemia    Type 2 diabetes mellitus without complication, with long-term current use of insulin ([facility])    Foot pain, bilateral    Hypothyroid    Pulmonary hypertension ([facility])    HFrEF (heart failure with reduced ejection fraction) ([facility])    PE (pulmonary thromboembolism) ([facility])    Primary hypertension    Dyspnea, unspecified type    Hemoptysis    Metabolic acidosis    LORENA (acute kidney injury)    Acute pulmonary embolism, unspecified pulmonary embolism type, unspecified whether acute cor pulmonale present ([facility])    MDS (myelodysplastic syndrome) ([facility])    CKD stage 3b, GFR 30-44 ml/min ([facility])    Cardiorenal syndrome    Acute kidney injury    Pulmonary infiltrates

## 2025-06-22 NOTE — PROGRESS NOTES
[facility]   part of [facility]     Hospitalist Progress Note     David Reyes Patient Status:  Inpatient    1945 MRN [id number]   Location [facility] 8NE-A Attending Sukhdeep Bueno DO   Hosp Day # 10 PCP Thea Harris DO     Chief Complaint: Dyspnea     Subjective:     Sob better today     Objective:    Review of Systems:   A comprehensive review of systems was completed; pertinent positive and negatives stated in subjective.    Vital signs:  Temp:  [97.7 °F (36.5 °C)-98 °F (36.7 °C)] 97.8 °F (36.6 °C)  Pulse:  [85-95] 86  Resp:  [14-25] 21  BP: ()/(53-73) 105/66  SpO2:  [95 %-99 %] 99 %    Physical Exam:    General: No acute distress  Respiratory: No wheezes, no rhonchi  Cardiovascular: S1, S2, regular rate and rhythm  Abdomen: Soft, Non-tender, non-distended, positive bowel sounds  Neuro: No new focal deficits.   Extremities: pitting edema       Diagnostic Data:    Labs:  Recent Labs   Lab 25  0621 25  1656 25  0549 25  0442 25  1432 25  0857 25  0500   WBC  --    < > 1.7* 1.6* 1.2* 1.4* 1.5*   HGB  --    < > 7.9* 8.3* 7.5* 7.5* 7.5*   MCV  --    < > 88.7 89.8 89.4 88.8 89.3   PLT  --    < > 47.0* 47.0* 63.0* 61.0* 61.0*   BAND  --   --  3  --   --   --   --    INR 1.46*  --   --  1.25*  --   --   --     < > = values in this interval not displayed.       Recent Labs   Lab 25  0620 25  0548 25  0442 25  0537 25  0500   * 144* 157* 129* 131*   BUN 66* 60* 57* 52* 51*   CREATSERUM 2.28* 2.29* 2.11* 2.00* 2.00*   CA 8.5* 8.7 9.0 8.8 9.0   ALB 3.5 3.6 3.6  --   --     137 138 136 137   K 3.5  3.5 3.9 4.2 3.9 4.2   CL 98 100 100 99 99   CO2 28.0 27.0 29.0 28.0 28.0   ALKPHO 111 101 96  --   --    * 211* 153*  --   --    * 436* 332*  --   --    BILT 1.0 1.0 1.0  --   --    TP 6.5 6.8 6.9  --   --        Estimated Glomerular Filtration Rate: 34 mL/min/1.73m2 (A) (result from lab).    No  results for input(s): \"TROP\", \"TROPHS\", \"CK\" in the last 168 hours.      Recent Labs   Lab 06/18/25  0621 06/20/25  0442   PTP 17.9* 15.8*   INR 1.46* 1.25*                  Microbiology    Hospital Encounter on 06/12/25   1. Blood Culture     Status: None    Collection Time: 06/12/25  5:17 PM    Specimen: Blood,peripheral   Result Value Ref Range    Blood Culture Result No Growth 5 Days N/A         Imaging: Reviewed in Epic.    Medications: Scheduled Medications[1]    Assessment & Plan:      #MDS with pancytopenia   Transfuse for hb< 7-  1u PRBC on 6/14/25  Onc on CS   cont palliative chemo  S/p 1 U plt   Back on hep gtt to transition to eliquis low dose before dc  Transfuse hgb <7    # Acute hypoxic resp failure due to pleural effusions, PNA and pulmonary embolus - improving  # hemoptysis 2/2 thrombocytopenia  IV Abx   Hep drip as above  Diuresis   Pulm - sp bronch for hemoptysis - old blood, nothing active. D/w pulm   Cxs from bronch so far neg  CXR stable, reviewed indepenently    # HFrEF, ICD- with acute exacerbation  As above  Cardiology on cs  I/O  Monitor Cr, e-  PO torsemide    #hypotension 2/2 diuresis  -improving    #atelectasis  CXR reviewed indep.  IS    #transaminitis  -GI consult  -US neg  -improving, may be able to start posaconazole once recovers    #H/o CAD s/p CABG    #Acute PE-  Heparin drip - as above  Doppler LE NEG  - no IVC filter    #Hypertension    #HLD    #Hypothyroid  Levothyroxine     #FLAKO     #constipation  -miralax    Delta Washington MD    Supplementary Documentation:     Quality:  DVT Mechanical Prophylaxis:   SCDs, Early ambuation  DVT Pharmacologic Prophylaxis   Medication    heparin (Porcine) 37953 units/250mL infusion PE/DVT/THROMBUS CONTINUOUS                Code Status: Full Code  Beltre: External urinary catheter in place  Beltre Duration (in days):   Central line:    GABE:     Discharge is dependent on: clinical   At this point Mr. Reyes is expected to be discharge to: tbd    The  21st Century Cures Act makes medical notes like these available to patients in the interest of transparency. Please be advised this is a medical document. Medical documents are intended to carry relevant information, facts as evident, and the clinical opinion of the practitioner. The medical note is intended as peer to peer communication and may appear blunt or direct. It is written in medical language and may contain abbreviations or verbiage that are unfamiliar.              **Certification      PHYSICIAN Certification of Need for Inpatient Hospitalization - Initial Certification    Patient will require inpatient services that will reasonably be expected to span two midnight's based on the clinical documentation in H+P.   Based on patients current state of illness, I anticipate that, after discharge, patient will require TBD.         [1]    polyethylene glycol (PEG 3350)  17 g Oral Daily    torsemide  40 mg Oral BID (Diuretic)    ondansetron  8 mg Intravenous Q24H    acyclovir  200 mg Oral BID    azaCITIDine  75 mg/m2 Subcutaneous Q24H    levoFLOXacin  750 mg Oral Q48HR @ 0700    [Held by provider] ezetimibe  10 mg Oral Daily    gabapentin  100 mg Oral Daily    levothyroxine  25 mcg Oral Before breakfast    [Held by provider] losartan  25 mg Oral Daily    metoprolol succinate ER  50 mg Oral 2x Daily(Beta Blocker)    [Held by provider] spironolactone  25 mg Oral Daily with breakfast    insulin aspart  1-10 Units Subcutaneous TID AC and HS    [Held by provider] Lovastatin  40 mg Oral BID

## 2025-06-22 NOTE — PROGRESS NOTES
Progress Note  David Reyes Patient Status:  Inpatient    1945 MRN [id number]   Location [facility] 8NE-A Attending Delta Washington MD   Hosp Day # 10 PCP Thea Harris DO     Subjective:  Pt stated to be tired, sleepy. Denies any chest pain or SOB    Objective:  BP 94/53 (BP Location: Left arm)   Pulse 85   Temp 97.4 °F (36.3 °C) (Oral)   Resp 18   Wt 201 lb 1 oz (91.2 kg)   SpO2 99%   BMI 29.69 kg/m²     Telemetry: paced, PVC      Intake/Output:    Intake/Output Summary (Last 24 hours) at 2025 1443  Last data filed at 2025 1100  Gross per 24 hour   Intake 598.98 ml   Output 2190 ml   Net -1591.02 ml       Last 3 Weights   25 0559 201 lb 1 oz (91.2 kg)   25 0500 198 lb 13.7 oz (90.2 kg)   25 0620 204 lb 9.6 oz (92.8 kg)   25 0449 201 lb 1 oz (91.2 kg)   25 0500 205 lb (93 kg)   25 0500 209 lb 10.5 oz (95.1 kg)   25 0545 218 lb (98.9 kg)   06/15/25 0508 218 lb 8 oz (99.1 kg)   25 0434 217 lb 13 oz (98.8 kg)   25 2258 213 lb (96.6 kg)   25 1935 215 lb (97.5 kg)   25 1506 217 lb (98.4 kg)   25 1102 219 lb 3.2 oz (99.4 kg)       Labs:  Recent Labs   Lab 25  0442 25  0537 25  0500   * 129* 131*   BUN 57* 52* 51*   CREATSERUM 2.11* 2.00* 2.00*   EGFRCR 32* 34* 34*   CA 9.0 8.8 9.0    136 137   K 4.2 3.9 4.2    99 99   CO2 29.0 28.0 28.0     Recent Labs   Lab 25  1432 25  0857 25  0500   RBC 2.82* 2.76* 2.81*   HGB 7.5* 7.5* 7.5*   HCT 25.2* 24.5* 25.1*   MCV 89.4 88.8 89.3   MCH 26.6 27.2 26.7   MCHC 29.8* 30.6* 29.9*   RDW 20.2 20.2 20.2   NEPRELIM 0.44* 0.56* 0.59*   WBC 1.2* 1.4* 1.5*   PLT 63.0* 61.0* 61.0*         No results for input(s): \"TROP\", \"TROPHS\", \"CK\" in the last 168 hours.  Lab Results   Component Value Date    PT 17.1 (H) 2014    PT 16.7 (H) 2014    INR 1.25 (H) 2025    INR 1.46 (H) 2025    INR 1.44 (H) 2025        Diagnostics:       Review of Systems   Respiratory: Negative.     Cardiovascular: Negative.        Physical Exam:    Physical Exam  Vitals reviewed.   Constitutional:       General: He is not in acute distress.     Appearance: He is obese. He is not ill-appearing.   Neck:      Vascular: No JVD.   Cardiovascular:      Rate and Rhythm: Normal rate and regular rhythm.      Pulses: Normal pulses.      Heart sounds: Normal heart sounds. No murmur heard.     No friction rub. No gallop.   Musculoskeletal:         General: No swelling or deformity. Normal range of motion.      Cervical back: Normal range of motion.      Right lower leg: Edema present.      Left lower leg: Edema present.   Skin:     General: Skin is warm and dry.      Findings: Bruising present.         Medications:  Scheduled Medications[1]  Medication Infusions[2]    Assessment/Plan:  Acute hypoxic respiratory failure, resolved - Weaned to room air. Multifactorial with CHF, pneumonia, acute PE  Acute on chronic HFrEF, ischemic cardiomyopathy  Decompensated/hypervolemic on arrival. proBNP 26407  TTE 6/13 with LVEF 15-20%, mildly reduced RV function, mild-mod aortic stenosis, mild-mod MR, mild-mod TR  GDMT: Toprol-XL 50 mg BID. He has not been able to receive his home MRA/ARB due to relative hypotension. Not on ARNI due to cost. Check pricing of SGLT2.  Improved volume status with diuresis. Continue torsemide  Acute PE - Heparin gtt held due to plt< 50 k. Heme/onc following recommend apixaban 2.5 mg BID at time of discharge with recommendation to pause AC if plt drop below 50k. Interventional cardiology evaluated recommending medical management, poor candidate for invasive procedure with MDS  MDS with pancytopenia- Started on azacitidine chemotherapy. Required PRBC this admission.   LORENA on CKD3 - Nephrology following, creatinine baseline 1.9-2.1. Creatinine improving with diuresis. Cardiorenal syndrome.   Pneumonia - Pulm following, antibiotics  [employer] CRT-D- Device interrogated with new drop in LV pacing percentage from upper 90s-100 to 76%. Frequent PVCs. No AT/AF. Continue Toprol-XL. PPM setting adjusted with LRL to 80 on 6/20/25  CAD s/p CABG LIMA-LAD, prior PCI RCA and Lcx 2015- Denies angina. Continue BB. Statin held with transaminitis.  Moderate pulmonary hypertension  Transaminitis - Unlikely congestive related as LFT elevation not present on admission. Down-trending. Liver US, primary/GI evaluating. Statin held  Rheumatoid arthritis     Plan;  - AC per hematology  - continue metoprolol and torsemide  - GDMT as kidney function and Blood pressure allows      Resmi MIKEL Molina  [facility]  6/22/2025  2:43 PM    Note reviewed and labs reviewed.  Agree with above assessment and plan.  Continue diuretic  DOAC per hematology for PE    ANTWON Kennedy MD       [1]    polyethylene glycol (PEG 3350)  17 g Oral Daily    torsemide  40 mg Oral BID (Diuretic)    ondansetron  8 mg Intravenous Q24H    acyclovir  200 mg Oral BID    azaCITIDine  75 mg/m2 Subcutaneous Q24H    levoFLOXacin  750 mg Oral Q48HR @ 0700    [Held by provider] ezetimibe  10 mg Oral Daily    gabapentin  100 mg Oral Daily    levothyroxine  25 mcg Oral Before breakfast    [Held by provider] losartan  25 mg Oral Daily    metoprolol succinate ER  50 mg Oral 2x Daily(Beta Blocker)    [Held by provider] spironolactone  25 mg Oral Daily with breakfast    insulin aspart  1-10 Units Subcutaneous TID AC and HS    [Held by provider] Lovastatin  40 mg Oral BID   [2]    continuous dose heparin 1,150 Units/hr (06/21/25 1434)

## 2025-06-22 NOTE — PROGRESS NOTES
Hematology/Oncology Progress Note    Patient Name: David Reyes  Medical Record Number: [id number]    YOB: 1945   Date of Service: 6/22/25    Reason for Consultation:  David Reyes was seen today for the diagnosis of MDS    Interval events:    - bronch 6/20 with old blood in RML, RLL, REINA, and LLL. No active bleeding. Cultures pending   - hgb stable at 7.5. plt stable at 61  - heparin gtt continues to run  - no complaints today. Feels better than yesterday. Will try to walk around unit later    Inpatient Meds:  Scheduled Medications[1]  Medication Infusions[2]    PRN Meds:  PRN Medications[3]    Allergies:   Allergies[4]    Vital Signs:  Height: --  Weight: 91.2 kg (201 lb 1 oz) (06/22 0559)  BSA (Calculated - sq m): --  Pulse: 86 (06/22 0400)  BP: 86/63 (06/22 0400)  Temp: 98 °F (36.7 °C) (06/22 0400)  Do Not Use - Resp Rate: --  SpO2: 97 % (06/22 0400)    Wt Readings from Last 6 Encounters:   06/22/25 91.2 kg (201 lb 1 oz)   05/29/25 98.4 kg (217 lb)   05/22/25 99.4 kg (219 lb 3.2 oz)   04/11/25 100.8 kg (222 lb 3.2 oz)   03/17/25 100.9 kg (222 lb 8 oz)   12/09/24 105.5 kg (232 lb 8 oz)       Physical Examination:  General: Patient is alert and oriented, not in acute distress  Psych: Mood and affect are appropriate  Eyes: EOMI, PERRL  ENT: Oropharynx is clear, no adenopathy  CV: mild LE edema  Respiratory: non-labored respirations  GI/Abd: Soft, non-tender   Skin: diffuse ecchymoses on extremities  Neurological: Grossly intact     Laboratory:  Recent Labs   Lab 06/20/25  1432 06/21/25  0857 06/22/25  0500   WBC 1.2* 1.4* 1.5*   HGB 7.5* 7.5* 7.5*   HCT 25.2* 24.5* 25.1*   PLT 63.0* 61.0* 61.0*   MCV 89.4 88.8 89.3   RDW 20.2 20.2 20.2   NEPRELIM 0.44* 0.56* 0.59*       Recent Labs   Lab 06/18/25  0620 06/19/25  0548 06/20/25  0442 06/21/25  0537 06/22/25  0500    137 138 136 137   K 3.5  3.5 3.9 4.2 3.9 4.2   CL 98 100 100 99 99   CO2 28.0 27.0 29.0 28.0 28.0   BUN 66* 60* 57*  52* 51*   CREATSERUM 2.28* 2.29* 2.11* 2.00* 2.00*   * 144* 157* 129* 131*   CA 8.5* 8.7 9.0 8.8 9.0   TP 6.5 6.8 6.9  --   --    ALB 3.5 3.6 3.6  --   --    ALKPHO 111 101 96  --   --    * 211* 153*  --   --    * 436* 332*  --   --    BILT 1.0 1.0 1.0  --   --        Recent Labs   Lab 06/18/25  0621 06/19/25  0548 06/20/25  0442 06/20/25  2237 06/21/25  0537 06/22/25  0500   INR 1.46*  --  1.25*  --   --   --    PTT 92.7*   < >  --  93.3* 93.6* 93.0*    < > = values in this interval not displayed.       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 Pat wants to pursue treatment. I warned that chemotherapy may be difficult with his comorbidities and he understands this  - given his pancytopenia and expected prolonged admission for heart failure exacerbation and needed diuresis, plan to start azacitidine 75mg/m2/day x 7 days  - we discussed azacitidine will be given every 28-35 days. Pt verbally consented  - we previously discussed incorporating venetoclax but phase 3 trial MONA results published yesterday showed adding venetoclax to azacitidine did not improve overall survival in higher risk MDS, so will not incorporate venetoclax  - ppx: acyclovir 200mg BID, levaquin 750mg q48 hours. Will need to start posaconazole 300mg daily when obtained from pharmacy. Will need to hold off on posaconazole initiation until LFTs recover  - transfuse for hgb <7 g/dL, plt <10k  - continue azacitidine; C1D1 6/17. Last dose planned 6/23  - after discharge, will need labs checked 2x/weekly and if he needs transfusions, he can get it at our cancer center. Next cycle of chemo will not be planned until 7/15    RLL PE  - continue heparin gtt while plts >50k   -  BLE dopplers negative for DVT  - near discharge, will need to transition to apixaban 2.5mg BID (due to posaconazole co-administration)  - 6/20/25 evening: heparin gtt resumed   hold heparin gtt for plt <50k. Ivc filter not indicated given dopplers negative.     Acute on chronic systolic heart failure  - per cardiology    CKD stage 4  - per nephrology    LFT elevations  - increased from 6/13  - ezetimibe held  - GI consulted  - LFTs improving; unclear etiology    Constipation  - miralax scheduled once daily    Dr. Chirinos will return to round tomorrow 6/23.    Isabelle Bloom D.O.  [facility]               [1]    polyethylene glycol (PEG 3350)  17 g Oral Daily    torsemide  40 mg Oral BID (Diuretic)    ondansetron  8 mg Intravenous Q24H    acyclovir  200 mg Oral BID    azaCITIDine  75 mg/m2 Subcutaneous Q24H    levoFLOXacin  750 mg Oral Q48HR @ 0700    [Held by provider] ezetimibe  10 mg Oral Daily    gabapentin  100 mg Oral Daily    levothyroxine  25 mcg Oral Before breakfast    [Held by provider] losartan  25 mg Oral Daily    metoprolol succinate ER  50 mg Oral 2x Daily(Beta Blocker)    [Held by provider] spironolactone  25 mg Oral Daily with breakfast    insulin aspart  1-10 Units Subcutaneous TID AC and HS    [Held by provider] Lovastatin  40 mg Oral BID   [2]    continuous dose heparin 1,150 Units/hr (06/21/25 1434)   [3]   lidocaine-menthol    benzocaine-menthol    alum-mag hydroxide-simethicone    metoclopramide    glucose **OR** glucose **OR** dextrose **OR** glucose **OR** glucose    acetaminophen    melatonin    sennosides    bisacodyl    benzonatate    guaiFENesin    glycerin-hypromellose-    sodium chloride  [4]   Allergies  Allergen Reactions    Pcn [Penicillins]      \"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

## 2025-06-22 NOTE — PLAN OF CARE
Patient is fatigue, sleeping a lot, ao when spoken to. 100% RA, lungs diminished, AV Pacing. Last  bm yesterday. Abdomen distended, rounded and hypoactive.Mepilex on sacrum change (red skin but no skin breakdown..  Miralax given. Primofit changed. +3 edema to lower extremities.  IV dressing changed.   +PE-Heparin gtt at 11 ml/hr. Next PTT in am.   WBC 1.5, Hgb 7.5, Plt 61. Transfuse platelets<10 and Hgb <7  per heme.  MDS (Myelodysplastic syndrome)-continue acyclovir, Vidaza (chemo injection given by oncology RN). Last chemo 6/23.  Zofran 1 hour prior to chemo.  Creat 2. Torsemide, DW if possible. EF 15-20-%, AV pacing.  QID gluc  Hosp, Renal, Cards, Heme/Onc, and Pulm following.  Needs posatonazol but costs $346.83/mo. Jardiance $147.41/mo  May need to hold posatonazol until liver levels improve per heme.  Wife at bedside in the afternoon where patient perks up and is awake.  Full code  Problem: Diabetes/Glucose Control  Goal: Glucose maintained within prescribed range  Description: INTERVENTIONS:  - Monitor Blood Glucose as ordered  - Assess for signs and symptoms of hyperglycemia and hypoglycemia  - Administer ordered medications to maintain glucose within target range  - Assess barriers to adequate nutritional intake and initiate nutrition consult as needed  - Instruct patient on self management of diabetes  Outcome: Progressing     Problem: Patient/Family Goals  Goal: Patient/Family Long Term Goal  Description: Patient's Long Term Goal:   To return home asap    Interventions:  - Cardiology consult  2 D echo with doppler  - See additional Care Plan goals for specific interventions  Outcome: Progressing  Goal: Patient/Family Short Term Goal  Description: Patient's Short Term Goal:  t get rid of shortness of breath    Interventions:   - medication adjustment      Supplemental oxygen as needed  - See additional Care Plan goals for specific interventions  Outcome: Progressing     Problem: PAIN - ADULT  Goal:  Verbalizes/displays adequate comfort level or patient's stated pain goal  Description: INTERVENTIONS:  - Encourage pt to monitor pain and request assistance  - Assess pain using appropriate pain scale  - Administer analgesics based on type and severity of pain and evaluate response  - Implement non-pharmacological measures as appropriate and evaluate response  - Consider cultural and social influences on pain and pain management  - Manage/alleviate anxiety  - Utilize distraction and/or relaxation techniques  - Monitor for opioid side effects  - Notify MD/LIP if interventions unsuccessful or patient reports new pain  - Anticipate increased pain with activity and pre-medicate as appropriate  Outcome: Progressing     Problem: SAFETY ADULT - FALL  Goal: Free from fall injury  Description: INTERVENTIONS:  - Assess pt frequently for physical needs  - Identify cognitive and physical deficits and behaviors that affect risk of falls.  - [place] fall precautions as indicated by assessment.  - Educate pt/family on patient safety including physical limitations  - Instruct pt to call for assistance with activity based on assessment  - Modify environment to reduce risk of injury  - Provide assistive devices as appropriate  - Consider OT/PT consult to assist with strengthening/mobility  - Encourage toileting schedule  Outcome: Progressing     Problem: CARDIOVASCULAR - ADULT  Goal: Maintains optimal cardiac output and hemodynamic stability  Description: INTERVENTIONS:  - Monitor vital signs, rhythm, and trends  - Monitor for bleeding, hypotension and signs of decreased cardiac output  - Evaluate effectiveness of vasoactive medications to optimize hemodynamic stability  - Monitor arterial and/or venous puncture sites for bleeding and/or hematoma  - Assess quality of pulses, skin color and temperature  - Assess for signs of decreased coronary artery perfusion - ex. Angina  - Evaluate fluid balance, assess for edema, trend  weights  Outcome: Progressing  Goal: Absence of cardiac arrhythmias or at baseline  Description: INTERVENTIONS:  - Continuous cardiac monitoring, monitor vital signs, obtain 12 lead EKG if indicated  - Evaluate effectiveness of antiarrhythmic and heart rate control medications as ordered  - Initiate emergency measures for life threatening arrhythmias  - Monitor electrolytes and administer replacement therapy as ordered  Outcome: Progressing     Problem: RESPIRATORY - ADULT  Goal: Achieves optimal ventilation and oxygenation  Description: INTERVENTIONS:  - Assess for changes in respiratory status  - Assess for changes in mentation and behavior  - Position to facilitate oxygenation and minimize respiratory effort  - Oxygen supplementation based on oxygen saturation or ABGs  - Provide Smoking Cessation handout, if applicable  - Encourage broncho-pulmonary hygiene including cough, deep breathe, Incentive Spirometry  - Assess the need for suctioning and perform as needed  - Assess and instruct to report SOB or any respiratory difficulty  - Respiratory Therapy support as indicated  - Manage/alleviate anxiety  - Monitor for signs/symptoms of CO2 retention  Outcome: Progressing     Problem: METABOLIC/FLUID AND ELECTROLYTES - ADULT  Goal: Glucose maintained within prescribed range  Description: INTERVENTIONS:  - Monitor Blood Glucose as ordered  - Assess for signs and symptoms of hyperglycemia and hypoglycemia  - Administer ordered medications to maintain glucose within target range  - Assess barriers to adequate nutritional intake and initiate nutrition consult as needed  - Instruct patient on self management of diabetes  Outcome: Progressing  Goal: Electrolytes maintained within normal limits  Description: INTERVENTIONS:  - Monitor labs and rhythm and assess patient for signs and symptoms of electrolyte imbalances  - Administer electrolyte replacement as ordered  - Monitor response to electrolyte replacements, including  rhythm and repeat lab results as appropriate  - Fluid restriction as ordered  - Instruct patient on fluid and nutrition restrictions as appropriate  Outcome: Progressing     Problem: HEMATOLOGIC - ADULT  Goal: Free from bleeding injury  Description: (Example usage: patient with low platelets)  INTERVENTIONS:  - Avoid intramuscular injections, enemas and rectal medication administration  - Ensure safe mobilization of patient  - Hold pressure on venipuncture sites to achieve adequate hemostasis  - Assess for signs and symptoms of internal bleeding  - Monitor lab trends  - Patient is to report abnormal signs of bleeding to staff  - Avoid use of toothpicks and dental floss  - Use electric shaver for shaving  - Use soft bristle tooth brush  - Limit straining and forceful nose blowing  Outcome: Progressing

## 2025-06-22 NOTE — PROGRESS NOTES
[facility]   part of [facility]     Nephrology Progress Note    David Reyes Patient Status:  Inpatient    1945 MRN [id number]   Location [facility] 8NE-A Attending Delta Washington MD   Hosp Day # 10 PCP Thea Harris,        SUBJECTIVE:  Remains stable on RA, weak        Physical Exam:   BP 96/63 (BP Location: Left arm)   Pulse 86   Temp 97.7 °F (36.5 °C) (Oral)   Resp 16   Wt 201 lb 1 oz (91.2 kg)   SpO2 96%   BMI 29.69 kg/m²   Temp (24hrs), Av.7 °F (36.5 °C), Min:97.1 °F (36.2 °C), Max:98 °F (36.7 °C)       Intake/Output Summary (Last 24 hours) at 2025 1133  Last data filed at 2025 0900  Gross per 24 hour   Intake 1078.98 ml   Output 1950 ml   Net -871.02 ml     Last 3 Weights   25 0559 201 lb 1 oz (91.2 kg)   25 0500 198 lb 13.7 oz (90.2 kg)   25 0620 204 lb 9.6 oz (92.8 kg)   25 0449 201 lb 1 oz (91.2 kg)   25 0500 205 lb (93 kg)   25 0500 209 lb 10.5 oz (95.1 kg)   25 0545 218 lb (98.9 kg)   06/15/25 0508 218 lb 8 oz (99.1 kg)   25 0434 217 lb 13 oz (98.8 kg)   25 2258 213 lb (96.6 kg)   25 1935 215 lb (97.5 kg)   25 1506 217 lb (98.4 kg)   25 1102 219 lb 3.2 oz (99.4 kg)   25 0955 222 lb 3.2 oz (100.8 kg)   25 1636 222 lb 8 oz (100.9 kg)     General: Alert and in no apparent distress.  HEENT: No scleral icterus, MMM  Neck: Supple, no DON or thyromegaly  Cardiac: irreg/irreg, S1, S2 normal, no murmur or rub  Lungs: Clear without wheezes, rales, rhonchi.    Abdomen: Soft, non-tender. + bowel sounds, no palpable organomegaly  Extremities: no sig pitting edema.  Neurologic: moving all extremities  Skin: Warm and dry, no rash        Labs:     Recent Labs   Lab 25  0621 25  1656 25  0549 25  0442 25  1432 25  0857 25  0500   WBC  --    < > 1.7* 1.6* 1.2* 1.4* 1.5*   HGB  --    < > 7.9* 8.3* 7.5* 7.5* 7.5*   MCV  --    < > 88.7 89.8 89.4 88.8  89.3   PLT  --    < > 47.0* 47.0* 63.0* 61.0* 61.0*   BAND  --   --  3  --   --   --   --    INR 1.46*  --   --  1.25*  --   --   --     < > = values in this interval not displayed.       Recent Labs   Lab 06/17/25  0046 06/17/25  0621 06/18/25  0620 06/19/25  0548 06/20/25  0442 06/21/25  0537 06/22/25  0500   NA  --    < > 137 137 138 136 137   K 3.5   < > 3.5  3.5 3.9 4.2 3.9 4.2   CL  --    < > 98 100 100 99 99   CO2  --    < > 28.0 27.0 29.0 28.0 28.0   BUN  --    < > 66* 60* 57* 52* 51*   CREATSERUM  --    < > 2.28* 2.29* 2.11* 2.00* 2.00*   CA  --    < > 8.5* 8.7 9.0 8.8 9.0   MG 2.5  --  2.4 2.3 2.5 2.6  --    GLU  --    < > 121* 144* 157* 129* 131*    < > = values in this interval not displayed.       Recent Labs   Lab 06/17/25  0621 06/18/25  0620 06/19/25  0548 06/20/25  0442   * 540* 436* 332*   * 307* 211* 153*   ALB 3.6 3.5 3.6 3.6       Recent Labs   Lab 06/21/25  0559 06/21/25  1221 06/21/25  1835 06/21/25  2139 06/22/25  0547   PGLU 135* 154* 153* 148* 176*       Meds:   Current Hospital Medications[1]      Impression/Plan:      1.Acute kidney injury on CKD 3b: baseline serum creatinine appears to be around 1.9-2.1 mg/dL. LORENA suggestive of CRS on admit and has resolved with renal fxn again at b/l.  Again on usual oral meds     2.Acute on chronic HFrEF: cardiology following, transitioned to oral diuretics      3. Acute PE: on heparin gtt. Per pulm/heme     4. 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 Chirinos as outpatient. S/p recent BMBx. Heme following.           Questions/concerns were discussed with patient and/or family by bedside.      Jerad Oliva MD  6/22/2025  11:33 AM           [1]   Current Facility-Administered Medications   Medication Dose Route Frequency    polyethylene glycol (PEG 3350) (Miralax) 17 g oral packet 17 g  17 g Oral Daily    lidocaine-menthol 4-1 % patch 1 patch  1 patch Transdermal Daily PRN    benzocaine-menthol (Cepacol) lozenge 1 lozenge  1  lozenge Oral PRN    alum-mag hydroxide-simethicone (Maalox) 200-200-20 MG/5ML oral suspension 30 mL  30 mL Oral QID PRN    torsemide (Demadex) tab 40 mg  40 mg Oral BID (Diuretic)    ondansetron (Zofran) 4 MG/2ML injection 8 mg  8 mg Intravenous Q24H    acyclovir (Zovirax) cap 200 mg  200 mg Oral BID    azaCITIDine (Vidaza) SUBQ syringe 150 mg  75 mg/m2 Subcutaneous Q24H    metoclopramide (Reglan) 5 mg/mL injection 5 mg  5 mg Intravenous Q8H PRN    heparin (Porcine) 73761 units/250mL infusion PE/DVT/THROMBUS CONTINUOUS  200-3,000 Units/hr Intravenous Continuous    levoFLOXacin (Levaquin) tab 750 mg  750 mg Oral Q48HR @ 0700    glucose (Dex4) 15 GM/59ML oral liquid 15 g  15 g Oral Q15 Min PRN    Or    glucose (Glutose) 40% oral gel 15 g  15 g Oral Q15 Min PRN    Or    dextrose 50% injection 50 mL  50 mL Intravenous Q15 Min PRN    Or    glucose (Dex4) 15 GM/59ML oral liquid 30 g  30 g Oral Q15 Min PRN    Or    glucose (Glutose) 40% oral gel 30 g  30 g Oral Q15 Min PRN    [Held by provider] ezetimibe (Zetia) tab 10 mg  10 mg Oral Daily    gabapentin (Neurontin) cap 100 mg  100 mg Oral Daily    levothyroxine (Synthroid) tab 25 mcg  25 mcg Oral Before breakfast    [Held by provider] losartan (Cozaar) tab 25 mg  25 mg Oral Daily    metoprolol succinate ER (Toprol XL) 24 hr tab 50 mg  50 mg Oral 2x Daily(Beta Blocker)    [Held by provider] spironolactone (Aldactone) tab 25 mg  25 mg Oral Daily with breakfast    insulin aspart (NovoLOG) 100 Units/mL FlexPen 1-10 Units  1-10 Units Subcutaneous TID AC and HS    acetaminophen (Tylenol Extra Strength) tab 1,000 mg  1,000 mg Oral Q8H PRN    melatonin tab 3 mg  3 mg Oral Nightly PRN    sennosides (Senokot) tab 17.2 mg  17.2 mg Oral Nightly PRN    bisacodyl (Dulcolax) 10 MG rectal suppository 10 mg  10 mg Rectal Daily PRN    benzonatate (Tessalon) cap 200 mg  200 mg Oral TID PRN    guaiFENesin (Robitussin) 100 MG/5 ML oral liquid 200 mg  200 mg Oral Q4H PRN     glycerin-hypromellose- (Artificial Tears) 0.2-0.2-1 % ophthalmic solution 1 drop  1 drop Both Eyes QID PRN    sodium chloride (Saline Mist) 0.65 % nasal solution 1 spray  1 spray Each Nare Q3H PRN    [Held by provider] Lovastatin TABS 40 mg **PATIENT SUPPLIED**  40 mg Oral BID

## 2025-06-22 NOTE — PLAN OF CARE
Shift Note:   Assumed care of pt at 1930. Forgetful at times. Heparin running per PE/DVT/Thrombus protocol.   Patient alert and oriented x4. Belongings at bedside.   O2 sats maintained on RA, Spo2 >92%, pulse ox and tele applied.   Short of breath at times, Cho, cough present with brown tinged sputum.   BP noted to be hypotensive with most recent BP of 91/57. Denies dizziness or lightheadedness.   Currently A-V paced with HR in the 80's on tele at rest.  Denies any chest pain, dizziness, or discomfort at this time.   Incontinent of B&B. Last BM 6/21.   Activity level x2 w/walker and gait belt.   All questions addressed & updated on plan of care.   Bed locked and in lowest position. Call light within reach. Bed alarm on.    POC:   -  PO Torsemide BID // Daily weight   - Prophylaxis Acyclovir BID/Levaquin QOD   - Monitor Hgb (transfuse if Hgb <7 or Plt <50)   - SubQ chemo daily for 7 days until 6/23 - [facility] RN to come administer, Zofran 1 hr prior   - Needs posatonazol but costs $346.83/mo. [facility] $147.41/mo     Problem: Diabetes/Glucose Control  Goal: Glucose maintained within prescribed range  Description: INTERVENTIONS:  - Monitor Blood Glucose as ordered  - Assess for signs and symptoms of hyperglycemia and hypoglycemia  - Administer ordered medications to maintain glucose within target range  - Assess barriers to adequate nutritional intake and initiate nutrition consult as needed  - Instruct patient on self management of diabetes  Outcome: Progressing     Problem: Patient/Family Goals  Goal: Patient/Family Long Term Goal  Description: Patient's Long Term Goal:   To return home asap    Interventions:  - Cardiology consult  2 D echo with doppler  - See additional Care Plan goals for specific interventions  Outcome: Progressing  Goal: Patient/Family Short Term Goal  Description: Patient's Short Term Goal:  t get rid of shortness of breath    Interventions:   - medication adjustment      Supplemental oxygen as  needed  - See additional Care Plan goals for specific interventions  Outcome: Progressing     Problem: PAIN - ADULT  Goal: Verbalizes/displays adequate comfort level or patient's stated pain goal  Description: INTERVENTIONS:  - Encourage pt to monitor pain and request assistance  - Assess pain using appropriate pain scale  - Administer analgesics based on type and severity of pain and evaluate response  - Implement non-pharmacological measures as appropriate and evaluate response  - Consider cultural and social influences on pain and pain management  - Manage/alleviate anxiety  - Utilize distraction and/or relaxation techniques  - Monitor for opioid side effects  - Notify MD/LIP if interventions unsuccessful or patient reports new pain  - Anticipate increased pain with activity and pre-medicate as appropriate  Outcome: Progressing     Problem: SAFETY ADULT - FALL  Goal: Free from fall injury  Description: INTERVENTIONS:  - Assess pt frequently for physical needs  - Identify cognitive and physical deficits and behaviors that affect risk of falls.  - [place] fall precautions as indicated by assessment.  - Educate pt/family on patient safety including physical limitations  - Instruct pt to call for assistance with activity based on assessment  - Modify environment to reduce risk of injury  - Provide assistive devices as appropriate  - Consider OT/PT consult to assist with strengthening/mobility  - Encourage toileting schedule  Outcome: Progressing     Problem: CARDIOVASCULAR - ADULT  Goal: Maintains optimal cardiac output and hemodynamic stability  Description: INTERVENTIONS:  - Monitor vital signs, rhythm, and trends  - Monitor for bleeding, hypotension and signs of decreased cardiac output  - Evaluate effectiveness of vasoactive medications to optimize hemodynamic stability  - Monitor arterial and/or venous puncture sites for bleeding and/or hematoma  - Assess quality of pulses, skin color and temperature  - Assess  for signs of decreased coronary artery perfusion - ex. Angina  - Evaluate fluid balance, assess for edema, trend weights  Outcome: Progressing  Goal: Absence of cardiac arrhythmias or at baseline  Description: INTERVENTIONS:  - Continuous cardiac monitoring, monitor vital signs, obtain 12 lead EKG if indicated  - Evaluate effectiveness of antiarrhythmic and heart rate control medications as ordered  - Initiate emergency measures for life threatening arrhythmias  - Monitor electrolytes and administer replacement therapy as ordered  Outcome: Progressing     Problem: RESPIRATORY - ADULT  Goal: Achieves optimal ventilation and oxygenation  Description: INTERVENTIONS:  - Assess for changes in respiratory status  - Assess for changes in mentation and behavior  - Position to facilitate oxygenation and minimize respiratory effort  - Oxygen supplementation based on oxygen saturation or ABGs  - Provide Smoking Cessation handout, if applicable  - Encourage broncho-pulmonary hygiene including cough, deep breathe, Incentive Spirometry  - Assess the need for suctioning and perform as needed  - Assess and instruct to report SOB or any respiratory difficulty  - Respiratory Therapy support as indicated  - Manage/alleviate anxiety  - Monitor for signs/symptoms of CO2 retention  Outcome: Progressing     Problem: METABOLIC/FLUID AND ELECTROLYTES - ADULT  Goal: Glucose maintained within prescribed range  Description: INTERVENTIONS:  - Monitor Blood Glucose as ordered  - Assess for signs and symptoms of hyperglycemia and hypoglycemia  - Administer ordered medications to maintain glucose within target range  - Assess barriers to adequate nutritional intake and initiate nutrition consult as needed  - Instruct patient on self management of diabetes  Outcome: Progressing  Goal: Electrolytes maintained within normal limits  Description: INTERVENTIONS:  - Monitor labs and rhythm and assess patient for signs and symptoms of electrolyte  imbalances  - Administer electrolyte replacement as ordered  - Monitor response to electrolyte replacements, including rhythm and repeat lab results as appropriate  - Fluid restriction as ordered  - Instruct patient on fluid and nutrition restrictions as appropriate  Outcome: Progressing     Problem: HEMATOLOGIC - ADULT  Goal: Free from bleeding injury  Description: (Example usage: patient with low platelets)  INTERVENTIONS:  - Avoid intramuscular injections, enemas and rectal medication administration  - Ensure safe mobilization of patient  - Hold pressure on venipuncture sites to achieve adequate hemostasis  - Assess for signs and symptoms of internal bleeding  - Monitor lab trends  - Patient is to report abnormal signs of bleeding to staff  - Avoid use of toothpicks and dental floss  - Use electric shaver for shaving  - Use soft bristle tooth brush  - Limit straining and forceful nose blowing  Outcome: Progressing

## 2025-06-23 ENCOUNTER — APPOINTMENT (OUTPATIENT)
Dept: GENERAL RADIOLOGY | Facility: HOSPITAL | Age: 80
End: 2025-06-23
Attending: STUDENT IN AN ORGANIZED HEALTH CARE EDUCATION/TRAINING PROGRAM
Payer: MEDICARE

## 2025-06-23 PROBLEM — N18.30 STAGE 3 CHRONIC KIDNEY DISEASE (HCC): Status: ACTIVE | Noted: 2025-06-15

## 2025-06-23 LAB
ALBUMIN SERPL-MCNC: 3.3 G/DL (ref 3.2–4.8)
ALP LIVER SERPL-CCNC: 74 U/L (ref 45–117)
ALT SERPL-CCNC: 110 U/L (ref 10–49)
ANION GAP SERPL CALC-SCNC: 9 MMOL/L (ref 0–18)
APTT PPP: 116.3 SECONDS (ref 23–36)
ASPERGILLUS AG BAL/SERUM: 0.21 INDEX
AST SERPL-CCNC: 42 U/L (ref ?–34)
BASOPHILS # BLD: 0 X10(3) UL (ref 0–0.2)
BASOPHILS # BLD: 0 X10(3) UL (ref 0–0.2)
BASOPHILS NFR BLD: 0 %
BASOPHILS NFR BLD: 0 %
BILIRUB DIRECT SERPL-MCNC: 0.3 MG/DL (ref ?–0.3)
BILIRUB SERPL-MCNC: 0.8 MG/DL (ref 0.2–1.1)
BLASTS # BLD: 0.02 X10(3) UL (ref ?–0.01)
BLASTS # BLD: 0.03 X10(3) UL (ref ?–0.01)
BLASTS NFR BLD: 1 %
BLASTS NFR BLD: 2 %
BUN BLD-MCNC: 46 MG/DL (ref 9–23)
CALCIUM BLD-MCNC: 8.7 MG/DL (ref 8.7–10.6)
CHLORIDE SERPL-SCNC: 99 MMOL/L (ref 98–112)
CO2 SERPL-SCNC: 27 MMOL/L (ref 21–32)
CREAT BLD-MCNC: 1.89 MG/DL (ref 0.7–1.3)
EGFRCR SERPLBLD CKD-EPI 2021: 36 ML/MIN/1.73M2 (ref 60–?)
EOSINOPHIL # BLD: 0 X10(3) UL (ref 0–0.7)
EOSINOPHIL # BLD: 0 X10(3) UL (ref 0–0.7)
EOSINOPHIL NFR BLD: 0 %
EOSINOPHIL NFR BLD: 0 %
ERYTHROCYTE [DISTWIDTH] IN BLOOD BY AUTOMATED COUNT: 20.1 %
GLUCOSE BLD-MCNC: 130 MG/DL (ref 70–99)
GLUCOSE BLD-MCNC: 144 MG/DL (ref 70–99)
GLUCOSE BLD-MCNC: 193 MG/DL (ref 70–99)
GLUCOSE BLD-MCNC: 197 MG/DL (ref 70–99)
GLUCOSE BLD-MCNC: 241 MG/DL (ref 70–99)
HCT VFR BLD AUTO: 24.6 % (ref 39–53)
HGB BLD-MCNC: 7.3 G/DL (ref 13–17.5)
LYMPHOCYTES NFR BLD: 0.88 X10(3) UL (ref 1–4)
LYMPHOCYTES NFR BLD: 1.1 X10(3) UL (ref 1–4)
LYMPHOCYTES NFR BLD: 63 %
LYMPHOCYTES NFR BLD: 73 %
M TB CMPLX RRNA SPEC QL PROBE: NOT DETECTED
MCH RBC QN AUTO: 26.5 PG (ref 26–34)
MCHC RBC AUTO-ENTMCNC: 29.7 G/DL (ref 31–37)
MCV RBC AUTO: 89.5 FL (ref 80–100)
MONOCYTES # BLD: 0.03 X10(3) UL (ref 0.1–1)
MONOCYTES # BLD: 0.05 X10(3) UL (ref 0.1–1)
MONOCYTES NFR BLD: 2 %
MONOCYTES NFR BLD: 3 %
NEUTROPHILS # BLD AUTO: 0.49 X10 (3) UL (ref 1.5–7.7)
NEUTROPHILS NFR BLD: 23 %
NEUTROPHILS NFR BLD: 33 %
NEUTS HYPERSEG # BLD: 0.35 X10(3) UL (ref 1.5–7.7)
NEUTS HYPERSEG # BLD: 0.46 X10(3) UL (ref 1.5–7.7)
NON GYNE INTERPRETATION: NEGATIVE
NRBC BLD MANUAL-RTO: 14 % (ref ?–1)
NRBC BLD MANUAL-RTO: 5 % (ref ?–1)
NT-PROBNP SERPL-MCNC: ABNORMAL PG/ML (ref ?–450)
OSMOLALITY SERPL CALC.SUM OF ELEC: 294 MOSM/KG (ref 275–295)
PLATELET # BLD AUTO: 58 10(3)UL (ref 150–450)
PLATELET MORPHOLOGY: NORMAL
PLATELET MORPHOLOGY: NORMAL
PLATELETS.RETICULATED NFR BLD AUTO: 8.5 % (ref 0–7)
POTASSIUM SERPL-SCNC: 4.3 MMOL/L (ref 3.5–5.1)
PROT SERPL-MCNC: 6.5 G/DL (ref 5.7–8.2)
RBC # BLD AUTO: 2.75 X10(6)UL (ref 3.8–5.8)
SODIUM SERPL-SCNC: 135 MMOL/L (ref 136–145)
TOTAL CELLS COUNTED BLD: 100
TOTAL CELLS COUNTED BLD: 100
WBC # BLD AUTO: 1.4 X10(3) UL (ref 4–11)

## 2025-06-23 PROCEDURE — 71045 X-RAY EXAM CHEST 1 VIEW: CPT | Performed by: STUDENT IN AN ORGANIZED HEALTH CARE EDUCATION/TRAINING PROGRAM

## 2025-06-23 PROCEDURE — 99232 SBSQ HOSP IP/OBS MODERATE 35: CPT | Performed by: INTERNAL MEDICINE

## 2025-06-23 PROCEDURE — 99233 SBSQ HOSP IP/OBS HIGH 50: CPT | Performed by: INTERNAL MEDICINE

## 2025-06-23 PROCEDURE — 99233 SBSQ HOSP IP/OBS HIGH 50: CPT | Performed by: HOSPITALIST

## 2025-06-23 RX ORDER — SPIRONOLACTONE 25 MG/1
25 TABLET ORAL
Qty: 90 TABLET | Refills: 0 | OUTPATIENT
Start: 2025-06-23

## 2025-06-23 RX ORDER — METFORMIN HYDROCHLORIDE 500 MG/1
1000 TABLET, EXTENDED RELEASE ORAL 2 TIMES DAILY WITH MEALS
Qty: 360 TABLET | Refills: 0 | OUTPATIENT
Start: 2025-06-23

## 2025-06-23 RX ORDER — FLUCONAZOLE 100 MG/1
200 TABLET ORAL DAILY
Status: DISCONTINUED | OUTPATIENT
Start: 2025-06-23 | End: 2025-06-24

## 2025-06-23 RX ORDER — FLUCONAZOLE 200 MG/1
400 TABLET ORAL DAILY
Qty: 180 TABLET | Refills: 3 | Status: SHIPPED | OUTPATIENT
Start: 2025-06-23

## 2025-06-23 RX ORDER — ONDANSETRON 2 MG/ML
8 INJECTION INTRAMUSCULAR; INTRAVENOUS ONCE
Status: COMPLETED | OUTPATIENT
Start: 2025-06-23 | End: 2025-06-23

## 2025-06-23 NOTE — PROGRESS NOTES
[facility]  Nephrology Progress Note    David Reyes Attending:  Delta Washington MD       Assessment and Plan:    1) LORENA on CKD 3- primarily due to cardiorenal syndrome / severe systolic HF in setting of PE- labs near baseline. 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 Chirinos)    5) CAD h/o CABG / interventions      Subjective:  Awake alert chart noted    Physical Exam:   /62 (BP Location: Left arm)   Pulse 96   Temp 98.9 °F (37.2 °C) (Oral)   Resp 22   Wt 204 lb 9.4 oz (92.8 kg)   SpO2 100%   BMI 30.21 kg/m²   Temp (24hrs), Av.1 °F (36.7 °C), Min:97.4 °F (36.3 °C), Max:98.9 °F (37.2 °C)       Intake/Output Summary (Last 24 hours) at 2025 0751  Last data filed at 2025 0749  Gross per 24 hour   Intake 600 ml   Output 2815 ml   Net -2215 ml     Wt Readings from Last 3 Encounters:   25 204 lb 9.4 oz (92.8 kg)   25 217 lb (98.4 kg)   25 219 lb 3.2 oz (99.4 kg)     General: awake alert  HEENT: No scleral icterus, MMM  Neck: Supple, no DON or thyromegaly  Cardiac: Regular rate and rhythm, S1, S2 normal, no murmur or tub  Lungs: Decreased BS at bases bilaterally   Abdomen: Soft, non-tender. + bowel sounds, no palpable organomegaly  Extremities: Without clubbing, cyanosis; no edmea  Neurologic: Cranial nerves grossly intact, moving all extremities  Skin: Warm and dry, no rashes       Labs:   Lab Results   Component Value Date    WBC 1.4 2025    HGB 7.3 2025    HCT 24.6 2025    PLT 58.0 2025    CREATSERUM 1.89 2025    BUN 46 2025     2025    K 4.3 2025    CL 99 2025    CO2 27.0 2025     2025    CA 8.7 2025    ALB 3.3 2025    ALKPHO 74  06/23/2025    BILT 0.8 06/23/2025    TP 6.5 06/23/2025    AST 42 06/23/2025     06/23/2025    .3 06/23/2025    PGLU 144 06/23/2025       Imaging:  All imaging studies reviewed.    Meds:   Current Hospital Medications[1]      Questions/concerns were discussed with patient and/or family by bedside.          Lacie Alvarez MD  6/23/2025  7:51 AM         [1]   Current Facility-Administered Medications   Medication Dose Route Frequency    polyethylene glycol (PEG 3350) (Miralax) 17 g oral packet 17 g  17 g Oral Daily    lidocaine-menthol 4-1 % patch 1 patch  1 patch Transdermal Daily PRN    benzocaine-menthol (Cepacol) lozenge 1 lozenge  1 lozenge Oral PRN    alum-mag hydroxide-simethicone (Maalox) 200-200-20 MG/5ML oral suspension 30 mL  30 mL Oral QID PRN    torsemide (Demadex) tab 40 mg  40 mg Oral BID (Diuretic)    acyclovir (Zovirax) cap 200 mg  200 mg Oral BID    azaCITIDine (Vidaza) SUBQ syringe 150 mg  75 mg/m2 Subcutaneous Q24H    metoclopramide (Reglan) 5 mg/mL injection 5 mg  5 mg Intravenous Q8H PRN    heparin (Porcine) 90733 units/250mL infusion PE/DVT/THROMBUS CONTINUOUS  200-3,000 Units/hr Intravenous Continuous    levoFLOXacin (Levaquin) tab 750 mg  750 mg Oral Q48HR @ 0700    glucose (Dex4) 15 GM/59ML oral liquid 15 g  15 g Oral Q15 Min PRN    Or    glucose (Glutose) 40% oral gel 15 g  15 g Oral Q15 Min PRN    Or    dextrose 50% injection 50 mL  50 mL Intravenous Q15 Min PRN    Or    glucose (Dex4) 15 GM/59ML oral liquid 30 g  30 g Oral Q15 Min PRN    Or    glucose (Glutose) 40% oral gel 30 g  30 g Oral Q15 Min PRN    [Held by provider] ezetimibe (Zetia) tab 10 mg  10 mg Oral Daily    gabapentin (Neurontin) cap 100 mg  100 mg Oral Daily    levothyroxine (Synthroid) tab 25 mcg  25 mcg Oral Before breakfast    [Held by provider] losartan (Cozaar) tab 25 mg  25 mg Oral Daily    metoprolol succinate ER (Toprol XL) 24 hr tab 50 mg  50 mg Oral 2x Daily(Beta Blocker)    [Held by provider]  spironolactone (Aldactone) tab 25 mg  25 mg Oral Daily with breakfast    insulin aspart (NovoLOG) 100 Units/mL FlexPen 1-10 Units  1-10 Units Subcutaneous TID AC and HS    acetaminophen (Tylenol Extra Strength) tab 1,000 mg  1,000 mg Oral Q8H PRN    melatonin tab 3 mg  3 mg Oral Nightly PRN    sennosides (Senokot) tab 17.2 mg  17.2 mg Oral Nightly PRN    bisacodyl (Dulcolax) 10 MG rectal suppository 10 mg  10 mg Rectal Daily PRN    benzonatate (Tessalon) cap 200 mg  200 mg Oral TID PRN    guaiFENesin (Robitussin) 100 MG/5 ML oral liquid 200 mg  200 mg Oral Q4H PRN    glycerin-hypromellose- (Artificial Tears) 0.2-0.2-1 % ophthalmic solution 1 drop  1 drop Both Eyes QID PRN    sodium chloride (Saline Mist) 0.65 % nasal solution 1 spray  1 spray Each Nare Q3H PRN    [Held by provider] Lovastatin TABS 40 mg **PATIENT SUPPLIED**  40 mg Oral BID

## 2025-06-23 NOTE — PROGRESS NOTES
[facility]  Progress Note    David Reyes Patient Status:  Inpatient    1945 MRN [id number]   Location [facility] 8NE-A Attending Alejandra Mehta MD   Hosp Day # 11 PCP Thea Harris DO     Subjective:  No acute events overnight, he feels the same today with continued fatigue. Reports continued low volume hemoptysis. No fevers. No pain.     Objective:  /62 (BP Location: Left arm)   Pulse 96   Temp 98.9 °F (37.2 °C) (Oral)   Resp 22   Wt 204 lb 9.4 oz (92.8 kg)   SpO2 100%   BMI 30.21 kg/m² room air        Temp (24hrs), Av.1 °F (36.7 °C), Min:97.4 °F (36.3 °C), Max:98.9 °F (37.2 °C)      Medications - reviewed  Scheduled Medications[1]  Current Medications[2]        Intake/Output:    Intake/Output Summary (Last 24 hours) at 2025 0800  Last data filed at 2025 0749  Gross per 24 hour   Intake 600 ml   Output 2815 ml   Net -2215 ml       Physical Exam:   General: alert, cooperative, oriented.  No respiratory distress.   Head: Normocephalic, without obvious abnormality, atraumatic.   Throat: Lips, mucosa, and tongue normal.  No thrush noted.   Neck: trachea midline, no adenopathy, no thyromegaly. No JVD.   Lungs: clear bilaterally. No wheeze   Chest wall: No tenderness or deformity.   Heart: Irregular with murmur   Abdomen: soft, non-distended, no pain with palpation   Extremity: BLE edema   Neurological: Alert, interactive, no focal deficits    Lab Data Review:  Recent Labs   Lab 25  0857 25  0500 25  0502   RBC 2.76* 2.81* 2.75*   HGB 7.5* 7.5* 7.3*   HCT 24.5* 25.1* 24.6*   MCV 88.8 89.3 89.5   MCH 27.2 26.7 26.5   MCHC 30.6* 29.9* 29.7*   RDW 20.2 20.2 20.1   NEPRELIM 0.56* 0.59* 0.49*   WBC 1.4* 1.5* 1.4*   PLT 61.0* 61.0* 58.0*       Recent Labs   Lab 25  0537 25  0500 25  0502   * 131* 130*   BUN 52* 51* 46*   CREATSERUM 2.00* 2.00* 1.89*   EGFRCR 34* 34* 36*   CA 8.8 9.0 8.7    137 135*   K 3.9 4.2 4.3   CL 99 99 99    CO2 28.0 28.0 27.0         Recent Labs   Lab 06/18/25  0621 06/19/25  0548 06/20/25  0442 06/20/25  2237 06/21/25  0537 06/22/25  0500 06/23/25  0502   PTP 17.9*  --  15.8*  --   --   --   --    INR 1.46*  --  1.25*  --   --   --   --    PTT 92.7*   < >  --    < > 93.6* 93.0* 116.3*    < > = values in this interval not displayed.       Cultures: Blood cultures remain negative    Radiology:  reviewed       Assessment:  Acute hypoxic respiratory failure, improved now on RA  Acute pulmonary embolism positive V/Q-negative Dopplers  Hemoptysis , chronic, reports over 18 months--no endobronchial disease on CT or bronchoscopy; suspect related to pancytopenia and heart failure  Bilateral pulmonary opacities, possible related to edema  Small pulmonary nodules: CT chest 6/12/2025  Acute elevation LFTs, improved  History of smoking at least 26 pack years tobacco though quit in 1986  Coronary artery disease: Status post CABG and PCI in past  HFrEF last ejection fraction of 20 to 25%   Obstructive sleep apnea syndrome had been compliant for 2 years not using in the recent past  LORENA/CKD improving with improved acidosis  Myelodysplastic syndrome with pancytopenia   Rheumatoid arthritis    Plan:  Monitor respiratory status, maintain sats >89%  Diuresis as per cardiology  Anticoagulation as per hematology  Can finish abx from pulmonary standpoint, BAL results remain ngtd    The pulmonary service will follow peripherally. Please call with questions/concerns       [1]    apixaban  2.5 mg Oral BID    fluconazole  200 mg Oral Daily    polyethylene glycol (PEG 3350)  17 g Oral Daily    torsemide  40 mg Oral BID (Diuretic)    acyclovir  200 mg Oral BID    azaCITIDine  75 mg/m2 Subcutaneous Q24H    levoFLOXacin  750 mg Oral Q48HR @ 0700    [Held by provider] ezetimibe  10 mg Oral Daily    gabapentin  100 mg Oral Daily    levothyroxine  25 mcg Oral Before breakfast    [Held by provider] losartan  25 mg Oral Daily    metoprolol succinate  ER  50 mg Oral 2x Daily(Beta Blocker)    [Held by provider] spironolactone  25 mg Oral Daily with breakfast    insulin aspart  1-10 Units Subcutaneous TID AC and HS    [Held by provider] Lovastatin  40 mg Oral BID   [2]    apixaban (Eliquis) tab 2.5 mg  2.5 mg Oral BID    fluconazole (Diflucan) tab 200 mg  200 mg Oral Daily    polyethylene glycol (PEG 3350) (Miralax) 17 g oral packet 17 g  17 g Oral Daily    lidocaine-menthol 4-1 % patch 1 patch  1 patch Transdermal Daily PRN    [COMPLETED] sodium chloride 0.9% infusion   Intravenous Once    benzocaine-menthol (Cepacol) lozenge 1 lozenge  1 lozenge Oral PRN    alum-mag hydroxide-simethicone (Maalox) 200-200-20 MG/5ML oral suspension 30 mL  30 mL Oral QID PRN    torsemide (Demadex) tab 40 mg  40 mg Oral BID (Diuretic)    [COMPLETED] potassium chloride (Klor-Con) 20 MEQ oral powder 40 mEq  40 mEq Oral Once    [COMPLETED] potassium chloride (Klor-Con) 20 MEQ oral powder 40 mEq  40 mEq Oral Once    [COMPLETED] sennosides (Senokot) tab 8.6 mg  8.6 mg Oral Daily    [COMPLETED] ondansetron (Zofran) 4 MG/2ML injection 8 mg  8 mg Intravenous Q24H    [COMPLETED] ondansetron (Zofran) 4 MG/2ML injection 8 mg  8 mg Intravenous Once    [COMPLETED] chlorothiazide (Diuril) 500 mg in sterile water for injection (PF) IV push  500 mg Intravenous Once    acyclovir (Zovirax) cap 200 mg  200 mg Oral BID    azaCITIDine (Vidaza) SUBQ syringe 150 mg  75 mg/m2 Subcutaneous Q24H    [COMPLETED] sodium chloride 0.9% infusion   Intravenous Once    [COMPLETED] furosemide (Lasix) 10 mg/mL injection 40 mg  40 mg Intravenous Once    metoclopramide (Reglan) 5 mg/mL injection 5 mg  5 mg Intravenous Q8H PRN    [COMPLETED] heparin (Porcine) 1000 UNIT/ML injection - BOLUS IV 3,000 Units  30 Units/kg Intravenous Once    [COMPLETED] furosemide (Lasix) 10 mg/mL injection 40 mg  40 mg Intravenous Once    levoFLOXacin (Levaquin) tab 750 mg  750 mg Oral Q48HR @ 0700    [COMPLETED] Perflutren Lipid Microsphere  (DEFINITY) 6.52 MG/ML injection 1.5 mL  1.5 mL Intravenous ONCE PRN    [] dextrose 5%-sodium chloride 0.45% infusion   Intravenous Continuous    glucose (Dex4) 15 GM/59ML oral liquid 15 g  15 g Oral Q15 Min PRN    Or    glucose (Glutose) 40% oral gel 15 g  15 g Oral Q15 Min PRN    Or    dextrose 50% injection 50 mL  50 mL Intravenous Q15 Min PRN    Or    glucose (Dex4) 15 GM/59ML oral liquid 30 g  30 g Oral Q15 Min PRN    Or    glucose (Glutose) 40% oral gel 30 g  30 g Oral Q15 Min PRN    [COMPLETED] heparin (Porcine) 88020 units/250 mL infusion ED (PE/DVT/THROMBUS) INITIAL DOSE  18 Units/kg/hr Intravenous Once    [Held by provider] ezetimibe (Zetia) tab 10 mg  10 mg Oral Daily    gabapentin (Neurontin) cap 100 mg  100 mg Oral Daily    levothyroxine (Synthroid) tab 25 mcg  25 mcg Oral Before breakfast    [Held by provider] losartan (Cozaar) tab 25 mg  25 mg Oral Daily    metoprolol succinate ER (Toprol XL) 24 hr tab 50 mg  50 mg Oral 2x Daily(Beta Blocker)    [Held by provider] spironolactone (Aldactone) tab 25 mg  25 mg Oral Daily with breakfast    insulin aspart (NovoLOG) 100 Units/mL FlexPen 1-10 Units  1-10 Units Subcutaneous TID AC and HS    acetaminophen (Tylenol Extra Strength) tab 1,000 mg  1,000 mg Oral Q8H PRN    melatonin tab 3 mg  3 mg Oral Nightly PRN    sennosides (Senokot) tab 17.2 mg  17.2 mg Oral Nightly PRN    bisacodyl (Dulcolax) 10 MG rectal suppository 10 mg  10 mg Rectal Daily PRN    benzonatate (Tessalon) cap 200 mg  200 mg Oral TID PRN    guaiFENesin (Robitussin) 100 MG/5 ML oral liquid 200 mg  200 mg Oral Q4H PRN    glycerin-hypromellose- (Artificial Tears) 0.2-0.2-1 % ophthalmic solution 1 drop  1 drop Both Eyes QID PRN    sodium chloride (Saline Mist) 0.65 % nasal solution 1 spray  1 spray Each Nare Q3H PRN    [Held by provider] Lovastatin TABS 40 mg **PATIENT SUPPLIED**  40 mg Oral BID

## 2025-06-23 NOTE — PROGRESS NOTES
Hematology/Oncology Progress Note    Patient Name: David Reyes  Medical Record Number: [id number]    YOB: 1945     Reason for Consultation:  David Reyes was seen today for the diagnosis of MDS    Interval events:      - hgb 7.3   - plt 58k  - sCr slightly improved  - remains therapeutic   - LFTs improved    Inpatient Meds:  Scheduled Medications[1]  Medication Infusions[2]    PRN Meds:  PRN Medications[3]    Allergies:   Allergies[4]    Vital Signs:  Height: --  Weight: 92.8 kg (204 lb 9.4 oz) (06/23 0540)  BSA (Calculated - sq m): --  Pulse: 96 (06/23 0749)  BP: 109/62 (06/23 0749)  Temp: 98.9 °F (37.2 °C) (06/23 0749)  Do Not Use - Resp Rate: --  SpO2: 100 % (06/23 0749)    Wt Readings from Last 6 Encounters:   06/23/25 92.8 kg (204 lb 9.4 oz)   05/29/25 98.4 kg (217 lb)   05/22/25 99.4 kg (219 lb 3.2 oz)   04/11/25 100.8 kg (222 lb 3.2 oz)   03/17/25 100.9 kg (222 lb 8 oz)   12/09/24 105.5 kg (232 lb 8 oz)       Physical Examination:  General: Patient is alert and oriented, not in acute distress  Psych: Mood and affect are appropriate  Eyes: EOMI, PERRL  ENT: Oropharynx is clear, no adenopathy  CV: mild LE edema  Respiratory: non-labored respirations  GI/Abd: Soft, non-tender   Neurological: Grossly intact     Laboratory:  Recent Labs   Lab 06/21/25  0857 06/22/25  0500 06/23/25  0502   WBC 1.4* 1.5* 1.4*   HGB 7.5* 7.5* 7.3*   HCT 24.5* 25.1* 24.6*   PLT 61.0* 61.0* 58.0*   MCV 88.8 89.3 89.5   RDW 20.2 20.2 20.1   NEPRELIM 0.56* 0.59* 0.49*       Recent Labs   Lab 06/19/25  0548 06/20/25  0442 06/21/25  0537 06/22/25  0500 06/23/25  0502    138 136 137 135*   K 3.9 4.2 3.9 4.2 4.3    100 99 99 99   CO2 27.0 29.0 28.0 28.0 27.0   BUN 60* 57* 52* 51* 46*   CREATSERUM 2.29* 2.11* 2.00* 2.00* 1.89*   * 157* 129* 131* 130*   CA 8.7 9.0 8.8 9.0 8.7   TP 6.8 6.9  --   --  6.5   ALB 3.6 3.6  --   --  3.3   ALKPHO 101 96  --   --  74   * 153*  --   --  42*   ALT  436* 332*  --   --  110*   BILT 1.0 1.0  --   --  0.8       Recent Labs   Lab 06/18/25  0621 06/19/25  0548 06/20/25  0442 06/20/25  2237 06/21/25  0537 06/22/25  0500 06/23/25  0502   INR 1.46*  --  1.25*  --   --   --   --    PTT 92.7*   < >  --    < > 93.6* 93.0* 116.3*    < > = values in this interval not displayed.       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 Pat wants to pursue treatment. I warned that chemotherapy may be difficult with his comorbidities and he understands this  - given his pancytopenia and expected prolonged admission for heart failure exacerbation and needed diuresis, plan to start azacitidine 75mg/m2/day x 7 days  - we discussed azacitidine will be given every 28-35 days. Pt verbally consented  - we previously discussed incorporating venetoclax but phase 3 trial MONA results published yesterday showed adding venetoclax to azacitidine did not improve overall survival in higher risk MDS, so will not incorporate venetoclax  - ppx: acyclovir 200mg BID, levaquin 750mg q48 hours. Will need to start posaconazole 300mg daily when obtained from pharmacy. Start posaconazole today; however pt says he has limited income and may not be able to afford it. Will investigate today if pt can fill posaconazole; if unable to afford will have to switch to fluconazole instead  - transfuse for hgb <7 g/dL, plt <10k  - continue azacitidine; C1D1 6/17. Last dose planned 6/23  - after discharge, will need labs checked 2x/weekly and if he needs transfusions, he can get it at our cancer center. Next cycle of chemo will not be planned until 7/15    RLL PE  - continue heparin gtt while plts >50k   - BLE dopplers negative for DVT  - near discharge, will need  to transition to apixaban 2.5mg BID (due to posaconazole co-administration)  - hold heparin gtt for plt <50k. Ivc filter not indicated given dopplers negative. Restart heparin gtt if plts remain >50k    Acute on chronic systolic heart failure  - per cardiology    CKD stage 4  - per nephrology    LFT elevations  - increased from 6/13  - ezetimibe held  - GI consulted  - LFTs improved to < grade 1    James Chirinos MD  [facility]             [1]    polyethylene glycol (PEG 3350)  17 g Oral Daily    torsemide  40 mg Oral BID (Diuretic)    acyclovir  200 mg Oral BID    azaCITIDine  75 mg/m2 Subcutaneous Q24H    levoFLOXacin  750 mg Oral Q48HR @ 0700    [Held by provider] ezetimibe  10 mg Oral Daily    gabapentin  100 mg Oral Daily    levothyroxine  25 mcg Oral Before breakfast    [Held by provider] losartan  25 mg Oral Daily    metoprolol succinate ER  50 mg Oral 2x Daily(Beta Blocker)    [Held by provider] spironolactone  25 mg Oral Daily with breakfast    insulin aspart  1-10 Units Subcutaneous TID AC and HS    [Held by provider] Lovastatin  40 mg Oral BID   [2]    continuous dose heparin 1,100 Units/hr (06/23/25 0555)   [3]   lidocaine-menthol    benzocaine-menthol    alum-mag hydroxide-simethicone    metoclopramide    glucose **OR** glucose **OR** dextrose **OR** glucose **OR** glucose    acetaminophen    melatonin    sennosides    bisacodyl    benzonatate    guaiFENesin    glycerin-hypromellose-    sodium chloride  [4]   Allergies  Allergen Reactions    Pcn [Penicillins]      \"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

## 2025-06-23 NOTE — PROGRESS NOTES
[facility]   part of [facility]    Advanced Heart Failure Progress Note    David Reyes Patient Status:  Inpatient    1945 MRN [id number]   Location [facility] 8NE-A Attending Alejandra Mehta MD   Hosp Day # 11 PCP Thea Harris,      Subjective:    Marked improvement in conversational dyspnea but feels drained and tired. Hoping to work with PT / OT today for stamina and strength management. Denies CP, syncope.     Objective:  /62 (BP Location: Left arm)   Pulse 96   Temp 98.9 °F (37.2 °C) (Oral)   Resp 22   Wt 204 lb 9.4 oz (92.8 kg)   SpO2 100%   BMI 30.21 kg/m²     Temp (24hrs), Av.1 °F (36.7 °C), Min:97.4 °F (36.3 °C), Max:98.9 °F (37.2 °C)      Intake/Output:    Intake/Output Summary (Last 24 hours) at 2025 0945  Last data filed at 2025 0749  Gross per 24 hour   Intake 360 ml   Output 2465 ml   Net -2105 ml       Wt Readings from Last 3 Encounters:   25 204 lb 9.4 oz (92.8 kg)   25 217 lb (98.4 kg)   25 219 lb 3.2 oz (99.4 kg)       Allergies:  Allergies[1]    Physical Exam:  Blood pressure 109/62, pulse 96, temperature 98.9 °F (37.2 °C), temperature source Oral, resp. rate 22, weight 204 lb 9.4 oz (92.8 kg), SpO2 100%.    General: Alert and oriented in no apparent distress.obese  HEENT: No focal deficits.  Neck: above clavicle JVD present, normal ROM.  Cardiac: Regular rate and rhythm, S1, S2 normal, no rubs or gallops noted.  Lungs: conversational dyspnea present (improved from prior), basilar crackles present.  Abdomen: Soft, non-tender.   Extremities: minimal LE edema  Neurologic: Alert and oriented, normal affect.  Skin: Warm and dry.     Laboratory/Data:      Labs:  Lab Results   Component Value Date    WBC 1.4 2025    RBC 2.75 2025    HGB 7.3 2025    HCT 24.6 2025    MCV 89.5 2025    MCH 26.5 2025    MCHC 29.7 2025    RDW 20.1 2025    PLT 58.0 2025       Lab Results   Component  Value Date    PT 17.1 (H) 04/27/2014    PT 16.7 (H) 04/26/2014    INR 1.25 (H) 06/20/2025    INR 1.46 (H) 06/18/2025    INR 1.44 (H) 06/13/2025     No results for input(s): \"BNPML\" in the last 168 hours.  BUN (mg/dL)   Date Value   06/23/2025 46 (H)   06/22/2025 51 (H)   06/21/2025 52 (H)     Blood Urea Nitrogen (mg/dL)   Date Value   03/23/2018 22 (H)   07/21/2017 20   02/21/2017 15   06/17/2016 18   03/09/2016 19   12/02/2015 19     UREA NITROGEN (BUN) (mg/dL)   Date Value   12/04/2021 30 (H)     Creatinine, Serum (mg/dL)   Date Value   06/17/2016 0.96   03/09/2016 1.04   12/02/2015 0.99     CREATININE (mg/dL)   Date Value   12/04/2021 1.19 (H)     Creatinine (mg/dL)   Date Value   06/23/2025 1.89 (H)   06/22/2025 2.00 (H)   06/21/2025 2.00 (H)   03/23/2018 1.03   07/21/2017 0.96   02/21/2017 0.99       Medications:  Current Hospital Medications[2]    Assessment and Plan:  Problem List[3]    Acute on Chronic HFrEF, stage C, NYHA 3  - per history, objective and clinical signs of fluid overload present on admission with also concern for reduction in BiV pacing percentage, for which EP adjusted device settings to optimize CRT   - continue GDMT with BB, ARB, MRA, and eventual SGLT2 on discharge, (not on ARNI due to cost), optimize electrolytes.   - encouraged PT / OT today, wean oxygen as able, and repeat CXR for evaluation   - continue current diuretic regiment for now.     LORENA on CKD  - CRS related superimposed on longstanding CKD history  - marked improvement in Cr levels with diuresis and now on oral diuretics.   - trend labs daily, nephrology management.     HTN   - titrate oral therapy.     CAD s/p CABG and PCI   - per history, no acute angina, not on AP agents due to MDS  - monitor on tele, risk factor modification.     Acute PE   - VQ scan with moderate size perfusion defect RLL  - deemed not candidate for invasive procedure given MDS and not likely a major contributor of hypoxia by IC  - AC for now,  transition to oral as able per hematology    Acute hypoxic respiratory failure  - combination of HF, PE, and PNA?  - diuresis, wean oxygen as able, heparin for AC for PE management - evaluated by Interventional cardiology and felt not likely a major contributor for hypoxia and unlikely candidate for invasive procedure due to MDS   - transition to oral AC per hematology near discharge.     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 GDMT optimization as tolerated.     Pancytopenia with MDS   - on therapy, hematology management.     Nikolai Walker MD   Advanced Heart Failure and Transplant Cardiology   [facility] ([facility])     L3         [1]   Allergies  Allergen Reactions    Pcn [Penicillins]      \"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   [2]   Current Facility-Administered Medications   Medication Dose Route Frequency    polyethylene glycol (PEG 3350) (Miralax) 17 g oral packet 17 g  17 g Oral Daily    lidocaine-menthol 4-1 % patch 1 patch  1 patch Transdermal Daily PRN    benzocaine-menthol (Cepacol) lozenge 1 lozenge  1 lozenge Oral PRN    alum-mag hydroxide-simethicone (Maalox) 200-200-20 MG/5ML oral suspension 30 mL  30 mL Oral QID PRN    torsemide (Demadex) tab 40 mg  40 mg Oral BID (Diuretic)    acyclovir (Zovirax) cap 200 mg  200 mg Oral BID    azaCITIDine (Vidaza) SUBQ syringe 150 mg  75 mg/m2 Subcutaneous Q24H    metoclopramide (Reglan) 5 mg/mL injection 5 mg  5 mg Intravenous Q8H PRN    heparin (Porcine) 53068 units/250mL infusion PE/DVT/THROMBUS CONTINUOUS  200-3,000 Units/hr Intravenous Continuous    levoFLOXacin (Levaquin) tab 750 mg  750 mg Oral Q48HR @ 0700    glucose (Dex4) 15 GM/59ML oral liquid 15 g  15 g Oral Q15 Min PRN    Or    glucose (Glutose) 40% oral gel 15 g  15 g Oral Q15 Min PRN    Or    dextrose 50% injection 50 mL  50 mL Intravenous  Q15 Min PRN    Or    glucose (Dex4) 15 GM/59ML oral liquid 30 g  30 g Oral Q15 Min PRN    Or    glucose (Glutose) 40% oral gel 30 g  30 g Oral Q15 Min PRN    [Held by provider] ezetimibe (Zetia) tab 10 mg  10 mg Oral Daily    gabapentin (Neurontin) cap 100 mg  100 mg Oral Daily    levothyroxine (Synthroid) tab 25 mcg  25 mcg Oral Before breakfast    [Held by provider] losartan (Cozaar) tab 25 mg  25 mg Oral Daily    metoprolol succinate ER (Toprol XL) 24 hr tab 50 mg  50 mg Oral 2x Daily(Beta Blocker)    [Held by provider] spironolactone (Aldactone) tab 25 mg  25 mg Oral Daily with breakfast    insulin aspart (NovoLOG) 100 Units/mL FlexPen 1-10 Units  1-10 Units Subcutaneous TID AC and HS    acetaminophen (Tylenol Extra Strength) tab 1,000 mg  1,000 mg Oral Q8H PRN    melatonin tab 3 mg  3 mg Oral Nightly PRN    sennosides (Senokot) tab 17.2 mg  17.2 mg Oral Nightly PRN    bisacodyl (Dulcolax) 10 MG rectal suppository 10 mg  10 mg Rectal Daily PRN    benzonatate (Tessalon) cap 200 mg  200 mg Oral TID PRN    guaiFENesin (Robitussin) 100 MG/5 ML oral liquid 200 mg  200 mg Oral Q4H PRN    glycerin-hypromellose- (Artificial Tears) 0.2-0.2-1 % ophthalmic solution 1 drop  1 drop Both Eyes QID PRN    sodium chloride (Saline Mist) 0.65 % nasal solution 1 spray  1 spray Each Nare Q3H PRN    [Held by provider] Lovastatin TABS 40 mg **PATIENT SUPPLIED**  40 mg Oral BID   [3]   Patient Active Problem List  Diagnosis    Nonischemic cardiomyopathy (HCC)    FLAKO (obstructive sleep apnea)    Congestive heart failure (HCC)    CAD (coronary artery disease)    Rheumatoid arthritis involving multiple sites with positive rheumatoid factor (HCC)    Type 2 diabetes mellitus with hyperglycemia, with long-term current use of insulin (HCC)    Normocytic anemia    Thrombocytopenia    Ischemic cardiomyopathy    Dyspnea on exertion    Dry mouth    Pure hypercholesterolemia    Vitamin D deficiency    Pulmonary hypertension secondary to  increased PVR (HCC)    Carotid artery stenosis    Obesity (BMI 30.0-34.9)    Chronic obstructive pulmonary disease, unspecified (HCC)    Weakness generalized    Aortic stenosis, mild    Hyponatremia    Pancytopenia (HCC)    Acute respiratory failure with hypoxia (HCC)    Immunocompromised state (HCC)    Wide-complex tachycardia    Acute on chronic anemia    Type 2 diabetes mellitus without complication, with long-term current use of insulin (HCC)    Foot pain, bilateral    Hypothyroid    Pulmonary hypertension (HCC)    HFrEF (heart failure with reduced ejection fraction) (HCC)    PE (pulmonary thromboembolism) (HCC)    Primary hypertension    Dyspnea, unspecified type    Hemoptysis    Metabolic acidosis    LORENA (acute kidney injury)    Acute pulmonary embolism, unspecified pulmonary embolism type, unspecified whether acute cor pulmonale present (HCC)    MDS (myelodysplastic syndrome) (HCC)    Stage 3 chronic kidney disease (HCC)    Cardiorenal syndrome    Acute kidney injury    Pulmonary infiltrates

## 2025-06-23 NOTE — PROGRESS NOTES
[facility]   part of [facility]     Hospitalist Progress Note     David Reyes Patient Status:  Inpatient    1945 MRN [id number]   Location [facility] 8NE-A Attending Sukhdeep Bueno DO   Hosp Day # 11 PCP Thea Harris DO     Chief Complaint: Dyspnea     Subjective:     Sob better today     Objective:    Review of Systems:   A comprehensive review of systems was completed; pertinent positive and negatives stated in subjective.    Vital signs:  Temp:  [97.7 °F (36.5 °C)-98.9 °F (37.2 °C)] 98.2 °F (36.8 °C)  Pulse:  [85-96] 86  Resp:  [17-22] 20  BP: ()/(55-68) 94/55  SpO2:  [91 %-100 %] 98 %    Physical Exam:    General: No acute distress  Respiratory: No wheezes, no rhonchi  Cardiovascular: S1, S2, regular rate and rhythm  Abdomen: Soft, Non-tender, non-distended, positive bowel sounds  Neuro: No new focal deficits.   Extremities: pitting edema       Diagnostic Data:    Labs:  Recent Labs   Lab 25  0621 25  1656 25  0549 25  0442 25  1432 25  0857 25  0500 25  0502   WBC  --    < > 1.7* 1.6* 1.2* 1.4* 1.5* 1.4*   HGB  --    < > 7.9* 8.3* 7.5* 7.5* 7.5* 7.3*   MCV  --    < > 88.7 89.8 89.4 88.8 89.3 89.5   PLT  --    < > 47.0* 47.0* 63.0* 61.0* 61.0* 58.0*   BAND  --   --  3  --   --   --   --   --    INR 1.46*  --   --  1.25*  --   --   --   --     < > = values in this interval not displayed.       Recent Labs   Lab 25  0548 25  0442 25  0537 25  0500 25  0502   * 157* 129* 131* 130*   BUN 60* 57* 52* 51* 46*   CREATSERUM 2.29* 2.11* 2.00* 2.00* 1.89*   CA 8.7 9.0 8.8 9.0 8.7   ALB 3.6 3.6  --   --  3.3    138 136 137 135*   K 3.9 4.2 3.9 4.2 4.3    100 99 99 99   CO2 27.0 29.0 28.0 28.0 27.0   ALKPHO 101 96  --   --  74   * 153*  --   --  42*   * 332*  --   --  110*   BILT 1.0 1.0  --   --  0.8   TP 6.8 6.9  --   --  6.5       Estimated Glomerular Filtration Rate: 36  mL/min/1.73m2 (A) (result from lab).    No results for input(s): \"TROP\", \"TROPHS\", \"CK\" in the last 168 hours.      Recent Labs   Lab 06/18/25  0621 06/20/25  0442   PTP 17.9* 15.8*   INR 1.46* 1.25*                  Microbiology    Hospital Encounter on 06/12/25   1. Blood Culture     Status: None    Collection Time: 06/12/25  5:17 PM    Specimen: Blood,peripheral   Result Value Ref Range    Blood Culture Result No Growth 5 Days N/A         Imaging: Reviewed in Epic.    Medications: Scheduled Medications[1]    Assessment & Plan:      #MDS with pancytopenia   Transfuse for hb< 7-  1u PRBC on 6/14/25  Onc on CS   cont palliative chemo. Complete today  S/p 1 U plt   Started eliquis low dose  Transfuse hgb <7    # Acute hypoxic resp failure due to pleural effusions, PNA and pulmonary embolus - improving  # hemoptysis 2/2 thrombocytopenia  IV Abx   Diuresis   Pulm - sp bronch for hemoptysis - old blood, nothing active. D/w pulm   Cxs from bronch so far neg  CXR stable, reviewed indepenently    # HFrEF, ICD- with acute exacerbation  As above  Cardiology on cs  I/O  Monitor Cr, e-  PO torsemide    #hypotension 2/2 diuresis  -improving    #LORENA on CKD3  -diuresis  -nephro    #atelectasis  CXR reviewed indep.  IS    #transaminitis  -GI consult  -US neg  -improving, can start posaconazole but may eed to be fluconazole due to cost    #H/o CAD s/p CABG    #Acute PE-  eliquis  Doppler LE NEG  - no IVC filter    #Hypertension    #HLD    #Hypothyroid  Levothyroxine     #FLAKO     #constipation  -miralax    DISPO: dc next 1-2 days but may need rehab. Await PT    Delta Washingotn MD    Supplementary Documentation:     Quality:  DVT Mechanical Prophylaxis:   SCDs, Early ambuation  DVT Pharmacologic Prophylaxis   Medication    apixaban (Eliquis) tab 2.5 mg                Code Status: Full Code  Beltre: External urinary catheter in place  Beltre Duration (in days):   Central line:    GABE:     Discharge is dependent on: clinical   At this point  Mr. Reyes is expected to be discharge to: tbd    The 21st Century Cures Act makes medical notes like these available to patients in the interest of transparency. Please be advised this is a medical document. Medical documents are intended to carry relevant information, facts as evident, and the clinical opinion of the practitioner. The medical note is intended as peer to peer communication and may appear blunt or direct. It is written in medical language and may contain abbreviations or verbiage that are unfamiliar.              **Certification      PHYSICIAN Certification of Need for Inpatient Hospitalization - Initial Certification    Patient will require inpatient services that will reasonably be expected to span two midnight's based on the clinical documentation in H+P.   Based on patients current state of illness, I anticipate that, after discharge, patient will require TBD.         [1]    apixaban  2.5 mg Oral BID    fluconazole  200 mg Oral Daily    polyethylene glycol (PEG 3350)  17 g Oral Daily    torsemide  40 mg Oral BID (Diuretic)    acyclovir  200 mg Oral BID    levoFLOXacin  750 mg Oral Q48HR @ 0700    [Held by provider] ezetimibe  10 mg Oral Daily    gabapentin  100 mg Oral Daily    levothyroxine  25 mcg Oral Before breakfast    [Held by provider] losartan  25 mg Oral Daily    metoprolol succinate ER  50 mg Oral 2x Daily(Beta Blocker)    [Held by provider] spironolactone  25 mg Oral Daily with breakfast    insulin aspart  1-10 Units Subcutaneous TID AC and HS    [Held by provider] Lovastatin  40 mg Oral BID

## 2025-06-23 NOTE — CM/SW NOTE
SW spoke with spouse over the phone to confirm discharge plan. Spouse agreeable to planning for [facility] at discharge. SW explained that pt's chemo injection is due on 7/15. Explained that the [facility] facility will not be able to provide the chemo medication because of the cost. SW encouraged spouse to make sure that the SW at [place] and pt's medical team is aware of pt's progress at the rehab. Emotional support provided. Await discharge clearance.     Zofia HOLCOMB MSW, LCSW  Discharge Planner

## 2025-06-23 NOTE — CM/SW NOTE
06/23/25 1200   CM/SW Referral Data   Referral Source    Reason for Referral Discharge planning   Informant Clinical Staff Member;EMR   Discharge Needs   Anticipated D/C needs Subacute rehab     CM discussed discharge plan with RN this AM.   Lavern RN stated that patient and spouse decided on [facility] yesterday and she was notified of choice.     [facility] reserved in [place]. Updates to be sent during remainder of hospital stay.     SW/CM to remain available for support and/or discharge planning.  Indira Quiroz, SARINAN RN, CMSRN  RN Case Manager

## 2025-06-23 NOTE — DIETARY NOTE
[facility]   part of [facility]    NUTRITION ASSESSMENT    Unable to diagnose malnutrition criteria at this time.    NUTRITION INTERVENTION:    Meal and Snacks - continue carbohydrate controlled diet, liberalized.  Monitor and encourage adequate PO intake.   Medical Food Supplements - Glucerna BID. Rationale/use for oral supplements discussed.    PATIENT STATUS:   6/23- Pt states appetite is pretty good. Had a good breakfast this am of avocado toast, glucerna, coffee, oj. Pt states taking glucerna well. Will increase to BID. +BM 6/21. Pt with added Dx of MDS per heme/onc. Follow per low risk.     6/13/25 Pt is a 80 yo male presenting with SOB and hemoptysis. PMH CABG,  obesity, CHF, high cholesterol, rheumatoid arthritis, obstructive sleep apnea, DM. Patient was assessed for MST II with poor appetite and weight loss. Pt reports a fair appetite, and has been eating half as much for about 1 month. He has eaten 50% of meals during admission. Pt has had weight loss of of 9 lbs over 3 months which is not significant. Patient reports having nausea, vomiting, and diarrhea. No issues chewing or swallowing. Pt is on a carbohydrate controlled diet 1800 kcal 60g/meal which is appropriate. Patient was receptive to receiving Glucerna 1 x per day.     ANTHROPOMETRICS:  Ht:  5'9\"  Wt: 92.8 kg (204 lb 9.4 oz).   BMI: Body mass index is 30.21 kg/m².  IBW: 72.7 kg    WEIGHT HISTORY:   Weight loss: Yes, Non-severe Wt loss of 9 lbs, 4.0%, over 3 months     Wt Readings from Last 10 Encounters:   06/23/25 92.8 kg (204 lb 9.4 oz)   05/29/25 98.4 kg (217 lb)   05/22/25 99.4 kg (219 lb 3.2 oz)   04/11/25 100.8 kg (222 lb 3.2 oz)   03/17/25 100.9 kg (222 lb 8 oz)   12/09/24 105.5 kg (232 lb 8 oz)   12/02/24 104.8 kg (231 lb)   09/28/24 108.3 kg (238 lb 12.1 oz)   09/09/24 109.3 kg (241 lb)   06/18/24 106.6 kg (235 lb)      NUTRITION:  Diet:       Procedures    Cardiac diet Cardiac; Calorie Restriction/Carb Controlled: 1800 kcal/60  grams; Is Patient on Accuchecks? Yes      Food Allergies: No  Cultural/Ethnic/Mormonism Preferences Addressed: Yes    Percent Meals Eaten (last 3 days)       Date/Time Percent Meals Eaten (%)    06/20/25 0757 0 %    06/20/25 1300 100 %    06/21/25 0812 100 %    06/21/25 1400 85 %    06/22/25 0900 100 %    06/23/25 0749 100 %          GI system review: diarrhea, emesis, and nausea Last BM Date: 06/17/25  Skin and wounds: intact    NUTRITION RELATED PHYSICAL FINDINGS:     1. Body Fat/Muscle Mass: no wasting noted      2. Fluid Accumulation: none    NUTRITION PRESCRIPTION: 72.7 kg IBW  Calories: 3077-3690 calories/day (23-28 kcal/kg)  Protein: 109-145 grams protein/day (1.5-2.0 grams protein per kg)  Fluid: ~1 ml/kcal or per MD discretion    MONITOR AND EVALUATE/NUTRITION GOALS:  PO intake of 75% of meals TID - Met, continues  PO intake of 75% of oral nutrition supplement/s - Met, continues  Weight stable within 1 to 2 lbs during admission - Ongoing  Improved blood glucose control  - Ongoing    MEDICATIONS:  Novolog (5 units), synthroid, cozaar, spironolactone, demedex, reglan    LABS:  reviewed,     Pt is at Low nutrition risk    Liz Redd RD, LDN  Clinical Dietitian

## 2025-06-23 NOTE — PLAN OF CARE
Pt chief complaint upon admission: PE. Received pt at 1930 during handoff.33    A&Ox 4. Strength: severe gen weakness per pt. RA during the day w/ 2L NC at night. No CPAP per pt, Nc in place overnight. Bilat lungs sound diminished but clear. R side more clear. AV paced on tele, slightly hypotensive which is baseline. Generalized pain. Declined need for pain medication.    GI/: Primo in place.   Activity: x2 pivot. Pt reordered to reassess d/t inc weakness from last visit.  LDA: PIV w/ heparin gtt infusing.   Skin: Pale w/ scattered ecchymosis   Incision: NA    All medications given as ordered. Pt resting in bed w/ all safety measures in place and call light within reach. Patient updated on plan of care, all questions answered.    Plan is to continue   Problem: Diabetes/Glucose Control  Goal: Glucose maintained within prescribed range  Description: INTERVENTIONS:  - Monitor Blood Glucose as ordered  - Assess for signs and symptoms of hyperglycemia and hypoglycemia  - Administer ordered medications to maintain glucose within target range  - Assess barriers to adequate nutritional intake and initiate nutrition consult as needed  - Instruct patient on self management of diabetes  Outcome: Progressing     Problem: Patient/Family Goals  Goal: Patient/Family Long Term Goal  Description: Patient's Long Term Goal:   To return home asap    Interventions:  - Cardiology consult  2 D echo with doppler  - See additional Care Plan goals for specific interventions  Outcome: Progressing  Goal: Patient/Family Short Term Goal  Description: Patient's Short Term Goal:  t get rid of shortness of breath    Interventions:   - medication adjustment      Supplemental oxygen as needed  - See additional Care Plan goals for specific interventions  Outcome: Progressing     Problem: PAIN - ADULT  Goal: Verbalizes/displays adequate comfort level or patient's stated pain goal  Description: INTERVENTIONS:  - Encourage pt to monitor pain and  request assistance  - Assess pain using appropriate pain scale  - Administer analgesics based on type and severity of pain and evaluate response  - Implement non-pharmacological measures as appropriate and evaluate response  - Consider cultural and social influences on pain and pain management  - Manage/alleviate anxiety  - Utilize distraction and/or relaxation techniques  - Monitor for opioid side effects  - Notify MD/LIP if interventions unsuccessful or patient reports new pain  - Anticipate increased pain with activity and pre-medicate as appropriate  Outcome: Progressing     Problem: SAFETY ADULT - FALL  Goal: Free from fall injury  Description: INTERVENTIONS:  - Assess pt frequently for physical needs  - Identify cognitive and physical deficits and behaviors that affect risk of falls.  - [place] fall precautions as indicated by assessment.  - Educate pt/family on patient safety including physical limitations  - Instruct pt to call for assistance with activity based on assessment  - Modify environment to reduce risk of injury  - Provide assistive devices as appropriate  - Consider OT/PT consult to assist with strengthening/mobility  - Encourage toileting schedule  Outcome: Progressing     Problem: CARDIOVASCULAR - ADULT  Goal: Maintains optimal cardiac output and hemodynamic stability  Description: INTERVENTIONS:  - Monitor vital signs, rhythm, and trends  - Monitor for bleeding, hypotension and signs of decreased cardiac output  - Evaluate effectiveness of vasoactive medications to optimize hemodynamic stability  - Monitor arterial and/or venous puncture sites for bleeding and/or hematoma  - Assess quality of pulses, skin color and temperature  - Assess for signs of decreased coronary artery perfusion - ex. Angina  - Evaluate fluid balance, assess for edema, trend weights  Outcome: Progressing  Goal: Absence of cardiac arrhythmias or at baseline  Description: INTERVENTIONS:  - Continuous cardiac monitoring,  monitor vital signs, obtain 12 lead EKG if indicated  - Evaluate effectiveness of antiarrhythmic and heart rate control medications as ordered  - Initiate emergency measures for life threatening arrhythmias  - Monitor electrolytes and administer replacement therapy as ordered  Outcome: Progressing     Problem: RESPIRATORY - ADULT  Goal: Achieves optimal ventilation and oxygenation  Description: INTERVENTIONS:  - Assess for changes in respiratory status  - Assess for changes in mentation and behavior  - Position to facilitate oxygenation and minimize respiratory effort  - Oxygen supplementation based on oxygen saturation or ABGs  - Provide Smoking Cessation handout, if applicable  - Encourage broncho-pulmonary hygiene including cough, deep breathe, Incentive Spirometry  - Assess the need for suctioning and perform as needed  - Assess and instruct to report SOB or any respiratory difficulty  - Respiratory Therapy support as indicated  - Manage/alleviate anxiety  - Monitor for signs/symptoms of CO2 retention  Outcome: Progressing     Problem: METABOLIC/FLUID AND ELECTROLYTES - ADULT  Goal: Glucose maintained within prescribed range  Description: INTERVENTIONS:  - Monitor Blood Glucose as ordered  - Assess for signs and symptoms of hyperglycemia and hypoglycemia  - Administer ordered medications to maintain glucose within target range  - Assess barriers to adequate nutritional intake and initiate nutrition consult as needed  - Instruct patient on self management of diabetes  Outcome: Progressing  Goal: Electrolytes maintained within normal limits  Description: INTERVENTIONS:  - Monitor labs and rhythm and assess patient for signs and symptoms of electrolyte imbalances  - Administer electrolyte replacement as ordered  - Monitor response to electrolyte replacements, including rhythm and repeat lab results as appropriate  - Fluid restriction as ordered  - Instruct patient on fluid and nutrition restrictions as  appropriate  Outcome: Progressing     Problem: HEMATOLOGIC - ADULT  Goal: Free from bleeding injury  Description: (Example usage: patient with low platelets)  INTERVENTIONS:  - Avoid intramuscular injections, enemas and rectal medication administration  - Ensure safe mobilization of patient  - Hold pressure on venipuncture sites to achieve adequate hemostasis  - Assess for signs and symptoms of internal bleeding  - Monitor lab trends  - Patient is to report abnormal signs of bleeding to staff  - Avoid use of toothpicks and dental floss  - Use electric shaver for shaving  - Use soft bristle tooth brush  - Limit straining and forceful nose blowing  Outcome: Progressing

## 2025-06-23 NOTE — PLAN OF CARE
Patient is aox3, more alert today, eating breakfast this am, 98% RA, V paced. Abdomen largely distended and rounded,Mepilex on sacrum. primovit, +3 edema to lower extremities.    Stop Heparin gtt. Start Eliquis 2.5 mg bid.  Specialty bed ordered for comfort and arrived.  Med/Onc RN to give Vidaza chemo injection this afternoon. Zofran 1 hour before.  Patient would like to be discharged to [facility] in [place].  Dr Chirinos stated that family is not ready to make a decision on code status or move on to palliative /Hospice yet but they are aware of poor prognosis.  Problem: Diabetes/Glucose Control  Goal: Glucose maintained within prescribed range  Description: INTERVENTIONS:  - Monitor Blood Glucose as ordered  - Assess for signs and symptoms of hyperglycemia and hypoglycemia  - Administer ordered medications to maintain glucose within target range  - Assess barriers to adequate nutritional intake and initiate nutrition consult as needed  - Instruct patient on self management of diabetes  Outcome: Progressing     Problem: Patient/Family Goals  Goal: Patient/Family Long Term Goal  Description: Patient's Long Term Goal:   To return home asap    Interventions:  - Cardiology consult  2 D echo with doppler  - See additional Care Plan goals for specific interventions  Outcome: Progressing  Goal: Patient/Family Short Term Goal  Description: Patient's Short Term Goal:  t get rid of shortness of breath    Interventions:   - medication adjustment      Supplemental oxygen as needed  - See additional Care Plan goals for specific interventions  Outcome: Progressing     Problem: PAIN - ADULT  Goal: Verbalizes/displays adequate comfort level or patient's stated pain goal  Description: INTERVENTIONS:  - Encourage pt to monitor pain and request assistance  - Assess pain using appropriate pain scale  - Administer analgesics based on type and severity of pain and evaluate response  - Implement non-pharmacological measures as appropriate and  evaluate response  - Consider cultural and social influences on pain and pain management  - Manage/alleviate anxiety  - Utilize distraction and/or relaxation techniques  - Monitor for opioid side effects  - Notify MD/LIP if interventions unsuccessful or patient reports new pain  - Anticipate increased pain with activity and pre-medicate as appropriate  Outcome: Progressing     Problem: SAFETY ADULT - FALL  Goal: Free from fall injury  Description: INTERVENTIONS:  - Assess pt frequently for physical needs  - Identify cognitive and physical deficits and behaviors that affect risk of falls.  - [place] fall precautions as indicated by assessment.  - Educate pt/family on patient safety including physical limitations  - Instruct pt to call for assistance with activity based on assessment  - Modify environment to reduce risk of injury  - Provide assistive devices as appropriate  - Consider OT/PT consult to assist with strengthening/mobility  - Encourage toileting schedule  Outcome: Progressing     Problem: CARDIOVASCULAR - ADULT  Goal: Maintains optimal cardiac output and hemodynamic stability  Description: INTERVENTIONS:  - Monitor vital signs, rhythm, and trends  - Monitor for bleeding, hypotension and signs of decreased cardiac output  - Evaluate effectiveness of vasoactive medications to optimize hemodynamic stability  - Monitor arterial and/or venous puncture sites for bleeding and/or hematoma  - Assess quality of pulses, skin color and temperature  - Assess for signs of decreased coronary artery perfusion - ex. Angina  - Evaluate fluid balance, assess for edema, trend weights  Outcome: Progressing  Goal: Absence of cardiac arrhythmias or at baseline  Description: INTERVENTIONS:  - Continuous cardiac monitoring, monitor vital signs, obtain 12 lead EKG if indicated  - Evaluate effectiveness of antiarrhythmic and heart rate control medications as ordered  - Initiate emergency measures for life threatening  arrhythmias  - Monitor electrolytes and administer replacement therapy as ordered  Outcome: Progressing     Problem: RESPIRATORY - ADULT  Goal: Achieves optimal ventilation and oxygenation  Description: INTERVENTIONS:  - Assess for changes in respiratory status  - Assess for changes in mentation and behavior  - Position to facilitate oxygenation and minimize respiratory effort  - Oxygen supplementation based on oxygen saturation or ABGs  - Provide Smoking Cessation handout, if applicable  - Encourage broncho-pulmonary hygiene including cough, deep breathe, Incentive Spirometry  - Assess the need for suctioning and perform as needed  - Assess and instruct to report SOB or any respiratory difficulty  - Respiratory Therapy support as indicated  - Manage/alleviate anxiety  - Monitor for signs/symptoms of CO2 retention  Outcome: Progressing     Problem: METABOLIC/FLUID AND ELECTROLYTES - ADULT  Goal: Glucose maintained within prescribed range  Description: INTERVENTIONS:  - Monitor Blood Glucose as ordered  - Assess for signs and symptoms of hyperglycemia and hypoglycemia  - Administer ordered medications to maintain glucose within target range  - Assess barriers to adequate nutritional intake and initiate nutrition consult as needed  - Instruct patient on self management of diabetes  Outcome: Progressing  Goal: Electrolytes maintained within normal limits  Description: INTERVENTIONS:  - Monitor labs and rhythm and assess patient for signs and symptoms of electrolyte imbalances  - Administer electrolyte replacement as ordered  - Monitor response to electrolyte replacements, including rhythm and repeat lab results as appropriate  - Fluid restriction as ordered  - Instruct patient on fluid and nutrition restrictions as appropriate  Outcome: Progressing     Problem: HEMATOLOGIC - ADULT  Goal: Free from bleeding injury  Description: (Example usage: patient with low platelets)  INTERVENTIONS:  - Avoid intramuscular  injections, enemas and rectal medication administration  - Ensure safe mobilization of patient  - Hold pressure on venipuncture sites to achieve adequate hemostasis  - Assess for signs and symptoms of internal bleeding  - Monitor lab trends  - Patient is to report abnormal signs of bleeding to staff  - Avoid use of toothpicks and dental floss  - Use electric shaver for shaving  - Use soft bristle tooth brush  - Limit straining and forceful nose blowing  Outcome: Progressing

## 2025-06-24 ENCOUNTER — APPOINTMENT (OUTPATIENT)
Dept: CT IMAGING | Facility: HOSPITAL | Age: 80
End: 2025-06-24
Attending: INTERNAL MEDICINE
Payer: MEDICARE

## 2025-06-24 ENCOUNTER — APPOINTMENT (OUTPATIENT)
Dept: CV DIAGNOSTICS | Facility: HOSPITAL | Age: 80
End: 2025-06-24
Attending: NURSE PRACTITIONER
Payer: MEDICARE

## 2025-06-24 LAB
ALBUMIN SERPL-MCNC: 3.3 G/DL (ref 3.2–4.8)
ALP LIVER SERPL-CCNC: 71 U/L (ref 45–117)
ALT SERPL-CCNC: 85 U/L (ref 10–49)
ANION GAP SERPL CALC-SCNC: 11 MMOL/L (ref 0–18)
ANTIBODY SCREEN: NEGATIVE
AST SERPL-CCNC: 37 U/L (ref ?–34)
BASOPHILS # BLD: 0 X10(3) UL (ref 0–0.2)
BASOPHILS NFR BLD: 0 %
BILIRUB DIRECT SERPL-MCNC: 0.4 MG/DL (ref ?–0.3)
BILIRUB SERPL-MCNC: 0.8 MG/DL (ref 0.2–1.1)
BLASTS # BLD: 0.03 X10(3) UL (ref ?–0.01)
BLASTS NFR BLD: 2 %
BUN BLD-MCNC: 43 MG/DL (ref 9–23)
CALCIUM BLD-MCNC: 8.6 MG/DL (ref 8.7–10.6)
CHLORIDE SERPL-SCNC: 98 MMOL/L (ref 98–112)
CO2 SERPL-SCNC: 28 MMOL/L (ref 21–32)
CREAT BLD-MCNC: 1.93 MG/DL (ref 0.7–1.3)
EGFRCR SERPLBLD CKD-EPI 2021: 35 ML/MIN/1.73M2 (ref 60–?)
EOSINOPHIL # BLD: 0 X10(3) UL (ref 0–0.7)
EOSINOPHIL NFR BLD: 0 %
ERYTHROCYTE [DISTWIDTH] IN BLOOD BY AUTOMATED COUNT: 20.4 %
GLUCOSE BLD-MCNC: 137 MG/DL (ref 70–99)
GLUCOSE BLD-MCNC: 143 MG/DL (ref 70–99)
GLUCOSE BLD-MCNC: 154 MG/DL (ref 70–99)
HCT VFR BLD AUTO: 24 % (ref 39–53)
HGB BLD-MCNC: 7 G/DL (ref 13–17.5)
LYMPHOCYTES NFR BLD: 1.02 X10(3) UL (ref 1–4)
LYMPHOCYTES NFR BLD: 60 %
MCH RBC QN AUTO: 26.1 PG (ref 26–34)
MCHC RBC AUTO-ENTMCNC: 29.2 G/DL (ref 31–37)
MCV RBC AUTO: 89.6 FL (ref 80–100)
MONOCYTES # BLD: 0.02 X10(3) UL (ref 0.1–1)
MONOCYTES NFR BLD: 1 %
NEUTROPHILS # BLD AUTO: 0.71 X10 (3) UL (ref 1.5–7.7)
NEUTROPHILS NFR BLD: 37 %
NEUTS HYPERSEG # BLD: 0.63 X10(3) UL (ref 1.5–7.7)
NRBC BLD MANUAL-RTO: 5 % (ref ?–1)
NT-PROBNP SERPL-MCNC: ABNORMAL PG/ML (ref ?–450)
OSMOLALITY SERPL CALC.SUM OF ELEC: 297 MOSM/KG (ref 275–295)
PLATELET # BLD AUTO: 53 10(3)UL (ref 150–450)
PLATELET MORPHOLOGY: NORMAL
PLATELETS.RETICULATED NFR BLD AUTO: 10.6 % (ref 0–7)
POTASSIUM SERPL-SCNC: 4.3 MMOL/L (ref 3.5–5.1)
PROT SERPL-MCNC: 6.6 G/DL (ref 5.7–8.2)
RBC # BLD AUTO: 2.68 X10(6)UL (ref 3.8–5.8)
RH BLOOD TYPE: POSITIVE
SODIUM SERPL-SCNC: 137 MMOL/L (ref 136–145)
TOTAL CELLS COUNTED BLD: 100
WBC # BLD AUTO: 1.7 X10(3) UL (ref 4–11)

## 2025-06-24 PROCEDURE — 99239 HOSP IP/OBS DSCHRG MGMT >30: CPT | Performed by: HOSPITALIST

## 2025-06-24 PROCEDURE — 99232 SBSQ HOSP IP/OBS MODERATE 35: CPT | Performed by: INTERNAL MEDICINE

## 2025-06-24 PROCEDURE — 99233 SBSQ HOSP IP/OBS HIGH 50: CPT | Performed by: INTERNAL MEDICINE

## 2025-06-24 PROCEDURE — 71275 CT ANGIOGRAPHY CHEST: CPT | Performed by: INTERNAL MEDICINE

## 2025-06-24 RX ORDER — ACYCLOVIR 200 MG/1
200 CAPSULE ORAL 2 TIMES DAILY
Qty: 60 CAPSULE | Refills: 0 | Status: SHIPPED | OUTPATIENT
Start: 2025-06-24

## 2025-06-24 RX ORDER — BUMETANIDE 0.25 MG/ML
2 INJECTION, SOLUTION INTRAMUSCULAR; INTRAVENOUS ONCE
Status: DISCONTINUED | OUTPATIENT
Start: 2025-06-24 | End: 2025-06-24

## 2025-06-24 RX ORDER — LEVOFLOXACIN 750 MG/1
750 TABLET, FILM COATED ORAL EVERY OTHER DAY
Qty: 15 TABLET | Refills: 0 | Status: SHIPPED | OUTPATIENT
Start: 2025-06-24

## 2025-06-24 NOTE — PLAN OF CARE
Assumed care at 1930. Alert and orientated x 4. Maintaining oxygen saturations on room air- 2L NC with sleep. Productive cough with hemoptysis. V-paced on tele with PVCs. Incontinent of bladder- Purewick and brief in place. Continent of bowel- LBM 6/23. No complaints of pain. Max assist with care. Patient updated with plan of care. Call light within reach.    Problem: Diabetes/Glucose Control  Goal: Glucose maintained within prescribed range  Description: INTERVENTIONS:  - Monitor Blood Glucose as ordered  - Assess for signs and symptoms of hyperglycemia and hypoglycemia  - Administer ordered medications to maintain glucose within target range  - Assess barriers to adequate nutritional intake and initiate nutrition consult as needed  - Instruct patient on self management of diabetes  Outcome: Progressing     Problem: Patient/Family Goals  Goal: Patient/Family Long Term Goal  Description: Patient's Long Term Goal:   To return home asap    Interventions:  - Cardiology consult  2 D echo with doppler  - See additional Care Plan goals for specific interventions  Outcome: Progressing  Goal: Patient/Family Short Term Goal  Description: Patient's Short Term Goal:  t get rid of shortness of breath    Interventions:   - medication adjustment      Supplemental oxygen as needed  - See additional Care Plan goals for specific interventions  Outcome: Progressing     Problem: PAIN - ADULT  Goal: Verbalizes/displays adequate comfort level or patient's stated pain goal  Description: INTERVENTIONS:  - Encourage pt to monitor pain and request assistance  - Assess pain using appropriate pain scale  - Administer analgesics based on type and severity of pain and evaluate response  - Implement non-pharmacological measures as appropriate and evaluate response  - Consider cultural and social influences on pain and pain management  - Manage/alleviate anxiety  - Utilize distraction and/or relaxation techniques  - Monitor for opioid side  effects  - Notify MD/LIP if interventions unsuccessful or patient reports new pain  - Anticipate increased pain with activity and pre-medicate as appropriate  Outcome: Progressing     Problem: SAFETY ADULT - FALL  Goal: Free from fall injury  Description: INTERVENTIONS:  - Assess pt frequently for physical needs  - Identify cognitive and physical deficits and behaviors that affect risk of falls.  - [place] fall precautions as indicated by assessment.  - Educate pt/family on patient safety including physical limitations  - Instruct pt to call for assistance with activity based on assessment  - Modify environment to reduce risk of injury  - Provide assistive devices as appropriate  - Consider OT/PT consult to assist with strengthening/mobility  - Encourage toileting schedule  Outcome: Progressing     Problem: CARDIOVASCULAR - ADULT  Goal: Maintains optimal cardiac output and hemodynamic stability  Description: INTERVENTIONS:  - Monitor vital signs, rhythm, and trends  - Monitor for bleeding, hypotension and signs of decreased cardiac output  - Evaluate effectiveness of vasoactive medications to optimize hemodynamic stability  - Monitor arterial and/or venous puncture sites for bleeding and/or hematoma  - Assess quality of pulses, skin color and temperature  - Assess for signs of decreased coronary artery perfusion - ex. Angina  - Evaluate fluid balance, assess for edema, trend weights  Outcome: Progressing  Goal: Absence of cardiac arrhythmias or at baseline  Description: INTERVENTIONS:  - Continuous cardiac monitoring, monitor vital signs, obtain 12 lead EKG if indicated  - Evaluate effectiveness of antiarrhythmic and heart rate control medications as ordered  - Initiate emergency measures for life threatening arrhythmias  - Monitor electrolytes and administer replacement therapy as ordered  Outcome: Progressing     Problem: RESPIRATORY - ADULT  Goal: Achieves optimal ventilation and oxygenation  Description:  INTERVENTIONS:  - Assess for changes in respiratory status  - Assess for changes in mentation and behavior  - Position to facilitate oxygenation and minimize respiratory effort  - Oxygen supplementation based on oxygen saturation or ABGs  - Provide Smoking Cessation handout, if applicable  - Encourage broncho-pulmonary hygiene including cough, deep breathe, Incentive Spirometry  - Assess the need for suctioning and perform as needed  - Assess and instruct to report SOB or any respiratory difficulty  - Respiratory Therapy support as indicated  - Manage/alleviate anxiety  - Monitor for signs/symptoms of CO2 retention  Outcome: Progressing     Problem: METABOLIC/FLUID AND ELECTROLYTES - ADULT  Goal: Glucose maintained within prescribed range  Description: INTERVENTIONS:  - Monitor Blood Glucose as ordered  - Assess for signs and symptoms of hyperglycemia and hypoglycemia  - Administer ordered medications to maintain glucose within target range  - Assess barriers to adequate nutritional intake and initiate nutrition consult as needed  - Instruct patient on self management of diabetes  Outcome: Progressing  Goal: Electrolytes maintained within normal limits  Description: INTERVENTIONS:  - Monitor labs and rhythm and assess patient for signs and symptoms of electrolyte imbalances  - Administer electrolyte replacement as ordered  - Monitor response to electrolyte replacements, including rhythm and repeat lab results as appropriate  - Fluid restriction as ordered  - Instruct patient on fluid and nutrition restrictions as appropriate  Outcome: Progressing     Problem: HEMATOLOGIC - ADULT  Goal: Free from bleeding injury  Description: (Example usage: patient with low platelets)  INTERVENTIONS:  - Avoid intramuscular injections, enemas and rectal medication administration  - Ensure safe mobilization of patient  - Hold pressure on venipuncture sites to achieve adequate hemostasis  - Assess for signs and symptoms of internal  bleeding  - Monitor lab trends  - Patient is to report abnormal signs of bleeding to staff  - Avoid use of toothpicks and dental floss  - Use electric shaver for shaving  - Use soft bristle tooth brush  - Limit straining and forceful nose blowing  Outcome: Progressing

## 2025-06-24 NOTE — PROGRESS NOTES
Hematology/Oncology Progress Note    Patient Name: David Reyes  Medical Record Number: [id number]    YOB: 1945     Reason for Consultation:  David Reyes was seen today for the diagnosis of MDS    Interval events:      - hgb 7.0  - plt 53k  - sCr 1.93 stable  - off O2  - denies breathing issues    Inpatient Meds:  Scheduled Medications[1]  Medication Infusions[2]    PRN Meds:  PRN Medications[3]    Allergies:   Allergies[4]    Vital Signs:  Height: --  Weight: 102.2 kg (225 lb 6.4 oz) (06/24 0449)  BSA (Calculated - sq m): --  Pulse: 88 (06/24 0500)  BP: 93/56 (06/24 0500)  Temp: 98.4 °F (36.9 °C) (06/24 0443)  Do Not Use - Resp Rate: --  SpO2: 99 % (06/24 0500)    Wt Readings from Last 6 Encounters:   06/24/25 102.2 kg (225 lb 6.4 oz)   05/29/25 98.4 kg (217 lb)   05/22/25 99.4 kg (219 lb 3.2 oz)   04/11/25 100.8 kg (222 lb 3.2 oz)   03/17/25 100.9 kg (222 lb 8 oz)   12/09/24 105.5 kg (232 lb 8 oz)       Physical Examination:  General: Patient is alert and oriented, not in acute distress  Psych: Mood and affect are appropriate  Eyes: EOMI, PERRL  ENT: Oropharynx is clear, no adenopathy  CV: mild LE edema  Respiratory: non-labored respirations  GI/Abd: Soft, non-tender   Neurological: Grossly intact     Laboratory:  Recent Labs   Lab 06/22/25  0500 06/23/25  0502 06/24/25  0519   WBC 1.5* 1.4* 1.7*   HGB 7.5* 7.3* 7.0*   HCT 25.1* 24.6* 24.0*   PLT 61.0* 58.0* 53.0*   MCV 89.3 89.5 89.6   RDW 20.2 20.1 20.4   NEPRELIM 0.59* 0.49* 0.71*       Recent Labs   Lab 06/20/25  0442 06/21/25  0537 06/22/25  0500 06/23/25  0502 06/24/25  0520      < > 137 135* 137   K 4.2   < > 4.2 4.3 4.3      < > 99 99 98   CO2 29.0   < > 28.0 27.0 28.0   BUN 57*   < > 51* 46* 43*   CREATSERUM 2.11*   < > 2.00* 1.89* 1.93*   *   < > 131* 130* 143*   CA 9.0   < > 9.0 8.7 8.6*   TP 6.9  --   --  6.5 6.6   ALB 3.6  --   --  3.3 3.3   ALKPHO 96  --   --  74 71   *  --   --  42* 37*   ALT  332*  --   --  110* 85*   BILT 1.0  --   --  0.8 0.8    < > = values in this interval not displayed.       Recent Labs   Lab 06/18/25  0621 06/19/25  0548 06/20/25  0442 06/20/25  2237 06/21/25  0537 06/22/25  0500 06/23/25  0502   INR 1.46*  --  1.25*  --   --   --   --    PTT 92.7*   < >  --    < > 93.6* 93.0* 116.3*    < > = values in this interval not displayed.       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 Pat wants to pursue treatment. I warned that chemotherapy may be difficult with his comorbidities and he understands this  - given his pancytopenia and expected prolonged admission for heart failure exacerbation and needed diuresis, plan to start azacitidine 75mg/m2/day x 7 days  - we discussed azacitidine will be given every 28-35 days. Pt verbally consented  - ppx: acyclovir 200mg BID, levaquin 750mg q48 hours. Fluconazole 400mg. Pt unable to afford posaconazole.  - transfuse for hgb <7 g/dL, plt <10k  - continue azacitidine; C1D1 6/17. Has completed cycle 1. Next cycle ~7/15  - after discharge, will need labs checked 2x/weekly and if he needs transfusions, he can get it at our cancer center. Next cycle of chemo will not be planned until 7/15  - given planned discharge to rehab possibly today, transfuse 1 unit pRBCs     RLL PE  - BLE dopplers negative for DVT. VQ scan positive for RLL PE  - obtain CTA given sCr better today. If PE confirmed, continue apixaban 2.5mg BID. If no PE, stop anticoagulation.    Acute on chronic systolic heart failure  - per cardiology    CKD stage 4  - per nephrology. improved    LFT elevations  - increased from 6/13  - ezetimibe held  - GI consulted  - LFTs improved to < grade 1    Ok to DC today after CTA chest and  blood transfusion. Will arrange OP follow up.     James Chirinos MD  [facility] Hematology Oncology             [1]    apixaban  2.5 mg Oral BID    fluconazole  200 mg Oral Daily    polyethylene glycol (PEG 3350)  17 g Oral Daily    torsemide  40 mg Oral BID (Diuretic)    acyclovir  200 mg Oral BID    levoFLOXacin  750 mg Oral Q48HR @ 0700    [Held by provider] ezetimibe  10 mg Oral Daily    gabapentin  100 mg Oral Daily    levothyroxine  25 mcg Oral Before breakfast    [Held by provider] losartan  25 mg Oral Daily    metoprolol succinate ER  50 mg Oral 2x Daily(Beta Blocker)    [Held by provider] spironolactone  25 mg Oral Daily with breakfast    insulin aspart  1-10 Units Subcutaneous TID AC and HS    [Held by provider] Lovastatin  40 mg Oral BID   [2] [3]   lidocaine-menthol    benzocaine-menthol    alum-mag hydroxide-simethicone    metoclopramide    glucose **OR** glucose **OR** dextrose **OR** glucose **OR** glucose    acetaminophen    melatonin    sennosides    bisacodyl    benzonatate    guaiFENesin    glycerin-hypromellose-    sodium chloride  [4]   Allergies  Allergen Reactions    Pcn [Penicillins]      \"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

## 2025-06-24 NOTE — PROGRESS NOTES
[facility]   part of [facility]    Advanced Heart Failure Progress Note    David Reyes Patient Status:  Inpatient    1945 MRN [id number]   Location [facility] 8NE-A Attending Alejandra Mehta MD   Hosp Day # 12 PCP Thea Harris,      Subjective:    Feels well this AM - hoping to go home later today however, has not been evaluated by PT / OT.     Denies CP, SOB improved, urinating well.     Noted other providers notes    Tele - no sustained VA's .    Objective:  BP 99/56 (BP Location: Left arm)   Pulse 85   Temp 99 °F (37.2 °C) (Oral)   Resp 20   Wt 225 lb 6.4 oz (102.2 kg)   SpO2 99%   BMI 33.29 kg/m²     Temp (24hrs), Av.6 °F (37 °C), Min:98.2 °F (36.8 °C), Max:99.6 °F (37.6 °C)      Intake/Output:    Intake/Output Summary (Last 24 hours) at 2025 0917  Last data filed at 2025 0443  Gross per 24 hour   Intake 360 ml   Output 1850 ml   Net -1490 ml       Wt Readings from Last 3 Encounters:   25 225 lb 6.4 oz (102.2 kg)   25 217 lb (98.4 kg)   25 219 lb 3.2 oz (99.4 kg)       Allergies:  Allergies[1]    Physical Exam:  Blood pressure 99/56, pulse 85, temperature 99 °F (37.2 °C), temperature source Oral, resp. rate 20, weight 225 lb 6.4 oz (102.2 kg), SpO2 99%.    General: Alert and oriented in no apparent distress.obese  HEENT: No focal deficits.  Neck: above clavicle JVD present, normal ROM.  Cardiac: Regular rate and rhythm, S1, S2 normal, no rubs or gallops noted.  Lungs: no conversational dyspnea present as previously noted, on RA, comfortable, no acute distress.   Abdomen: Soft, non-tender.   Extremities: minimal LE edema  Neurologic: Alert and oriented, normal affect.  Skin: Warm and dry.     Laboratory/Data:      Labs:  Lab Results   Component Value Date    WBC 1.7 2025    RBC 2.68 2025    HGB 7.0 2025    HCT 24.0 2025    MCV 89.6 2025    MCH 26.1 2025    MCHC 29.2 2025    RDW 20.4 2025    PLT  53.0 06/24/2025       Lab Results   Component Value Date    PT 17.1 (H) 04/27/2014    PT 16.7 (H) 04/26/2014    INR 1.25 (H) 06/20/2025    INR 1.46 (H) 06/18/2025    INR 1.44 (H) 06/13/2025     No results for input(s): \"BNPML\" in the last 168 hours.  BUN (mg/dL)   Date Value   06/24/2025 43 (H)   06/23/2025 46 (H)   06/22/2025 51 (H)     Blood Urea Nitrogen (mg/dL)   Date Value   03/23/2018 22 (H)   07/21/2017 20   02/21/2017 15   06/17/2016 18   03/09/2016 19   12/02/2015 19     UREA NITROGEN (BUN) (mg/dL)   Date Value   12/04/2021 30 (H)     Creatinine, Serum (mg/dL)   Date Value   06/17/2016 0.96   03/09/2016 1.04   12/02/2015 0.99     CREATININE (mg/dL)   Date Value   12/04/2021 1.19 (H)     Creatinine (mg/dL)   Date Value   06/24/2025 1.93 (H)   06/23/2025 1.89 (H)   06/22/2025 2.00 (H)   03/23/2018 1.03   07/21/2017 0.96   02/21/2017 0.99       Medications:  Current Hospital Medications[2]    Assessment and Plan:  Problem List[3]    Acute on Chronic HFrEF, stage C, NYHA 3  - per history, objective and clinical signs of fluid overload present on admission with also concern for reduction in BiV pacing percentage, for which EP adjusted device settings to optimize CRT   - continue GDMT as tolerated (borderline hypotension), (not on ARNI due to cost), optimize electrolytes.   - encouraged PT / OT today  - continue current diuretic regiment for now.   - serial labs 3-5 days post discharge (CMP, proBNP) with close follow up with primary cardiologist Dr. Paz within 7=14 days of discharge.     LORENA on CKD  - CRS related superimposed on longstanding CKD history  - marked improvement in Cr levels with diuresis and now on oral diuretics.   - trend labs daily, nephrology management.     HTN   - titrate oral therapy.     CAD s/p CABG and PCI   - per history, no acute angina, not on AP agents due to MDS  - monitor on tele, risk factor modification.     Acute PE   - VQ scan with moderate size perfusion defect RLL  - deemed  not candidate for invasive procedure given MDS and not likely a major contributor of hypoxia by IC  - AC for now, CTPA pending as per hematology recommendations.     Acute hypoxic respiratory failure - resolved (on RA)  - combination of HF, PE, and PNA?  - diuresis, heparin for AC for PE management - evaluated by Interventional cardiology and felt not likely a major contributor for hypoxia and unlikely candidate for invasive procedure due to MDS   - transition to oral AC per hematology near discharge with their plan to obtain CTPA for evaluation.     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 GDMT optimization as tolerated.     Pancytopenia with MDS   - on therapy, hematology management.   - transfuse 1U pRBC    Nikolai Walker MD   Advanced Heart Failure and Transplant Cardiology   [facility] ([facility])     L3         [1]   Allergies  Allergen Reactions    Pcn [Penicillins]      \"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   [2]   Current Facility-Administered Medications   Medication Dose Route Frequency    bumetanide (Bumex) 0.25 MG/ML injection 2 mg  2 mg Intravenous Once    apixaban (Eliquis) tab 2.5 mg  2.5 mg Oral BID    fluconazole (Diflucan) tab 200 mg  200 mg Oral Daily    polyethylene glycol (PEG 3350) (Miralax) 17 g oral packet 17 g  17 g Oral Daily    lidocaine-menthol 4-1 % patch 1 patch  1 patch Transdermal Daily PRN    benzocaine-menthol (Cepacol) lozenge 1 lozenge  1 lozenge Oral PRN    alum-mag hydroxide-simethicone (Maalox) 200-200-20 MG/5ML oral suspension 30 mL  30 mL Oral QID PRN    torsemide (Demadex) tab 40 mg  40 mg Oral BID (Diuretic)    acyclovir (Zovirax) cap 200 mg  200 mg Oral BID    metoclopramide (Reglan) 5 mg/mL injection 5 mg  5 mg Intravenous Q8H PRN    levoFLOXacin (Levaquin) tab 750 mg  750 mg Oral Q48HR @ 0700    glucose (Dex4) 15  GM/59ML oral liquid 15 g  15 g Oral Q15 Min PRN    Or    glucose (Glutose) 40% oral gel 15 g  15 g Oral Q15 Min PRN    Or    dextrose 50% injection 50 mL  50 mL Intravenous Q15 Min PRN    Or    glucose (Dex4) 15 GM/59ML oral liquid 30 g  30 g Oral Q15 Min PRN    Or    glucose (Glutose) 40% oral gel 30 g  30 g Oral Q15 Min PRN    [Held by provider] ezetimibe (Zetia) tab 10 mg  10 mg Oral Daily    gabapentin (Neurontin) cap 100 mg  100 mg Oral Daily    levothyroxine (Synthroid) tab 25 mcg  25 mcg Oral Before breakfast    [Held by provider] losartan (Cozaar) tab 25 mg  25 mg Oral Daily    metoprolol succinate ER (Toprol XL) 24 hr tab 50 mg  50 mg Oral 2x Daily(Beta Blocker)    [Held by provider] spironolactone (Aldactone) tab 25 mg  25 mg Oral Daily with breakfast    insulin aspart (NovoLOG) 100 Units/mL FlexPen 1-10 Units  1-10 Units Subcutaneous TID AC and HS    acetaminophen (Tylenol Extra Strength) tab 1,000 mg  1,000 mg Oral Q8H PRN    melatonin tab 3 mg  3 mg Oral Nightly PRN    sennosides (Senokot) tab 17.2 mg  17.2 mg Oral Nightly PRN    bisacodyl (Dulcolax) 10 MG rectal suppository 10 mg  10 mg Rectal Daily PRN    benzonatate (Tessalon) cap 200 mg  200 mg Oral TID PRN    guaiFENesin (Robitussin) 100 MG/5 ML oral liquid 200 mg  200 mg Oral Q4H PRN    glycerin-hypromellose- (Artificial Tears) 0.2-0.2-1 % ophthalmic solution 1 drop  1 drop Both Eyes QID PRN    sodium chloride (Saline Mist) 0.65 % nasal solution 1 spray  1 spray Each Nare Q3H PRN    [Held by provider] Lovastatin TABS 40 mg **PATIENT SUPPLIED**  40 mg Oral BID   [3]   Patient Active Problem List  Diagnosis    Nonischemic cardiomyopathy (HCC)    FLAKO (obstructive sleep apnea)    Congestive heart failure (HCC)    CAD (coronary artery disease)    Rheumatoid arthritis involving multiple sites with positive rheumatoid factor (HCC)    Type 2 diabetes mellitus with hyperglycemia, with long-term current use of insulin (HCC)    Normocytic anemia     Thrombocytopenia    Ischemic cardiomyopathy    Dyspnea on exertion    Dry mouth    Pure hypercholesterolemia    Vitamin D deficiency    Pulmonary hypertension secondary to increased PVR (HCC)    Carotid artery stenosis    Obesity (BMI 30.0-34.9)    Chronic obstructive pulmonary disease, unspecified (HCC)    Weakness generalized    Aortic stenosis, mild    Hyponatremia    Pancytopenia (HCC)    Acute respiratory failure with hypoxia (HCC)    Immunocompromised state (HCC)    Wide-complex tachycardia    Acute on chronic anemia    Type 2 diabetes mellitus without complication, with long-term current use of insulin ([facility])    Foot pain, bilateral    Hypothyroid    Pulmonary hypertension ([facility])    HFrEF (heart failure with reduced ejection fraction) ([facility])    PE (pulmonary thromboembolism) ([facility])    Primary hypertension    Dyspnea, unspecified type    Hemoptysis    Metabolic acidosis    LORENA (acute kidney injury)    Acute pulmonary embolism, unspecified pulmonary embolism type, unspecified whether acute cor pulmonale present ([facility])    MDS (myelodysplastic syndrome) ([facility])    Stage 3 chronic kidney disease ([facility])    Cardiorenal syndrome    Acute kidney injury    Pulmonary infiltrates

## 2025-06-24 NOTE — PLAN OF CARE
Patient is aox3, vss, 98% RA, lungs diminished, AV pacing. Hypoactive bowel sounds, Distended and rounded. Last bm yesterday. Mepilex on sacrum. +3 edema to lower extremities.   CT chest to evaluate if PE is there.  If negative DC eliquis.   Hgb 7 . Platelet 53-- 1 unit of PRBC's given.  BUN 43, Creat 1.93  LFT's improving.  DC eliquis. CT shows negative for PE.  QID gluc.   DC to [facility] today.  Thorough report given to Ismael HANDY at [facility]. No further questions. Ambulance and wife at bedside ready for transfer to [facility].  Problem: Diabetes/Glucose Control  Goal: Glucose maintained within prescribed range  Description: INTERVENTIONS:  - Monitor Blood Glucose as ordered  - Assess for signs and symptoms of hyperglycemia and hypoglycemia  - Administer ordered medications to maintain glucose within target range  - Assess barriers to adequate nutritional intake and initiate nutrition consult as needed  - Instruct patient on self management of diabetes  Outcome: Completed     Problem: Patient/Family Goals  Goal: Patient/Family Long Term Goal  Description: Patient's Long Term Goal:   To return home asap    Interventions:  - Cardiology consult  2 D echo with doppler  - See additional Care Plan goals for specific interventions  Outcome: Completed  Goal: Patient/Family Short Term Goal  Description: Patient's Short Term Goal:  t get rid of shortness of breath    Interventions:   - medication adjustment      Supplemental oxygen as needed  - See additional Care Plan goals for specific interventions  Outcome: Completed     Problem: PAIN - ADULT  Goal: Verbalizes/displays adequate comfort level or patient's stated pain goal  Description: INTERVENTIONS:  - Encourage pt to monitor pain and request assistance  - Assess pain using appropriate pain scale  - Administer analgesics based on type and severity of pain and evaluate response  - Implement non-pharmacological measures as appropriate and  evaluate response  - Consider cultural and social influences on pain and pain management  - Manage/alleviate anxiety  - Utilize distraction and/or relaxation techniques  - Monitor for opioid side effects  - Notify MD/LIP if interventions unsuccessful or patient reports new pain  - Anticipate increased pain with activity and pre-medicate as appropriate  Outcome: Completed     Problem: SAFETY ADULT - FALL  Goal: Free from fall injury  Description: INTERVENTIONS:  - Assess pt frequently for physical needs  - Identify cognitive and physical deficits and behaviors that affect risk of falls.  - [place] fall precautions as indicated by assessment.  - Educate pt/family on patient safety including physical limitations  - Instruct pt to call for assistance with activity based on assessment  - Modify environment to reduce risk of injury  - Provide assistive devices as appropriate  - Consider OT/PT consult to assist with strengthening/mobility  - Encourage toileting schedule  Outcome: Completed     Problem: CARDIOVASCULAR - ADULT  Goal: Maintains optimal cardiac output and hemodynamic stability  Description: INTERVENTIONS:  - Monitor vital signs, rhythm, and trends  - Monitor for bleeding, hypotension and signs of decreased cardiac output  - Evaluate effectiveness of vasoactive medications to optimize hemodynamic stability  - Monitor arterial and/or venous puncture sites for bleeding and/or hematoma  - Assess quality of pulses, skin color and temperature  - Assess for signs of decreased coronary artery perfusion - ex. Angina  - Evaluate fluid balance, assess for edema, trend weights  Outcome: Completed  Goal: Absence of cardiac arrhythmias or at baseline  Description: INTERVENTIONS:  - Continuous cardiac monitoring, monitor vital signs, obtain 12 lead EKG if indicated  - Evaluate effectiveness of antiarrhythmic and heart rate control medications as ordered  - Initiate emergency measures for life threatening arrhythmias  -  Monitor electrolytes and administer replacement therapy as ordered  Outcome: Completed     Problem: RESPIRATORY - ADULT  Goal: Achieves optimal ventilation and oxygenation  Description: INTERVENTIONS:  - Assess for changes in respiratory status  - Assess for changes in mentation and behavior  - Position to facilitate oxygenation and minimize respiratory effort  - Oxygen supplementation based on oxygen saturation or ABGs  - Provide Smoking Cessation handout, if applicable  - Encourage broncho-pulmonary hygiene including cough, deep breathe, Incentive Spirometry  - Assess the need for suctioning and perform as needed  - Assess and instruct to report SOB or any respiratory difficulty  - Respiratory Therapy support as indicated  - Manage/alleviate anxiety  - Monitor for signs/symptoms of CO2 retention  Outcome: Completed     Problem: METABOLIC/FLUID AND ELECTROLYTES - ADULT  Goal: Glucose maintained within prescribed range  Description: INTERVENTIONS:  - Monitor Blood Glucose as ordered  - Assess for signs and symptoms of hyperglycemia and hypoglycemia  - Administer ordered medications to maintain glucose within target range  - Assess barriers to adequate nutritional intake and initiate nutrition consult as needed  - Instruct patient on self management of diabetes  Outcome: Completed  Goal: Electrolytes maintained within normal limits  Description: INTERVENTIONS:  - Monitor labs and rhythm and assess patient for signs and symptoms of electrolyte imbalances  - Administer electrolyte replacement as ordered  - Monitor response to electrolyte replacements, including rhythm and repeat lab results as appropriate  - Fluid restriction as ordered  - Instruct patient on fluid and nutrition restrictions as appropriate  Outcome: Completed     Problem: HEMATOLOGIC - ADULT  Goal: Free from bleeding injury  Description: (Example usage: patient with low platelets)  INTERVENTIONS:  - Avoid intramuscular injections, enemas and rectal  medication administration  - Ensure safe mobilization of patient  - Hold pressure on venipuncture sites to achieve adequate hemostasis  - Assess for signs and symptoms of internal bleeding  - Monitor lab trends  - Patient is to report abnormal signs of bleeding to staff  - Avoid use of toothpicks and dental floss  - Use electric shaver for shaving  - Use soft bristle tooth brush  - Limit straining and forceful nose blowing  Outcome: Completed

## 2025-06-24 NOTE — CM/SW NOTE
Message left with Santy Hernández to confirm bed availability. Await call back.     Zofia SHEARER, LCSW  Discharge Planner

## 2025-06-24 NOTE — PROGRESS NOTES
[facility]  Nephrology Progress Note    David Reyes Attending:  Delta Washington MD       Assessment and Plan:    1) LORENA on CKD 3- primarily due to cardiorenal syndrome / severe systolic HF in setting of PE- labs near baseline. 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 Chirinos). Transfuse 1 u PRBC    5) CAD h/o CABG / interventions    DC planning to [facility]      Subjective:  Awake alert chart noted    Physical Exam:   BP 99/56 (BP Location: Left arm)   Pulse 85   Temp 99 °F (37.2 °C) (Oral)   Resp 20   Wt 225 lb 6.4 oz (102.2 kg)   SpO2 99%   BMI 33.29 kg/m²   Temp (24hrs), Av.6 °F (37 °C), Min:98.2 °F (36.8 °C), Max:99.6 °F (37.6 °C)       Intake/Output Summary (Last 24 hours) at 2025 0842  Last data filed at 2025 0443  Gross per 24 hour   Intake 360 ml   Output 1850 ml   Net -1490 ml     Wt Readings from Last 3 Encounters:   25 225 lb 6.4 oz (102.2 kg)   25 217 lb (98.4 kg)   25 219 lb 3.2 oz (99.4 kg)     General: awake alert  HEENT: No scleral icterus, MMM  Neck: Supple, no DON or thyromegaly  Cardiac: Regular rate and rhythm, S1, S2 normal, no murmur or tub  Lungs: Decreased BS at bases bilaterally   Abdomen: Soft, non-tender. + bowel sounds, no palpable organomegaly  Extremities: Without clubbing, cyanosis; no edmea  Neurologic: Cranial nerves grossly intact, moving all extremities  Skin: Warm and dry, no rashes       Labs:   Lab Results   Component Value Date    WBC 1.7 2025    HGB 7.0 2025    HCT 24.0 2025    PLT 53.0 2025    CREATSERUM 1.93 2025    BUN 43 2025     2025    K 4.3 2025    CL 98 2025    CO2 28.0 2025     2025    CA 8.6 2025     ALB 3.3 06/24/2025    ALKPHO 71 06/24/2025    BILT 0.8 06/24/2025    TP 6.6 06/24/2025    AST 37 06/24/2025    ALT 85 06/24/2025    PGLU 154 06/24/2025       Imaging:  All imaging studies reviewed.    Meds:   Current Hospital Medications[1]      Questions/concerns were discussed with patient and/or family by bedside.          Lacie Alvarez MD  6/23/2025  7:51 AM         [1]   Current Facility-Administered Medications   Medication Dose Route Frequency    apixaban (Eliquis) tab 2.5 mg  2.5 mg Oral BID    fluconazole (Diflucan) tab 200 mg  200 mg Oral Daily    polyethylene glycol (PEG 3350) (Miralax) 17 g oral packet 17 g  17 g Oral Daily    lidocaine-menthol 4-1 % patch 1 patch  1 patch Transdermal Daily PRN    benzocaine-menthol (Cepacol) lozenge 1 lozenge  1 lozenge Oral PRN    alum-mag hydroxide-simethicone (Maalox) 200-200-20 MG/5ML oral suspension 30 mL  30 mL Oral QID PRN    torsemide (Demadex) tab 40 mg  40 mg Oral BID (Diuretic)    acyclovir (Zovirax) cap 200 mg  200 mg Oral BID    metoclopramide (Reglan) 5 mg/mL injection 5 mg  5 mg Intravenous Q8H PRN    levoFLOXacin (Levaquin) tab 750 mg  750 mg Oral Q48HR @ 0700    glucose (Dex4) 15 GM/59ML oral liquid 15 g  15 g Oral Q15 Min PRN    Or    glucose (Glutose) 40% oral gel 15 g  15 g Oral Q15 Min PRN    Or    dextrose 50% injection 50 mL  50 mL Intravenous Q15 Min PRN    Or    glucose (Dex4) 15 GM/59ML oral liquid 30 g  30 g Oral Q15 Min PRN    Or    glucose (Glutose) 40% oral gel 30 g  30 g Oral Q15 Min PRN    [Held by provider] ezetimibe (Zetia) tab 10 mg  10 mg Oral Daily    gabapentin (Neurontin) cap 100 mg  100 mg Oral Daily    levothyroxine (Synthroid) tab 25 mcg  25 mcg Oral Before breakfast    [Held by provider] losartan (Cozaar) tab 25 mg  25 mg Oral Daily    metoprolol succinate ER (Toprol XL) 24 hr tab 50 mg  50 mg Oral 2x Daily(Beta Blocker)    [Held by provider] spironolactone (Aldactone) tab 25 mg  25 mg Oral Daily with breakfast    insulin  aspart (NovoLOG) 100 Units/mL FlexPen 1-10 Units  1-10 Units Subcutaneous TID AC and HS    acetaminophen (Tylenol Extra Strength) tab 1,000 mg  1,000 mg Oral Q8H PRN    melatonin tab 3 mg  3 mg Oral Nightly PRN    sennosides (Senokot) tab 17.2 mg  17.2 mg Oral Nightly PRN    bisacodyl (Dulcolax) 10 MG rectal suppository 10 mg  10 mg Rectal Daily PRN    benzonatate (Tessalon) cap 200 mg  200 mg Oral TID PRN    guaiFENesin (Robitussin) 100 MG/5 ML oral liquid 200 mg  200 mg Oral Q4H PRN    glycerin-hypromellose- (Artificial Tears) 0.2-0.2-1 % ophthalmic solution 1 drop  1 drop Both Eyes QID PRN    sodium chloride (Saline Mist) 0.65 % nasal solution 1 spray  1 spray Each Nare Q3H PRN    [Held by provider] Lovastatin TABS 40 mg **PATIENT SUPPLIED**  40 mg Oral BID

## 2025-06-24 NOTE — CM/SW NOTE
LOIS spoke with Admissions at [facility]. Confirmed that they can accept pt today for SKYE admission. Requesting early admission if possible. RN and Hospitalist updated. LOIS placed BLS transport on will call. PCS form completed/printed. Await discharge clearance.     Zofia SHEARER, LCSW  Discharge Planner

## 2025-06-24 NOTE — DISCHARGE INSTRUCTIONS
Please see on the following medication list when it is safe to resume the Metformin          Going Home Instructions  In this section you will find the tools which will guide you through the first few days after you leave the hospital. Continued use of these tools will help you develop the skills necessary to keep your heart failure under control.     Home Care Instructions Following Heart Failure - the most important things to do every day include:   Weigh yourself and review the “Self-Check Plan” sheet every morning.   Call your cardiologist office if you are in the “Pay Attention-Use Caution” (yellow zone) or “Medical Alert-Warning!” (red zone) as outlined in the Self-Check Plan sheet.  Take your medicines as prescribed.  Limit your sodium (salt) intake.  Know when to call your cardiologist, primary doctor, or nurse.  Know when to seek emergency care.      Things for You to Remember:   1. See your doctor or healthcare provider as written on your discharge instructions.  It is important that you attend this appointment to make sure your symptoms are under control.     2. Your recommended sodium intake is 4021-7708 mg daily.    3.  Weigh yourself every day.    4. Some exercise and activity is important to help keep your heart functioning and strong. Unless instructed not to exercise, you may walk at a slow to moderate pace for 10-15 minutes 2-3 days per week to start. Pace your activity to prevent shortness of breath or fatigue. Stop exercising if you develop chest pain, lightheadedness, or significant shortness of breath.       Call Your Cardiologist If:   You gain 2-3 pounds in one day or 5 pounds in one week.  You have more difficulty breathing.  You are getting more tired with normal activity.  You are more short of breath lying down, or awaken at night short of breath.  You have swelling of your feet or legs.  You urinate less often during the day and more often at night.  You have cramps in your legs.  You  have blurred vision or see yellowish-green halos around objects of lights.    Go to the Emergency Room If:   You have pain or tightness in your chest  You are extremely short of breath  You are coughing up pink-frothy mucus  You are traveling and develop symptoms of worsening heart failure      ** Please follow up with your cardiologist or Advanced Practice Provider as written on your discharge instructions. If you are not provided with an appointment, let your nurse know so you can get an appointment**

## 2025-06-24 NOTE — CM/SW NOTE
LOIS sent updates to [facility] in [place]. Anticipate discharge today. Chemo completed and next cycle is due 7/15.    Zofia SHEARER, LCSW  Discharge Planner

## 2025-06-24 NOTE — TRANSITION NOTE
80yo presented with months of increased edema and bloody sputum. Has pancytopenia with MDS and found to have PE. Also was in HF, and diuresed 11L per I/O, admit weight 213lb, dc weight today 225lb which clearly doesn't correlate.     LVEF:15-20  NYHA Class:3  BNP Admit/DC: 46K-->21K  BB:Yes  ACE/ARB/ARNI: Yes   MRA: Yes  SGLT2-inhibitor:  ICD/Device: YES  Discharge/dry weight: 225 LB  Cardiomems candidate: potentially, has ICD  Heart Failure Clinic Referral Placed: NO  Inpatient HF orderset: NO

## 2025-06-24 NOTE — CM/SW NOTE
06/24/25 1300   Discharge disposition   Expected discharge disposition subacute   Post Acute Care Provider Santy Lynch   Discharge transportation [facility]       Pt cleared for discharge to [facility]. Admissions at [place] aware of the plan for today. BLS set up for 3pm. PCS form completed/printed. SW will update spouse of the discharge plan.     Spoke with spouse - provided update on plan. Spouse had questions about coverage with [facility]. Explained that it is important to work with the SW at the facility on next steps for discharge planning.     Addendum: Transport pushed to 4:45pm due to waiting for scan results. Attempted to contact spouse to update, but voicemail message left.     Addendum: Spoke to spouse - updated of discharge time.     RN to call report at dc; [facility] [phone]    [facility]: [phone] or [phone]    Zofia HOLCOMB MSW, LCSW  Discharge Planner

## 2025-06-24 NOTE — PHYSICAL THERAPY NOTE
PHYSICAL THERAPY TREATMENT NOTE - INPATIENT    Room Number: 8610/8610-A     Session: 4  Number of Visits to Meet Established Goals: 5    Presenting Problem: acute hypoxic respiratory failure, acute PE, HF, ischemic cardiomyopathy, pancytopenia in setting of MDS  Co-Morbidities : CAD s/p CABG, HFrEF s/p ICD, DM2, HTN, Hypothyroidism, HLD, FLAKO, RA    History related to current admission: Patient is a 79 year old male who presented to the ED on 6/12/2025 from PCP office for shortness of breath, hemoptysis, and fatigue.  Pt diagnosed with acute hypoxic respiratory failure, acute PE, HF, ischemic cardiomyopathy, pancytopenia in setting of MDS.     Relevant Hospitalizations:  9/20/24-9/28/24 with respiratory failure, COVID, PNA, septic shock; discharged to SNF at [place], pt reports he discharged home from SNF in November 2024.         HOME SITUATION  Type of Home: House  Home Layout: One level  Stairs to Enter : 2   Railing: No           Lives With: Spouse    Drives: Yes   Patient Regularly Uses: Reading glasses, Rollator       Prior Level of [place]: The pt reports that typically he is able to ambulate independently, but since being discharged from SNF in November, intermittently uses a RW.  Pt states he is independent with I/ADL's.  Pt does physically assist his spouse to get in/out of shower.  Pt denies any recent falls.    PHYSICAL THERAPY ASSESSMENT     Patient demonstrates fair progress this session, goals  updated to reflect patient performance.      Patient is requiring stand-by assist, contact guard assist, minimal assist, and moderate assist as a result of the following impairments: decreased functional strength, decreased endurance/aerobic capacity, pain, decreased muscular endurance, and medical status.     Patient continues to function below baseline with bed mobility, transfers, and gait.  Next session anticipate patient to progress transfers and gait.  Physical Therapy will continue to follow patient  for duration of hospitalization.    Patient continues to benefit from continued skilled PT services: to promote return to prior level of function and safety with continuous assistance and gradual rehabilitative therapy .    PLAN DURING HOSPITALIZATION  Nursing Mobility Recommendation :  (2 assist RW)  PT Device Recommendation: Rolling walker  PT Treatment Plan: Bed mobility, Endurance, Gait training, Body mechanics, Energy conservation, Patient education, Strengthening, Transfer training, Balance training  Frequency (Obs): 3-5x/week     CURRENT GOALS     Goal #1 Patient is able to demonstrate supine - sit EOB @ level: minimum assistance  MET 6/24    Upgrade: SBA   Goal #2 Patient is able to demonstrate transfers Sit to/from Stand at assistance level: moderate assistance      Goal #3 Patient is able to ambulate 50 feet with assist device: walker - rolling at assistance level: minimum assistance      Goal #4     Goal #5     Goal #6     Goal Comments: Goals established on 6/15/2025  6/20/2025 all goals ongoing     SUBJECTIVE  \"You're a day late! They told me physical therapy would be here yesterday.\"  Reviewed plan of care with pt to be seen 3-5x per week.  Pt was seen for 4 sessions last week.  Encouraged pt to continue to mobilize with nursing staff, which pt confirms he is doing.  Reviewed LE exercises and HEP for pt to be completing independently.  Offered pt printed HEP, pt declined.    OBJECTIVE  Precautions: Bed/chair alarm    WEIGHT BEARING RESTRICTION     PAIN ASSESSMENT   Rating:  (Denies pain)      BALANCE                                                                                                                       Static Sitting: Fair +  Dynamic Sitting: Fair -           Static Standing: Poor  Dynamic Standing: Poor    ACTIVITY TOLERANCE  Pulse: 88  Heart Rate Source: Monitor     BP: (!) 126/97  BP Location: Left arm  BP Method: Automatic  Patient Position: Sitting    O2 WALK  Oxygen Therapy  SPO2%  on Room Air at Rest: 97    AM-PAC '6-Clicks' INPATIENT SHORT FORM - BASIC MOBILITY  How much difficulty does the patient currently have...  Patient Difficulty: Turning over in bed (including adjusting bedclothes, sheets and blankets)?: A Little   Patient Difficulty: Sitting down on and standing up from a chair with arms (e.g., wheelchair, bedside commode, etc.): A Lot   Patient Difficulty: Moving from lying on back to sitting on the side of the bed?: A Lot   How much help from another person does the patient currently need...   Help from Another: Moving to and from a bed to a chair (including a wheelchair)?: A Lot   Help from Another: Need to walk in hospital room?: A Lot   Help from Another: Climbing 3-5 steps with a railing?: Total     AM-PAC Score:  Raw Score: 12   Approx Degree of Impairment: 68.66%   Standardized Score (AM-PAC Scale): 35.33   CMS Modifier (G-Code): CL    FUNCTIONAL ABILITY STATUS  Gait Assessment   Functional Mobility/Gait Assessment  Gait Assistance: Moderate assistance  Distance (ft): 3  Assistive Device: Rolling walker  Pattern: Shuffle (increased trunk flexion)    Skilled Therapy Provided    Bed Mobility:  Rolling: SBA    Supine>Sit: CGA    Sit>Supine: NT     Transfer Mobility:  Sit<>Stand: Mod A  Stand>Sit: Mod A    Gait: Mod A    Therapist's Comments: The pt was able to complete supine>sidelying with verbal cues for sequencing and use of bed rail.  Pt able to complete sidelying>sit to EOB with CGA.  Pt sat upright at EOB x5 minutes.  Pt c/o SOB but denies feeling lightheaded or dizzy, vitals as above.  Pt completed sit>stand to RW with 2 attempts and Mod A. Pt required increased time and Mod A to stabilize in static standing.  Pt with increased trunk flexion, requiring verbal and tactile cues to achieve upright posture.  Pt ambulated 3 feet, bed>chair with RW and Mod A for stability.  Pt encouraged to ambulate further, but unable to achieve secondary to \"weakness and shortness of  breath.\"        MODIFIED CHUY DYSPNEA SCALE - (SHORTNESS OF BREATH SCALE)= 5 with mobility    SCORE DESCRIPTION   0 Nothing at all   1 Very slight   2 Slight   3 Moderate   4 Somewhat severe   5 Strong or hard breathing   6    7 Very hard breathing   8    9 Very, Very Severe   10 MAX - You need to stop     Patient Education provided:    Use this scale to rate the difficulty of your breathing:  - As you would rate your pain from 0-10, you can rate your SOB from 0-10  - Goal is to stay below 7/10 with activity  - If you reach beyond 7/10, it is important to rest; stop activity and if you need to sit down to recover.    THERAPEUTIC EXERCISES  Lower Extremity Sitting:  Alternating marching  LAQ    Long sitting:  Ankle pumps  Glut sets with 5 second hold  Hip AB/AD  Quad sets with 5 second hold  SLR     Upper Extremity      Position As above     Repetitions X10 BLE   Sets x1         Patient End of Session: Up in chair, Needs met, Call light within reach, RN aware of session/findings, All patient questions and concerns addressed, Hospital anti-slip socks    PT Session Time: 23 minutes  Gait Training:  3 minutes  Therapeutic Activity: 10 minutes  Therapeutic Exercise: 10 minutes

## 2025-06-24 NOTE — CONSULTS
Heart Failure Nurse  Progress Note    Patient was evaluated by the Heart Failure Nurse  for understanding, verbalization, demonstration, and recall of education related to heart failure, overall adherence to the behaviors necessary to maintain a compensated status, and risk for readmission.     Patient assessment: Late consult received. Patient is scheduled to discharge to [facility] today at 3:00 PM. Heart failure (HF) education was reviewed with the patient, including the importance of daily weights, medication adherence, and contacting cardiology at the first sign of any symptoms.    Follow-up appointment scheduled with Dr. Gonzalez on 6/30 at 3:00 PM. Per patient, transportation to this appointment has been arranged. RN Lavern was informed and will notify [place] of the scheduled appointment in case additional transport assistance is needed.    Patient verbalized understanding of discharge and follow-up plan.    Patient is able to verbalize signs/symptoms fluid overload/impending HF exacerbation and who to contact with problems                                          _x__ yes  ___ no      Patient is following a 2000 mg sodium diet                                             ___ yes  _x__ no    If no, barriers to 2000 mg sodium diet:_______________________________________________    Patient informed of 2-Part dietician classes that is free if sign up within 30 days of discharge or $40  _x__ yes  ___ no      Patient is adherent to medication regimen                                              _x__ yes  ___ no    If no, barriers to medication regimen:    Patient has sufficient funds to purchase medication                      _x__ yes  ___ no      Patient has a scale in the home              ___ yes  ___ no - going to [facility]       Patient is adherent to daily weight monitoring                                        ___ yes   __x_ no    If no, barriers to daily weight monitoring: patient reports weighing for a few days  and then not weighing for a few days - reviewed importance of complying with daily weight checks to help prevent recurrence of symptoms and readmission to hospital    Symptom Tracker Worksheet reviewed with patient  _x__ yes   ___ no      Patient verbalizes understanding of stoplight/heart failure zones          _x__ yes   ___ no      Patient understands the importance of 7-day follow-up appointment      _x__ yes  ___ no    Appointment Date: 6/30/25 at 3pm          Patient has adequate transportation to attend follow-up appointments    _x__ yes  ___ no    If no, was referral to Social Work made  _x__yes  ___ no      Family/Friend present during education: None  RN, Lavern present in room during education     Additional consultations required: none    GDMT: limited due to hypotension and cost, per MD note. Currently on: BB, MRA, SGLT2i +torsemide for sx mgmt    Tiff BRANCHN, RN, PCCN   HF  [id number]

## 2025-06-25 LAB
BLOOD TYPE BARCODE: 6200
UNIT VOLUME: 350 ML

## 2025-06-25 NOTE — DISCHARGE SUMMARY
[place] HOSPITALIST  DISCHARGE SUMMARY     David Reyes Patient Status:  Inpatient    1945 MRN [id number]   Location [facility] 8NE-A Attending No att. providers found   Hosp Day # 12 PCP Thea Harris DO     Date of Admission: 2025  Date of Discharge:  2025     Discharge Disposition: SNF Subacute Rehab    Discharge Diagnosis:  #MDS with pancytopenia   Transfuse for hb< 7-  1u PRBC on 25  Onc on CS   palliative chemo. Complete today  S/p 1 U plt   Started eliquis low dose  Transfuse hgb <7     # Acute hypoxic resp failure due to pleural effusions, PNA and pulmonary embolus - improving  # hemoptysis 2/2 thrombocytopenia  IV Abx   Diuresis   Pulm - sp bronch for hemoptysis - old blood, nothing active. D/w pulm   Cxs from bronch so far neg  CXR stable, reviewed indepenently     # HFrEF, ICD- with acute exacerbation  As above  Cardiology on cs  I/O  Monitor Cr, e-  PO torsemide     #hypotension 2/2 diuresis  -improving     #LORENA on CKD3  -diuresis  -nephro     #atelectasis  CXR reviewed indep.  IS     #transaminitis  -GI consult  -US neg  -improving, unable to afford posaconazole - dc on fluconazole due to cost     #H/o CAD s/p CABG     #Acute PE-  eliquis  Doppler LE NEG  - no IVC filter     #Hypertension     #HLD     #Hypothyroid  Levothyroxine      #FLAKO      #constipation  -miralax    History of Present Illness: David Reyes is a 79 year old male with PMHx CAD s/p CABG, HFrEF s/p ICD, DM2, HTN, Hypothyroidism, HLD, FLAKO, RA on methotrexate who presents for SOB.      Patient with last admission from 2024 to 2022 for acute hypoxic respiratory failure secondary to bacterial pneumonia and septic shock.  Patient discharged to rehab and notes that he has been progressively more short of breath for the past few months.  States that since discharge weight has been relatively stable, states her lower extremity edema has been slightly worse.  Also endorsing ongoing cough with  bloody sputum production.  States has been ongoing for past few months.  Denies any fevers, chills, chest pain, abdominal pain, nausea, vomiting.  Patient went to see his PCP today who noted that he was significantly short of breath with exertion and advised presentation to ER for further workup.  VQ scan positive for PE.    Brief Synopsis: pt w/ MDS w/ pancytopenia. Found to have acute PE. Started on hep gtt. Transfused 1 U prbc. Started on palliative chemo per onc. Continued to have hemoptysis so underwent bronch which was unrevealing. Likely some bleeding/oozing due to thrombocytopenia. Did have to hold hep gtt at times due to low plts. Was a bit FOL due to CHF exacerbation and diuresed. BP borderline. Did have a transient transaminitis, possibly hepatorenal as it improved w/ diuresis. Started on ppx abx and fluconazole. Completed course of chemo here and counts now stable. Transitioned to eliquis. Significant weakness. Ok to DC to [facility] for rehab.     Lace+ Score: 82  59-90 High Risk  29-58 Medium Risk  0-28   Low Risk       TCM Follow-Up Recommendation:  LACE > 58: High Risk of readmission after discharge from the hospital.      Procedures during hospitalization:   bronch    Incidental or significant findings and recommendations (brief descriptions):  yes    Lab/Test results pending at Discharge:   yes    Consultants:  Onc, pulm, nephro, cards    Discharge Medication List:     Discharge Medications        START taking these medications        Instructions Prescription details   acyclovir 200 MG Caps  Commonly known as: Zovirax      Take 1 capsule (200 mg total) by mouth 2 (two) times daily.   Quantity: 60 capsule  Refills: 0     empagliflozin 10 MG Tabs  Commonly known as: Jardiance      Take 1 tablet (10 mg total) by mouth daily.   Quantity: 30 tablet  Refills: 1     fluconazole 200 MG Tabs  Commonly known as: Diflucan      Take 2 tablets (400 mg total) by mouth daily.   Quantity: 180 tablet  Refills: 3      levoFLOXacin 750 MG Tabs  Commonly known as: Levaquin      Take 1 tablet (750 mg total) by mouth every other day.   Quantity: 15 tablet  Refills: 0            CHANGE how you take these medications        Instructions Prescription details   Torsemide 40 MG Tabs  What changed:   medication strength  how much to take      Take 40 mg by mouth BID (Diuretic).   Quantity: 60 tablet  Refills: 0            CONTINUE taking these medications        Instructions Prescription details   gabapentin 100 MG Caps  Commonly known as: Neurontin      TAKE 1 CAPSULE(100 MG) BY MOUTH THREE TIMES DAILY AS NEEDED   Quantity: 90 capsule  Refills: 0     levothyroxine 25 MCG Tabs  Commonly known as: Synthroid      TAKE 1 TABLET(25 MCG) BY MOUTH BEFORE BREAKFAST   Quantity: 90 tablet  Refills: 0     metFORMIN  MG Tb24  Commonly known as: Glucophage XR  Notes to patient: This medication should not be taken within 48 hours of receiving IV contrast dye. You received IV contrast dye on Tuesday, 6/24/2025  at 11 am with imaging. Therefore, you should not resume this medication until after Thursday, 6/26/2025 at 11 am.      TAKE 2 TABLETS(1000 MG) BY MOUTH TWICE DAILY WITH MEALS   Quantity: 360 tablet  Refills: 0     metoprolol succinate ER 50 MG Tb24  Commonly known as: Toprol XL      Take 1 tablet (50 mg total) by mouth in the morning and 1 tablet (50 mg total) before bedtime.   Refills: 0     Microlet Lancets Misc      CHECK BLOOD SUGAR THREE TIMES DAILY. Dx E11.65 insulin dependent   Quantity: 300 each  Refills: 3     spironolactone 25 MG Tabs  Commonly known as: Aldactone      Take 1 tablet (25 mg total) by mouth daily with breakfast.   Quantity: 90 tablet  Refills: 0     TYLENOL ARTHRITIS PAIN OR      Take by mouth.   Refills: 0     Vitamin B-1 100 MG Tabs  Commonly known as: VITAMIN B1      Take 1 tablet (100 mg total) by mouth in the morning.   Refills: 0            STOP taking these medications      ezetimibe 10 MG Tabs  Commonly  known as: Zetia        losartan 25 MG Tabs  Commonly known as: Cozaar        Lovastatin 40 MG Tabs        Toupaige Max SoloStar 300 UNIT/ML Sopn  Generic drug: Insulin Glargine (2 Unit Dial)                  Where to Get Your Medications        These medications were sent to [facility] - [address], [address] [phone], [phone]  [address], [address], [place] [address]      Phone: [phone]   empagliflozin 10 MG Tabs  fluconazole 200 MG Tabs       These medications were sent to [id number] - [place], [place] - [address] AT [facility] [address], [phone], [phone]  [address], [facility] [address]      Phone: [phone]   Torsemide 40 MG Tabs       Please  your prescriptions at the location directed by your doctor or nurse    Bring a paper prescription for each of these medications  acyclovir 200 MG Caps  levoFLOXacin 750 MG Tabs         [place]    Follow-up appointment:   Brendan Mullen MD  [address]  [facility] [id number] [phone]    Follow up in 2 week(s)  Office will call you to schedule the appointment    Emerson Gonzalez DO  [address]  [facility] [id number] [phone]    Go on 6/30/2025  Your appointment has been made for 6/30/25 at 3pm with Dr. Gonzalez     **do not remove, contact   [phone]**    Thea Harris,   [address] [facility] [address] [phone]    Schedule an appointment as soon as possible for a visit in 1 week(s)      Appointments for Next 30 Days 6/24/2025 - 7/24/2025        Date Arrival Time Visit Type Length Department Provider     7/9/2025  2:00 PM  PERIPHERAL LAB [2088] 10 min [facility] PF OOT    Patient Instructions:         Location Instructions:     Your appointment is scheduled at the [facility] in [place]. The address is [address] [place] [address].  Please park in the GREEN LOT and enter through the CANCER CENTER ENTRANCE of BUILDING A.. Once you arrive, please register at the Cancer Center  on the 1st floor.  Masks are optional for all patients and visitors, unless otherwise indicated. No care partners/visitors under 18 years of age are allowed in the infusion room.               2025  2:30 PM  FOLLOW UP-HEM/ONC [2451] 15 min [facility] Hematology Oncology [facility] James Chirinos MD    Patient Instructions:         Location Instructions:     **IF YOU NEED LABWORK OR AN INFUSION ALONG WITH YOUR APPOINTMENT, YOU MUST CALL TO SCHEDULE.**  Your appointment is scheduled at the [facility] in [place]. The address is [address] [place] [address]. Please park in the GREEN LOT and enter through the CANCER CENTER ENTRANCE of BUILDING A. Once you arrive, please register at the [facility]  on the 1st floor.  Masks are optional for all patients and visitors, unless otherwise indicated.                      Vital signs:  Temp:  [97.6 °F (36.4 °C)-99.6 °F (37.6 °C)] 97.6 °F (36.4 °C)  Pulse:  [85-89] 88  Resp:  [20] 20  BP: ()/(56-97) 92/69  SpO2:  [96 %-100 %] 97 %    Physical Exam:    General: No acute distress   Lungs: clear to auscultation  Cardiovascular: S1, S2  Abdomen: Soft  Extremities: pitting edema     -----------------------------------------------------------------------------------------------  PATIENT DISCHARGE INSTRUCTIONS: See electronic chart    Delta Washington MD    Total time spent on discharge plannin minutes     The  Cures Act makes medical notes like these available to patients in the interest of transparency. Please be advised this is a medical document. Medical documents are intended to carry relevant information, facts as evident, and the clinical opinion of the practitioner. The medical note is intended as peer to peer communication and may appear blunt  or direct. It is written in medical language and may contain abbreviations or verbiage that are unfamiliar.

## 2025-06-25 NOTE — OCCUPATIONAL THERAPY NOTE
OCCUPATIONAL THERAPY TREATMENT NOTE - INPATIENT     Room Number: 8610/8610-A  Session: 2   Number of Visits to Meet Established Goals: 3    Presenting Problem: acute hypoxic respiratory failure, acute PE, HF, ischemic cardiomyopathy, pancytopenia in setting of MDS      History: Patient is a 79 year old male admitted on 6/12/2025 with Presenting Problem: acute hypoxic respiratory failure, acute PE, HF, ischemic cardiomyopathy, pancytopenia in setting of MDS. Co-Morbidities : CAD s/p CABG, HFrEF s/p ICD, DM2, HTN, Hypothyroidism, HLD, FLAKO, RA     Relevant Hospitalizations:  9/20/24-9/28/24 with respiratory failure, COVID, PNA, septic shock; discharged to SNF at [facility], pt reports he discharged home from SNF in November 2024.    HOME SITUATION  Type of Home: House  Home Layout: One level  Lives With: Spouse     Toilet and Equipment: Standard height toilet  Shower/Tub and Equipment: Walk-in shower     Drives: Yes  Patient Regularly Uses: Reading glasses, Rollator     Prior Level of Function: independent with ADL and IADL, helps his wife with shower transfer, intermittently using RW since returning home in November, 2024      ASSESSMENT   Patient demonstrates fair progress this session, goals remain in progress.    Patient continues to function below baseline with toileting, bathing, upper body dressing, lower body dressing, bed mobility, and transfers.   Contributing factors to remaining limitations include decreased functional strength, decreased endurance, pain, decreased muscular endurance, and medical status.  Next session anticipate patient to progress toileting, bathing, upper body dressing, and lower body dressing.  Occupational Therapy will continue to follow patient for duration of hospitalization.    Patient continues to benefit from continued skilled OT services: to promote return to prior level of function and safety with continuous assistance and gradual rehabilitative therapy.         WEIGHT BEARING  RESTRICTION       Recommendations for nursing staff:   Transfers: mod A with RW  Toileting location: bedside commode     TREATMENT SESSION:  Patient Start of Session: Pt was found in his bed.     FUNCTIONAL TRANSFER ASSESSMENT  Sit to Stand: Edge of Bed  Edge of Bed: Moderate Assist  Chair: Minimal Assist    BED MOBILITY  Supine to Sit : Moderate Assist  Scooting: Min A    BALANCE ASSESSMENT     FUNCTIONAL ADL ASSESSMENT  LB Dressing Seated: Dependent    ACTIVITY TOLERANCE:                          O2 SATURATIONS       EDUCATION PROVIDED  Patient Education : Role of Occupational Therapy; Plan of Care; Discharge Recommendations; Functional Transfer Techniques; Fall Prevention  Patient's Response to Education: Verbalized Understanding    Equipment used: RW, gait belt   Demonstrates functional use, Would benefit from additional trial          UPPER EXTREMITY  ROM: within functional limits   Strength: is within functional limits     Therapist comments: supine>sit EOB>stand to RW>steps to sit in chair to L side>reclined in chair       Patient End of Session: Up in chair, Needs met, RN aware of session/findings, Call light within reach, Hospital anti-slip socks, Alarm set    SUBJECTIVE  \"Why were you not here yesterday? They said you were coming...\"     PAIN ASSESSMENT  Ratin  Location: back  Management Techniques: Activity promotion, Relaxation, Repositioning     OBJECTIVE  Precautions: Bed/chair alarm    AM-PAC ‘6-Clicks’ Inpatient Daily Activity Short Form  -   Putting on and taking off regular lower body clothing?: A Lot  -   Bathing (including washing, rinsing, drying)?: A Lot  -   Toileting, which includes using toilet, bedpan or urinal? : A Lot  -   Putting on and taking off regular upper body clothing?: A Little  -   Taking care of personal grooming such as brushing teeth?: A Little  -   Eating meals?: A Little    AM-PAC Score:  Score: 15  Approx Degree of Impairment: 56.46%  Standardized Score (AM-PAC Scale):  34.69    PLAN     OT Treatment Plan: Energy conservation/work simplification techniques, ADL training, Functional transfer training, Patient/Family education, Equipment eval/education, Compensatory technique education  Rehab Potential : Fair  Frequency: 3x/week    OT Goals:     All goals ongoing 06/24/25, LS     ADL Goals   Patient will perform lower body dressing:  with min assist  Patient will perform toileting: with min assist     Functional Transfer Goals  Patient will transfer to toilet:  cga     UE Exercise Program Goal  Patient will be independent with bilateral AROM HEP (home exercise program).     Additional Goals  Pt will incorporate 2 energy conservation techniques into ADL.      OT Session Time: 30 minutes  Therapeutic Activity: 30 minutes

## 2025-06-26 VITALS
TEMPERATURE: 98 F | HEART RATE: 88 BPM | WEIGHT: 215 LBS | DIASTOLIC BLOOD PRESSURE: 69 MMHG | BODY MASS INDEX: 32 KG/M2 | OXYGEN SATURATION: 97 % | RESPIRATION RATE: 20 BRPM | SYSTOLIC BLOOD PRESSURE: 92 MMHG

## 2025-07-18 ENCOUNTER — TELEPHONE (OUTPATIENT)
Age: 80
End: 2025-07-18

## 2025-07-21 ENCOUNTER — TELEPHONE (OUTPATIENT)
Age: 80
End: 2025-07-21

## 2025-07-24 ENCOUNTER — APPOINTMENT (OUTPATIENT)
Facility: LOCATION | Age: 80
End: 2025-07-24
Attending: INTERNAL MEDICINE

## 2025-07-24 ENCOUNTER — TELEPHONE (OUTPATIENT)
Facility: LOCATION | Age: 80
End: 2025-07-24

## 2025-07-24 ENCOUNTER — OFFICE VISIT (OUTPATIENT)
Facility: LOCATION | Age: 80
End: 2025-07-24
Attending: INTERNAL MEDICINE

## 2025-07-24 DIAGNOSIS — D46.9 MDS (MYELODYSPLASTIC SYNDROME) (HCC): Primary | ICD-10-CM

## 2025-07-24 NOTE — TELEPHONE ENCOUNTER
Sharmin called back. David is living at [facility], and insurance does not cover transportation. They have no way for him to get here. Santy also said the insurance will either cover the stay at [facility] and therapy at [facility], or come for treatment.     He would like to stay in rehab for now and get stronger, and then once he is home, call us back to start chemo.

## 2025-07-25 ENCOUNTER — APPOINTMENT (OUTPATIENT)
Facility: LOCATION | Age: 80
End: 2025-07-25
Attending: INTERNAL MEDICINE

## 2025-07-28 ENCOUNTER — APPOINTMENT (OUTPATIENT)
Facility: LOCATION | Age: 80
End: 2025-07-28
Attending: INTERNAL MEDICINE

## 2025-07-29 ENCOUNTER — APPOINTMENT (OUTPATIENT)
Facility: LOCATION | Age: 80
End: 2025-07-29
Attending: INTERNAL MEDICINE

## 2025-07-30 ENCOUNTER — APPOINTMENT (OUTPATIENT)
Facility: LOCATION | Age: 80
End: 2025-07-30
Attending: INTERNAL MEDICINE

## 2025-07-31 ENCOUNTER — APPOINTMENT (OUTPATIENT)
Facility: LOCATION | Age: 80
End: 2025-07-31
Attending: INTERNAL MEDICINE

## 2025-07-31 ENCOUNTER — TELEPHONE (OUTPATIENT)
Facility: LOCATION | Age: 80
End: 2025-07-31

## 2025-08-01 ENCOUNTER — APPOINTMENT (OUTPATIENT)
Facility: LOCATION | Age: 80
End: 2025-08-01
Attending: INTERNAL MEDICINE

## 2025-08-02 DIAGNOSIS — R79.89 ABNORMAL TSH: ICD-10-CM

## 2025-08-04 ENCOUNTER — TELEPHONE (OUTPATIENT)
Dept: FAMILY MEDICINE CLINIC | Facility: CLINIC | Age: 80
End: 2025-08-04

## 2025-08-04 RX ORDER — LEVOTHYROXINE SODIUM 25 UG/1
25 TABLET ORAL
Qty: 90 TABLET | Refills: 0 | OUTPATIENT
Start: 2025-08-04

## 2025-08-06 ENCOUNTER — TELEPHONE (OUTPATIENT)
Facility: LOCATION | Age: 80
End: 2025-08-06

## (undated) DIAGNOSIS — Z79.4 TYPE 2 DIABETES MELLITUS WITH HYPERGLYCEMIA, WITH LONG-TERM CURRENT USE OF INSULIN (HCC): ICD-10-CM

## (undated) DIAGNOSIS — M05.79 RHEUMATOID ARTHRITIS INVOLVING MULTIPLE SITES WITH POSITIVE RHEUMATOID FACTOR (HCC): ICD-10-CM

## (undated) DIAGNOSIS — I50.22 CHRONIC SYSTOLIC CONGESTIVE HEART FAILURE (HCC): ICD-10-CM

## (undated) DIAGNOSIS — E78.5 DYSLIPIDEMIA: ICD-10-CM

## (undated) DIAGNOSIS — E11.65 TYPE 2 DIABETES MELLITUS WITH HYPERGLYCEMIA, WITH LONG-TERM CURRENT USE OF INSULIN (HCC): ICD-10-CM

## (undated) DIAGNOSIS — I42.8 NONISCHEMIC CARDIOMYOPATHY (HCC): ICD-10-CM

## (undated) DIAGNOSIS — Z02.9 ENCOUNTERS FOR UNSPECIFIED ADMINISTRATIVE PURPOSE: ICD-10-CM

## (undated) DIAGNOSIS — G47.33 OSA (OBSTRUCTIVE SLEEP APNEA): ICD-10-CM

## (undated) DIAGNOSIS — R06.00 EXERTIONAL DYSPNEA: ICD-10-CM

## (undated) DEVICE — SINGLE USE SUCTION VALVE MAJ-209: Brand: SINGLE USE SUCTION VALVE (STERILE)

## (undated) DEVICE — BOWL MED MD 16OZ PLAS CAP GRAD

## (undated) DEVICE — 60 ML SYRINGE REGULAR TIP: Brand: MONOJECT

## (undated) DEVICE — SINGLE USE BIOPSY VALVE MAJ-210: Brand: SINGLE USE BIOPSY VALVE (STERILE)

## (undated) DEVICE — MEDI-VAC NON-CONDUCTIVE SUCTION TUBING: Brand: CARDINAL HEALTH

## (undated) DEVICE — KIT CUSTOM ENDOPROCEDURE STERIS

## (undated) DEVICE — 3M™ RED DOT™ MONITORING ELECTRODE WITH FOAM TAPE AND STICKY GEL, 50/BAG, 20/CASE, 72/PLT 2570: Brand: RED DOT™

## (undated) DEVICE — 1200CC GUARDIAN II: Brand: GUARDIAN

## (undated) DEVICE — MASK,FACE,MAXFLUIDPROTECT,SHIELD/TIES: Brand: MEDLINE

## (undated) DEVICE — MEDI-VAC SUCTION HANDLE REGULAR CAPACITY: Brand: CARDINAL HEALTH

## (undated) DEVICE — SYRINGE MED 10ML SLIP TIP CLR BRL TAPR PLUNG

## (undated) NOTE — LETTER
[address]  [address]  Authorization for Surgical Operation and Procedure     Date: 06/19/2025                                                                                                         Time:__________  I hereby authorize Surgeon(s): Justice Raymundo MD, my physician and his/her assistants (if applicable), which may include medical students, residents, and/or fellows, to perform the following surgical operation/ procedure and administer such anesthesia as may be determined necessary by my physician:  Operation/Procedure name (s) Procedure(s): BRONCHOSCOPY WITH BRONCHOALVEOLAR LAVAGE, POSSIBLE ARGON PLASMA COAGULATION on David M Savocchi   2.   I recognize that during the surgical operation/procedure, unforeseen conditions may necessitate additional or different procedures than those listed above.  I, therefore, further authorize and request that the above-named surgeon, assistants, or designees perform such procedures as are, in their judgment, necessary and desirable.    3.   My surgeon/physician has discussed prior to my surgery the potential benefits, risks and side effects of this procedure; the likelihood of achieving goals; and potential problems that might occur during recuperation.  They also discussed reasonable alternatives to the procedure, including risks, benefits, and side effects related to the alternatives and risks related to not receiving this procedure.  I have had all my questions answered and I acknowledge that no guarantee has been made as to the result that may be obtained.    4.   Should the need arise during my operation/procedure, which includes change of level of care prior to discharge, I also consent to the administration of blood and/or blood products.  Further, I understand that despite careful testing and screening of blood or blood products by collecting agencies, I may still be subject to ill effects as a result of receiving a  blood transfusion and/or blood products.  The following are some, but not all, of the potential risks that can occur: fever and allergic reactions, hemolytic reactions, transmission of diseases such as Hepatitis, AIDS and Cytomegalovirus (CMV) and fluid overload.  In the event that I wish to have an autologous transfusion of my own blood, or a directed donor transfusion, I will discuss this with my physician.  Check only if Refusing Blood or Blood Products  I understand refusal of blood or blood products as deemed necessary by my physician may have serious consequences to my condition to include possible death. I hereby assume responsibility for my refusal and release the hospital, its personnel, and my physicians from any responsibility for the consequences of my refusal.          o  Refuse      5.   I authorize the use of any specimen, organs, tissues, body parts or foreign objects that may be removed from my body during the operation/procedure for diagnosis, research or teaching purposes and their subsequent disposal by hospital authorities.  I also authorize the release of specimen test results and/or written reports to my treating physician on the hospital medical staff or other referring or consulting physicians involved in my care, at the discretion of the Pathologist or my treating physician.    6.   I consent to the photographing or videotaping of the operations or procedures to be performed, including appropriate portions of my body for medical, scientific, or educational purposes, provided my identity is not revealed by the pictures or by descriptive texts accompanying them.  If the procedure has been photographed/videotaped, the surgeon will obtain the original picture, image, videotape or CD.  The hospital will not be responsible for storage, release or maintenance of the picture, image, tape or CD.    7.   I consent to the presence of a  or observers in the operating room as deemed  necessary by my physician or their designees.    8.   I recognize that in the event my procedure results in extended X-Ray/fluoroscopy time, I may develop a skin reaction.    9. If I have a Do Not Attempt Resuscitation (DNAR) order in place, that status will be suspended while in the operating room, procedural suite, and during the recovery period unless otherwise explicitly stated by me (or a person authorized to consent on my behalf). The surgeon or my attending physician will determine when the applicable recovery period ends for purposes of reinstating the DNAR order.  10. Patients having a sterilization procedure: I understand that if the procedure is successful the results will be permanent and it will therefore be impossible for me to inseminate, conceive, or bear children.  I also understand that the procedure is intended to result in sterility, although the result has not been guaranteed.   11. I acknowledge that my physician has explained sedation/analgesia administration to me including the risk and benefits I consent to the administration of sedation/analgesia as may be necessary or desirable in the judgment of my physician.    I CERTIFY THAT I HAVE READ AND FULLY UNDERSTAND THE ABOVE CONSENT TO OPERATION and/or OTHER PROCEDURE.      _________________________________________  __________________________________  Signature of Patient     Signature of Responsible Person         ___________________________________         Printed Name of Responsible Person           _________________________________                 Relationship to Patient  _________________________________________  ______________________________  Signature of Witness          Date  Time      Patient Name: David M Eric     : 1945                 Printed: 2025     Medical Record #: [id number]                     Page 1 of 2                                    [address]   [address]    Consent for Anesthesia    IDavid agree to be cared for by an anesthesiologist, who is specially trained to monitor me and give me medicine to put me to sleep or keep me comfortable during my procedure    I understand that my anesthesiologist is not an employee or agent of [facility] or [employer]. He or she works for [employer].    As the patient asking for anesthesia services, I agree to:  Allow the anesthesiologist (anesthesia doctor) to give me medicine and do additional procedures as necessary. Some examples are: Starting or using an “IV” to give me medicine, fluids or blood during my procedure, and having a breathing tube placed to help me breathe when I’m asleep (intubation). In the event that my heart stops working properly, I understand that my anesthesiologist will make every effort to sustain my life, unless otherwise directed by [facility] Do Not Resuscitate documents.  Tell my anesthesia doctor before my procedure:  If I am pregnant.  The last time that I ate or drank.  All of the medicines I take (including prescriptions, herbal supplements, and pills I can buy without a prescription (including street drugs/illegal medications). Failure to inform my anesthesiologist about these medicines may increase my risk of anesthetic complications.  If I am allergic to anything or have had a reaction to anesthesia before.  I understand how the anesthesia medicine will help me (benefits).  I understand that with any type of anesthesia medicine there are risks:  The most common risks are: nausea, vomiting, sore throat, muscle soreness, damage to my eyes, mouth, or teeth (from breathing tube placement).  Rare risks include: remembering what happened during my procedure, allergic reactions to medications, injury to my airway, heart, lungs, vision, nerves, or muscles and in extremely rare instances death.  My doctor has explained to me other choices  available to me for my care (alternatives).  Pregnant Patients (“epidural”):  I understand that the risks of having an epidural (medicine given into my back to help control pain during labor), include itching, low blood pressure, difficulty urinating, headache or slowing of the baby’s heart. Very rare risks include infection, bleeding, seizure, irregular heart rhythms and nerve injury.  Regional Anesthesia (“spinal”, “epidural”, & “nerve blocks”):  I understand that rare but potential complications include headache, bleeding, infection, seizure, irregular heart rhythms, and nerve injury.    I can change my mind about having anesthesia services at any time before I get the medicine.    _____________________________________________________________________________  Patient (or Representative) Signature/Relationship to Patient  Date   Time    _____________________________________________________________________________   Name (if used)    Language/Organization   Time    _____________________________________________________________________________  Anesthesiologist Signature     Date   Time  I have discussed the procedure and information above with the patient (or patient’s representative) and answered their questions. The patient or their representative has agreed to have anesthesia services.    _____________________________________________________________________________  Witness        Date   Time  I have verified that the signature is that of the patient or patient’s representative, and that it was signed before the procedure  Patient Name: David Reyes     : 1945                 Printed: 2025     Medical Record #: [id number]                     Page 2 of 2

## (undated) NOTE — LETTER
Your patient was recently seen at the [facility] for a hospital follow-up visit. The visit note is attached. Please contact the clinic with any questions at [phone].     Thank you,  MIKEL Chan

## (undated) NOTE — LETTER
Date & Time: 4/24/2022, 10:50 AM  Patient: Emma Viera  Encounter Provider(s):    MIKEL Oh       To Whom It May Concern:    Gretta Milan was seen and treated in our department on 4/24/2022. He may return to work 4/29/2022.     If you have any questions or concerns, please do not hesitate to call.        _____________________________  Physician/APC Signature

## (undated) NOTE — MR AVS SNAPSHOT
[facility] [place] [id number]  [phone]               Thank you for choosing us for your health care visit with Emelyn Pulido RN,CDE.   We are glad to serve you and happy to provide you with this summa necessary. INDIVIDUAL FOLLOW-UP APPOINTMENTS To be determined at appointment  INITIAL INDIVIDUAL ASSESSMENTS An assessment and meter training will begin this appointment. Educational needs will be discussed at that time.   STEP ONE SATISFIER This is a 1-ho This list is accurate as of: 3/21/17 11:59 PM.  Always use your most recent med list.                aspirin 325 MG Tbec   Take 1 tablet by mouth daily.    What changed:  how much to take           RSOIBEL CONTOUR NEXT CONTROL Low Soln   1 drop by In Vitro ro view more details from this visit by going to [email or web address]. If you've recently had a stay at the Hospital you can access your discharge instructions in [employer] by going to Visits < Admission Summaries.  If you've been to the Emergency Depar

## (undated) NOTE — LETTER
Date: 5/6/2022    Patient Name: Gerald Howell          To Whom it may concern: The above patient was seen at the [facility] for treatment of a medical condition. This patient should be excused from attending work from 5/2/2022 through 5/11/2022. The patient may return to work on 5/12/2022 with the no limitations. Please call with any questions or concerns [phone].         Sincerely,    MIKEL Polk

## (undated) NOTE — LETTER
05/31/18        Gavin Reyes  [address]  [facility] [address]      Dear Wagner Thomas,    1967 [facility] records indicate that you have outstanding lab work and or testing that was ordered for you and has not yet been completed:          Occult Blood, Fec

## (undated) NOTE — LETTER
David Reyes   [address] [facility] [address]           Dear David Reyes     Our records indicate that you have outstanding lab work and or testing that was ordered for you and has not yet been completed:  Lab Frequency Next Occurrence   Vitamin D [E] Once 10/01/2023      To provide you with the best possible care, please complete these orders at your earliest convenience. If you have recently completed these orders please disregard this letter.     If you have any questions please call the office at [phone].     Thank you,     [facility]

## (undated) NOTE — MR AVS SNAPSHOT
[facility] [place] [id number]  [phone]               Thank you for choosing us for your health care visit with Bina Henderson RN,CDE.   We are glad to serve you and happy to provide you with this summa at that time. STEP ONE SATISFIER This is a 1-hour appointment taught by a registered dietitian and diabetes educator. Half of this appointment time is dedicated to the development of a personalized education plan.   This plan is developed privately in a 1: 1 drop by In Vitro route as needed.  Test 1st strip out of each new vial;discard solution 90 days after opening  Dx Code: E11.65 IDDM           ROSIBEL MICROLET LANCETS Misc   Test 3 times daily DxCode: E11.65 IDDM           Clopidogrel Bisulfate 75 MG Tabs discharge instructions in [employer] by going to Visits < Admission Summaries. If you've been to the Emergency Department or your doctor's office, you can view your past visit information in [employer] by going to Visits < Visit Summaries. Omnireliant questions?

## (undated) NOTE — LETTER
Patient Name: David Reyes        : 1945       Medical Record #: [id number]    CONSENT FOR PROCEDURES/SEDATION    Date: 2025       Time: 8:09 AM        1. I authorize the performance upon David Reyes the following:    _______Image Guided Bone Marrow Biopsy_____________________    2. I authorize Dr. Paige (and whomever is designated as the doctor’s assistant), to perform the above mentioned procedures.    3. If any unforeseen conditions arise during this procedure calling for additional procedures, operations, or medications (including anesthesia and blood transfusion), I  further request and authorize the doctor to do whatever he/she deems advisable in my interest.    4. I consent to the taking and reproduction of any photographs in the course of this procedure for professional purposes.    5. I consent to the administration of such sedation as may be considered necessary or advisable by the physician responsible for this service, with the exception of  _______none_____________.    6. I have been informed by my doctor of the nature and purpose of this procedure/sedation, possible alternative methods of treatment, risk involved and possible complications.      Signature of Patient:  ___________________________    Signature of person authorized to consent for patient: Relationship to patient:  ___________________________    ___________________    Witness: ____________________     Date: ______________    Provider: ____________________     Date: ______________

## (undated) NOTE — LETTER
[facility] [address]  [address] [facility] [address]  [phone]    Blood Transfusion Consent    In the course of your treatment, it may become necessary to administer a transfusion of blood or blood components. This form provides basic information concerning this procedure and, if signed by you, authorizes its administration. By signing this form, you agree that all of your questions about the administration of blood or blood products have been answered by the ordering medical professional or designee.    Description of Procedure  Blood is introduced into one of your veins, commonly in the arm, using a sterilized disposable needle. The amount of blood transfused, and whether the transfusion will be of blood or blood components is a judgement the physician will make based on your particular needs.    Risks  The transfusion is a common procedure of low risk.  MINOR AND TEMPORARY REACTIONS ARE NOT UNCOMMON, including a slight bruise, swelling or local reaction in the area where the needle pierces your skin, or a nonserious reaction to the transfused material itself, including headache, fever or mild skin reaction, such as rash.  Serious reactions are possible, though very unlikely, and include severe allergic reaction (shock) and destruction (hemolysis) of transfused blood cells.  Infectious diseases which are known to be transmitted by blood transfusion include certain types of viral Hepatitis(liver infection from a virus), Human Immunodeficiency Virus (HIV-1,2) infection, a viral infection known to cause Acquired Immunodeficiency Syndrome (AIDS), as well as certain other bacterial, viral, and parasitic diseases. While a minimal risk of acquiring an infectious disease from transfused blood exists, in accordance with the Federal and State law, all due care has been taken in donor selection and testing to avoid transmission of disease.    Alternatives  If loss of blood poses serious threats during your  treatment, THERE IS NO EFFECTIVE ALTERNATIVE TO BLOOD TRANSFUSION. However, if you have any further questions on this matter, your provider will fully explain the alternatives to you if it has not already been done.    I, ______________________________, have read/had read to me the above. I understand the matters bearing on the decision whether or not to authorize a transfusion of blood or blood components. I have no questions which have not been answered to my full satisfaction. I hereby consent to such transfusion as my physician may deem necessary or advisable in the course of my treatment.    ______________________________________________                    ___________________________  (Signature of Patient or Responsible party in case of minor,                 (Printed Name of Patient or incompetent, or unconscious patient)              Responsible Party)    ___________________________               _____________________  (Relationship to Patient if not self)                                    (Date and Time)    __________________________                                                           ______________________              (Signature of Witness)               (Printed Name of Witness)     Language line ()    Telephone/Verbal/Video Consent    __________________________                     ____________________  (Signature of 2nd Witness           (Printed Name of 2nd  Telephone/Verbal/Video Consent)           Witness)    Patient Name: David Reyes     : 1945                 Printed: 2025     Medical Record #: [id number]      Rev: 2023

## (undated) NOTE — LETTER
[address]  [address]  Authorization for Surgical Operation and Procedure     Date:___________                                                                                                         Time:__________  I hereby authorize Surgeon(s):  Justice Raymundo MD, my physician and his/her assistants (if applicable), which may include medical students, residents, and/or fellows, to perform the following surgical operation/ procedure and administer such anesthesia as may be determined necessary by my physician:  Operation/Procedure name (s) Procedure(s):  BRONCHOSCOPY WITH BRONCHOALVEOLAR LAVAGE, POSSIBLE ARGON PLASMA COAGULATION on David M Savocchi   2.   I recognize that during the surgical operation/procedure, unforeseen conditions may necessitate additional or different procedures than those listed above.  I, therefore, further authorize and request that the above-named surgeon, assistants, or designees perform such procedures as are, in their judgment, necessary and desirable.    3.   My surgeon/physician has discussed prior to my surgery the potential benefits, risks and side effects of this procedure; the likelihood of achieving goals; and potential problems that might occur during recuperation.  They also discussed reasonable alternatives to the procedure, including risks, benefits, and side effects related to the alternatives and risks related to not receiving this procedure.  I have had all my questions answered and I acknowledge that no guarantee has been made as to the result that may be obtained.    4.   Should the need arise during my operation/procedure, which includes change of level of care prior to discharge, I also consent to the administration of blood and/or blood products.  Further, I understand that despite careful testing and screening of blood or blood products by collecting agencies, I may still be subject to ill effects as a result of receiving  a blood transfusion and/or blood products.  The following are some, but not all, of the potential risks that can occur: fever and allergic reactions, hemolytic reactions, transmission of diseases such as Hepatitis, AIDS and Cytomegalovirus (CMV) and fluid overload.  In the event that I wish to have an autologous transfusion of my own blood, or a directed donor transfusion, I will discuss this with my physician.  Check only if Refusing Blood or Blood Products  I understand refusal of blood or blood products as deemed necessary by my physician may have serious consequences to my condition to include possible death. I hereby assume responsibility for my refusal and release the hospital, its personnel, and my physicians from any responsibility for the consequences of my refusal.          o  Refuse      5.   I authorize the use of any specimen, organs, tissues, body parts or foreign objects that may be removed from my body during the operation/procedure for diagnosis, research or teaching purposes and their subsequent disposal by hospital authorities.  I also authorize the release of specimen test results and/or written reports to my treating physician on the hospital medical staff or other referring or consulting physicians involved in my care, at the discretion of the Pathologist or my treating physician.    6.   I consent to the photographing or videotaping of the operations or procedures to be performed, including appropriate portions of my body for medical, scientific, or educational purposes, provided my identity is not revealed by the pictures or by descriptive texts accompanying them.  If the procedure has been photographed/videotaped, the surgeon will obtain the original picture, image, videotape or CD.  The hospital will not be responsible for storage, release or maintenance of the picture, image, tape or CD.    7.   I consent to the presence of a  or observers in the operating room as deemed  necessary by my physician or their designees.    8.   I recognize that in the event my procedure results in extended X-Ray/fluoroscopy time, I may develop a skin reaction.    9. If I have a Do Not Attempt Resuscitation (DNAR) order in place, that status will be suspended while in the operating room, procedural suite, and during the recovery period unless otherwise explicitly stated by me (or a person authorized to consent on my behalf). The surgeon or my attending physician will determine when the applicable recovery period ends for purposes of reinstating the DNAR order.  10. Patients having a sterilization procedure: I understand that if the procedure is successful the results will be permanent and it will therefore be impossible for me to inseminate, conceive, or bear children.  I also understand that the procedure is intended to result in sterility, although the result has not been guaranteed.   11. I acknowledge that my physician has explained sedation/analgesia administration to me including the risk and benefits I consent to the administration of sedation/analgesia as may be necessary or desirable in the judgment of my physician.    I CERTIFY THAT I HAVE READ AND FULLY UNDERSTAND THE ABOVE CONSENT TO OPERATION and/or OTHER PROCEDURE.    _________________________________________  __________________________________  Signature of Patient     Signature of Responsible Person         ___________________________________         Printed Name of Responsible Person           _________________________________                 Relationship to Patient  _________________________________________  ______________________________  Signature of Witness          Date  Time      Patient Name: David M Eric     : 1945                 Printed: 2025     Medical Record #: [id number]                     Page 1 of 2                                    [address]   [address]    Consent for Anesthesia    IDavid agree to be cared for by an anesthesiologist, who is specially trained to monitor me and give me medicine to put me to sleep or keep me comfortable during my procedure    I understand that my anesthesiologist is not an employee or agent of [facility] or [employer]. He or she works for [employer].    As the patient asking for anesthesia services, I agree to:  Allow the anesthesiologist (anesthesia doctor) to give me medicine and do additional procedures as necessary. Some examples are: Starting or using an “IV” to give me medicine, fluids or blood during my procedure, and having a breathing tube placed to help me breathe when I’m asleep (intubation). In the event that my heart stops working properly, I understand that my anesthesiologist will make every effort to sustain my life, unless otherwise directed by [facility] Do Not Resuscitate documents.  Tell my anesthesia doctor before my procedure:  If I am pregnant.  The last time that I ate or drank.  All of the medicines I take (including prescriptions, herbal supplements, and pills I can buy without a prescription (including street drugs/illegal medications). Failure to inform my anesthesiologist about these medicines may increase my risk of anesthetic complications.  If I am allergic to anything or have had a reaction to anesthesia before.  I understand how the anesthesia medicine will help me (benefits).  I understand that with any type of anesthesia medicine there are risks:  The most common risks are: nausea, vomiting, sore throat, muscle soreness, damage to my eyes, mouth, or teeth (from breathing tube placement).  Rare risks include: remembering what happened during my procedure, allergic reactions to medications, injury to my airway, heart, lungs, vision, nerves, or muscles and in extremely rare instances death.  My doctor has explained to me other choices  available to me for my care (alternatives).  Pregnant Patients (“epidural”):  I understand that the risks of having an epidural (medicine given into my back to help control pain during labor), include itching, low blood pressure, difficulty urinating, headache or slowing of the baby’s heart. Very rare risks include infection, bleeding, seizure, irregular heart rhythms and nerve injury.  Regional Anesthesia (“spinal”, “epidural”, & “nerve blocks”):  I understand that rare but potential complications include headache, bleeding, infection, seizure, irregular heart rhythms, and nerve injury.    I can change my mind about having anesthesia services at any time before I get the medicine.    _____________________________________________________________________________  Patient (or Representative) Signature/Relationship to Patient  Date   Time    _____________________________________________________________________________   Name (if used)    Language/Organization   Time    _____________________________________________________________________________  Anesthesiologist Signature     Date   Time  I have discussed the procedure and information above with the patient (or patient’s representative) and answered their questions. The patient or their representative has agreed to have anesthesia services.    _____________________________________________________________________________  Witness        Date   Time  I have verified that the signature is that of the patient or patient’s representative, and that it was signed before the procedure  Patient Name: David Reyes     : 1945                 Printed: 2025     Medical Record #: [id number]                     Page 2 of 2

## (undated) NOTE — LETTER
Your patient was recently seen at the [facility] for a hospital follow-up visit. The visit note is attached. Please contact the clinic with any questions at [phone].     Thank you,  MIKEL Wood

## (undated) NOTE — LETTER
[address]  [address]  Consent for Procedure/Sedation  Date: ***         Time: ***    {[facility] ivs consent:7212}

## (undated) NOTE — LETTER
03/31/25      David Reyes   [address] [facility] [address]           Dear David Reyes     Our records indicate that you have outstanding lab work and or testing that was ordered for you and has not yet been completed:  Lab Frequency Next Occurrence   TSH and Free T4 [E] Once 02/10/2025      To provide you with the best possible care, please complete these orders at your earliest convenience. If you have recently completed these orders please disregard this letter.     If you have any questions please call the office at [phone].     Thank you,     [facility]

## (undated) NOTE — MR AVS SNAPSHOT
[facility] [place] [address] [phone]               Thank you for choosing us for your health care visit with Susanne Nolan RN,CDE.   We are glad to serve you and happy to provide you with this summa at that time. STEP ONE SATISFIER This is a 1-hour appointment taught by a registered dietitian and diabetes educator. Half of this appointment time is dedicated to the development of a personalized education plan.   This plan is developed privately in a 1: 1 drop by In Vitro route as needed.  Test 1st strip out of each new vial;discard solution 90 days after opening  Dx Code: E11.65 IDDM           ROSIBEL MICROLET LANCETS Misc   Test 3 times daily DxCode: E11.65 IDDM           Clopidogrel Bisulfate 75 MG Tabs - ROSIBEL MICROLET LANCETS Misc  - Glucose Blood Strp  - Insulin Glargine 300 UNIT/ML Sopn  - Insulin Pen Needle 32G X 4 MM Misc  - MetFORMIN HCl  MG Tb24            MyChart     Visit MyChart  You can access your MyChart to more actively manage your he

## (undated) NOTE — LETTER
Date & Time: 5/13/2024, 4:55 PM  Patient: David Reyes  Encounter Provider(s):    Myron Gonzalez APRN         This certifies that I, David Reyes, a patient at an [facility] [place], am leaving the facility by private car versus ambulance against the advice of my provider.    I acknowledge that I have been:    1. informed that my provider believes that I need to be transported to the ER by ambulance;  2. informed that if I go by private car, my condition can worsen causing permanent injury or even death without immediate medical attention.  3. provided discharge instructions consistent with my current diagnosis.    I hereby release my physician, the facility, and its employees from all responsibility for any ill effects which may result from this action.        __________________________________  Patient or authorized caregiver signature    __________________________________  RN signature    If no patient or patient representative signature was obtained, sign below to acknowledge that the form was reviewed with the patient and that the patient refused to sign.    __________________________________  RN signature  __________________________________  Patient or authorized caregiver signature    __________________________________  RN signature    If no patient or patient representative signature was obtained, sign below to acknowledge that the form was reviewed with the patient and that the patient refused to sign.    __________________________________  RN signature

## (undated) NOTE — MR AVS SNAPSHOT
[facility] [address] [address] [phone]               Thank you for choosing us for your health care visit with Emeyln Pulido RN,CDE.   We are glad to serve you and happy to provide you with this summa insulin to carbohydrate and correction factor ratios will be calculated and, if already in use, will  be reviewed for accuracy and recalculated if necessary.   INDIVIDUAL FOLLOW-UP APPOINTMENTS To be determined at appointment  [address] [facility] This list is accurate as of: 5/2/17 11:59 PM.  Always use your most recent med list.                aspirin 325 MG Tbec   Take 1 tablet by mouth daily.    What changed:  how much to take           ROSIBEL CONTOUR NEXT CONTROL Low Soln   1 drop by In Vitro rou medications prescribed for you. Read the directions carefully, and ask your doctor or other care provider to review them with you.             MyChart     Visit [facility]  You can access your MyChart to more actively manage your health care and view more deta

## (undated) NOTE — MR AVS SNAPSHOT
[address] [place] [address] [phone]               Thank you for choosing us for your health care visit with Klaus Laboy DO.   We are glad to serve you and happy to provide you with this summary of your vi Lovastatin ER 20 MG Tb24   Take 20 mg by mouth daily. Commonly known as:  ALTOPREV           methotrexate 2.5 MG Tabs   TAKE 6 TABLETS BY MOUTH EVERY 7 DAYS.    What changed:  additional instructions   Commonly known as:  RHEUMATREX           Metoprolol Women and lighter weight persons: limit to <= 1 drink* per day.               Visit [facility] online at  [email or web address]

## (undated) NOTE — LETTER
[address]  [address]  Authorization for Surgical Operation and Procedure     Date:___________                                                                                                         Time:__________  I hereby authorize placement of central venous catheter, my physician and his/her assistants (if applicable), which may include medical students, residents, and/or fellows, to perform the following surgical operation/ procedure and administer such anesthesia as may be determined necessary by my physician:  BRIAN Torres on David Reyes   2.   I recognize that during the surgical operation/procedure, unforeseen conditions may necessitate additional or different procedures than those listed above.  I, therefore, further authorize and request that the above-named surgeon, assistants, or designees perform such procedures as are, in their judgment, necessary and desirable.    3.   My surgeon/physician has discussed prior to my surgery the potential benefits, risks and side effects of this procedure; the likelihood of achieving goals; and potential problems that might occur during recuperation.  They also discussed reasonable alternatives to the procedure, including risks, benefits, and side effects related to the alternatives and risks related to not receiving this procedure.  I have had all my questions answered and I acknowledge that no guarantee has been made as to the result that may be obtained.    4.   Should the need arise during my operation/procedure, which includes change of level of care prior to discharge, I also consent to the administration of blood and/or blood products.  Further, I understand that despite careful testing and screening of blood or blood products by collecting agencies, I may still be subject to ill effects as a result of receiving a blood transfusion and/or blood products.  The following are some, but not all, of the potential  risks that can occur: fever and allergic reactions, hemolytic reactions, transmission of diseases such as Hepatitis, AIDS and Cytomegalovirus (CMV) and fluid overload.  In the event that I wish to have an autologous transfusion of my own blood, or a directed donor transfusion, I will discuss this with my physician.  Check only if Refusing Blood or Blood Products  I understand refusal of blood or blood products as deemed necessary by my physician may have serious consequences to my condition to include possible death. I hereby assume responsibility for my refusal and release the hospital, its personnel, and my physicians from any responsibility for the consequences of my refusal.          o  Refuse      5.   I authorize the use of any specimen, organs, tissues, body parts or foreign objects that may be removed from my body during the operation/procedure for diagnosis, research or teaching purposes and their subsequent disposal by hospital authorities.  I also authorize the release of specimen test results and/or written reports to my treating physician on the hospital medical staff or other referring or consulting physicians involved in my care, at the discretion of the Pathologist or my treating physician.    6.   I consent to the photographing or videotaping of the operations or procedures to be performed, including appropriate portions of my body for medical, scientific, or educational purposes, provided my identity is not revealed by the pictures or by descriptive texts accompanying them.  If the procedure has been photographed/videotaped, the surgeon will obtain the original picture, image, videotape or CD.  The hospital will not be responsible for storage, release or maintenance of the picture, image, tape or CD.    7.   I consent to the presence of a  or observers in the operating room as deemed necessary by my physician or their designees.    8.   I recognize that in the event my procedure  results in extended X-Ray/fluoroscopy time, I may develop a skin reaction.    9. If I have a Do Not Attempt Resuscitation (DNAR) order in place, that status will be suspended while in the operating room, procedural suite, and during the recovery period unless otherwise explicitly stated by me (or a person authorized to consent on my behalf). The surgeon or my attending physician will determine when the applicable recovery period ends for purposes of reinstating the DNAR order.  10. Patients having a sterilization procedure: I understand that if the procedure is successful the results will be permanent and it will therefore be impossible for me to inseminate, conceive, or bear children.  I also understand that the procedure is intended to result in sterility, although the result has not been guaranteed.   11. I acknowledge that my physician has explained sedation/analgesia administration to me including the risk and benefits I consent to the administration of sedation/analgesia as may be necessary or desirable in the judgment of my physician.    I CERTIFY THAT I HAVE READ AND FULLY UNDERSTAND THE ABOVE CONSENT TO OPERATION and/or OTHER PROCEDURE.    _________________________________________  __________________________________  Signature of Patient     Signature of Responsible Person         ___________________________________         Printed Name of Responsible Person           _________________________________                 Relationship to Patient  _________________________________________  ______________________________  Signature of Witness          Date  Time      Patient Name: David Reyes     : 1945                 Printed: 2024     Medical Record #: [id number]                     Page 1 of [address] [place] [address]  [id number]    Consent for Anesthesia    I, David Reyes agree to be cared for by an anesthesiologist,  who is specially trained to monitor me and give me medicine to put me to sleep or keep me comfortable during my procedure    I understand that my anesthesiologist is not an employee or agent of [facility] or [employer]. He or she works for [employer].    As the patient asking for anesthesia services, I agree to:  Allow the anesthesiologist (anesthesia doctor) to give me medicine and do additional procedures as necessary. Some examples are: Starting or using an “IV” to give me medicine, fluids or blood during my procedure, and having a breathing tube placed to help me breathe when I’m asleep (intubation). In the event that my heart stops working properly, I understand that my anesthesiologist will make every effort to sustain my life, unless otherwise directed by [facility] Do Not Resuscitate documents.  Tell my anesthesia doctor before my procedure:  If I am pregnant.  The last time that I ate or drank.  All of the medicines I take (including prescriptions, herbal supplements, and pills I can buy without a prescription (including street drugs/illegal medications). Failure to inform my anesthesiologist about these medicines may increase my risk of anesthetic complications.  If I am allergic to anything or have had a reaction to anesthesia before.  I understand how the anesthesia medicine will help me (benefits).  I understand that with any type of anesthesia medicine there are risks:  The most common risks are: nausea, vomiting, sore throat, muscle soreness, damage to my eyes, mouth, or teeth (from breathing tube placement).  Rare risks include: remembering what happened during my procedure, allergic reactions to medications, injury to my airway, heart, lungs, vision, nerves, or muscles and in extremely rare instances death.  My doctor has explained to me other choices available to me for my care (alternatives).  Pregnant Patients (“epidural”):  I understand that the risks of  having an epidural (medicine given into my back to help control pain during labor), include itching, low blood pressure, difficulty urinating, headache or slowing of the baby’s heart. Very rare risks include infection, bleeding, seizure, irregular heart rhythms and nerve injury.  Regional Anesthesia (“spinal”, “epidural”, & “nerve blocks”):  I understand that rare but potential complications include headache, bleeding, infection, seizure, irregular heart rhythms, and nerve injury.    I can change my mind about having anesthesia services at any time before I get the medicine.    _____________________________________________________________________________  Patient (or Representative) Signature/Relationship to Patient  Date   Time    _____________________________________________________________________________   Name (if used)    Language/Organization   Time    _____________________________________________________________________________  Anesthesiologist Signature     Date   Time  I have discussed the procedure and information above with the patient (or patient’s representative) and answered their questions. The patient or their representative has agreed to have anesthesia services.    _____________________________________________________________________________  Witness        Date   Time  I have verified that the signature is that of the patient or patient’s representative, and that it was signed before the procedure  Patient Name: David Reyes     : 1945                 Printed: 2024     Medical Record #: [id number]                     Page 2 of 2

## (undated) NOTE — LETTER
October 21, 2019    [address]  [place] [address]      Dear Bear Almanza: It was a pleasure speaking with you over the phone recently.  To follow up, I wanted to send you my contact information to utilize when you have a quest

## (undated) NOTE — LETTER
Radha Cast   [address]  [place] [address]           Dear Sagrario Patterson records indicate that you have outstanding lab work and or testing that was ordered for you and has not yet been completed:  Lab Frequency Next Occurrence   BASIC METABOLIC PANEL (8) Once 72/90/4377   VITAMIN D Once 12/08/2022   CBC WITH DIFFERENTIAL WITH PLATELET Once 25/59/8032      To provide you with the best possible care, please complete these orders at your earliest convenience. If you have recently completed these orders please disregard this letter. If you have any questions please call the office at [phone].      Thank you,     [facility]

## (undated) NOTE — Clinical Note
Jose Dobsonale's LDL is not at goal. He has had some statin intolerances in the past. The Lovastatin is currently tolerated.  Is there anything else to add to his regimen to get him to goal? Judy Sarabia